# Patient Record
Sex: FEMALE | Race: WHITE | NOT HISPANIC OR LATINO | Employment: OTHER | URBAN - METROPOLITAN AREA
[De-identification: names, ages, dates, MRNs, and addresses within clinical notes are randomized per-mention and may not be internally consistent; named-entity substitution may affect disease eponyms.]

---

## 2017-01-04 DIAGNOSIS — I25.9 CHRONIC ISCHEMIC HEART DISEASE: Primary | ICD-10-CM

## 2017-01-04 RX ORDER — ISOSORBIDE MONONITRATE 60 MG/1
60 TABLET, EXTENDED RELEASE ORAL DAILY
Qty: 90 TABLET | Refills: 3 | Status: SHIPPED | OUTPATIENT
Start: 2017-01-04 | End: 2018-01-06

## 2017-01-04 NOTE — TELEPHONE ENCOUNTER
ISOSORBIDE [place] ER 60 MG TABLET  Last Written Prescription Date: 12/08/2016  Last Fill Quantity: 30,  # refills: 0   Last Office Visit with [facility], [facility] or [facility] prescribing provider: 12/20/2016                                         Next 5 appointments (look out 90 days)     Mar 20, 2017  9:15 AM   Return Visit with Kalyan Luis MD   [facility] [place] ([facility])    [address] [place] [id number] [phone]

## 2017-01-13 ENCOUNTER — TELEPHONE (OUTPATIENT)
Dept: FAMILY MEDICINE | Facility: CLINIC | Age: 82
End: 2017-01-13

## 2017-01-13 NOTE — TELEPHONE ENCOUNTER
Reason for Call:  Form, our goal is to have forms completed with 72 hours, however, some forms may require a visit or additional information.    Type of letter, form or note:  [address]    Who is the form from?: [address] (if other please explain)    Where did the form come from: form was faxed in    What clinic location was the form placed at?: [facility]    Where the form was placed: Dr's Box: Antwon Schmidt MD    What number is listed as a contact on the form?: [address], fax # [phone]       Additional comments: Need for Reasonable Accommodations Verification Form    Call taken on 1/13/2017 at 1:34 PM by YAQUELIN PHELPS

## 2017-02-08 DIAGNOSIS — I25.9 CHRONIC ISCHEMIC HEART DISEASE, UNSPECIFIED: Primary | ICD-10-CM

## 2017-02-08 RX ORDER — RANOLAZINE 500 MG/1
500 TABLET, EXTENDED RELEASE ORAL 2 TIMES DAILY
Qty: 180 TABLET | Refills: 2 | Status: SHIPPED | OUTPATIENT
Start: 2017-02-08 | End: 2018-02-19

## 2017-03-20 ENCOUNTER — OFFICE VISIT (OUTPATIENT)
Dept: CARDIOLOGY | Facility: CLINIC | Age: 82
End: 2017-03-20
Attending: INTERNAL MEDICINE
Payer: MEDICARE

## 2017-03-20 VITALS
DIASTOLIC BLOOD PRESSURE: 64 MMHG | WEIGHT: 205 LBS | BODY MASS INDEX: 37.73 KG/M2 | SYSTOLIC BLOOD PRESSURE: 112 MMHG | HEART RATE: 68 BPM | HEIGHT: 62 IN

## 2017-03-20 DIAGNOSIS — I25.9 CHRONIC ISCHEMIC HEART DISEASE: Primary | ICD-10-CM

## 2017-03-20 DIAGNOSIS — I20.9 ISCHEMIC CHEST PAIN (H): ICD-10-CM

## 2017-03-20 DIAGNOSIS — I10 HYPERTENSION GOAL BP (BLOOD PRESSURE) < 140/90: ICD-10-CM

## 2017-03-20 PROCEDURE — 99213 OFFICE O/P EST LOW 20 MIN: CPT | Performed by: INTERNAL MEDICINE

## 2017-03-20 NOTE — PROGRESS NOTES
HPI and Plan:   See dictation  [id number]    Orders Placed This Encounter   Procedures     Follow-Up with Cardiologist       No orders of the defined types were placed in this encounter.      There are no discontinued medications.      Encounter Diagnoses   Name Primary?     Chronic ischemic heart disease Yes     Hypertension goal BP (blood pressure) < 140/90      Ischemic chest pain (H)        CURRENT MEDICATIONS:  Current Outpatient Prescriptions   Medication Sig Dispense Refill     ranolazine (RANEXA) 500 MG 12 hr tablet Take 1 tablet (500 mg) by mouth 2 times daily 180 tablet 2     isosorbide mononitrate (IMDUR) 60 MG 24 hr tablet Take 1 tablet (60 mg) by mouth daily 90 tablet 3     metFORMIN (GLUCOPHAGE-XR) 750 MG 24 hr tablet TAKE ONE TABLET BY MOUTH ONE TIME DAILY WITH DINNER 90 tablet 1     LYRICA 150 MG capsule TAKE ONE CAPSULE TWICE A DAY 60 capsule 5     nitroglycerin (NITROSTAT) 0.4 MG SL tablet Place 1 tablet (0.4 mg) under the tongue every 5 minutes as needed for chest pain 25 tablet 3     atorvastatin (LIPITOR) 40 MG tablet Take 1 tablet (40 mg) by mouth daily 90 tablet 3     amLODIPine (NORVASC) 5 MG tablet Take 1 tablet (5 mg) by mouth daily 90 tablet 3     losartan (COZAAR) 50 MG tablet TAKE ONE TABLET BY MOUTH ONE TIME DAILY 90 tablet 2     levothyroxine (SYNTHROID, LEVOTHROID) 25 MCG tablet TAKE ONE TABLET BY MOUTH ONE TIME DAILY  90 tablet 3     order for DME Equipment being ordered: wheeled walker with brakes and a seat 1 Device 0     traMADol (ULTRAM) 50 MG tablet Take 1 tablet (50 mg) by mouth every 8 hours as needed for moderate pain (Patient taking differently: Take 50 mg by mouth as needed for moderate pain ) 60 tablet 3     ACETAMINOPHEN PO Take 1,000 mg by mouth 2 times daily       Travoprost (TRAVATAN) 0.004 % SOLN Place 1 drop into both eyes At Bedtime        psyllium (METAMUCIL) 30.9 % POWD        aspirin (ASPIRIN LOW DOSE) 81 MG tablet Take 1 tablet by mouth daily.       Cholecalciferol  (HM VITAMIN D3) 4000 UNITS CAPS Take 1 tablet by mouth daily.       metoprolol (LOPRESSOR) 100 MG tablet TAKE 1/2 TABLET BY MOUTH EVERY AM AND TAKE 1 TABLET IN THE EVENING. 135 tablet 2       ALLERGIES     Allergies   Allergen Reactions     Hydrochlorothiazide      Pancreatitis - patient denies this.  She says it gave her low sodium.       PAST MEDICAL HISTORY:  Past Medical History   Diagnosis Date     Angina at rest (H)      Thought to be microvascular     Arthritis      Basal cell carcinoma 1/2014, (3/5/6-2014)     nose and check and ear and face     Coronary artery disease      Mild CAD per 2/2008 angiogram     Hyperlipidaemia      Hypertension      Hypothyroidism 1/17/2013     Mitral regurgitation 6/2015     echo     Type 2 diabetes, HbA1C goal < 8% (H) 9/11/2013       PAST SURGICAL HISTORY:  Past Surgical History   Procedure Laterality Date     Cholecystectomy  1975     Back surgery  1996     Appendectomy  1974     Hysterectomy  1970     Oophorectomy  1974     bilateral     Bladder surgery  1990     bladder suspension     Face surgery  1-6/2014     basal cell plus plastic to nose, face , ear     Coronary angiography adult order  2/2008     Mild CAD. LAD w/20% stenosis, RCA with 30-40% stenosis. No flow limiting obstructions.     Phacoemulsification clear cornea with standard intraocular lens implant Right 7/21/2015     Procedure: PHACOEMULSIFICATION CLEAR CORNEA WITH STANDARD INTRAOCULAR LENS IMPLANT;  Surgeon: Schuyler Chapa MD;  Location: [facility]     Phacoemulsification clear cornea with standard intraocular lens implant Left 8/18/2015     Procedure: PHACOEMULSIFICATION CLEAR CORNEA WITH STANDARD INTRAOCULAR LENS IMPLANT;  Surgeon: Schuyler Chapa MD;  Location: [facility]       FAMILY HISTORY:  Family History   Problem Relation Age of Onset     CANCER Brother      lung     CANCER Son      CEREBROVASCULAR DISEASE Mother      and Parkinson's     CANCER Father      pancreas     DIABETES Son        SOCIAL  "HISTORY:  Social History     Social History     Marital status:      Spouse name: N/A     Number of children: N/A     Years of education: N/A     Social History Main Topics     Smoking status: Never Smoker     Smokeless tobacco: Never Used     Alcohol use Yes      Comment: Cocktail 2-3 times per month     Drug use: No     Sexual activity: No     Other Topics Concern     Parent/Sibling W/ Cabg, Mi Or Angioplasty Before 65f 55m? No     Caffeine Concern Yes     4 cups caffeine per day     Sleep Concern No     Stress Concern No     Weight Concern No     Special Diet No     Exercise Yes     exercises 30 minutes, 4 days week     Social History Narrative    Had 2 sons; one ; had 2 step dtrs; one .                                        times 2       Review of Systems:  Skin:  Negative       Eyes:  Positive for glaucoma;glasses    ENT:  Negative      Respiratory:  Positive for dyspnea on exertion     Cardiovascular:    Positive for;lightheadedness;dizziness;edema    Gastroenterology: Positive for constipation    Genitourinary:  Positive for nocturia    Musculoskeletal:  Positive for joint pain;arthritis    Neurologic:  Positive for numbness or tingling of feet neuropathy   Psychiatric:  Negative      Heme/Lymph/Imm:  Positive for allergies    Endocrine:  Positive for thyroid disorder;diabetes      Physical Exam:  Vitals: /64  Pulse 68  Ht 1.575 m (5' 2\")  Wt 93 kg (205 lb)  LMP  (LMP Unknown)  BMI 37.49 kg/m2    Constitutional:  cooperative;alert and oriented        Skin:  warm and dry to the touch        Head:  normocephalic        Eyes:  pupils equal and round        ENT:  not assessed this visit        Neck:  JVP normal        Chest:  clear to auscultation          Cardiac: regular rhythm;normal S1 and S2   S4              Abdomen:  abdomen soft;BS normoactive        Vascular: not assessed this visit                                        Extremities and Back:  no spinal abnormalities noted      "         Neurological:  no gross motor deficits              CC  Kalyan Luis MD   [facility]  [address]  [place] [id number]

## 2017-03-20 NOTE — PROGRESS NOTES
2017             Antwon Schmidt MD   [facility]    [address] [place] [address]      RE: Lora Vergara   MRN: [id number]   : 1925      Dear Antwon:      I had the pleasure of seeing Lora Vergara in Cardiology Clinic, who has chronic ischemic chest discomfort related to microvascular angina.  She has had a coronary angiography in , which showed mild to moderate coronary artery atherosclerosis, but no obstructive disease.  However, chest discomfort and jaw pain have been suspicious for angina and responded to Imdur and Ranexa.  With that, she has had no need for taking sublingual nitroglycerin.  She has been still quite active at her age.  She drives.   She denies any recent chest discomfort, orthopnea or PND.        PHYSICAL EXAMINATION:  Blood pressure 112/64, pulse 68 per minute and regular.  Cardiopulmonary examination is unremarkable and unchanged.        IMAGING:  Her last echocardiogram from 2016 had shown aortic valve sclerosis.  Ejection fraction was greater than 70%.  Last stress nuclear study from  showed no ischemia.      IMPRESSION:   1.  Microvascular angina/coronary artery atherosclerosis.  At this time, pain is well controlled on a combination of Imdur, Ranexa, amlodipine and p.r.n. sublingual nitro.  She has not taken sublingual nitro for a while now.  She is on baby aspirin.  She is also on metoprolol.  At this time, her blood pressure is quite well controlled.  Last LDL was 48 in 2016.   2.  Hyperlipidemia.  Continue atorvastatin.   3.  Hypertension, well controlled.   4.  Aortic valve sclerosis.  We will consider an echocardiogram next year to reassess this.        Overall, she is doing well.  I made no changes to her medications.  She will return to see me in followup in 6 months or earlier as needed.      Sincerely,      MD LIDYA Rosa MD             D: 2017 09:43   T: 2017  12:05   MT: tiffany      Name:     FRAN CALDWELL   MRN:      [id number]        Account:      [id number]   :      1925           Service Date: 2017      Document: [id number]

## 2017-03-20 NOTE — LETTER
2017             Antwon Schmidt MD   [facility]    [address] [place] [address]      RE: Lora Vergara   MRN: [id number]   : 1925      Dear Antwon:      I had the pleasure of seeing Lora Vergara in Cardiology Clinic, who has chronic ischemic chest discomfort related to microvascular angina.  She has had a coronary angiography in , which showed mild to moderate coronary artery atherosclerosis, but no obstructive disease.  However, chest discomfort and jaw pain have been suspicious for angina and responded to Imdur and Ranexa.  With that, she has had no need for taking sublingual nitroglycerin.  She has been still quite active at her age.  She drives.   She denies any recent chest discomfort, orthopnea or PND.        PHYSICAL EXAMINATION:  Blood pressure 112/64, pulse 68 per minute and regular.  Cardiopulmonary examination is unremarkable and unchanged.        IMAGING:  Her last echocardiogram from 2016 had shown aortic valve sclerosis.  Ejection fraction was greater than 70%.  Last stress nuclear study from  showed no ischemia.      IMPRESSION:   1.  Microvascular angina/coronary artery atherosclerosis.  At this time, pain is well controlled on a combination of Imdur, Ranexa, amlodipine and p.r.n. sublingual nitro.  She has not taken sublingual nitro for a while now.  She is on baby aspirin.  She is also on metoprolol.  At this time, her blood pressure is quite well controlled.  Last LDL was 48 in 2016.   2.  Hyperlipidemia.  Continue atorvastatin.   3.  Hypertension, well controlled.   4.  Aortic valve sclerosis.  We will consider an echocardiogram next year to reassess this.                  Overall, she is doing well.  I made no changes to her medications.  She will return to see me in followup in 6 months or earlier as needed.      Sincerely,      Kalyan Luis MD

## 2017-03-20 NOTE — MR AVS SNAPSHOT
"              After Visit Summary   3/20/2017    Lora Vergara    MRN: [id number]           Patient Information     Date Of Birth          9/11/1925        Visit Information        Provider Department      3/20/2017 9:15 AM Kalyan Luis MD [facility]        Today's Diagnoses     Chronic ischemic heart disease    -  1    Hypertension goal BP (blood pressure) < 140/90        Ischemic chest pain (H)           Follow-ups after your visit        Additional Services     Follow-Up with Cardiologist                 Future tests that were ordered for you today     Open Future Orders        Priority Expected Expires Ordered    Follow-Up with Cardiologist Routine 9/16/2017 3/20/2018 3/20/2017            Who to contact     If you have questions or need follow up information about today's clinic visit or your schedule please contact [facility] directly at [phone].  Normal or non-critical lab and imaging results will be communicated to you by MyChart, letter or phone within 4 business days after the clinic has received the results. If you do not hear from us within 7 days, please contact the clinic through [employer] or phone. If you have a critical or abnormal lab result, we will notify you by phone as soon as possible.  Submit refill requests through "[employer] or call your pharmacy and they will forward the refill request to us. Please allow 3 business days for your refill to be completed.          Additional Information About Your Visit        MyChart Information     "[employer] lets you send messages to your doctor, view your test results, renew your prescriptions, schedule appointments and more. To sign up, go to [email or web address] . Click on \"Log in\" on the left side of the screen, which will take you to the Welcome page. Then click on \"Sign up Now\" on the right side of the page.     You will be asked to enter the access code " "listed below, as well as some personal information. Please follow the directions to create your username and password.     Your access code is: [email or web address]  Expires: 2017  9:18 AM     Your access code will  in 90 days. If you need help or a new code, please call your [place] clinic or [phone].        Care EveryWhere ID     This is your Care EveryWhere ID. This could be used by other organizations to access your [place] medical records  [id number]        Your Vitals Were     Pulse Height Last Period BMI (Body Mass Index)          68 1.575 m (5' 2\") (LMP Unknown) 37.49 kg/m2         Blood Pressure from Last 3 Encounters:   17 112/64   16 120/60   16 114/68    Weight from Last 3 Encounters:   17 93 kg (205 lb)   16 95.3 kg (210 lb)   16 95.8 kg (211 lb 1.6 oz)              We Performed the Following     Follow-Up with Cardiologist          Today's Medication Changes          These changes are accurate as of: 3/20/17  9:18 AM.  If you have any questions, ask your nurse or doctor.               These medicines have changed or have updated prescriptions.        Dose/Directions    traMADol 50 MG tablet   Commonly known as:  ULTRAM   This may have changed:  when to take this   Used for:  Right hip pain, Trochanteric bursitis, right        Dose:  50 mg   Take 1 tablet (50 mg) by mouth every 8 hours as needed for moderate pain   Quantity:  60 tablet   Refills:  3                Primary Care Provider Office Phone # Fax #    Antwon Schmidt [phone]       [facility] [address] [facility] [address]        Thank you!     Thank you for choosing [facility] [place]  for your care. Our goal is always to provide you with excellent care. Hearing back from our patients is one way we can continue to improve our services. Please take a few minutes to complete the written survey that you may receive in " the mail after your visit with us. Thank you!             Your Updated Medication List - Protect others around you: Learn how to safely use, store and throw away your medicines at www.disposemymeds.org.          This list is accurate as of: 3/20/17  9:18 AM.  Always use your most recent med list.                   Brand Name Dispense Instructions for use    ACETAMINOPHEN PO      Take 1,000 mg by mouth 2 times daily       amLODIPine 5 MG tablet    NORVASC    90 tablet    Take 1 tablet (5 mg) by mouth daily       ASPIRIN LOW DOSE 81 MG tablet   Generic drug:  aspirin      Take 1 tablet by mouth daily.       atorvastatin 40 MG tablet    LIPITOR    90 tablet    Take 1 tablet (40 mg) by mouth daily       HM VITAMIN D3 4000 UNITS Caps   Generic drug:  Cholecalciferol      Take 1 tablet by mouth daily.       isosorbide mononitrate 60 MG 24 hr tablet    IMDUR    90 tablet    Take 1 tablet (60 mg) by mouth daily       levothyroxine 25 MCG tablet    SYNTHROID/LEVOTHROID    90 tablet    TAKE ONE TABLET BY MOUTH ONE TIME DAILY       losartan 50 MG tablet    COZAAR    90 tablet    TAKE ONE TABLET BY MOUTH ONE TIME DAILY       LYRICA 150 MG capsule   Generic drug:  pregabalin     60 capsule    TAKE ONE CAPSULE TWICE A DAY       METAMUCIL 30.9 % Powd   Generic drug:  psyllium          metFORMIN 750 MG 24 hr tablet    GLUCOPHAGE-XR    90 tablet    TAKE ONE TABLET BY MOUTH ONE TIME DAILY WITH DINNER       metoprolol 100 MG tablet    LOPRESSOR    135 tablet    TAKE 1/2 TABLET BY MOUTH EVERY AM AND TAKE 1 TABLET IN THE EVENING.       nitroglycerin 0.4 MG sublingual tablet    NITROSTAT    25 tablet    Place 1 tablet (0.4 mg) under the tongue every 5 minutes as needed for chest pain       order for DME     1 Device    Equipment being ordered: wheeled walker with brakes and a seat       ranolazine 500 MG 12 hr tablet    RANEXA    180 tablet    Take 1 tablet (500 mg) by mouth 2 times daily       traMADol 50 MG tablet    ULTRAM    60  tablet    Take 1 tablet (50 mg) by mouth every 8 hours as needed for moderate pain       TRAVATAN 0.004 % Soln   Generic drug:  Travoprost      Place 1 drop into both eyes At Bedtime

## 2017-03-22 ENCOUNTER — OFFICE VISIT (OUTPATIENT)
Dept: FAMILY MEDICINE | Facility: CLINIC | Age: 82
End: 2017-03-22
Payer: MEDICARE

## 2017-03-22 VITALS
HEART RATE: 79 BPM | BODY MASS INDEX: 35.77 KG/M2 | SYSTOLIC BLOOD PRESSURE: 132 MMHG | OXYGEN SATURATION: 95 % | DIASTOLIC BLOOD PRESSURE: 76 MMHG | TEMPERATURE: 98.1 F | HEIGHT: 64 IN | RESPIRATION RATE: 14 BRPM | WEIGHT: 209.5 LBS

## 2017-03-22 DIAGNOSIS — I25.9 CHRONIC ISCHEMIC HEART DISEASE: ICD-10-CM

## 2017-03-22 DIAGNOSIS — E11.40 TYPE 2 DIABETES MELLITUS WITH DIABETIC NEUROPATHY, WITHOUT LONG-TERM CURRENT USE OF INSULIN (H): ICD-10-CM

## 2017-03-22 DIAGNOSIS — Z00.00 ROUTINE GENERAL MEDICAL EXAMINATION AT A HEALTH CARE FACILITY: Primary | ICD-10-CM

## 2017-03-22 DIAGNOSIS — R39.89 ABNORMAL URINE COLOR: ICD-10-CM

## 2017-03-22 DIAGNOSIS — G62.9 NEUROPATHY: Chronic | ICD-10-CM

## 2017-03-22 DIAGNOSIS — I10 HYPERTENSION GOAL BP (BLOOD PRESSURE) < 140/90: ICD-10-CM

## 2017-03-22 LAB
ALBUMIN UR-MCNC: NEGATIVE MG/DL
APPEARANCE UR: CLEAR
BILIRUB UR QL STRIP: NEGATIVE
COLOR UR AUTO: YELLOW
GLUCOSE UR STRIP-MCNC: NEGATIVE MG/DL
HGB UR QL STRIP: NEGATIVE
KETONES UR STRIP-MCNC: NEGATIVE MG/DL
LEUKOCYTE ESTERASE UR QL STRIP: ABNORMAL
NITRATE UR QL: NEGATIVE
NON-SQ EPI CELLS #/AREA URNS LPF: NORMAL /LPF
PH UR STRIP: 5.5 PH (ref 5–7)
RBC #/AREA URNS AUTO: NORMAL /HPF (ref 0–2)
SP GR UR STRIP: 1.02 (ref 1–1.03)
URN SPEC COLLECT METH UR: ABNORMAL
UROBILINOGEN UR STRIP-ACNC: 0.2 EU/DL (ref 0.2–1)
WBC #/AREA URNS AUTO: NORMAL /HPF (ref 0–2)

## 2017-03-22 PROCEDURE — G0439 PPPS, SUBSEQ VISIT: HCPCS | Performed by: INTERNAL MEDICINE

## 2017-03-22 PROCEDURE — 81001 URINALYSIS AUTO W/SCOPE: CPT | Performed by: INTERNAL MEDICINE

## 2017-03-22 PROCEDURE — 36415 COLL VENOUS BLD VENIPUNCTURE: CPT | Performed by: INTERNAL MEDICINE

## 2017-03-22 PROCEDURE — 80053 COMPREHEN METABOLIC PANEL: CPT | Performed by: INTERNAL MEDICINE

## 2017-03-22 NOTE — NURSING NOTE
"Chief Complaint   Patient presents with     Medicare Visit     89 year old female presents for annual wellness exam.       Initial /76 (BP Location: Right arm, Patient Position: Chair, Cuff Size: Adult Regular)  Pulse 79  Temp 98.1  F (36.7  C) (Tympanic)  Resp 14  Ht 5' 3.5\" (1.613 m)  Wt 209 lb 8 oz (95 kg)  LMP  (LMP Unknown)  SpO2 95%  Breastfeeding? No  BMI 36.53 kg/m2 Estimated body mass index is 36.53 kg/(m^2) as calculated from the following:    Height as of this encounter: 5' 3.5\" (1.613 m).    Weight as of this encounter: 209 lb 8 oz (95 kg).  Medication Reconciliation: complete     April Joy LPN  "

## 2017-03-22 NOTE — PROGRESS NOTES
SUBJECTIVE:                                                            Lora Vergara is a 89 year old female who presents for Preventive Visit  Are you in the first 12 months of your Medicare Part B coverage?  No    Healthy Habits:    Do you get at least three servings of calcium containing foods daily (dairy, green leafy vegetables, etc.)? yes    Amount of exercise or daily activities, outside of work: 5 day(s) per week    Problems taking medications regularly No    Medication side effects: No    Have you had an eye exam in the past two years? yes    Do you see a dentist twice per year? yes    Do you have sleep apnea, excessive snoring or daytime drowsiness?no    COGNITIVE SCREEN  1) Repeat 3 items (Banana, Sunrise, Chair)    2) Clock draw: NORMAL  3) 3 item recall: Recalls 2 objects   Results: NORMAL clock, 1-2 items recalled: COGNITIVE IMPAIRMENT LESS LIKELY    Mini-CogTM Copyright S [employer]. Licensed by the author for use in [facility]; reprinted with permission (benito@[facility]). All rights reserved.                         She reports no major or urgent problems.          hhas not used any nitroglycerin for several months.                    she still c/o jasmyne/yellow urine,despite any amount of fluid intake.        Reviewed and updated as needed this visit by clinical staff  Tobacco  Allergies  Meds  Med Hx  Surg Hx  Fam Hx  Soc Hx        Reviewed and updated as needed this visit by Provider        Social History   Substance Use Topics     Smoking status: Never Smoker     Smokeless tobacco: Never Used     Alcohol use Yes      Comment: Cocktail 2-3 times per month       The patient does not drink >3 drinks per day nor >7 drinks per week.    Today's PHQ-2 Score:   PHQ-2 ( 1999 [employer]) 3/22/2017 12/20/2016   Q1: Little interest or pleasure in doing things 0 0   Q2: Feeling down, depressed or hopeless 1 0   PHQ-2 Score 1 0       Do you feel safe in your environment - Yes    Do you have a Health  Care Directive?: Yes: Advance Directive has been received and scanned.    Current providers sharing in care for this patient include:   Patient Care Team:  Antwon Schmidt MD as PCP - General (Internal Medicine)      Hearing impairment: Yes, Difficulty following a conversation in a noisy restaurant or crowded room.    Difficulty understanding soft or whispered speech.    Ability to successfully perform activities of daily living: Yes, no assistance needed     Fall risk:  Fallen 2 or more times in the past year?: No  Any fall with injury in the past year?: No    Home safety:  none identified      The following health maintenance items are reviewed in Epic and correct as of today:  Health Maintenance   Topic Date Due     EYE EXAM Q1 YEAR( NO INBASKET)  01/23/2016     MEDICARE ANNUAL WELLNESS VISIT  01/23/2016     MICROALBUMIN Q1 YEAR( NO INBASKET)  07/20/2016     FOOT EXAM Q1 YEAR( NO INBASKET)  03/09/2017     LIPID MONITORING Q1 YEAR( NO INBASKET)  03/09/2017     A1C Q6 MO( NO INBASKET)  06/20/2017     INFLUENZA VACCINE (SYSTEM ASSIGNED)  09/01/2017     CREATININE Q1 YEAR (NO INBASKET)  12/20/2017     FALL RISK ASSESSMENT  12/20/2017     TSH W/ FREE T4 REFLEX Q2 YEAR (NO INBASKET)  12/20/2018     ADVANCE DIRECTIVE PLANNING Q5 YRS (NO INBASKET)  05/17/2021     TETANUS IMMUNIZATION (SYSTEM ASSIGNED)  01/23/2025     PNEUMOCOCCAL  Completed              ROS:  C: NEGATIVE for fever, chills, change in weight  I: NEGATIVE for worrisome rashes, moles or lesions  E: NEGATIVE for vision changes or irritation  E/M: NEGATIVE for ear, mouth and throat problems  R: NEGATIVE for significant cough or SOB  B: NEGATIVE for masses, tenderness or discharge  CV: NEGATIVE for orthopnea and palpitations  GI: NEGATIVE for nausea, abdominal pain, heartburn, or change in bowel habits  : NEGATIVE for frequency, dysuria, or hematuria  NEURO: POSITIVE for stable symptoms of peripheral neuropathy, which affects her walking ability.  E:  "NEGATIVE for temperature intolerance, skin/hair changes  H: NEGATIVE for bleeding problems  P: NEGATIVE for changes in mood or affect    BP Readings from Last 3 Encounters:   03/22/17 132/76   03/20/17 112/64   12/20/16 120/60    Wt Readings from Last 3 Encounters:   03/22/17 209 lb 8 oz (95 kg)   03/20/17 205 lb (93 kg)   12/20/16 210 lb (95.3 kg)                  Patient Active Problem List    Diagnosis Date Noted     Other and unspecified angina pectoris 01/22/2015     Priority: High     Chronic ischemic heart disease 11/15/2013     Priority: High     Mild to moderate on coronary angiography per Cardiology; angina is thought to be \" microvascular\"; medical Rx advised by Cardiology.         Echo in 2010 showed normal LV systolic fxn with grade I diastolic dysfxn           Type 2 diabetes mellitus with diabetic neuropathy (H) 09/11/2013     Priority: High     Hypertension goal BP (blood pressure) < 140/90 01/17/2013     Priority: High     CXR neg in 6/13       Hyperlipidemia LDL goal <70 01/17/2013     Priority: High     Hypothyroidism, unspecified type 12/20/2016     Priority: Medium     SOB (shortness of breath) 03/16/2016     Priority: Medium     Ischemic chest pain (H) 03/16/2016     Priority: Medium     Hypothyroidism 03/09/2016     Priority: Medium     Low back pain 03/17/2015     Priority: Medium     See Dr Sutton note 2/15; pt reports MARANDA was helpful  Diagnosis updated by automated process. Provider to review and confirm.       Overweight 12/27/2013     Priority: Medium     Sent for a sleep study by Dr. Luis of Cardiology in the remote past; pt did not sleep enough for a valid test    Problem list name updated by automated process. Provider to review       BCC (basal cell carcinoma), face 11/27/2013     Priority: Medium     Left nose; will have Mohs surgery 1/6/14, and plastic surgery 1/7/14 with Dr. Ernestina Torres        LLQ abdominal pain 11/26/2013     Priority: Medium     2011; CT: possible mild " diverticulitis         Neuropathy (H) 04/29/2013     Priority: Medium     Uses Lyrica BID       ACP (advance care planning) 03/09/2016     Priority: Low     Advance Care Planning 5/17/2016: Receipt of ACP document:  Received: Health Care Directive which was witnessed or notarized on 11-12-12.  Document previously scanned on 12-28-12.  Validation form completed and sent to be scanned.  Code Status reflects choices in most recent ACP document.  Confirmed/documented designated decision maker(s).  Added by Kymberly Nolen RN Advance Care Planning Liaison with [employer]  Advance Care Planning 3/9/2016: Receipt of ACP document:  Received: POLST which was signed and dated by provider on 3/9/16.  Document not previously scanned.  Reviewed for validity as medical order and sent to be scanned.  Code Status needs to be updated to reflect choices in most recent ACP document. Notification sent to Dr. Antwon Schmidt for followup.  Confirmed/documented designated decision maker(s).  Added by Katalina Davidson             Preventive measure 11/26/2013     Priority: Low     Pre visit report from [facility] reviewed                                   Mammogram 8/14        CXR 6/13                                      DXA wnl  in 2014           Past Surgical History:   Procedure Laterality Date     APPENDECTOMY  1974     BACK SURGERY  1996     BLADDER SURGERY  1990    bladder suspension     CHOLECYSTECTOMY  1975     CORONARY ANGIOGRAPHY ADULT ORDER  2/2008    Mild CAD. LAD w/20% stenosis, RCA with 30-40% stenosis. No flow limiting obstructions.     face surgery  1-6/2014    basal cell plus plastic to nose, face , ear     HYSTERECTOMY  1970     OOPHORECTOMY  1974    bilateral     PHACOEMULSIFICATION CLEAR CORNEA WITH STANDARD INTRAOCULAR LENS IMPLANT Right 7/21/2015    Procedure: PHACOEMULSIFICATION CLEAR CORNEA WITH STANDARD INTRAOCULAR LENS IMPLANT;  Surgeon: Schuyler Chapa MD;  Location: [facility]     PHACOEMULSIFICATION CLEAR CORNEA  WITH STANDARD INTRAOCULAR LENS IMPLANT Left 8/18/2015    Procedure: PHACOEMULSIFICATION CLEAR CORNEA WITH STANDARD INTRAOCULAR LENS IMPLANT;  Surgeon: Schuyler Chapa MD;  Location: [facility]       Social History   Substance Use Topics     Smoking status: Never Smoker     Smokeless tobacco: Never Used     Alcohol use Yes      Comment: Cocktail 2-3 times per month     Family History   Problem Relation Age of Onset     CANCER Brother      lung     CANCER Son      CEREBROVASCULAR DISEASE Mother      and Parkinson's     CANCER Father      pancreas     DIABETES Son          Current Outpatient Prescriptions   Medication Sig Dispense Refill     ranolazine (RANEXA) 500 MG 12 hr tablet Take 1 tablet (500 mg) by mouth 2 times daily 180 tablet 2     isosorbide mononitrate (IMDUR) 60 MG 24 hr tablet Take 1 tablet (60 mg) by mouth daily 90 tablet 3     metFORMIN (GLUCOPHAGE-XR) 750 MG 24 hr tablet TAKE ONE TABLET BY MOUTH ONE TIME DAILY WITH DINNER 90 tablet 1     LYRICA 150 MG capsule TAKE ONE CAPSULE TWICE A DAY 60 capsule 5     nitroglycerin (NITROSTAT) 0.4 MG SL tablet Place 1 tablet (0.4 mg) under the tongue every 5 minutes as needed for chest pain 25 tablet 3     atorvastatin (LIPITOR) 40 MG tablet Take 1 tablet (40 mg) by mouth daily 90 tablet 3     amLODIPine (NORVASC) 5 MG tablet Take 1 tablet (5 mg) by mouth daily 90 tablet 3     losartan (COZAAR) 50 MG tablet TAKE ONE TABLET BY MOUTH ONE TIME DAILY 90 tablet 2     metoprolol (LOPRESSOR) 100 MG tablet TAKE 1/2 TABLET BY MOUTH EVERY AM AND TAKE 1 TABLET IN THE EVENING. 135 tablet 2     levothyroxine (SYNTHROID, LEVOTHROID) 25 MCG tablet TAKE ONE TABLET BY MOUTH ONE TIME DAILY  90 tablet 3     order for DME Equipment being ordered: wheeled walker with brakes and a seat 1 Device 0     traMADol (ULTRAM) 50 MG tablet Take 1 tablet (50 mg) by mouth every 8 hours as needed for moderate pain (Patient taking differently: Take 50 mg by mouth as needed for moderate pain ) 60  "tablet 3     ACETAMINOPHEN PO Take 1,000 mg by mouth 2 times daily       Travoprost (TRAVATAN) 0.004 % SOLN Place 1 drop into both eyes At Bedtime        psyllium (METAMUCIL) 30.9 % POWD        aspirin (ASPIRIN LOW DOSE) 81 MG tablet Take 1 tablet by mouth daily.       Cholecalciferol (HM VITAMIN D3) 4000 UNITS CAPS Take 1 tablet by mouth daily.       Allergies   Allergen Reactions     Hydrochlorothiazide      Pancreatitis - patient denies this.  She says it gave her low sodium.     OBJECTIVE:                                                            /76 (BP Location: Right arm, Patient Position: Chair, Cuff Size: Adult Regular)  Pulse 79  Temp 98.1  F (36.7  C) (Tympanic)  Resp 14  Ht 5' 3.5\" (1.613 m)  Wt 209 lb 8 oz (95 kg)  LMP  (LMP Unknown)  SpO2 95%  Breastfeeding? No  BMI 36.53 kg/m2 Estimated body mass index is 36.53 kg/(m^2) as calculated from the following:    Height as of this encounter: 5' 3.5\" (1.613 m).    Weight as of this encounter: 209 lb 8 oz (95 kg).  EXAM:   GENERAL: alert, no distress and over weight  HENT: ear canals and TM's normal, nose and mouth without ulcers or lesions  NECK: no adenopathy, no asymmetry, masses, or scars and thyroid normal to palpation  RESP: lungs clear to auscultation - no rales, rhonchi or wheezes  BREAST: normal without masses, tenderness or nipple discharge and no palpable axillary masses or adenopathy  CV: regular rate and rhythm, normal S1 S2, no S3 or S4, no murmur, click or rub, no peripheral edema and peripheral pulses strong  ABDOMEN: soft, nontender, without hepatosplenomegaly or masses  MS: spine exam shows kyphosis  SKIN: no suspicious lesions or rashes  NEURO: gait abnormal:   PSYCH: mentation appears normal, affect normal/bright    ASSESSMENT / PLAN:                                                            Lora was seen today for medicare visit.    Diagnoses and all orders for this visit:    Routine general medical examination at a " "health care facility  -     Urine Microscopic    Type 2 diabetes mellitus with diabetic neuropathy, without long-term current use of insulin (H)  -     Comprehensive metabolic panel (BMP + Alb, Alk Phos, ALT, AST, Total. Bili, TP)  -     UA reflex to Microscopic    Neuropathy (H)    Abnormal urine color  -     UA reflex to Microscopic    Hypertension goal BP (blood pressure) < 140/90    Chronic ischemic heart disease                        Summary and implications:  We reviewed multiple issues. We did some of the necessary lab work in December so that was not repeated today.       We will check for bilirubinuria, and for hyperbilirubinemia, given her observations regarding her urine color.                     For now she will continue the same medicines and follow-up in 6 months.                                     End of Life Planning:  Patient currently has an advanced directive: Yes.  Practitioner is supportive of decision.    COUNSELING:  Reviewed preventive health counseling, as reflected in patient instructions  Special attention given to:       Regular exercise       Healthy diet/nutrition        Estimated body mass index is 36.53 kg/(m^2) as calculated from the following:    Height as of this encounter: 5' 3.5\" (1.613 m).    Weight as of this encounter: 209 lb 8 oz (95 kg).  Weight management plan: Discussed healthy diet and exercise guidelines and patient will follow up in 12 months in clinic to re-evaluate.   reports that she has never smoked. She has never used smokeless tobacco.      Appropriate preventive services were discussed with this patient, including applicable screening as appropriate for cardiovascular disease, diabetes, osteopenia/osteoporosis, and glaucoma.  As appropriate for age/gender, discussed screening for colorectal cancer, prostate cancer, breast cancer, and cervical cancer. Checklist reviewing preventive services available has been given to the patient.    Reviewed patients plan of " care and provided an AVS. The Basic Care Plan (routine screening as documented in Health Maintenance) for Lora meets the Care Plan requirement. This Care Plan has been established and reviewed with the Patient.    Counseling Resources:  ATP IV Guidelines  Pooled Cohorts Equation Calculator  Breast Cancer Risk Calculator  FRAX Risk Assessment  ICSI Preventive Guidelines  Dietary Guidelines for Americans, 2010  USDA's MyPlate  ASA Prophylaxis  Lung CA Screening    Antwon Schmidt MD  [facility]    Results for orders placed or performed in visit on 03/22/17   Comprehensive metabolic panel (BMP + Alb, Alk Phos, ALT, AST, Total. Bili, TP)   Result Value Ref Range    Sodium 136 133 - 144 mmol/L    Potassium 4.2 3.4 - 5.3 mmol/L    Chloride 100 94 - 109 mmol/L    Carbon Dioxide 30 20 - 32 mmol/L    Anion Gap 6 3 - 14 mmol/L    Glucose 88 70 - 99 mg/dL    Urea Nitrogen 17 7 - 30 mg/dL    Creatinine 0.84 0.52 - 1.04 mg/dL    GFR Estimate 64 >60 mL/min/1.7m2    GFR Estimate If Black 77 >60 mL/min/1.7m2    Calcium 8.8 8.5 - 10.1 mg/dL    Bilirubin Total 0.4 0.2 - 1.3 mg/dL    Albumin 3.7 3.4 - 5.0 g/dL    Protein Total 7.0 6.8 - 8.8 g/dL    Alkaline Phosphatase 66 40 - 150 U/L    ALT 17 0 - 50 U/L    AST 12 0 - 45 U/L   UA reflex to Microscopic   Result Value Ref Range    Color Urine Yellow     Appearance Urine Clear     Glucose Urine Negative NEG mg/dL    Bilirubin Urine Negative NEG    Ketones Urine Negative NEG mg/dL    Specific Gravity Urine 1.025 1.003 - 1.035    Blood Urine Negative NEG    pH Urine 5.5 5.0 - 7.0 pH    Protein Albumin Urine Negative NEG mg/dL    Urobilinogen Urine 0.2 0.2 - 1.0 EU/dL    Nitrite Urine Negative NEG    Leukocyte Esterase Urine Small (A) NEG    Source Midstream Urine    Urine Microscopic   Result Value Ref Range    WBC Urine O - 2 0 - 2 /HPF    RBC Urine O - 2 0 - 2 /HPF    Squamous Epithelial /LPF Urine Few FEW /LPF     Letter sent.                 Your lab  results look very good,including the liver,kidney,glucose, and urine tests.      Keep taking the same medications.

## 2017-03-22 NOTE — MR AVS SNAPSHOT
After Visit Summary   3/22/2017    Lora Vergara    MRN: [id number]           Patient Information     Date Of Birth          9/11/1925        Visit Information        Provider Department      3/22/2017 3:15 PM Antwon Schmidt MD [facility]        Today's Diagnoses     Routine general medical examination at a health care facility    -  1    Type 2 diabetes mellitus with diabetic neuropathy, without long-term current use of insulin (H)        Neuropathy (H)        Abnormal urine color        Hypertension goal BP (blood pressure) < 140/90        Chronic ischemic heart disease          Care Instructions      Preventive Health Recommendations  Female Ages 65 +    Yearly exam:     See your health care provider every year in order to  o Review health changes.   o Discuss preventive care.    o Review your medicines if your doctor has prescribed any.      You no longer need a yearly Pap test unless you've had an abnormal Pap test in the past 10 years. If you have vaginal symptoms, such as bleeding or discharge, be sure to talk with your provider about a Pap test.      Every 1 to 2 years, have a mammogram.  If you are over 69, talk with your health care provider about whether or not you want to continue having screening mammograms.      Every 10 years, have a colonoscopy. Or, have a yearly FIT test (stool test). These exams will check for colon cancer.       Have a cholesterol test every 5 years, or more often if your doctor advises it.       Have a diabetes test (fasting glucose) every three years. If you are at risk for diabetes, you should have this test more often.       At age 65, have a bone density scan (DEXA) to check for osteoporosis (brittle bone disease).    Shots:    Get a flu shot each year.    Get a tetanus shot every 10 years.    Talk to your doctor about your pneumonia vaccines. There are now two you should receive - Pneumovax (PPSV 23) and Prevnar (PCV  "13).    Talk to your doctor about the shingles vaccine.    Talk to your doctor about the hepatitis B vaccine.    Nutrition:     Eat at least 5 servings of fruits and vegetables each day.      Eat whole-grain bread, whole-wheat pasta and brown rice instead of white grains and rice.      Talk to your provider about Calcium and Vitamin D.     Lifestyle    Exercise at least 150 minutes a week (30 minutes a day, 5 days a week). This will help you control your weight and prevent disease.      Limit alcohol to one drink per day.      No smoking.       Wear sunscreen to prevent skin cancer.       See your dentist twice a year for an exam and cleaning.      See your eye doctor every 1 to 2 years to screen for conditions such as glaucoma, macular degeneration, cataracts, etc         Follow-ups after your visit        Who to contact     If you have questions or need follow up information about today's clinic visit or your schedule please contact [facility] directly at [phone].  Normal or non-critical lab and imaging results will be communicated to you by [employer], letter or phone within 4 business days after the clinic has received the results. If you do not hear from us within 7 days, please contact the clinic through [employer] or phone. If you have a critical or abnormal lab result, we will notify you by phone as soon as possible.  Submit refill requests through [employer] or call your pharmacy and they will forward the refill request to us. Please allow 3 business days for your refill to be completed.          Additional Information About Your Visit        [employer] Information     [employer] lets you send messages to your doctor, view your test results, renew your prescriptions, schedule appointments and more. To sign up, go to [email or web address] . Click on \"Log in\" on the left side of the screen, which will take you to the Welcome page. Then click on \"Sign up Now\" on the right side of the " "page.     You will be asked to enter the access code listed below, as well as some personal information. Please follow the directions to create your username and password.     Your access code is: [email or web address]  Expires: 2017  9:18 AM     Your access code will  in 90 days. If you need help or a new code, please call your [place] clinic or [phone].        Care EveryWhere ID     This is your Care EveryWhere ID. This could be used by other organizations to access your [place] medical records  [id number]        Your Vitals Were     Pulse Temperature Respirations Height Last Period Pulse Oximetry    79 98.1  F (36.7  C) (Tympanic) 14 5' 3.5\" (1.613 m) (LMP Unknown) 95%    Breastfeeding? BMI (Body Mass Index)                No 36.53 kg/m2           Blood Pressure from Last 3 Encounters:   17 132/76   17 112/64   16 120/60    Weight from Last 3 Encounters:   17 209 lb 8 oz (95 kg)   17 205 lb (93 kg)   16 210 lb (95.3 kg)              We Performed the Following     Comprehensive metabolic panel (BMP + Alb, Alk Phos, ALT, AST, Total. Bili, TP)     UA reflex to Microscopic     Urine Microscopic          Today's Medication Changes          These changes are accurate as of: 3/22/17 11:59 PM.  If you have any questions, ask your nurse or doctor.               These medicines have changed or have updated prescriptions.        Dose/Directions    traMADol 50 MG tablet   Commonly known as:  ULTRAM   This may have changed:  when to take this   Used for:  Right hip pain, Trochanteric bursitis, right        Dose:  50 mg   Take 1 tablet (50 mg) by mouth every 8 hours as needed for moderate pain   Quantity:  60 tablet   Refills:  3                Primary Care Provider Office Phone # Fax #    Antwon Schmidt [phone]       [facility] [address]  [facility] [address]        Thank you!     Thank you for choosing [facility] [place]  for your care. Our goal is always to provide you with excellent care. Hearing back from our patients is one way we can continue to improve our services. Please take a few minutes to complete the written survey that you may receive in the mail after your visit with us. Thank you!             Your Updated Medication List - Protect others around you: Learn how to safely use, store and throw away your medicines at www.disposemymeds.org.          This list is accurate as of: 3/22/17 11:59 PM.  Always use your most recent med list.                   Brand Name Dispense Instructions for use    ACETAMINOPHEN PO      Take 1,000 mg by mouth 2 times daily       amLODIPine 5 MG tablet    NORVASC    90 tablet    Take 1 tablet (5 mg) by mouth daily       ASPIRIN LOW DOSE 81 MG tablet   Generic drug:  aspirin      Take 1 tablet by mouth daily.       atorvastatin 40 MG tablet    LIPITOR    90 tablet    Take 1 tablet (40 mg) by mouth daily       HM VITAMIN D3 4000 UNITS Caps   Generic drug:  Cholecalciferol      Take 1 tablet by mouth daily.       isosorbide mononitrate 60 MG 24 hr tablet    IMDUR    90 tablet    Take 1 tablet (60 mg) by mouth daily       levothyroxine 25 MCG tablet    SYNTHROID/LEVOTHROID    90 tablet    TAKE ONE TABLET BY MOUTH ONE TIME DAILY       losartan 50 MG tablet    COZAAR    90 tablet    TAKE ONE TABLET BY MOUTH ONE TIME DAILY       LYRICA 150 MG capsule   Generic drug:  pregabalin     60 capsule    TAKE ONE CAPSULE TWICE A DAY       METAMUCIL 30.9 % Powd   Generic drug:  psyllium          metFORMIN 750 MG 24 hr tablet    GLUCOPHAGE-XR    90 tablet    TAKE ONE TABLET BY MOUTH ONE TIME DAILY WITH DINNER       metoprolol 100 MG tablet    LOPRESSOR    135 tablet    TAKE 1/2 TABLET BY MOUTH EVERY AM AND TAKE 1 TABLET IN THE EVENING.       nitroglycerin 0.4 MG sublingual tablet    NITROSTAT    25 tablet    Place 1 tablet (0.4 mg) under the tongue every 5 minutes as needed for chest pain       order  for DME     1 Device    Equipment being ordered: wheeled walker with brakes and a seat       ranolazine 500 MG 12 hr tablet    RANEXA    180 tablet    Take 1 tablet (500 mg) by mouth 2 times daily       traMADol 50 MG tablet    ULTRAM    60 tablet    Take 1 tablet (50 mg) by mouth every 8 hours as needed for moderate pain       TRAVATAN 0.004 % Soln   Generic drug:  Travoprost      Place 1 drop into both eyes At Bedtime

## 2017-03-22 NOTE — LETTER
[facility]  [address]  [address]  [place] [facility] [place] [facility] [id number] [phone]                                                                                                           Lora Vergara  [address]    [place] [place] [facility] [address]    March 23, 2017      Dear Lora,    The results of your recent tests were reviewed and are enclosed.            Your lab results look very good,including the liver,kidney,glucose, and urine tests.      Keep taking the same medications.       Thank you for choosing [facility].  We appreciate the opportunity to serve you and look forward to supporting your healthcare needs in the future.    If you have any questions or concerns, please call me or my staff at [phone].      Sincerely,    Antwon Schmidt MD      Results for orders placed or performed in visit on 03/22/17   Comprehensive metabolic panel (BMP + Alb, Alk Phos, ALT, AST, Total. Bili, TP)   Result Value Ref Range    Sodium 136 133 - 144 mmol/L    Potassium 4.2 3.4 - 5.3 mmol/L    Chloride 100 94 - 109 mmol/L    Carbon Dioxide 30 20 - 32 mmol/L    Anion Gap 6 3 - 14 mmol/L    Glucose 88 70 - 99 mg/dL    Urea Nitrogen 17 7 - 30 mg/dL    Creatinine 0.84 0.52 - 1.04 mg/dL    GFR Estimate 64 >60 mL/min/1.7m2    GFR Estimate If Black 77 >60 mL/min/1.7m2    Calcium 8.8 8.5 - 10.1 mg/dL    Bilirubin Total 0.4 0.2 - 1.3 mg/dL    Albumin 3.7 3.4 - 5.0 g/dL    Protein Total 7.0 6.8 - 8.8 g/dL    Alkaline Phosphatase 66 40 - 150 U/L    ALT 17 0 - 50 U/L    AST 12 0 - 45 U/L   UA reflex to Microscopic   Result Value Ref Range    Color Urine Yellow     Appearance Urine Clear     Glucose Urine Negative NEG mg/dL    Bilirubin Urine Negative NEG    Ketones Urine Negative NEG mg/dL    Specific Gravity Urine 1.025 1.003 - 1.035    Blood Urine Negative NEG    pH Urine 5.5 5.0 - 7.0 pH    Protein Albumin Urine Negative NEG mg/dL    Urobilinogen Urine 0.2  0.2 - 1.0 EU/dL    Nitrite Urine Negative NEG    Leukocyte Esterase Urine Small (A) NEG    Source Midstream Urine    Urine Microscopic   Result Value Ref Range    WBC Urine O - 2 0 - 2 /HPF    RBC Urine O - 2 0 - 2 /HPF    Squamous Epithelial /LPF Urine Few FEW /LPF

## 2017-03-23 LAB
ALBUMIN SERPL-MCNC: 3.7 G/DL (ref 3.4–5)
ALP SERPL-CCNC: 66 U/L (ref 40–150)
ALT SERPL W P-5'-P-CCNC: 17 U/L (ref 0–50)
ANION GAP SERPL CALCULATED.3IONS-SCNC: 6 MMOL/L (ref 3–14)
AST SERPL W P-5'-P-CCNC: 12 U/L (ref 0–45)
BILIRUB SERPL-MCNC: 0.4 MG/DL (ref 0.2–1.3)
BUN SERPL-MCNC: 17 MG/DL (ref 7–30)
CALCIUM SERPL-MCNC: 8.8 MG/DL (ref 8.5–10.1)
CHLORIDE SERPL-SCNC: 100 MMOL/L (ref 94–109)
CO2 SERPL-SCNC: 30 MMOL/L (ref 20–32)
CREAT SERPL-MCNC: 0.84 MG/DL (ref 0.52–1.04)
GFR SERPL CREATININE-BSD FRML MDRD: 64 ML/MIN/1.7M2
GLUCOSE SERPL-MCNC: 88 MG/DL (ref 70–99)
POTASSIUM SERPL-SCNC: 4.2 MMOL/L (ref 3.4–5.3)
PROT SERPL-MCNC: 7 G/DL (ref 6.8–8.8)
SODIUM SERPL-SCNC: 136 MMOL/L (ref 133–144)

## 2017-03-26 DIAGNOSIS — I10 HYPERTENSION GOAL BP (BLOOD PRESSURE) < 140/90: ICD-10-CM

## 2017-03-27 NOTE — TELEPHONE ENCOUNTER
metoprolol (LOPRESSOR)      Last Written Prescription Date: 7/5/16  Last Fill Quantity: 135, # refills: 2  Last Office Visit with [facility] or [facility] prescribing provider: 3/22/17       Potassium   Date Value Ref Range Status   03/22/2017 4.2 3.4 - 5.3 mmol/L Final     Creatinine   Date Value Ref Range Status   03/22/2017 0.84 0.52 - 1.04 mg/dL Final     BP Readings from Last 3 Encounters:   03/22/17 132/76   03/20/17 112/64   12/20/16 120/60

## 2017-03-28 RX ORDER — METOPROLOL TARTRATE 100 MG
TABLET ORAL
Qty: 135 TABLET | Refills: 3 | Status: SHIPPED | OUTPATIENT
Start: 2017-03-28 | End: 2017-12-26

## 2017-03-28 NOTE — TELEPHONE ENCOUNTER
Prescription approved per [facility], [facility] or [facility] refill protocol.  Zoe Conde RN  [employer]

## 2017-04-19 DIAGNOSIS — E03.9 HYPOTHYROIDISM: ICD-10-CM

## 2017-04-19 DIAGNOSIS — I15.9 SECONDARY HYPERTENSION WITH GOAL BLOOD PRESSURE LESS THAN 130/80: ICD-10-CM

## 2017-04-19 RX ORDER — LEVOTHYROXINE SODIUM 25 UG/1
TABLET ORAL
Qty: 90 TABLET | Refills: 2 | Status: SHIPPED | OUTPATIENT
Start: 2017-04-19 | End: 2018-01-18

## 2017-04-19 RX ORDER — LOSARTAN POTASSIUM 50 MG/1
TABLET ORAL
Qty: 90 TABLET | Refills: 2 | Status: SHIPPED | OUTPATIENT
Start: 2017-04-19 | End: 2017-09-08

## 2017-04-19 NOTE — TELEPHONE ENCOUNTER
Losartan      Last Written Prescription Date: 7/27/16  Last Fill Quantity: 90, # refills: 2  Last Office Visit with [facility], [facility] or [facility] prescribing provider: 3/22/17       Potassium   Date Value Ref Range Status   03/22/2017 4.2 3.4 - 5.3 mmol/L Final     Creatinine   Date Value Ref Range Status   03/22/2017 0.84 0.52 - 1.04 mg/dL Final     BP Readings from Last 3 Encounters:   03/22/17 132/76   03/20/17 112/64   12/20/16 120/60     Levothyroxine     Last Written Prescription Date: 4/25/16  Last Quantity: 90, # refills: 3  Last Office Visit with [facility], [facility] or [facility] prescribing provider: 3/22/17        TSH   Date Value Ref Range Status   12/20/2016 3.14 0.40 - 5.00 mU/L Final

## 2017-04-24 ENCOUNTER — TELEPHONE (OUTPATIENT)
Dept: FAMILY MEDICINE | Facility: CLINIC | Age: 82
End: 2017-04-24

## 2017-04-24 NOTE — TELEPHONE ENCOUNTER
Reason for Call:  Form, our goal is to have forms completed with 72 hours, however, some forms may require a visit or additional information.    Type of letter, form or note:  [facility] [address]    Who is the form from?: [facility] [address] (if other please explain)    Where did the form come from: form was faxed in    What clinic location was the form placed at?: [facility]    Where the form was placed: Dr's Box: Antwon Schmidt MD    What number is listed as a contact on the form?: [employer] [address], fax # [phone]       Additional comments: Form for Verification of OTC Medications    Call taken on 4/24/2017 at 11:24 AM by YAQUELIN PHELPS

## 2017-05-10 DIAGNOSIS — G62.9 NEUROPATHY: Chronic | ICD-10-CM

## 2017-05-10 RX ORDER — PREGABALIN 150 MG/1
CAPSULE ORAL
Qty: 60 CAPSULE | Refills: 5 | Status: SHIPPED | OUTPATIENT
Start: 2017-05-10 | End: 2017-11-12

## 2017-05-10 NOTE — TELEPHONE ENCOUNTER
LYRICA 150 MG CAPSULE      Last Written Prescription Date:  11/14/2016  Last Fill Quantity: 60,   # refills: 5  Last Office Visit with [facility] or  [facility] prescribing provider: 03/22/2017  Future Office visit:       Routing refill request to provider for review/approval because:  Drug not on the [facility], [facility] or  [employer] refill protocol or controlled substance

## 2017-05-31 ENCOUNTER — HOSPITAL ENCOUNTER (OUTPATIENT)
Facility: CLINIC | Age: 82
Setting detail: OBSERVATION
Discharge: HOME | End: 2017-06-01
Attending: PHYSICIAN ASSISTANT | Admitting: INTERNAL MEDICINE
Payer: MEDICARE

## 2017-05-31 ENCOUNTER — APPOINTMENT (OUTPATIENT)
Dept: GENERAL RADIOLOGY | Facility: CLINIC | Age: 82
End: 2017-05-31
Attending: PHYSICIAN ASSISTANT
Payer: MEDICARE

## 2017-05-31 DIAGNOSIS — R19.7 NAUSEA VOMITING AND DIARRHEA: ICD-10-CM

## 2017-05-31 DIAGNOSIS — R11.2 NAUSEA VOMITING AND DIARRHEA: ICD-10-CM

## 2017-05-31 PROBLEM — K52.9 GASTROENTERITIS: Status: ACTIVE | Noted: 2017-05-31

## 2017-05-31 LAB
ALBUMIN SERPL-MCNC: 3.5 G/DL (ref 3.4–5)
ALP SERPL-CCNC: 50 U/L (ref 40–150)
ALT SERPL W P-5'-P-CCNC: 20 U/L (ref 0–50)
ANION GAP SERPL CALCULATED.3IONS-SCNC: 7 MMOL/L (ref 3–14)
AST SERPL W P-5'-P-CCNC: 16 U/L (ref 0–45)
BASOPHILS # BLD AUTO: 0 10E9/L (ref 0–0.2)
BASOPHILS NFR BLD AUTO: 0 %
BILIRUB SERPL-MCNC: 0.5 MG/DL (ref 0.2–1.3)
BUN SERPL-MCNC: 13 MG/DL (ref 7–30)
C DIFF TOX B STL QL: NORMAL
CALCIUM SERPL-MCNC: 8.4 MG/DL (ref 8.5–10.1)
CHLORIDE SERPL-SCNC: 103 MMOL/L (ref 94–109)
CO2 SERPL-SCNC: 28 MMOL/L (ref 20–32)
CREAT SERPL-MCNC: 0.68 MG/DL (ref 0.52–1.04)
DIFFERENTIAL METHOD BLD: NORMAL
EOSINOPHIL # BLD AUTO: 0 10E9/L (ref 0–0.7)
EOSINOPHIL NFR BLD AUTO: 0.2 %
ERYTHROCYTE [DISTWIDTH] IN BLOOD BY AUTOMATED COUNT: 13 % (ref 10–15)
GFR SERPL CREATININE-BSD FRML MDRD: 80 ML/MIN/1.7M2
GLUCOSE BLDC GLUCOMTR-MCNC: 123 MG/DL (ref 70–99)
GLUCOSE BLDC GLUCOMTR-MCNC: 87 MG/DL (ref 70–99)
GLUCOSE SERPL-MCNC: 126 MG/DL (ref 70–99)
HCT VFR BLD AUTO: 41.3 % (ref 35–47)
HGB BLD-MCNC: 14 G/DL (ref 11.7–15.7)
IMM GRANULOCYTES # BLD: 0 10E9/L (ref 0–0.4)
IMM GRANULOCYTES NFR BLD: 0.3 %
LIPASE SERPL-CCNC: 81 U/L (ref 73–393)
LYMPHOCYTES # BLD AUTO: 0.9 10E9/L (ref 0.8–5.3)
LYMPHOCYTES NFR BLD AUTO: 13.1 %
MCH RBC QN AUTO: 30.4 PG (ref 26.5–33)
MCHC RBC AUTO-ENTMCNC: 33.9 G/DL (ref 31.5–36.5)
MCV RBC AUTO: 90 FL (ref 78–100)
MONOCYTES # BLD AUTO: 0.3 10E9/L (ref 0–1.3)
MONOCYTES NFR BLD AUTO: 4.7 %
NEUTROPHILS # BLD AUTO: 5.3 10E9/L (ref 1.6–8.3)
NEUTROPHILS NFR BLD AUTO: 81.7 %
NRBC # BLD AUTO: 0 10*3/UL
NRBC BLD AUTO-RTO: 0 /100
PLATELET # BLD AUTO: 239 10E9/L (ref 150–450)
POTASSIUM SERPL-SCNC: 4.1 MMOL/L (ref 3.4–5.3)
PROT SERPL-MCNC: 6.9 G/DL (ref 6.8–8.8)
RBC # BLD AUTO: 4.61 10E12/L (ref 3.8–5.2)
SODIUM SERPL-SCNC: 138 MMOL/L (ref 133–144)
SPECIMEN SOURCE: NORMAL
WBC # BLD AUTO: 6.5 10E9/L (ref 4–11)

## 2017-05-31 PROCEDURE — 80053 COMPREHEN METABOLIC PANEL: CPT | Performed by: PHYSICIAN ASSISTANT

## 2017-05-31 PROCEDURE — 85025 COMPLETE CBC W/AUTO DIFF WBC: CPT | Performed by: PHYSICIAN ASSISTANT

## 2017-05-31 PROCEDURE — 25000132 ZZH RX MED GY IP 250 OP 250 PS 637: Mod: GY | Performed by: INTERNAL MEDICINE

## 2017-05-31 PROCEDURE — 00000146 ZZHCL STATISTIC GLUCOSE BY METER IP

## 2017-05-31 PROCEDURE — 87506 IADNA-DNA/RNA PROBE TQ 6-11: CPT | Performed by: INTERNAL MEDICINE

## 2017-05-31 PROCEDURE — A9270 NON-COVERED ITEM OR SERVICE: HCPCS | Mod: GY | Performed by: INTERNAL MEDICINE

## 2017-05-31 PROCEDURE — 25000128 H RX IP 250 OP 636: Performed by: PHYSICIAN ASSISTANT

## 2017-05-31 PROCEDURE — 87493 C DIFF AMPLIFIED PROBE: CPT | Mod: XU | Performed by: INTERNAL MEDICINE

## 2017-05-31 PROCEDURE — 99219 ZZC INITIAL OBSERVATION CARE,LEVL II: CPT | Performed by: INTERNAL MEDICINE

## 2017-05-31 PROCEDURE — 71020 XR CHEST 2 VW: CPT

## 2017-05-31 PROCEDURE — 83690 ASSAY OF LIPASE: CPT | Performed by: PHYSICIAN ASSISTANT

## 2017-05-31 PROCEDURE — 99285 EMERGENCY DEPT VISIT HI MDM: CPT | Mod: 25

## 2017-05-31 PROCEDURE — G0378 HOSPITAL OBSERVATION PER HR: HCPCS

## 2017-05-31 PROCEDURE — 25000128 H RX IP 250 OP 636: Performed by: INTERNAL MEDICINE

## 2017-05-31 PROCEDURE — 96360 HYDRATION IV INFUSION INIT: CPT

## 2017-05-31 RX ORDER — PROCHLORPERAZINE 25 MG
12.5 SUPPOSITORY, RECTAL RECTAL EVERY 12 HOURS PRN
Status: DISCONTINUED | OUTPATIENT
Start: 2017-05-31 | End: 2017-06-01 | Stop reason: HOSPADM

## 2017-05-31 RX ORDER — ISOSORBIDE MONONITRATE 30 MG/1
60 TABLET, EXTENDED RELEASE ORAL DAILY
Status: DISCONTINUED | OUTPATIENT
Start: 2017-05-31 | End: 2017-06-01 | Stop reason: HOSPADM

## 2017-05-31 RX ORDER — PREGABALIN 75 MG/1
150 CAPSULE ORAL 2 TIMES DAILY
Status: DISCONTINUED | OUTPATIENT
Start: 2017-05-31 | End: 2017-06-01 | Stop reason: HOSPADM

## 2017-05-31 RX ORDER — NALOXONE HYDROCHLORIDE 0.4 MG/ML
.1-.4 INJECTION, SOLUTION INTRAMUSCULAR; INTRAVENOUS; SUBCUTANEOUS
Status: DISCONTINUED | OUTPATIENT
Start: 2017-05-31 | End: 2017-06-01 | Stop reason: HOSPADM

## 2017-05-31 RX ORDER — ONDANSETRON 4 MG/1
4 TABLET, ORALLY DISINTEGRATING ORAL EVERY 6 HOURS PRN
Status: DISCONTINUED | OUTPATIENT
Start: 2017-05-31 | End: 2017-06-01 | Stop reason: HOSPADM

## 2017-05-31 RX ORDER — ASPIRIN 81 MG/1
81 TABLET ORAL DAILY
Status: DISCONTINUED | OUTPATIENT
Start: 2017-05-31 | End: 2017-06-01 | Stop reason: HOSPADM

## 2017-05-31 RX ORDER — PROCHLORPERAZINE MALEATE 5 MG
5 TABLET ORAL EVERY 6 HOURS PRN
Status: DISCONTINUED | OUTPATIENT
Start: 2017-05-31 | End: 2017-06-01 | Stop reason: HOSPADM

## 2017-05-31 RX ORDER — RANOLAZINE 500 MG/1
500 TABLET, EXTENDED RELEASE ORAL 2 TIMES DAILY
Status: DISCONTINUED | OUTPATIENT
Start: 2017-05-31 | End: 2017-06-01 | Stop reason: HOSPADM

## 2017-05-31 RX ORDER — ONDANSETRON 2 MG/ML
4 INJECTION INTRAMUSCULAR; INTRAVENOUS EVERY 6 HOURS PRN
Status: DISCONTINUED | OUTPATIENT
Start: 2017-05-31 | End: 2017-06-01 | Stop reason: HOSPADM

## 2017-05-31 RX ORDER — TRAMADOL HYDROCHLORIDE 50 MG/1
50 TABLET ORAL EVERY 6 HOURS PRN
Status: DISCONTINUED | OUTPATIENT
Start: 2017-05-31 | End: 2017-05-31

## 2017-05-31 RX ORDER — ATORVASTATIN CALCIUM 40 MG/1
40 TABLET, FILM COATED ORAL AT BEDTIME
Status: DISCONTINUED | OUTPATIENT
Start: 2017-05-31 | End: 2017-06-01 | Stop reason: HOSPADM

## 2017-05-31 RX ORDER — LIDOCAINE 40 MG/G
CREAM TOPICAL
Status: DISCONTINUED | OUTPATIENT
Start: 2017-05-31 | End: 2017-06-01 | Stop reason: HOSPADM

## 2017-05-31 RX ORDER — DEXTROSE MONOHYDRATE 25 G/50ML
25-50 INJECTION, SOLUTION INTRAVENOUS
Status: DISCONTINUED | OUTPATIENT
Start: 2017-05-31 | End: 2017-06-01 | Stop reason: HOSPADM

## 2017-05-31 RX ORDER — METOPROLOL TARTRATE 50 MG
50 TABLET ORAL 2 TIMES DAILY
Status: DISCONTINUED | OUTPATIENT
Start: 2017-05-31 | End: 2017-06-01 | Stop reason: HOSPADM

## 2017-05-31 RX ORDER — ACETAMINOPHEN 500 MG
1000 TABLET ORAL 2 TIMES DAILY
Status: DISCONTINUED | OUTPATIENT
Start: 2017-05-31 | End: 2017-06-01 | Stop reason: HOSPADM

## 2017-05-31 RX ORDER — SODIUM CHLORIDE 9 MG/ML
INJECTION, SOLUTION INTRAVENOUS CONTINUOUS
Status: DISCONTINUED | OUTPATIENT
Start: 2017-05-31 | End: 2017-06-01 | Stop reason: HOSPADM

## 2017-05-31 RX ORDER — LEVOTHYROXINE SODIUM 25 UG/1
25 TABLET ORAL
Status: DISCONTINUED | OUTPATIENT
Start: 2017-05-31 | End: 2017-06-01 | Stop reason: HOSPADM

## 2017-05-31 RX ORDER — NICOTINE POLACRILEX 4 MG
15-30 LOZENGE BUCCAL
Status: DISCONTINUED | OUTPATIENT
Start: 2017-05-31 | End: 2017-06-01 | Stop reason: HOSPADM

## 2017-05-31 RX ADMIN — ISOSORBIDE MONONITRATE 60 MG: 30 TABLET, EXTENDED RELEASE ORAL at 15:28

## 2017-05-31 RX ADMIN — ASPIRIN 81 MG: 81 TABLET, COATED ORAL at 15:29

## 2017-05-31 RX ADMIN — SODIUM CHLORIDE: 9 INJECTION, SOLUTION INTRAVENOUS at 15:04

## 2017-05-31 RX ADMIN — ATORVASTATIN CALCIUM 40 MG: 40 TABLET, FILM COATED ORAL at 20:27

## 2017-05-31 RX ADMIN — ACETAMINOPHEN 1000 MG: 500 TABLET, FILM COATED ORAL at 20:24

## 2017-05-31 RX ADMIN — PREGABALIN 150 MG: 75 CAPSULE ORAL at 20:25

## 2017-05-31 RX ADMIN — METOPROLOL TARTRATE 50 MG: 50 TABLET, FILM COATED ORAL at 20:25

## 2017-05-31 RX ADMIN — SODIUM CHLORIDE 1000 ML: 9 INJECTION, SOLUTION INTRAVENOUS at 11:40

## 2017-05-31 RX ADMIN — LEVOTHYROXINE SODIUM 25 MCG: 25 TABLET ORAL at 15:29

## 2017-05-31 RX ADMIN — RANOLAZINE 500 MG: 500 TABLET, FILM COATED, EXTENDED RELEASE ORAL at 20:25

## 2017-05-31 ASSESSMENT — ENCOUNTER SYMPTOMS
DIARRHEA: 1
ABDOMINAL PAIN: 1
NAUSEA: 1
DYSURIA: 0
HEMATURIA: 0
WEAKNESS: 1
SHORTNESS OF BREATH: 0
FEVER: 0
VOMITING: 1
FREQUENCY: 0

## 2017-05-31 NOTE — ED NOTES
"[facility]  ED Nurse Handoff Report    ED Chief complaint: Nausea, Vomiting, & Diarrhea (NVD all night. Loose stools all week , )      ED Diagnosis:   Final diagnoses:   Nausea vomiting and diarrhea       Code Status: not addressed in ED     Allergies:   Allergies   Allergen Reactions     Hydrochlorothiazide      Pancreatitis - patient denies this.  She says it gave her low sodium.       Activity level - Baseline/Home:  Independent    Activity Level - Current:   Stand with Assist     Needed?: No    Isolation: Yes  Infection: C-Diff Pending    Bariatric?: No    Vital Signs:   Vitals:    05/31/17 1109 05/31/17 1115 05/31/17 1158 05/31/17 1222   BP: 159/78 135/74 135/84 154/76   Pulse: 87      Resp: 16      Temp: 98.2  F (36.8  C)      TempSrc: Oral      SpO2: 96% 92% (!) 87% 93%   Weight: 95 kg (209 lb 7 oz)      Height: 1.575 m (5' 2\")          Cardiac Rhythm: ,        Pain level: 0-10 Pain Scale: 0    Is this patient confused?: No    Patient Report: Initial Complaint: lives alone, up all night with NVD, feels too weak, shakey to go home ,   Focused Assessment: had one episode diarrhea but was mixed with urine here so still needs stool specimens , no vomiting had zofran by EMS , sats 89-93 room air   Tests Performed: labs, CXR   Abnormal Results: see results   Treatments provided: IV fluids one liter     Family Comments: none here     OBS brochure/video discussed/provided to patient: Yes    ED Medications:   Medications   0.9% sodium chloride BOLUS (0 mLs Intravenous Stopped 5/31/17 1221)       Drips infusing?:  No      ED NURSE PHONE NUMBER: [phone]       "

## 2017-05-31 NOTE — ED NOTES
Bed: ED29  Expected date:   Expected time:   Means of arrival:   Comments:  511  89 F vomiting  1108

## 2017-05-31 NOTE — H&P
HISTORY OF PRESENT ILLNESS:  Lora Vergara is a 89-year-old female who presents with nausea, vomiting, diarrhea.  The patient states that for several days now, she has had loose, nonbloody diarrhea.  She denies any fevers or chills.  Yesterday evening diarrhea became more frequent and she started developing nausea, vomiting, threw up several times overnight.  The patient lives alone in a assisted community and said she had some difficulty making it to the bathroom quick enough.  In fact spent most of the night in the bathroom so she presents to the hospital here for further care.  The patient denies any sick contacts.  There is nobody else in the building who is known to have had gastroenteritis.  No recent travel, no antibiotics, no lightheadedness or dizziness.  No falls.  No nasal congestion, runny nose or sore throat.  No cough.  The patient has some mild cramping abdominal pain.      PAST MEDICAL HISTORY:  Significant for  coronary artery disease, hypertension, hyperlipidemia, aortic valve sclerosis, diabetes mellitus type 2, cholecystectomy, hypothyroidism, chronic low back pain, obesity, basal cell carcinoma, peripheral neuropathy.        PAST SURGICAL HISTORY:  Back surgery, appendectomy, hysterectomy, bilateral oophorectomy, bladder suspension surgery, phacoemulsification of the eyes bilaterally.      CURRENT MEDICATIONS:   1.  Lyrica 150 mg twice daily.   2.  Losartan 50 mg daily.   3.  Synthroid 25 mcg daily.   4.  Metoprolol 50 mg in the morning, 100 mg in the evening.    5.  Ranexa 500 mg twice daily.   6.  Imdur 60 mg daily.   7.  Metformin 750 mg daily.   8.  Nitroglycerin sublingual as needed.   9.  Lipitor 40 mg daily.   10.  Norvasc 5 mg daily.   11.  Tramadol 50 mg every 8 hours as needed.   12.  Acetaminophen 1000 mg twice daily.   13.  Travatan 1 drop both eyes at bedtime.   14.  Metamucil as directed.   15.  Aspirin 81 mg daily.   16.  Cholecalciferol 4000 units daily.      ALLERGIES:   Hydrochlorothiazide.      SOCIAL HISTORY:  The patient lives alone in an assisted living facility.  She is .  Nonsmoker.  Occasional alcohol use.      FAMILY HISTORY:  Reviewed and is noncontributory due to age.      REVIEW OF SYSTEMS:  Complete comprehensive review of systems was performed and was negative except as above.      PHYSICAL EXAMINATION:   VITAL SIGNS:  Temperature 98.2, pulse 87, respiratory rate 16, blood pressure 146/75, satting 93% on room air.   GENERAL:  Well-nourished elderly female resting comfortably in bed.   HEENT:  Head appears atraumatic, normocephalic.  Eyes:  Sclerae noninjected, anicteric.  Oral mucosa moist without lesions or exudates.   NECK:  Supple without any lymphadenopathy.   LUNGS:  Clear to auscultation bilaterally.   HEART:  Regular rate and rhythm, 2/6 systolic murmur.   ABDOMEN:  Bowel sounds positive.  Soft, nontender, nondistended, no hepatosplenomegaly.   MUSCULOSKELETAL:  Normal muscle mass and tone.   LYMPHATIC:  No lymphadenopathy, 1+ bilateral lower extremity edema.   SKIN:  Shows no rashes.   NEUROLOGIC:  Cranial nerves II-XII are intact.  Strength in all 4 extremities normal, coordination normal.  Reflexes normal and symmetrical.   MUSCULOSKELETAL:  Normal muscle mass and tone.      ASSESSMENT AND PLAN:  A 89-year-old female admitted with gastroenteritis.   1.  Gastroenteritis.  C. diff and stool ROSALINE is pending at this time.  We will place patient on clear liquid diet and gently hydrate with intravenous normal saline overnight, advance diet as tolerated.   2.  Diabetes.  Hold metformin and place patient on insulin sliding scale.   3.  Coronary artery disease.  Continue metoprolol, losartan, Imdur, aspirin and atorvastatin.  Hold losartan and amlodipine.  Continue Ranexa.   4.  Peripheral neuropathy.  Continue Lyrica.   5.  Code status:  Patient is DNR/DNI, as confirmed with patient.         JARED HURTADO MD             D: 05/31/2017 13:15   T: 05/31/2017  13:37   MT: NENA      Name:     FRAN CALDWELL   MRN:      [id number]        Account:      [id number]   :      1925           Admitted:     [id number]      Document: [id number]       cc: Antwon Schmidt MD

## 2017-05-31 NOTE — PLAN OF CARE
Problem: Goal Outcome Summary  Goal: Goal Outcome Summary  Outcome: Improving  Need stool sample.  PT is alert and oriented X4. Up independently.  No N/V/D since admission.  IV fluids infusing.  Denies pain.

## 2017-05-31 NOTE — ED PROVIDER NOTES
History     Chief Complaint:  Nausea, Vomiting, & Diarrhea     HPI   Ashish Vergara is a 89 year old female with a history of diabetes, hypertension and coronary artery disease who presents via EMS with nausea, vomiting, diarrhea. Ashish states that she had onset of loose diarrhea three days ago. Then last night she had onset of abdominal pain, nausea and dry heaving which persisted throughout the night. This morning ashish woke up with generalized weakness that ultimately prompted her to call the paramedics and come into the ER. Here in the ER, the patient endorses leg cramping. She denies chest pain, shortness of breath and fevers. She also denies any dysuria, hematuria and frequency above baseline for her. Ashish lives alone in a [facility] home. She denies sick contact or recent antibiotic use. Her MMR vaccination is up to date.     Allergies:  Hydrochlorothiazide     Medications:    Lyrica  Cozaar  Levothyroxine  Metoprolol  Ranexa  Metformin  Nitroglycerin  Atorvastatin  Norvasc  Ultram  Acetaminophen  Travatan  Metamucil  Aspirin    Past Medical History:    Angina at rest  CAD  Mitral regurgitation  Hypothyroidism  shortness of breath   Chronic ischemic heart disease  Lower back pain  Other and unspecified angina pectoris  Overweight  BCC  Preventative measure  LLQ abdominal pain  DM type II  Neuropathy  HTN  Hyperlipidemia    Past Surgical History:    Appendectomy  Back surgery  Cholecystectomy  Coronary angiography   BCC nose, face, ear surgery  Hysterectomy  Oophorectomy bilateral  Phacoemulsification clear cornea with standard intraocular lens implant    Family History:    Lung cancer  Parkinson's disease  Pancreatic cancer  Diabetes  Cerebrovascular disease    Social History:  Relationship status:   Tobacco use: neg  Alcohol use: pos  The patient presents via EMS alone.  The patient lives in a [facility] home.      Review of Systems   Constitutional: Negative for fever.   Respiratory: Negative for  "shortness of breath.    Cardiovascular: Negative for chest pain.   Gastrointestinal: Positive for abdominal pain, diarrhea, nausea and vomiting.   Genitourinary: Negative for dysuria, frequency and hematuria.   Musculoskeletal:        Positive for leg pain.    Neurological: Positive for weakness.   All other systems reviewed and are negative.    Physical Exam   First Vitals:  BP: 159/78  Pulse: 87  Temp: 98.2  F (36.8  C)  Resp: 16  Height: 157.5 cm (5' 2\")  Weight: 95 kg (209 lb 7 oz)  SpO2: 96 %      Physical Exam  General: Alert, interactive, elderly female appears comfortable    Eye:  Pupils are equal, round, and reactive.      ENT:     No rhinorrhea.     Moist mucus membranes.     Uvula midline    Neck: No rigidity.               Cardiac:  Regular rate and rhythm. S1, S2.  No murmurs, gallops, or rubs.    Pulmonary:  Clear to auscultation bilaterally.  No wheezes or crackles.             Non labored. No use of accessory muscles.     Abdomen:  Abdomen is soft and non-distended, without focal tenderness.     Musculoskeletal:  Freely moveable extremities    Skin:  Warm and dry without rashes.    Neurologic:  Non-focal exam without asymmetric weakness or numbness.  GCS 15    Psychiatric:  Awake. Normal affect with appropriate interaction with examiner.    Emergency Department Course   Imaging:  All results communicated to the patient via radiology.  Chest XR PA LAT, per radiology:  Mild new opacities in the lower left lung could represent prominent atelectasis or mild/early pneumonia. Aortic calcification. Degenerative changes of the spine. Otherwise negative chest.    Laboratory:  CBC: WNL (WBC 6.5, HGB 14.0, )   CMP: glucose 126 (H), calcium 8.4 (L), o/w WNL (Creatinine 0.68)     Lipase: 81    Interventions:  1140: Normal saline, 1000 mL, IV    Emergency Department Course:  Nursing notes and vitals reviewed.  I performed an exam of the patient as documented above.  The above workup was undertaken.  1220: I " rechecked the patient and discussed results.  1233: I spoke with Dr. Barrios from hospitalist services regarding patient admission. The patient has been accepted to an observation bed.  Admit to a telemetry bed under the care of Dr. Barrios.    Impression & Plan      Medical Decision Making:  Lora Vergara is a 89 year old female with a history of diabetes, coronary artery disease and hypertension who presents to the ER with nausea, vomiting and diarrhea. On exam she is hemodynamically stable and clinically well appearing. She has a rather benign abdominal exam with no specific focal tenderness, rebound or guarding making my clinical suspicious for underlying acute abdomen such as diverticulitis, appendicitis, severe colitis or biliary etiology or bowel obstruction unlikely therefore I did not pursue further abdominal imaging. The patient denies further urinary symptoms therefore a urine analysis was not obtained. Stool culture were ordered and are pending but they were difficult for her to give as she has contaminated the sample with urine here in the ER. She was administered one liter of normal saline for hydration. The patient has no significant electrolyte abnormality. On repeat exam, she appears comfortable and I noticed that her SPO2 ranged from 90-93 % and the patient report no known lung disease. Given her cardiac history and report of a cough over the last two weeks without shortness of breath or chest pain I opted to perform a plain chest Xray. Chest Xray shows some mild new opacities in the lower left lung which could represent early pneumonia, however given her normal WBC, no respiratory distress, and clear lung sounds I did not start emergent antibiotics, especially with her current diarrhea and GI symptoms I suspect viral etiology. At this point the patient lives alone and she has felt weak increasing her risk for falls. The patient will therefore be admitted to hospitalist services for further  observation and evaluation.     Diagnosis:    ICD-10-CM   1. Nausea vomiting and diarrhea R11.2     Disposition:  Admit to a telemetry bed under the care of Dr. Barrios.    I, Sebastian Rose, am serving as a scribe on 5/31/2017 at 11:27 AM to personally document services performed by Evon Rojas PA, based on my observations and the provider's statements to me.     EMERGENCY DEPARTMENT       Evon Rojas PA-C  05/31/17 1655

## 2017-05-31 NOTE — IP AVS SNAPSHOT
MRN:[id number]                      After Visit Summary   5/31/2017    Lora Vergara    MRN: [id number]           Thank you!     Thank you for choosing [facility] for your care. Our goal is always to provide you with excellent care. Hearing back from our patients is one way we can continue to improve our services. Please take a few minutes to complete the written survey that you may receive in the mail after you visit with us. Thank you!        Patient Information     Date Of Birth          9/11/1925        About your hospital stay     You were admitted on:  May 31, 2017 [facility]    You were discharged on:  June 1, 2017        Reason for your hospital stay       You were admitted due to symptoms of an acute GI illness. Your symptoms improved with supportive cares and your stool samples were negative for infection. The cause of your symptoms was most likely a viral illness. If you continue to experience symptoms after discharge home, you are encouraged to discuss them with your primary care provider.                  Who to Call     For medical emergencies, please call 911.  For non-urgent questions about your medical care, please call your primary care provider or clinic, [phone]          Attending Provider     Provider Specialty    Evon Rojas PA-C Physician Assistant    Antwon Barrios MD Internal Medicine       Primary Care Provider Office Phone # Fax #    Antwon Schmidt [phone]      After Care Instructions     Activity       Your activity upon discharge: activity as tolerated            Diet       Follow this diet upon discharge: Orders Placed This Encounter      Moderate Consistent CHO Diet                  Follow-up Appointments     Follow-up and recommended labs and tests        Follow up with primary care provider, Antwon Schmidt, within 7 days for hospital follow- up.  No follow up labs or test are  "needed.                  Your next 10 appointments already scheduled     2017  4:15 PM CDT   Office Visit with Antwon Schmidt MD   [facility]    [address] [place] [id number] [phone]           Bring a current list of meds and any records pertaining to this visit.  For Physicals, please bring immunization records and any forms needing to be filled out.  Please arrive 10 minutes early to complete paperwork.              Pending Results     No orders found for last 3 day(s).            Statement of Approval     Ordered          17 1118  I have reviewed and agree with all the recommendations and orders detailed in this document.  EFFECTIVE NOW     Approved and electronically signed by:  Dwight Pacheoc PA-C             Admission Information     Date & Time Provider Department Dept. Phone    2017 Antwon Barrios MD [facility] [phone]      Your Vitals Were     Blood Pressure Pulse Temperature Respirations Height Weight    126/60 (BP Location: Right arm) 75 98.4  F (36.9  C) (Oral) 16 1.575 m (5' 2\") 95 kg (209 lb 8 oz)    Last Period Pulse Oximetry BMI (Body Mass Index)             (LMP Unknown) 91% 38.32 kg/m2         [employer] Information     [employer] lets you send messages to your doctor, view your test results, renew your prescriptions, schedule appointments and more. To sign up, go to [email or web address] . Click on \"Log in\" on the left side of the screen, which will take you to the Welcome page. Then click on \"Sign up Now\" on the right side of the page.     You will be asked to enter the access code listed below, as well as some personal information. Please follow the directions to create your username and password.     Your access code is: [email or web address]  Expires: 2017  9:18 AM     Your access code will  in 90 days. If you need help or a new code, " please call your [facility] clinic or [phone].        Care EveryWhere ID     This is your Care EveryWhere ID. This could be used by other organizations to access your [facility] medical records  [id number]           Review of your medicines      CONTINUE these medicines which may have CHANGED, or have new prescriptions. If we are uncertain of the size of tablets/capsules you have at home, strength may be listed as something that might have changed.        Dose / Directions    metFORMIN 750 MG 24 hr tablet   Commonly known as:  GLUCOPHAGE-XR   This may have changed:  See the new instructions.   Used for:  Type 2 diabetes mellitus with diabetic neuropathy (H)        TAKE ONE TABLET BY MOUTH ONE TIME DAILY WITH DINNER   Quantity:  90 tablet   Refills:  1         CONTINUE these medicines which have NOT CHANGED        Dose / Directions    ACETAMINOPHEN PO        Dose:  1000 mg   Take 1,000 mg by mouth 2 times daily   Refills:  0       amLODIPine 5 MG tablet   Commonly known as:  NORVASC   Used for:  Essential hypertension        Dose:  5 mg   Take 1 tablet (5 mg) by mouth daily   Quantity:  90 tablet   Refills:  3       ASPIRIN LOW DOSE 81 MG tablet   Generic drug:  aspirin        Dose:  81 mg   Take 81 mg by mouth daily   Refills:  0       atorvastatin 40 MG tablet   Commonly known as:  LIPITOR   Used for:  Hyperlipidemia LDL goal <70        Dose:  40 mg   Take 1 tablet (40 mg) by mouth daily   Quantity:  90 tablet   Refills:  3       DOCUSATE SODIUM PO        Dose:  100 mg   Take 100 mg by mouth every morning   Refills:  0       HM VITAMIN D3 4000 UNITS Caps   Generic drug:  Cholecalciferol        Dose:  1 tablet   Take 1 tablet by mouth daily.   Refills:  0       isosorbide mononitrate 60 MG 24 hr tablet   Commonly known as:  IMDUR   Used for:  Chronic ischemic heart disease        Dose:  60 mg   Take 1 tablet (60 mg) by mouth daily   Quantity:  90 tablet   Refills:  3       levothyroxine 25 MCG tablet   Commonly  known as:  SYNTHROID/LEVOTHROID   Used for:  Hypothyroidism        TAKE ONE TABLET BY MOUTH ONE TIME DAILY   Quantity:  90 tablet   Refills:  2       losartan 50 MG tablet   Commonly known as:  COZAAR   Used for:  Secondary hypertension with goal blood pressure less than 130/80        TAKE ONE TABLET BY MOUTH ONE TIME DAILY   Quantity:  90 tablet   Refills:  2       LYRICA 150 MG capsule   Used for:  Neuropathy (H)   Generic drug:  pregabalin        TAKE ONE CAPSULE BY MOUTH TWICE A DAY   Quantity:  60 capsule   Refills:  5       METAMUCIL 30.9 % Powd   Generic drug:  psyllium        Dose:  1 tsp.   Take 1 tsp. by mouth daily   Refills:  0       metoprolol 100 MG tablet   Commonly known as:  LOPRESSOR   Used for:  Hypertension goal BP (blood pressure) < 140/90        TAKE 1/2 TABLET BY MOUTH EVERY AM AND TAKE 1 TABLET IN THE EVENING.   Quantity:  135 tablet   Refills:  3       nitroglycerin 0.4 MG sublingual tablet   Commonly known as:  NITROSTAT   Used for:  Chronic ischemic heart disease, unspecified        Dose:  0.4 mg   Place 1 tablet (0.4 mg) under the tongue every 5 minutes as needed for chest pain   Quantity:  25 tablet   Refills:  3       order for DME   Used for:  Neuropathy (H), Poor balance        Equipment being ordered: wheeled walker with brakes and a seat   Quantity:  1 Device   Refills:  0       ranolazine 500 MG 12 hr tablet   Commonly known as:  RANEXA   Used for:  Chronic ischemic heart disease, unspecified        Dose:  500 mg   Take 1 tablet (500 mg) by mouth 2 times daily   Quantity:  180 tablet   Refills:  2       TRAVATAN 0.004 % Soln   Generic drug:  Travoprost        Dose:  1 drop   Place 1 drop into both eyes At Bedtime   Refills:  0                Protect others around you: Learn how to safely use, store and throw away your medicines at www.disposemymeds.org.             Medication List: This is a list of all your medications and when to take them. Check marks below indicate your daily  home schedule. Keep this list as a reference.      Medications           Morning Afternoon Evening Bedtime As Needed    ACETAMINOPHEN PO   Take 1,000 mg by mouth 2 times daily   Last time this was given:  1,000 mg on 6/1/2017  8:31 AM                                amLODIPine 5 MG tablet   Commonly known as:  NORVASC   Take 1 tablet (5 mg) by mouth daily                                ASPIRIN LOW DOSE 81 MG tablet   Take 81 mg by mouth daily   Generic drug:  aspirin                                atorvastatin 40 MG tablet   Commonly known as:  LIPITOR   Take 1 tablet (40 mg) by mouth daily   Last time this was given:  40 mg on 5/31/2017  8:27 PM                                DOCUSATE SODIUM PO   Take 100 mg by mouth every morning                                HM VITAMIN D3 4000 UNITS Caps   Take 1 tablet by mouth daily.   Generic drug:  Cholecalciferol                                isosorbide mononitrate 60 MG 24 hr tablet   Commonly known as:  IMDUR   Take 1 tablet (60 mg) by mouth daily   Last time this was given:  60 mg on 6/1/2017  8:31 AM                                levothyroxine 25 MCG tablet   Commonly known as:  SYNTHROID/LEVOTHROID   TAKE ONE TABLET BY MOUTH ONE TIME DAILY   Last time this was given:  25 mcg on 6/1/2017  6:58 AM                                losartan 50 MG tablet   Commonly known as:  COZAAR   TAKE ONE TABLET BY MOUTH ONE TIME DAILY                                LYRICA 150 MG capsule   TAKE ONE CAPSULE BY MOUTH TWICE A DAY   Last time this was given:  150 mg on 6/1/2017  8:32 AM   Generic drug:  pregabalin                                METAMUCIL 30.9 % Powd   Take 1 tsp. by mouth daily   Generic drug:  psyllium                                metFORMIN 750 MG 24 hr tablet   Commonly known as:  GLUCOPHAGE-XR   TAKE ONE TABLET BY MOUTH ONE TIME DAILY WITH DINNER                                metoprolol 100 MG tablet   Commonly known as:  LOPRESSOR   TAKE 1/2 TABLET BY MOUTH EVERY AM  AND TAKE 1 TABLET IN THE EVENING.   Last time this was given:  50 mg on 6/1/2017  8:31 AM                                nitroglycerin 0.4 MG sublingual tablet   Commonly known as:  NITROSTAT   Place 1 tablet (0.4 mg) under the tongue every 5 minutes as needed for chest pain                                order for DME   Equipment being ordered: wheeled walker with brakes and a seat                                ranolazine 500 MG 12 hr tablet   Commonly known as:  RANEXA   Take 1 tablet (500 mg) by mouth 2 times daily   Last time this was given:  500 mg on 6/1/2017  8:31 AM                                TRAVATAN 0.004 % Soln   Place 1 drop into both eyes At Bedtime   Generic drug:  Travoprost

## 2017-05-31 NOTE — IP AVS SNAPSHOT
[facility]    [address] [place] [id number]    Phone:  [phone]                                       After Visit Summary   5/31/2017    Lora Vergara    MRN: [id number]           After Visit Summary Signature Page     I have received my discharge instructions, and my questions have been answered. I have discussed any challenges I see with this plan with the nurse or doctor.    ..........................................................................................................................................  Patient/Patient Representative Signature      ..........................................................................................................................................  Patient Representative Print Name and Relationship to Patient    ..................................................               ................................................  Date                                            Time    ..........................................................................................................................................  Reviewed by Signature/Title    ...................................................              ..............................................  Date                                                            Time

## 2017-06-01 VITALS
BODY MASS INDEX: 38.55 KG/M2 | HEIGHT: 62 IN | DIASTOLIC BLOOD PRESSURE: 60 MMHG | RESPIRATION RATE: 16 BRPM | SYSTOLIC BLOOD PRESSURE: 126 MMHG | WEIGHT: 209.5 LBS | OXYGEN SATURATION: 91 % | TEMPERATURE: 98.4 F | HEART RATE: 75 BPM

## 2017-06-01 LAB
ANION GAP SERPL CALCULATED.3IONS-SCNC: 7 MMOL/L (ref 3–14)
BUN SERPL-MCNC: 12 MG/DL (ref 7–30)
CALCIUM SERPL-MCNC: 7.5 MG/DL (ref 8.5–10.1)
CAMPYLOBACTER GROUP BY NAT: NOT DETECTED
CHLORIDE SERPL-SCNC: 106 MMOL/L (ref 94–109)
CO2 SERPL-SCNC: 27 MMOL/L (ref 20–32)
CREAT SERPL-MCNC: 0.72 MG/DL (ref 0.52–1.04)
ENTERIC PATHOGEN COMMENT: NORMAL
ERYTHROCYTE [DISTWIDTH] IN BLOOD BY AUTOMATED COUNT: 13.3 % (ref 10–15)
GFR SERPL CREATININE-BSD FRML MDRD: 75 ML/MIN/1.7M2
GLUCOSE BLDC GLUCOMTR-MCNC: 114 MG/DL (ref 70–99)
GLUCOSE BLDC GLUCOMTR-MCNC: 126 MG/DL (ref 70–99)
GLUCOSE SERPL-MCNC: 103 MG/DL (ref 70–99)
HBA1C MFR BLD: 5.9 % (ref 4.3–6)
HCT VFR BLD AUTO: 35.8 % (ref 35–47)
HGB BLD-MCNC: 11.9 G/DL (ref 11.7–15.7)
MCH RBC QN AUTO: 30.1 PG (ref 26.5–33)
MCHC RBC AUTO-ENTMCNC: 33.2 G/DL (ref 31.5–36.5)
MCV RBC AUTO: 91 FL (ref 78–100)
NOROVIRUS I AND II BY NAT: NOT DETECTED
PLATELET # BLD AUTO: 197 10E9/L (ref 150–450)
POTASSIUM SERPL-SCNC: 3.7 MMOL/L (ref 3.4–5.3)
RBC # BLD AUTO: 3.95 10E12/L (ref 3.8–5.2)
ROTAVIRUS A BY NAT: NOT DETECTED
SALMONELLA SPECIES BY NAT: NOT DETECTED
SHIGA TOXIN 1 GENE BY NAT: NOT DETECTED
SHIGA TOXIN 2 GENE BY NAT: NOT DETECTED
SHIGELLA SP+EIEC IPAH STL QL NAA+PROBE: NOT DETECTED
SODIUM SERPL-SCNC: 140 MMOL/L (ref 133–144)
VIBRIO GROUP BY NAT: NOT DETECTED
WBC # BLD AUTO: 4.1 10E9/L (ref 4–11)
YERSINIA ENTEROCOLITICA BY NAT: NOT DETECTED

## 2017-06-01 PROCEDURE — A9270 NON-COVERED ITEM OR SERVICE: HCPCS | Mod: GY | Performed by: INTERNAL MEDICINE

## 2017-06-01 PROCEDURE — 80048 BASIC METABOLIC PNL TOTAL CA: CPT | Performed by: INTERNAL MEDICINE

## 2017-06-01 PROCEDURE — 00000146 ZZHCL STATISTIC GLUCOSE BY METER IP

## 2017-06-01 PROCEDURE — 85027 COMPLETE CBC AUTOMATED: CPT | Performed by: INTERNAL MEDICINE

## 2017-06-01 PROCEDURE — 36415 COLL VENOUS BLD VENIPUNCTURE: CPT | Performed by: INTERNAL MEDICINE

## 2017-06-01 PROCEDURE — 25000128 H RX IP 250 OP 636: Performed by: INTERNAL MEDICINE

## 2017-06-01 PROCEDURE — 83036 HEMOGLOBIN GLYCOSYLATED A1C: CPT | Performed by: INTERNAL MEDICINE

## 2017-06-01 PROCEDURE — 25000132 ZZH RX MED GY IP 250 OP 250 PS 637: Mod: GY | Performed by: INTERNAL MEDICINE

## 2017-06-01 PROCEDURE — G0378 HOSPITAL OBSERVATION PER HR: HCPCS

## 2017-06-01 PROCEDURE — 99217 ZZC OBSERVATION CARE DISCHARGE: CPT | Performed by: PHYSICIAN ASSISTANT

## 2017-06-01 RX ADMIN — METOPROLOL TARTRATE 50 MG: 50 TABLET, FILM COATED ORAL at 08:31

## 2017-06-01 RX ADMIN — SODIUM CHLORIDE: 9 INJECTION, SOLUTION INTRAVENOUS at 03:18

## 2017-06-01 RX ADMIN — ACETAMINOPHEN 1000 MG: 500 TABLET, FILM COATED ORAL at 08:31

## 2017-06-01 RX ADMIN — ISOSORBIDE MONONITRATE 60 MG: 30 TABLET, EXTENDED RELEASE ORAL at 08:31

## 2017-06-01 RX ADMIN — RANOLAZINE 500 MG: 500 TABLET, FILM COATED, EXTENDED RELEASE ORAL at 08:31

## 2017-06-01 RX ADMIN — ASPIRIN 81 MG: 81 TABLET, COATED ORAL at 08:31

## 2017-06-01 RX ADMIN — LEVOTHYROXINE SODIUM 25 MCG: 25 TABLET ORAL at 06:58

## 2017-06-01 RX ADMIN — PREGABALIN 150 MG: 75 CAPSULE ORAL at 08:32

## 2017-06-01 NOTE — PLAN OF CARE
Problem: Goal Outcome Summary  Goal: Goal Outcome Summary  Outcome: No Change  A&O.VSS. Stool sample sent. Voiding. Denies N/V. Alley fluids.

## 2017-06-01 NOTE — PLAN OF CARE
Problem: Discharge Planning  Goal: Discharge Planning (Adult, OB, Behavioral, Peds)  Outcome: Improving  PRIOR TO DISCHARGE     Comments: List all goals to be met before discharge home   - Nausea/vomiting (diarrhea if present) improved. Goal met  - Tolerating oral intake to maintain hydration Goal met  - Vital signs normal or at patient baseline goal met    orthostatic vitals are normal    patient not lightheaded with standing goal met  - Diagnostic tests and consults completed and resulted if ordered Met  - Adequate pain control on oral analgesia goal met

## 2017-06-01 NOTE — PLAN OF CARE
Problem: Discharge Planning  Goal: Discharge Planning (Adult, OB, Behavioral, Peds)  Outcome: Improving  PRIOR TO DISCHARGE     Comments: List all goals to be met before discharge home   - Nausea/vomiting (diarrhea if present) improved. Goal met  - Tolerating oral intake to maintain hydration Goal met  - Vital signs normal or at patient baseline goal met    orthostatic vitals are normal    patient not lightheaded with standing goal met  - Diagnostic tests and consults completed and resulted if ordered Met  - Adequate pain control on oral analgesia goal met    Plan for d/c today

## 2017-06-01 NOTE — PLAN OF CARE
Problem: Discharge Planning  Goal: Discharge Planning (Adult, OB, Behavioral, Peds)  Outcome: Improving  PRIOR TO DISCHARGE     Comments: List all goals to be met before discharge home   - Nausea/vomiting (diarrhea if present) improved. Goal met  - Tolerating oral intake to maintain hydration Goal met  - Vital signs normal or at patient baseline goal met    orthostatic vitals are normal    patient not lightheaded with standing goal met  - Diagnostic tests and consults completed and resulted if ordered not met  - Adequate pain control on oral analgesia goal met

## 2017-06-01 NOTE — PLAN OF CARE
Problem: Discharge Planning  Goal: Discharge Planning (Adult, OB, Behavioral, Peds)  Outcome: No Change  PRIOR TO DISCHARGE     Comments: List all goals to be met before discharge home   - Nausea/vomiting (diarrhea if present) improved. Goal met  - Tolerating oral intake to maintain hydration Goal met  - Vital signs normal or at patient baseline goal met    orthostatic vitals are normal    patient not lightheaded with standing goal met  - Diagnostic tests and consults completed and resulted if ordered not met  - Adequate pain control on oral analgesia goal met

## 2017-06-01 NOTE — PROGRESS NOTES
The following criteria to be met before discharge:    1.  - Nausea/vomiting (diarrhea if present) improved - not met  2.  - Tolerating oral intake to maintain hydration - not met  3.  - Vital signs normal or at patient baseline and orthostatic vitals are normal and patient not lightheaded with standing - not met    May 31, 2017

## 2017-06-01 NOTE — PLAN OF CARE
Problem: Goal Outcome Summary  Goal: Goal Outcome Summary  Outcome: Adequate for Discharge Date Met:  06/01/17  A&O x4. VSS. Assist of 1 w/ walker. IV removed. Tolerating diet. Denies pain and nausea. Passing gas. . D/c paperwork and follow-up appointments explained to pt and son, no further questions at this time. Pt belongings gathered and taken w/ pt at time of d/c. Pt left unit via wheelchair and NA, son providing ride.

## 2017-06-01 NOTE — PLAN OF CARE
Problem: Goal Outcome Summary  Goal: Goal Outcome Summary  Outcome: Improving  A&O. VSS. Up with x1 with wk. Cdiff and viral panel negative. Voiding. Denies N/V and pain. IVF. Tolerating diet.

## 2017-06-01 NOTE — PROGRESS NOTES
The following criteria to be met before discharge:     1.  - Nausea/vomiting (diarrhea if present) improved -  met  2.  - Tolerating oral intake to maintain hydration - met  3.  - Vital signs normal or at patient baseline and orthostatic vitals are normal and patient not lightheaded with standing - not met

## 2017-06-01 NOTE — DISCHARGE SUMMARY
[facility]    Discharge Summary  Hospitalist    Date of Admission:  5/31/2017  Date of Discharge:  6/1/2017  Discharging Provider: Dwight Pacheco PA-C    Discharge Diagnoses   Nausea vomiting and diarrhea    History of Present Illness   Lora Vergara is an 89 year old female who presented with nausea, vomiting, and diarrhea.    For complete details of admission, please refer to H&P performed by Antwon Barrios MD.    Hospital Course   Lora Vergraa was admitted on 5/31/2017.  The following problems were addressed during her hospitalization:    Gastroenteritis, likely viral  Presented with several days of nonbloody diarrhea with associated nausea and vomiting.  Workup on admission negative for infectious etiology, including Cdiff and stool culture. She was treated with supportive cares including IV fluids, antiemetics, and bowel rest with subsequent improvement in symptoms. The following morning she was able to advance her diet and bowel function had returned to normal. She was discharged home in stable condition.    Chronic Medical conditions that remained stable:  Diabetes Mellitus, Type 2: Resume PTA metformin  Coronary artery disease: Continue metoprolol, amlodipine, ranexa, losartan, Imdur, aspirin, atorvastatin.    Peripheral neuropathy: Continue Lyrica.     Dwight Pacheco PA-C    Significant Results and Procedures   As noted below    Pending Results   These results will be followed up by n/a  Unresulted Labs Ordered in the Past 30 Days of this Admission     No orders found for last 61 day(s).          Code Status   DNR / DNI       Primary Care Physician   Antwon Schmidt    Physical Exam   Temp: 98.4  F (36.9  C) Temp src: Oral BP: 126/60 Pulse: 75   Resp: 16 SpO2: 91 % O2 Device: None (Room air)    Vitals:    05/31/17 1109 05/31/17 1329 06/01/17 0829   Weight: 95 kg (209 lb 7 oz) 94.1 kg (207 lb 8 oz) 95 kg (209 lb 8 oz)     Vital Signs with Ranges  Temp:  [98.3  F (36.8  C)-99  F (37.2  C)]  98.4  F (36.9  C)  Pulse:  [75-97] 75  Resp:  [16-18] 16  BP: (105-157)/(50-84) 126/60  SpO2:  [87 %-93 %] 91 %  I/O last 3 completed shifts:  In: 1125 [P.O.:600; I.V.:525]  Out: 650 [Urine:650]    GEN: well-developed, well-nourished, appears comfortable  PULM: lungs CTA bilaterally, no increased work of breathing, no wheeze, rales, rhonchi  CV: RRR, S1 & S2  GI: soft, nontender, nondistended, no guarding or rigidity, +BS in all 4 quadrants  SKIN: warm & dry without rash, wound, or pedal edema, no bruising or bleeding    Discharge Disposition   Discharged to home  Condition at discharge: Stable    Consultations This Hospital Stay   CARE COORDINATOR IP CONSULT    Time Spent on this Encounter   IDwight, personally saw the patient today and spent greater than 30 minutes discharging this patient.    Discharge Orders     Reason for your hospital stay   You were admitted due to symptoms of an acute GI illness. Your symptoms improved with supportive cares and your stool samples were negative for infection. The cause of your symptoms was most likely a viral illness. If you continue to experience symptoms after discharge home, you are encouraged to discuss them with your primary care provider.     Follow-up and recommended labs and tests    Follow up with primary care provider, Antwon Schmidt, within 7 days for hospital follow- up.  No follow up labs or test are needed.     Activity   Your activity upon discharge: activity as tolerated     Diet   Follow this diet upon discharge: Orders Placed This Encounter     Moderate Consistent CHO Diet       Discharge Medications   Current Discharge Medication List      CONTINUE these medications which have NOT CHANGED    Details   DOCUSATE SODIUM PO Take 100 mg by mouth every morning      LYRICA 150 MG capsule TAKE ONE CAPSULE BY MOUTH TWICE A DAY  Qty: 60 capsule, Refills: 5    Comments: This request is for a new prescription for a controlled substance as required by  Federal/State law.  Associated Diagnoses: Neuropathy (H)      losartan (COZAAR) 50 MG tablet TAKE ONE TABLET BY MOUTH ONE TIME DAILY  Qty: 90 tablet, Refills: 2    Associated Diagnoses: Secondary hypertension with goal blood pressure less than 130/80      levothyroxine (SYNTHROID/LEVOTHROID) 25 MCG tablet TAKE ONE TABLET BY MOUTH ONE TIME DAILY  Qty: 90 tablet, Refills: 2    Associated Diagnoses: Hypothyroidism      metoprolol (LOPRESSOR) 100 MG tablet TAKE 1/2 TABLET BY MOUTH EVERY AM AND TAKE 1 TABLET IN THE EVENING.  Qty: 135 tablet, Refills: 3    Associated Diagnoses: Hypertension goal BP (blood pressure) < 140/90      ranolazine (RANEXA) 500 MG 12 hr tablet Take 1 tablet (500 mg) by mouth 2 times daily  Qty: 180 tablet, Refills: 2    Associated Diagnoses: Chronic ischemic heart disease, unspecified      isosorbide mononitrate (IMDUR) 60 MG 24 hr tablet Take 1 tablet (60 mg) by mouth daily  Qty: 90 tablet, Refills: 3    Associated Diagnoses: Chronic ischemic heart disease      metFORMIN (GLUCOPHAGE-XR) 750 MG 24 hr tablet TAKE ONE TABLET BY MOUTH ONE TIME DAILY WITH DINNER  Qty: 90 tablet, Refills: 1    Associated Diagnoses: Type 2 diabetes mellitus with diabetic neuropathy (H)      nitroglycerin (NITROSTAT) 0.4 MG SL tablet Place 1 tablet (0.4 mg) under the tongue every 5 minutes as needed for chest pain  Qty: 25 tablet, Refills: 3    Associated Diagnoses: Chronic ischemic heart disease, unspecified      atorvastatin (LIPITOR) 40 MG tablet Take 1 tablet (40 mg) by mouth daily  Qty: 90 tablet, Refills: 3    Associated Diagnoses: Hyperlipidemia LDL goal <70      amLODIPine (NORVASC) 5 MG tablet Take 1 tablet (5 mg) by mouth daily  Qty: 90 tablet, Refills: 3    Associated Diagnoses: Essential hypertension      ACETAMINOPHEN PO Take 1,000 mg by mouth 2 times daily      Travoprost (TRAVATAN) 0.004 % SOLN Place 1 drop into both eyes At Bedtime       psyllium (METAMUCIL) 30.9 % POWD Take 1 tsp. by mouth daily        aspirin (ASPIRIN LOW DOSE) 81 MG tablet Take 81 mg by mouth daily       Cholecalciferol (HM VITAMIN D3) 4000 UNITS CAPS Take 1 tablet by mouth daily.      order for DME Equipment being ordered: wheeled walker with brakes and a seat  Qty: 1 Device, Refills: 0    Associated Diagnoses: Neuropathy (H); Poor balance           Allergies   Allergies   Allergen Reactions     Hydrochlorothiazide      Pancreatitis - patient denies this.  She says it gave her low sodium.     Data   Most Recent 3 CBC's:  Recent Labs   Lab Test  06/01/17   0543  05/31/17   1125  12/20/16   1115   WBC  4.1  6.5  4.8   HGB  11.9  14.0  13.0   MCV  91  90  90   PLT  197  239  283      Most Recent 3 BMP's:  Recent Labs   Lab Test  06/01/17   0543  05/31/17   1125  03/22/17   1601   NA  140  138  136   POTASSIUM  3.7  4.1  4.2   CHLORIDE  106  103  100   CO2  27  28  30   BUN  12  13  17   CR  0.72  0.68  0.84   ANIONGAP  7  7  6   VIOLETA  7.5*  8.4*  8.8   GLC  103*  126*  88     Most Recent 2 LFT's:  Recent Labs   Lab Test  05/31/17   1125  03/22/17   1601   AST  16  12   ALT  20  17   ALKPHOS  50  66   BILITOTAL  0.5  0.4     Most Recent INR's and Anticoagulation Dosing History:  Anticoagulation Dose History     Recent Dosing and Labs Latest Ref Rng & Units 3/23/2007 2/22/2008 12/17/2009    INR 0.86 - 1.14 1.00 0.97 1.02        Most Recent 3 Troponin's:  Recent Labs   Lab Test  12/29/14   0410  12/29/14   0015  12/28/14 2003   TROPI  <0.015  The 99th percentile for upper reference range is 0.045 ug/L.  Troponin values in   the range of 0.045 - 0.120 ug/L may be associated with risks of adverse   clinical events.   Effective 7/30/2014, the reference range for this assay has changed to reflect   new instrumentation/methodology.    <0.015  The 99th percentile for upper reference range is 0.045 ug/L.  Troponin values in   the range of 0.045 - 0.120 ug/L may be associated with risks of adverse   clinical events.   Effective 7/30/2014, the reference  range for this assay has changed to reflect   new instrumentation/methodology.    <0.015  The 99th percentile for upper reference range is 0.045 ug/L.  Troponin values in   the range of 0.045 - 0.120 ug/L may be associated with risks of adverse   clinical events.   Effective 7/30/2014, the reference range for this assay has changed to reflect   new instrumentation/methodology.       Most Recent Cholesterol Panel:  Recent Labs   Lab Test  03/09/16   0938   CHOL  127   LDL  48   HDL  58   TRIG  104     Most Recent 6 Bacteria Isolates From Any Culture (See EPIC Reports for Culture Details):  Recent Labs   Lab Test  12/20/16   1145   CULT  10,000 to 50,000 colonies/mL mixed urogenital fam     Most Recent TSH, T4 and A1c Labs:  Recent Labs   Lab Test  06/01/17   0543  12/20/16   1115   08/18/11   1843   TSH   --   3.14   < >   --    T4   --    --    --   1.06   A1C  5.9  6.0   < >   --     < > = values in this interval not displayed.     Results for orders placed or performed during the hospital encounter of 05/31/17   Chest XR,  PA & LAT    Narrative    XR CHEST 2 VW  5/31/2017 12:43 PM    HISTORY:  cough    COMPARISON:  3/17/2015      Impression    IMPRESSION:  Mild new opacities in the lower left lung could represent  prominent atelectasis or mild/early pneumonia. Aortic calcification.  Degenerative changes of the spine. Otherwise negative chest.    ISAI CHONG MD

## 2017-06-02 ENCOUNTER — TELEPHONE (OUTPATIENT)
Dept: FAMILY MEDICINE | Facility: CLINIC | Age: 82
End: 2017-06-02

## 2017-06-02 NOTE — TELEPHONE ENCOUNTER
"Hospital/TCU/ED for chronic condition Discharge Protocol    \"Hi, my name is Ariadna Hurd, a registered nurse, and I am calling from [facility].  I am calling to follow up and see how things are going for you after your recent emergency visit/hospital/TCU stay.\"    Tell me how you are doing now that you are home?\" \" I am feeling weak,it really took a lot out of me.      Discharge Instructions    \"Let's review your discharge instructions.  What is/are the follow-up recommendations?  Pt. Response: Well they really didn't tell me anything.    \"Has an appointment with your primary care provider been scheduled?\"   Yes. (confirm)    \"When you see the provider, I would recommend that you bring your medications with you.\"    Medications    \"Tell me what changed about your medicines when you discharged?\"    Changes to chronic meds?    0-1    \"What questions do you have about your medications?\"    None     New diagnoses of heart failure, COPD, diabetes, or MI?    No              Medication reconciliation completed? Yes  Was MTM referral placed (*Make sure to put transitions as reason for referral)?   No    Call Summary    \"What questions or concerns do you have about your recent visit and your follow-up care?\"     none    \"If you have questions or things don't continue to improve, we encourage you contact us through the main clinic number ([phone]  Even if the clinic is not open, triage nurses are available 24/7 to help you.     We would like you to know that our clinic has extended hours (provide information).  We also have urgent care (provide details on closest location and hours/contact info)\"      \"Thank you for your time and take care!\"             "

## 2017-06-06 ENCOUNTER — OFFICE VISIT (OUTPATIENT)
Dept: FAMILY MEDICINE | Facility: CLINIC | Age: 82
End: 2017-06-06
Payer: MEDICARE

## 2017-06-06 VITALS
SYSTOLIC BLOOD PRESSURE: 128 MMHG | DIASTOLIC BLOOD PRESSURE: 68 MMHG | HEART RATE: 88 BPM | HEIGHT: 62 IN | WEIGHT: 207 LBS | TEMPERATURE: 98 F | RESPIRATION RATE: 20 BRPM | OXYGEN SATURATION: 94 % | BODY MASS INDEX: 38.09 KG/M2

## 2017-06-06 DIAGNOSIS — R11.2 NON-INTRACTABLE VOMITING WITH NAUSEA, UNSPECIFIED VOMITING TYPE: ICD-10-CM

## 2017-06-06 DIAGNOSIS — E11.40 TYPE 2 DIABETES MELLITUS WITH DIABETIC NEUROPATHY, WITHOUT LONG-TERM CURRENT USE OF INSULIN (H): ICD-10-CM

## 2017-06-06 DIAGNOSIS — K52.9 GASTROENTERITIS: Primary | ICD-10-CM

## 2017-06-06 PROCEDURE — 99213 OFFICE O/P EST LOW 20 MIN: CPT | Performed by: INTERNAL MEDICINE

## 2017-06-06 NOTE — MR AVS SNAPSHOT
"              After Visit Summary   6/6/2017    Lora Vergara    MRN: [id number]           Patient Information     Date Of Birth          9/11/1925        Visit Information        Provider Department      6/6/2017 4:15 PM Antwon Schmidt MD [facility]        Today's Diagnoses     Gastroenteritis    -  1    Non-intractable vomiting with nausea, unspecified vomiting type          Care Instructions    Let's do this: stop taking the metformin.   You probably don't need it at this point anyway.                 If your symptoms persist, then please follow up.         I suggest that you cut back on coffee,until you feel back to normal.               Follow-ups after your visit        Who to contact     If you have questions or need follow up information about today's clinic visit or your schedule please contact [facility] directly at [phone].  Normal or non-critical lab and imaging results will be communicated to you by MyChart, letter or phone within 4 business days after the clinic has received the results. If you do not hear from us within 7 days, please contact the clinic through MyChart or phone. If you have a critical or abnormal lab result, we will notify you by phone as soon as possible.  Submit refill requests through [email or web address] or call your pharmacy and they will forward the refill request to us. Please allow 3 business days for your refill to be completed.          Additional Information About Your Visit        MyChart Information     [email or web address] lets you send messages to your doctor, view your test results, renew your prescriptions, schedule appointments and more. To sign up, go to [email or web address] . Click on \"Log in\" on the left side of the screen, which will take you to the Welcome page. Then click on \"Sign up Now\" on the right side of the page.     You will be asked to enter the access code listed below, as well as some personal information. " "Please follow the directions to create your username and password.     Your access code is: [email or web address]  Expires: 2017  9:18 AM     Your access code will  in 90 days. If you need help or a new code, please call your [place] clinic or [phone].        Care EveryWhere ID     This is your Care EveryWhere ID. This could be used by other organizations to access your [place] medical records  [id number]        Your Vitals Were     Pulse Temperature Respirations Height Last Period Pulse Oximetry    88 98  F (36.7  C) 20 5' 2\" (1.575 m) (LMP Unknown) 94%    Breastfeeding? BMI (Body Mass Index)                No 37.86 kg/m2           Blood Pressure from Last 3 Encounters:   17 128/68   17 126/60   17 132/76    Weight from Last 3 Encounters:   17 207 lb (93.9 kg)   17 209 lb 8 oz (95 kg)   17 209 lb 8 oz (95 kg)              Today, you had the following     No orders found for display         Today's Medication Changes          These changes are accurate as of: 17  4:59 PM.  If you have any questions, ask your nurse or doctor.               These medicines have changed or have updated prescriptions.        Dose/Directions    metFORMIN 750 MG 24 hr tablet   Commonly known as:  GLUCOPHAGE-XR   This may have changed:  See the new instructions.   Used for:  Type 2 diabetes mellitus with diabetic neuropathy (H)        TAKE ONE TABLET BY MOUTH ONE TIME DAILY WITH DINNER   Quantity:  90 tablet   Refills:  1                Primary Care Provider Office Phone # Fax #    Antwon Schmidt [phone]       [facility] [address] [facility] [address]        Thank you!     Thank you for choosing [facility]  for your care. Our goal is always to provide you with excellent care. Hearing back from our patients is one way we can continue to improve our services. Please take a few minutes to complete the written " survey that you may receive in the mail after your visit with us. Thank you!             Your Updated Medication List - Protect others around you: Learn how to safely use, store and throw away your medicines at www.disposemymeds.org.          This list is accurate as of: 6/6/17  4:59 PM.  Always use your most recent med list.                   Brand Name Dispense Instructions for use    ACETAMINOPHEN PO      Take 1,000 mg by mouth 2 times daily       amLODIPine 5 MG tablet    NORVASC    90 tablet    Take 1 tablet (5 mg) by mouth daily       ASPIRIN LOW DOSE 81 MG tablet   Generic drug:  aspirin      Take 81 mg by mouth daily       atorvastatin 40 MG tablet    LIPITOR    90 tablet    Take 1 tablet (40 mg) by mouth daily       DOCUSATE SODIUM PO      Take 100 mg by mouth every morning       HM VITAMIN D3 4000 UNITS Caps   Generic drug:  Cholecalciferol      Take 1 tablet by mouth daily.       isosorbide mononitrate 60 MG 24 hr tablet    IMDUR    90 tablet    Take 1 tablet (60 mg) by mouth daily       levothyroxine 25 MCG tablet    SYNTHROID/LEVOTHROID    90 tablet    TAKE ONE TABLET BY MOUTH ONE TIME DAILY       losartan 50 MG tablet    COZAAR    90 tablet    TAKE ONE TABLET BY MOUTH ONE TIME DAILY       LYRICA 150 MG capsule   Generic drug:  pregabalin     60 capsule    TAKE ONE CAPSULE BY MOUTH TWICE A DAY       METAMUCIL 30.9 % Powd   Generic drug:  psyllium      Take 1 tsp. by mouth daily       metFORMIN 750 MG 24 hr tablet    GLUCOPHAGE-XR    90 tablet    TAKE ONE TABLET BY MOUTH ONE TIME DAILY WITH DINNER       metoprolol 100 MG tablet    LOPRESSOR    135 tablet    TAKE 1/2 TABLET BY MOUTH EVERY AM AND TAKE 1 TABLET IN THE EVENING.       nitroglycerin 0.4 MG sublingual tablet    NITROSTAT    25 tablet    Place 1 tablet (0.4 mg) under the tongue every 5 minutes as needed for chest pain       order for DME     1 Device    Equipment being ordered: wheeled walker with brakes and a seat       ranolazine 500 MG 12 hr  tablet    RANEXA    180 tablet    Take 1 tablet (500 mg) by mouth 2 times daily       TRAVATAN 0.004 % Soln   Generic drug:  Travoprost      Place 1 drop into both eyes At Bedtime

## 2017-06-06 NOTE — NURSING NOTE
"Chief Complaint   Patient presents with     Hospital F/U       Initial /68 (BP Location: Right arm, Patient Position: Chair, Cuff Size: Adult Large)  Pulse 88  Temp 98  F (36.7  C)  Resp 20  Ht 5' 2\" (1.575 m)  Wt 207 lb (93.9 kg)  LMP  (LMP Unknown)  SpO2 94%  Breastfeeding? No  BMI 37.86 kg/m2 Estimated body mass index is 37.86 kg/(m^2) as calculated from the following:    Height as of this encounter: 5' 2\" (1.575 m).    Weight as of this encounter: 207 lb (93.9 kg).  Medication Reconciliation: complete   Catherine Royal LPN  "

## 2017-06-06 NOTE — PROGRESS NOTES
"  SUBJECTIVE:                                                    Lora Vergara is a 89 year old female who presents to clinic today for the following health issues:          Hospital Follow-up Visit:    Hospital/Nursing Home/IP Rehab Facility: [facility]  Date of Admission: 5/31/2017  Date of Discharge: 6/1/2017  Reason(s) for Admission: N & V -Gastroenteritis            Problems taking medications regularly:  None       Medication changes since discharge: see med list       Problems adhering to non-medication therapy:  None    Summary of hospitalization:  [facility] discharge summary reviewed  Diagnostic Tests/Treatments reviewed.  Follow up needed: none  Other Healthcare Providers Involved in Patient s Care:         None  Update since discharge: stable.  , but still \"weak and not hungry\"     Post Discharge Medication Reconciliation: discharge medications reconciled and changed, per note/orders (see AVS).  Plan of care communicated with patient     Coding guidelines for this visit:  Type of Medical   Decision Making Face-to-Face Visit       within 7 Days of discharge Face-to-Face Visit        within 14 days of discharge   Moderate Complexity [id number]   High Complexity [id number]                      See the hospital reports.   She presented with signs and symptoms of gastroenteritis. She was kept in the hospital overnight. As listed below, her lab results were unremarkable.            She still has some symptoms of anorexia and mild nausea.                 Problem list and histories reviewed & adjusted, as indicated.  Additional history: as documented    Current Outpatient Prescriptions   Medication Sig Dispense Refill     DOCUSATE SODIUM PO Take 100 mg by mouth every morning       LYRICA 150 MG capsule TAKE ONE CAPSULE BY MOUTH TWICE A DAY 60 capsule 5     losartan (COZAAR) 50 MG tablet TAKE ONE TABLET BY MOUTH ONE TIME DAILY 90 tablet 2     levothyroxine (SYNTHROID/LEVOTHROID) 25 MCG " tablet TAKE ONE TABLET BY MOUTH ONE TIME DAILY 90 tablet 2     metoprolol (LOPRESSOR) 100 MG tablet TAKE 1/2 TABLET BY MOUTH EVERY AM AND TAKE 1 TABLET IN THE EVENING. 135 tablet 3     ranolazine (RANEXA) 500 MG 12 hr tablet Take 1 tablet (500 mg) by mouth 2 times daily 180 tablet 2     isosorbide mononitrate (IMDUR) 60 MG 24 hr tablet Take 1 tablet (60 mg) by mouth daily 90 tablet 3     metFORMIN (GLUCOPHAGE-XR) 750 MG 24 hr tablet TAKE ONE TABLET BY MOUTH ONE TIME DAILY WITH DINNER (Patient taking differently: TAKE ONE TABLET BY MOUTH ONE TIME DAILY BEFORE BREAKFAST) 90 tablet 1     nitroglycerin (NITROSTAT) 0.4 MG SL tablet Place 1 tablet (0.4 mg) under the tongue every 5 minutes as needed for chest pain 25 tablet 3     atorvastatin (LIPITOR) 40 MG tablet Take 1 tablet (40 mg) by mouth daily 90 tablet 3     amLODIPine (NORVASC) 5 MG tablet Take 1 tablet (5 mg) by mouth daily 90 tablet 3     order for DME Equipment being ordered: wheeled walker with brakes and a seat 1 Device 0     ACETAMINOPHEN PO Take 1,000 mg by mouth 2 times daily       Travoprost (TRAVATAN) 0.004 % SOLN Place 1 drop into both eyes At Bedtime        psyllium (METAMUCIL) 30.9 % POWD Take 1 tsp. by mouth daily        aspirin (ASPIRIN LOW DOSE) 81 MG tablet Take 81 mg by mouth daily        Cholecalciferol (HM VITAMIN D3) 4000 UNITS CAPS Take 1 tablet by mouth daily.       Allergies   Allergen Reactions     Hydrochlorothiazide      Pancreatitis - patient denies this.  She says it gave her low sodium.     BP Readings from Last 3 Encounters:   06/06/17 128/68   06/01/17 126/60   03/22/17 132/76    Wt Readings from Last 3 Encounters:   06/06/17 207 lb (93.9 kg)   06/01/17 209 lb 8 oz (95 kg)   03/22/17 209 lb 8 oz (95 kg)                    Reviewed and updated as needed this visit by clinical staff  Tobacco  Allergies  Meds  Med Hx  Surg Hx  Fam Hx  Soc Hx      Reviewed and updated as needed this visit by Provider      "    ROS:  CONSTITUTIONAL:NEGATIVE for fever, chills, change in weight  RESP:NEGATIVE for significant cough or SOB  GI: NEGATIVE for hematochezia and melena    OBJECTIVE:                                                    /68 (BP Location: Right arm, Patient Position: Chair, Cuff Size: Adult Large)  Pulse 88  Temp 98  F (36.7  C)  Resp 20  Ht 5' 2\" (1.575 m)  Wt 207 lb (93.9 kg)  LMP  (LMP Unknown)  SpO2 94%  Breastfeeding? No  BMI 37.86 kg/m2  Body mass index is 37.86 kg/(m^2).  GENERAL APPEARANCE: alert, no distress and over weight  CV: regular rates and rhythm  ABDOMEN: soft, nontender, without hepatosplenomegaly or masses    Diagnostic test results:  Results for orders placed or performed during the hospital encounter of 05/31/17   Chest XR,  PA & LAT    Narrative    XR CHEST 2 VW  5/31/2017 12:43 PM    HISTORY:  cough    COMPARISON:  3/17/2015      Impression    IMPRESSION:  Mild new opacities in the lower left lung could represent  prominent atelectasis or mild/early pneumonia. Aortic calcification.  Degenerative changes of the spine. Otherwise negative chest.    ISAI CHONG MD   CBC with platelets differential   Result Value Ref Range    WBC 6.5 4.0 - 11.0 10e9/L    RBC Count 4.61 3.8 - 5.2 10e12/L    Hemoglobin 14.0 11.7 - 15.7 g/dL    Hematocrit 41.3 35.0 - 47.0 %    MCV 90 78 - 100 fl    MCH 30.4 26.5 - 33.0 pg    MCHC 33.9 31.5 - 36.5 g/dL    RDW 13.0 10.0 - 15.0 %    Platelet Count 239 150 - 450 10e9/L    Diff Method Automated Method     % Neutrophils 81.7 %    % Lymphocytes 13.1 %    % Monocytes 4.7 %    % Eosinophils 0.2 %    % Basophils 0.0 %    % Immature Granulocytes 0.3 %    Nucleated RBCs 0 0 /100    Absolute Neutrophil 5.3 1.6 - 8.3 10e9/L    Absolute Lymphocytes 0.9 0.8 - 5.3 10e9/L    Absolute Monocytes 0.3 0.0 - 1.3 10e9/L    Absolute Eosinophils 0.0 0.0 - 0.7 10e9/L    Absolute Basophils 0.0 0.0 - 0.2 10e9/L    Abs Immature Granulocytes 0.0 0 - 0.4 10e9/L    Absolute " Nucleated RBC 0.0    Comprehensive metabolic panel   Result Value Ref Range    Sodium 138 133 - 144 mmol/L    Potassium 4.1 3.4 - 5.3 mmol/L    Chloride 103 94 - 109 mmol/L    Carbon Dioxide 28 20 - 32 mmol/L    Anion Gap 7 3 - 14 mmol/L    Glucose 126 (H) 70 - 99 mg/dL    Urea Nitrogen 13 7 - 30 mg/dL    Creatinine 0.68 0.52 - 1.04 mg/dL    GFR Estimate 80 >60 mL/min/1.7m2    GFR Estimate If Black >90   GFR Calc   >60 mL/min/1.7m2    Calcium 8.4 (L) 8.5 - 10.1 mg/dL    Bilirubin Total 0.5 0.2 - 1.3 mg/dL    Albumin 3.5 3.4 - 5.0 g/dL    Protein Total 6.9 6.8 - 8.8 g/dL    Alkaline Phosphatase 50 40 - 150 U/L    ALT 20 0 - 50 U/L    AST 16 0 - 45 U/L   Lipase   Result Value Ref Range    Lipase 81 73 - 393 U/L   Glucose by meter   Result Value Ref Range    Glucose 87 70 - 99 mg/dL   Glucose by meter   Result Value Ref Range    Glucose 123 (H) 70 - 99 mg/dL   CBC with platelets   Result Value Ref Range    WBC 4.1 4.0 - 11.0 10e9/L    RBC Count 3.95 3.8 - 5.2 10e12/L    Hemoglobin 11.9 11.7 - 15.7 g/dL    Hematocrit 35.8 35.0 - 47.0 %    MCV 91 78 - 100 fl    MCH 30.1 26.5 - 33.0 pg    MCHC 33.2 31.5 - 36.5 g/dL    RDW 13.3 10.0 - 15.0 %    Platelet Count 197 150 - 450 10e9/L   Basic metabolic panel   Result Value Ref Range    Sodium 140 133 - 144 mmol/L    Potassium 3.7 3.4 - 5.3 mmol/L    Chloride 106 94 - 109 mmol/L    Carbon Dioxide 27 20 - 32 mmol/L    Anion Gap 7 3 - 14 mmol/L    Glucose 103 (H) 70 - 99 mg/dL    Urea Nitrogen 12 7 - 30 mg/dL    Creatinine 0.72 0.52 - 1.04 mg/dL    GFR Estimate 75 >60 mL/min/1.7m2    GFR Estimate If Black >90   GFR Calc   >60 mL/min/1.7m2    Calcium 7.5 (L) 8.5 - 10.1 mg/dL   Hemoglobin A1c   Result Value Ref Range    Hemoglobin A1C 5.9 4.3 - 6.0 %   Glucose by meter   Result Value Ref Range    Glucose 114 (H) 70 - 99 mg/dL   Glucose by meter   Result Value Ref Range    Glucose 126 (H) 70 - 99 mg/dL   Enteric Bacteria and Virus Panel by ROSALINE Stool    Result Value Ref Range    Campylobacter group by ROSALINE Not Detected NDET    Salmonella species by ROSALINE Not Detected NDET    Shigella species by ROSALINE Not Detected NDET    Vibrio group by ROSALINE Not Detected NDET    Rotavirus A by ROSALNIE Not Detected NDET    Shiga toxin 1 gene by ROSALINE Not Detected NDET    Shiga toxin 2 gene by ROSALINE Not Detected NDET    Norovirus I and II by ROSALINE Not Detected NDET    Yersinia enterocolitica by ROSALINE Not Detected NDET    Enteric pathogen comment       Testing performed by multiplexed, qualitative PCR using the [employer]   Enteric Pathogens Nucleic Acid Test. Results should not be used as the sole   basis for diagnosis, treatment, or other patient management decisions.   Positive results do not rule out co-infection with other organisms that are not   detected by this test, and may not be the sole or definitive cause of patient   illness.   Negative results in the setting of clinical illness compatible with   gastroenteritis may be due to infection by pathogens that are not detected by   this test or non-infectious causes such as ulcerative colitis, irritable bowel   syndrome, or Crohn's disease.   Note: Shiga toxin producing E. coli (STEC) typically harbor one or both genes   that encode for Shiga toxins 1 and 2.     Clostridium difficile toxin B PCR   Result Value Ref Range    Specimen Description Feces     C Diff Toxin B PCR  NEG     Negative  Negative: Clostridium difficile target DNA sequences NOT detected, presumed   negative for Clostridium difficile toxin B or the number of bacteria present   may be below the limit of detection for the test.   FDA approved assay performed using [employer] GeneXpert real-time PCR.   A negative result does not exclude actual disease due to Clostridium difficile   and may be due to improper collection, handling and storage of the specimen or   the number of organisms in the specimen is below the detection limit of the   assay.           ASSESSMENT/PLAN:                                                         ICD-10-CM    1. Gastroenteritis K52.9    2. Non-intractable vomiting with nausea, unspecified vomiting type R11.2    3. Type 2 diabetes mellitus with diabetic neuropathy, without long-term current use of insulin (H) E11.40        Her situation is most consistent with a gastroenteritis with mild residual symptoms.  Patient Instructions   Let's do this: stop taking the metformin.   You probably don't need it at this point anyway.                 If your symptoms persist, then please follow up.         I suggest that you cut back on coffee,until you feel back to normal.           Antwon Schmidt MD  [facility]

## 2017-06-06 NOTE — PATIENT INSTRUCTIONS
Let's do this: stop taking the metformin.   You probably don't need it at this point anyway.                 If your symptoms persist, then please follow up.         I suggest that you cut back on coffee,until you feel back to normal.

## 2017-07-18 DIAGNOSIS — I10 ESSENTIAL HYPERTENSION: ICD-10-CM

## 2017-07-18 DIAGNOSIS — E78.5 HYPERLIPIDEMIA LDL GOAL <70: ICD-10-CM

## 2017-07-18 RX ORDER — AMLODIPINE BESYLATE 5 MG/1
5 TABLET ORAL DAILY
Qty: 90 TABLET | Refills: 2 | Status: SHIPPED | OUTPATIENT
Start: 2017-07-18 | End: 2018-04-06

## 2017-07-18 RX ORDER — ATORVASTATIN CALCIUM 40 MG/1
40 TABLET, FILM COATED ORAL DAILY
Qty: 90 TABLET | Refills: 2 | Status: SHIPPED | OUTPATIENT
Start: 2017-07-18 | End: 2018-04-17

## 2017-08-07 ENCOUNTER — TELEPHONE (OUTPATIENT)
Dept: CARDIOLOGY | Facility: CLINIC | Age: 82
End: 2017-08-07

## 2017-08-07 NOTE — TELEPHONE ENCOUNTER
Call out to Pt. She left message regarding Remenex and Lyrica. Returned call left phone number for call back to discuss further what issue is. VIC Luo RN

## 2017-08-23 ENCOUNTER — OFFICE VISIT (OUTPATIENT)
Dept: URGENT CARE | Facility: URGENT CARE | Age: 82
End: 2017-08-23
Payer: MEDICARE

## 2017-08-23 VITALS
OXYGEN SATURATION: 93 % | HEART RATE: 75 BPM | TEMPERATURE: 97.8 F | DIASTOLIC BLOOD PRESSURE: 57 MMHG | SYSTOLIC BLOOD PRESSURE: 89 MMHG | WEIGHT: 206 LBS | BODY MASS INDEX: 37.68 KG/M2

## 2017-08-23 DIAGNOSIS — J20.9 ACUTE BRONCHITIS WITH SYMPTOMS > 10 DAYS: Primary | ICD-10-CM

## 2017-08-23 PROCEDURE — 99213 OFFICE O/P EST LOW 20 MIN: CPT | Performed by: FAMILY MEDICINE

## 2017-08-23 RX ORDER — DOXYCYCLINE HYCLATE 100 MG
100 TABLET ORAL 2 TIMES DAILY
Qty: 20 TABLET | Refills: 0 | Status: SHIPPED | OUTPATIENT
Start: 2017-08-23 | End: 2017-09-02

## 2017-08-23 NOTE — PROGRESS NOTES
"SUBJECTIVE: Lora Vergara is a 89 year old female presenting with a chief complaint of nasal congestion and cough .  Onset of symptoms was 10 day(s) ago.  Course of illness is same.    Severity moderate  Current and Associated symptoms: \"cold symptoms\"  Treatment measures tried include OTC meds.  Predisposing factors include None.    Past Medical History:   Diagnosis Date     Angina at rest (H)     Thought to be microvascular     Arthritis      Basal cell carcinoma 1/2014, (3/5/6-2014)    nose and check and ear and face     Coronary artery disease     Mild CAD per 2/2008 angiogram     Hyperlipidaemia      Hypertension      Hypothyroidism 1/17/2013     Mitral regurgitation 6/2015    echo     Type 2 diabetes, HbA1C goal < 8% (H) 9/11/2013     Allergies   Allergen Reactions     Hydrochlorothiazide      Pancreatitis - patient denies this.  She says it gave her low sodium.     Social History   Substance Use Topics     Smoking status: Never Smoker     Smokeless tobacco: Never Used     Alcohol use Yes      Comment: Cocktail 2-3 times per month       ROS:  SKIN: no rash  GI: no vomiting    OBJECTIVE:  BP (!) 89/57  Pulse 75  Temp 97.8  F (36.6  C) (Oral)  Wt 206 lb (93.4 kg)  LMP  (LMP Unknown)  SpO2 93%  Breastfeeding? No  BMI 37.68 kg/t8OADPHEQ APPEARANCE: healthy, alert and no distress  EYES: EOMI,  PERRL, conjunctiva clear  HENT: ear canals and TM's normal.  Nose and mouth without ulcers, erythema or lesions  NECK: supple, nontender, no lymphadenopathy  RESP: lungs clear to auscultation - no rales, rhonchi or wheezes  SKIN: no suspicious lesions or rashes      ICD-10-CM    1. Acute bronchitis with symptoms > 10 days J20.9 doxycycline (VIBRA-TABS) 100 MG tablet       Fluids/Rest, f/u if worse/not any better    "

## 2017-08-23 NOTE — MR AVS SNAPSHOT
"              After Visit Summary   8/23/2017    Lora Vergara    MRN: [id number]           Patient Information     Date Of Birth          9/11/1925        Visit Information        Provider Department      8/23/2017 10:10 AM Antwon Hurd DO [facility]        Today's Diagnoses     Acute bronchitis with symptoms > 10 days    -  1       Follow-ups after your visit        Your next 10 appointments already scheduled     Sep 08, 2017 11:15 AM CDT   Return Visit with Kalyan Luis MD   [facility] [employer] ([facility])    [address] [place] [id number] [phone]              Who to contact     If you have questions or need follow up information about today's clinic visit or your schedule please contact [facility] directly at [phone].  Normal or non-critical lab and imaging results will be communicated to you by MyChart, letter or phone within 4 business days after the clinic has received the results. If you do not hear from us within 7 days, please contact the clinic through MyChart or phone. If you have a critical or abnormal lab result, we will notify you by phone as soon as possible.  Submit refill requests through [employer] or call your pharmacy and they will forward the refill request to us. Please allow 3 business days for your refill to be completed.          Additional Information About Your Visit        MyChart Information     [employer] lets you send messages to your doctor, view your test results, renew your prescriptions, schedule appointments and more. To sign up, go to [email or web address] . Click on \"Log in\" on the left side of the screen, which will take you to the Welcome page. Then click on \"Sign up Now\" on the right side of the page.     You will be asked to enter the access code listed below, as well as some personal information. Please follow the directions to " create your username and password.     Your access code is: [email or web address]  Expires: 2017 10:27 AM     Your access code will  in 90 days. If you need help or a new code, please call your [facility] clinic or [phone].        Care EveryWhere ID     This is your Care EveryWhere ID. This could be used by other organizations to access your [facility] medical records  [id number]        Your Vitals Were     Pulse Temperature Last Period Pulse Oximetry Breastfeeding? BMI (Body Mass Index)    75 97.8  F (36.6  C) (Oral) (LMP Unknown) 93% No 37.68 kg/m2       Blood Pressure from Last 3 Encounters:   17 (!) 89/57   17 128/68   17 126/60    Weight from Last 3 Encounters:   17 206 lb (93.4 kg)   17 207 lb (93.9 kg)   17 209 lb 8 oz (95 kg)              Today, you had the following     No orders found for display         Today's Medication Changes          These changes are accurate as of: 17 10:27 AM.  If you have any questions, ask your nurse or doctor.               Start taking these medicines.        Dose/Directions    doxycycline 100 MG tablet   Commonly known as:  VIBRA-TABS   Used for:  Acute bronchitis with symptoms > 10 days   Started by:  Antwon Hurd DO        Dose:  100 mg   Take 1 tablet (100 mg) by mouth 2 times daily for 10 days   Quantity:  20 tablet   Refills:  0            Where to get your medicines      These medications were sent to [facility] [address] [place] [address] [facility] [address]     Phone:  [phone]     doxycycline 100 MG tablet                Primary Care Provider Office Phone # Fax #    Antwon Schmidt [phone] [address] [facility] [address]        Equal Access to Services     [facility] CASA AH: Liz fuentes Somarquita, waaxda luqadaha, qaybta kaaldana flower, debbie martins. [facility] [phone].    ATENCIÓN: Si carlos griffin,  tiene a david disposición servicios gratuitos de asistencia lingüística. Scar chandler [phone].    We comply with applicable federal civil rights laws and [place] laws. We do not discriminate on the basis of race, color, national origin, age, disability sex, sexual orientation or gender identity.            Thank you!     Thank you for choosing [facility]  for your care. Our goal is always to provide you with excellent care. Hearing back from our patients is one way we can continue to improve our services. Please take a few minutes to complete the written survey that you may receive in the mail after your visit with us. Thank you!             Your Updated Medication List - Protect others around you: Learn how to safely use, store and throw away your medicines at www.disposemymeds.org.          This list is accurate as of: 8/23/17 10:27 AM.  Always use your most recent med list.                   Brand Name Dispense Instructions for use Diagnosis    ACETAMINOPHEN PO      Take 1,000 mg by mouth 2 times daily        amLODIPine 5 MG tablet    NORVASC    90 tablet    Take 1 tablet (5 mg) by mouth daily    Essential hypertension       ASPIRIN LOW DOSE 81 MG tablet   Generic drug:  aspirin      Take 81 mg by mouth daily        atorvastatin 40 MG tablet    LIPITOR    90 tablet    Take 1 tablet (40 mg) by mouth daily    Hyperlipidemia LDL goal <70       DOCUSATE SODIUM PO      Take 100 mg by mouth every morning        doxycycline 100 MG tablet    VIBRA-TABS    20 tablet    Take 1 tablet (100 mg) by mouth 2 times daily for 10 days    Acute bronchitis with symptoms > 10 days       HM VITAMIN D3 4000 UNITS Caps   Generic drug:  Cholecalciferol      Take 1 tablet by mouth daily.        isosorbide mononitrate 60 MG 24 hr tablet    IMDUR    90 tablet    Take 1 tablet (60 mg) by mouth daily    Chronic ischemic heart disease       levothyroxine 25 MCG tablet    SYNTHROID/LEVOTHROID    90 tablet    TAKE  ONE TABLET BY MOUTH ONE TIME DAILY    Hypothyroidism       losartan 50 MG tablet    COZAAR    90 tablet    TAKE ONE TABLET BY MOUTH ONE TIME DAILY    Secondary hypertension with goal blood pressure less than 130/80       LYRICA 150 MG capsule   Generic drug:  pregabalin     60 capsule    TAKE ONE CAPSULE BY MOUTH TWICE A DAY    Neuropathy (H)       METAMUCIL 30.9 % Powd   Generic drug:  psyllium      Take 1 tsp. by mouth daily        metoprolol 100 MG tablet    LOPRESSOR    135 tablet    TAKE 1/2 TABLET BY MOUTH EVERY AM AND TAKE 1 TABLET IN THE EVENING.    Hypertension goal BP (blood pressure) < 140/90       nitroGLYcerin 0.4 MG sublingual tablet    NITROSTAT    25 tablet    Place 1 tablet (0.4 mg) under the tongue every 5 minutes as needed for chest pain    Chronic ischemic heart disease, unspecified       order for DME     1 Device    Equipment being ordered: wheeled walker with brakes and a seat    Neuropathy (H), Poor balance       ranolazine 500 MG 12 hr tablet    RANEXA    180 tablet    Take 1 tablet (500 mg) by mouth 2 times daily    Chronic ischemic heart disease, unspecified       TRAVATAN 0.004 % Soln   Generic drug:  Travoprost      Place 1 drop into both eyes At Bedtime

## 2017-08-23 NOTE — NURSING NOTE
"Chief Complaint   Patient presents with     Cough     persistent cough, chest congestion, wheezing and sob 10 days.       Initial BP (!) 89/57  Pulse 75  Temp 97.8  F (36.6  C) (Oral)  Wt 206 lb (93.4 kg)  LMP  (LMP Unknown)  SpO2 93%  Breastfeeding? No  BMI 37.68 kg/m2 Estimated body mass index is 37.68 kg/(m^2) as calculated from the following:    Height as of 6/6/17: 5' 2\" (1.575 m).    Weight as of this encounter: 206 lb (93.4 kg).  Medication Reconciliation: complete    "

## 2017-08-24 ENCOUNTER — ALLIED HEALTH/NURSE VISIT (OUTPATIENT)
Dept: NURSING | Facility: CLINIC | Age: 82
End: 2017-08-24
Payer: MEDICARE

## 2017-08-24 ENCOUNTER — TELEPHONE (OUTPATIENT)
Dept: FAMILY MEDICINE | Facility: CLINIC | Age: 82
End: 2017-08-24

## 2017-08-24 VITALS — OXYGEN SATURATION: 92 % | DIASTOLIC BLOOD PRESSURE: 68 MMHG | SYSTOLIC BLOOD PRESSURE: 102 MMHG | HEART RATE: 82 BPM

## 2017-08-24 DIAGNOSIS — Z01.30 BP CHECK: Primary | ICD-10-CM

## 2017-08-24 PROCEDURE — 99207 ZZC NO CHARGE NURSE ONLY: CPT

## 2017-08-24 NOTE — TELEPHONE ENCOUNTER
Reason for Call:  Other call back    Detailed comments: Patient was in urgent care. She states he BP was low and it was never addressed. Patient is wondering if she should come in. Please call to advise     Phone Number Patient can be reached at: Home number on file [phone] (home)    Best Time: Any    Can we leave a detailed message on this number? YES    Call taken on 8/24/2017 at 9:43 AM by ESTEPHANIA NOLAN

## 2017-08-24 NOTE — NURSING NOTE
Patient came in today for BP check. Initially her BP was 102/68, which is lower than her usual BP. I asked if she felt dizzy or sick at all, and she said no, she felt fine. I also checked patient's oxygen as it was 93 at . When checked here her oxygen went between 92 and 93. Pulse 82.   I told the patient that she should continue to rest and take her abx she was given at . I told her that if she feels worse or dizzy to call us back and/or schedule an appointment to be seen by you, Dr. Schmidt.    This is an FYI message. I told patient I would send this to you and if you have any comments to make, please advise.    Thanks!

## 2017-08-24 NOTE — MR AVS SNAPSHOT
"              After Visit Summary   8/24/2017    Lora Vergara    MRN: [id number]           Patient Information     Date Of Birth          9/11/1925        Visit Information        Provider Department      8/24/2017 2:00 PM BX NURSE [facility]        Today's Diagnoses     BP check    -  1       Follow-ups after your visit        Your next 10 appointments already scheduled     Sep 08, 2017 11:15 AM CDT   Return Visit with Kalyan Luis MD   [facility] [place] ([facility])    [address] [place] [address] [id number] [phone]              Who to contact     If you have questions or need follow up information about today's clinic visit or your schedule please contact [facility] directly at [phone].  Normal or non-critical lab and imaging results will be communicated to you by MyChart, letter or phone within 4 business days after the clinic has received the results. If you do not hear from us within 7 days, please contact the clinic through MyChart or phone. If you have a critical or abnormal lab result, we will notify you by phone as soon as possible.  Submit refill requests through [employer] or call your pharmacy and they will forward the refill request to us. Please allow 3 business days for your refill to be completed.          Additional Information About Your Visit        MyChart Information     [employer] lets you send messages to your doctor, view your test results, renew your prescriptions, schedule appointments and more. To sign up, go to [email or web address] . Click on \"Log in\" on the left side of the screen, which will take you to the Welcome page. Then click on \"Sign up Now\" on the right side of the page.     You will be asked to enter the access code listed below, as well as some personal information. Please follow the directions to create your username and password.     Your " access code is: [email or web address]  Expires: 2017 10:27 AM     Your access code will  in 90 days. If you need help or a new code, please call your [place] clinic or [phone].        Care EveryWhere ID     This is your Care EveryWhere ID. This could be used by other organizations to access your [place] medical records  [id number]        Your Vitals Were     Pulse Last Period Pulse Oximetry             82 (LMP Unknown) 92%          Blood Pressure from Last 3 Encounters:   17 102/68   17 (!) 89/57   17 128/68    Weight from Last 3 Encounters:   17 206 lb (93.4 kg)   17 207 lb (93.9 kg)   17 209 lb 8 oz (95 kg)              Today, you had the following     No orders found for display       Primary Care Provider Office Phone # Fax #    Antwon Schmidt [phone] [address] [facility] [place] [address]        Equal Access to Services     [facility]: Hadii aad ku hadasho Soomaali, waaxda luqadaha, qaybta kaalmada adeegyada, waxay ash haykylee juan . So [facility] [phone].    ATENCIÓN: Si habla español, tiene a david disposición servicios gratuitos de asistencia lingüística. Scar al [phone].    We comply with applicable federal civil rights laws and [place] laws. We do not discriminate on the basis of race, color, national origin, age, disability sex, sexual orientation or gender identity.            Thank you!     Thank you for choosing [facility]  for your care. Our goal is always to provide you with excellent care. Hearing back from our patients is one way we can continue to improve our services. Please take a few minutes to complete the written survey that you may receive in the mail after your visit with us. Thank you!             Your Updated Medication List - Protect others around you: Learn how to safely use, store and throw away your medicines at www.disposemymeds.org.          This  list is accurate as of: 8/24/17  2:09 PM.  Always use your most recent med list.                   Brand Name Dispense Instructions for use Diagnosis    ACETAMINOPHEN PO      Take 1,000 mg by mouth 2 times daily        amLODIPine 5 MG tablet    NORVASC    90 tablet    Take 1 tablet (5 mg) by mouth daily    Essential hypertension       ASPIRIN LOW DOSE 81 MG tablet   Generic drug:  aspirin      Take 81 mg by mouth daily        atorvastatin 40 MG tablet    LIPITOR    90 tablet    Take 1 tablet (40 mg) by mouth daily    Hyperlipidemia LDL goal <70       DOCUSATE SODIUM PO      Take 100 mg by mouth every morning        doxycycline 100 MG tablet    VIBRA-TABS    20 tablet    Take 1 tablet (100 mg) by mouth 2 times daily for 10 days    Acute bronchitis with symptoms > 10 days       HM VITAMIN D3 4000 UNITS Caps   Generic drug:  Cholecalciferol      Take 1 tablet by mouth daily.        isosorbide mononitrate 60 MG 24 hr tablet    IMDUR    90 tablet    Take 1 tablet (60 mg) by mouth daily    Chronic ischemic heart disease       levothyroxine 25 MCG tablet    SYNTHROID/LEVOTHROID    90 tablet    TAKE ONE TABLET BY MOUTH ONE TIME DAILY    Hypothyroidism       losartan 50 MG tablet    COZAAR    90 tablet    TAKE ONE TABLET BY MOUTH ONE TIME DAILY    Secondary hypertension with goal blood pressure less than 130/80       LYRICA 150 MG capsule   Generic drug:  pregabalin     60 capsule    TAKE ONE CAPSULE BY MOUTH TWICE A DAY    Neuropathy (H)       METAMUCIL 30.9 % Powd   Generic drug:  psyllium      Take 1 tsp. by mouth daily        metoprolol 100 MG tablet    LOPRESSOR    135 tablet    TAKE 1/2 TABLET BY MOUTH EVERY AM AND TAKE 1 TABLET IN THE EVENING.    Hypertension goal BP (blood pressure) < 140/90       nitroGLYcerin 0.4 MG sublingual tablet    NITROSTAT    25 tablet    Place 1 tablet (0.4 mg) under the tongue every 5 minutes as needed for chest pain    Chronic ischemic heart disease, unspecified       order for DME     1  Device    Equipment being ordered: wheeled walker with brakes and a seat    Neuropathy (H), Poor balance       ranolazine 500 MG 12 hr tablet    RANEXA    180 tablet    Take 1 tablet (500 mg) by mouth 2 times daily    Chronic ischemic heart disease, unspecified       TRAVATAN 0.004 % Soln   Generic drug:  Travoprost      Place 1 drop into both eyes At Bedtime

## 2017-08-24 NOTE — TELEPHONE ENCOUNTER
Patient was called back, yesterday she went to the urgent care for bronchitis, is now on Abx.   Her BP was 89/57, but was not addressed or rechecked. She denies dizziness, headache, or feeling different in any way.   Some clamminess skin and low grade fever this week, nothing over 99, but she thinks this is due to the bronchitis.     NURSING PLAN: Nursing advice to patient given    RECOMMENDED DISPOSITION:  See in 24 hours - Patient scheduled for a nurse only BP check appointment. If abnormal, patient to be seen by a provider.   Will comply with recommendation: Yes  If further questions/concerns or if symptoms do not improve, worsen or new symptoms develop, call your PCP or [facility] as soon as possible.      Guideline used:  Telephone Triage Protocols for Nurses, Fifth Edition, Sendy Manning RN

## 2017-08-28 ENCOUNTER — TRANSFERRED RECORDS (OUTPATIENT)
Dept: HEALTH INFORMATION MANAGEMENT | Facility: CLINIC | Age: 82
End: 2017-08-28

## 2017-09-08 ENCOUNTER — OFFICE VISIT (OUTPATIENT)
Dept: CARDIOLOGY | Facility: CLINIC | Age: 82
End: 2017-09-08
Attending: INTERNAL MEDICINE
Payer: MEDICARE

## 2017-09-08 VITALS
WEIGHT: 210.1 LBS | HEIGHT: 62 IN | BODY MASS INDEX: 38.66 KG/M2 | HEART RATE: 68 BPM | SYSTOLIC BLOOD PRESSURE: 116 MMHG | DIASTOLIC BLOOD PRESSURE: 72 MMHG

## 2017-09-08 DIAGNOSIS — E78.5 HYPERLIPIDEMIA LDL GOAL <70: Primary | Chronic | ICD-10-CM

## 2017-09-08 DIAGNOSIS — R06.02 SOB (SHORTNESS OF BREATH): ICD-10-CM

## 2017-09-08 DIAGNOSIS — I10 HYPERTENSION GOAL BP (BLOOD PRESSURE) < 140/90: ICD-10-CM

## 2017-09-08 DIAGNOSIS — I25.9 CHRONIC ISCHEMIC HEART DISEASE: ICD-10-CM

## 2017-09-08 PROCEDURE — 99214 OFFICE O/P EST MOD 30 MIN: CPT | Performed by: INTERNAL MEDICINE

## 2017-09-08 NOTE — MR AVS SNAPSHOT
"              After Visit Summary   9/8/2017    Lora Vergara    MRN: [id number]           Patient Information     Date Of Birth          9/11/1925        Visit Information        Provider Department      9/8/2017 11:15 AM Kalyan Luis MD [facility] [place]        Today's Diagnoses     Hyperlipidemia LDL goal <70    -  1    Chronic ischemic heart disease        Hypertension goal BP (blood pressure) < 140/90        SOB (shortness of breath)          Care Instructions    Stop Losartan          Follow-ups after your visit        Additional Services     Follow-Up with Cardiologist                 Future tests that were ordered for you today     Open Future Orders        Priority Expected Expires Ordered    Echocardiogram Routine 3/7/2018 9/8/2018 9/8/2017    Follow-Up with Cardiologist Routine 3/7/2018 9/8/2018 9/8/2017            Who to contact     If you have questions or need follow up information about today's clinic visit or your schedule please contact [facility] directly at [phone].  Normal or non-critical lab and imaging results will be communicated to you by [employer], letter or phone within 4 business days after the clinic has received the results. If you do not hear from us within 7 days, please contact the clinic through "[employer]t or phone. If you have a critical or abnormal lab result, we will notify you by phone as soon as possible.  Submit refill requests through [email or web address] ([email or web address]) or call your pharmacy and they will forward the refill request to us. Please allow 3 business days for your refill to be completed.          Additional Information About Your Visit        [employer] Information     [email or web address] lets you send messages to your doctor, view your test results, renew your prescriptions, schedule appointments and more. To sign up, go to [email or web address] ([email or web address]) . Click on \"Log in\" on the left side of the screen, which will take you " "to the Welcome page. Then click on \"Sign up Now\" on the right side of the page.     You will be asked to enter the access code listed below, as well as some personal information. Please follow the directions to create your username and password.     Your access code is: [email or web address]  Expires: 2017 10:27 AM     Your access code will  in 90 days. If you need help or a new code, please call your [place] clinic or [phone].        Care EveryWhere ID     This is your Care EveryWhere ID. This could be used by other organizations to access your [place] medical records  [id number]        Your Vitals Were     Pulse Height Last Period BMI (Body Mass Index)          68 1.575 m (5' 2\") (LMP Unknown) 38.43 kg/m2         Blood Pressure from Last 3 Encounters:   17 116/72   17 102/68   17 (!) 89/57    Weight from Last 3 Encounters:   17 95.3 kg (210 lb 1.6 oz)   17 93.4 kg (206 lb)   17 93.9 kg (207 lb)              We Performed the Following     Follow-Up with Cardiologist          Today's Medication Changes          These changes are accurate as of: 17 11:40 AM.  If you have any questions, ask your nurse or doctor.               Stop taking these medicines if you haven't already. Please contact your care team if you have questions.     losartan 50 MG tablet   Commonly known as:  COZAAR   Stopped by:  Kalyan Luis MD                    Primary Care Provider Office Phone # Fax #    Antwon Schmidt [phone] [address] [facility] [address]        Equal Access to Services     CINDI CORMIER : Liz Gracia, cynthia borrego, lupe reyesalmachio flower, debbie martins. So [facility] [phone].    ATENCIÓN: Si habla español, tiene a david disposición servicios gratuitos de asistencia lingüística. Llame al [phone].    We comply with applicable federal civil rights laws and [place] laws. We do " not discriminate on the basis of race, color, national origin, age, disability sex, sexual orientation or gender identity.            Thank you!     Thank you for choosing [facility] [place]  for your care. Our goal is always to provide you with excellent care. Hearing back from our patients is one way we can continue to improve our services. Please take a few minutes to complete the written survey that you may receive in the mail after your visit with us. Thank you!             Your Updated Medication List - Protect others around you: Learn how to safely use, store and throw away your medicines at www.disposemymeds.org.          This list is accurate as of: 9/8/17 11:40 AM.  Always use your most recent med list.                   Brand Name Dispense Instructions for use Diagnosis    ACETAMINOPHEN PO      Take 1,000 mg by mouth 2 times daily        amLODIPine 5 MG tablet    NORVASC    90 tablet    Take 1 tablet (5 mg) by mouth daily    Essential hypertension       ASPIRIN LOW DOSE 81 MG tablet   Generic drug:  aspirin      Take 81 mg by mouth daily        atorvastatin 40 MG tablet    LIPITOR    90 tablet    Take 1 tablet (40 mg) by mouth daily    Hyperlipidemia LDL goal <70       DOCUSATE SODIUM PO      Take 100 mg by mouth every morning        HM VITAMIN D3 4000 UNITS Caps   Generic drug:  Cholecalciferol      Take 1 tablet by mouth daily.        isosorbide mononitrate 60 MG 24 hr tablet    IMDUR    90 tablet    Take 1 tablet (60 mg) by mouth daily    Chronic ischemic heart disease       levothyroxine 25 MCG tablet    SYNTHROID/LEVOTHROID    90 tablet    TAKE ONE TABLET BY MOUTH ONE TIME DAILY    Hypothyroidism       LYRICA 150 MG capsule   Generic drug:  pregabalin     60 capsule    TAKE ONE CAPSULE BY MOUTH TWICE A DAY    Neuropathy (H)       METAMUCIL 30.9 % Powd   Generic drug:  psyllium      Take 1 tsp. by mouth daily        metoprolol 100 MG tablet    LOPRESSOR    135 tablet     TAKE 1/2 TABLET BY MOUTH EVERY AM AND TAKE 1 TABLET IN THE EVENING.    Hypertension goal BP (blood pressure) < 140/90       nitroGLYcerin 0.4 MG sublingual tablet    NITROSTAT    25 tablet    Place 1 tablet (0.4 mg) under the tongue every 5 minutes as needed for chest pain    Chronic ischemic heart disease, unspecified       order for DME     1 Device    Equipment being ordered: wheeled walker with brakes and a seat    Neuropathy (H), Poor balance       ranolazine 500 MG 12 hr tablet    RANEXA    180 tablet    Take 1 tablet (500 mg) by mouth 2 times daily    Chronic ischemic heart disease, unspecified       TRAVATAN 0.004 % Soln   Generic drug:  Travoprost      Place 1 drop into both eyes At Bedtime

## 2017-09-08 NOTE — PROGRESS NOTES
HPI and Plan:         RE:                Lora Vergara   MRN:             [id number]   :             1925       Dear Antwon:       I had the pleasure of seeing Lora Vergara in Cardiology Clinic, who has chronic ischemic chest discomfort related to microvascular angina.  She has had a coronary angiography in , which showed mild to moderate coronary artery atherosclerosis, but no obstructive disease.  However, chest discomfort and jaw pain have been suspicious for angina and responded to Imdur and Ranexa.  With that, she has had no need for taking sublingual nitroglycerin.      She has been still quite active at her age.  She drives.   She denies any recent chest discomfort, orthopnea or PND.       She does have some lightheadedness intermittently and is afraid that she might pass out occasionally.      PHYSICAL EXAMINATION:  Blood pressure 116/72, pulse 68 per minute and regular.  Cardiopulmonary examination is unremarkable and unchanged.         IMAGING:  Her last echocardiogram from 2016 had shown aortic valve sclerosis.  Ejection fraction was greater than 70%.  Last stress nuclear study from  showed no ischemia.       IMPRESSION:   1.  Microvascular angina/coronary artery atherosclerosis.  At this time, pain is well controlled on a combination of Imdur, Ranexa, amlodipine and p.r.n. sublingual nitro.   she is now in a donut hole and Ranexa is expensive for her. I gave her some samples of her neck supple. I was asked to call my nurse Mirna in couple months and if she runs out of her next set, we can give her more samples and perhaps 1000mg samples which she can cut it in half twice a day. She has not taken sublingual nitro for a while now.  She is on baby aspirin.  she is having some lightheadedness and blood pressure is low normal and therefore I have suggested we stop her losartan. She'll remain on amlodipine, Imdur and metoprolol.    2.  Hyperlipidemia.  Continue atorvastatin. Last LDL was 48  and 2016.     3.  Hypertension, well controlled.     She'll return to see me in follow up in 6 months. She'll call my nurse for additional samples of her neck service see her as she is in a donut hole.    Sincerely,    Kalyan Luis MD    Cc:  Antwon Schmidt MD   [facility]    [address] [place] [address]   Sincerely,       Kalyan Luis MD         Orders Placed This Encounter   Procedures     Follow-Up with Cardiologist     Echocardiogram       No orders of the defined types were placed in this encounter.      Medications Discontinued During This Encounter   Medication Reason     losartan (COZAAR) 50 MG tablet          Encounter Diagnoses   Name Primary?     Chronic ischemic heart disease      Hyperlipidemia LDL goal <70 Yes     Hypertension goal BP (blood pressure) < 140/90      SOB (shortness of breath)        CURRENT MEDICATIONS:  Current Outpatient Prescriptions   Medication Sig Dispense Refill     amLODIPine (NORVASC) 5 MG tablet Take 1 tablet (5 mg) by mouth daily 90 tablet 2     atorvastatin (LIPITOR) 40 MG tablet Take 1 tablet (40 mg) by mouth daily 90 tablet 2     DOCUSATE SODIUM PO Take 100 mg by mouth every morning       LYRICA 150 MG capsule TAKE ONE CAPSULE BY MOUTH TWICE A DAY 60 capsule 5     levothyroxine (SYNTHROID/LEVOTHROID) 25 MCG tablet TAKE ONE TABLET BY MOUTH ONE TIME DAILY 90 tablet 2     metoprolol (LOPRESSOR) 100 MG tablet TAKE 1/2 TABLET BY MOUTH EVERY AM AND TAKE 1 TABLET IN THE EVENING. 135 tablet 3     ranolazine (RANEXA) 500 MG 12 hr tablet Take 1 tablet (500 mg) by mouth 2 times daily 180 tablet 2     isosorbide mononitrate (IMDUR) 60 MG 24 hr tablet Take 1 tablet (60 mg) by mouth daily 90 tablet 3     nitroglycerin (NITROSTAT) 0.4 MG SL tablet Place 1 tablet (0.4 mg) under the tongue every 5 minutes as needed for chest pain 25 tablet 3     order for DME Equipment being ordered: wheeled walker with brakes and a seat 1 Device 0      ACETAMINOPHEN PO Take 1,000 mg by mouth 2 times daily       Travoprost (TRAVATAN) 0.004 % SOLN Place 1 drop into both eyes At Bedtime        psyllium (METAMUCIL) 30.9 % POWD Take 1 tsp. by mouth daily        aspirin (ASPIRIN LOW DOSE) 81 MG tablet Take 81 mg by mouth daily        Cholecalciferol (HM VITAMIN D3) 4000 UNITS CAPS Take 1 tablet by mouth daily.         ALLERGIES     Allergies   Allergen Reactions     Hydrochlorothiazide      Pancreatitis - patient denies this.  She says it gave her low sodium.       PAST MEDICAL HISTORY:  Past Medical History:   Diagnosis Date     Angina at rest (H)     Thought to be microvascular     Arthritis      Basal cell carcinoma 1/2014, (3/5/6-2014)    nose and check and ear and face     Coronary artery disease     Mild CAD per 2/2008 angiogram     Hyperlipidaemia      Hypertension      Hypothyroidism 1/17/2013     Mitral regurgitation 6/2015    echo     Type 2 diabetes, HbA1C goal < 8% (H) 9/11/2013       PAST SURGICAL HISTORY:  Past Surgical History:   Procedure Laterality Date     APPENDECTOMY  1974     BACK SURGERY  1996     BLADDER SURGERY  1990    bladder suspension     CHOLECYSTECTOMY  1975     CORONARY ANGIOGRAPHY ADULT ORDER  2/2008    Mild CAD. LAD w/20% stenosis, RCA with 30-40% stenosis. No flow limiting obstructions.     face surgery  1-6/2014    basal cell plus plastic to nose, face , ear     HYSTERECTOMY  1970     OOPHORECTOMY  1974    bilateral     PHACOEMULSIFICATION CLEAR CORNEA WITH STANDARD INTRAOCULAR LENS IMPLANT Right 7/21/2015    Procedure: PHACOEMULSIFICATION CLEAR CORNEA WITH STANDARD INTRAOCULAR LENS IMPLANT;  Surgeon: Schuyler Chapa MD;  Location: [facility]     PHACOEMULSIFICATION CLEAR CORNEA WITH STANDARD INTRAOCULAR LENS IMPLANT Left 8/18/2015    Procedure: PHACOEMULSIFICATION CLEAR CORNEA WITH STANDARD INTRAOCULAR LENS IMPLANT;  Surgeon: Schuyler Chapa MD;  Location: [facility]       FAMILY HISTORY:  Family History   Problem Relation Age of  "Onset     CANCER Brother      lung     CANCER Son      CEREBROVASCULAR DISEASE Mother      and Parkinson's     CANCER Father      pancreas     DIABETES Son        SOCIAL HISTORY:  Social History     Social History     Marital status:      Spouse name: N/A     Number of children: N/A     Years of education: N/A     Social History Main Topics     Smoking status: Never Smoker     Smokeless tobacco: Never Used     Alcohol use Yes      Comment: Cocktail 2-3 times per month     Drug use: No     Sexual activity: No     Other Topics Concern     Parent/Sibling W/ Cabg, Mi Or Angioplasty Before 65f 55m? No     Caffeine Concern Yes     4 cups caffeine per day     Sleep Concern No     Stress Concern No     Weight Concern No     Special Diet No     Exercise Yes     exercises 30 minutes, 4 days week     Social History Narrative    Had 2 sons; one ; had 2 step dtrs; one .                                        times 2       Review of Systems:  Skin:  Negative       Eyes:  Positive for glaucoma;glasses    ENT:  Negative      Respiratory:  Positive for dyspnea on exertion bronchitis one month ago, completed abx   Cardiovascular:  Negative;palpitations;chest pain;syncope or near-syncope;cyanosis exercise intolerance;Positive for;fatigue;lightheadedness edema in ankles at the end of the day  Gastroenterology: Positive for   hospitalized  for nausea and vomiting  Genitourinary:  Positive for nocturia    Musculoskeletal:  Positive for joint pain;arthritis    Neurologic:  Positive for   neuropathy  Psychiatric:  Negative      Heme/Lymph/Imm:  Positive for allergies    Endocrine:  Positive for thyroid disorder      Physical Exam:  Vitals: /72 (BP Location: Left arm, Cuff Size: Adult Large)  Pulse 68  Ht 1.575 m (5' 2\")  Wt 95.3 kg (210 lb 1.6 oz)  LMP  (LMP Unknown)  BMI 38.43 kg/m2    Constitutional:  cooperative;alert and oriented        Skin:  warm and dry to the touch        Head:  normocephalic    "     Eyes:  pupils equal and round        ENT:  not assessed this visit        Neck:  JVP normal        Chest:  clear to auscultation          Cardiac: regular rhythm;normal S1 and S2   S4              Abdomen:  abdomen soft;BS normoactive        Vascular: not assessed this visit                                        Extremities and Back:  no spinal abnormalities noted              Neurological:  no gross motor deficits              SHANKAR Luis MD  [address]  [id number]  [place] [id number]

## 2017-09-08 NOTE — LETTER
2017    Antwon Schmidt MD  [address]  [facility] [address]    RE: Lora Vergara       Dear Colleague,    I had the pleasure of seeing Lora Vergara in the [facility].    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  She has had a coronary angiography in , which showed mild to moderate coronary artery atherosclerosis, but no obstructive disease.  However, chest discomfort and jaw pain have been suspicious for angina and responded to Imdur and Ranexa.  With that, she has had no need for taking sublingual nitroglycerin.      She has been still quite active at her age.  She drives.   She denies any recent chest discomfort, orthopnea or PND.       She does have some lightheadedness intermittently and is afraid that she might pass out occasionally.      PHYSICAL EXAMINATION:  Blood pressure 116/72, pulse 68 per minute and regular.  Cardiopulmonary examination is unremarkable and unchanged.         IMAGING:  Her last echocardiogram from 2016 had shown aortic valve sclerosis.  Ejection fraction was greater than 70%.  Last stress nuclear study from  showed no ischemia.       IMPRESSION:   1.  Microvascular angina/coronary artery atherosclerosis.  At this time, pain is well controlled on a combination of Imdur, Ranexa, amlodipine and p.r.n. sublingual nitro.    she is now in a donut hole and Ranexa is expensive for her. I gave her some samples of her neck supple. I was asked to call my nurse Mirna in couple months and if she runs out of her next set, we can give her more samples and perhaps 1000mg samples which she can cut it in half twice a day. She has not taken sublingual nitro for a while now.  She is on baby aspirin.  she is having  some lightheadedness and blood pressure is low normal and therefore I have suggested we stop her losartan. She'll remain on amlodipine, Imdur and metoprolol.    2.  Hyperlipidemia.  Continue atorvastatin. Last LDL was 48 and 2016.     3.  Hypertension, well controlled.     She'll return to see me in follow up in 6 months. She'll call my nurse for additional samples of her neck service see her as she is in a donut hole.    Sincerely,    Kalyan Luis MD    Cc:  Antwon Schmidt MD   [address] [address]   Sincerely,       Kalyan Luis MD         Orders Placed This Encounter   Procedures     Follow-Up with Cardiologist     Echocardiogram       No orders of the defined types were placed in this encounter.      Medications Discontinued During This Encounter   Medication Reason     losartan (COZAAR) 50 MG tablet          Encounter Diagnoses   Name Primary?     Chronic ischemic heart disease      Hyperlipidemia LDL goal <70 Yes     Hypertension goal BP (blood pressure) < 140/90      SOB (shortness of breath)        CURRENT MEDICATIONS:  Current Outpatient Prescriptions   Medication Sig Dispense Refill     amLODIPine (NORVASC) 5 MG tablet Take 1 tablet (5 mg) by mouth daily 90 tablet 2     atorvastatin (LIPITOR) 40 MG tablet Take 1 tablet (40 mg) by mouth daily 90 tablet 2     DOCUSATE SODIUM PO Take 100 mg by mouth every morning       LYRICA 150 MG capsule TAKE ONE CAPSULE BY MOUTH TWICE A DAY 60 capsule 5     levothyroxine (SYNTHROID/LEVOTHROID) 25 MCG tablet TAKE ONE TABLET BY MOUTH ONE TIME DAILY 90 tablet 2     metoprolol (LOPRESSOR) 100 MG tablet TAKE 1/2 TABLET BY MOUTH EVERY AM AND TAKE 1 TABLET IN THE EVENING. 135 tablet 3     ranolazine (RANEXA) 500 MG 12 hr tablet Take 1 tablet (500 mg) by mouth 2 times daily 180 tablet 2     isosorbide mononitrate (IMDUR) 60 MG 24 hr tablet Take 1 tablet (60 mg) by mouth daily 90 tablet 3     nitroglycerin  (NITROSTAT) 0.4 MG SL tablet Place 1 tablet (0.4 mg) under the tongue every 5 minutes as needed for chest pain 25 tablet 3     order for DME Equipment being ordered: wheeled walker with brakes and a seat 1 Device 0     ACETAMINOPHEN PO Take 1,000 mg by mouth 2 times daily       Travoprost (TRAVATAN) 0.004 % SOLN Place 1 drop into both eyes At Bedtime        psyllium (METAMUCIL) 30.9 % POWD Take 1 tsp. by mouth daily        aspirin (ASPIRIN LOW DOSE) 81 MG tablet Take 81 mg by mouth daily        Cholecalciferol (HM VITAMIN D3) 4000 UNITS CAPS Take 1 tablet by mouth daily.         ALLERGIES     Allergies   Allergen Reactions     Hydrochlorothiazide      Pancreatitis - patient denies this.  She says it gave her low sodium.       PAST MEDICAL HISTORY:  Past Medical History:   Diagnosis Date     Angina at rest (H)     Thought to be microvascular     Arthritis      Basal cell carcinoma 1/2014, (3/5/6-2014)    nose and check and ear and face     Coronary artery disease     Mild CAD per 2/2008 angiogram     Hyperlipidaemia      Hypertension      Hypothyroidism 1/17/2013     Mitral regurgitation 6/2015    echo     Type 2 diabetes, HbA1C goal < 8% (H) 9/11/2013       PAST SURGICAL HISTORY:  Past Surgical History:   Procedure Laterality Date     APPENDECTOMY  1974     BACK SURGERY  1996     BLADDER SURGERY  1990    bladder suspension     CHOLECYSTECTOMY  1975     CORONARY ANGIOGRAPHY ADULT ORDER  2/2008    Mild CAD. LAD w/20% stenosis, RCA with 30-40% stenosis. No flow limiting obstructions.     face surgery  1-6/2014    basal cell plus plastic to nose, face , ear     HYSTERECTOMY  1970     OOPHORECTOMY  1974    bilateral     PHACOEMULSIFICATION CLEAR CORNEA WITH STANDARD INTRAOCULAR LENS IMPLANT Right 7/21/2015    Procedure: PHACOEMULSIFICATION CLEAR CORNEA WITH STANDARD INTRAOCULAR LENS IMPLANT;  Surgeon: Schuyler Chapa MD;  Location: [facility]     PHACOEMULSIFICATION CLEAR CORNEA WITH STANDARD INTRAOCULAR LENS IMPLANT  Left 2015    Procedure: PHACOEMULSIFICATION CLEAR CORNEA WITH STANDARD INTRAOCULAR LENS IMPLANT;  Surgeon: Schuyler Chapa MD;  Location: [facility]       FAMILY HISTORY:  Family History   Problem Relation Age of Onset     CANCER Brother      lung     CANCER Son      CEREBROVASCULAR DISEASE Mother      and Parkinson's     CANCER Father      pancreas     DIABETES Son        SOCIAL HISTORY:  Social History     Social History     Marital status:      Spouse name: N/A     Number of children: N/A     Years of education: N/A     Social History Main Topics     Smoking status: Never Smoker     Smokeless tobacco: Never Used     Alcohol use Yes      Comment: Cocktail 2-3 times per month     Drug use: No     Sexual activity: No     Other Topics Concern     Parent/Sibling W/ Cabg, Mi Or Angioplasty Before 65f 55m? No     Caffeine Concern Yes     4 cups caffeine per day     Sleep Concern No     Stress Concern No     Weight Concern No     Special Diet No     Exercise Yes     exercises 30 minutes, 4 days week     Social History Narrative    Had 2 sons; one ; had 2 step dtrs; one .                                        times 2       Review of Systems:  Skin:  Negative       Eyes:  Positive for glaucoma;glasses    ENT:  Negative      Respiratory:  Positive for dyspnea on exertion bronchitis one month ago, completed abx   Cardiovascular:  Negative;palpitations;chest pain;syncope or near-syncope;cyanosis exercise intolerance;Positive for;fatigue;lightheadedness edema in ankles at the end of the day  Gastroenterology: Positive for   hospitalized  for nausea and vomiting  Genitourinary:  Positive for nocturia    Musculoskeletal:  Positive for joint pain;arthritis    Neurologic:  Positive for   neuropathy  Psychiatric:  Negative      Heme/Lymph/Imm:  Positive for allergies    Endocrine:  Positive for thyroid disorder      Physical Exam:  Vitals: /72 (BP Location: Left arm, Cuff Size: Adult Large)   "Pulse 68  Ht 1.575 m (5' 2\")  Wt 95.3 kg (210 lb 1.6 oz)  LMP  (LMP Unknown)  BMI 38.43 kg/m2    Constitutional:  cooperative;alert and oriented        Skin:  warm and dry to the touch        Head:  normocephalic        Eyes:  pupils equal and round        ENT:  not assessed this visit        Neck:  JVP normal        Chest:  clear to auscultation          Cardiac: regular rhythm;normal S1 and S2   S4              Abdomen:  abdomen soft;BS normoactive        Vascular: not assessed this visit                                        Extremities and Back:  no spinal abnormalities noted              Neurological:  no gross motor deficits        Thank you for allowing me to participate in the care of your patient.    Sincerely,     Kalyan Luis MD     [facility] [place] [facility]    "

## 2017-10-31 ENCOUNTER — OFFICE VISIT (OUTPATIENT)
Dept: FAMILY MEDICINE | Facility: CLINIC | Age: 82
End: 2017-10-31
Payer: MEDICARE

## 2017-10-31 VITALS
HEART RATE: 78 BPM | OXYGEN SATURATION: 94 % | WEIGHT: 205 LBS | BODY MASS INDEX: 37.49 KG/M2 | SYSTOLIC BLOOD PRESSURE: 116 MMHG | RESPIRATION RATE: 18 BRPM | DIASTOLIC BLOOD PRESSURE: 62 MMHG | TEMPERATURE: 98.2 F

## 2017-10-31 DIAGNOSIS — G89.29 CHRONIC RIGHT-SIDED LOW BACK PAIN WITH RIGHT-SIDED SCIATICA: Primary | ICD-10-CM

## 2017-10-31 DIAGNOSIS — M54.41 CHRONIC RIGHT-SIDED LOW BACK PAIN WITH RIGHT-SIDED SCIATICA: Primary | ICD-10-CM

## 2017-10-31 DIAGNOSIS — M17.11 OSTEOARTHRITIS OF RIGHT KNEE, UNSPECIFIED OSTEOARTHRITIS TYPE: ICD-10-CM

## 2017-10-31 DIAGNOSIS — E11.40 TYPE 2 DIABETES MELLITUS WITH DIABETIC NEUROPATHY, WITHOUT LONG-TERM CURRENT USE OF INSULIN (H): ICD-10-CM

## 2017-10-31 DIAGNOSIS — E03.9 HYPOTHYROIDISM, UNSPECIFIED TYPE: Chronic | ICD-10-CM

## 2017-10-31 DIAGNOSIS — G62.9 NEUROPATHY: Chronic | ICD-10-CM

## 2017-10-31 DIAGNOSIS — I10 HYPERTENSION GOAL BP (BLOOD PRESSURE) < 140/90: ICD-10-CM

## 2017-10-31 LAB — HBA1C MFR BLD: 6.2 % (ref 4.3–6)

## 2017-10-31 PROCEDURE — 84443 ASSAY THYROID STIM HORMONE: CPT | Performed by: INTERNAL MEDICINE

## 2017-10-31 PROCEDURE — 99214 OFFICE O/P EST MOD 30 MIN: CPT | Performed by: INTERNAL MEDICINE

## 2017-10-31 PROCEDURE — 82043 UR ALBUMIN QUANTITATIVE: CPT | Performed by: INTERNAL MEDICINE

## 2017-10-31 PROCEDURE — 36415 COLL VENOUS BLD VENIPUNCTURE: CPT | Performed by: INTERNAL MEDICINE

## 2017-10-31 PROCEDURE — 80053 COMPREHEN METABOLIC PANEL: CPT | Performed by: INTERNAL MEDICINE

## 2017-10-31 PROCEDURE — 83036 HEMOGLOBIN GLYCOSYLATED A1C: CPT | Performed by: INTERNAL MEDICINE

## 2017-10-31 NOTE — NURSING NOTE
"Chief Complaint   Patient presents with     Knee Pain     right knee into shin       Initial /62  Pulse 78  Temp 98.2  F (36.8  C)  Resp 18  Wt 205 lb (93 kg)  LMP  (LMP Unknown)  SpO2 94%  BMI 37.49 kg/m2 Estimated body mass index is 37.49 kg/(m^2) as calculated from the following:    Height as of 9/8/17: 5' 2\" (1.575 m).    Weight as of this encounter: 205 lb (93 kg).  Medication Reconciliation: complete   EDVIN Ledesma CMA      "

## 2017-10-31 NOTE — PROGRESS NOTES
SUBJECTIVE:   Lora Vergara is a 89 year old female who presents to clinic today for the following health issues:      Musculoskeletal problem/pain      Duration: ongoing    Description  Location: right knee, pain is going into shin and up into right side low back    Intensity:  moderate    Accompanying signs and symptoms: radiation of pain to shin and back    History  Previous similar problem: YES  Previous evaluation:  YES    Precipitating or alleviating factors:  Trauma or overuse: no   Aggravating factors include: standing and walking    Therapies tried and outcome: rest/inactivity                      She is now off losartan; see Cardiology note; BP a bit low,some lightheadedness.                                             Ongoing right-sided low back pain with right leg radicular symptoms, as well as right knee pain.            She has seen Dr. Sutton in the past for these issues, most recently approximately 2-1/2 years ago.            She reports having a lumbar spine MRI at that time, followed by an epidural steroid injection which was helpful.                At some point prior to that she had also had a right knee injection which was also helpful.                             Her other health issues are fairly stable, and she is due for lab work to monitor her various conditions.                        Problem list and histories reviewed & adjusted, as indicated.  Additional history: as documented    Current Outpatient Prescriptions   Medication Sig Dispense Refill     amLODIPine (NORVASC) 5 MG tablet Take 1 tablet (5 mg) by mouth daily 90 tablet 2     atorvastatin (LIPITOR) 40 MG tablet Take 1 tablet (40 mg) by mouth daily 90 tablet 2     DOCUSATE SODIUM PO Take 100 mg by mouth every morning       LYRICA 150 MG capsule TAKE ONE CAPSULE BY MOUTH TWICE A DAY 60 capsule 5     levothyroxine (SYNTHROID/LEVOTHROID) 25 MCG tablet TAKE ONE TABLET BY MOUTH ONE TIME DAILY 90 tablet 2     metoprolol (LOPRESSOR)  100 MG tablet TAKE 1/2 TABLET BY MOUTH EVERY AM AND TAKE 1 TABLET IN THE EVENING. 135 tablet 3     ranolazine (RANEXA) 500 MG 12 hr tablet Take 1 tablet (500 mg) by mouth 2 times daily 180 tablet 2     isosorbide mononitrate (IMDUR) 60 MG 24 hr tablet Take 1 tablet (60 mg) by mouth daily 90 tablet 3     nitroglycerin (NITROSTAT) 0.4 MG SL tablet Place 1 tablet (0.4 mg) under the tongue every 5 minutes as needed for chest pain 25 tablet 3     order for DME Equipment being ordered: wheeled walker with brakes and a seat 1 Device 0     ACETAMINOPHEN PO Take 1,000 mg by mouth 2 times daily       Travoprost (TRAVATAN) 0.004 % SOLN Place 1 drop into both eyes At Bedtime        psyllium (METAMUCIL) 30.9 % POWD Take 1 tsp. by mouth daily        aspirin (ASPIRIN LOW DOSE) 81 MG tablet Take 81 mg by mouth daily        Cholecalciferol (HM VITAMIN D3) 4000 UNITS CAPS Take 1 tablet by mouth daily.       Allergies   Allergen Reactions     Hydrochlorothiazide      Pancreatitis - patient denies this.  She says it gave her low sodium.     Recent Labs   Lab Test  06/01/17   0543  05/31/17   1125  03/22/17   1601  12/20/16   1115  03/09/16   0938  07/20/15   1017   07/24/14   1048   A1C  5.9   --    --   6.0  6.2*  6.2*   < >  6.6*   LDL   --    --    --    --   48  33   --   58   HDL   --    --    --    --   58  61   --   61   TRIG   --    --    --    --   104  115   --   109   ALT   --   20  17  23  20  24   < >  19   CR  0.72  0.68  0.84  0.99  0.90  1.00   < >  0.80   GFRESTIMATED  75  80  64  53*  59*  52*   < >  68   GFRESTBLACK  >90   GFR Calc    >90   GFR Calc    77  64  71  63   < >  82   POTASSIUM  3.7  4.1  4.2  4.4  4.3  4.4   < >  4.5   TSH   --    --    --   3.14  3.61   --    --    --     < > = values in this interval not displayed.      BP Readings from Last 3 Encounters:   10/31/17 116/62   09/08/17 116/72   08/24/17 102/68    Wt Readings from Last 3 Encounters:   10/31/17 205 lb (93 kg)    09/08/17 210 lb 1.6 oz (95.3 kg)   08/23/17 206 lb (93.4 kg)                        Reviewed and updated as needed this visit by clinical staff     Reviewed and updated as needed this visit by Provider         ROS:  CONSTITUTIONAL:NEGATIVE for fever, chills, change in weight  RESP:NEGATIVE for significant cough or SOB  CV: NEGATIVE for chest pain, palpitations or peripheral edema  NEURO: POSITIVE for gait disturbance    OBJECTIVE:                                                    /62  Pulse 78  Temp 98.2  F (36.8  C)  Resp 18  Wt 205 lb (93 kg)  LMP  (LMP Unknown)  SpO2 94%  BMI 37.49 kg/m2  Body mass index is 37.49 kg/(m^2).  GENERAL APPEARANCE: alert, no distress and over weight  CV: regular rates and rhythm and normal S1 S2, no S3 or S4  MS: decreased range of motion right knee and lumbar spine  NEURO: gait abnormal ; she uses a walker    Diagnostic test results:  Pending       ASSESSMENT/PLAN:                                                        ICD-10-CM    1. Chronic right-sided low back pain with right-sided sciatica M54.41 ORTHOPEDICS ADULT REFERRAL    G89.29    2. Osteoarthritis of right knee, unspecified osteoarthritis type M17.11 ORTHOPEDICS ADULT REFERRAL   3. Type 2 diabetes mellitus with diabetic neuropathy, without long-term current use of insulin (H) E11.40 Albumin Random Urine Quantitative with Creat Ratio     Comprehensive metabolic panel (BMP + Alb, Alk Phos, ALT, AST, Total. Bili, TP)     Hemoglobin A1c   4. Hypothyroidism, unspecified type E03.9 TSH with free T4 reflex   5. Hypertension goal BP (blood pressure) < 140/90 I10    6. Neuropathy G62.9        Summary and implications:  We reviewed multiple issues. Check labs and adjust medications as indicated.    Patient Instructions   I will let you know your lab results.   You are planning another appointment with orthopedics.       Antwon Schmidt MD  [facility]            Results for orders placed  or performed in visit on 10/31/17   Albumin Random Urine Quantitative with Creat Ratio   Result Value Ref Range    Creatinine Urine 227 mg/dL    Albumin Urine mg/L 17 mg/L    Albumin Urine mg/g Cr 7.40 0 - 25 mg/g Cr   Comprehensive metabolic panel (BMP + Alb, Alk Phos, ALT, AST, Total. Bili, TP)   Result Value Ref Range    Sodium 137 133 - 144 mmol/L    Potassium 4.4 3.4 - 5.3 mmol/L    Chloride 100 94 - 109 mmol/L    Carbon Dioxide 30 20 - 32 mmol/L    Anion Gap 7 3 - 14 mmol/L    Glucose 108 (H) 70 - 99 mg/dL    Urea Nitrogen 16 7 - 30 mg/dL    Creatinine 0.79 0.52 - 1.04 mg/dL    GFR Estimate 68 >60 mL/min/1.7m2    GFR Estimate If Black 82 >60 mL/min/1.7m2    Calcium 8.6 8.5 - 10.1 mg/dL    Bilirubin Total 0.4 0.2 - 1.3 mg/dL    Albumin 3.5 3.4 - 5.0 g/dL    Protein Total 6.7 (L) 6.8 - 8.8 g/dL    Alkaline Phosphatase 62 40 - 150 U/L    ALT 18 0 - 50 U/L    AST 16 0 - 45 U/L   Hemoglobin A1c   Result Value Ref Range    Hemoglobin A1C 6.2 (H) 4.3 - 6.0 %   TSH with free T4 reflex   Result Value Ref Range    TSH 2.29 0.40 - 4.00 mU/L     Letter sent.            Most of your lab results are good,including the kidney,liver,and potassium levels.      The thyroid level is good. Keep taking the same dosage.         Your diabetes control is good.       The A1C of 6.2 correlates to an average blood sugar of approximately 126.

## 2017-10-31 NOTE — LETTER
November 1, 2017      Lora Vergara  [address]   [place] [place] [address]        Dear ,    We are writing to inform you of your test results.           Most of your lab results are good,including the kidney,liver,and potassium levels.      The thyroid level is good. Keep taking the same dosage.         Your diabetes control is good.       The A1C of 6.2 correlates to an average blood sugar of approximately 126.    Resulted Orders   Albumin Random Urine Quantitative with Creat Ratio   Result Value Ref Range    Creatinine Urine 227 mg/dL    Albumin Urine mg/L 17 mg/L    Albumin Urine mg/g Cr 7.40 0 - 25 mg/g Cr   Comprehensive metabolic panel (BMP + Alb, Alk Phos, ALT, AST, Total. Bili, TP)   Result Value Ref Range    Sodium 137 133 - 144 mmol/L    Potassium 4.4 3.4 - 5.3 mmol/L    Chloride 100 94 - 109 mmol/L    Carbon Dioxide 30 20 - 32 mmol/L    Anion Gap 7 3 - 14 mmol/L    Glucose 108 (H) 70 - 99 mg/dL    Urea Nitrogen 16 7 - 30 mg/dL    Creatinine 0.79 0.52 - 1.04 mg/dL    GFR Estimate 68 >60 mL/min/1.7m2      Comment:      Non  GFR Calc    GFR Estimate If Black 82 >60 mL/min/1.7m2      Comment:       GFR Calc    Calcium 8.6 8.5 - 10.1 mg/dL    Bilirubin Total 0.4 0.2 - 1.3 mg/dL    Albumin 3.5 3.4 - 5.0 g/dL    Protein Total 6.7 (L) 6.8 - 8.8 g/dL    Alkaline Phosphatase 62 40 - 150 U/L    ALT 18 0 - 50 U/L    AST 16 0 - 45 U/L   Hemoglobin A1c   Result Value Ref Range    Hemoglobin A1C 6.2 (H) 4.3 - 6.0 %   TSH with free T4 reflex   Result Value Ref Range    TSH 2.29 0.40 - 4.00 mU/L       If you have any questions or concerns, please call the clinic at the number listed above.       Sincerely,        Antwon Schmidt MD

## 2017-10-31 NOTE — MR AVS SNAPSHOT
After Visit Summary   10/31/2017    Lora Vergara    MRN: [id number]           Patient Information     Date Of Birth          9/11/1925        Visit Information        Provider Department      10/31/2017 2:30 PM Antwon Schmidt MD [facility]        Today's Diagnoses     Chronic bilateral low back pain with right-sided sciatica    -  1    Osteoarthritis of right knee, unspecified osteoarthritis type        Type 2 diabetes mellitus with diabetic neuropathy, without long-term current use of insulin (H)        Hypothyroidism, unspecified type        Neuropathy        Hypertension goal BP (blood pressure) < 140/90        Chronic right-sided low back pain with right-sided sciatica          Care Instructions    I will let you know your lab results.   You are planning another appointment with orthopedics.           Follow-ups after your visit        Additional Services     ORTHOPEDICS ADULT REFERRAL       Your provider has referred you to: [facility] [place] [phone]   [email or web address]                              PLEASE SEE THIS PATIENT AGAIN.                  SHE HAS WORSENING R SIDED LUMBOSACRAL RADICULOPATHY,AND RIGHT KNEE ARTHRITIS.                         Please be aware that coverage of these services is subject to the terms and limitations of your health insurance plan.  Call member services at your health plan with any benefit or coverage questions.      Please bring the following to your appointment:    >>   Any x-rays, CTs or MRIs which have been performed.  Contact the facility where they were done to arrange for  prior to your scheduled appointment.    >>   List of current medications   >>   This referral request   >>   Any documents/labs given to you for this referral                  Who to contact     If you have questions or need follow up information about today's clinic visit or your schedule please contact  "[facility] directly at [phone].  Normal or non-critical lab and imaging results will be communicated to you by MyChart, letter or phone within 4 business days after the clinic has received the results. If you do not hear from us within 7 days, please contact the clinic through MyChart or phone. If you have a critical or abnormal lab result, we will notify you by phone as soon as possible.  Submit refill requests through [facility] or call your pharmacy and they will forward the refill request to us. Please allow 3 business days for your refill to be completed.          Additional Information About Your Visit        [facility] Information     [facility] lets you send messages to your doctor, view your test results, renew your prescriptions, schedule appointments and more. To sign up, go to [email or web address] . Click on \"Log in\" on the left side of the screen, which will take you to the Welcome page. Then click on \"Sign up Now\" on the right side of the page.     You will be asked to enter the access code listed below, as well as some personal information. Please follow the directions to create your username and password.     Your access code is: [email or web address]  Expires: 2017 10:27 AM     Your access code will  in 90 days. If you need help or a new code, please call your [facility] clinic or [phone].        Care EveryWhere ID     This is your Care EveryWhere ID. This could be used by other organizations to access your [facility] medical records  [id number]        Your Vitals Were     Pulse Temperature Respirations Last Period Pulse Oximetry BMI (Body Mass Index)    78 98.2  F (36.8  C) 18 (LMP Unknown) 94% 37.49 kg/m2       Blood Pressure from Last 3 Encounters:   10/31/17 116/62   17 116/72   17 102/68    Weight from Last 3 Encounters:   10/31/17 205 lb (93 kg)   17 210 lb 1.6 oz (95.3 kg)   17 206 lb (93.4 kg)              We Performed the Following  "    Albumin Random Urine Quantitative with Creat Ratio     Comprehensive metabolic panel (BMP + Alb, Alk Phos, ALT, AST, Total. Bili, TP)     Hemoglobin A1c     ORTHOPEDICS ADULT REFERRAL     TSH with free T4 reflex        Primary Care Provider Office Phone # Fax #    Antwon Schmidt [phone] [address] [facility] [address]        Equal Access to Services     YOGESH CORMIER : Hadii aad ku hadasho Soomaali, waaxda luqadaha, qaybta kaalmada adeegyada, waxay idiin hayaan adeeg kharash la'aan ah. So [facility] [phone].    ATENCIÓN: Si habla espbeth, tiene a david disposición servicios gratuitos de asistencia lingüística. Llame al [phone].    We comply with applicable federal civil rights laws and [place] laws. We do not discriminate on the basis of race, color, national origin, age, disability, sex, sexual orientation, or gender identity.            Thank you!     Thank you for choosing [facility]  for your care. Our goal is always to provide you with excellent care. Hearing back from our patients is one way we can continue to improve our services. Please take a few minutes to complete the written survey that you may receive in the mail after your visit with us. Thank you!             Your Updated Medication List - Protect others around you: Learn how to safely use, store and throw away your medicines at www.disposemymeds.org.          This list is accurate as of: 10/31/17  2:59 PM.  Always use your most recent med list.                   Brand Name Dispense Instructions for use Diagnosis    ACETAMINOPHEN PO      Take 1,000 mg by mouth 2 times daily        amLODIPine 5 MG tablet    NORVASC    90 tablet    Take 1 tablet (5 mg) by mouth daily    Essential hypertension       ASPIRIN LOW DOSE 81 MG tablet   Generic drug:  aspirin      Take 81 mg by mouth daily        atorvastatin 40 MG tablet    LIPITOR    90 tablet    Take 1 tablet (40 mg) by mouth daily     Hyperlipidemia LDL goal <70       DOCUSATE SODIUM PO      Take 100 mg by mouth every morning        HM VITAMIN D3 4000 UNITS Caps   Generic drug:  Cholecalciferol      Take 1 tablet by mouth daily.        isosorbide mononitrate 60 MG 24 hr tablet    IMDUR    90 tablet    Take 1 tablet (60 mg) by mouth daily    Chronic ischemic heart disease       levothyroxine 25 MCG tablet    SYNTHROID/LEVOTHROID    90 tablet    TAKE ONE TABLET BY MOUTH ONE TIME DAILY    Hypothyroidism       LYRICA 150 MG capsule   Generic drug:  pregabalin     60 capsule    TAKE ONE CAPSULE BY MOUTH TWICE A DAY    Neuropathy       METAMUCIL 30.9 % Powd   Generic drug:  psyllium      Take 1 tsp. by mouth daily        metoprolol 100 MG tablet    LOPRESSOR    135 tablet    TAKE 1/2 TABLET BY MOUTH EVERY AM AND TAKE 1 TABLET IN THE EVENING.    Hypertension goal BP (blood pressure) < 140/90       nitroGLYcerin 0.4 MG sublingual tablet    NITROSTAT    25 tablet    Place 1 tablet (0.4 mg) under the tongue every 5 minutes as needed for chest pain    Chronic ischemic heart disease, unspecified       order for DME     1 Device    Equipment being ordered: wheeled walker with brakes and a seat    Neuropathy, Poor balance       ranolazine 500 MG 12 hr tablet    RANEXA    180 tablet    Take 1 tablet (500 mg) by mouth 2 times daily    Chronic ischemic heart disease, unspecified       TRAVATAN 0.004 % Soln   Generic drug:  Travoprost      Place 1 drop into both eyes At Bedtime

## 2017-11-01 LAB
ALBUMIN SERPL-MCNC: 3.5 G/DL (ref 3.4–5)
ALP SERPL-CCNC: 62 U/L (ref 40–150)
ALT SERPL W P-5'-P-CCNC: 18 U/L (ref 0–50)
ANION GAP SERPL CALCULATED.3IONS-SCNC: 7 MMOL/L (ref 3–14)
AST SERPL W P-5'-P-CCNC: 16 U/L (ref 0–45)
BILIRUB SERPL-MCNC: 0.4 MG/DL (ref 0.2–1.3)
BUN SERPL-MCNC: 16 MG/DL (ref 7–30)
CALCIUM SERPL-MCNC: 8.6 MG/DL (ref 8.5–10.1)
CHLORIDE SERPL-SCNC: 100 MMOL/L (ref 94–109)
CO2 SERPL-SCNC: 30 MMOL/L (ref 20–32)
CREAT SERPL-MCNC: 0.79 MG/DL (ref 0.52–1.04)
CREAT UR-MCNC: 227 MG/DL
GFR SERPL CREATININE-BSD FRML MDRD: 68 ML/MIN/1.7M2
GLUCOSE SERPL-MCNC: 108 MG/DL (ref 70–99)
MICROALBUMIN UR-MCNC: 17 MG/L
MICROALBUMIN/CREAT UR: 7.4 MG/G CR (ref 0–25)
POTASSIUM SERPL-SCNC: 4.4 MMOL/L (ref 3.4–5.3)
PROT SERPL-MCNC: 6.7 G/DL (ref 6.8–8.8)
SODIUM SERPL-SCNC: 137 MMOL/L (ref 133–144)
TSH SERPL DL<=0.005 MIU/L-ACNC: 2.29 MU/L (ref 0.4–4)

## 2017-11-12 DIAGNOSIS — G62.9 NEUROPATHY: Chronic | ICD-10-CM

## 2017-11-14 RX ORDER — PREGABALIN 150 MG/1
CAPSULE ORAL
Qty: 60 CAPSULE | Refills: 11 | Status: SHIPPED | OUTPATIENT
Start: 2017-11-14 | End: 2018-04-10

## 2017-11-14 NOTE — TELEPHONE ENCOUNTER
Controlled Substance Refill Request for Lyrica  Problem List Complete:  No     PROVIDER TO CONSIDER COMPLETION OF PROBLEM LIST AND OVERVIEW/CONTROLLED SUBSTANCE AGREEMENT    Last Written Prescription Date:  5-10-17  Last Fill Quantity: 60,   # refills: 5    Last Office Visit with G primary care provider: 10-31-17    Future Office visit:     Controlled substance agreement on file: No.     Processing:  Fax Rx to [facility] pharmacy     checked in past 6 months?  Yes 11-14-17 no concerns

## 2017-12-04 ENCOUNTER — TELEPHONE (OUTPATIENT)
Dept: CARDIOLOGY | Facility: CLINIC | Age: 82
End: 2017-12-04

## 2017-12-26 ENCOUNTER — HOSPITAL ENCOUNTER (INPATIENT)
Facility: CLINIC | Age: 82
LOS: 8 days | Discharge: HOME HEALTH | DRG: 189 | End: 2018-01-03
Attending: EMERGENCY MEDICINE | Admitting: INTERNAL MEDICINE
Payer: MEDICARE

## 2017-12-26 ENCOUNTER — APPOINTMENT (OUTPATIENT)
Dept: GENERAL RADIOLOGY | Facility: CLINIC | Age: 82
DRG: 189 | End: 2017-12-26
Attending: EMERGENCY MEDICINE
Payer: MEDICARE

## 2017-12-26 DIAGNOSIS — R09.02 HYPOXIA: ICD-10-CM

## 2017-12-26 DIAGNOSIS — J20.9 ACUTE BRONCHITIS, UNSPECIFIED ORGANISM: Primary | ICD-10-CM

## 2017-12-26 PROBLEM — J96.91 RESPIRATORY FAILURE WITH HYPOXIA (H): Status: ACTIVE | Noted: 2017-12-26

## 2017-12-26 LAB
ANION GAP SERPL CALCULATED.3IONS-SCNC: 4 MMOL/L (ref 3–14)
BASOPHILS # BLD AUTO: 0 10E9/L (ref 0–0.2)
BASOPHILS NFR BLD AUTO: 0.2 %
BUN SERPL-MCNC: 10 MG/DL (ref 7–30)
CALCIUM SERPL-MCNC: 8.4 MG/DL (ref 8.5–10.1)
CHLORIDE SERPL-SCNC: 96 MMOL/L (ref 94–109)
CO2 SERPL-SCNC: 32 MMOL/L (ref 20–32)
CREAT SERPL-MCNC: 0.57 MG/DL (ref 0.52–1.04)
DIFFERENTIAL METHOD BLD: NORMAL
EOSINOPHIL # BLD AUTO: 0.1 10E9/L (ref 0–0.7)
EOSINOPHIL NFR BLD AUTO: 1.9 %
ERYTHROCYTE [DISTWIDTH] IN BLOOD BY AUTOMATED COUNT: 12.9 % (ref 10–15)
FLUAV+FLUBV AG SPEC QL: NEGATIVE
FLUAV+FLUBV AG SPEC QL: NEGATIVE
GFR SERPL CREATININE-BSD FRML MDRD: >90 ML/MIN/1.7M2
GLUCOSE SERPL-MCNC: 133 MG/DL (ref 70–99)
HCT VFR BLD AUTO: 42.8 % (ref 35–47)
HGB BLD-MCNC: 14.4 G/DL (ref 11.7–15.7)
IMM GRANULOCYTES # BLD: 0 10E9/L (ref 0–0.4)
IMM GRANULOCYTES NFR BLD: 0.4 %
LYMPHOCYTES # BLD AUTO: 1 10E9/L (ref 0.8–5.3)
LYMPHOCYTES NFR BLD AUTO: 19 %
MCH RBC QN AUTO: 30.5 PG (ref 26.5–33)
MCHC RBC AUTO-ENTMCNC: 33.6 G/DL (ref 31.5–36.5)
MCV RBC AUTO: 91 FL (ref 78–100)
MONOCYTES # BLD AUTO: 0.4 10E9/L (ref 0–1.3)
MONOCYTES NFR BLD AUTO: 8.3 %
NEUTROPHILS # BLD AUTO: 3.7 10E9/L (ref 1.6–8.3)
NEUTROPHILS NFR BLD AUTO: 70.2 %
NRBC # BLD AUTO: 0 10*3/UL
NRBC BLD AUTO-RTO: 0 /100
PLATELET # BLD AUTO: 266 10E9/L (ref 150–450)
POTASSIUM SERPL-SCNC: 4 MMOL/L (ref 3.4–5.3)
RBC # BLD AUTO: 4.72 10E12/L (ref 3.8–5.2)
SODIUM SERPL-SCNC: 132 MMOL/L (ref 133–144)
SPECIMEN SOURCE: NORMAL
WBC # BLD AUTO: 5.3 10E9/L (ref 4–11)

## 2017-12-26 PROCEDURE — 25000125 ZZHC RX 250: Performed by: NURSE PRACTITIONER

## 2017-12-26 PROCEDURE — A9270 NON-COVERED ITEM OR SERVICE: HCPCS | Mod: GY | Performed by: NURSE PRACTITIONER

## 2017-12-26 PROCEDURE — 25000132 ZZH RX MED GY IP 250 OP 250 PS 637: Mod: GY | Performed by: NURSE PRACTITIONER

## 2017-12-26 PROCEDURE — 40000274 ZZH STATISTIC RCP CONSULT EA 30 MIN

## 2017-12-26 PROCEDURE — 94640 AIRWAY INHALATION TREATMENT: CPT

## 2017-12-26 PROCEDURE — 99285 EMERGENCY DEPT VISIT HI MDM: CPT | Mod: 25

## 2017-12-26 PROCEDURE — 25000125 ZZHC RX 250: Performed by: EMERGENCY MEDICINE

## 2017-12-26 PROCEDURE — 40000275 ZZH STATISTIC RCP TIME EA 10 MIN

## 2017-12-26 PROCEDURE — 94640 AIRWAY INHALATION TREATMENT: CPT | Mod: 76

## 2017-12-26 PROCEDURE — 99223 1ST HOSP IP/OBS HIGH 75: CPT | Mod: AI | Performed by: NURSE PRACTITIONER

## 2017-12-26 PROCEDURE — 25000132 ZZH RX MED GY IP 250 OP 250 PS 637: Mod: GY | Performed by: INTERNAL MEDICINE

## 2017-12-26 PROCEDURE — 71020 XR CHEST 2 VW: CPT

## 2017-12-26 PROCEDURE — 80048 BASIC METABOLIC PNL TOTAL CA: CPT | Performed by: EMERGENCY MEDICINE

## 2017-12-26 PROCEDURE — A9270 NON-COVERED ITEM OR SERVICE: HCPCS | Mod: GY | Performed by: INTERNAL MEDICINE

## 2017-12-26 PROCEDURE — 87804 INFLUENZA ASSAY W/OPTIC: CPT | Performed by: EMERGENCY MEDICINE

## 2017-12-26 PROCEDURE — 85025 COMPLETE CBC W/AUTO DIFF WBC: CPT | Performed by: EMERGENCY MEDICINE

## 2017-12-26 PROCEDURE — 12000007 ZZH R&B INTERMEDIATE

## 2017-12-26 RX ORDER — ONDANSETRON 4 MG/1
4 TABLET, ORALLY DISINTEGRATING ORAL EVERY 6 HOURS PRN
Status: DISCONTINUED | OUTPATIENT
Start: 2017-12-26 | End: 2018-01-03 | Stop reason: HOSPADM

## 2017-12-26 RX ORDER — AMLODIPINE BESYLATE 5 MG/1
5 TABLET ORAL DAILY
Status: DISCONTINUED | OUTPATIENT
Start: 2017-12-27 | End: 2018-01-03 | Stop reason: HOSPADM

## 2017-12-26 RX ORDER — POLYETHYLENE GLYCOL 3350 17 G/17G
17 POWDER, FOR SOLUTION ORAL DAILY PRN
Status: DISCONTINUED | OUTPATIENT
Start: 2017-12-26 | End: 2018-01-03 | Stop reason: HOSPADM

## 2017-12-26 RX ORDER — ACETAMINOPHEN 325 MG/1
975 TABLET ORAL EVERY 6 HOURS PRN
Status: DISCONTINUED | OUTPATIENT
Start: 2017-12-26 | End: 2018-01-03 | Stop reason: HOSPADM

## 2017-12-26 RX ORDER — LATANOPROST 50 UG/ML
1 SOLUTION/ DROPS OPHTHALMIC 3 TIMES DAILY
COMMUNITY
End: 2023-01-01

## 2017-12-26 RX ORDER — METOPROLOL TARTRATE 100 MG
100 TABLET ORAL EVERY EVENING
Status: DISCONTINUED | OUTPATIENT
Start: 2017-12-26 | End: 2018-01-03 | Stop reason: HOSPADM

## 2017-12-26 RX ORDER — PROCHLORPERAZINE 25 MG
12.5 SUPPOSITORY, RECTAL RECTAL EVERY 12 HOURS PRN
Status: DISCONTINUED | OUTPATIENT
Start: 2017-12-26 | End: 2018-01-03 | Stop reason: HOSPADM

## 2017-12-26 RX ORDER — PROCHLORPERAZINE MALEATE 5 MG
5 TABLET ORAL EVERY 6 HOURS PRN
Status: DISCONTINUED | OUTPATIENT
Start: 2017-12-26 | End: 2018-01-03 | Stop reason: HOSPADM

## 2017-12-26 RX ORDER — METOPROLOL TARTRATE 50 MG
50 TABLET ORAL EVERY MORNING
Status: DISCONTINUED | OUTPATIENT
Start: 2017-12-27 | End: 2018-01-03 | Stop reason: HOSPADM

## 2017-12-26 RX ORDER — IPRATROPIUM BROMIDE AND ALBUTEROL SULFATE 2.5; .5 MG/3ML; MG/3ML
3 SOLUTION RESPIRATORY (INHALATION) ONCE
Status: COMPLETED | OUTPATIENT
Start: 2017-12-26 | End: 2017-12-26

## 2017-12-26 RX ORDER — LATANOPROST 50 UG/ML
1 SOLUTION/ DROPS OPHTHALMIC AT BEDTIME
Status: DISCONTINUED | OUTPATIENT
Start: 2017-12-26 | End: 2018-01-03 | Stop reason: HOSPADM

## 2017-12-26 RX ORDER — NITROGLYCERIN 0.4 MG/1
0.4 TABLET SUBLINGUAL EVERY 5 MIN PRN
Status: DISCONTINUED | OUTPATIENT
Start: 2017-12-26 | End: 2018-01-03 | Stop reason: HOSPADM

## 2017-12-26 RX ORDER — LIDOCAINE 40 MG/G
CREAM TOPICAL
Status: DISCONTINUED | OUTPATIENT
Start: 2017-12-26 | End: 2018-01-03 | Stop reason: HOSPADM

## 2017-12-26 RX ORDER — PREGABALIN 75 MG/1
150 CAPSULE ORAL 2 TIMES DAILY
Status: DISCONTINUED | OUTPATIENT
Start: 2017-12-26 | End: 2018-01-03 | Stop reason: HOSPADM

## 2017-12-26 RX ORDER — IPRATROPIUM BROMIDE AND ALBUTEROL SULFATE 2.5; .5 MG/3ML; MG/3ML
3 SOLUTION RESPIRATORY (INHALATION)
Status: DISCONTINUED | OUTPATIENT
Start: 2017-12-26 | End: 2018-01-01

## 2017-12-26 RX ORDER — DOCUSATE SODIUM 100 MG/1
100 CAPSULE, LIQUID FILLED ORAL EVERY MORNING
Status: DISCONTINUED | OUTPATIENT
Start: 2017-12-26 | End: 2018-01-03 | Stop reason: HOSPADM

## 2017-12-26 RX ORDER — ONDANSETRON 2 MG/ML
4 INJECTION INTRAMUSCULAR; INTRAVENOUS EVERY 6 HOURS PRN
Status: DISCONTINUED | OUTPATIENT
Start: 2017-12-26 | End: 2018-01-03 | Stop reason: HOSPADM

## 2017-12-26 RX ORDER — ASPIRIN 81 MG/1
81 TABLET ORAL DAILY
Status: DISCONTINUED | OUTPATIENT
Start: 2017-12-27 | End: 2018-01-03 | Stop reason: HOSPADM

## 2017-12-26 RX ORDER — LEVOTHYROXINE SODIUM 25 UG/1
25 TABLET ORAL
Status: DISCONTINUED | OUTPATIENT
Start: 2017-12-27 | End: 2018-01-03 | Stop reason: HOSPADM

## 2017-12-26 RX ORDER — NALOXONE HYDROCHLORIDE 0.4 MG/ML
.1-.4 INJECTION, SOLUTION INTRAMUSCULAR; INTRAVENOUS; SUBCUTANEOUS
Status: DISCONTINUED | OUTPATIENT
Start: 2017-12-26 | End: 2018-01-03 | Stop reason: HOSPADM

## 2017-12-26 RX ORDER — PREDNISONE 20 MG/1
20 TABLET ORAL DAILY
Status: DISCONTINUED | OUTPATIENT
Start: 2017-12-26 | End: 2017-12-27

## 2017-12-26 RX ORDER — ISOSORBIDE MONONITRATE 60 MG/1
60 TABLET, EXTENDED RELEASE ORAL DAILY
Status: DISCONTINUED | OUTPATIENT
Start: 2017-12-26 | End: 2018-01-03 | Stop reason: HOSPADM

## 2017-12-26 RX ORDER — RANOLAZINE 500 MG/1
500 TABLET, EXTENDED RELEASE ORAL 2 TIMES DAILY
Status: DISCONTINUED | OUTPATIENT
Start: 2017-12-26 | End: 2018-01-03 | Stop reason: HOSPADM

## 2017-12-26 RX ADMIN — RANOLAZINE 500 MG: 500 TABLET, FILM COATED, EXTENDED RELEASE ORAL at 20:29

## 2017-12-26 RX ADMIN — LATANOPROST 1 DROP: 50 SOLUTION OPHTHALMIC at 21:46

## 2017-12-26 RX ADMIN — METOPROLOL TARTRATE 100 MG: 100 TABLET, FILM COATED ORAL at 20:26

## 2017-12-26 RX ADMIN — GUAIFENESIN 10 ML: 100 SOLUTION ORAL at 17:56

## 2017-12-26 RX ADMIN — ISOSORBIDE MONONITRATE 60 MG: 60 TABLET, EXTENDED RELEASE ORAL at 14:40

## 2017-12-26 RX ADMIN — IPRATROPIUM BROMIDE AND ALBUTEROL SULFATE 3 ML: .5; 3 SOLUTION RESPIRATORY (INHALATION) at 09:39

## 2017-12-26 RX ADMIN — IPRATROPIUM BROMIDE AND ALBUTEROL SULFATE 3 ML: .5; 3 SOLUTION RESPIRATORY (INHALATION) at 13:45

## 2017-12-26 RX ADMIN — PREDNISONE 20 MG: 20 TABLET ORAL at 14:40

## 2017-12-26 RX ADMIN — DOCUSATE SODIUM 100 MG: 100 CAPSULE, LIQUID FILLED ORAL at 17:38

## 2017-12-26 RX ADMIN — IPRATROPIUM BROMIDE AND ALBUTEROL SULFATE 3 ML: .5; 3 SOLUTION RESPIRATORY (INHALATION) at 15:47

## 2017-12-26 RX ADMIN — PREGABALIN 150 MG: 75 CAPSULE ORAL at 20:26

## 2017-12-26 RX ADMIN — IPRATROPIUM BROMIDE AND ALBUTEROL SULFATE 3 ML: .5; 3 SOLUTION RESPIRATORY (INHALATION) at 19:48

## 2017-12-26 ASSESSMENT — ACTIVITIES OF DAILY LIVING (ADL)
DRESS: 1-->ASSISTIVE EQUIPMENT
FALL_HISTORY_WITHIN_LAST_SIX_MONTHS: NO
COGNITION: 0 - NO COGNITION ISSUES REPORTED
AMBULATION: 1-->ASSISTIVE EQUIPMENT
RETIRED_EATING: 0-->INDEPENDENT
BATHING: 1-->ASSISTIVE EQUIPMENT
SWALLOWING: 0-->SWALLOWS FOODS/LIQUIDS WITHOUT DIFFICULTY
RETIRED_COMMUNICATION: 0-->UNDERSTANDS/COMMUNICATES WITHOUT DIFFICULTY
TRANSFERRING: 1-->ASSISTIVE EQUIPMENT
TOILETING: 1-->ASSISTIVE EQUIPMENT

## 2017-12-26 ASSESSMENT — ENCOUNTER SYMPTOMS
COUGH: 1
SHORTNESS OF BREATH: 1

## 2017-12-26 NOTE — PLAN OF CARE
Problem: Patient Care Overview  Goal: Plan of Care/Patient Progress Review  Outcome: No Change  Pt admitted with cough today.  Pt given neb treatments with improvement felt.  Pt did have dizziness when up to BR.  Encouraged to drink more fluids.  O2 on at 2L.

## 2017-12-26 NOTE — IP AVS SNAPSHOT
[address] [facility]    [address] [place] [address]    Phone:  [phone]                                       After Visit Summary   12/26/2017    Lora Vergara    MRN: [id number]           After Visit Summary Signature Page     I have received my discharge instructions, and my questions have been answered. I have discussed any challenges I see with this plan with the nurse or doctor.    ..........................................................................................................................................  Patient/Patient Representative Signature      ..........................................................................................................................................  Patient Representative Print Name and Relationship to Patient    ..................................................               ................................................  Date                                            Time    ..........................................................................................................................................  Reviewed by Signature/Title    ...................................................              ..............................................  Date                                                            Time

## 2017-12-26 NOTE — PHARMACY-ADMISSION MEDICATION HISTORY
Admission medication history interview status for the 12/26/2017  admission is complete. See EPIC admission navigator for prior to admission medications     Medication history source reliability:Moderate    Actions taken by pharmacist (provider contacted, etc):None     Additional medication history information not noted on PTA med list :None    Medication reconciliation/reorder completed by provider prior to medication history? No    Time spent in this activity: 25 minutes    Prior to Admission medications    Medication Sig Last Dose Taking? Auth Provider   VITAMIN D, CHOLECALCIFEROL, PO Take 4,000 Units by mouth daily 12/25/2017 at 1200 Yes Unknown, Entered By History   Metoprolol Tartrate (LOPRESSOR PO) Take 50 mg by mouth every morning 12/26/2017 at am Yes Unknown, Entered By History   Metoprolol Tartrate (LOPRESSOR PO) Take 100 mg by mouth every evening  Yes Unknown, Entered By History   latanoprost (XALATAN) 0.005 % ophthalmic solution Place 1 drop into both eyes At Bedtime 12/25/2017 at pm Yes Unknown, Entered By History   LYRICA 150 MG capsule TAKE 1 CAPSULE BY MOUTH TWICE A DAY 12/26/2017 at am Yes Antwon Schmidt MD   amLODIPine (NORVASC) 5 MG tablet Take 1 tablet (5 mg) by mouth daily 12/26/2017 at am Yes Kalyan Luis MD   atorvastatin (LIPITOR) 40 MG tablet Take 1 tablet (40 mg) by mouth daily 12/25/2017 at pm Yes Kalyan Luis MD   DOCUSATE SODIUM PO Take 100 mg by mouth every morning 12/25/2017 at 1200 Yes Unknown, Entered By History   levothyroxine (SYNTHROID/LEVOTHROID) 25 MCG tablet TAKE ONE TABLET BY MOUTH ONE TIME DAILY 12/26/2017 at am Yes Antwon Schmidt MD   ranolazine (RANEXA) 500 MG 12 hr tablet Take 1 tablet (500 mg) by mouth 2 times daily 12/26/2017 at am Yes Kalyan Luis MD   isosorbide mononitrate (IMDUR) 60 MG 24 hr tablet Take 1 tablet (60 mg) by mouth daily unsure if took Yes Antwon Schmidt MD   nitroglycerin (NITROSTAT) 0.4 MG SL tablet Place 1 tablet (0.4  mg) under the tongue every 5 minutes as needed for chest pain  Yes Kalyan Luis MD   ACETAMINOPHEN PO Take 1,000 mg by mouth 3 times daily  12/26/2017 at am Yes Unknown, Entered By History   aspirin (ASPIRIN LOW DOSE) 81 MG tablet Take 81 mg by mouth daily  12/26/2017 at am Yes Reported, Patient   order for DME Equipment being ordered: wheeled walker with brakes and a seat   Antwon Schmidt MD

## 2017-12-26 NOTE — IP AVS SNAPSHOT
MRN:[id number]                      After Visit Summary   12/26/2017    Lora Vergara    MRN: [id number]           Thank you!     Thank you for choosing [facility] for your care. Our goal is always to provide you with excellent care. Hearing back from our patients is one way we can continue to improve our services. Please take a few minutes to complete the written survey that you may receive in the mail after you visit with us. Thank you!        Patient Information     Date Of Birth          9/11/1925        Designated Caregiver       Most Recent Value    Caregiver    Will someone help with your care after discharge? no      About your hospital stay     You were admitted on:  December 26, 2017 You last received care in the:  [address] [facility]    You were discharged on:  January 3, 2018       Who to Call     For medical emergencies, please call 911.  For non-urgent questions about your medical care, please call your primary care provider or clinic, [phone]          Attending Provider     Provider Specialty    Martha Brady MD Emergency Medicine    Alaniz, Evan Pimentel MD Internal Medicine       Primary Care Provider Office Phone # Fax #    Antwon Schmidt [phone]      After Care Instructions     Activity       Your activity upon discharge: activity as tolerated            Diet       Follow this diet upon discharge: Regular                  Follow-up Appointments     Follow-up and recommended labs and tests        Follow up with primary care provider, Antwon Schmidt, within 5-7 days for hospital follow- up.  No follow up labs or test are needed.                  Your next 10 appointments already scheduled     Marty 10, 2018  1:30 PM CST   [facility]    [address] [place] [address] [id number] [phone]     "       Bring a current list of meds and any records pertaining to this visit. For Physicals, please bring immunization records and any forms needing to be filled out. Please arrive 10 minutes early to complete paperwork.              Additional Services     Home care nursing referral       RN skilled nursing visit. RN to assess vital signs, respiratory status and home safety.  RN to teach medication management.      Your provider has ordered home care nursing services.  Discharge to home with [facility] services. The staff will call to schedule the first visit. Their phone number is [phone].                  Pending Results     No orders found from 12/24/2017 to 12/27/2017.            Statement of Approval     Ordered          01/03/18 0839  I have reviewed and agree with all the recommendations and orders detailed in this document.  EFFECTIVE NOW     Approved and electronically signed by:  Tosha Crowell MD             Admission Information     Date & Time Provider Department Dept. Phone    12/26/2017 Evan Alaniz MD [place] [address] [facility] [phone]      Your Vitals Were     Blood Pressure Pulse Temperature Respirations Height Weight    138/104 (BP Location: Right arm) 78 98.3  F (36.8  C) (Oral) 16 1.575 m (5' 2\") 90.9 kg (200 lb 6.4 oz)    Last Period Pulse Oximetry BMI (Body Mass Index)             (LMP Unknown) 95% 36.65 kg/m2         MyChart Information     [employer] lets you send messages to your doctor, view your test results, renew your prescriptions, schedule appointments and more. To sign up, go to [email or web address] . Click on \"Log in\" on the left side of the screen, which will take you to the Welcome page. Then click on \"Sign up Now\" on the right side of the page.     You will be asked to enter the access code listed below, as well as some personal information. Please follow the directions to create your username and password.     Your access " code is: [email or web address]  Expires: 4/3/2018  9:05 AM     Your access code will  in 90 days. If you need help or a new code, please call your [facility] or [phone].        Care EveryWhere ID     This is your Care EveryWhere ID. This could be used by other organizations to access your [facility] medical records  [id number]        Equal Access to Services     [facility] : Hadii aad ku hadasho Soomaali, waaxda luqadaha, qaybta kaalmada adeegyada, debbie aleman margaretlaila callejas ld larandylaila . So [facility] [phone].    ATENCIÓN: Si carlos griffin, tiene a david disposición servicios gratuitos de asistencia lingüística. Llame al [phone].    We comply with applicable federal civil rights laws and [place] laws. We do not discriminate on the basis of race, color, national origin, age, disability, sex, sexual orientation, or gender identity.               Review of your medicines      START taking        Dose / Directions    albuterol 108 (90 BASE) MCG/ACT Inhaler   Commonly known as:  PROAIR HFA/PROVENTIL HFA/VENTOLIN HFA   Used for:  Acute bronchitis, unspecified organism        Dose:  2 puff   Inhale 2 puffs into the lungs every 6 hours as needed for shortness of breath / dyspnea or wheezing   Quantity:  1 Inhaler   Refills:  0       * guaiFENesin 20 mg/mL Soln solution   Commonly known as:  ROBITUSSIN   Used for:  Acute bronchitis, unspecified organism        Dose:  10 mL   Take 10 mLs by mouth every 4 hours as needed for cough   Quantity:  1200 mL   Refills:  0       * guaiFENesin 600 MG 12 hr tablet   Commonly known as:  MUCINEX   Used for:  Acute bronchitis, unspecified organism        Dose:  600 mg   Take 1 tablet (600 mg) by mouth 2 times daily for 7 days   Quantity:  14 tablet   Refills:  0       predniSONE 20 MG tablet   Commonly known as:  DELTASONE   Used for:  Acute bronchitis, unspecified organism        Dose:  40 mg   Start taking on:  2018   Take 2 tablets (40 mg) by mouth daily for 3 days    Quantity:  6 tablet   Refills:  0       * Notice:  This list has 2 medication(s) that are the same as other medications prescribed for you. Read the directions carefully, and ask your doctor or other care provider to review them with you.      CONTINUE these medicines which have NOT CHANGED        Dose / Directions    ACETAMINOPHEN PO        Dose:  1000 mg   Take 1,000 mg by mouth 3 times daily   Refills:  0       amLODIPine 5 MG tablet   Commonly known as:  NORVASC   Used for:  Essential hypertension        Dose:  5 mg   Take 1 tablet (5 mg) by mouth daily   Quantity:  90 tablet   Refills:  2       ASPIRIN LOW DOSE 81 MG tablet   Generic drug:  aspirin        Dose:  81 mg   Take 81 mg by mouth daily   Refills:  0       atorvastatin 40 MG tablet   Commonly known as:  LIPITOR   Used for:  Hyperlipidemia LDL goal <70        Dose:  40 mg   Take 1 tablet (40 mg) by mouth daily   Quantity:  90 tablet   Refills:  2       DOCUSATE SODIUM PO        Dose:  100 mg   Take 100 mg by mouth every morning   Refills:  0       isosorbide mononitrate 60 MG 24 hr tablet   Commonly known as:  IMDUR   Used for:  Chronic ischemic heart disease        Dose:  60 mg   Take 1 tablet (60 mg) by mouth daily   Quantity:  90 tablet   Refills:  3       latanoprost 0.005 % ophthalmic solution   Commonly known as:  XALATAN        Dose:  1 drop   Place 1 drop into both eyes At Bedtime   Refills:  0       levothyroxine 25 MCG tablet   Commonly known as:  SYNTHROID/LEVOTHROID   Used for:  Hypothyroidism        TAKE ONE TABLET BY MOUTH ONE TIME DAILY   Quantity:  90 tablet   Refills:  2       * LOPRESSOR PO        Dose:  50 mg   Take 50 mg by mouth every morning   Refills:  0       * LOPRESSOR PO        Dose:  100 mg   Take 100 mg by mouth every evening   Refills:  0       LYRICA 150 MG capsule   Used for:  Neuropathy   Generic drug:  pregabalin        TAKE 1 CAPSULE BY MOUTH TWICE A DAY   Quantity:  60 capsule   Refills:  11       nitroGLYcerin 0.4  MG sublingual tablet   Commonly known as:  NITROSTAT   Used for:  Chronic ischemic heart disease, unspecified        Dose:  0.4 mg   Place 1 tablet (0.4 mg) under the tongue every 5 minutes as needed for chest pain   Quantity:  25 tablet   Refills:  3       order for DME   Used for:  Neuropathy, Poor balance        Equipment being ordered: wheeled walker with brakes and a seat   Quantity:  1 Device   Refills:  0       ranolazine 500 MG 12 hr tablet   Commonly known as:  RANEXA   Used for:  Chronic ischemic heart disease, unspecified        Dose:  500 mg   Take 1 tablet (500 mg) by mouth 2 times daily   Quantity:  180 tablet   Refills:  2       VITAMIN D (CHOLECALCIFEROL) PO        Dose:  4000 Units   Take 4,000 Units by mouth daily   Refills:  0       * Notice:  This list has 2 medication(s) that are the same as other medications prescribed for you. Read the directions carefully, and ask your doctor or other care provider to review them with you.         Where to get your medicines      These medications were sent to [facility] [place] - [place] - [address]  [address] [facility] [address], [place] [address]     Phone:  [phone]     albuterol 108 (90 BASE) MCG/ACT Inhaler    guaiFENesin 20 mg/mL Soln solution    guaiFENesin 600 MG 12 hr tablet    predniSONE 20 MG tablet                Protect others around you: Learn how to safely use, store and throw away your medicines at www.disposemymeds.org.             Medication List: This is a list of all your medications and when to take them. Check marks below indicate your daily home schedule. Keep this list as a reference.      Medications           Morning Afternoon Evening Bedtime As Needed    ACETAMINOPHEN PO   Take 1,000 mg by mouth 3 times daily   Last time this was given:  975 mg on 12/28/2017  6:12 PM   Next Dose Due:  1/3/18 PM            1/4/18       1/4/18       1/3/18               albuterol 108 (90 BASE) MCG/ACT Inhaler   Commonly known as:   PROAIR HFA/PROVENTIL HFA/VENTOLIN HFA   Inhale 2 puffs into the lungs every 6 hours as needed for shortness of breath / dyspnea or wheezing   Next Dose Due:  Available as needed                                   amLODIPine 5 MG tablet   Commonly known as:  NORVASC   Take 1 tablet (5 mg) by mouth daily   Last time this was given:  5 mg on 1/3/2018  9:07 AM   Next Dose Due:  1/4/18 AM            1/4/18                       ASPIRIN LOW DOSE 81 MG tablet   Take 81 mg by mouth daily   Generic drug:  aspirin   Next Dose Due:  1/4/18 AM            1/4/18                       atorvastatin 40 MG tablet   Commonly known as:  LIPITOR   Take 1 tablet (40 mg) by mouth daily   Next Dose Due:  1/3/18 Bedtime                        1/3/18           DOCUSATE SODIUM PO   Take 100 mg by mouth every morning   Last time this was given:  100 mg on 1/3/2018  9:07 AM   Next Dose Due:  1/4/18 AM            1/4/18                       * guaiFENesin 20 mg/mL Soln solution   Commonly known as:  ROBITUSSIN   Take 10 mLs by mouth every 4 hours as needed for cough   Last time this was given:  10 mLs on 1/2/2018  9:07 PM   Next Dose Due:  Available as needed                                   * guaiFENesin 600 MG 12 hr tablet   Commonly known as:  MUCINEX   Take 1 tablet (600 mg) by mouth 2 times daily for 7 days   Next Dose Due:  1/3/18 PM            1/4/18           1/3/18               isosorbide mononitrate 60 MG 24 hr tablet   Commonly known as:  IMDUR   Take 1 tablet (60 mg) by mouth daily   Last time this was given:  60 mg on 1/3/2018  9:06 AM   Next Dose Due:  1/4/18 AM            1/4/18                       latanoprost 0.005 % ophthalmic solution   Commonly known as:  XALATAN   Place 1 drop into both eyes At Bedtime   Last time this was given:  1 drop on 1/2/2018  9:07 PM   Next Dose Due:  1/3/18 Bedtime                        1/3/18           levothyroxine 25 MCG tablet   Commonly known as:  SYNTHROID/LEVOTHROID   TAKE ONE TABLET BY  MOUTH ONE TIME DAILY   Last time this was given:  25 mcg on 1/3/2018  9:06 AM                                * LOPRESSOR PO   Take 50 mg by mouth every morning   Last time this was given:  50 mg on 1/3/2018  9:07 AM   Next Dose Due:  1/4/18 AM            1/4/18                       * LOPRESSOR PO   Take 100 mg by mouth every evening   Last time this was given:  50 mg on 1/3/2018  9:07 AM   Next Dose Due:  1/3/18 PM                    1/3/18               LYRICA 150 MG capsule   TAKE 1 CAPSULE BY MOUTH TWICE A DAY   Last time this was given:  150 mg on 1/3/2018  9:06 AM   Generic drug:  pregabalin   Next Dose Due:  1/3/18 PM            1/4/18           1/3/18                nitroGLYcerin 0.4 MG sublingual tablet   Commonly known as:  NITROSTAT   Place 1 tablet (0.4 mg) under the tongue every 5 minutes as needed for chest pain   Next Dose Due:  Available as needed                                   order for DME   Equipment being ordered: wheeled walker with brakes and a seat                                predniSONE 20 MG tablet   Commonly known as:  DELTASONE   Take 2 tablets (40 mg) by mouth daily for 3 days   Start taking on:  1/4/2018   Last time this was given:  40 mg on 1/3/2018  9:06 AM   Next Dose Due:  1/4/18 AM            1/4/18                       ranolazine 500 MG 12 hr tablet   Commonly known as:  RANEXA   Take 1 tablet (500 mg) by mouth 2 times daily   Last time this was given:  500 mg on 1/3/2018  9:06 AM   Next Dose Due:  1/3/18 PM            1/4/18           1/3/18               VITAMIN D (CHOLECALCIFEROL) PO   Take 4,000 Units by mouth daily   Next Dose Due:  1/4/18 AM            1/4/18                       * Notice:  This list has 4 medication(s) that are the same as other medications prescribed for you. Read the directions carefully, and ask your doctor or other care provider to review them with you.

## 2017-12-26 NOTE — ED NOTES
Bed: ED07  Expected date:   Expected time:   Means of arrival:   Comments:  514  89 F weak/SOB/  0882

## 2017-12-26 NOTE — PROGRESS NOTES
[facility]AT Note    Date: 12/26/2017  Admission Diagnosis: Cough, acute hypoxic resp. failure  Pulmonary History: none  Home Nebulizer/ MDI Use: none  Home Oxygen Use: none  Acuity Level (from RT Assessment flow sheet): 4    Aerosol Therapy Initiated: Duoneb QID      Pulmonary Hygiene Initiated:      Volume Expansion Therapy Initiated:      Current Oxygen Requirement: 2LPM  Current SpO2: 93%    Re-evaluation date: 12/29/2017    See 'RT Assessments' flow sheet for patient assessment scoring and Acuity Level Details.

## 2017-12-26 NOTE — ED NOTES
[facility]  ED Nurse Handoff Report    ED Chief complaint: Cough (SINCE SINCE 12/18.o2 sat 84 85 5 for ems. placed on 6liters n/c. o2 sat up to 95%)      ED Diagnosis:   Final diagnoses:   Hypoxia       Code Status: DNR / DNI    Allergies:   Allergies   Allergen Reactions     Hydrochlorothiazide      Pancreatitis - patient denies this.  She says it gave her low sodium.       Activity level - Baseline/Home:  Stand with Assist    Activity Level - Current:   Stand with Assist of 2     Needed?: No    Isolation: No  Infection: Not Applicable    Bariatric?: No    Vital Signs:   Vitals:    12/26/17 0900 12/26/17 0930 12/26/17 0945 12/26/17 1000   BP: 132/79   129/75   Pulse:       Resp:       Temp:       TempSrc:       SpO2: 95% 93% 96% 92%   Weight:       Height:           Cardiac Rhythm: ,        Pain level:      Is this patient confused?: No    Patient Report: Initial Complaint: cough  Focused Assessment:  Pt states  She has had a cough since 12/18. Productive at times. Alert and oriented. Pt states she uses her walker when she goes out and sometimes in her apartment.ems had a o2 sat of 84% on room air. Placed on o2 6liters by ems. Pt taken off o2 here to see room air. desat to 88%. Placed back on o2 at 2 liters n/c. sats 94%  Tests Performed: cxr ,blood,influenza  Abnormal Results: none  Treatments provided: neb    Family Comments: here alone    OBS brochure/video discussed/provided to patient: N/A    ED Medications:   Medications   ipratropium - albuterol 0.5 mg/2.5 mg/3 mL (DUONEB) neb solution 3 mL (3 mLs Nebulization Given 12/26/17 0939)       Drips infusing?:  No      ED NURSE PHONE NUMBER: [phone]

## 2017-12-26 NOTE — H&P
[facility]    History and Physical  Hospitalist       Date of Admission:  12/26/2017    Assessment & Plan   Lora Vergara is a 89 year old female who presented to [facility] Emergency Department this morning for evaluation and management of cough of 1 1/2 weeks duration with thick creamy sputum. She is being admitted for acute hypoxic respiratory failure of unknown etiology.     Acute hypoxic respiratory failure, unknown etiology, suspect acute bronchitis   Cough, 1 1/2 week duration   Ms. Vergara endorses a cough of at least 1 1/2 weeks duration with creamy thick sputum production. She denies chronic pulmonary disease, however endorses baseline mild shortness of breath secondary to heart disease. She states she has not been unusually short of breath and has been sleeping with 1- pillow. She specifically denies fevers, but has had intermittent chills. She has never had GERD and denies sinus symptoms. Last SaO2 10/31/2017 94% on room air per EMR review. Upon arrival to ED her SaO2 was 82% and improved with 2- liters per NC. Chest Xray did not reveal cardiopulmonary disease. On exam she does not appear significantly short of breath, speaks full sentences. Lungs are coarse with expiratory wheezing. Influenza swab is negative. No leukocytosis.   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 Mrs. Vergara states she has not had chest pain since initiation of Ranexa. EMR review is notable for last echocardiogram in March 2016 with aortic valve sclerosis with EF 70%.   - continue home anti-hypertensive medications with hold parameters  - continue home  Imdur and Ranexa     Hypothyroidism, treated  Chronic, stable.   - continue home levothyroxine     Personal history of diabetes mellitus, type II  She states she was told last summer she is no longer diabetic and was taken off medications.  - monitor     Chronic lower extremity pain, likely secondary to neuropathy and right knee arthritis, treated  Chronic, stable.  - resume home Lyrica    DVT Prophylaxis: Pneumatic Compression Devices and Ambulate every shift  Code Status: Full Code; she states she does have an advanced directive, but cannot remember exactly what is written. Agreeable to initiate treatment.     Disposition: Expected discharge in 2-4 days once no longer requiring oxygen.    HERMELINDA French, CNP  Hospitalist Service, House Officer  [facility]     Text Page  Pager: [phone]    HERMELINDA French, CNP  Hospitalist Service, House Officer  [facility]     Text Page  Pager: [phone]    Primary Care Physician   Dr. Antwon Schmidt    Chief Complaint   Cough, at least 1 1/2 weeks duration     History is obtained from the patient.    History of Present Illness   Lora Vergara is a 89 year old female who presented to [facility] Emergency Department this morning for evaluation of a persistent and worsening cough with onset 1 1/2 weeks ago. She states the cough has worsened over the past few days and she was up last night coughing. She has not noted positional dyspnea, has had thick creamy sputum production. She specifically denies fever, weight loss, and night sweats, but has had chills.     Emergency Department course includes chest Xray unremarkable for cardiopulmonary disease and negative influenza swab.     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 Taking?   ACETAMINOPHEN PO 12/26/2017 at am Self Yes Yes   Sig: Take 1,000 mg by mouth 3 times daily    DOCUSATE SODIUM PO 12/25/2017 at 1200 Self Yes Yes   Sig: Take 100 mg by mouth every morning   LYRICA 150 MG capsule 12/26/2017 at am Self No Yes   Sig: TAKE 1 CAPSULE BY MOUTH TWICE A DAY   Metoprolol Tartrate (LOPRESSOR PO) 12/26/2017 at am Self Yes Yes   Sig: Take 50 mg by mouth every morning   Metoprolol Tartrate (LOPRESSOR PO)  Self Yes Yes   Sig: Take 100 mg by mouth every evening   VITAMIN D, CHOLECALCIFEROL, PO 12/25/2017 at 1200 Self Yes Yes   Sig: Take 4,000 Units by mouth daily   amLODIPine (NORVASC) 5 MG tablet 12/26/2017 at am Self No Yes   Sig: Take 1 tablet (5 mg) by mouth daily   aspirin (ASPIRIN LOW DOSE) 81 MG tablet 12/26/2017 at am Self Yes Yes   Sig: Take 81 mg by mouth daily    atorvastatin (LIPITOR) 40 MG tablet 12/25/2017 at pm Self No Yes   Sig: Take 1 tablet (40 mg) by mouth daily   isosorbide mononitrate (IMDUR) 60 MG 24 hr tablet unsure if took Self No Yes   Sig: Take 1 tablet (60 mg) by mouth daily   latanoprost (XALATAN) 0.005 % ophthalmic solution 12/25/2017 at pm Self Yes Yes   Sig: Place 1 drop into both eyes At Bedtime   levothyroxine (SYNTHROID/LEVOTHROID) 25 MCG tablet 12/26/2017 at am Self No Yes   Sig: TAKE ONE TABLET BY MOUTH ONE TIME DAILY   nitroglycerin (NITROSTAT) 0.4 MG SL tablet  Self No Yes   Sig: Place 1 tablet (0.4 mg) under the tongue every 5 minutes as needed for chest pain   order for DME  Self No No   Sig: Equipment being ordered: wheeled walker with brakes and a seat   ranolazine (RANEXA) 500  MG 12 hr tablet 12/26/2017 at am Self No Yes   Sig: Take 1 tablet (500 mg) by mouth 2 times daily      Facility-Administered Medications: None     Allergies   Allergies   Allergen Reactions     Hydrochlorothiazide      Pancreatitis - patient denies this.  She says it gave her low sodium.       Social History   I personally reviewed history with patient:  - lives alone in a senior complex  - drives a car  - uses a walker  - lifelong non-smoker  - 1-2 drinks/month  - denies drug use    Family History   I personally reviewed history with patient:  - cancers and diabetes    Review of Systems   The 10 point Review of Systems is negative other than noted in the HPI or here.     Physical Exam   Temp: 99  F (37.2  C) Temp src: Oral BP: 120/72 Pulse: 81   Resp: 20 SpO2: 95 % O2 Device: None (Room air)    Vital Signs with Ranges  Temp:  [99  F (37.2  C)] 99  F (37.2  C)  Pulse:  [81] 81  Resp:  [20] 20  BP: (118-132)/(72-90) 120/72  SpO2:  [82 %-96 %] 95 %  200 lbs 0 oz    General: Appears younger than stated age, no acute distress.  Skin:  Warm, dry. No rashes or lesions on exposed skin.  HEENT:  Normocephalic, atraumatic.  Chest:  Bilateral anterior and posterior lung fields coarse with expiratory wheezing to auscultation. Very mild shortness of breath at rest, able to speak full sentences. Does require supplemental oxygen.  Cardiovascular:  Regular rate and rhythm, without murmur, rub, or gallop. Bilateral upper and lower distal pulses palpable.   Abdomen:  Soft, non-tender, non-distended. Bowel sounds present.   Musculoskeletal:  Moves all four extremities. Bilateral calves soft.  Neurological:  Alert and oriented x 4. Cranial nerves II-XII grossly intact.   Psychiatric:  Affect and mood congruent.    Data   Data reviewed today:  I personally reviewed the chest x-ray image(s) showing no acute cardipulmonary pathology..    Imaging:  Recent Results (from the past 24 hour(s))   Chest XR,  PA & LAT    Narrative    XR CHEST 2  VW 12/26/2017 9:25 AM    HISTORY: Cough and fever.    COMPARISON: 5/31/2017    FINDINGS: No airspace consolidation, pleural effusion or pneumothorax.  Normal heart size.      Impression    IMPRESSION: No acute cardiopulmonary abnormality.    FRANCISCO COTTRELL MD

## 2017-12-27 LAB
ANION GAP SERPL CALCULATED.3IONS-SCNC: 4 MMOL/L (ref 3–14)
BUN SERPL-MCNC: 13 MG/DL (ref 7–30)
CALCIUM SERPL-MCNC: 8.7 MG/DL (ref 8.5–10.1)
CHLORIDE SERPL-SCNC: 94 MMOL/L (ref 94–109)
CO2 SERPL-SCNC: 33 MMOL/L (ref 20–32)
CREAT SERPL-MCNC: 0.71 MG/DL (ref 0.52–1.04)
D DIMER PPP FEU-MCNC: 0.4 UG/ML FEU (ref 0–0.5)
ERYTHROCYTE [DISTWIDTH] IN BLOOD BY AUTOMATED COUNT: 12.8 % (ref 10–15)
GFR SERPL CREATININE-BSD FRML MDRD: 76 ML/MIN/1.7M2
GLUCOSE BLDC GLUCOMTR-MCNC: 153 MG/DL (ref 70–99)
GLUCOSE BLDC GLUCOMTR-MCNC: 234 MG/DL (ref 70–99)
GLUCOSE BLDC GLUCOMTR-MCNC: 277 MG/DL (ref 70–99)
GLUCOSE SERPL-MCNC: 116 MG/DL (ref 70–99)
HBA1C MFR BLD: 6.9 % (ref 4.3–6)
HCT VFR BLD AUTO: 38.3 % (ref 35–47)
HGB BLD-MCNC: 13.1 G/DL (ref 11.7–15.7)
MCH RBC QN AUTO: 30.5 PG (ref 26.5–33)
MCHC RBC AUTO-ENTMCNC: 34.2 G/DL (ref 31.5–36.5)
MCV RBC AUTO: 89 FL (ref 78–100)
PLATELET # BLD AUTO: 220 10E9/L (ref 150–450)
POTASSIUM SERPL-SCNC: 4 MMOL/L (ref 3.4–5.3)
RBC # BLD AUTO: 4.29 10E12/L (ref 3.8–5.2)
SODIUM SERPL-SCNC: 131 MMOL/L (ref 133–144)
WBC # BLD AUTO: 5.3 10E9/L (ref 4–11)

## 2017-12-27 PROCEDURE — 94640 AIRWAY INHALATION TREATMENT: CPT

## 2017-12-27 PROCEDURE — 36415 COLL VENOUS BLD VENIPUNCTURE: CPT | Performed by: INTERNAL MEDICINE

## 2017-12-27 PROCEDURE — 94640 AIRWAY INHALATION TREATMENT: CPT | Mod: 76

## 2017-12-27 PROCEDURE — 83036 HEMOGLOBIN GLYCOSYLATED A1C: CPT | Performed by: INTERNAL MEDICINE

## 2017-12-27 PROCEDURE — A9270 NON-COVERED ITEM OR SERVICE: HCPCS | Mod: GY | Performed by: INTERNAL MEDICINE

## 2017-12-27 PROCEDURE — 85379 FIBRIN DEGRADATION QUANT: CPT | Performed by: INTERNAL MEDICINE

## 2017-12-27 PROCEDURE — 85027 COMPLETE CBC AUTOMATED: CPT | Performed by: INTERNAL MEDICINE

## 2017-12-27 PROCEDURE — 12000007 ZZH R&B INTERMEDIATE

## 2017-12-27 PROCEDURE — 25000132 ZZH RX MED GY IP 250 OP 250 PS 637: Mod: GY | Performed by: INTERNAL MEDICINE

## 2017-12-27 PROCEDURE — 99233 SBSQ HOSP IP/OBS HIGH 50: CPT | Performed by: INTERNAL MEDICINE

## 2017-12-27 PROCEDURE — 40000275 ZZH STATISTIC RCP TIME EA 10 MIN

## 2017-12-27 PROCEDURE — 00000146 ZZHCL STATISTIC GLUCOSE BY METER IP

## 2017-12-27 PROCEDURE — 25000128 H RX IP 250 OP 636: Performed by: INTERNAL MEDICINE

## 2017-12-27 PROCEDURE — 25000131 ZZH RX MED GY IP 250 OP 636 PS 637: Mod: GY | Performed by: INTERNAL MEDICINE

## 2017-12-27 PROCEDURE — 25000132 ZZH RX MED GY IP 250 OP 250 PS 637: Mod: GY | Performed by: NURSE PRACTITIONER

## 2017-12-27 PROCEDURE — 80048 BASIC METABOLIC PNL TOTAL CA: CPT | Performed by: INTERNAL MEDICINE

## 2017-12-27 PROCEDURE — 25000125 ZZHC RX 250: Performed by: NURSE PRACTITIONER

## 2017-12-27 PROCEDURE — A9270 NON-COVERED ITEM OR SERVICE: HCPCS | Mod: GY | Performed by: NURSE PRACTITIONER

## 2017-12-27 RX ORDER — AZITHROMYCIN 250 MG/1
250 TABLET, FILM COATED ORAL DAILY
Status: COMPLETED | OUTPATIENT
Start: 2017-12-28 | End: 2017-12-31

## 2017-12-27 RX ORDER — SODIUM CHLORIDE 9 MG/ML
INJECTION, SOLUTION INTRAVENOUS CONTINUOUS
Status: ACTIVE | OUTPATIENT
Start: 2017-12-27 | End: 2017-12-28

## 2017-12-27 RX ORDER — METHYLPREDNISOLONE SODIUM SUCCINATE 40 MG/ML
40 INJECTION, POWDER, LYOPHILIZED, FOR SOLUTION INTRAMUSCULAR; INTRAVENOUS EVERY 12 HOURS
Status: DISCONTINUED | OUTPATIENT
Start: 2017-12-27 | End: 2018-01-02

## 2017-12-27 RX ORDER — AZITHROMYCIN 250 MG/1
500 TABLET, FILM COATED ORAL ONCE
Status: COMPLETED | OUTPATIENT
Start: 2017-12-27 | End: 2017-12-27

## 2017-12-27 RX ORDER — NICOTINE POLACRILEX 4 MG
15-30 LOZENGE BUCCAL
Status: DISCONTINUED | OUTPATIENT
Start: 2017-12-27 | End: 2018-01-03 | Stop reason: HOSPADM

## 2017-12-27 RX ORDER — DEXTROSE MONOHYDRATE 25 G/50ML
25-50 INJECTION, SOLUTION INTRAVENOUS
Status: DISCONTINUED | OUTPATIENT
Start: 2017-12-27 | End: 2018-01-03 | Stop reason: HOSPADM

## 2017-12-27 RX ADMIN — METHYLPREDNISOLONE SODIUM SUCCINATE 40 MG: 40 INJECTION, POWDER, FOR SOLUTION INTRAMUSCULAR; INTRAVENOUS at 21:50

## 2017-12-27 RX ADMIN — ISOSORBIDE MONONITRATE 60 MG: 60 TABLET, EXTENDED RELEASE ORAL at 10:12

## 2017-12-27 RX ADMIN — INSULIN ASPART 1 UNITS: 100 INJECTION, SOLUTION INTRAVENOUS; SUBCUTANEOUS at 12:40

## 2017-12-27 RX ADMIN — RANOLAZINE 500 MG: 500 TABLET, FILM COATED, EXTENDED RELEASE ORAL at 10:12

## 2017-12-27 RX ADMIN — METOPROLOL TARTRATE 100 MG: 100 TABLET, FILM COATED ORAL at 20:20

## 2017-12-27 RX ADMIN — LEVOTHYROXINE SODIUM 25 MCG: 25 TABLET ORAL at 06:47

## 2017-12-27 RX ADMIN — AMLODIPINE BESYLATE 5 MG: 5 TABLET ORAL at 10:12

## 2017-12-27 RX ADMIN — IPRATROPIUM BROMIDE AND ALBUTEROL SULFATE 3 ML: .5; 3 SOLUTION RESPIRATORY (INHALATION) at 11:25

## 2017-12-27 RX ADMIN — INSULIN ASPART 2 UNITS: 100 INJECTION, SOLUTION INTRAVENOUS; SUBCUTANEOUS at 18:06

## 2017-12-27 RX ADMIN — GUAIFENESIN 10 ML: 100 SOLUTION ORAL at 04:37

## 2017-12-27 RX ADMIN — AZITHROMYCIN 500 MG: 250 TABLET, FILM COATED ORAL at 10:12

## 2017-12-27 RX ADMIN — SODIUM CHLORIDE: 9 INJECTION, SOLUTION INTRAVENOUS at 18:11

## 2017-12-27 RX ADMIN — IPRATROPIUM BROMIDE AND ALBUTEROL SULFATE 3 ML: .5; 3 SOLUTION RESPIRATORY (INHALATION) at 20:08

## 2017-12-27 RX ADMIN — METHYLPREDNISOLONE SODIUM SUCCINATE 40 MG: 40 INJECTION, POWDER, FOR SOLUTION INTRAMUSCULAR; INTRAVENOUS at 10:15

## 2017-12-27 RX ADMIN — IPRATROPIUM BROMIDE AND ALBUTEROL SULFATE 3 ML: .5; 3 SOLUTION RESPIRATORY (INHALATION) at 15:33

## 2017-12-27 RX ADMIN — PREGABALIN 150 MG: 75 CAPSULE ORAL at 20:20

## 2017-12-27 RX ADMIN — LATANOPROST 1 DROP: 50 SOLUTION OPHTHALMIC at 21:49

## 2017-12-27 RX ADMIN — RANOLAZINE 500 MG: 500 TABLET, FILM COATED, EXTENDED RELEASE ORAL at 20:20

## 2017-12-27 RX ADMIN — DOCUSATE SODIUM 100 MG: 100 CAPSULE, LIQUID FILLED ORAL at 10:12

## 2017-12-27 RX ADMIN — ASPIRIN 81 MG: 81 TABLET, COATED ORAL at 10:12

## 2017-12-27 RX ADMIN — PREGABALIN 150 MG: 75 CAPSULE ORAL at 10:12

## 2017-12-27 RX ADMIN — IPRATROPIUM BROMIDE AND ALBUTEROL SULFATE 3 ML: .5; 3 SOLUTION RESPIRATORY (INHALATION) at 07:23

## 2017-12-27 NOTE — PLAN OF CARE
Problem: Patient Care Overview  Goal: Plan of Care/Patient Progress Review  Outcome: No Change  Pt admitted today with cough.  Pt's wheezes improving after neb treatments.  Pt c/o dizziness when up to use BR with assist.  Voided.  Pt has frequent cough with no production.  She was informed that we need sputum specimen.  Using O2-2L.

## 2017-12-27 NOTE — PROGRESS NOTES
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 She is being admitted for acute hypoxic respiratory failure of unknown etiology.      Acute hypoxic respiratory failure 2/2 suspected acute bronchitis   Ms. Vergara endorses a cough of at least 1 1/2 weeks duration with creamy thick sputum production. She denies chronic pulmonary disease, however endorses baseline mild shortness of breath secondary to heart disease. She states she has not been unusually short of breath and has been sleeping with 1- pillow. She specifically denies fevers, but has had intermittent chills. She has never had GERD and denies sinus symptoms. Last SaO2 10/31/2017 94% on room air per EMR review. Upon arrival to ED her SaO2 was 82% and improved with 2- liters per NC. Chest Xray did not reveal cardiopulmonary disease.  Influenza swab is negative. No leukocytosis. D-Dimer negative.     - Initiated on Prednisone 20 mg daily on admit but on HOD #1 she still has significant diffuse expiratory wheezing, coarse cough and requiring 2 L NC. Overall she does not appear well but not in any acute distress.   - Will increase her therapy to Solumedrol 40 mg Q12 and add Azithromycin for its antiinflammatory effect as well.  - C/w DuoNebs Q4 and Q 2 prn  - Robitussin prn  - Encourage IS and out of bed to chair.   - Taper O2 as tolerated.      Hyponatremia  Na noted to be 132 on admit and 131 12/27. She does appear a little dry on exam.    - Will gently hydrate overnight with NS.    - NA in am.      Chronic ischemia chest pain secondary to microvascular angina, treated  Coronary artery disease, non-obstructive per last  angiogram 2010  Hypertension, treated  Hyperlipidemia, treated  Chronic, stable. Mrs. Vergara states she has not had chest pain since initiation of Ranexa. EMR review is notable for last echocardiogram in March 2016 with aortic valve sclerosis with EF 70%.   - continue home anti-hypertensive medications with hold parameters  - continue home Imdur and Ranexa      Hypothyroidism, treated  Chronic, stable.   - continued home levothyroxine      Personal history of diabetes mellitus, type II  She states she was told last summer she is no longer diabetic and was taken off medications.  - A1c pending.    - Monitor FSBS now that her steroid regimen has been increased.  - RISS prn.       Chronic lower extremity pain, likely secondary to neuropathy and right knee arthritis, treated  Chronic, stable.  - resumed home Lyrica    LUE finger tip (thumb, index and middle) numbness   Noted 12/27 without other neurological deficit on exam.     - Possibly related to her hypoxia but unclear. Also with a h/o neuropathy but typically on in her feet.    - Will follow      DVT Prophylaxis: Pneumatic Compression Devices and Ambulate every shift  Code Status: Full Code; she states she does have an advanced directive, but cannot remember exactly what is written. Agreeable to initiate treatment.      Disposition:  Increased steroid therapy today given lack of improvement.  Will taper as able. Anticipate another 1-2 days with likely d/c on 12/29.        Evan Alaniz       Interval History   Does not feel any better today. Still with a coarse cough and wheezing. Aferile and without leukocytosis. On 2 L NC. C/o some numbness in her left thumb tip, index finger tip and middle finger tip. No other neurological deficits noted on exam. Started this am.     -Data reviewed today: I reviewed all new labs and imaging results over the last 24 hours. I personally reviewed no images or EKG's today.    Physical Exam   Temp: 98.6  F (37  C) Temp src:  Oral BP: 137/67 Pulse: 85   Resp: 18 SpO2: 93 % O2 Device: Nasal cannula Oxygen Delivery: 2 LPM  Vitals:    12/26/17 0833 12/27/17 0718   Weight: 90.7 kg (200 lb) 95.3 kg (210 lb)     Vital Signs with Ranges  Temp:  [97.2  F (36.2  C)-98.9  F (37.2  C)] 98.6  F (37  C)  Pulse:  [] 85  Resp:  [16-18] 18  BP: ()/(57-90) 137/67  SpO2:  [91 %-96 %] 93 %  I/O last 3 completed shifts:  In: 640 [P.O.:640]  Out: 700 [Urine:700]    Gen: Patient in no acute distress.  Appears tired and with a coarse cough.   Heart:  S1S2+, regular rate and rhythm, No murmurs.  Lungs:  Diffuse expiratory wheezing with reduced BS at the bases, Coarse R>L. No rales or clear rhonchi.   Abdomen:  Soft, non tender, non distended, bowel sounds positive.  Extremities:  No edema.    Medications        amLODIPine  5 mg Oral Daily     aspirin EC  81 mg Oral Daily     docusate sodium (COLACE) capsule 100 mg  100 mg Oral QAM     isosorbide mononitrate  60 mg Oral Daily     latanoprost  1 drop Both Eyes At Bedtime     levothyroxine  25 mcg Oral QAM AC     pregabalin  150 mg Oral BID     metoprolol (LOPRESSOR) tablet 50 mg  50 mg Oral QAM     metoprolol (LOPRESSOR) tablet 100 mg  100 mg Oral QPM     ranolazine  500 mg Oral BID     sodium chloride (PF)  3 mL Intracatheter Q8H     ipratropium - albuterol 0.5 mg/2.5 mg/3 mL  3 mL Nebulization 4x daily     predniSONE  20 mg Oral Daily       Data     Recent Labs  Lab 12/27/17  0633 12/26/17  0839   WBC 5.3 5.3   HGB 13.1 14.4   MCV 89 91    266   * 132*   POTASSIUM 4.0 4.0   CHLORIDE 94 96   CO2 33* 32   BUN 13 10   CR 0.71 0.57   ANIONGAP 4 4   VIOLETA 8.7 8.4*   * 133*       Recent Results (from the past 24 hour(s))   Chest XR,  PA & LAT    Narrative    XR CHEST 2 VW 12/26/2017 9:25 AM    HISTORY: Cough and fever.    COMPARISON: 5/31/2017    FINDINGS: No airspace consolidation, pleural effusion or pneumothorax.  Normal heart size.      Impression    IMPRESSION: No acute  cardiopulmonary abnormality.    FRANCISCO COTTRELL MD

## 2017-12-27 NOTE — PLAN OF CARE
Problem: Patient Care Overview  Goal: Plan of Care/Patient Progress Review  Outcome: Improving  Pt is A&Ox4, Assist x1/GB/walker. Voiding adequately in BR. Denies pain this shift. Frequent cough w/ no production relieved w/ robitussin. Will continue to monitor.

## 2017-12-27 NOTE — PLAN OF CARE
Problem: Patient Care Overview  Goal: Plan of Care/Patient Progress Review  Outcome: Improving  A&O x4. Bradycardic at times, otherwise VSS on 2L O2 NC, O2 sats maintained mid 90s. Up w/1, ADRIAN bailey. LS expiratory wheezes, bases diminished and coarse; frequent productive cough w/small amt of thick sputum; sputum sample needed; SOB reported. BS+, tolerating regular diet, denies nausea. Steroid dose increased, BG and SSI ordered;  w/approp coverage. New numbness in MD CRIS aware, continue to monitor.

## 2017-12-28 LAB
ANION GAP SERPL CALCULATED.3IONS-SCNC: 6 MMOL/L (ref 3–14)
BUN SERPL-MCNC: 12 MG/DL (ref 7–30)
CALCIUM SERPL-MCNC: 8.7 MG/DL (ref 8.5–10.1)
CHLORIDE SERPL-SCNC: 95 MMOL/L (ref 94–109)
CO2 SERPL-SCNC: 32 MMOL/L (ref 20–32)
CREAT SERPL-MCNC: 0.58 MG/DL (ref 0.52–1.04)
GFR SERPL CREATININE-BSD FRML MDRD: >90 ML/MIN/1.7M2
GLUCOSE BLDC GLUCOMTR-MCNC: 183 MG/DL (ref 70–99)
GLUCOSE BLDC GLUCOMTR-MCNC: 186 MG/DL (ref 70–99)
GLUCOSE BLDC GLUCOMTR-MCNC: 197 MG/DL (ref 70–99)
GLUCOSE BLDC GLUCOMTR-MCNC: 230 MG/DL (ref 70–99)
GLUCOSE BLDC GLUCOMTR-MCNC: 247 MG/DL (ref 70–99)
GLUCOSE SERPL-MCNC: 182 MG/DL (ref 70–99)
POTASSIUM SERPL-SCNC: 4.3 MMOL/L (ref 3.4–5.3)
SODIUM SERPL-SCNC: 133 MMOL/L (ref 133–144)

## 2017-12-28 PROCEDURE — 25000125 ZZHC RX 250: Performed by: NURSE PRACTITIONER

## 2017-12-28 PROCEDURE — 94640 AIRWAY INHALATION TREATMENT: CPT | Mod: 76

## 2017-12-28 PROCEDURE — 12000007 ZZH R&B INTERMEDIATE

## 2017-12-28 PROCEDURE — A9270 NON-COVERED ITEM OR SERVICE: HCPCS | Mod: GY | Performed by: NURSE PRACTITIONER

## 2017-12-28 PROCEDURE — 80048 BASIC METABOLIC PNL TOTAL CA: CPT | Performed by: INTERNAL MEDICINE

## 2017-12-28 PROCEDURE — 40000275 ZZH STATISTIC RCP TIME EA 10 MIN

## 2017-12-28 PROCEDURE — 25000132 ZZH RX MED GY IP 250 OP 250 PS 637: Mod: GY | Performed by: INTERNAL MEDICINE

## 2017-12-28 PROCEDURE — 94640 AIRWAY INHALATION TREATMENT: CPT

## 2017-12-28 PROCEDURE — 25000132 ZZH RX MED GY IP 250 OP 250 PS 637: Mod: GY | Performed by: NURSE PRACTITIONER

## 2017-12-28 PROCEDURE — 36415 COLL VENOUS BLD VENIPUNCTURE: CPT | Performed by: INTERNAL MEDICINE

## 2017-12-28 PROCEDURE — 25000128 H RX IP 250 OP 636: Performed by: INTERNAL MEDICINE

## 2017-12-28 PROCEDURE — 00000146 ZZHCL STATISTIC GLUCOSE BY METER IP

## 2017-12-28 PROCEDURE — 99232 SBSQ HOSP IP/OBS MODERATE 35: CPT | Performed by: INTERNAL MEDICINE

## 2017-12-28 PROCEDURE — A9270 NON-COVERED ITEM OR SERVICE: HCPCS | Mod: GY | Performed by: INTERNAL MEDICINE

## 2017-12-28 RX ADMIN — METOPROLOL TARTRATE 50 MG: 50 TABLET ORAL at 08:18

## 2017-12-28 RX ADMIN — METHYLPREDNISOLONE SODIUM SUCCINATE 40 MG: 40 INJECTION, POWDER, FOR SOLUTION INTRAMUSCULAR; INTRAVENOUS at 21:10

## 2017-12-28 RX ADMIN — METHYLPREDNISOLONE SODIUM SUCCINATE 40 MG: 40 INJECTION, POWDER, FOR SOLUTION INTRAMUSCULAR; INTRAVENOUS at 10:09

## 2017-12-28 RX ADMIN — INSULIN ASPART 2 UNITS: 100 INJECTION, SOLUTION INTRAVENOUS; SUBCUTANEOUS at 08:19

## 2017-12-28 RX ADMIN — AZITHROMYCIN 250 MG: 250 TABLET, FILM COATED ORAL at 08:19

## 2017-12-28 RX ADMIN — RANOLAZINE 500 MG: 500 TABLET, FILM COATED, EXTENDED RELEASE ORAL at 08:18

## 2017-12-28 RX ADMIN — PREGABALIN 150 MG: 75 CAPSULE ORAL at 08:19

## 2017-12-28 RX ADMIN — ASPIRIN 81 MG: 81 TABLET, COATED ORAL at 08:19

## 2017-12-28 RX ADMIN — LEVOTHYROXINE SODIUM 25 MCG: 25 TABLET ORAL at 06:22

## 2017-12-28 RX ADMIN — METOPROLOL TARTRATE 100 MG: 100 TABLET, FILM COATED ORAL at 21:11

## 2017-12-28 RX ADMIN — AMLODIPINE BESYLATE 5 MG: 5 TABLET ORAL at 08:19

## 2017-12-28 RX ADMIN — SODIUM CHLORIDE: 9 INJECTION, SOLUTION INTRAVENOUS at 06:35

## 2017-12-28 RX ADMIN — ACETAMINOPHEN 975 MG: 325 TABLET, FILM COATED ORAL at 18:12

## 2017-12-28 RX ADMIN — INSULIN ASPART 1 UNITS: 100 INJECTION, SOLUTION INTRAVENOUS; SUBCUTANEOUS at 13:51

## 2017-12-28 RX ADMIN — ISOSORBIDE MONONITRATE 60 MG: 60 TABLET, EXTENDED RELEASE ORAL at 08:19

## 2017-12-28 RX ADMIN — LATANOPROST 1 DROP: 50 SOLUTION OPHTHALMIC at 21:19

## 2017-12-28 RX ADMIN — INSULIN ASPART 3 UNITS: 100 INJECTION, SOLUTION INTRAVENOUS; SUBCUTANEOUS at 17:41

## 2017-12-28 RX ADMIN — PREGABALIN 150 MG: 75 CAPSULE ORAL at 21:10

## 2017-12-28 RX ADMIN — RANOLAZINE 500 MG: 500 TABLET, FILM COATED, EXTENDED RELEASE ORAL at 21:11

## 2017-12-28 RX ADMIN — IPRATROPIUM BROMIDE AND ALBUTEROL SULFATE 3 ML: .5; 3 SOLUTION RESPIRATORY (INHALATION) at 20:24

## 2017-12-28 RX ADMIN — IPRATROPIUM BROMIDE AND ALBUTEROL SULFATE 3 ML: .5; 3 SOLUTION RESPIRATORY (INHALATION) at 15:15

## 2017-12-28 RX ADMIN — IPRATROPIUM BROMIDE AND ALBUTEROL SULFATE 3 ML: .5; 3 SOLUTION RESPIRATORY (INHALATION) at 07:58

## 2017-12-28 NOTE — PLAN OF CARE
Problem: Patient Care Overview  Goal: Plan of Care/Patient Progress Review  Outcome: Improving  Patient is A&OX4, VSS on 2L of 02 while sleeping, 1L 02 while awake, continue to wean. CMS intact. Up with SBA and walker.   Voiding in bathroom. Regular  Diet. Denies Pain. Lung sounds coarse with expiratory wheezing. Reports dyspnea on exertion but appears comfortable and can talk when up.   Will continue to monitor.

## 2017-12-28 NOTE — PLAN OF CARE
Problem: Patient Care Overview  Goal: Plan of Care/Patient Progress Review  Outcome: Improving  Patient up with assist of 1 and walker. On 1L O2 n/c. Adequate I/O. Denies pain. Frequent cough, unable to collect sputum sample. Numbness in LUE resolved.

## 2017-12-28 NOTE — PROGRESS NOTES
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 She is being admitted for acute hypoxic respiratory failure of unknown etiology.      Acute hypoxic respiratory failure 2/2 suspected acute bronchitis   Ms. Vergara endorses a cough of at least 1 1/2 weeks duration with creamy thick sputum production. She denies chronic pulmonary disease, however endorses baseline mild shortness of breath secondary to heart disease. She states she has not been unusually short of breath and has been sleeping with 1- pillow. She specifically denies fevers, but has had intermittent chills. She has never had GERD and denies sinus symptoms. Last SaO2 10/31/2017 94% on room air per EMR review. Upon arrival to ED her SaO2 was 82% and improved with 2- liters per NC. Chest Xray did not reveal cardiopulmonary disease.  Influenza swab is negative. No leukocytosis. D-Dimer negative.   - Initiated on Prednisone 20 mg daily on admit but on HOD #1 she still has significant diffuse expiratory wheezing, coarse cough and requiring 2 L NC. Overall she does not appear well but not in any acute distress.   - increased her therapy to Solumedrol 40 mg Q12 and add Azithromycin for its antiinflammatory effect as well on 12/28  - C/w DuoNebs Q4 and Q 2 prn  - Robitussin prn  - Encourage IS and out of bed to chair.   - Taper O2 as tolerated.    - will add BNP to am labs    Hyponatremia  Stable in the low 130's  - monitor      Chronic ischemia chest pain secondary to microvascular angina, treated  Coronary artery disease, non-obstructive per last angiogram 2010  Hypertension, treated  Hyperlipidemia, treated  Chronic, stable.  Mrs. Vergara states she has not had chest pain since initiation of Ranexa. EMR review is notable for last echocardiogram in March 2016 with aortic valve sclerosis with EF 70%.   - continue home anti-hypertensive medications with hold parameters  - continue home Imdur and Ranexa      Hypothyroidism, treated  Chronic, stable.   - continued home levothyroxine      Personal history of diabetes mellitus, type II  She states she was told last summer she is no longer diabetic and was taken off medications.  - A1c 6.9%  - BS running high 2/2 steroids  - sliding scale insulin as needed     Chronic lower extremity pain, likely secondary to neuropathy and right knee arthritis, treated  Chronic, stable.  - resumed home Lyrica    LUE finger tip (thumb, index and middle) numbness   No actue issues and following      DVT Prophylaxis: Pneumatic Compression Devices and Ambulate every shift  Code Status: Full Code; she states she does have an advanced directive, but cannot remember exactly what is written. Agreeable to initiate treatment.      Disposition:  Increased steroid therapy 12/27 given lack of improvement.  Will taper as able. Anticipate another 1-2 days with likely d/c on 12/29.  Management otherwise as above    Lester Viera M.D.  Hospitalist  Pager [phone]  Text Page until 6 pm (after call answering service)      Interval History   Still with SOB. Denies cp.     -Data reviewed today: I reviewed all new labs and imaging results over the last 24 hours. I personally reviewed no images or EKG's today.    Physical Exam   Temp: 98  F (36.7  C) Temp src: Oral BP: 151/83 Pulse: 105 Heart Rate: 87 Resp: 18 SpO2: 93 % O2 Device: Nasal cannula Oxygen Delivery: 1 LPM  Vitals:    12/26/17 0833 12/27/17 0718 12/28/17 0412   Weight: 90.7 kg (200 lb) 95.3 kg (210 lb) 93 kg (205 lb)     Vital Signs with Ranges  Temp:  [98  F (36.7  C)-98.1  F (36.7  C)] 98  F (36.7  C)  Pulse:  [105] 105  Heart Rate:  [] 87  Resp:  [18]  18  BP: (138-151)/(77-83) 151/83  SpO2:  [90 %-93 %] 93 %  I/O last 3 completed shifts:  In: 1348 [P.O.:450; I.V.:898]  Out: 1100 [Urine:1100]    Gen: Patient in no acute distress.  Appears tired and with a coarse cough.   Heart:  S1S2+, regular rate and rhythm, No murmurs.  Lungs:  Diffuse wheezing, ? rales  Abdomen:  Soft, non tender, non distended, bowel sounds positive.  Extremities:  No edema.    Medications     NaCl 75 mL/hr at 12/28/17 0635       methylPREDNISolone  40 mg Intravenous Q12H     azithromycin  250 mg Oral Daily     insulin aspart  1-7 Units Subcutaneous TID AC     insulin aspart  1-5 Units Subcutaneous At Bedtime     amLODIPine  5 mg Oral Daily     aspirin EC  81 mg Oral Daily     docusate sodium (COLACE) capsule 100 mg  100 mg Oral QAM     isosorbide mononitrate  60 mg Oral Daily     latanoprost  1 drop Both Eyes At Bedtime     levothyroxine  25 mcg Oral QAM AC     pregabalin  150 mg Oral BID     metoprolol (LOPRESSOR) tablet 50 mg  50 mg Oral QAM     metoprolol (LOPRESSOR) tablet 100 mg  100 mg Oral QPM     ranolazine  500 mg Oral BID     sodium chloride (PF)  3 mL Intracatheter Q8H     ipratropium - albuterol 0.5 mg/2.5 mg/3 mL  3 mL Nebulization 4x daily       Data     Recent Labs  Lab 12/28/17  0702 12/27/17  0633 12/26/17  0839   WBC  --  5.3 5.3   HGB  --  13.1 14.4   MCV  --  89 91   PLT  --  220 266    131* 132*   POTASSIUM 4.3 4.0 4.0   CHLORIDE 95 94 96   CO2 32 33* 32   BUN 12 13 10   CR 0.58 0.71 0.57   ANIONGAP 6 4 4   VIOLETA 8.7 8.7 8.4*   * 116* 133*       No results found for this or any previous visit (from the past 24 hour(s)).

## 2017-12-29 ENCOUNTER — APPOINTMENT (OUTPATIENT)
Dept: GENERAL RADIOLOGY | Facility: CLINIC | Age: 82
DRG: 189 | End: 2017-12-29
Attending: INTERNAL MEDICINE
Payer: MEDICARE

## 2017-12-29 LAB
GLUCOSE BLDC GLUCOMTR-MCNC: 156 MG/DL (ref 70–99)
GLUCOSE BLDC GLUCOMTR-MCNC: 180 MG/DL (ref 70–99)
GLUCOSE BLDC GLUCOMTR-MCNC: 185 MG/DL (ref 70–99)
GLUCOSE BLDC GLUCOMTR-MCNC: 230 MG/DL (ref 70–99)
GLUCOSE BLDC GLUCOMTR-MCNC: 312 MG/DL (ref 70–99)
NT-PROBNP SERPL-MCNC: 2467 PG/ML (ref 0–1800)
PROCALCITONIN SERPL-MCNC: <0.05 NG/ML

## 2017-12-29 PROCEDURE — 00000146 ZZHCL STATISTIC GLUCOSE BY METER IP

## 2017-12-29 PROCEDURE — 94640 AIRWAY INHALATION TREATMENT: CPT

## 2017-12-29 PROCEDURE — 84145 PROCALCITONIN (PCT): CPT | Performed by: INTERNAL MEDICINE

## 2017-12-29 PROCEDURE — 12000007 ZZH R&B INTERMEDIATE

## 2017-12-29 PROCEDURE — 25000132 ZZH RX MED GY IP 250 OP 250 PS 637: Mod: GY | Performed by: INTERNAL MEDICINE

## 2017-12-29 PROCEDURE — A9270 NON-COVERED ITEM OR SERVICE: HCPCS | Mod: GY | Performed by: INTERNAL MEDICINE

## 2017-12-29 PROCEDURE — 40000274 ZZH STATISTIC RCP CONSULT EA 30 MIN

## 2017-12-29 PROCEDURE — 36415 COLL VENOUS BLD VENIPUNCTURE: CPT | Performed by: INTERNAL MEDICINE

## 2017-12-29 PROCEDURE — 40000275 ZZH STATISTIC RCP TIME EA 10 MIN

## 2017-12-29 PROCEDURE — 83880 ASSAY OF NATRIURETIC PEPTIDE: CPT | Performed by: INTERNAL MEDICINE

## 2017-12-29 PROCEDURE — 94640 AIRWAY INHALATION TREATMENT: CPT | Mod: 76

## 2017-12-29 PROCEDURE — A9270 NON-COVERED ITEM OR SERVICE: HCPCS | Mod: GY | Performed by: NURSE PRACTITIONER

## 2017-12-29 PROCEDURE — 25000125 ZZHC RX 250: Performed by: NURSE PRACTITIONER

## 2017-12-29 PROCEDURE — 25000132 ZZH RX MED GY IP 250 OP 250 PS 637: Mod: GY | Performed by: NURSE PRACTITIONER

## 2017-12-29 PROCEDURE — 25000128 H RX IP 250 OP 636: Performed by: INTERNAL MEDICINE

## 2017-12-29 PROCEDURE — 99233 SBSQ HOSP IP/OBS HIGH 50: CPT | Performed by: INTERNAL MEDICINE

## 2017-12-29 PROCEDURE — 71020 XR CHEST 2 VW: CPT

## 2017-12-29 PROCEDURE — 40000893 ZZH STATISTIC PT IP EVAL DEFER: Performed by: PHYSICAL THERAPIST

## 2017-12-29 RX ADMIN — RANOLAZINE 500 MG: 500 TABLET, FILM COATED, EXTENDED RELEASE ORAL at 20:05

## 2017-12-29 RX ADMIN — LATANOPROST 1 DROP: 50 SOLUTION OPHTHALMIC at 21:38

## 2017-12-29 RX ADMIN — RANOLAZINE 500 MG: 500 TABLET, FILM COATED, EXTENDED RELEASE ORAL at 08:09

## 2017-12-29 RX ADMIN — METOPROLOL TARTRATE 100 MG: 100 TABLET, FILM COATED ORAL at 20:05

## 2017-12-29 RX ADMIN — ISOSORBIDE MONONITRATE 60 MG: 60 TABLET, EXTENDED RELEASE ORAL at 08:09

## 2017-12-29 RX ADMIN — IPRATROPIUM BROMIDE AND ALBUTEROL SULFATE 3 ML: .5; 3 SOLUTION RESPIRATORY (INHALATION) at 08:15

## 2017-12-29 RX ADMIN — AZITHROMYCIN 250 MG: 250 TABLET, FILM COATED ORAL at 08:09

## 2017-12-29 RX ADMIN — INSULIN ASPART 1 UNITS: 100 INJECTION, SOLUTION INTRAVENOUS; SUBCUTANEOUS at 14:09

## 2017-12-29 RX ADMIN — AMLODIPINE BESYLATE 5 MG: 5 TABLET ORAL at 08:08

## 2017-12-29 RX ADMIN — LEVOTHYROXINE SODIUM 25 MCG: 25 TABLET ORAL at 06:58

## 2017-12-29 RX ADMIN — INSULIN ASPART 1 UNITS: 100 INJECTION, SOLUTION INTRAVENOUS; SUBCUTANEOUS at 08:09

## 2017-12-29 RX ADMIN — METHYLPREDNISOLONE SODIUM SUCCINATE 40 MG: 40 INJECTION, POWDER, FOR SOLUTION INTRAMUSCULAR; INTRAVENOUS at 09:07

## 2017-12-29 RX ADMIN — METOPROLOL TARTRATE 50 MG: 50 TABLET ORAL at 08:09

## 2017-12-29 RX ADMIN — IPRATROPIUM BROMIDE AND ALBUTEROL SULFATE 3 ML: .5; 3 SOLUTION RESPIRATORY (INHALATION) at 15:29

## 2017-12-29 RX ADMIN — METHYLPREDNISOLONE SODIUM SUCCINATE 40 MG: 40 INJECTION, POWDER, FOR SOLUTION INTRAMUSCULAR; INTRAVENOUS at 21:33

## 2017-12-29 RX ADMIN — DOCUSATE SODIUM 100 MG: 100 CAPSULE, LIQUID FILLED ORAL at 08:09

## 2017-12-29 RX ADMIN — PREGABALIN 150 MG: 75 CAPSULE ORAL at 08:09

## 2017-12-29 RX ADMIN — ASPIRIN 81 MG: 81 TABLET, COATED ORAL at 08:09

## 2017-12-29 RX ADMIN — PREGABALIN 150 MG: 75 CAPSULE ORAL at 20:05

## 2017-12-29 RX ADMIN — INSULIN ASPART 4 UNITS: 100 INJECTION, SOLUTION INTRAVENOUS; SUBCUTANEOUS at 17:18

## 2017-12-29 RX ADMIN — IPRATROPIUM BROMIDE AND ALBUTEROL SULFATE 3 ML: .5; 3 SOLUTION RESPIRATORY (INHALATION) at 12:11

## 2017-12-29 NOTE — PROGRESS NOTES
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 She is being admitted for acute hypoxic respiratory failure of unknown etiology.      Acute hypoxic respiratory failure 2/2 suspected acute bronchitis   Ms. Vergara endorses a cough of at least 1 1/2 weeks duration with creamy thick sputum production prior to admission. She denies chronic pulmonary disease, however endorses baseline mild shortness of breath secondary to heart disease. She states she has not been unusually short of breath and has been sleeping with 1- pillow. She specifically denies fevers, but has had intermittent chills. She has never had GERD and denies sinus symptoms. Last SaO2 10/31/2017 94% on room air per EMR review. Upon arrival to ED her SaO2 was 82% and improved with 2- liters per NC. Chest Xray did not reveal cardiopulmonary disease.  Influenza swab is negative. No leukocytosis. D-Dimer negative.   - Initiated on Prednisone 20 mg daily on admit but on HOD #1 she still has significant diffuse expiratory wheezing, coarse cough and requiring 2 L NC. Increased her therapy to Solumedrol 40 mg Q12 and add Azithromycin for its antiinflammatory effect as well on 12/28  - C/w DuoNebs Q4 and Q 2 prn  - Robitussin prn  - Encourage IS and out of bed to chair.   - Taper O2 as tolerated.    - BNP sl elevated at 2467, although exam and CXR not consistent with CHF, no peripheral edema noted  - has had some improvement in her hypoxia, appears to mostly be reactive airway component. If fails to improve will repeat CXR 12/30    Hyponatremia  Stable in the low 130's  - monitor      Chronic ischemia chest pain secondary to  microvascular angina, treated  Coronary artery disease, non-obstructive per last angiogram 2010  Hypertension, treated  Hyperlipidemia, treated  Chronic, stable. Mrs. Vergara states she has not had chest pain since initiation of Ranexa. EMR review is notable for last echocardiogram in March 2016 with aortic valve sclerosis with EF 70%.   - continue home anti-hypertensive medications with hold parameters  - continue home Imdur and Ranexa      Hypothyroidism, treated  Chronic, stable.   - continued home levothyroxine      Personal history of diabetes mellitus, type II  She states she was told last summer she is no longer diabetic and was taken off medications.  - A1c 6.9%  - BS running high 2/2 steroids  - sliding scale insulin as needed     Chronic lower extremity pain, likely secondary to neuropathy and right knee arthritis, treated  Chronic, stable.  - resumed home Lyrica     DVT Prophylaxis: Pneumatic Compression Devices   Code Status: Full Code; she states she does have an advanced directive, but cannot remember exactly what is written. Agreeable to initiate treatment.      Disposition:  Increased steroid therapy 12/27 given lack of improvement.  Will taper as able. Anticipate another 1-2 days with hopeful d/c over weekend (12/29-30)    Lester Viera M.D.  Hospitalist  Pager [phone]  Text Page until 6 pm (after call answering service)      Interval History   Breathing slightly better. Denies chest pain    -Data reviewed today: I reviewed all new labs and imaging results over the last 24 hours. I personally reviewed no images or EKG's today.    Physical Exam   Temp: 98  F (36.7  C) Temp src: Oral BP: 124/71   Heart Rate: 75 Resp: 16 SpO2: 94 % O2 Device: Nasal cannula Oxygen Delivery: 3 LPM  Vitals:    12/27/17 0718 12/28/17 0412 12/29/17 0344   Weight: 95.3 kg (210 lb) 93 kg (205 lb) 93.9 kg (207 lb)     Vital Signs with Ranges  Temp:  [98  F (36.7  C)-98.3  F (36.8  C)] 98  F (36.7  C)  Heart Rate:   [75-94] 75  Resp:  [16-18] 16  BP: (124-171)/(64-94) 124/71  SpO2:  [91 %-94 %] 94 %  I/O last 3 completed shifts:  In: 1260 [P.O.:1260]  Out: -     Gen: Patient in no acute distress.  Appears tired and with a coarse cough.   Heart:  S1S2+, regular rate and rhythm, No murmurs.  Lungs:  Diffuse wheezing, ? Some rales but seem to clear with cough  Abdomen:  Soft, non tender, non distended, bowel sounds positive.  Extremities:  No edema.    Medications        methylPREDNISolone  40 mg Intravenous Q12H     azithromycin  250 mg Oral Daily     insulin aspart  1-7 Units Subcutaneous TID AC     insulin aspart  1-5 Units Subcutaneous At Bedtime     amLODIPine  5 mg Oral Daily     aspirin EC  81 mg Oral Daily     docusate sodium (COLACE) capsule 100 mg  100 mg Oral QAM     isosorbide mononitrate  60 mg Oral Daily     latanoprost  1 drop Both Eyes At Bedtime     levothyroxine  25 mcg Oral QAM AC     pregabalin  150 mg Oral BID     metoprolol (LOPRESSOR) tablet 50 mg  50 mg Oral QAM     metoprolol (LOPRESSOR) tablet 100 mg  100 mg Oral QPM     ranolazine  500 mg Oral BID     sodium chloride (PF)  3 mL Intracatheter Q8H     ipratropium - albuterol 0.5 mg/2.5 mg/3 mL  3 mL Nebulization 4x daily       Data     Recent Labs  Lab 12/28/17  0702 12/27/17  0633 12/26/17  0839   WBC  --  5.3 5.3   HGB  --  13.1 14.4   MCV  --  89 91   PLT  --  220 266    131* 132*   POTASSIUM 4.3 4.0 4.0   CHLORIDE 95 94 96   CO2 32 33* 32   BUN 12 13 10   CR 0.58 0.71 0.57   ANIONGAP 6 4 4   VIOLETA 8.7 8.7 8.4*   * 116* 133*       No results found for this or any previous visit (from the past 24 hour(s)).

## 2017-12-29 NOTE — PLAN OF CARE
Problem: Patient Care Overview  Goal: Plan of Care/Patient Progress Review  PT: Orders received, PT screen completed.  Pt admitted with hypoxia.  Reports she lives in IND apartment, uses FWW or 4WW for mobility (has both); IND with all ADLs at baseline.  Denies falls history in last 6 months and denies use of supplemental O2 at baseline.    Discharge Planner PT   Patient plan for discharge: Home  Current status: IND supine <> sit from flat bed, IND sit <> stand, SBA progressed to mod I with FWW to amb x120', no LOB or path deviation noted.  2L O2, sats 92% prior to activity, 88% after activity needing cues for PLB to recover >90%  Barriers to return to prior living situation: None noted, may need O2???  Recommendations for discharge: Home with HHPT  Rationale for recommendations: Pt has demonstrated adequate mobility for safe discharge home.  Would recommend pt amb frequently with nursing staff and nursing staff to monitor O2 needs.  Pt would benefit from HHPT for safety eval.  No further acute PT needs identified.  Will complete order.       Entered by: Eryn Lassiter 12/29/2017 4:20 PM

## 2017-12-29 NOTE — PLAN OF CARE
Problem: Patient Care Overview  Goal: Plan of Care/Patient Progress Review  Outcome: Improving  VSS, no pain, doing better with nebs, solumedrol, A + O x 4, no wounds, BNP draw in am, 0.9% NS stopped at 1800.

## 2017-12-29 NOTE — PLAN OF CARE
Problem: Patient Care Overview  Goal: Plan of Care/Patient Progress Review  Outcome: No Change  A&Ox4.  VSS on 2L oxygen via NC.  Denies pain.  Regular diet.  Continue to monitor.

## 2017-12-29 NOTE — PLAN OF CARE
Problem: Patient Care Overview  Goal: Plan of Care/Patient Progress Review  Outcome: Improving  Pt A&Ox4, VSS, LS coarse with expiratory wheezes throughout, O2@2L/NC, dyspnea upon exertion, tolerating regular diet, bg 197,183,247 with sliding scale coverage, non productive cough, up with SBA and walker to BR, voiding well.

## 2017-12-30 LAB
ERYTHROCYTE [DISTWIDTH] IN BLOOD BY AUTOMATED COUNT: 12.8 % (ref 10–15)
GLUCOSE BLDC GLUCOMTR-MCNC: 167 MG/DL (ref 70–99)
GLUCOSE BLDC GLUCOMTR-MCNC: 185 MG/DL (ref 70–99)
GLUCOSE BLDC GLUCOMTR-MCNC: 190 MG/DL (ref 70–99)
GLUCOSE BLDC GLUCOMTR-MCNC: 208 MG/DL (ref 70–99)
GLUCOSE BLDC GLUCOMTR-MCNC: 314 MG/DL (ref 70–99)
HCT VFR BLD AUTO: 41.2 % (ref 35–47)
HGB BLD-MCNC: 13.9 G/DL (ref 11.7–15.7)
MCH RBC QN AUTO: 30.6 PG (ref 26.5–33)
MCHC RBC AUTO-ENTMCNC: 33.7 G/DL (ref 31.5–36.5)
MCV RBC AUTO: 91 FL (ref 78–100)
PLATELET # BLD AUTO: 266 10E9/L (ref 150–450)
RBC # BLD AUTO: 4.54 10E12/L (ref 3.8–5.2)
WBC # BLD AUTO: 7.6 10E9/L (ref 4–11)

## 2017-12-30 PROCEDURE — 25000132 ZZH RX MED GY IP 250 OP 250 PS 637: Mod: GY | Performed by: INTERNAL MEDICINE

## 2017-12-30 PROCEDURE — 25000132 ZZH RX MED GY IP 250 OP 250 PS 637: Mod: GY | Performed by: NURSE PRACTITIONER

## 2017-12-30 PROCEDURE — 25000128 H RX IP 250 OP 636: Performed by: INTERNAL MEDICINE

## 2017-12-30 PROCEDURE — A9270 NON-COVERED ITEM OR SERVICE: HCPCS | Mod: GY | Performed by: NURSE PRACTITIONER

## 2017-12-30 PROCEDURE — 25000125 ZZHC RX 250: Performed by: NURSE PRACTITIONER

## 2017-12-30 PROCEDURE — 12000007 ZZH R&B INTERMEDIATE

## 2017-12-30 PROCEDURE — 99232 SBSQ HOSP IP/OBS MODERATE 35: CPT | Performed by: INTERNAL MEDICINE

## 2017-12-30 PROCEDURE — 94640 AIRWAY INHALATION TREATMENT: CPT

## 2017-12-30 PROCEDURE — 40000275 ZZH STATISTIC RCP TIME EA 10 MIN

## 2017-12-30 PROCEDURE — 36415 COLL VENOUS BLD VENIPUNCTURE: CPT | Performed by: INTERNAL MEDICINE

## 2017-12-30 PROCEDURE — A9270 NON-COVERED ITEM OR SERVICE: HCPCS | Mod: GY | Performed by: INTERNAL MEDICINE

## 2017-12-30 PROCEDURE — 94640 AIRWAY INHALATION TREATMENT: CPT | Mod: 76

## 2017-12-30 PROCEDURE — 85027 COMPLETE CBC AUTOMATED: CPT | Performed by: INTERNAL MEDICINE

## 2017-12-30 PROCEDURE — 00000146 ZZHCL STATISTIC GLUCOSE BY METER IP

## 2017-12-30 RX ADMIN — POLYETHYLENE GLYCOL 3350 17 G: 17 POWDER, FOR SOLUTION ORAL at 22:59

## 2017-12-30 RX ADMIN — AZITHROMYCIN 250 MG: 250 TABLET, FILM COATED ORAL at 08:17

## 2017-12-30 RX ADMIN — ASPIRIN 81 MG: 81 TABLET, COATED ORAL at 08:17

## 2017-12-30 RX ADMIN — AMLODIPINE BESYLATE 5 MG: 5 TABLET ORAL at 08:17

## 2017-12-30 RX ADMIN — PREGABALIN 150 MG: 75 CAPSULE ORAL at 20:18

## 2017-12-30 RX ADMIN — GUAIFENESIN 10 ML: 100 SOLUTION ORAL at 22:59

## 2017-12-30 RX ADMIN — RANOLAZINE 500 MG: 500 TABLET, FILM COATED, EXTENDED RELEASE ORAL at 20:18

## 2017-12-30 RX ADMIN — PREGABALIN 150 MG: 75 CAPSULE ORAL at 08:17

## 2017-12-30 RX ADMIN — METHYLPREDNISOLONE SODIUM SUCCINATE 40 MG: 40 INJECTION, POWDER, FOR SOLUTION INTRAMUSCULAR; INTRAVENOUS at 10:11

## 2017-12-30 RX ADMIN — IPRATROPIUM BROMIDE AND ALBUTEROL SULFATE 3 ML: .5; 3 SOLUTION RESPIRATORY (INHALATION) at 16:17

## 2017-12-30 RX ADMIN — IPRATROPIUM BROMIDE AND ALBUTEROL SULFATE 3 ML: .5; 3 SOLUTION RESPIRATORY (INHALATION) at 12:08

## 2017-12-30 RX ADMIN — ISOSORBIDE MONONITRATE 60 MG: 60 TABLET, EXTENDED RELEASE ORAL at 08:17

## 2017-12-30 RX ADMIN — METOPROLOL TARTRATE 50 MG: 50 TABLET ORAL at 08:17

## 2017-12-30 RX ADMIN — RANOLAZINE 500 MG: 500 TABLET, FILM COATED, EXTENDED RELEASE ORAL at 08:17

## 2017-12-30 RX ADMIN — LATANOPROST 1 DROP: 50 SOLUTION OPHTHALMIC at 22:04

## 2017-12-30 RX ADMIN — LEVOTHYROXINE SODIUM 25 MCG: 25 TABLET ORAL at 06:19

## 2017-12-30 RX ADMIN — METHYLPREDNISOLONE SODIUM SUCCINATE 40 MG: 40 INJECTION, POWDER, FOR SOLUTION INTRAMUSCULAR; INTRAVENOUS at 22:04

## 2017-12-30 RX ADMIN — INSULIN ASPART 1 UNITS: 100 INJECTION, SOLUTION INTRAVENOUS; SUBCUTANEOUS at 15:07

## 2017-12-30 RX ADMIN — METOPROLOL TARTRATE 100 MG: 100 TABLET, FILM COATED ORAL at 20:18

## 2017-12-30 RX ADMIN — IPRATROPIUM BROMIDE AND ALBUTEROL SULFATE 3 ML: .5; 3 SOLUTION RESPIRATORY (INHALATION) at 08:22

## 2017-12-30 RX ADMIN — IPRATROPIUM BROMIDE AND ALBUTEROL SULFATE 3 ML: .5; 3 SOLUTION RESPIRATORY (INHALATION) at 20:59

## 2017-12-30 RX ADMIN — INSULIN ASPART 1 UNITS: 100 INJECTION, SOLUTION INTRAVENOUS; SUBCUTANEOUS at 08:18

## 2017-12-30 RX ADMIN — INSULIN ASPART 4 UNITS: 100 INJECTION, SOLUTION INTRAVENOUS; SUBCUTANEOUS at 17:15

## 2017-12-30 RX ADMIN — DOCUSATE SODIUM 100 MG: 100 CAPSULE, LIQUID FILLED ORAL at 08:17

## 2017-12-30 NOTE — PLAN OF CARE
Problem: Patient Care Overview  Goal: Plan of Care/Patient Progress Review  Outcome: No Change  VSS,A/OX4.Up with 1 assist/walker.IV SL.Denies pain.2L O2.dyspnea upon exertion,non productive frequent coughs..Tolerating reg diet.Walked in hallways.Will continue to monitor.

## 2017-12-30 NOTE — PLAN OF CARE
Problem: Breathing Pattern Ineffective (Adult)  Goal: Identify Related Risk Factors and Signs and Symptoms  Related risk factors and signs and symptoms are identified upon initiation of Human Response Clinical Practice Guideline (CPG).   Outcome: No Change  Pt A/Ox4. VSS, O2 2L. Up 1 assist w/ walker, ambulated in gould twice. Denies pain. Voiding adequately. Regular diet tolerated.

## 2017-12-30 NOTE — PLAN OF CARE
Problem: Patient Care Overview  Goal: Individualization & Mutuality  Outcome: No Change  Pt A/Ox4. VSS, O2 2L. Up 1 assist w/ walker. Denies pain. Voiding adequately. Regular diet tolerated. , 180 and 312.

## 2017-12-30 NOTE — PROGRESS NOTES
Patient on 2L NC. Lung sounds coarse with fine crackles. SpO2 in the mid 90's.  Neb tx given as ordered. Will continue to follow.    Dereje Royal RT

## 2017-12-30 NOTE — PROGRESS NOTES
[facility]AT Note    Date: 12/26/17  Admission Diagnosis: Cough, acute hypoxic resp. failure  Pulmonary History:  Home Nebulizer/ MDI Use:  Home Oxygen Use:  Acuity Level (from RT Assessment flow sheet): 4    Aerosol Therapy Initiated:  Continue Duoneb QID    Pulmonary Hygiene Initiated:      Volume Expansion Therapy Initiated:      Current Oxygen Requirement: 2L  Current SpO2: 94%    Re-evaluation date: 1/5/18    See 'RT Assessments' flow sheet for patient assessment scoring and Acuity Level Details.

## 2017-12-30 NOTE — PROGRESS NOTES
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 She was admitted on 12/26/2017 for acute hypoxic respiratory failure of unknown etiology.       Acute hypoxic respiratory failure 2/2 suspected acute bronchitis   Ms. Vergara endorses a cough of at least 1 1/2 weeks duration with creamy thick sputum production prior to admission. She denies chronic pulmonary disease, however endorses baseline mild shortness of breath secondary to heart disease. She states she has not been unusually short of breath and has been sleeping with 1- pillow. She specifically denies fevers, but has had intermittent chills. She has never had GERD and denies sinus symptoms. Last SaO2 10/31/2017 94% on room air per EMR review. Upon arrival to ED her SaO2 was 82% and improved with 2- liters per NC. Chest Xray did not reveal cardiopulmonary disease.  Influenza swab is negative. No leukocytosis. D-Dimer negative.   - Initiated on Prednisone 20 mg daily on admit but on HOD #1 she still has significant diffuse expiratory wheezing, coarse cough and requiring 2 L NC. Increased her therapy to Solumedrol 40 mg Q12 and add Azithromycin for its antiinflammatory effect as well on 12/28  -Patient gradually but improving and seems to be easily fatigued with any kind of activity, had negative repeat chest x-ray yesterday, continue to monitor today with current therapies  - C/w DuoNebs Q4 and Q 2 prn  - Robitussin prn  - Encourage IS and out of bed to chair.   - Taper O2 as tolerated.    - BNP sl elevated at 2467, although exam and CXR not consistent with CHF, no peripheral edema noted     Hyponatremia  Stable in the low 130's  - monitor       Chronic ischemia chest pain secondary  to microvascular angina, treated  Coronary artery disease, non-obstructive per last angiogram 2010  Hypertension, treated  Hyperlipidemia, treated  Chronic, stable. Mrs. Vergara states she has not had chest pain since initiation of Ranexa. EMR review is notable for last echocardiogram in March 2016 with aortic valve sclerosis with EF 70%.   - continue home anti-hypertensive which include Imdur, amlodipine, metoprolol with hold parameters  - continue home Imdur and Ranexa       Hypothyroidism, treated  Chronic, stable.   - continued home levothyroxine       Personal history of diabetes mellitus, type II  She states she was told last summer she is no longer diabetic and was taken off medications.  - A1c 6.9%  - BS running high 2/2 steroids  - sliding scale insulin as needed      Chronic lower extremity pain, likely secondary to neuropathy and right knee arthritis, treated  Chronic, stable.  - resumed home Lyrica    DVT Prophylaxis: Pneumatic Compression Devices  Code Status: Full Code    Disposition: Expected discharge in 1 days once clinical improvement and able to taper off oxygen.  Patient not comfortable going home on oxygen as she lives by herself and reports extreme fatigue and weakness currently    Sariah Oneil MD  Text page (7am - 6pm)    Interval History   Patient feels extremely exhausted and was short of breath when she went for a walk.  At rest however patient feels better as compared to when she came in.  No new nursing concerns    -Data reviewed today: I reviewed all new labs and imaging results over the last 24 hours. I personally reviewed no images or EKG's today.    Physical Exam   Temp: 97.8  F (36.6  C) Temp src: Oral BP: 128/64 Pulse: 91 Heart Rate: 65 Resp: 16 SpO2: 96 % O2 Device: Nasal cannula Oxygen Delivery: 2 LPM  Vitals:    12/28/17 0412 12/29/17 0344 12/30/17 0318   Weight: 93 kg (205 lb) 93.9 kg (207 lb) 91.2 kg (201 lb)     Vital Signs with Ranges  Temp:  [97.5  F (36.4  C)-98.1  F (36.7   C)] 97.8  F (36.6  C)  Pulse:  [84-91] 91  Heart Rate:  [65-98] 65  Resp:  [16-18] 16  BP: (124-155)/(63-90) 128/64  SpO2:  [93 %-98 %] 96 %  I/O last 3 completed shifts:  In: 780 [P.O.:780]  Out: -     Exam:  Constitutional: Awake, alert and no distress. Appears comfortable  Head: Normocephalic. No masses, lesions, tenderness or abnormalities  ENT: no neck nodes or sinus tenderness  Cardiovascular: RRR.  No murmurs, no rub or gallop no JVD  Respiratory: Normal WOB, bilateral wheezes or crackles present  Gastrointestinal: Abdomen soft, non-tender. BS normal. No masses, organomegaly  : Deferred  Extremities: No edema , no clubbing /cyanosis   Skin: Warm and dry, no rash        Medications        methylPREDNISolone  40 mg Intravenous Q12H     azithromycin  250 mg Oral Daily     insulin aspart  1-7 Units Subcutaneous TID AC     insulin aspart  1-5 Units Subcutaneous At Bedtime     amLODIPine  5 mg Oral Daily     aspirin EC  81 mg Oral Daily     docusate sodium (COLACE) capsule 100 mg  100 mg Oral QAM     isosorbide mononitrate  60 mg Oral Daily     latanoprost  1 drop Both Eyes At Bedtime     levothyroxine  25 mcg Oral QAM AC     pregabalin  150 mg Oral BID     metoprolol (LOPRESSOR) tablet 50 mg  50 mg Oral QAM     metoprolol (LOPRESSOR) tablet 100 mg  100 mg Oral QPM     ranolazine  500 mg Oral BID     sodium chloride (PF)  3 mL Intracatheter Q8H     ipratropium - albuterol 0.5 mg/2.5 mg/3 mL  3 mL Nebulization 4x daily       Data     Recent Labs  Lab 12/30/17  0720 12/28/17  0702 12/27/17  0633 12/26/17  0839   WBC 7.6  --  5.3 5.3   HGB 13.9  --  13.1 14.4   MCV 91  --  89 91     --  220 266   NA  --  133 131* 132*   POTASSIUM  --  4.3 4.0 4.0   CHLORIDE  --  95 94 96   CO2  --  32 33* 32   BUN  --  12 13 10   CR  --  0.58 0.71 0.57   ANIONGAP  --  6 4 4   VIOLETA  --  8.7 8.7 8.4*   GLC  --  182* 116* 133*       Imaging:  Recent Results (from the past 24 hour(s))   XR Chest 2 Views    Narrative    XR CHEST 2  VW   12/29/2017 2:40 PM     HISTORY: hypoxia, persistent;     COMPARISON: Film dated 12/26/2017    FINDINGS: The heart is negative.  The lungs are clear. The pulmonary  vasculature is normal.  The bones and soft tissues are unremarkable.      Impression    IMPRESSION: No active infiltrate identified. No significant change.         MEERA GOTTLIEB MD

## 2017-12-31 LAB
GLUCOSE BLDC GLUCOMTR-MCNC: 192 MG/DL (ref 70–99)
GLUCOSE BLDC GLUCOMTR-MCNC: 198 MG/DL (ref 70–99)
GLUCOSE BLDC GLUCOMTR-MCNC: 200 MG/DL (ref 70–99)
GLUCOSE BLDC GLUCOMTR-MCNC: 243 MG/DL (ref 70–99)
GLUCOSE BLDC GLUCOMTR-MCNC: 281 MG/DL (ref 70–99)

## 2017-12-31 PROCEDURE — 25000131 ZZH RX MED GY IP 250 OP 636 PS 637: Mod: GY | Performed by: INTERNAL MEDICINE

## 2017-12-31 PROCEDURE — 00000146 ZZHCL STATISTIC GLUCOSE BY METER IP

## 2017-12-31 PROCEDURE — 94640 AIRWAY INHALATION TREATMENT: CPT

## 2017-12-31 PROCEDURE — 25000125 ZZHC RX 250: Performed by: NURSE PRACTITIONER

## 2017-12-31 PROCEDURE — 94640 AIRWAY INHALATION TREATMENT: CPT | Mod: 76

## 2017-12-31 PROCEDURE — 25000132 ZZH RX MED GY IP 250 OP 250 PS 637: Mod: GY | Performed by: NURSE PRACTITIONER

## 2017-12-31 PROCEDURE — 99207 ZZC CDG-MDM COMPONENT: MEETS MODERATE - UP CODED: CPT | Performed by: INTERNAL MEDICINE

## 2017-12-31 PROCEDURE — A9270 NON-COVERED ITEM OR SERVICE: HCPCS | Mod: GY | Performed by: INTERNAL MEDICINE

## 2017-12-31 PROCEDURE — A9270 NON-COVERED ITEM OR SERVICE: HCPCS | Mod: GY | Performed by: NURSE PRACTITIONER

## 2017-12-31 PROCEDURE — 25000132 ZZH RX MED GY IP 250 OP 250 PS 637: Mod: GY | Performed by: INTERNAL MEDICINE

## 2017-12-31 PROCEDURE — 99232 SBSQ HOSP IP/OBS MODERATE 35: CPT | Performed by: INTERNAL MEDICINE

## 2017-12-31 PROCEDURE — 40000275 ZZH STATISTIC RCP TIME EA 10 MIN

## 2017-12-31 PROCEDURE — 25000128 H RX IP 250 OP 636: Performed by: INTERNAL MEDICINE

## 2017-12-31 PROCEDURE — 12000007 ZZH R&B INTERMEDIATE

## 2017-12-31 RX ADMIN — IPRATROPIUM BROMIDE AND ALBUTEROL SULFATE 3 ML: .5; 3 SOLUTION RESPIRATORY (INHALATION) at 07:55

## 2017-12-31 RX ADMIN — AZITHROMYCIN 250 MG: 250 TABLET, FILM COATED ORAL at 10:11

## 2017-12-31 RX ADMIN — IPRATROPIUM BROMIDE AND ALBUTEROL SULFATE 3 ML: .5; 3 SOLUTION RESPIRATORY (INHALATION) at 11:24

## 2017-12-31 RX ADMIN — ASPIRIN 81 MG: 81 TABLET, COATED ORAL at 10:12

## 2017-12-31 RX ADMIN — INSULIN ASPART 2 UNITS: 100 INJECTION, SOLUTION INTRAVENOUS; SUBCUTANEOUS at 14:07

## 2017-12-31 RX ADMIN — RANOLAZINE 500 MG: 500 TABLET, FILM COATED, EXTENDED RELEASE ORAL at 10:12

## 2017-12-31 RX ADMIN — PREGABALIN 150 MG: 75 CAPSULE ORAL at 20:48

## 2017-12-31 RX ADMIN — PREGABALIN 150 MG: 75 CAPSULE ORAL at 10:11

## 2017-12-31 RX ADMIN — IPRATROPIUM BROMIDE AND ALBUTEROL SULFATE 3 ML: .5; 3 SOLUTION RESPIRATORY (INHALATION) at 19:13

## 2017-12-31 RX ADMIN — ISOSORBIDE MONONITRATE 60 MG: 60 TABLET, EXTENDED RELEASE ORAL at 10:12

## 2017-12-31 RX ADMIN — INSULIN ASPART 3 UNITS: 100 INJECTION, SOLUTION INTRAVENOUS; SUBCUTANEOUS at 17:52

## 2017-12-31 RX ADMIN — LATANOPROST 1 DROP: 50 SOLUTION OPHTHALMIC at 22:11

## 2017-12-31 RX ADMIN — AMLODIPINE BESYLATE 5 MG: 5 TABLET ORAL at 10:12

## 2017-12-31 RX ADMIN — INSULIN ASPART 2 UNITS: 100 INJECTION, SOLUTION INTRAVENOUS; SUBCUTANEOUS at 10:09

## 2017-12-31 RX ADMIN — METHYLPREDNISOLONE SODIUM SUCCINATE 40 MG: 40 INJECTION, POWDER, FOR SOLUTION INTRAMUSCULAR; INTRAVENOUS at 10:14

## 2017-12-31 RX ADMIN — DOCUSATE SODIUM 100 MG: 100 CAPSULE, LIQUID FILLED ORAL at 10:12

## 2017-12-31 RX ADMIN — METOPROLOL TARTRATE 50 MG: 50 TABLET ORAL at 10:12

## 2017-12-31 RX ADMIN — LEVOTHYROXINE SODIUM 25 MCG: 25 TABLET ORAL at 08:53

## 2017-12-31 RX ADMIN — IPRATROPIUM BROMIDE AND ALBUTEROL SULFATE 3 ML: .5; 3 SOLUTION RESPIRATORY (INHALATION) at 15:25

## 2017-12-31 RX ADMIN — INSULIN GLARGINE 10 UNITS: 100 INJECTION, SOLUTION SUBCUTANEOUS at 10:08

## 2017-12-31 RX ADMIN — RANOLAZINE 500 MG: 500 TABLET, FILM COATED, EXTENDED RELEASE ORAL at 20:48

## 2017-12-31 RX ADMIN — METHYLPREDNISOLONE SODIUM SUCCINATE 40 MG: 40 INJECTION, POWDER, FOR SOLUTION INTRAMUSCULAR; INTRAVENOUS at 22:12

## 2017-12-31 RX ADMIN — METOPROLOL TARTRATE 100 MG: 100 TABLET, FILM COATED ORAL at 20:48

## 2017-12-31 NOTE — PLAN OF CARE
Problem: Breathing Pattern Ineffective (Adult)  Goal: Identify Related Risk Factors and Signs and Symptoms  Related risk factors and signs and symptoms are identified upon initiation of Human Response Clinical Practice Guideline (CPG).   Outcome: No Change  Patient up with assist of 1 and walker.  Denies pain.  VSS on 2.5L NC.  A/Ox4.  Lung sounds diminished, coarse crackles present.  Dyspnea upon exertion; improving per patient report.  Frequent, nonproductive cough present.  Blood sugars 314, covered with sliding scale coverage, and 190, no coverage needed.  Ambulated in halls twice this evening; patient complaint of fatigue after ambulating.

## 2017-12-31 NOTE — PROGRESS NOTES
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 She was admitted on 12/26/2017 for acute hypoxic respiratory failure of unknown etiology.       Acute hypoxic respiratory failure 2/2 suspected acute bronchitis   Ms. Vergara endorses a cough of at least 1 1/2 weeks duration with creamy thick sputum production prior to admission. She denies chronic pulmonary disease, however endorses baseline mild shortness of breath secondary to heart disease. She states she has not been unusually short of breath and has been sleeping with 1- pillow. She specifically denies fevers, but has had intermittent chills. She has never had GERD and denies sinus symptoms. Last SaO2 10/31/2017 94% on room air per EMR review. Upon arrival to ED her SaO2 was 82% and improved with 2- liters per NC. Chest Xray did not reveal cardiopulmonary disease.  Influenza swab is negative. No leukocytosis. D-Dimer negative.   - Initiated on Prednisone 20 mg daily on admit but on HOD #1 she still has significant diffuse expiratory wheezing, coarse cough and requiring 2 L NC. Increased her therapy to Solumedrol 40 mg Q12 and add Azithromycin for its antiinflammatory effect as well on 12/28  -Patient gradually but improving and seems to be easily fatigued with any kind of activity, had negative repeat chest x-ray , continue to monitor today with current therapies  - C/w DuoNebs Q4 and Q 2 prn  - Robitussin prn  - Encourage IS and out of bed to chair.   - Taper O2 as tolerated.    - BNP sl elevated at 2467, although exam and CXR not consistent with CHF, no peripheral edema noted     Hyponatremia  Stable in the low 130's  - monitor       Chronic ischemia chest pain secondary to  microvascular angina, treated  Coronary artery disease, non-obstructive per last angiogram 2010  Hypertension, treated  Hyperlipidemia, treated  Chronic, stable. Mrs. Vergara states she has not had chest pain since initiation of Ranexa. EMR review is notable for last echocardiogram in March 2016 with aortic valve sclerosis with EF 70%.   - continue home anti-hypertensive which include Imdur, amlodipine, metoprolol with hold parameters  - continue home Imdur and Ranexa       Hypothyroidism, treated  Chronic, stable.   - continued home levothyroxine       Personal history of diabetes mellitus, type II  She states she was told last summer she is no longer diabetic and was taken off medications.  - A1c 6.9%  - BS running high 2/2 steroids  - sliding scale insulin as needed, low-dose Lantus added      Chronic lower extremity pain, likely secondary to neuropathy and right knee arthritis, treated  Chronic, stable.  - resumed home Lyrica    DVT Prophylaxis: Pneumatic Compression Devices  Code Status: Full Code    Disposition: Expected discharge in 1 days once clinical improvement and able to taper off oxygen.  Patient not comfortable going home on oxygen as she lives by herself and reports extreme fatigue and weakness currently    Sariah Oneil MD  Text page (7am - 6pm)    Interval History   Patient without any new complaints.  No new nursing concerns.    -Data reviewed today: I reviewed all new labs and imaging results over the last 24 hours. I personally reviewed no images or EKG's today.    Physical Exam   Temp: 98  F (36.7  C) Temp src: Oral BP: 133/74 Pulse: 79 Heart Rate: 77 Resp: 16 SpO2: 96 % O2 Device: Nasal cannula Oxygen Delivery: 2 LPM  Vitals:    12/29/17 0344 12/30/17 0318 12/31/17 0656   Weight: 93.9 kg (207 lb) 91.2 kg (201 lb) 91.6 kg (202 lb)     Vital Signs with Ranges  Temp:  [97.4  F (36.3  C)-98  F (36.7  C)] 98  F (36.7  C)  Pulse:  [79-85] 79  Heart Rate:  [74-84] 77  Resp:  [16-18] 16  BP:  (128-154)/(70-87) 133/74  SpO2:  [85 %-96 %] 96 %  I/O last 3 completed shifts:  In: 1100 [P.O.:1100]  Out: -     Exam:  Constitutional: Awake, alert and no distress. Appears comfortable  Head: Normocephalic. No masses, lesions, tenderness or abnormalities  ENT: no neck nodes or sinus tenderness  Cardiovascular: RRR.  No murmurs, no rub or gallop no JVD  Respiratory: Normal WOB, bilateral wheezes or crackles present  Gastrointestinal: Abdomen soft, non-tender. BS normal. No masses, organomegaly  : Deferred  Extremities: No edema , no clubbing /cyanosis   Skin: Warm and dry, no rash        Medications        insulin glargine  10 Units Subcutaneous QAM AC     methylPREDNISolone  40 mg Intravenous Q12H     insulin aspart  1-7 Units Subcutaneous TID AC     insulin aspart  1-5 Units Subcutaneous At Bedtime     amLODIPine  5 mg Oral Daily     aspirin EC  81 mg Oral Daily     docusate sodium (COLACE) capsule 100 mg  100 mg Oral QAM     isosorbide mononitrate  60 mg Oral Daily     latanoprost  1 drop Both Eyes At Bedtime     levothyroxine  25 mcg Oral QAM AC     pregabalin  150 mg Oral BID     metoprolol (LOPRESSOR) tablet 50 mg  50 mg Oral QAM     metoprolol (LOPRESSOR) tablet 100 mg  100 mg Oral QPM     ranolazine  500 mg Oral BID     sodium chloride (PF)  3 mL Intracatheter Q8H     ipratropium - albuterol 0.5 mg/2.5 mg/3 mL  3 mL Nebulization 4x daily       Data     Recent Labs  Lab 12/30/17  0720 12/28/17  0702 12/27/17  0633 12/26/17  0839   WBC 7.6  --  5.3 5.3   HGB 13.9  --  13.1 14.4   MCV 91  --  89 91     --  220 266   NA  --  133 131* 132*   POTASSIUM  --  4.3 4.0 4.0   CHLORIDE  --  95 94 96   CO2  --  32 33* 32   BUN  --  12 13 10   CR  --  0.58 0.71 0.57   ANIONGAP  --  6 4 4   VIOLETA  --  8.7 8.7 8.4*   GLC  --  182* 116* 133*       Imaging:  No results found for this or any previous visit (from the past 24 hour(s)).

## 2017-12-31 NOTE — PLAN OF CARE
Problem: Patient Care Overview  Goal: Plan of Care/Patient Progress Review  Outcome: No Change  A&Ox4. VSS on 2.5: O2. LS diminished and coarse crackles. MCKEON. Up with 1 and walker. Regular diet. SL. Continue to monitor.

## 2018-01-01 ENCOUNTER — APPOINTMENT (OUTPATIENT)
Dept: GENERAL RADIOLOGY | Facility: CLINIC | Age: 83
DRG: 189 | End: 2018-01-01
Attending: INTERNAL MEDICINE
Payer: MEDICARE

## 2018-01-01 LAB
ANION GAP SERPL CALCULATED.3IONS-SCNC: 5 MMOL/L (ref 3–14)
BUN SERPL-MCNC: 23 MG/DL (ref 7–30)
CALCIUM SERPL-MCNC: 8.5 MG/DL (ref 8.5–10.1)
CHLORIDE SERPL-SCNC: 92 MMOL/L (ref 94–109)
CO2 SERPL-SCNC: 34 MMOL/L (ref 20–32)
CREAT SERPL-MCNC: 0.61 MG/DL (ref 0.52–1.04)
ERYTHROCYTE [DISTWIDTH] IN BLOOD BY AUTOMATED COUNT: 12.5 % (ref 10–15)
GFR SERPL CREATININE-BSD FRML MDRD: >90 ML/MIN/1.7M2
GLUCOSE BLDC GLUCOMTR-MCNC: 176 MG/DL (ref 70–99)
GLUCOSE BLDC GLUCOMTR-MCNC: 182 MG/DL (ref 70–99)
GLUCOSE BLDC GLUCOMTR-MCNC: 204 MG/DL (ref 70–99)
GLUCOSE BLDC GLUCOMTR-MCNC: 227 MG/DL (ref 70–99)
GLUCOSE BLDC GLUCOMTR-MCNC: 276 MG/DL (ref 70–99)
GLUCOSE SERPL-MCNC: 337 MG/DL (ref 70–99)
HCT VFR BLD AUTO: 40.2 % (ref 35–47)
HGB BLD-MCNC: 13.7 G/DL (ref 11.7–15.7)
MCH RBC QN AUTO: 30.4 PG (ref 26.5–33)
MCHC RBC AUTO-ENTMCNC: 34.1 G/DL (ref 31.5–36.5)
MCV RBC AUTO: 89 FL (ref 78–100)
PLATELET # BLD AUTO: 275 10E9/L (ref 150–450)
POTASSIUM SERPL-SCNC: 4.3 MMOL/L (ref 3.4–5.3)
RBC # BLD AUTO: 4.5 10E12/L (ref 3.8–5.2)
SODIUM SERPL-SCNC: 131 MMOL/L (ref 133–144)
WBC # BLD AUTO: 11.1 10E9/L (ref 4–11)

## 2018-01-01 PROCEDURE — 25000132 ZZH RX MED GY IP 250 OP 250 PS 637: Mod: GY | Performed by: NURSE PRACTITIONER

## 2018-01-01 PROCEDURE — 25000132 ZZH RX MED GY IP 250 OP 250 PS 637: Mod: GY | Performed by: INTERNAL MEDICINE

## 2018-01-01 PROCEDURE — 94640 AIRWAY INHALATION TREATMENT: CPT

## 2018-01-01 PROCEDURE — 00000146 ZZHCL STATISTIC GLUCOSE BY METER IP

## 2018-01-01 PROCEDURE — 94640 AIRWAY INHALATION TREATMENT: CPT | Mod: 76

## 2018-01-01 PROCEDURE — 25000125 ZZHC RX 250: Performed by: NURSE PRACTITIONER

## 2018-01-01 PROCEDURE — 80048 BASIC METABOLIC PNL TOTAL CA: CPT | Performed by: INTERNAL MEDICINE

## 2018-01-01 PROCEDURE — 25000128 H RX IP 250 OP 636: Performed by: INTERNAL MEDICINE

## 2018-01-01 PROCEDURE — 40000274 ZZH STATISTIC RCP CONSULT EA 30 MIN

## 2018-01-01 PROCEDURE — 40000275 ZZH STATISTIC RCP TIME EA 10 MIN

## 2018-01-01 PROCEDURE — 25000131 ZZH RX MED GY IP 250 OP 636 PS 637: Mod: GY | Performed by: INTERNAL MEDICINE

## 2018-01-01 PROCEDURE — A9270 NON-COVERED ITEM OR SERVICE: HCPCS | Mod: GY | Performed by: INTERNAL MEDICINE

## 2018-01-01 PROCEDURE — 99233 SBSQ HOSP IP/OBS HIGH 50: CPT | Performed by: INTERNAL MEDICINE

## 2018-01-01 PROCEDURE — 99207 ZZC CDG-MDM COMPONENT: MEETS MODERATE - UP CODED: CPT | Performed by: INTERNAL MEDICINE

## 2018-01-01 PROCEDURE — A9270 NON-COVERED ITEM OR SERVICE: HCPCS | Mod: GY | Performed by: NURSE PRACTITIONER

## 2018-01-01 PROCEDURE — 71046 X-RAY EXAM CHEST 2 VIEWS: CPT

## 2018-01-01 PROCEDURE — 12000007 ZZH R&B INTERMEDIATE

## 2018-01-01 PROCEDURE — 85027 COMPLETE CBC AUTOMATED: CPT | Performed by: INTERNAL MEDICINE

## 2018-01-01 PROCEDURE — 36415 COLL VENOUS BLD VENIPUNCTURE: CPT | Performed by: INTERNAL MEDICINE

## 2018-01-01 RX ORDER — IPRATROPIUM BROMIDE AND ALBUTEROL SULFATE 2.5; .5 MG/3ML; MG/3ML
3 SOLUTION RESPIRATORY (INHALATION) EVERY 4 HOURS PRN
Status: DISCONTINUED | OUTPATIENT
Start: 2018-01-01 | End: 2018-01-03 | Stop reason: HOSPADM

## 2018-01-01 RX ADMIN — IPRATROPIUM BROMIDE AND ALBUTEROL SULFATE 3 ML: .5; 3 SOLUTION RESPIRATORY (INHALATION) at 07:31

## 2018-01-01 RX ADMIN — RANOLAZINE 500 MG: 500 TABLET, FILM COATED, EXTENDED RELEASE ORAL at 09:36

## 2018-01-01 RX ADMIN — ASPIRIN 81 MG: 81 TABLET, COATED ORAL at 09:37

## 2018-01-01 RX ADMIN — GUAIFENESIN 10 ML: 100 SOLUTION ORAL at 06:56

## 2018-01-01 RX ADMIN — INSULIN ASPART 2 UNITS: 100 INJECTION, SOLUTION INTRAVENOUS; SUBCUTANEOUS at 18:20

## 2018-01-01 RX ADMIN — AMLODIPINE BESYLATE 5 MG: 5 TABLET ORAL at 09:37

## 2018-01-01 RX ADMIN — METHYLPREDNISOLONE SODIUM SUCCINATE 40 MG: 40 INJECTION, POWDER, FOR SOLUTION INTRAMUSCULAR; INTRAVENOUS at 10:55

## 2018-01-01 RX ADMIN — INSULIN ASPART 1 UNITS: 100 INJECTION, SOLUTION INTRAVENOUS; SUBCUTANEOUS at 09:36

## 2018-01-01 RX ADMIN — LEVOTHYROXINE SODIUM 25 MCG: 25 TABLET ORAL at 06:54

## 2018-01-01 RX ADMIN — METHYLPREDNISOLONE SODIUM SUCCINATE 40 MG: 40 INJECTION, POWDER, FOR SOLUTION INTRAMUSCULAR; INTRAVENOUS at 22:13

## 2018-01-01 RX ADMIN — PREGABALIN 150 MG: 75 CAPSULE ORAL at 19:52

## 2018-01-01 RX ADMIN — ISOSORBIDE MONONITRATE 60 MG: 60 TABLET, EXTENDED RELEASE ORAL at 09:37

## 2018-01-01 RX ADMIN — LATANOPROST 1 DROP: 50 SOLUTION OPHTHALMIC at 22:13

## 2018-01-01 RX ADMIN — METOPROLOL TARTRATE 100 MG: 100 TABLET, FILM COATED ORAL at 19:52

## 2018-01-01 RX ADMIN — METOPROLOL TARTRATE 50 MG: 50 TABLET ORAL at 09:37

## 2018-01-01 RX ADMIN — PREGABALIN 150 MG: 75 CAPSULE ORAL at 09:36

## 2018-01-01 RX ADMIN — INSULIN GLARGINE 15 UNITS: 100 INJECTION, SOLUTION SUBCUTANEOUS at 10:54

## 2018-01-01 RX ADMIN — RANOLAZINE 500 MG: 500 TABLET, FILM COATED, EXTENDED RELEASE ORAL at 19:52

## 2018-01-01 RX ADMIN — INSULIN ASPART 2 UNITS: 100 INJECTION, SOLUTION INTRAVENOUS; SUBCUTANEOUS at 13:34

## 2018-01-01 RX ADMIN — DOCUSATE SODIUM 100 MG: 100 CAPSULE, LIQUID FILLED ORAL at 09:37

## 2018-01-01 NOTE — PLAN OF CARE
Problem: Patient Care Overview  Goal: Plan of Care/Patient Progress Review  Outcome: No Change  VSS on 2L/NC, O2 85% on RA, baseline neuropathy, voiding in BR, ambulating in gould with SBA and walker, plans to discharge home when weaned off O2.

## 2018-01-01 NOTE — PROGRESS NOTES
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 She was admitted on 12/26/2017 for acute hypoxic respiratory failure of unknown etiology.       Acute hypoxic respiratory failure 2/2 suspected acute bronchitis   Ms. Vergara endorses a cough of at least 1 1/2 weeks duration with creamy thick sputum production prior to admission. She denies chronic pulmonary disease, however endorses baseline mild shortness of breath secondary to heart disease. She states she has not been unusually short of breath and has been sleeping with 1- pillow. She specifically denies fevers, but has had intermittent chills. She has never had GERD and denies sinus symptoms.  Upon arrival to ED her SaO2 was 82% and improved with 2- liters per NC.   -Chest Xray did not reveal cardiopulmonary disease.  Influenza swab is negative. No leukocytosis. D-Dimer negative.   -Currently on Solumedrol 40 mg Q12 and has completed course of azithromycin for its antiinflammatory effect    -However oxygen requirement worsened overnight from 2 L to 4 L, patient continues to feel bad  -I will check her repeat x-ray and will add [place]   - C/w Kelly Coffman, IS   - BNP sl elevated at 2467, although exam and CXR not consistent with CHF, no peripheral edema noted    Hyponatremia  Stable in the low 130's  - monitor intermittently      Chronic ischemia chest pain secondary to microvascular angina, treated  Coronary artery disease, non-obstructive per last angiogram 2010  Hypertension, treated  Hyperlipidemia, treated  Chronic, stable. Mrs. Vergara states she has not had chest pain since initiation of Ranexa. EMR review is notable for last echocardiogram in March 2016 with aortic  valve sclerosis with EF 70%.   - continue home anti-hypertensive which include Imdur, amlodipine, metoprolol with hold parameters  - continue home Imdur and Ranexa       Hypothyroidism, treated  Chronic, stable.   - continued home levothyroxine       Personal history of diabetes mellitus, type II  She states she was told last summer she is no longer diabetic and was taken off medications.  - A1c 6.9%  - BS running high 2/2 steroids  - sliding scale insulin as needed, low-dose Lantus added, blood sugar however still slightly high so Lantus increased from 10 units to 15 units.    -Once off steroids blood sugars should improve.  However did recommend lifestyle modification and follow-up with PCP    Chronic lower extremity pain, likely secondary to neuropathy and right knee arthritis, treated  Chronic, stable.  - resumed home Lyrica    DVT Prophylaxis: Pneumatic Compression Devices  Code Status: Full Code    Disposition: Expected discharge in 1 days once clinical improvement and oxygenation improved  Sariah Oneil MD  Text page (7am - 6pm)    Interval History   Patient feels about the same , however her O2 requirement increased to 4L today.  Nursing tried to wean her down earlier today however was unsuccessful.    -Data reviewed today: I reviewed all new labs and imaging results over the last 24 hours. I personally reviewed no images or EKG's today.    Physical Exam   Temp: 97.7  F (36.5  C) Temp src: Oral BP: 157/85 Pulse: 85 Heart Rate: 87 Resp: 16 SpO2: 94 % O2 Device: Nasal cannula Oxygen Delivery: 2 LPM  Vitals:    12/29/17 0344 12/30/17 0318 12/31/17 0656   Weight: 93.9 kg (207 lb) 91.2 kg (201 lb) 91.6 kg (202 lb)     Vital Signs with Ranges  Temp:  [97.3  F (36.3  C)-98.4  F (36.9  C)] 97.7  F (36.5  C)  Pulse:  [85] 85  Heart Rate:  [67-94] 87  Resp:  [16-18] 16  BP: (119-157)/(61-85) 157/85  SpO2:  [90 %-96 %] 94 %  I/O last 3 completed shifts:  In: 410 [P.O.:410]  Out: -     Exam:  Constitutional: Awake,  alert and no distress. Appears comfortable  Head: Normocephalic. No masses, lesions, tenderness or abnormalities  ENT: no neck nodes or sinus tenderness  Cardiovascular: RRR.  No murmurs, no rub or gallop no JVD  Respiratory: Normal WOB, bilateral scattered wheezes or crackles present  Gastrointestinal: Abdomen soft, non-tender. BS normal. No masses, organomegaly  : Deferred  Extremities: No edema , no clubbing /cyanosis   Skin: Warm and dry, no rash        Medications        insulin glargine  15 Units Subcutaneous QAM AC     methylPREDNISolone  40 mg Intravenous Q12H     insulin aspart  1-7 Units Subcutaneous TID AC     insulin aspart  1-5 Units Subcutaneous At Bedtime     amLODIPine  5 mg Oral Daily     aspirin EC  81 mg Oral Daily     docusate sodium (COLACE) capsule 100 mg  100 mg Oral QAM     isosorbide mononitrate  60 mg Oral Daily     latanoprost  1 drop Both Eyes At Bedtime     levothyroxine  25 mcg Oral QAM AC     pregabalin  150 mg Oral BID     metoprolol (LOPRESSOR) tablet 50 mg  50 mg Oral QAM     metoprolol (LOPRESSOR) tablet 100 mg  100 mg Oral QPM     ranolazine  500 mg Oral BID     sodium chloride (PF)  3 mL Intracatheter Q8H     ipratropium - albuterol 0.5 mg/2.5 mg/3 mL  3 mL Nebulization 4x daily       Data     Recent Labs  Lab 12/30/17  0720 12/28/17  0702 12/27/17  0633 12/26/17  0839   WBC 7.6  --  5.3 5.3   HGB 13.9  --  13.1 14.4   MCV 91  --  89 91     --  220 266   NA  --  133 131* 132*   POTASSIUM  --  4.3 4.0 4.0   CHLORIDE  --  95 94 96   CO2  --  32 33* 32   BUN  --  12 13 10   CR  --  0.58 0.71 0.57   ANIONGAP  --  6 4 4   VIOLETA  --  8.7 8.7 8.4*   GLC  --  182* 116* 133*       Imaging:  No results found for this or any previous visit (from the past 24 hour(s)).

## 2018-01-01 NOTE — PLAN OF CARE
Problem: Patient Care Overview  Goal: Plan of Care/Patient Progress Review  Outcome: No Change  A&O, vs stable, oxygen on at 4 liters.  Lungs coarse and diminished, infrequent nonproductive cough.  Up with assist x 1 and walker, dyspnea on exertion.  Denies pain, will continue to monitor.

## 2018-01-01 NOTE — PROGRESS NOTES
[facility] RCAT Note    Date:12/26/18  Admission Diagnosis:acute hypoxic respiratory failure of unknown etiology  Pulmonary History: None  Home Nebulizer/ MDI Use: None  Home Oxygen Use: None  Acuity Level (from RT Assessment flow sheet): 2    Aerosol Therapy Initiated: None      Pulmonary Hygiene Initiated: [place]      Volume Expansion Therapy Initiated:IS      Current Oxygen Requirement: 2LPM  Current SpO2: 95%    Re-evaluation date: 1/4/2018  See 'RT Assessments' flow sheet for patient assessment scoring and Acuity Level Details.

## 2018-01-02 LAB
ANION GAP SERPL CALCULATED.3IONS-SCNC: 4 MMOL/L (ref 3–14)
BUN SERPL-MCNC: 22 MG/DL (ref 7–30)
CALCIUM SERPL-MCNC: 8.4 MG/DL (ref 8.5–10.1)
CHLORIDE SERPL-SCNC: 94 MMOL/L (ref 94–109)
CO2 SERPL-SCNC: 33 MMOL/L (ref 20–32)
CREAT SERPL-MCNC: 0.55 MG/DL (ref 0.52–1.04)
ERYTHROCYTE [DISTWIDTH] IN BLOOD BY AUTOMATED COUNT: 12.6 % (ref 10–15)
GFR SERPL CREATININE-BSD FRML MDRD: >90 ML/MIN/1.7M2
GLUCOSE BLDC GLUCOMTR-MCNC: 176 MG/DL (ref 70–99)
GLUCOSE BLDC GLUCOMTR-MCNC: 218 MG/DL (ref 70–99)
GLUCOSE BLDC GLUCOMTR-MCNC: 246 MG/DL (ref 70–99)
GLUCOSE BLDC GLUCOMTR-MCNC: 314 MG/DL (ref 70–99)
GLUCOSE SERPL-MCNC: 225 MG/DL (ref 70–99)
HCT VFR BLD AUTO: 41.8 % (ref 35–47)
HGB BLD-MCNC: 14.1 G/DL (ref 11.7–15.7)
MCH RBC QN AUTO: 30.3 PG (ref 26.5–33)
MCHC RBC AUTO-ENTMCNC: 33.7 G/DL (ref 31.5–36.5)
MCV RBC AUTO: 90 FL (ref 78–100)
PLATELET # BLD AUTO: 260 10E9/L (ref 150–450)
POTASSIUM SERPL-SCNC: 4.8 MMOL/L (ref 3.4–5.3)
RBC # BLD AUTO: 4.65 10E12/L (ref 3.8–5.2)
SODIUM SERPL-SCNC: 131 MMOL/L (ref 133–144)
WBC # BLD AUTO: 8.6 10E9/L (ref 4–11)

## 2018-01-02 PROCEDURE — 25000125 ZZHC RX 250: Performed by: INTERNAL MEDICINE

## 2018-01-02 PROCEDURE — 12000007 ZZH R&B INTERMEDIATE

## 2018-01-02 PROCEDURE — A9270 NON-COVERED ITEM OR SERVICE: HCPCS | Mod: GY | Performed by: INTERNAL MEDICINE

## 2018-01-02 PROCEDURE — 25000132 ZZH RX MED GY IP 250 OP 250 PS 637: Mod: GY | Performed by: INTERNAL MEDICINE

## 2018-01-02 PROCEDURE — A9270 NON-COVERED ITEM OR SERVICE: HCPCS | Mod: GY | Performed by: NURSE PRACTITIONER

## 2018-01-02 PROCEDURE — 25000131 ZZH RX MED GY IP 250 OP 636 PS 637: Mod: GY | Performed by: INTERNAL MEDICINE

## 2018-01-02 PROCEDURE — 80048 BASIC METABOLIC PNL TOTAL CA: CPT | Performed by: INTERNAL MEDICINE

## 2018-01-02 PROCEDURE — 99232 SBSQ HOSP IP/OBS MODERATE 35: CPT | Performed by: INTERNAL MEDICINE

## 2018-01-02 PROCEDURE — 36415 COLL VENOUS BLD VENIPUNCTURE: CPT | Performed by: INTERNAL MEDICINE

## 2018-01-02 PROCEDURE — 85027 COMPLETE CBC AUTOMATED: CPT | Performed by: INTERNAL MEDICINE

## 2018-01-02 PROCEDURE — 25000132 ZZH RX MED GY IP 250 OP 250 PS 637: Mod: GY | Performed by: NURSE PRACTITIONER

## 2018-01-02 PROCEDURE — 00000146 ZZHCL STATISTIC GLUCOSE BY METER IP

## 2018-01-02 RX ORDER — PREDNISONE 20 MG/1
40 TABLET ORAL DAILY
Status: DISCONTINUED | OUTPATIENT
Start: 2018-01-02 | End: 2018-01-03 | Stop reason: HOSPADM

## 2018-01-02 RX ADMIN — PREDNISONE 40 MG: 20 TABLET ORAL at 09:11

## 2018-01-02 RX ADMIN — METOPROLOL TARTRATE 50 MG: 50 TABLET ORAL at 08:12

## 2018-01-02 RX ADMIN — INSULIN ASPART 1 UNITS: 100 INJECTION, SOLUTION INTRAVENOUS; SUBCUTANEOUS at 17:48

## 2018-01-02 RX ADMIN — GUAIFENESIN 10 ML: 100 SOLUTION ORAL at 21:07

## 2018-01-02 RX ADMIN — DOCUSATE SODIUM 100 MG: 100 CAPSULE, LIQUID FILLED ORAL at 08:12

## 2018-01-02 RX ADMIN — PREGABALIN 150 MG: 75 CAPSULE ORAL at 21:07

## 2018-01-02 RX ADMIN — LATANOPROST 1 DROP: 50 SOLUTION OPHTHALMIC at 21:07

## 2018-01-02 RX ADMIN — INSULIN ASPART 2 UNITS: 100 INJECTION, SOLUTION INTRAVENOUS; SUBCUTANEOUS at 08:13

## 2018-01-02 RX ADMIN — RANOLAZINE 500 MG: 500 TABLET, FILM COATED, EXTENDED RELEASE ORAL at 21:07

## 2018-01-02 RX ADMIN — INSULIN GLARGINE 15 UNITS: 100 INJECTION, SOLUTION SUBCUTANEOUS at 08:12

## 2018-01-02 RX ADMIN — ASPIRIN 81 MG: 81 TABLET, COATED ORAL at 08:12

## 2018-01-02 RX ADMIN — RANOLAZINE 500 MG: 500 TABLET, FILM COATED, EXTENDED RELEASE ORAL at 08:12

## 2018-01-02 RX ADMIN — INSULIN ASPART 4 UNITS: 100 INJECTION, SOLUTION INTRAVENOUS; SUBCUTANEOUS at 12:42

## 2018-01-02 RX ADMIN — AMLODIPINE BESYLATE 5 MG: 5 TABLET ORAL at 08:12

## 2018-01-02 RX ADMIN — PREGABALIN 150 MG: 75 CAPSULE ORAL at 08:12

## 2018-01-02 RX ADMIN — ISOSORBIDE MONONITRATE 60 MG: 60 TABLET, EXTENDED RELEASE ORAL at 08:12

## 2018-01-02 RX ADMIN — METOPROLOL TARTRATE 100 MG: 100 TABLET, FILM COATED ORAL at 19:32

## 2018-01-02 RX ADMIN — GUAIFENESIN 10 ML: 100 SOLUTION ORAL at 08:20

## 2018-01-02 RX ADMIN — LEVOTHYROXINE SODIUM 25 MCG: 25 TABLET ORAL at 06:29

## 2018-01-02 NOTE — PROGRESS NOTES
"BRIEF NUTRITION ASSESSMENT      REASON FOR ASSESSMENT:  LOS    NUTRITION HISTORY:  Pt lives alone, follows a regular diet.  She gets free groceries delivered twiec a month from, she thinks, [employer].  She normally does her own cooking but now is wondering about frozen meals during her recovery.    CURRENT DIET AND INTAKE:  Diet:  Mod consistent carb  She is eating 100%, orderiing small meals, \"I hate to waste food.\"           ANTHROPOMETRICS:  Height: 5' 2\"  Weight: 90.7 kg  BMI: 36.58 kg/m2  IBW:  50 kg +/- 10%  Weight Status: Obesity Grade II BMI 35-39.9  %IBW: 180%  Weight History:   Wt Readings from Last 10 Encounters:   01/02/18 90.7 kg (200 lb)   10/31/17 93 kg (205 lb)   09/08/17 95.3 kg (210 lb 1.6 oz)   08/23/17 93.4 kg (206 lb)   06/06/17 93.9 kg (207 lb)   06/01/17 95 kg (209 lb 8 oz)   03/22/17 95 kg (209 lb 8 oz)   03/20/17 93 kg (205 lb)   12/20/16 95.3 kg (210 lb)   09/29/16 95.8 kg (211 lb 1.6 oz)     LABS:  Labs noted    MALNUTRITION:  Patient does not meet two of the following criteria necessary for diagnosing malnutrition: significant weight loss, reduced intake, subcutaneous fat loss, muscle loss or fluid retention    NUTRITION INTERVENTION:  Nutrition Diagnosis:  No nutrition diagnosis at this time.    Implementation:  Nutrition Education:  Per Provider order if indicated    FOLLOW UP/MONITORING:   Will re-evaluate in 7 - 10 days, or sooner, if re-consulted.    Beena Olguin RD  Pager [phone] (M-F)            [phone] (W/E & Hol)            "

## 2018-01-02 NOTE — PROGRESS NOTES
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 She was admitted on 12/26/2017 for acute hypoxic respiratory failure of unknown etiology.       Acute hypoxic respiratory failure 2/2 suspected acute bronchitis   Cough of at least 1 1/2 weeks duration with creamy thick sputum production prior to admission. She denies chronic pulmonary disease, however endorses baseline mild shortness of breath secondary to heart disease. She states she has not been unusually short of breath and has been sleeping with 1- pillow. She specifically denies fevers, but has had intermittent chills. She has never had GERD and denies sinus symptoms.  Upon arrival to ED her SaO2 was 82% and improved with 2- liters per NC.   -Chest Xray did not reveal cardiopulmonary disease.  Influenza swab is negative. No leukocytosis. D-Dimer negative.   -Has completed course of azithromycin for its antiinflammatory effect    - change Solumedrol to Prednisone 40mg daily x5 days  -just weaned off O2 this morning and sat hanging ~90  - C/w Kelly Coffman IS   - BNP sl elevated at 2467, although exam and CXR not consistent with CHF, no peripheral edema noted  - will monitor her sats today and see if O2 is needed at discharge     Hyponatremia  Stable in the low 130's  - monitor intermittently      Chronic ischemia chest pain secondary to microvascular angina, treated  Coronary artery disease, non-obstructive per last angiogram 2010  Hypertension, treated  Hyperlipidemia, treated  Chronic, stable. Mrs. Vergara states she has not had chest pain since initiation of Ranexa. EMR review is notable for last echocardiogram in March 2016 with aortic  "valve sclerosis with EF 70%.   - continue home anti-hypertensive which include Imdur, amlodipine, metoprolol with hold parameters  - continue home Imdur and Ranexa       Hypothyroidism, treated  Chronic, stable.   - continued home levothyroxine       Personal history of diabetes mellitus, type II  She states she was told last summer she is no longer diabetic and was taken off medications.  - A1c 6.9%  - BS running high 2/2 steroids  - sliding scale insulin as needed, and continue Lantus at 15 units.   -since steroids are being decreased, continue same dose of lantus  - However did recommend lifestyle modification and follow-up with PCP     Chronic lower extremity pain, likely secondary to neuropathy and right knee arthritis, treated  Chronic, stable.  - resumed home Lyrica    Communications: son, Derrek, updated by phone per patient request     DVT Prophylaxis: Pneumatic Compression Devices  Code Status: changed to DNR/DNI--discussed with patient and also consistent with her POLST in chart.      Disposition: Expected discharge tomorrow if remains off O2    Interval History   Feels better. Cough seems to be most bothersome to her. Was weaned off O2 this am and sats ~90. She is afebrile. Denies chest pain, n/v or abdominal pain.     -Data reviewed today: I reviewed all new labs and imaging over the last 24 hours. I personally reviewed the chest x-ray image(s) showing no acute infiltrate.    Physical Exam   Heart Rate: 79, Blood pressure 147/88, pulse 78, temperature 98.3  F (36.8  C), temperature source Oral, resp. rate 16, height 1.575 m (5' 2\"), weight 90.7 kg (200 lb), SpO2 95 %, not currently breastfeeding.  Vitals:    12/30/17 0318 12/31/17 0656 01/02/18 0727   Weight: 91.2 kg (201 lb) 91.6 kg (202 lb) 90.7 kg (200 lb)     Vital Signs with Ranges  Temp:  [97.5  F (36.4  C)-98.3  F (36.8  C)] 98.3  F (36.8  C)  Pulse:  [76-78] 78  Heart Rate:  [65-88] 79  Resp:  [16] 16  BP: (126-158)/(59-88) 147/88  SpO2:  [92 %-95 " %] 95 %  I/O's Last 24 hours  I/O last 3 completed shifts:  In: 640 [P.O.:640]  Out: -     Constitutional: Alert, awake and no apparent distress   Respiratory: Coarse breath sounds bilaterally, no wheezing  Cardiovascular: Regular rate and rhythm  GI: soft and non-tender  Skin/Integumen: warm and dry  Ext: no LE edema bilaterally    Medications   All medications I am responsible for were reviewed.       predniSONE  40 mg Oral Daily     insulin glargine  15 Units Subcutaneous QAM AC     insulin aspart  1-7 Units Subcutaneous TID AC     insulin aspart  1-5 Units Subcutaneous At Bedtime     amLODIPine  5 mg Oral Daily     aspirin EC  81 mg Oral Daily     docusate sodium (COLACE) capsule 100 mg  100 mg Oral QAM     isosorbide mononitrate  60 mg Oral Daily     latanoprost  1 drop Both Eyes At Bedtime     levothyroxine  25 mcg Oral QAM AC     pregabalin  150 mg Oral BID     metoprolol (LOPRESSOR) tablet 50 mg  50 mg Oral QAM     metoprolol (LOPRESSOR) tablet 100 mg  100 mg Oral QPM     ranolazine  500 mg Oral BID     sodium chloride (PF)  3 mL Intracatheter Q8H        Data     Recent Labs  Lab 01/02/18  0617 01/01/18  1135 12/30/17  0720 12/28/17  0702   WBC 8.6 11.1* 7.6  --    HGB 14.1 13.7 13.9  --    MCV 90 89 91  --     275 266  --    * 131*  --  133   POTASSIUM 4.8 4.3  --  4.3   CHLORIDE 94 92*  --  95   CO2 33* 34*  --  32   BUN 22 23  --  12   CR 0.55 0.61  --  0.58   ANIONGAP 4 5  --  6   VIOLETA 8.4* 8.5  --  8.7   * 337*  --  182*       Recent Results (from the past 24 hour(s))   XR Chest 2 Views    Narrative    CHEST TWO VIEWS January 1, 2018 10:11 AM     HISTORY: Worsening hypoxia.    COMPARISON: Two view chest x-ray 12/29/2017.    FINDINGS: The cardiac silhouette, pulmonary vasculature, lungs and  pleural spaces are within normal limits.      Impression    IMPRESSION: Clear lungs.     HILARY BAUTISTA MD

## 2018-01-02 NOTE — PROGRESS NOTES
"SP[facility]ITUAL [facility] Progress Note  [facility] 55     visited pt on LOS. Pt was sitting up waiting for blood test before pt could eat lunch. Pt shared details of her family including the death of one son 5 years ago and diagnosis of her daughter in law and another family member with Alzheimer's. Pt has one living son who lives near [place] who pt says is \"very supportive\".    Pt is long time member of [employer] and has a \"strong bette\" Pt prays every morning and night and says she knows that \"God is good and will take care of everything in His own way\". Pt says she is well connected and supported through her [employer] family.     SH provided reflective listening, comfort and support. Pt welcomed prayer. Pt plans to d/c tomorrow.    No plans to follow but SH remains available by request.      P[place] M.Div.  Chaplain Resident  [phone] Pager  "

## 2018-01-02 NOTE — PLAN OF CARE
Problem: Patient Care Overview  Goal: Plan of Care/Patient Progress Review  A&O. VSS on 2L O2 overnight. Up with A1. Pt denies pain. CMS intact ex baseline neuropathy. LS  coarse and diminished.  Infrequent nonproductive cough. MCKEON. IV SL.

## 2018-01-02 NOTE — PLAN OF CARE
Problem: Patient Care Overview  Goal: Plan of Care/Patient Progress Review  Outcome: No Change  VSS on 2L/NC, continues to desat on RA, CXR negative, up with SBA and walker, voiding in BR, nonproductive cough continues.

## 2018-01-03 VITALS
HEIGHT: 62 IN | DIASTOLIC BLOOD PRESSURE: 104 MMHG | HEART RATE: 78 BPM | TEMPERATURE: 98.3 F | WEIGHT: 200.4 LBS | BODY MASS INDEX: 36.88 KG/M2 | OXYGEN SATURATION: 95 % | SYSTOLIC BLOOD PRESSURE: 138 MMHG | RESPIRATION RATE: 16 BRPM

## 2018-01-03 LAB
GLUCOSE BLDC GLUCOMTR-MCNC: 109 MG/DL (ref 70–99)
GLUCOSE BLDC GLUCOMTR-MCNC: 146 MG/DL (ref 70–99)

## 2018-01-03 PROCEDURE — 99239 HOSP IP/OBS DSCHRG MGMT >30: CPT | Performed by: INTERNAL MEDICINE

## 2018-01-03 PROCEDURE — 25000125 ZZHC RX 250: Performed by: INTERNAL MEDICINE

## 2018-01-03 PROCEDURE — A9270 NON-COVERED ITEM OR SERVICE: HCPCS | Mod: GY | Performed by: NURSE PRACTITIONER

## 2018-01-03 PROCEDURE — 25000132 ZZH RX MED GY IP 250 OP 250 PS 637: Mod: GY | Performed by: NURSE PRACTITIONER

## 2018-01-03 PROCEDURE — 00000146 ZZHCL STATISTIC GLUCOSE BY METER IP

## 2018-01-03 RX ORDER — ALBUTEROL SULFATE 90 UG/1
2 AEROSOL, METERED RESPIRATORY (INHALATION) EVERY 6 HOURS PRN
Qty: 1 INHALER | Refills: 0 | Status: SHIPPED | OUTPATIENT
Start: 2018-01-03 | End: 2019-02-10

## 2018-01-03 RX ORDER — GUAIFENESIN 600 MG/1
600 TABLET, EXTENDED RELEASE ORAL 2 TIMES DAILY
Qty: 14 TABLET | Refills: 0 | Status: SHIPPED | OUTPATIENT
Start: 2018-01-03 | End: 2018-01-10

## 2018-01-03 RX ORDER — PREDNISONE 20 MG/1
40 TABLET ORAL DAILY
Qty: 6 TABLET | Refills: 0 | Status: SHIPPED | OUTPATIENT
Start: 2018-01-04 | End: 2018-01-07

## 2018-01-03 RX ADMIN — DOCUSATE SODIUM 100 MG: 100 CAPSULE, LIQUID FILLED ORAL at 09:07

## 2018-01-03 RX ADMIN — ASPIRIN 81 MG: 81 TABLET, COATED ORAL at 09:07

## 2018-01-03 RX ADMIN — ISOSORBIDE MONONITRATE 60 MG: 60 TABLET, EXTENDED RELEASE ORAL at 09:06

## 2018-01-03 RX ADMIN — AMLODIPINE BESYLATE 5 MG: 5 TABLET ORAL at 09:07

## 2018-01-03 RX ADMIN — LEVOTHYROXINE SODIUM 25 MCG: 25 TABLET ORAL at 09:06

## 2018-01-03 RX ADMIN — PREDNISONE 40 MG: 20 TABLET ORAL at 09:06

## 2018-01-03 RX ADMIN — PREGABALIN 150 MG: 75 CAPSULE ORAL at 09:06

## 2018-01-03 RX ADMIN — RANOLAZINE 500 MG: 500 TABLET, FILM COATED, EXTENDED RELEASE ORAL at 09:06

## 2018-01-03 RX ADMIN — METOPROLOL TARTRATE 50 MG: 50 TABLET ORAL at 09:07

## 2018-01-03 NOTE — PROGRESS NOTES
Met with patient re: referral for C RN services.  Patient see's Dr. Schmidt at [facility]  Referral made to  and they will be in contact with patient to arrange first visit.  Will continue to follow for any further discharge needs.

## 2018-01-03 NOTE — PROGRESS NOTES
[facility]  Met with pt to discuss plans for HC.  Pt to be discharged home today and has agreed to have [facility] follow with services of . Patient care support center processing referral.  Pt verbalized understanding that initial visit is scheduled for 1/4/18 or 1/5/18.  Pt has 24 hour phone number for [facility] for any questions or concerns.

## 2018-01-03 NOTE — PLAN OF CARE
Problem: Breathing Pattern Ineffective (Adult)  Goal: Effective Oxygenation/Ventilation  Patient will demonstrate the desired outcomes by discharge/transition of care.   Outcome: Improving  Pt A/Ox4. VSS. Up 1 assist w/ walker, ambulated 3x. Denies pain. CMS intact except baseline bilateral numbness LE. Voiding adequately. Mod CHO diet tolerated, , 314 and 176. LS diminished.

## 2018-01-03 NOTE — PLAN OF CARE
Problem: Breathing Pattern Ineffective (Adult)  Goal: Identify Related Risk Factors and Signs and Symptoms  Related risk factors and signs and symptoms are identified upon initiation of Human [employer] Clinical Practice Guideline (CPG).   Outcome: Adequate for Discharge Date Met: 01/03/18  Pt A/Ox4. VSS. Up 1 assist w/ walker. Denies pain. Mod CHO diet tolerated. Voiding adequately. D/c to home with son at 1300 via wheelchair. AVS and cost of medication reviewed with pt. Denies pain at d/c.

## 2018-01-03 NOTE — PLAN OF CARE
Problem: Patient Care Overview  Goal: Plan of Care/Patient Progress Review  Outcome: No Change  1900-0730 Patient A/Ox4. VSS on RA. LS diminished, MCKEON.  Patient denies nausea, numbness/tingling. CMS intact.  , 146. Tolerating mod carb diet. Up with 1 + walker + gait belt. Voiding adequately in BR. Rested between cares. Will continue to monitor

## 2018-01-03 NOTE — DISCHARGE SUMMARY
[facility]    Discharge Summary  Hospitalist    Date of Admission:  12/26/2017  Date of Discharge:  1/3/2018  Discharging Provider: Tosha Crowell  Date of Service (when I saw the patient): 01/03/18    Discharge Diagnoses   Acute hypoxic respiratory failure likely secondary to acute bronchitis  Hyponatremia    Chronic ischemia chest pain secondary to microvascular angina  Coronary artery disease, non-obstructive per last angiogram 2010  Hypertension  Hyperlipidemia  chronic lower ext pain likely secondary to neuropathy      History of Present Illness   Lora Vergara is a 89 year old female with a pmhx of htn, cad, chronic 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 She was admitted on 12/26/2017 for acute hypoxic respiratory failure.  See H & P for detail.     Hospital Course   Lora Vergara was admitted on 12/26/2017.  The following problems were addressed during her hospitalization:    Acute hypoxic respiratory failure 2/2 suspected acute bronchitis   Cough of at least 1 1/2 weeks duration with creamy thick sputum production prior to admission. She denies chronic pulmonary disease, however endorses baseline mild shortness of breath secondary to heart disease. Upon arrival to ED her SaO2 was 82% and improved with 2- liters per NC. Chest Xray did not reveal cardiopulmonary disease.  Influenza swab is negative. No leukocytosis. D-Dimer negative.   - completed course of Z-karis  - treated with IV steroids and changed to PO Prednisone for total of 5 days and has 3 more days to take  - has been weaned off O2 and remained stable on RA >24 hrs  - d/c on antitussive and albuterol prn      Hyponatremia  Stable in the low 130's    Chronic ischemia chest pain secondary to microvascular angina, treated  Coronary artery disease, non-obstructive per last angiogram  2010  Hypertension  Hyperlipidemia  Chronic, stable. Mrs. Vergara states she has not had chest pain since initiation of Ranexa. EMR review is notable for last echocardiogram in March 2016 with aortic valve sclerosis with EF 70%.   - continue home anti-hypertensive which include Imdur, amlodipine, metoprolol and Ranexa      Hypothyroidism  - continued home levothyroxine       Personal history of diabetes mellitus, type II  She states she was told last summer she is no longer diabetic and was taken off medications.  - A1c 6.9%  - BS running high 2/2 steroids while in hospital which was managed with insulin  - BG improved with decreasing dose of steroids and transition to PO  - recommend follow up with PCP for further management      Chronic lower extremity pain, likely secondary to neuropathy and right knee arthritis, treated  Chronic, stable. Continue with Odalis Crowell    Significant Results and Procedures   See below for imaging and labs    Pending Results   Unresulted Labs Ordered in the Past 30 Days of this Admission     No orders found from 10/27/2017 to 12/27/2017.          Code Status   DNR / DNI       Primary Care Physician   Antwon Schmidt    Physical Exam                     Vitals:    12/31/17 0656 01/02/18 0727 01/03/18 0718   Weight: 91.6 kg (202 lb) 90.7 kg (200 lb) 90.9 kg (200 lb 6.4 oz)     Vital Signs with Ranges     I/O last 3 completed shifts:  In: 1920 [P.O.:1920]  Out: -     General: alert, awake and no  Apparent distress  CVS: regular rate and rhythm  Resp: CTAB, no wheezing  Abd: soft, non-tender and non-distended  Ext: trace LE edema    Discharge Disposition   Discharged to home  Condition at discharge: Stable    Consultations This Hospital Stay   PHYSICAL THERAPY ADULT IP CONSULT    Time Spent on this Encounter   Tosha GRACE, personally saw the patient today and spent 35 minutes discharging this patient.    Discharge Orders     Home care nursing referral      Follow-up and recommended labs and tests    Follow up with primary care provider, Antwon Schmidt, within 5-7 days for hospital follow- up.  No follow up labs or test are needed.     Activity   Your activity upon discharge: activity as tolerated     MD face to face encounter   Documentation of Face to Face and Certification for Home Health Services    I certify that patient: Lora Vergara is under my care and that I, or a nurse practitioner or physician's assistant working with me, had a face-to-face encounter that meets the physician face-to-face encounter requirements with this patient on: 1/3/2018.    This encounter with the patient was in whole, or in part, for the following medical condition, which is the primary reason for home health care: acute bronchitis    I certify that, based on my findings, the following services are medically necessary home health services: Nursing.    My clinical findings support the need for the above services because: Nurse is needed: To assess home safety/respiratory status after changes in medical regimen..    Further, I certify that my clinical findings support that this patient is homebound (i.e. absences from home require considerable and taxing effort and are for medical reasons or [employer] services or infrequently or of short duration when for other reasons) because: due to generalized weakness after respiratory illness and hospitalization    Based on the above findings. I certify that this patient is confined to the home and needs intermittent skilled nursing care, physical therapy and/or speech therapy.  The patient is under my care, and I have initiated the establishment of the plan of care.  This patient will be followed by a physician who will periodically review the plan of care.  Physician/Provider to provide follow up care: Antwon Schmidt    Attending hospital physician (the Medicare certified [facility] provider): Tosha Crowell  Physician Signature: See  electronic signature associated with these discharge orders.  Date: 1/3/2018     DNR/DNI     Diet   Follow this diet upon discharge: Regular       Discharge Medications   Discharge Medication List as of 1/3/2018 11:20 AM      START taking these medications    Details   predniSONE (DELTASONE) 20 MG tablet Take 2 tablets (40 mg) by mouth daily for 3 days, Disp-6 tablet, R-0, E-Prescribe      guaiFENesin (ROBITUSSIN) 20 mg/mL SOLN solution Take 10 mLs by mouth every 4 hours as needed for cough, Disp-1200 mL, R-0, E-Prescribe      guaiFENesin (MUCINEX) 600 MG 12 hr tablet Take 1 tablet (600 mg) by mouth 2 times daily for 7 days, Disp-14 tablet, R-0, E-Prescribe      albuterol (PROAIR HFA/PROVENTIL HFA/VENTOLIN HFA) 108 (90 BASE) MCG/ACT Inhaler Inhale 2 puffs into the lungs every 6 hours as needed for shortness of breath / dyspnea or wheezing, Disp-1 Inhaler, R-0, E-Prescribe         CONTINUE these medications which have NOT CHANGED    Details   VITAMIN D, CHOLECALCIFEROL, PO Take 4,000 Units by mouth daily, Historical      !! Metoprolol Tartrate (LOPRESSOR PO) Take 50 mg by mouth every morning, Historical      !! Metoprolol Tartrate (LOPRESSOR PO) Take 100 mg by mouth every evening, Historical      latanoprost (XALATAN) 0.005 % ophthalmic solution Place 1 drop into both eyes At Bedtime, Historical      LYRICA 150 MG capsule TAKE 1 CAPSULE BY MOUTH TWICE A DAY, Disp-60 capsule, R-11, Local PrintThis request is for a new prescription for a controlled substance as required by Federal/State law.      amLODIPine (NORVASC) 5 MG tablet Take 1 tablet (5 mg) by mouth daily, Disp-90 tablet, R-2, E-Prescribe      atorvastatin (LIPITOR) 40 MG tablet Take 1 tablet (40 mg) by mouth daily, Disp-90 tablet, R-2, E-Prescribe      DOCUSATE SODIUM PO Take 100 mg by mouth every morning, Historical      levothyroxine (SYNTHROID/LEVOTHROID) 25 MCG tablet TAKE ONE TABLET BY MOUTH ONE TIME DAILY, Disp-90 tablet, R-2, E-Prescribe       ranolazine (RANEXA) 500 MG 12 hr tablet Take 1 tablet (500 mg) by mouth 2 times daily, Disp-180 tablet, R-2, E-Prescribe      isosorbide mononitrate (IMDUR) 60 MG 24 hr tablet Take 1 tablet (60 mg) by mouth daily, Disp-90 tablet, R-3, E-Prescribe      nitroglycerin (NITROSTAT) 0.4 MG SL tablet Place 1 tablet (0.4 mg) under the tongue every 5 minutes as needed for chest pain, Disp-25 tablet, R-3, E-Prescribe      ACETAMINOPHEN PO Take 1,000 mg by mouth 3 times daily , Historical      aspirin (ASPIRIN LOW DOSE) 81 MG tablet Take 81 mg by mouth daily , Historical      order for DME Equipment being ordered: wheeled walker with brakes and a seatDisp-1 Device, R-0, Local Print       !! - Potential duplicate medications found. Please discuss with provider.        Allergies   Allergies   Allergen Reactions     Hydrochlorothiazide      Pancreatitis - patient denies this.  She says it gave her low sodium.     Data   Most Recent 3 CBC's:  Recent Labs   Lab Test  01/02/18   0617  01/01/18   1135  12/30/17   0720   WBC  8.6  11.1*  7.6   HGB  14.1  13.7  13.9   MCV  90  89  91   PLT  260  275  266      Most Recent 3 BMP's:  Recent Labs   Lab Test  01/02/18   0617  01/01/18   1135  12/28/17   0702   NA  131*  131*  133   POTASSIUM  4.8  4.3  4.3   CHLORIDE  94  92*  95   CO2  33*  34*  32   BUN  22  23  12   CR  0.55  0.61  0.58   ANIONGAP  4  5  6   VIOLETA  8.4*  8.5  8.7   GLC  225*  337*  182*     Most Recent 2 LFT's:  Recent Labs   Lab Test  10/31/17   1501  05/31/17   1125   AST  16  16   ALT  18  20   ALKPHOS  62  50   BILITOTAL  0.4  0.5     Most Recent INR's and Anticoagulation Dosing History:  Anticoagulation Dose History     Recent Dosing and Labs Latest Ref Rng & Units 3/23/2007 2/22/2008 12/17/2009    INR 0.86 - 1.14 1.00 0.97 1.02        Most Recent 3 Troponin's:  Recent Labs   Lab Test  12/29/14   0410  12/29/14   0015  12/28/14 2003   TROPI  <0.015  The 99th percentile for upper reference range is 0.045 ug/L.   Troponin values in   the range of 0.045 - 0.120 ug/L may be associated with risks of adverse   clinical events.   Effective 7/30/2014, the reference range for this assay has changed to reflect   new instrumentation/methodology.    <0.015  The 99th percentile for upper reference range is 0.045 ug/L.  Troponin values in   the range of 0.045 - 0.120 ug/L may be associated with risks of adverse   clinical events.   Effective 7/30/2014, the reference range for this assay has changed to reflect   new instrumentation/methodology.    <0.015  The 99th percentile for upper reference range is 0.045 ug/L.  Troponin values in   the range of 0.045 - 0.120 ug/L may be associated with risks of adverse   clinical events.   Effective 7/30/2014, the reference range for this assay has changed to reflect   new instrumentation/methodology.       Most Recent Cholesterol Panel:  Recent Labs   Lab Test  03/09/16   0938   CHOL  127   LDL  48   HDL  58   TRIG  104     Most Recent 6 Bacteria Isolates From Any Culture (See EPIC Reports for Culture Details):  Recent Labs   Lab Test  12/20/16   1145   CULT  10,000 to 50,000 colonies/mL mixed urogenital fam     Most Recent TSH, T4 and A1c Labs:  Recent Labs   Lab Test  12/27/17   0633  10/31/17   1501   08/18/11   1843   TSH   --   2.29   < >   --    T4   --    --    --   1.06   A1C  6.9*  6.2*   < >   --     < > = values in this interval not displayed.     Results for orders placed or performed during the hospital encounter of 12/26/17   Chest XR,  PA & LAT    Narrative    XR CHEST 2 VW 12/26/2017 9:25 AM    HISTORY: Cough and fever.    COMPARISON: 5/31/2017    FINDINGS: No airspace consolidation, pleural effusion or pneumothorax.  Normal heart size.      Impression    IMPRESSION: No acute cardiopulmonary abnormality.    FRANCISCO COTTRELL MD   XR Chest 2 Views    Narrative    XR CHEST 2 VW   12/29/2017 2:40 PM     HISTORY: hypoxia, persistent;     COMPARISON: Film dated 12/26/2017    FINDINGS: The  heart is negative.  The lungs are clear. The pulmonary  vasculature is normal.  The bones and soft tissues are unremarkable.      Impression    IMPRESSION: No active infiltrate identified. No significant change.         MEERA GOTTLIEB MD   XR Chest 2 Views    Narrative    CHEST TWO VIEWS January 1, 2018 10:11 AM     HISTORY: Worsening hypoxia.    COMPARISON: Two view chest x-ray 12/29/2017.    FINDINGS: The cardiac silhouette, pulmonary vasculature, lungs and  pleural spaces are within normal limits.      Impression    IMPRESSION: Clear lungs.     HILARY BAUTISTA MD

## 2018-01-04 ENCOUNTER — TELEPHONE (OUTPATIENT)
Dept: FAMILY MEDICINE | Facility: CLINIC | Age: 83
End: 2018-01-04

## 2018-01-04 NOTE — TELEPHONE ENCOUNTER
Verbal okay given for the following orders:     Skilled nursing services 1 x a week for1 weeks  2 times for one week and one time a week for one week & 3 PRN for: respiratory assessment and education, medication education and pain management.       Delmi Gomez RN

## 2018-01-04 NOTE — TELEPHONE ENCOUNTER
"Hospital/TCU/ED for chronic condition Discharge Protocol    \"Hi, my name is Delmi Gomez, a registered nurse, and I am calling from [facility].  I am calling to follow up and see how things are going for you after your recent emergency visit/hospital/TCU stay.\"    Tell me how you are doing now that you are home?\" Left yesterday, I am a lone, but it is a little scary. Hard to not have care after being used to having 24 hour care the last 7 days.       Discharge Instructions    \"Let's review your discharge instructions.  What is/are the follow-up recommendations?  Pt. Response: none    \"Has an appointment with your primary care provider been scheduled?\"   Yes. (confirm)    \"When you see the provider, I would recommend that you bring your medications with you.\"    Medications    \"Tell me what changed about your medicines when you discharged?\"    Changes to chronic meds?    0-1    \"What questions do you have about your medications?\"    None     New diagnoses of heart failure, COPD, diabetes, or MI?    No                  Medication reconciliation completed? Yes  Was MTM referral placed (*Make sure to put transitions as reason for referral)?   No    Call Summary    \"What questions or concerns do you have about your recent visit and your follow-up care?\"     none    \"If you have questions or things don't continue to improve, we encourage you contact us through the main clinic number (give number).  Even if the clinic is not open, triage nurses are available 24/7 to help you.     We would like you to know that our clinic has extended hours (provide information).  We also have urgent care (provide details on closest location and hours/contact info)\"      \"Thank you for your time and take care!\"         "

## 2018-01-06 DIAGNOSIS — I25.9 CHRONIC ISCHEMIC HEART DISEASE: ICD-10-CM

## 2018-01-09 RX ORDER — ISOSORBIDE MONONITRATE 60 MG/1
TABLET, EXTENDED RELEASE ORAL
Qty: 90 TABLET | Refills: 1 | Status: SHIPPED | OUTPATIENT
Start: 2018-01-09 | End: 2018-07-31

## 2018-01-09 NOTE — TELEPHONE ENCOUNTER
"Requested Prescriptions   Pending Prescriptions Disp Refills     isosorbide mononitrate (IMDUR) 60 MG 24 hr tablet [Pharmacy Med Name: ISOSORBIDE MN ER 60 MG TABLET]  Last Written Prescription Date:  1/4/2017  Last Fill Quantity: 90 tablet,  # refills: 3   Last Office Visit  10/31/2017        with  [facility], [facility] or [facility] prescribing provider:     Future Office Visit:    Next 5 appointments (look out 90 days)     Marty 10, 2018  1:30 PM CST   [facility]twon [facility] MD   [facility]    [address]  [place] [address]   [phone]            Apr 06, 2018  1:15 PM CDT   Return Visit with Kalyan Luis MD   [facility] ([facility])    [address]  [place] [address]   [phone]                90 tablet 3     Sig: TAKE 1 TABLET (60 MG) BY MOUTH DAILY    Nitrates Failed    1/6/2018  5:28 AM       Failed - Blood pressure under 140/90    BP Readings from Last 3 Encounters:   01/03/18 (!) 138/104   10/31/17 116/62   09/08/17 116/72          Passed - Pt is not on erectile dysfunction medications       Passed - Recent or future visit with authorizing provider's specialty    Patient had office visit in the last year or has a visit in the next 30 days with authorizing provider.  See \"Patient Info\" tab in inbasket, or \"Choose Columns\" in Meds & Orders section of the refill encounter.        Passed - Patient is age 18 or older          "

## 2018-01-10 ENCOUNTER — OFFICE VISIT (OUTPATIENT)
Dept: FAMILY MEDICINE | Facility: CLINIC | Age: 83
End: 2018-01-10
Payer: MEDICARE

## 2018-01-10 VITALS
TEMPERATURE: 97.9 F | HEIGHT: 62 IN | WEIGHT: 201 LBS | BODY MASS INDEX: 36.99 KG/M2 | OXYGEN SATURATION: 96 % | SYSTOLIC BLOOD PRESSURE: 120 MMHG | DIASTOLIC BLOOD PRESSURE: 62 MMHG | HEART RATE: 90 BPM | RESPIRATION RATE: 20 BRPM

## 2018-01-10 DIAGNOSIS — J96.91 RESPIRATORY FAILURE WITH HYPOXIA, UNSPECIFIED CHRONICITY (H): ICD-10-CM

## 2018-01-10 DIAGNOSIS — E11.40 TYPE 2 DIABETES MELLITUS WITH DIABETIC NEUROPATHY, WITHOUT LONG-TERM CURRENT USE OF INSULIN (H): Primary | ICD-10-CM

## 2018-01-10 LAB — GLUCOSE BLD-MCNC: 240 MG/DL (ref 70–99)

## 2018-01-10 PROCEDURE — 99213 OFFICE O/P EST LOW 20 MIN: CPT | Performed by: INTERNAL MEDICINE

## 2018-01-10 PROCEDURE — 82947 ASSAY GLUCOSE BLOOD QUANT: CPT | Performed by: INTERNAL MEDICINE

## 2018-01-10 PROCEDURE — 36416 COLLJ CAPILLARY BLOOD SPEC: CPT | Performed by: INTERNAL MEDICINE

## 2018-01-10 RX ORDER — METFORMIN HYDROCHLORIDE 750 MG/1
750 TABLET, EXTENDED RELEASE ORAL
Qty: 90 TABLET | Refills: 1 | Status: SHIPPED | OUTPATIENT
Start: 2018-01-10 | End: 2018-07-06

## 2018-01-10 NOTE — MR AVS SNAPSHOT
After Visit Summary   1/10/2018    Lora Vergara    MRN: [id number]           Patient Information     Date Of Birth          9/11/1925        Visit Information        Provider Department      1/10/2018 1:30 PM Antwon Schmidt MD [facility]        Today's Diagnoses     Type 2 diabetes mellitus with diabetic neuropathy, without long-term current use of insulin (H)    -  1    Respiratory failure with hypoxia, unspecified chronicity (H)          Care Instructions    Let's do this: resume the metformin, at 750 mg once per day.           We should recheck some of these labs again in 3 months.                See you then.                Follow-ups after your visit        Your next 10 appointments already scheduled     Apr 06, 2018  1:15 PM CDT   Return Visit with Kalyan Luis MD   [facility] ([facility])    [address] [place] [id number] [phone]              Who to contact     If you have questions or need follow up information about today's clinic visit or your schedule please contact [facility] directly at [phone].  Normal or non-critical lab and imaging results will be communicated to you by MyChart, letter or phone within 4 business days after the clinic has received the results. If you do not hear from us within 7 days, please contact the clinic through MyChart or phone. If you have a critical or abnormal lab result, we will notify you by phone as soon as possible.  Submit refill requests through [employer] or call your pharmacy and they will forward the refill request to us. Please allow 3 business days for your refill to be completed.          Additional Information About Your Visit        MyChart Information     [facility] lets you send messages to your doctor, view your test results, renew your prescriptions, schedule appointments and more. To sign up, go  "to [email or web address] . Click on \"Log in\" on the left side of the screen, which will take you to the Welcome page. Then click on \"Sign up Now\" on the right side of the page.     You will be asked to enter the access code listed below, as well as some personal information. Please follow the directions to create your username and password.     Your access code is: [email or web address]  Expires: 4/3/2018  9:05 AM     Your access code will  in 90 days. If you need help or a new code, please call your [facility] clinic or [phone].        Care EveryWhere ID     This is your Care EveryWhere ID. This could be used by other organizations to access your [facility] medical records  [id number]        Your Vitals Were     Pulse Temperature Respirations Height Last Period Pulse Oximetry    90 97.9  F (36.6  C) 20 5' 2\" (1.575 m) (LMP Unknown) 96%    Breastfeeding? BMI (Body Mass Index)                No 36.76 kg/m2           Blood Pressure from Last 3 Encounters:   01/10/18 120/62   18 (!) 138/104   10/31/17 116/62    Weight from Last 3 Encounters:   01/10/18 201 lb (91.2 kg)   18 200 lb 6.4 oz (90.9 kg)   10/31/17 205 lb (93 kg)              We Performed the Following     Glucose by meter     Glucose whole blood          Today's Medication Changes          These changes are accurate as of: 1/10/18  2:23 PM.  If you have any questions, ask your nurse or doctor.               Start taking these medicines.        Dose/Directions    metFORMIN 750 MG 24 hr tablet   Commonly known as:  GLUCOPHAGE-XR   Used for:  Type 2 diabetes mellitus with diabetic neuropathy, without long-term current use of insulin (H)   Started by:  Antwon Schmidt MD        Dose:  750 mg   Take 1 tablet (750 mg) by mouth daily (with dinner)   Quantity:  90 tablet   Refills:  1            Where to get your medicines      These medications were sent to [facility] [address] [place] [facility] [address] [address] "     Phone:  [phone]     metFORMIN 750 MG 24 hr tablet                Primary Care Provider Office Phone # Fax #    Antwon Schmidt [phone]       [address] [facility] [address]        Equal Access to Services     YOGESH CORMIER : Hadii aad ku hadasho Soomaali, waaxda luqadaha, qaybta kaalmada adeegyada, waxay jagdishin hayderrekn júnior terencemikey lakahlil . So [facility] [phone].    ATENCIÓN: Si habla español, tiene a david disposición servicios gratuitos de asistencia lingüística. Lindaclari al [phone].    We comply with applicable federal civil rights laws and [place] laws. We do not discriminate on the basis of race, color, national origin, age, disability, sex, sexual orientation, or gender identity.            Thank you!     Thank you for choosing [facility]  for your care. Our goal is always to provide you with excellent care. Hearing back from our patients is one way we can continue to improve our services. Please take a few minutes to complete the written survey that you may receive in the mail after your visit with us. Thank you!             Your Updated Medication List - Protect others around you: Learn how to safely use, store and throw away your medicines at www.disposemymeds.org.          This list is accurate as of: 1/10/18  2:23 PM.  Always use your most recent med list.                   Brand Name Dispense Instructions for use Diagnosis    ACETAMINOPHEN PO      Take 1,000 mg by mouth 3 times daily        albuterol 108 (90 BASE) MCG/ACT Inhaler    PROAIR HFA/PROVENTIL HFA/VENTOLIN HFA    1 Inhaler    Inhale 2 puffs into the lungs every 6 hours as needed for shortness of breath / dyspnea or wheezing    Acute bronchitis, unspecified organism       amLODIPine 5 MG tablet    NORVASC    90 tablet    Take 1 tablet (5 mg) by mouth daily    Essential hypertension       ASPIRIN LOW DOSE 81 MG tablet   Generic drug:  aspirin      Take 81 mg by mouth daily         atorvastatin 40 MG tablet    LIPITOR    90 tablet    Take 1 tablet (40 mg) by mouth daily    Hyperlipidemia LDL goal <70       DOCUSATE SODIUM PO      Take 100 mg by mouth every morning        * guaiFENesin 20 mg/mL Soln solution    ROBITUSSIN    1200 mL    Take 10 mLs by mouth every 4 hours as needed for cough    Acute bronchitis, unspecified organism       * guaiFENesin 600 MG 12 hr tablet    MUCINEX    14 tablet    Take 1 tablet (600 mg) by mouth 2 times daily for 7 days    Acute bronchitis, unspecified organism       isosorbide mononitrate 60 MG 24 hr tablet    IMDUR    90 tablet    TAKE 1 TABLET (60 MG) BY MOUTH DAILY    Chronic ischemic heart disease       latanoprost 0.005 % ophthalmic solution    XALATAN     Place 1 drop into both eyes At Bedtime        levothyroxine 25 MCG tablet    SYNTHROID/LEVOTHROID    90 tablet    TAKE ONE TABLET BY MOUTH ONE TIME DAILY    Hypothyroidism       * LOPRESSOR PO      Take 50 mg by mouth every morning        * LOPRESSOR PO      Take 100 mg by mouth every evening        LYRICA 150 MG capsule   Generic drug:  pregabalin     60 capsule    TAKE 1 CAPSULE BY MOUTH TWICE A DAY    Neuropathy       metFORMIN 750 MG 24 hr tablet    GLUCOPHAGE-XR    90 tablet    Take 1 tablet (750 mg) by mouth daily (with dinner)    Type 2 diabetes mellitus with diabetic neuropathy, without long-term current use of insulin (H)       nitroGLYcerin 0.4 MG sublingual tablet    NITROSTAT    25 tablet    Place 1 tablet (0.4 mg) under the tongue every 5 minutes as needed for chest pain    Chronic ischemic heart disease, unspecified       order for DME     1 Device    Equipment being ordered: wheeled walker with brakes and a seat    Neuropathy, Poor balance       ranolazine 500 MG 12 hr tablet    RANEXA    180 tablet    Take 1 tablet (500 mg) by mouth 2 times daily    Chronic ischemic heart disease, unspecified       VITAMIN D (CHOLECALCIFEROL) PO      Take 4,000 Units by mouth daily        * Notice:   This list has 4 medication(s) that are the same as other medications prescribed for you. Read the directions carefully, and ask your doctor or other care provider to review them with you.

## 2018-01-10 NOTE — PATIENT INSTRUCTIONS
Let's do this: resume the metformin, at 750 mg once per day.           We should recheck some of these labs again in 3 months.                See you then.

## 2018-01-10 NOTE — PROGRESS NOTES
"  SUBJECTIVE:   Lora Vergara is a 89 year old female who presents to clinic today for the following health issues:          Hospital Follow-up Visit:    Hospital/Nursing Home/IP Rehab Facility: [facility]  Date of Admission: 12/26/17  Date of Discharge: 1/3/2018  Reason(s) for Admission: Acute Hypoxic respitory failure likely secondary to Acute Bronchitis            Problems taking medications regularly:  None       Medication changes since discharge: see med list       Problems adhering to non-medication therapy:  None    Summary of hospitalization:  [facility] discharge summary reviewed  Diagnostic Tests/Treatments reviewed.  Follow up needed:   Other Healthcare Providers Involved in Patient s Care:         Homecare  Update since discharge: stable., but \"very weak\"    Post Discharge Medication Reconciliation: discharge medications reconciled and changed, per note/orders (see AVS).  Plan of care communicated with patient     Coding guidelines for this visit:  Type of Medical   Decision Making Face-to-Face Visit       within 7 Days of discharge Face-to-Face Visit        within 14 days of discharge   Moderate Complexity [id number] [id number]   High Complexity [id number]              Labs, and 3 CXR all negative for bacterial infection or pneumonia.                         Needed insulin; last prednisone was 4-5 days ago.                     She feels weak.  Otherwise her cough has largely resolved.                        Problem list and histories reviewed & adjusted, as indicated.  Additional history: as documented    Current Outpatient Prescriptions   Medication Sig Dispense Refill     isosorbide mononitrate (IMDUR) 60 MG 24 hr tablet TAKE 1 TABLET (60 MG) BY MOUTH DAILY 90 tablet 1     guaiFENesin (ROBITUSSIN) 20 mg/mL SOLN solution Take 10 mLs by mouth every 4 hours as needed for cough 1200 mL 0     guaiFENesin (MUCINEX) 600 MG 12 hr tablet Take 1 tablet (600 mg) by mouth 2 times daily for 7 days " 14 tablet 0     albuterol (PROAIR HFA/PROVENTIL HFA/VENTOLIN HFA) 108 (90 BASE) MCG/ACT Inhaler Inhale 2 puffs into the lungs every 6 hours as needed for shortness of breath / dyspnea or wheezing 1 Inhaler 0     VITAMIN D, CHOLECALCIFEROL, PO Take 4,000 Units by mouth daily       Metoprolol Tartrate (LOPRESSOR PO) Take 50 mg by mouth every morning       Metoprolol Tartrate (LOPRESSOR PO) Take 100 mg by mouth every evening       latanoprost (XALATAN) 0.005 % ophthalmic solution Place 1 drop into both eyes At Bedtime       LYRICA 150 MG capsule TAKE 1 CAPSULE BY MOUTH TWICE A DAY 60 capsule 11     amLODIPine (NORVASC) 5 MG tablet Take 1 tablet (5 mg) by mouth daily 90 tablet 2     atorvastatin (LIPITOR) 40 MG tablet Take 1 tablet (40 mg) by mouth daily 90 tablet 2     DOCUSATE SODIUM PO Take 100 mg by mouth every morning       levothyroxine (SYNTHROID/LEVOTHROID) 25 MCG tablet TAKE ONE TABLET BY MOUTH ONE TIME DAILY 90 tablet 2     ranolazine (RANEXA) 500 MG 12 hr tablet Take 1 tablet (500 mg) by mouth 2 times daily 180 tablet 2     nitroglycerin (NITROSTAT) 0.4 MG SL tablet Place 1 tablet (0.4 mg) under the tongue every 5 minutes as needed for chest pain 25 tablet 3     order for DME Equipment being ordered: wheeled walker with brakes and a seat 1 Device 0     ACETAMINOPHEN PO Take 1,000 mg by mouth 3 times daily        aspirin (ASPIRIN LOW DOSE) 81 MG tablet Take 81 mg by mouth daily        Allergies   Allergen Reactions     Hydrochlorothiazide      Pancreatitis - patient denies this.  She says it gave her low sodium.     BP Readings from Last 3 Encounters:   01/10/18 120/62   01/03/18 (!) 138/104   10/31/17 116/62    Wt Readings from Last 3 Encounters:   01/10/18 201 lb (91.2 kg)   01/03/18 200 lb 6.4 oz (90.9 kg)   10/31/17 205 lb (93 kg)                      Reviewed and updated as needed this visit by clinical staff       Reviewed and updated as needed this visit by Provider         ROS:  CONSTITUTIONAL:NEGATIVE  "for fever, chills, change in weight  RESP:NEGATIVE for hemoptysis and wheezing  CV: NEGATIVE for chest pain/chest pressure    OBJECTIVE:                                                    /62 (BP Location: Left arm, Patient Position: Chair, Cuff Size: Adult Large)  Pulse 90  Temp 97.9  F (36.6  C)  Resp 20  Ht 5' 2\" (1.575 m)  Wt 201 lb (91.2 kg)  LMP  (LMP Unknown)  SpO2 94%  Breastfeeding? No  BMI 36.76 kg/m2  Body mass index is 36.76 kg/(m^2).  GENERAL APPEARANCE: alert, no distress and over weight  RESP: no rales or rhonchi  CV: regular rates and rhythm  NEURO: gait abnormal ; she uses a walker    Diagnostic test results: 2 hours postprandial  Results for orders placed or performed in visit on 01/10/18   Glucose whole blood   Result Value Ref Range    Glucose Whole Blood 240 (H) 70 - 99 mg/dL            ASSESSMENT/PLAN:                                                        ICD-10-CM    1. Type 2 diabetes mellitus with diabetic neuropathy, without long-term current use of insulin (H) E11.40 Glucose by meter   2. Respiratory failure with hypoxia, unspecified chronicity (H) J96.91        She is recovering well from her respiratory infection.  She has residual hyperglycemia related to her underlying diabetes, plus recent prednisone use.  Her renal function has been normal.  She wants to resume metformin.  In the past she took 750 mg once per day.   Follow up with Provider -   Patient Instructions   Let's do this: resume the metformin, at 750 mg once per day.           We should recheck some of these labs again in 3 months.                See you then.               Antwon Schmidt MD  [facility]  "

## 2018-01-10 NOTE — NURSING NOTE
"Chief Complaint   Patient presents with     Hospital F/U       Initial /62 (BP Location: Left arm, Patient Position: Chair, Cuff Size: Adult Large)  Pulse 90  Temp 97.9  F (36.6  C)  Resp 20  Ht 5' 2\" (1.575 m)  Wt 201 lb (91.2 kg)  LMP  (LMP Unknown)  SpO2 94%  Breastfeeding? No  BMI 36.76 kg/m2 Estimated body mass index is 36.76 kg/(m^2) as calculated from the following:    Height as of this encounter: 5' 2\" (1.575 m).    Weight as of this encounter: 201 lb (91.2 kg).  Medication Reconciliation: complete   Catherine Royal LPN  "

## 2018-01-11 ENCOUNTER — TELEPHONE (OUTPATIENT)
Dept: FAMILY MEDICINE | Facility: CLINIC | Age: 83
End: 2018-01-11

## 2018-01-11 NOTE — TELEPHONE ENCOUNTER
Reason for Call:  Form, our goal is to have forms completed with 72 hours, however, some forms may require a visit or additional information.    Type of letter, form or note:  medical    Who is the form from?: Home care    Where did the form come from: form was faxed in    What clinic location was the form placed at?: [facility]    Where the form was placed: 's Box: Antwon Schmidt MD    What number is listed as a contact on the form?: [phone]       Additional comments: [facility] 1 d 1    Call taken on 1/11/2018 at 3:13 PM by Nhi Maharaj

## 2018-01-12 ENCOUNTER — TELEPHONE (OUTPATIENT)
Dept: FAMILY MEDICINE | Facility: CLINIC | Age: 83
End: 2018-01-12

## 2018-01-15 ENCOUNTER — TELEPHONE (OUTPATIENT)
Dept: FAMILY MEDICINE | Facility: CLINIC | Age: 83
End: 2018-01-15

## 2018-01-15 NOTE — TELEPHONE ENCOUNTER
Reason for Call:  Form, our goal is to have forms completed with 72 hours, however, some forms may require a visit or additional information.    Type of letter, form or note:  medical    Who is the form from?: Home care    Where did the form come from: form was faxed in    What clinic location was the form placed at?: [facility]    Where the form was placed: 's Box: Antwon Schmidt MD    What number is listed as a contact on the form?: [phone]       Additional comments: [facility] 1 d 1    Call taken on 1/15/2018 at 2:17 PM by Nhi Maharaj

## 2018-01-17 ENCOUNTER — TELEPHONE (OUTPATIENT)
Dept: FAMILY MEDICINE | Facility: CLINIC | Age: 83
End: 2018-01-17

## 2018-01-17 NOTE — TELEPHONE ENCOUNTER
Reason for Call:  Form, our goal is to have forms completed with 72 hours, however, some forms may require a visit or additional information.    Type of letter, form or note:  medical    Who is the form from?: Home care    Where did the form come from: form was faxed in    What clinic location was the form placed at?: [facility]    Where the form was placed: 's Box: Antwon Schmidt MD    What number is listed as a contact on the form?: [phone]       Additional comments: [facility] PT 2 week 2    Call taken on 1/17/2018 at 3:59 PM by Nhi Maharaj

## 2018-01-18 DIAGNOSIS — E03.9 HYPOTHYROIDISM: ICD-10-CM

## 2018-01-18 RX ORDER — LEVOTHYROXINE SODIUM 25 UG/1
TABLET ORAL
Qty: 90 TABLET | Refills: 2 | Status: SHIPPED | OUTPATIENT
Start: 2018-01-18 | End: 2018-10-20

## 2018-01-18 NOTE — TELEPHONE ENCOUNTER
"Last OV 01/10/2018.    Requested Prescriptions   Pending Prescriptions Disp Refills     levothyroxine (SYNTHROID/LEVOTHROID) 25 MCG tablet [Pharmacy Med Name: LEVOTHYROXINE 25 MCG TABLET] 90 tablet 2     Sig: TAKE ONE TABLET BY MOUTH ONE TIME DAILY    Thyroid Protocol Passed    1/18/2018  1:09 AM       Passed - Patient is 12 years or older       Passed - Recent or future visit with authorizing provider's specialty    Patient had office visit in the last year or has a visit in the next 30 days with authorizing provider.  See \"Patient Info\" tab in inbasket, or \"Choose Columns\" in Meds & Orders section of the refill encounter.            Passed - Normal TSH on file in past 12 months    Recent Labs   Lab Test  10/31/17   1501   TSH  2.29             Passed - No active pregnancy on record    If patient is pregnant or has had a positive pregnancy test, please check TSH.         Passed - No positive pregnancy test in past 12 months    If patient is pregnant or has had a positive pregnancy test, please check TSH.          Prescription approved per [facility] Refill Protocol.    "

## 2018-02-06 ENCOUNTER — TELEPHONE (OUTPATIENT)
Dept: FAMILY MEDICINE | Facility: CLINIC | Age: 83
End: 2018-02-06

## 2018-02-06 NOTE — TELEPHONE ENCOUNTER
Reason for Call:  Form, our goal is to have forms completed with 72 hours, however, some forms may require a visit or additional information.    Type of letter, form or note:  medical    Who is the form from?: Home care    Where did the form come from: form was faxed in    What clinic location was the form placed at?: [facility]    Where the form was placed: 's Box: Antwon Schmidt MD    What number is listed as a contact on the form?: [phone]       Additional comments: [facility] 1/4/18 to 3/4/18    Call taken on 2/6/2018 at 1:35 PM by Nhi Maharaj

## 2018-02-08 DIAGNOSIS — Z53.9 DIAGNOSIS NOT YET DEFINED: Primary | ICD-10-CM

## 2018-02-08 PROCEDURE — G0180 MD CERTIFICATION HHA PATIENT: HCPCS | Performed by: INTERNAL MEDICINE

## 2018-02-19 DIAGNOSIS — I20.9 ISCHEMIC CHEST PAIN (H): Primary | ICD-10-CM

## 2018-02-19 RX ORDER — RANOLAZINE 500 MG/1
500 TABLET, EXTENDED RELEASE ORAL 2 TIMES DAILY
Qty: 180 TABLET | Refills: 1 | Status: SHIPPED | OUTPATIENT
Start: 2018-02-19 | End: 2018-04-06

## 2018-04-05 DIAGNOSIS — I10 HYPERTENSION GOAL BP (BLOOD PRESSURE) < 140/90: ICD-10-CM

## 2018-04-05 RX ORDER — METOPROLOL TARTRATE 100 MG
TABLET ORAL
Qty: 135 TABLET | Refills: 2 | Status: SHIPPED | OUTPATIENT
Start: 2018-04-05 | End: 2018-04-05

## 2018-04-05 RX ORDER — METOPROLOL TARTRATE 100 MG
TABLET ORAL
Qty: 135 TABLET | Refills: 2 | Status: SHIPPED | OUTPATIENT
Start: 2018-04-05 | End: 2019-04-22

## 2018-04-05 NOTE — TELEPHONE ENCOUNTER
"Last OV 01/10/2018.  Requested Prescriptions   Pending Prescriptions Disp Refills     metoprolol tartrate (LOPRESSOR) 100 MG tablet [Pharmacy Med Name: METOPROLOL TARTR 100MG TAB] 135 tablet 3     Sig: TAKE 1/2 TABLET BY MOUTH EVERY AM AND TAKE 1 TABLET IN THE EVENING.    Beta-Blockers Protocol Passed    4/5/2018  8:18 AM       Passed - Blood pressure under 140/90 in past 12 months    BP Readings from Last 3 Encounters:   01/10/18 120/62   01/03/18 (!) 138/104   10/31/17 116/62                Passed - Patient is age 6 or older       Passed - Recent (12 mo) or future (30 days) visit within the authorizing provider's specialty    Patient had office visit in the last 12 months or has a visit in the next 30 days with authorizing provider or within the authorizing provider's specialty.  See \"Patient Info\" tab in inbasket, or \"Choose Columns\" in Meds & Orders section of the refill encounter.            Rx approved by error will route to PCP b/c  Routing refill request to provider for review/approval because:  Medication is reported/historical          "

## 2018-04-06 ENCOUNTER — OFFICE VISIT (OUTPATIENT)
Dept: CARDIOLOGY | Facility: CLINIC | Age: 83
End: 2018-04-06
Attending: INTERNAL MEDICINE
Payer: MEDICARE

## 2018-04-06 VITALS
HEART RATE: 87 BPM | HEIGHT: 62 IN | WEIGHT: 210.5 LBS | BODY MASS INDEX: 38.74 KG/M2 | DIASTOLIC BLOOD PRESSURE: 72 MMHG | SYSTOLIC BLOOD PRESSURE: 132 MMHG

## 2018-04-06 DIAGNOSIS — I87.2 VENOUS (PERIPHERAL) INSUFFICIENCY: ICD-10-CM

## 2018-04-06 DIAGNOSIS — R07.9 CHEST PAIN: Primary | ICD-10-CM

## 2018-04-06 DIAGNOSIS — E78.5 HYPERLIPIDEMIA LDL GOAL <70: Chronic | ICD-10-CM

## 2018-04-06 DIAGNOSIS — I25.9 CHRONIC ISCHEMIC HEART DISEASE: ICD-10-CM

## 2018-04-06 DIAGNOSIS — I10 HYPERTENSION GOAL BP (BLOOD PRESSURE) < 140/90: ICD-10-CM

## 2018-04-06 DIAGNOSIS — I50.33 ACUTE ON CHRONIC DIASTOLIC CONGESTIVE HEART FAILURE (H): Primary | ICD-10-CM

## 2018-04-06 DIAGNOSIS — R60.0 BILATERAL LEG EDEMA: ICD-10-CM

## 2018-04-06 DIAGNOSIS — R06.02 SOB (SHORTNESS OF BREATH): ICD-10-CM

## 2018-04-06 PROCEDURE — 99215 OFFICE O/P EST HI 40 MIN: CPT | Performed by: INTERNAL MEDICINE

## 2018-04-06 RX ORDER — RANOLAZINE 500 MG/1
500 TABLET, EXTENDED RELEASE ORAL 2 TIMES DAILY
Qty: 180 TABLET | Refills: 3 | Status: SHIPPED | OUTPATIENT
Start: 2018-04-06 | End: 2018-04-10

## 2018-04-06 RX ORDER — FUROSEMIDE 20 MG
20 TABLET ORAL DAILY
Qty: 30 TABLET | Refills: 11 | Status: SHIPPED | OUTPATIENT
Start: 2018-04-06 | End: 2018-10-26

## 2018-04-06 NOTE — PATIENT INSTRUCTIONS
Avoid high salt containing fodds  Dont use box dinners or canned soups  Take less than 2g per day of sodium  Start lasix 20mg daily (water pill)  Stop amlodipine  Stop Ranexa  Blood work in 7-10 days and see my NP  We will also do heart and leg ultrasound.

## 2018-04-06 NOTE — LETTER
4/6/2018    Antwon Schmidt MD  [address]  [facility][1] [id number]    RE: Lroa Vergara       Dear Colleague,    I had the pleasure of seeing Lora Vergara in the [facility].    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  She has had a coronary angiography in 2010, which showed mild to moderate coronary artery atherosclerosis, but no obstructive disease.  However, chest discomfort and jaw pain have been suspicious for angina and responded to Imdur and Ranexa.  However, she is not able to take Ranexa because of cost.  She cannot afford it.    In January, she was admitted with shortness of breath and cough with mucopurulent expectoration.  She was seen by the hospitalist and was diagnosed with the respiratory distress due to bronchitis.  She received some antibiotics.  She did have an N-terminal proBNP elevation but no echocardiogram was performed or diuretic started.    Since that time, she has progressively increasing leg edema.  She has gained about 9 pounds since January.  She has some shortness of breath but no orthopnea or PND.  Remarkably, she has not had much chest pain episodes requiring nitroglycerin.    With respect to her edema, she is also on amlodipine 5 mg daily.  She is on Lyrica which can cause edema.  She also eats boxed dinners which have high salt content.  In January, her sodium was low and she felt like she could eat more sodium to replenish it.  Her sodium was 131.     She has been still quite active at her age.  She drives.             PHYSICAL EXAMINATION:  She has 2+ leg edema.  Chest is clear to auscultation.  JVD was not well seen.      IMAGING:  Her last echocardiogram from 03/2016 had shown aortic valve sclerosis.  Ejection fraction was greater than 70%.  Last stress nuclear study from 2014 showed no ischemia.        IMPRESSION:   1.  Acute diastolic heart failure  Patient has increasing leg edema since January.  He has some shortness of breath.  She had elevated N-terminal proBNP in January.  She could have diastolic heart failure.  I will suggest starting Lasix 20 mg daily.  We will do a BMP and an N-terminal proBNP in a week.  I have recommended an echocardiogram to assess wall motion and ejection fraction.  We spent a long time discussing importance of low-salt diet.  She should have less than 2 g per day.  We talked about avoiding high salt containing box dinners.    2.  Leg edema  In addition to heart failure, this may be related to amlodipine I will discontinue it.  I told her that Lyrica can also contribute to it and will have to reassess that at a later date.  I will also check venous ultrasound to rule out venous insufficiency.    3.  Microvascular angina/coronary artery atherosclerosis.  At this time, pain is well controlled on a combination of Imdur and amlodipine and p.r.n. sublingual nitro.  Ranexa was useful for her but she cannot afford it.  I have asked by nursing team to check with the company to see if he can get discounted medication for her.  We are stopping amlodipine for leg edema.  If there is recurrent chest pain, we may have to increase him isosorbide mononitrate.    3.  Hyperlipidemia.  Continue atorvastatin.      4.  Hypertension, well controlled.  However, will have to monitor it off amlodipine    I have asked her to see my nurse practitioner-after the above tests are done.  If there is venous insufficiency, I would like to start her on compression therapy.  She might need couple visits to adjust her diuretics as needed.  I would like to see her back in follow-up in 6 months.          Sincerely,     Kalyan Luis MD    Orders Placed This Encounter   Procedures     US Venous Competency Bilateral     Basic metabolic panel     Follow-Up with Cardiac Advanced Practice Provider     Echocardiogram        No orders of the defined types were placed in this encounter.      Medications Discontinued During This Encounter   Medication Reason     Metoprolol Tartrate (LOPRESSOR PO) Dose adjustment     Metoprolol Tartrate (LOPRESSOR PO) Dose adjustment     amLODIPine (NORVASC) 5 MG tablet      ranolazine (RANEXA) 500 MG 12 hr tablet          Encounter Diagnoses   Name Primary?     Chronic ischemic heart disease      Hyperlipidemia LDL goal <70      Hypertension goal BP (blood pressure) < 140/90      SOB (shortness of breath)      Bilateral leg edema      Venous (peripheral) insufficiency      Acute on chronic diastolic congestive heart failure (H) Yes       CURRENT MEDICATIONS:  Current Outpatient Prescriptions   Medication Sig Dispense Refill     metoprolol tartrate (LOPRESSOR) 100 MG tablet TAKE 1/2 TABLET BY MOUTH EVERY AM AND TAKE 1 TABLET IN THE EVENING. 135 tablet 2     levothyroxine (SYNTHROID/LEVOTHROID) 25 MCG tablet TAKE ONE TABLET BY MOUTH ONE TIME DAILY 90 tablet 2     metFORMIN (GLUCOPHAGE-XR) 750 MG 24 hr tablet Take 1 tablet (750 mg) by mouth daily (with dinner) 90 tablet 1     isosorbide mononitrate (IMDUR) 60 MG 24 hr tablet TAKE 1 TABLET (60 MG) BY MOUTH DAILY 90 tablet 1     albuterol (PROAIR HFA/PROVENTIL HFA/VENTOLIN HFA) 108 (90 BASE) MCG/ACT Inhaler Inhale 2 puffs into the lungs every 6 hours as needed for shortness of breath / dyspnea or wheezing 1 Inhaler 0     VITAMIN D, CHOLECALCIFEROL, PO Take 4,000 Units by mouth daily       latanoprost (XALATAN) 0.005 % ophthalmic solution Place 1 drop into both eyes At Bedtime       LYRICA 150 MG capsule TAKE 1 CAPSULE BY MOUTH TWICE A DAY 60 capsule 11     atorvastatin (LIPITOR) 40 MG tablet Take 1 tablet (40 mg) by mouth daily 90 tablet 2     DOCUSATE SODIUM PO Take 100 mg by mouth every morning       nitroglycerin (NITROSTAT) 0.4 MG SL tablet Place 1 tablet (0.4 mg) under the tongue every 5 minutes as needed for chest pain 25 tablet 3     order for DME  Equipment being ordered: wheeled walker with brakes and a seat 1 Device 0     ACETAMINOPHEN PO Take 1,000 mg by mouth 3 times daily        aspirin (ASPIRIN LOW DOSE) 81 MG tablet Take 81 mg by mouth daily        guaiFENesin (ROBITUSSIN) 20 mg/mL SOLN solution Take 10 mLs by mouth every 4 hours as needed for cough (Patient not taking: Reported on 4/6/2018) 1200 mL 0     [DISCONTINUED] Metoprolol Tartrate (LOPRESSOR PO) Take 50 mg by mouth every morning       [DISCONTINUED] Metoprolol Tartrate (LOPRESSOR PO) Take 100 mg by mouth every evening         ALLERGIES     Allergies   Allergen Reactions     Hydrochlorothiazide      Pancreatitis - patient denies this.  She says it gave her low sodium.       PAST MEDICAL HISTORY:  Past Medical History:   Diagnosis Date     Angina at rest (H)     Thought to be microvascular     Arthritis      Basal cell carcinoma 1/2014, (3/5/6-2014)    nose and check and ear and face     Coronary artery disease     Mild CAD per 2/2008 angiogram     Hyperlipidaemia      Hypertension      Hypothyroidism 1/17/2013     Mitral regurgitation 6/2015    echo     Type 2 diabetes, HbA1C goal < 8% (H) 9/11/2013       PAST SURGICAL HISTORY:  Past Surgical History:   Procedure Laterality Date     APPENDECTOMY  1974     BACK SURGERY  1996     BLADDER SURGERY  1990    bladder suspension     CHOLECYSTECTOMY  1975     CORONARY ANGIOGRAPHY ADULT ORDER  2/2008    Mild CAD. LAD w/20% stenosis, RCA with 30-40% stenosis. No flow limiting obstructions.     face surgery  1-6/2014    basal cell plus plastic to nose, face , ear     HYSTERECTOMY  1970     OOPHORECTOMY  1974    bilateral     PHACOEMULSIFICATION CLEAR CORNEA WITH STANDARD INTRAOCULAR LENS IMPLANT Right 7/21/2015    Procedure: PHACOEMULSIFICATION CLEAR CORNEA WITH STANDARD INTRAOCULAR LENS IMPLANT;  Surgeon: Schuyler Chapa MD;  Location: [facility]     PHACOEMULSIFICATION CLEAR CORNEA WITH STANDARD INTRAOCULAR LENS IMPLANT Left 8/18/2015    Procedure:  "PHACOEMULSIFICATION CLEAR CORNEA WITH STANDARD INTRAOCULAR LENS IMPLANT;  Surgeon: Schuyler Chapa MD;  Location: [facility]       FAMILY HISTORY:  Family History   Problem Relation Age of Onset     CANCER Brother      lung     CANCER Son      CEREBROVASCULAR DISEASE Mother      and Parkinson's     CANCER Father      pancreas     DIABETES Son        SOCIAL HISTORY:  Social History     Social History     Marital status:      Spouse name: N/A     Number of children: N/A     Years of education: N/A     Social History Main Topics     Smoking status: Never Smoker     Smokeless tobacco: Never Used     Alcohol use Yes      Comment: Cocktail 2-3 times per month     Drug use: No     Sexual activity: No     Other Topics Concern     Parent/Sibling W/ Cabg, Mi Or Angioplasty Before 65f 55m? No     Caffeine Concern Yes     4 cups caffeine per day     Sleep Concern No     Stress Concern No     Weight Concern No     Special Diet No     Exercise Yes     exercises 30 minutes, 4 days week     Social History Narrative    Had 2 sons; one ; had 2 step dtrs; one .                                        times 2       Review of Systems:  Skin:  Negative       Eyes:  Positive for glaucoma;glasses    ENT:  Negative      Respiratory:  Negative       Cardiovascular:  Negative;palpitations;chest pain;lightheadedness;dizziness;cyanosis;fatigue Positive for;edema;lower extremity symptoms    Gastroenterology: Negative      Genitourinary:  Positive for urinary frequency    Musculoskeletal:  Positive for joint swelling;back pain    Neurologic:  Positive for numbness or tingling of feet neuropathy  Psychiatric:  Negative      Heme/Lymph/Imm:  Positive for allergies    Endocrine:  Positive for thyroid disorder;diabetes      Physical Exam:  Vitals: /72 (BP Location: Left arm, Cuff Size: Adult Large)  Pulse 87  Ht 1.575 m (5' 2\")  Wt 95.5 kg (210 lb 8 oz)  LMP  (LMP Unknown)  SpO2 (P) 96%  BMI 38.5 " kg/m2    Constitutional:  cooperative        Skin:  warm and dry to the touch          Head:  normocephalic        Eyes:  pupils equal and round        Lymph:      ENT:  not assessed this visit        Neck: JVP difficult to assess    Respiratory:  clear to auscultation         Cardiac: regular rhythm;normal S1 and S2   S4            not assessed this visit                                        GI:  abdomen soft;BS normoactive        Extremities and Muscular Skeletal:  no spinal abnormalities noted          2+ leg edema    Neurological:  no gross motor deficits        Psych:  Alert and Oriented x 3        CC  Kalyan Luis MD  [address]  [address]  [place] [id number]                Thank you for allowing me to participate in the care of your patient.      Sincerely,     Kalyan Luis MD     [facility]    cc:   Kalyan Luis MD  [address]  [address]  [place] [id number]

## 2018-04-06 NOTE — PROGRESS NOTES
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  She has had a coronary angiography in 2010, which showed mild to moderate coronary artery atherosclerosis, but no obstructive disease.  However, chest discomfort and jaw pain have been suspicious for angina and responded to Imdur and Ranexa.  However, she is not able to take Ranexa because of cost.  She cannot afford it.    In January, she was admitted with shortness of breath and cough with mucopurulent expectoration.  She was seen by the hospitalist and was diagnosed with the respiratory distress due to bronchitis.  She received some antibiotics.  She did have an N-terminal proBNP elevation but no echocardiogram was performed or diuretic started.    Since that time, she has progressively increasing leg edema.  She has gained about 9 pounds since January.  She has some shortness of breath but no orthopnea or PND.  Remarkably, she has not had much chest pain episodes requiring nitroglycerin.    With respect to her edema, she is also on amlodipine 5 mg daily.  She is on Lyrica which can cause edema.  She also eats boxed dinners which have high salt content.  In January, her sodium was low and she felt like she could eat more sodium to replenish it.  Her sodium was 131.     She has been still quite active at her age.  She drives.             PHYSICAL EXAMINATION:  She has 2+ leg edema.  Chest is clear to auscultation.  JVD was not well seen.      IMAGING:  Her last echocardiogram from 03/2016 had shown aortic valve sclerosis.  Ejection fraction was greater than 70%.  Last stress nuclear study from 2014 showed no ischemia.       IMPRESSION:   1.  Acute diastolic heart failure  Patient has increasing leg edema since January.  He has some shortness of breath.  She had elevated N-terminal proBNP in January.  She could have diastolic heart failure.  I  will suggest starting Lasix 20 mg daily.  We will do a BMP and an N-terminal proBNP in a week.  I have recommended an echocardiogram to assess wall motion and ejection fraction.  We spent a long time discussing importance of low-salt diet.  She should have less than 2 g per day.  We talked about avoiding high salt containing box dinners.    2.  Leg edema  In addition to heart failure, this may be related to amlodipine I will discontinue it.  I told her that Lyrica can also contribute to it and will have to reassess that at a later date.  I will also check venous ultrasound to rule out venous insufficiency.    3.  Microvascular angina/coronary artery atherosclerosis.  At this time, pain is well controlled on a combination of Imdur and amlodipine and p.r.n. sublingual nitro.  Ranexa was useful for her but she cannot afford it.  I have asked by nursing team to check with the company to see if he can get discounted medication for her.  We are stopping amlodipine for leg edema.  If there is recurrent chest pain, we may have to increase him isosorbide mononitrate.    3.  Hyperlipidemia.  Continue atorvastatin.      4.  Hypertension, well controlled.  However, will have to monitor it off amlodipine    I have asked her to see my nurse practitioner-after the above tests are done.  If there is venous insufficiency, I would like to start her on compression therapy.  She might need couple visits to adjust her diuretics as needed.  I would like to see her back in follow-up in 6 months.          Sincerely,     Kalyan Luis MD    Orders Placed This Encounter   Procedures     US Venous Competency Bilateral     Basic metabolic panel     Follow-Up with Cardiac Advanced Practice Provider     Echocardiogram       No orders of the defined types were placed in this encounter.      Medications Discontinued During This Encounter   Medication Reason     Metoprolol Tartrate (LOPRESSOR PO) Dose adjustment     Metoprolol Tartrate (LOPRESSOR  PO) Dose adjustment     amLODIPine (NORVASC) 5 MG tablet      ranolazine (RANEXA) 500 MG 12 hr tablet          Encounter Diagnoses   Name Primary?     Chronic ischemic heart disease      Hyperlipidemia LDL goal <70      Hypertension goal BP (blood pressure) < 140/90      SOB (shortness of breath)      Bilateral leg edema      Venous (peripheral) insufficiency      Acute on chronic diastolic congestive heart failure (H) Yes       CURRENT MEDICATIONS:  Current Outpatient Prescriptions   Medication Sig Dispense Refill     metoprolol tartrate (LOPRESSOR) 100 MG tablet TAKE 1/2 TABLET BY MOUTH EVERY AM AND TAKE 1 TABLET IN THE EVENING. 135 tablet 2     levothyroxine (SYNTHROID/LEVOTHROID) 25 MCG tablet TAKE ONE TABLET BY MOUTH ONE TIME DAILY 90 tablet 2     metFORMIN (GLUCOPHAGE-XR) 750 MG 24 hr tablet Take 1 tablet (750 mg) by mouth daily (with dinner) 90 tablet 1     isosorbide mononitrate (IMDUR) 60 MG 24 hr tablet TAKE 1 TABLET (60 MG) BY MOUTH DAILY 90 tablet 1     albuterol (PROAIR HFA/PROVENTIL HFA/VENTOLIN HFA) 108 (90 BASE) MCG/ACT Inhaler Inhale 2 puffs into the lungs every 6 hours as needed for shortness of breath / dyspnea or wheezing 1 Inhaler 0     VITAMIN D, CHOLECALCIFEROL, PO Take 4,000 Units by mouth daily       latanoprost (XALATAN) 0.005 % ophthalmic solution Place 1 drop into both eyes At Bedtime       LYRICA 150 MG capsule TAKE 1 CAPSULE BY MOUTH TWICE A DAY 60 capsule 11     atorvastatin (LIPITOR) 40 MG tablet Take 1 tablet (40 mg) by mouth daily 90 tablet 2     DOCUSATE SODIUM PO Take 100 mg by mouth every morning       nitroglycerin (NITROSTAT) 0.4 MG SL tablet Place 1 tablet (0.4 mg) under the tongue every 5 minutes as needed for chest pain 25 tablet 3     order for DME Equipment being ordered: wheeled walker with brakes and a seat 1 Device 0     ACETAMINOPHEN PO Take 1,000 mg by mouth 3 times daily        aspirin (ASPIRIN LOW DOSE) 81 MG tablet Take 81 mg by mouth daily        guaiFENesin  (ROBITUSSIN) 20 mg/mL SOLN solution Take 10 mLs by mouth every 4 hours as needed for cough (Patient not taking: Reported on 4/6/2018) 1200 mL 0     [DISCONTINUED] Metoprolol Tartrate (LOPRESSOR PO) Take 50 mg by mouth every morning       [DISCONTINUED] Metoprolol Tartrate (LOPRESSOR PO) Take 100 mg by mouth every evening         ALLERGIES     Allergies   Allergen Reactions     Hydrochlorothiazide      Pancreatitis - patient denies this.  She says it gave her low sodium.       PAST MEDICAL HISTORY:  Past Medical History:   Diagnosis Date     Angina at rest (H)     Thought to be microvascular     Arthritis      Basal cell carcinoma 1/2014, (3/5/6-2014)    nose and check and ear and face     Coronary artery disease     Mild CAD per 2/2008 angiogram     Hyperlipidaemia      Hypertension      Hypothyroidism 1/17/2013     Mitral regurgitation 6/2015    echo     Type 2 diabetes, HbA1C goal < 8% (H) 9/11/2013       PAST SURGICAL HISTORY:  Past Surgical History:   Procedure Laterality Date     APPENDECTOMY  1974     BACK SURGERY  1996     BLADDER SURGERY  1990    bladder suspension     CHOLECYSTECTOMY  1975     CORONARY ANGIOGRAPHY ADULT ORDER  2/2008    Mild CAD. LAD w/20% stenosis, RCA with 30-40% stenosis. No flow limiting obstructions.     face surgery  1-6/2014    basal cell plus plastic to nose, face , ear     HYSTERECTOMY  1970     OOPHORECTOMY  1974    bilateral     PHACOEMULSIFICATION CLEAR CORNEA WITH STANDARD INTRAOCULAR LENS IMPLANT Right 7/21/2015    Procedure: PHACOEMULSIFICATION CLEAR CORNEA WITH STANDARD INTRAOCULAR LENS IMPLANT;  Surgeon: Schuyler Chapa MD;  Location: [facility]     PHACOEMULSIFICATION CLEAR CORNEA WITH STANDARD INTRAOCULAR LENS IMPLANT Left 8/18/2015    Procedure: PHACOEMULSIFICATION CLEAR CORNEA WITH STANDARD INTRAOCULAR LENS IMPLANT;  Surgeon: Schuyler Chapa MD;  Location: [facility]       FAMILY HISTORY:  Family History   Problem Relation Age of Onset     CANCER Brother      lung      "CANCER Son      CEREBROVASCULAR DISEASE Mother      and Parkinson's     CANCER Father      pancreas     DIABETES Son        SOCIAL HISTORY:  Social History     Social History     Marital status:      Spouse name: N/A     Number of children: N/A     Years of education: N/A     Social History Main Topics     Smoking status: Never Smoker     Smokeless tobacco: Never Used     Alcohol use Yes      Comment: Cocktail 2-3 times per month     Drug use: No     Sexual activity: No     Other Topics Concern     Parent/Sibling W/ Cabg, Mi Or Angioplasty Before 65f 55m? No     Caffeine Concern Yes     4 cups caffeine per day     Sleep Concern No     Stress Concern No     Weight Concern No     Special Diet No     Exercise Yes     exercises 30 minutes, 4 days week     Social History Narrative    Had 2 sons; one ; had 2 step dtrs; one .                                        times 2       Review of Systems:  Skin:  Negative       Eyes:  Positive for glaucoma;glasses    ENT:  Negative      Respiratory:  Negative       Cardiovascular:  Negative;palpitations;chest pain;lightheadedness;dizziness;cyanosis;fatigue Positive for;edema;lower extremity symptoms    Gastroenterology: Negative      Genitourinary:  Positive for urinary frequency    Musculoskeletal:  Positive for joint swelling;back pain    Neurologic:  Positive for numbness or tingling of feet neuropathy  Psychiatric:  Negative      Heme/Lymph/Imm:  Positive for allergies    Endocrine:  Positive for thyroid disorder;diabetes      Physical Exam:  Vitals: /72 (BP Location: Left arm, Cuff Size: Adult Large)  Pulse 87  Ht 1.575 m (5' 2\")  Wt 95.5 kg (210 lb 8 oz)  LMP  (LMP Unknown)  SpO2 (P) 96%  BMI 38.5 kg/m2    Constitutional:  cooperative        Skin:  warm and dry to the touch          Head:  normocephalic        Eyes:  pupils equal and round        Lymph:      ENT:  not assessed this visit        Neck: JVP difficult to assess    Respiratory:  " clear to auscultation         Cardiac: regular rhythm;normal S1 and S2   S4            not assessed this visit                                        GI:  abdomen soft;BS normoactive        Extremities and Muscular Skeletal:  no spinal abnormalities noted          2+ leg edema    Neurological:  no gross motor deficits        Psych:  Alert and Oriented x 3        CC  Kalyan Luis MD  [address]  [address]  [place] [id number]

## 2018-04-06 NOTE — LETTER
4/6/2018    Antwon Schmidt MD  [address]  [facility] [address]    RE: Lora Vergara       Dear Colleague,    I had the pleasure of seeing Lora Vergara in the [facility].    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  She has had a coronary angiography in 2010, which showed mild to moderate coronary artery atherosclerosis, but no obstructive disease.  However, chest discomfort and jaw pain have been suspicious for angina and responded to Imdur and Ranexa.  However, she is not able to take Ranexa because of cost.  She cannot afford it.    In January, she was admitted with shortness of breath and cough with mucopurulent expectoration.  She was seen by the hospitalist and was diagnosed with the respiratory distress due to bronchitis.  She received some antibiotics.  She did have an N-terminal proBNP elevation but no echocardiogram was performed or diuretic started.    Since that time, she has progressively increasing leg edema.  She has gained about 9 pounds since January.  She has some shortness of breath but no orthopnea or PND.  Remarkably, she has not had much chest pain episodes requiring nitroglycerin.    With respect to her edema, she is also on amlodipine 5 mg daily.  She is on Lyrica which can cause edema.  She also eats boxed dinners which have high salt content.  In January, her sodium was low and she felt like she could eat more sodium to replenish it.  Her sodium was 131.     She has been still quite active at her age.  She drives.             PHYSICAL EXAMINATION:  She has 2+ leg edema.  Chest is clear to auscultation.  JVD was not well seen.      IMAGING:  Her last echocardiogram from 03/2016 had shown aortic valve sclerosis.  Ejection fraction was greater than 70%.  Last stress nuclear study from 2014 showed no ischemia.        IMPRESSION:   1.  Acute diastolic heart failure  Patient has increasing leg edema since January.  He has some shortness of breath.  She had elevated N-terminal proBNP in January.  She could have diastolic heart failure.  I will suggest starting Lasix 20 mg daily.  We will do a BMP and an N-terminal proBNP in a week.  I have recommended an echocardiogram to assess wall motion and ejection fraction.  We spent a long time discussing importance of low-salt diet.  She should have less than 2 g per day.  We talked about avoiding high salt containing box dinners.    2.  Leg edema  In addition to heart failure, this may be related to amlodipine I will discontinue it.  I told her that Lyrica can also contribute to it and will have to reassess that at a later date.  I will also check venous ultrasound to rule out venous insufficiency.    3.  Microvascular angina/coronary artery atherosclerosis.  At this time, pain is well controlled on a combination of Imdur and amlodipine and p.r.n. sublingual nitro.  Ranexa was useful for her but she cannot afford it.  I have asked by nursing team to check with the company to see if he can get discounted medication for her.  We are stopping amlodipine for leg edema.  If there is recurrent chest pain, we may have to increase him isosorbide mononitrate.    3.  Hyperlipidemia.  Continue atorvastatin.      4.  Hypertension, well controlled.  However, will have to monitor it off amlodipine    I have asked her to see my nurse practitioner-after the above tests are done.  If there is venous insufficiency, I would like to start her on compression therapy.  She might need couple visits to adjust her diuretics as needed.  I would like to see her back in follow-up in 6 months.          Sincerely,     Kalyan Luis MD    Orders Placed This Encounter   Procedures     US Venous Competency Bilateral     Basic metabolic panel     Follow-Up with Cardiac Advanced Practice Provider     Echocardiogram        No orders of the defined types were placed in this encounter.      Medications Discontinued During This Encounter   Medication Reason     Metoprolol Tartrate (LOPRESSOR PO) Dose adjustment     Metoprolol Tartrate (LOPRESSOR PO) Dose adjustment     amLODIPine (NORVASC) 5 MG tablet      ranolazine (RANEXA) 500 MG 12 hr tablet          Encounter Diagnoses   Name Primary?     Chronic ischemic heart disease      Hyperlipidemia LDL goal <70      Hypertension goal BP (blood pressure) < 140/90      SOB (shortness of breath)      Bilateral leg edema      Venous (peripheral) insufficiency      Acute on chronic diastolic congestive heart failure (H) Yes       CURRENT MEDICATIONS:  Current Outpatient Prescriptions   Medication Sig Dispense Refill     metoprolol tartrate (LOPRESSOR) 100 MG tablet TAKE 1/2 TABLET BY MOUTH EVERY AM AND TAKE 1 TABLET IN THE EVENING. 135 tablet 2     levothyroxine (SYNTHROID/LEVOTHROID) 25 MCG tablet TAKE ONE TABLET BY MOUTH ONE TIME DAILY 90 tablet 2     metFORMIN (GLUCOPHAGE-XR) 750 MG 24 hr tablet Take 1 tablet (750 mg) by mouth daily (with dinner) 90 tablet 1     isosorbide mononitrate (IMDUR) 60 MG 24 hr tablet TAKE 1 TABLET (60 MG) BY MOUTH DAILY 90 tablet 1     albuterol (PROAIR HFA/PROVENTIL HFA/VENTOLIN HFA) 108 (90 BASE) MCG/ACT Inhaler Inhale 2 puffs into the lungs every 6 hours as needed for shortness of breath / dyspnea or wheezing 1 Inhaler 0     VITAMIN D, CHOLECALCIFEROL, PO Take 4,000 Units by mouth daily       latanoprost (XALATAN) 0.005 % ophthalmic solution Place 1 drop into both eyes At Bedtime       LYRICA 150 MG capsule TAKE 1 CAPSULE BY MOUTH TWICE A DAY 60 capsule 11     atorvastatin (LIPITOR) 40 MG tablet Take 1 tablet (40 mg) by mouth daily 90 tablet 2     DOCUSATE SODIUM PO Take 100 mg by mouth every morning       nitroglycerin (NITROSTAT) 0.4 MG SL tablet Place 1 tablet (0.4 mg) under the tongue every 5 minutes as needed for chest pain 25 tablet 3     order for DME  Equipment being ordered: wheeled walker with brakes and a seat 1 Device 0     ACETAMINOPHEN PO Take 1,000 mg by mouth 3 times daily        aspirin (ASPIRIN LOW DOSE) 81 MG tablet Take 81 mg by mouth daily        guaiFENesin (ROBITUSSIN) 20 mg/mL SOLN solution Take 10 mLs by mouth every 4 hours as needed for cough (Patient not taking: Reported on 4/6/2018) 1200 mL 0     [DISCONTINUED] Metoprolol Tartrate (LOPRESSOR PO) Take 50 mg by mouth every morning       [DISCONTINUED] Metoprolol Tartrate (LOPRESSOR PO) Take 100 mg by mouth every evening         ALLERGIES     Allergies   Allergen Reactions     Hydrochlorothiazide      Pancreatitis - patient denies this.  She says it gave her low sodium.       PAST MEDICAL HISTORY:  Past Medical History:   Diagnosis Date     Angina at rest (H)     Thought to be microvascular     Arthritis      Basal cell carcinoma 1/2014, (3/5/6-2014)    nose and check and ear and face     Coronary artery disease     Mild CAD per 2/2008 angiogram     Hyperlipidaemia      Hypertension      Hypothyroidism 1/17/2013     Mitral regurgitation 6/2015    echo     Type 2 diabetes, HbA1C goal < 8% (H) 9/11/2013       PAST SURGICAL HISTORY:  Past Surgical History:   Procedure Laterality Date     APPENDECTOMY  1974     BACK SURGERY  1996     BLADDER SURGERY  1990    bladder suspension     CHOLECYSTECTOMY  1975     CORONARY ANGIOGRAPHY ADULT ORDER  2/2008    Mild CAD. LAD w/20% stenosis, RCA with 30-40% stenosis. No flow limiting obstructions.     face surgery  1-6/2014    basal cell plus plastic to nose, face , ear     HYSTERECTOMY  1970     OOPHORECTOMY  1974    bilateral     PHACOEMULSIFICATION CLEAR CORNEA WITH STANDARD INTRAOCULAR LENS IMPLANT Right 7/21/2015    Procedure: PHACOEMULSIFICATION CLEAR CORNEA WITH STANDARD INTRAOCULAR LENS IMPLANT;  Surgeon: Schuyler Chapa MD;  Location: [facility]     PHACOEMULSIFICATION CLEAR CORNEA WITH STANDARD INTRAOCULAR LENS IMPLANT Left 8/18/2015    Procedure:  "PHACOEMULSIFICATION CLEAR CORNEA WITH STANDARD INTRAOCULAR LENS IMPLANT;  Surgeon: Schuyler Chapa MD;  Location: [facility]       FAMILY HISTORY:  Family History   Problem Relation Age of Onset     CANCER Brother      lung     CANCER Son      CEREBROVASCULAR DISEASE Mother      and Parkinson's     CANCER Father      pancreas     DIABETES Son        SOCIAL HISTORY:  Social History     Social History     Marital status:      Spouse name: N/A     Number of children: N/A     Years of education: N/A     Social History Main Topics     Smoking status: Never Smoker     Smokeless tobacco: Never Used     Alcohol use Yes      Comment: Cocktail 2-3 times per month     Drug use: No     Sexual activity: No     Other Topics Concern     Parent/Sibling W/ Cabg, Mi Or Angioplasty Before 65f 55m? No     Caffeine Concern Yes     4 cups caffeine per day     Sleep Concern No     Stress Concern No     Weight Concern No     Special Diet No     Exercise Yes     exercises 30 minutes, 4 days week     Social History Narrative    Had 2 sons; one ; had 2 step dtrs; one .                                        times 2       Review of Systems:  Skin:  Negative       Eyes:  Positive for glaucoma;glasses    ENT:  Negative      Respiratory:  Negative       Cardiovascular:  Negative;palpitations;chest pain;lightheadedness;dizziness;cyanosis;fatigue Positive for;edema;lower extremity symptoms    Gastroenterology: Negative      Genitourinary:  Positive for urinary frequency    Musculoskeletal:  Positive for joint swelling;back pain    Neurologic:  Positive for numbness or tingling of feet neuropathy  Psychiatric:  Negative      Heme/Lymph/Imm:  Positive for allergies    Endocrine:  Positive for thyroid disorder;diabetes      Physical Exam:  Vitals: /72 (BP Location: Left arm, Cuff Size: Adult Large)  Pulse 87  Ht 1.575 m (5' 2\")  Wt 95.5 kg (210 lb 8 oz)  LMP  (LMP Unknown)  SpO2 (P) 96%  BMI 38.5 " kg/m2    Constitutional:  cooperative        Skin:  warm and dry to the touch          Head:  normocephalic        Eyes:  pupils equal and round        Lymph:      ENT:  not assessed this visit        Neck: JVP difficult to assess    Respiratory:  clear to auscultation         Cardiac: regular rhythm;normal S1 and S2   S4            not assessed this visit                                        GI:  abdomen soft;BS normoactive        Extremities and Muscular Skeletal:  no spinal abnormalities noted          2+ leg edema    Neurological:  no gross motor deficits        Psych:  Alert and Oriented x 3        Thank you for allowing me to participate in the care of your patient.    Sincerely,     Kalyan Luis MD     [facility]

## 2018-04-06 NOTE — TELEPHONE ENCOUNTER
RN confirmed with Dr. Luis that he would like patient to take 500mg BID in addition to her imdur 60mg daily (see 4/6/18 OV note for details). RN called [facility] pharmacy and they ran rx with and without co pay card and cost for 60tablets is $162.09. Pharmacist advised that they did not have any options for assistance programs as patient is on medicare part D. RN called [employer] program ([phone]) to inquire if there were any options they had for patients who are on medicare part D.     [employer]an[employer] connect program verified for this RN that they could fax over an application for potential additional coverage or total coverage for medicare part D patients. RN provided fax number for clinic and advised that they fax it to ATTN: Dr. Luis.    RN advised [facility] pharmacy to hold RX for fill until we can verify that we can find additional if not total coverage. Pharmacy acknowledged understanding and agreed with plan.     RN currently awaiting application for [employer] program.

## 2018-04-06 NOTE — MR AVS SNAPSHOT
After Visit Summary   4/6/2018    Lora Vergara    MRN: [id number]           Patient Information     Date Of Birth          9/11/1925        Visit Information        Provider Department      4/6/2018 1:15 PM Kalyan Luis MD [facility]        Today's Diagnoses     Acute on chronic diastolic congestive heart failure (H)    -  1    Chronic ischemic heart disease        Hyperlipidemia LDL goal <70        Hypertension goal BP (blood pressure) < 140/90        SOB (shortness of breath)        Bilateral leg edema        Venous (peripheral) insufficiency          Care Instructions    Avoid high salt containing fodds  Dont use box dinners or canned soups  Take less than 2g per day of sodium  Start lasix 20mg daily (water pill)  Stop amlodipine  Stop Ranexa  Blood work in 7-10 days and see my NP  We will also do heart and leg ultrasound.            Follow-ups after your visit        Additional Services     Follow-Up with Cardiac Advanced Practice Provider                 Your next 10 appointments already scheduled     Apr 10, 2018  1:00 PM CDT   Office Visit with Antwon Schmidt MD   [facility] ([facility])    [address] [place], [place] [id number] [phone]           Bring a current list of meds and any records pertaining to this visit. For Physicals, please bring immunization records and any forms needing to be filled out. Please arrive 10 minutes early to complete paperwork.              Future tests that were ordered for you today     Open Future Orders        Priority Expected Expires Ordered    US Venous Competency Bilateral Routine 5/6/2018 4/6/2019 4/6/2018    Basic metabolic panel Routine 4/13/2018 4/6/2019 4/6/2018    Echocardiogram Routine 4/13/2018 4/6/2019 4/6/2018    Follow-Up with Cardiac Advanced Practice Provider Routine 4/13/2018 4/6/2019 4/6/2018           "  Who to contact     If you have questions or need follow up information about today's clinic visit or your schedule please contact [facility] directly at [phone].  Normal or non-critical lab and imaging results will be communicated to you by MyChart, letter or phone within 4 business days after the clinic has received the results. If you do not hear from us within 7 days, please contact the clinic through MyChart or phone. If you have a critical or abnormal lab result, we will notify you by phone as soon as possible.  Submit refill requests through [employer] or call your pharmacy and they will forward the refill request to us. Please allow 3 business days for your refill to be completed.          Additional Information About Your Visit        [facility] Information     [employer] lets you send messages to your doctor, view your test results, renew your prescriptions, schedule appointments and more. To sign up, go to [email or web address] . Click on \"Log in\" on the left side of the screen, which will take you to the Welcome page. Then click on \"Sign up Now\" on the right side of the page.     You will be asked to enter the access code listed below, as well as some personal information. Please follow the directions to create your username and password.     Your access code is: [email or web address]  Expires: 2018  1:47 PM     Your access code will  in 90 days. If you need help or a new code, please call your [place] clinic or [phone].        Care EveryWhere ID     This is your Care EveryWhere ID. This could be used by other organizations to access your [place] medical records  [id number]        Your Vitals Were     Pulse Height Last Period BMI (Body Mass Index)          87 1.575 m (5' 2\") (LMP Unknown) 38.5 kg/m2         Blood Pressure from Last 3 Encounters:   18 132/72   01/10/18 120/62   18 (!) 138/104    Weight from Last 3 Encounters:   18 95.5 kg " (210 lb 8 oz)   01/10/18 91.2 kg (201 lb)   01/03/18 90.9 kg (200 lb 6.4 oz)              We Performed the Following     Follow-Up with Cardiologist          Today's Medication Changes          These changes are accurate as of 4/6/18  1:50 PM.  If you have any questions, ask your nurse or doctor.               These medicines have changed or have updated prescriptions.        Dose/Directions    metoprolol tartrate 100 MG tablet   Commonly known as:  LOPRESSOR   This may have changed:  Another medication with the same name was removed. Continue taking this medication, and follow the directions you see here.   Used for:  Hypertension goal BP (blood pressure) < 140/90   Changed by:  Kalyan Luis MD        TAKE 1/2 TABLET BY MOUTH EVERY AM AND TAKE 1 TABLET IN THE EVENING.   Quantity:  135 tablet   Refills:  2         Stop taking these medicines if you haven't already. Please contact your care team if you have questions.     amLODIPine 5 MG tablet   Commonly known as:  NORVASC   Stopped by:  Kalyan Luis MD           ranolazine 500 MG 12 hr tablet   Commonly known as:  RANEXA   Stopped by:  Kalyan Luis MD                    Primary Care Provider Office Phone # Fax #    Antwon PRAVIN Schmidt [phone] [address] [facility] [address]        Equal Access to Services     CINDI CORMIER AH: Hadii aad ku hadasho Soomaali, waaxda luqadaha, qaybta kaalmada adeegyada, debbie martins. So [facility] [phone].    ATENCIÓN: Si habla español, tiene a david disposición servicios gratuitos de asistencia lingüística. Llame al [phone].    We comply with applicable federal civil rights laws and [place] laws. We do not discriminate on the basis of race, color, national origin, age, disability, sex, sexual orientation, or gender identity.            Thank you!     Thank you for choosing [facility]  for your care. Our  goal is always to provide you with excellent care. Hearing back from our patients is one way we can continue to improve our services. Please take a few minutes to complete the written survey that you may receive in the mail after your visit with us. Thank you!             Your Updated Medication List - Protect others around you: Learn how to safely use, store and throw away your medicines at www.disposemymeds.org.          This list is accurate as of 4/6/18  1:50 PM.  Always use your most recent med list.                   Brand Name Dispense Instructions for use Diagnosis    ACETAMINOPHEN PO      Take 1,000 mg by mouth 3 times daily        albuterol 108 (90 BASE) MCG/ACT Inhaler    PROAIR HFA/PROVENTIL HFA/VENTOLIN HFA    1 Inhaler    Inhale 2 puffs into the lungs every 6 hours as needed for shortness of breath / dyspnea or wheezing    Acute bronchitis, unspecified organism       ASPIRIN LOW DOSE 81 MG tablet   Generic drug:  aspirin      Take 81 mg by mouth daily        atorvastatin 40 MG tablet    LIPITOR    90 tablet    Take 1 tablet (40 mg) by mouth daily    Hyperlipidemia LDL goal <70       DOCUSATE SODIUM PO      Take 100 mg by mouth every morning        guaiFENesin 20 mg/mL Soln solution    ROBITUSSIN    1200 mL    Take 10 mLs by mouth every 4 hours as needed for cough    Acute bronchitis, unspecified organism       isosorbide mononitrate 60 MG 24 hr tablet    IMDUR    90 tablet    TAKE 1 TABLET (60 MG) BY MOUTH DAILY    Chronic ischemic heart disease       latanoprost 0.005 % ophthalmic solution    XALATAN     Place 1 drop into both eyes At Bedtime        levothyroxine 25 MCG tablet    SYNTHROID/LEVOTHROID    90 tablet    TAKE ONE TABLET BY MOUTH ONE TIME DAILY    Hypothyroidism       LYRICA 150 MG capsule   Generic drug:  pregabalin     60 capsule    TAKE 1 CAPSULE BY MOUTH TWICE A DAY    Neuropathy       metFORMIN 750 MG 24 hr tablet    GLUCOPHAGE-XR    90 tablet    Take 1 tablet (750 mg) by mouth daily  (with dinner)    Type 2 diabetes mellitus with diabetic neuropathy, without long-term current use of insulin (H)       metoprolol tartrate 100 MG tablet    LOPRESSOR    135 tablet    TAKE 1/2 TABLET BY MOUTH EVERY AM AND TAKE 1 TABLET IN THE EVENING.    Hypertension goal BP (blood pressure) < 140/90       nitroGLYcerin 0.4 MG sublingual tablet    NITROSTAT    25 tablet    Place 1 tablet (0.4 mg) under the tongue every 5 minutes as needed for chest pain    Chronic ischemic heart disease, unspecified       order for DME     1 Device    Equipment being ordered: wheeled walker with brakes and a seat    Neuropathy, Poor balance       VITAMIN D (CHOLECALCIFEROL) PO      Take 4,000 Units by mouth daily

## 2018-04-09 DIAGNOSIS — R07.9 CHEST PAIN: Primary | ICD-10-CM

## 2018-04-09 RX ORDER — NITROGLYCERIN 0.4 MG/1
0.4 TABLET SUBLINGUAL EVERY 5 MIN PRN
Qty: 25 TABLET | Refills: 3 | Status: SHIPPED | OUTPATIENT
Start: 2018-04-09 | End: 2018-05-25

## 2018-04-10 ENCOUNTER — OFFICE VISIT (OUTPATIENT)
Dept: FAMILY MEDICINE | Facility: CLINIC | Age: 83
End: 2018-04-10
Payer: MEDICARE

## 2018-04-10 VITALS
DIASTOLIC BLOOD PRESSURE: 74 MMHG | WEIGHT: 208 LBS | RESPIRATION RATE: 20 BRPM | OXYGEN SATURATION: 93 % | HEART RATE: 85 BPM | TEMPERATURE: 97.9 F | SYSTOLIC BLOOD PRESSURE: 122 MMHG | BODY MASS INDEX: 38.28 KG/M2 | HEIGHT: 62 IN

## 2018-04-10 DIAGNOSIS — I25.9 CHRONIC ISCHEMIC HEART DISEASE: ICD-10-CM

## 2018-04-10 DIAGNOSIS — G62.9 NEUROPATHY: Primary | Chronic | ICD-10-CM

## 2018-04-10 DIAGNOSIS — R60.0 BILATERAL LEG EDEMA: ICD-10-CM

## 2018-04-10 PROCEDURE — 99213 OFFICE O/P EST LOW 20 MIN: CPT | Performed by: INTERNAL MEDICINE

## 2018-04-10 RX ORDER — PREGABALIN 150 MG/1
CAPSULE ORAL
Qty: 180 CAPSULE | Refills: 3 | Status: SHIPPED | OUTPATIENT
Start: 2018-04-10 | End: 2018-10-03

## 2018-04-10 NOTE — PROGRESS NOTES
SUBJECTIVE:   Lora Vergara is a 89 year old female who presents to clinic today for the following health issues:      Discuss recent Cardio Dr. Visit and also fill out form to get Lyrica at lower cost.             Has not started taking furosemide yet.               Will start today.                   She is concerned about recurrent hyponatremia.              Legs are heavy, and painful.           Amlodipine was stopped.          She cannot afford Ranexa.           Today she has some paperwork for us to complete so that she can get Lyrica at reduced cost.                                   She feels stressed, and actually took a sublingual nitroglycerin here in the office with good results.          Problem list and histories reviewed & adjusted, as indicated.      Current Outpatient Prescriptions   Medication Sig Dispense Refill     nitroGLYcerin (NITROSTAT) 0.4 MG sublingual tablet Place 1 tablet (0.4 mg) under the tongue every 5 minutes as needed for chest pain 25 tablet 3     furosemide (LASIX) 20 MG tablet Take 1 tablet (20 mg) by mouth daily 30 tablet 11     metoprolol tartrate (LOPRESSOR) 100 MG tablet TAKE 1/2 TABLET BY MOUTH EVERY AM AND TAKE 1 TABLET IN THE EVENING. 135 tablet 2     levothyroxine (SYNTHROID/LEVOTHROID) 25 MCG tablet TAKE ONE TABLET BY MOUTH ONE TIME DAILY 90 tablet 2     metFORMIN (GLUCOPHAGE-XR) 750 MG 24 hr tablet Take 1 tablet (750 mg) by mouth daily (with dinner) 90 tablet 1     isosorbide mononitrate (IMDUR) 60 MG 24 hr tablet TAKE 1 TABLET (60 MG) BY MOUTH DAILY 90 tablet 1     albuterol (PROAIR HFA/PROVENTIL HFA/VENTOLIN HFA) 108 (90 BASE) MCG/ACT Inhaler Inhale 2 puffs into the lungs every 6 hours as needed for shortness of breath / dyspnea or wheezing 1 Inhaler 0     VITAMIN D, CHOLECALCIFEROL, PO Take 4,000 Units by mouth daily       latanoprost (XALATAN) 0.005 % ophthalmic solution Place 1 drop into both eyes At Bedtime       LYRICA 150 MG capsule TAKE 1 CAPSULE BY MOUTH  "TWICE A DAY 60 capsule 11     atorvastatin (LIPITOR) 40 MG tablet Take 1 tablet (40 mg) by mouth daily 90 tablet 2     DOCUSATE SODIUM PO Take 100 mg by mouth every morning       order for DME Equipment being ordered: wheeled walker with brakes and a seat 1 Device 0     ACETAMINOPHEN PO Take 1,000 mg by mouth 3 times daily        aspirin (ASPIRIN LOW DOSE) 81 MG tablet Take 81 mg by mouth daily        Allergies   Allergen Reactions     Hydrochlorothiazide      Pancreatitis - patient denies this.  She says it gave her low sodium.     BP Readings from Last 3 Encounters:   04/10/18 122/74   04/06/18 132/72   01/10/18 120/62    Wt Readings from Last 3 Encounters:   04/10/18 208 lb (94.3 kg)   04/06/18 210 lb 8 oz (95.5 kg)   01/10/18 201 lb (91.2 kg)                    Reviewed and updated as needed this visit by clinical staff       Reviewed and updated as needed this visit by Provider         ROS:  CONSTITUTIONAL: Weight gain since her December hospitalization  RESP:POSITIVE for dyspnea on exertion  CV: POSITIVE for chest pain/chest pressure and lower extremity edema    OBJECTIVE:                                                    /74 (BP Location: Left arm, Patient Position: Chair, Cuff Size: Adult Large)  Pulse 85  Temp 97.9  F (36.6  C)  Resp 20  Ht 5' 2\" (1.575 m)  Wt 208 lb (94.3 kg)  LMP  (LMP Unknown)  SpO2 93%  Breastfeeding? No  BMI 38.04 kg/m2  Body mass index is 38.04 kg/(m^2).  GENERAL APPEARANCE: alert, no distress and over weight  RESP: lungs clear to auscultation - no rales, rhonchi or wheezes  CV: regular rates and rhythm, normal S1 S2, no S3 or S4, no murmur, click or rub and pitting B/L LE edema to 2+    Diagnostic test results:  none      ASSESSMENT/PLAN:                                                        ICD-10-CM    1. Neuropathy G62.9 pregabalin (LYRICA) 150 MG capsule   2. Chronic ischemic heart disease I25.9    3. Bilateral leg edema R60.0        She will start furosemide " today.  We discussed restricting her water intake.  She prefers coffee.                   Once her leg edema improves, and consider resuming amlodipine if necessary.                                     Paperwork completed regarding her Lyrica prescription.   Follow up with Provider -as needed    Antwon Schmidt MD  [facility]

## 2018-04-10 NOTE — NURSING NOTE
"Chief Complaint   Patient presents with     Forms       Initial /74 (BP Location: Left arm, Patient Position: Chair, Cuff Size: Adult Large)  Pulse 85  Temp 97.9  F (36.6  C)  Resp 20  Ht 5' 2\" (1.575 m)  Wt 208 lb (94.3 kg)  LMP  (LMP Unknown)  SpO2 93%  Breastfeeding? No  BMI 38.04 kg/m2 Estimated body mass index is 38.04 kg/(m^2) as calculated from the following:    Height as of this encounter: 5' 2\" (1.575 m).    Weight as of this encounter: 208 lb (94.3 kg).  Medication Reconciliation: complete   Catherine Royal LPN  "

## 2018-04-10 NOTE — MR AVS SNAPSHOT
After Visit Summary   4/10/2018    Lora Vergara    MRN: [id number]           Patient Information     Date Of Birth          9/11/1925        Visit Information        Provider Department      4/10/2018 1:00 PM Antwon Schmidt MD [facility]        Today's Diagnoses     Neuropathy    -  1    Chronic ischemic heart disease        Bilateral leg edema           Follow-ups after your visit        Your next 10 appointments already scheduled     Apr 18, 2018 12:10 PM CDT   LAB with [facility]   [facility] ([facility])    [address] [place], [place] [id number]  [facility] [address]   [phone]           Please do not eat 10-12 hours before your appointment if you are coming in fasting for labs on lipids, cholesterol, or glucose (sugar). This does not apply to pregnant women. Water, hot tea and black coffee (with nothing added) are okay. Do not drink other fluids, diet soda or chew gum.            Apr 18, 2018 12:45 PM CDT   Ech Complete with [id number]   [facility] CV Echocardiography (Cardiovascular [facility] at [facility])    [address] [place] [id number] [phone]           1. Please bring or wear a comfortable two-piece outfit. 2. You may eat, drink and take your normal medicines. 3. For any questions that cannot be answered, please contact the ordering physician            Apr 18, 2018  2:00 PM CDT   (Arrive by 1:45 PM)   US LOWER EXTREMITY VENOUS COMPETENCY BILATERAL with SUVUS1   [facility] ([facility])    [address] [place] [id number] [phone]           Please bring a list of your medicines (including vitamins, minerals and over-the-counter drugs). Also, tell your doctor about any allergies you may have. Wear comfortable clothes and leave your valuables at home.  You do not need to do anything special  "to prepare for your exam.  Please call the Imaging Department at your exam site with any questions.            2018  1:00 PM CDT   Return Visit with HERMELINDA Moraes CNP   [facility] ([facility])    [address] [place], [place] [address]  [facility] [address] [phone] OPT 2              Who to contact     If you have questions or need follow up information about today's clinic visit or your schedule please contact [facility] directly at [phone].  Normal or non-critical lab and imaging results will be communicated to you by GiPStechhart, letter or phone within 4 business days after the clinic has received the results. If you do not hear from us within 7 days, please contact the clinic through GiPStechhart or phone. If you have a critical or abnormal lab result, we will notify you by phone as soon as possible.  Submit refill requests through LogMeIn or call your pharmacy and they will forward the refill request to us. Please allow 3 business days for your refill to be completed.          Additional Information About Your Visit        MyChart Information     LogMeIn lets you send messages to your doctor, view your test results, renew your prescriptions, schedule appointments and more. To sign up, go to [email or web address] . Click on \"Log in\" on the left side of the screen, which will take you to the Welcome page. Then click on \"Sign up Now\" on the right side of the page.     You will be asked to enter the access code listed below, as well as some personal information. Please follow the directions to create your username and password.     Your access code is: [email or web address]  Expires: 2018  1:47 PM     Your access code will  in 90 days. If you need help or a new code, please call your [facility] or [phone].        Care EveryWhere ID     This is your Care EveryWhere ID. This could be used by other " "organizations to access your [facility] medical records  [id number]        Your Vitals Were     Pulse Temperature Respirations Height Last Period Pulse Oximetry    85 97.9  F (36.6  C) 20 5' 2\" (1.575 m) (LMP Unknown) 93%    Breastfeeding? BMI (Body Mass Index)                No 38.04 kg/m2           Blood Pressure from Last 3 Encounters:   04/10/18 122/74   04/06/18 132/72   01/10/18 120/62    Weight from Last 3 Encounters:   04/10/18 208 lb (94.3 kg)   04/06/18 210 lb 8 oz (95.5 kg)   01/10/18 201 lb (91.2 kg)              Today, you had the following     No orders found for display         Today's Medication Changes          These changes are accurate as of 4/10/18  1:32 PM.  If you have any questions, ask your nurse or doctor.               These medicines have changed or have updated prescriptions.        Dose/Directions    pregabalin 150 MG capsule   Commonly known as:  LYRICA   This may have changed:  See the new instructions.   Used for:  Neuropathy   Changed by:  Antwon Schmidt MD        TAKE 1 CAPSULE BY MOUTH TWICE A DAY   Quantity:  180 capsule   Refills:  3            Where to get your medicines      Some of these will need a paper prescription and others can be bought over the counter.  Ask your nurse if you have questions.     Bring a paper prescription for each of these medications     pregabalin 150 MG capsule                Primary Care Provider Office Phone # Fax #    Antwon Schmidt [phone] [address] [facility] [address]        Equal Access to Services     [facility] AH: Hadii aad ku hadasho Soomaali, waaxda luqadaha, qaybta kaalmada adeegyada, waxay idiin hayaan adeeg kharash la'aan . So [facility] [phone].    ATENCIÓN: Si habla español, tiene a david disposición servicios gratuitos de asistencia lingüística. Llame al [phone].    We comply with applicable federal civil rights laws and [place] laws. We do not discriminate on the basis of race, color, " national origin, age, disability, sex, sexual orientation, or gender identity.            Thank you!     Thank you for choosing [facility]  for your care. Our goal is always to provide you with excellent care. Hearing back from our patients is one way we can continue to improve our services. Please take a few minutes to complete the written survey that you may receive in the mail after your visit with us. Thank you!             Your Updated Medication List - Protect others around you: Learn how to safely use, store and throw away your medicines at www.disposemymeds.org.          This list is accurate as of 4/10/18  1:32 PM.  Always use your most recent med list.                   Brand Name Dispense Instructions for use Diagnosis    ACETAMINOPHEN PO      Take 1,000 mg by mouth 3 times daily        albuterol 108 (90 Base) MCG/ACT Inhaler    PROAIR HFA/PROVENTIL HFA/VENTOLIN HFA    1 Inhaler    Inhale 2 puffs into the lungs every 6 hours as needed for shortness of breath / dyspnea or wheezing    Acute bronchitis, unspecified organism       ASPIRIN LOW DOSE 81 MG tablet   Generic drug:  aspirin      Take 81 mg by mouth daily        atorvastatin 40 MG tablet    LIPITOR    90 tablet    Take 1 tablet (40 mg) by mouth daily    Hyperlipidemia LDL goal <70       DOCUSATE SODIUM PO      Take 100 mg by mouth every morning        furosemide 20 MG tablet    LASIX    30 tablet    Take 1 tablet (20 mg) by mouth daily    Bilateral leg edema       isosorbide mononitrate 60 MG 24 hr tablet    IMDUR    90 tablet    TAKE 1 TABLET (60 MG) BY MOUTH DAILY    Chronic ischemic heart disease       latanoprost 0.005 % ophthalmic solution    XALATAN     Place 1 drop into both eyes At Bedtime        levothyroxine 25 MCG tablet    SYNTHROID/LEVOTHROID    90 tablet    TAKE ONE TABLET BY MOUTH ONE TIME DAILY    Hypothyroidism       metFORMIN 750 MG 24 hr tablet    GLUCOPHAGE-XR    90 tablet    Take 1 tablet (750 mg)  by mouth daily (with dinner)    Type 2 diabetes mellitus with diabetic neuropathy, without long-term current use of insulin (H)       metoprolol tartrate 100 MG tablet    LOPRESSOR    135 tablet    TAKE 1/2 TABLET BY MOUTH EVERY AM AND TAKE 1 TABLET IN THE EVENING.    Hypertension goal BP (blood pressure) < 140/90       nitroGLYcerin 0.4 MG sublingual tablet    NITROSTAT    25 tablet    Place 1 tablet (0.4 mg) under the tongue every 5 minutes as needed for chest pain    Chest pain       order for DME     1 Device    Equipment being ordered: wheeled walker with brakes and a seat    Neuropathy, Poor balance       pregabalin 150 MG capsule    LYRICA    180 capsule    TAKE 1 CAPSULE BY MOUTH TWICE A DAY    Neuropathy       VITAMIN D (CHOLECALCIFEROL) PO      Take 4,000 Units by mouth daily

## 2018-04-17 DIAGNOSIS — E78.5 HYPERLIPIDEMIA LDL GOAL <70: ICD-10-CM

## 2018-04-17 RX ORDER — ATORVASTATIN CALCIUM 40 MG/1
40 TABLET, FILM COATED ORAL DAILY
Qty: 90 TABLET | Refills: 3 | Status: SHIPPED | OUTPATIENT
Start: 2018-04-17 | End: 2019-05-14

## 2018-04-18 ENCOUNTER — RADIANT APPOINTMENT (OUTPATIENT)
Dept: VASCULAR ULTRASOUND | Facility: CLINIC | Age: 83
End: 2018-04-18
Attending: INTERNAL MEDICINE
Payer: MEDICARE

## 2018-04-18 ENCOUNTER — HOSPITAL ENCOUNTER (OUTPATIENT)
Dept: CARDIOLOGY | Facility: CLINIC | Age: 83
Discharge: HOME | End: 2018-04-18
Attending: INTERNAL MEDICINE | Admitting: INTERNAL MEDICINE
Payer: MEDICARE

## 2018-04-18 ENCOUNTER — TELEPHONE (OUTPATIENT)
Dept: CARDIOLOGY | Facility: CLINIC | Age: 83
End: 2018-04-18

## 2018-04-18 DIAGNOSIS — I87.2 VENOUS (PERIPHERAL) INSUFFICIENCY: ICD-10-CM

## 2018-04-18 DIAGNOSIS — I50.33 ACUTE ON CHRONIC DIASTOLIC CONGESTIVE HEART FAILURE (H): ICD-10-CM

## 2018-04-18 DIAGNOSIS — R60.0 BILATERAL LEG EDEMA: ICD-10-CM

## 2018-04-18 DIAGNOSIS — R06.02 SOB (SHORTNESS OF BREATH): ICD-10-CM

## 2018-04-18 LAB
ANION GAP SERPL CALCULATED.3IONS-SCNC: 11.9 MMOL/L (ref 6–17)
BUN SERPL-MCNC: 12 MG/DL (ref 7–30)
CALCIUM SERPL-MCNC: 9.5 MG/DL (ref 8.5–10.5)
CHLORIDE SERPL-SCNC: 98 MMOL/L (ref 98–107)
CO2 SERPL-SCNC: 32 MMOL/L (ref 23–29)
CREAT SERPL-MCNC: 0.94 MG/DL (ref 0.7–1.3)
GFR SERPL CREATININE-BSD FRML MDRD: 56 ML/MIN/1.7M2
GLUCOSE SERPL-MCNC: 105 MG/DL (ref 70–105)
NT-PROBNP SERPL-MCNC: 495 PG/ML (ref 0–450)
POTASSIUM SERPL-SCNC: 3.9 MMOL/L (ref 3.5–5.1)
SODIUM SERPL-SCNC: 138 MMOL/L (ref 136–145)

## 2018-04-18 PROCEDURE — 80048 BASIC METABOLIC PNL TOTAL CA: CPT | Performed by: INTERNAL MEDICINE

## 2018-04-18 PROCEDURE — 36415 COLL VENOUS BLD VENIPUNCTURE: CPT | Performed by: INTERNAL MEDICINE

## 2018-04-18 PROCEDURE — 93970 EXTREMITY STUDY: CPT | Performed by: INTERNAL MEDICINE

## 2018-04-18 PROCEDURE — 25500064 ZZH RX 255 OP 636: Performed by: INTERNAL MEDICINE

## 2018-04-18 PROCEDURE — 93306 TTE W/DOPPLER COMPLETE: CPT | Mod: 26 | Performed by: INTERNAL MEDICINE

## 2018-04-18 PROCEDURE — 83880 ASSAY OF NATRIURETIC PEPTIDE: CPT | Performed by: INTERNAL MEDICINE

## 2018-04-18 PROCEDURE — 40000264 ECHO COMPLETE WITH OPTISON

## 2018-04-18 RX ORDER — RANOLAZINE 500 MG/1
500 TABLET, EXTENDED RELEASE ORAL 2 TIMES DAILY
Qty: 360 TABLET | Refills: 3 | COMMUNITY
Start: 2018-04-18 | End: 2018-05-25

## 2018-04-18 RX ADMIN — HUMAN ALBUMIN MICROSPHERES AND PERFLUTREN 9 ML: 10; .22 INJECTION, SOLUTION INTRAVENOUS at 13:15

## 2018-04-18 NOTE — TELEPHONE ENCOUNTER
Due to edema, Lora's amlodipine was discontinued on 4/6/18 by Dr. Luis.  Called pharmacy to inform them.  ROWAN Roth 4/18/2018

## 2018-04-19 NOTE — TELEPHONE ENCOUNTER
Prior Authorization Approval    Authorization Effective Date: 4/19/2018  Authorization Expiration Date:    Medication: Ranexa 500mg er tablets - Approved  Approved Dose/Quantity:   Reference #:     Insurance Company: Medicare Blue - Phone [phone] Fax [phone]  Expected CoPay:       CoPay Card Available:      Foundation Assistance Needed:    Which Pharmacy is filling the prescription (Not needed for infusion/clinic administered): [facility] [id number] [place] [address]  Pharmacy Notified:    Patient Notified:

## 2018-04-19 NOTE — TELEPHONE ENCOUNTER
Central Prior Authorization Team   Phone: [phone]      [place] Initiation    Medication: Ranexa 500mg er tablets  Insurance Company: Medicare Blue - Phone [phone] Fax [phone]  Pharmacy [facility]illing the Rx[id number] [place] [facility] [place], [place] - [address]  Filling Pharmacy Phone: [phone]  Filling Pharmacy Fax:    Start Date: 4/19/2018

## 2018-04-20 ENCOUNTER — TELEPHONE (OUTPATIENT)
Dept: FAMILY MEDICINE | Facility: CLINIC | Age: 83
End: 2018-04-20

## 2018-04-20 NOTE — TELEPHONE ENCOUNTER
Reason for Call:  Form, our goal is to have forms completed with 72 hours, however, some forms may require a visit or additional information.    Type of letter, form or note:  medical    Who is the form from?: Home care    Where did the form come from: form was faxed in    What clinic location was the form placed at?: [facility]    Where the form was placed: 's Box: Antwon Schmidt MD    What number is listed as a contact on the form?: [phone]  Phone [phone]         Additional comments: [employer]is [employer] OTC verification form    Call taken on 4/20/2018 at 9:04 AM by Nhi Maharaj

## 2018-04-26 ENCOUNTER — OFFICE VISIT (OUTPATIENT)
Dept: CARDIOLOGY | Facility: CLINIC | Age: 83
End: 2018-04-26
Attending: INTERNAL MEDICINE
Payer: MEDICARE

## 2018-04-26 VITALS
SYSTOLIC BLOOD PRESSURE: 122 MMHG | BODY MASS INDEX: 37.67 KG/M2 | HEART RATE: 84 BPM | HEIGHT: 62 IN | WEIGHT: 204.7 LBS | DIASTOLIC BLOOD PRESSURE: 64 MMHG

## 2018-04-26 DIAGNOSIS — E78.5 HYPERLIPIDEMIA LDL GOAL <70: Primary | Chronic | ICD-10-CM

## 2018-04-26 DIAGNOSIS — I50.33 ACUTE ON CHRONIC DIASTOLIC CONGESTIVE HEART FAILURE (H): ICD-10-CM

## 2018-04-26 DIAGNOSIS — I10 HYPERTENSION GOAL BP (BLOOD PRESSURE) < 140/90: ICD-10-CM

## 2018-04-26 DIAGNOSIS — R60.0 BILATERAL LEG EDEMA: ICD-10-CM

## 2018-04-26 DIAGNOSIS — I25.9 CHRONIC ISCHEMIC HEART DISEASE: ICD-10-CM

## 2018-04-26 DIAGNOSIS — I25.85 CARDIAC MICROVASCULAR DISEASE: ICD-10-CM

## 2018-04-26 PROCEDURE — 99214 OFFICE O/P EST MOD 30 MIN: CPT | Performed by: NURSE PRACTITIONER

## 2018-04-26 RX ORDER — RANOLAZINE 500 MG/1
500 TABLET, EXTENDED RELEASE ORAL 2 TIMES DAILY
Qty: 180 TABLET | Refills: 3 | Status: SHIPPED | OUTPATIENT
Start: 2018-04-26 | End: 2018-09-18

## 2018-04-26 NOTE — PATIENT INSTRUCTIONS
Once your restart Ranexa we need to get an EKG. Please call my nurse once you start it, and ask her to order and EKG a few days later, this can be done at Dr. Rush office.    Continue lasix 20 mg daily.    See me in a month.    [place] [phone]

## 2018-04-26 NOTE — LETTER
4/26/2018    Antwon Schmidt MD  [address]  [facility] [address]    RE: Lora Vergara       Dear Colleague,    I had the pleasure of seeing Lora Vergara in the [facility].    HPI and Plan:   I had the pleasure of meeting Lora Son and her son Derrek today in cardiology clinic to review the results of her venous competency studies, her echocardiogram and her response to some medication changes.    Lora has been following with Dr. Luis for many years.  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  Her coronary angiogram in 2010 showed mild to moderate coronary artery atherosclerosis, but no obstructive disease.  She is continued to have chest discomfort and jaw pain suspicious for angina that has responded to Imdur and Ranexa.  We have been trying to get her Ranexa covered for her at a reduced cost, and looking in to see where we are with that.    Lora saw Dr. Luis earlier this month.  She been hospitalized for bronchitis in December and had a elevated NT proBNP but no echo or diuretic.  Since then she has had progressive increasing leg edema and gained about 9 pounds.  She has not had any shortness of breath orthopnea or PND with this.  As a result Dr. Luis recommended that we do echocardiogram, that we see where her NT proBNP is, which has dropped from 2467 down to 495.  She also was started on a diuretic, and venous competency studies were done.    Venous competency studies were negative for venous insufficiency and also negative for DVT.  Her echocardiogram showed normal LV systolic function no wall motion abnormalities, and mild MR and moderate mitral stenosis.  Since starting furosemide 20 mg daily her weight has come down 6 pounds on our clinic scale.  Her renal function continues to be good, her creatinine is 0.94, though this is a little higher than it was in January at 0.55.  She still has a little bit of ankle  edema that is worse at the end of the day.    Today Lora is feeling well.  She denies any shortness of breath, PND or orthopnea.  She is using her Ranexa this left over as needed, I told her I do not think that is a good idea and would like her to stop.  She did have one episode where she is living when she went from sit to stand when she got a little bit dizzy and lightheaded.  She did not have any syncope or presyncope with this and this has never happened before or since.    Physical Exam  Please see Below     Assessment and Plan  1.  Diastolic heart failure.  Her echocardiogram looks good today, her RVSP is 27+ right atrial pressure.  Her EF is preserved.  She is responded well to the diuretics Dr. Luis started, though her weight is still up and she still complains of a little bit of ankle edema that is worse at the end of the day.  My recommendation is that she continue on the 20 mg of Lasix.  2.  Microvascular angina.  She admits today to still having some Ranexa tablets, she does not have enough to take this twice a day and she says the cost of it is prohibitive.  We have been looking into it getting covered at a lower cost through the pharmaceutical company.  I am not sure where we stand with that.  I did also give her son a paper copy of a prescription for Ranexa 500 mg twice daily.  He is going to look into a [place] pharmacy that he has used in the past for his medications.  I gave him written instructions that when she starts Ranexa 500 mg twice daily that a few days later we should do an EKG, we can place a order for it when they call us and this can be done at Dr. Stevens's office if they prefer.    Thank you for allowing me to see Lora today.  I will see her back in 1 month's time to see how she is doing with Ranexa and Lasix.    HERMELINDA Hernandez, CNP      Orders Placed This Encounter   Procedures     Follow-Up with Cardiac Advanced Practice Provider     Orders Placed This Encounter    Medications     ranolazine (RANEXA) 500 MG 12 hr tablet     Sig: Take 1 tablet (500 mg) by mouth 2 times daily     Dispense:  180 tablet     Refill:  3     There are no discontinued medications.      CURRENT MEDICATIONS:  Current Outpatient Prescriptions   Medication Sig Dispense Refill     ACETAMINOPHEN PO Take 1,000 mg by mouth 3 times daily        albuterol (PROAIR HFA/PROVENTIL HFA/VENTOLIN HFA) 108 (90 BASE) MCG/ACT Inhaler Inhale 2 puffs into the lungs every 6 hours as needed for shortness of breath / dyspnea or wheezing (Patient not taking: Reported on 4/26/2018) 1 Inhaler 0     aspirin (ASPIRIN LOW DOSE) 81 MG tablet Take 81 mg by mouth daily        atorvastatin (LIPITOR) 40 MG tablet Take 1 tablet (40 mg) by mouth daily 90 tablet 3     DOCUSATE SODIUM PO Take 100 mg by mouth every morning       furosemide (LASIX) 20 MG tablet Take 1 tablet (20 mg) by mouth daily 30 tablet 11     isosorbide mononitrate (IMDUR) 60 MG 24 hr tablet TAKE 1 TABLET (60 MG) BY MOUTH DAILY 90 tablet 1     latanoprost (XALATAN) 0.005 % ophthalmic solution Place 1 drop into both eyes At Bedtime       levothyroxine (SYNTHROID/LEVOTHROID) 25 MCG tablet TAKE ONE TABLET BY MOUTH ONE TIME DAILY 90 tablet 2     metFORMIN (GLUCOPHAGE-XR) 750 MG 24 hr tablet Take 1 tablet (750 mg) by mouth daily (with dinner) 90 tablet 1     metoprolol tartrate (LOPRESSOR) 100 MG tablet TAKE 1/2 TABLET BY MOUTH EVERY AM AND TAKE 1 TABLET IN THE EVENING. 135 tablet 2     nitroGLYcerin (NITROSTAT) 0.4 MG sublingual tablet Place 1 tablet (0.4 mg) under the tongue every 5 minutes as needed for chest pain 25 tablet 3     order for DME Equipment being ordered: wheeled walker with brakes and a seat 1 Device 0     pregabalin (LYRICA) 150 MG capsule TAKE 1 CAPSULE BY MOUTH TWICE A  capsule 3     ranolazine (RANEXA) 500 MG 12 hr tablet Take 1 tablet (500 mg) by mouth 2 times daily 360 tablet 3     ranolazine (RANEXA) 500 MG 12 hr tablet Take 1 tablet (500 mg)  by mouth 2 times daily 180 tablet 3     VITAMIN D, CHOLECALCIFEROL, PO Take 4,000 Units by mouth daily         ALLERGIES     Allergies   Allergen Reactions     Hydrochlorothiazide      Pancreatitis - patient denies this.  She says it gave her low sodium.       PAST MEDICAL HISTORY:  Past Medical History:   Diagnosis Date     Angina at rest (H)     Thought to be microvascular     Arthritis      Basal cell carcinoma 1/2014, (3/5/6-2014)    nose and check and ear and face     Coronary artery disease     Mild CAD per 2/2008 angiogram     Hyperlipidaemia      Hypertension      Hypothyroidism 1/17/2013     Mitral regurgitation 6/2015    echo     Type 2 diabetes, HbA1C goal < 8% (H) 9/11/2013       PAST SURGICAL HISTORY:  Past Surgical History:   Procedure Laterality Date     APPENDECTOMY  1974     BACK SURGERY  1996     BLADDER SURGERY  1990    bladder suspension     CHOLECYSTECTOMY  1975     CORONARY ANGIOGRAPHY ADULT ORDER  2/2008    Mild CAD. LAD w/20% stenosis, RCA with 30-40% stenosis. No flow limiting obstructions.     face surgery  1-6/2014    basal cell plus plastic to nose, face , ear     HYSTERECTOMY  1970     OOPHORECTOMY  1974    bilateral     PHACOEMULSIFICATION CLEAR CORNEA WITH STANDARD INTRAOCULAR LENS IMPLANT Right 7/21/2015    Procedure: PHACOEMULSIFICATION CLEAR CORNEA WITH STANDARD INTRAOCULAR LENS IMPLANT;  Surgeon: Schuyler Chapa MD;  Location: [facility]     PHACOEMULSIFICATION CLEAR CORNEA WITH STANDARD INTRAOCULAR LENS IMPLANT Left 8/18/2015    Procedure: PHACOEMULSIFICATION CLEAR CORNEA WITH STANDARD INTRAOCULAR LENS IMPLANT;  Surgeon: Schuyler Chapa MD;  Location: [facility]       FAMILY HISTORY:  Family History   Problem Relation Age of Onset     CANCER Brother      lung     CANCER Son      CEREBROVASCULAR DISEASE Mother      and Parkinson's     CANCER Father      pancreas     DIABETES Son        SOCIAL HISTORY:  Social History     Social History     Marital status:      Spouse name:  "N/A     Number of children: N/A     Years of education: N/A     Social History Main Topics     Smoking status: Never Smoker     Smokeless tobacco: Never Used     Alcohol use Yes      Comment: Cocktail 2-3 times per month     Drug use: No     Sexual activity: No     Other Topics Concern     Parent/Sibling W/ Cabg, Mi Or Angioplasty Before 65f 55m? No     Caffeine Concern Yes     4 cups caffeine per day     Sleep Concern No     Stress Concern No     Weight Concern No     Special Diet No     Exercise Yes     exercises 30 minutes, 4 days week     Social History Narrative    Had 2 sons; one ; had 2 step dtrs; one .                                        times 2       Review of Systems:  Skin:  Negative       Eyes:  Positive for glasses    ENT:  Negative      Respiratory:  Positive for dyspnea on exertion     Cardiovascular:    chest pain;Positive for;edema;lightheadedness used two Nitro in the last 16 days and lightheaded if stands too quickly  Gastroenterology: Negative      Genitourinary:  Negative      Musculoskeletal:  Positive for arthritis    Neurologic:  Positive for numbness or tingling of hands    Psychiatric:  Positive for depression maybe a little  Heme/Lymph/Imm:  Negative      Endocrine:  Positive for diabetes;thyroid disorder      Physical Exam:  Vitals: /64  Pulse 84  Ht 1.575 m (5' 2\")  Wt 92.9 kg (204 lb 11.2 oz)  LMP  (LMP Unknown)  BMI 37.44 kg/m2    Constitutional:  cooperative        Skin:  warm and dry to the touch          Head:  normocephalic        Eyes:  pupils equal and round        Lymph:      ENT:  not assessed this visit        Neck:  JVP normal        Respiratory:  clear to auscultation         Cardiac: regular rhythm;normal S1 and S2   S4            not assessed this visit                                        GI:  abdomen soft;BS normoactive        Extremities and Muscular Skeletal:  no spinal abnormalities noted   bilateral LE edema;trace          Neurological:  " no gross motor deficits        Psych:  Alert and Oriented x 3    Encounter Diagnoses   Name Primary?     Bilateral leg edema      Acute on chronic diastolic congestive heart failure (H)      Hyperlipidemia LDL goal <70 Yes     Hypertension goal BP (blood pressure) < 140/90      Chronic ischemic heart disease      Cardiac microvascular disease (H)        Recent Lab Results:  LIPID RESULTS:  Lab Results   Component Value Date    CHOL 127 03/09/2016    HDL 58 03/09/2016    LDL 48 03/09/2016    TRIG 104 03/09/2016    CHOLHDLRATIO 1.9 07/20/2015       LIVER ENZYME RESULTS:  Lab Results   Component Value Date    AST 16 10/31/2017    ALT 18 10/31/2017       CBC RESULTS:  Lab Results   Component Value Date    WBC 8.6 01/02/2018    RBC 4.65 01/02/2018    HGB 14.1 01/02/2018    HCT 41.8 01/02/2018    MCV 90 01/02/2018    MCH 30.3 01/02/2018    MCHC 33.7 01/02/2018    RDW 12.6 01/02/2018     01/02/2018       BMP RESULTS:  Lab Results   Component Value Date     04/18/2018    POTASSIUM 3.9 04/18/2018    CHLORIDE 98 04/18/2018    CO2 32 (H) 04/18/2018    ANIONGAP 11.9 04/18/2018     04/18/2018    BUN 12 04/18/2018    CR 0.94 04/18/2018    GFRESTIMATED 56 (L) 04/18/2018    GFRESTBLACK 67 04/18/2018    VIOLETA 9.5 04/18/2018        A1C RESULTS:  Lab Results   Component Value Date    A1C 6.9 (H) 12/27/2017       INR RESULTS:  Lab Results   Component Value Date    INR 1.02 12/17/2009    INR 0.97 02/22/2008           CC  Kalyan Luis MD  [address]  [address]  [place] [address]                  Thank you for allowing me to participate in the care of your patient.      Sincerely,     HERMELINDA [facility]ays [facility]    cc:   Kalyan Luis MD  [address]  [address]  [place] [address]

## 2018-04-26 NOTE — PROGRESS NOTES
HPI and Plan:   I had the pleasure of meeting Lora Son and her son Derrek today in cardiology clinic to review the results of her venous competency studies, her echocardiogram and her response to some medication changes.    Lora has been following with Dr. Luis for many years.  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  Her coronary angiogram in 2010 showed mild to moderate coronary artery atherosclerosis, but no obstructive disease.  She is continued to have chest discomfort and jaw pain suspicious for angina that has responded to Imdur and Ranexa.  We have been trying to get her Ranexa covered for her at a reduced cost, and looking in to see where we are with that.    Lora saw Dr. Luis earlier this month.  She been hospitalized for bronchitis in December and had a elevated NT proBNP but no echo or diuretic.  Since then she has had progressive increasing leg edema and gained about 9 pounds.  She has not had any shortness of breath orthopnea or PND with this.  As a result Dr. Luis recommended that we do echocardiogram, that we see where her NT proBNP is, which has dropped from 2467 down to 495.  She also was started on a diuretic, and venous competency studies were done.    Venous competency studies were negative for venous insufficiency and also negative for DVT.  Her echocardiogram showed normal LV systolic function no wall motion abnormalities, and mild MR and moderate mitral stenosis.  Since starting furosemide 20 mg daily her weight has come down 6 pounds on our clinic scale.  Her renal function continues to be good, her creatinine is 0.94, though this is a little higher than it was in January at 0.55.  She still has a little bit of ankle edema that is worse at the end of the day.    Today Lora is feeling well.  She denies any shortness of breath, PND or orthopnea.  She is using her Ranexa this left over as needed, I told her I do not think that is a good  idea and would like her to stop.  She did have one episode where she is living when she went from sit to stand when she got a little bit dizzy and lightheaded.  She did not have any syncope or presyncope with this and this has never happened before or since.    Physical Exam  Please see Below     Assessment and Plan  1.  Diastolic heart failure.  Her echocardiogram looks good today, her RVSP is 27+ right atrial pressure.  Her EF is preserved.  She is responded well to the diuretics Dr. Luis started, though her weight is still up and she still complains of a little bit of ankle edema that is worse at the end of the day.  My recommendation is that she continue on the 20 mg of Lasix.  2.  Microvascular angina.  She admits today to still having some Ranexa tablets, she does not have enough to take this twice a day and she says the cost of it is prohibitive.  We have been looking into it getting covered at a lower cost through the pharmaceutical company.  I am not sure where we stand with that.  I did also give her son a paper copy of a prescription for Ranexa 500 mg twice daily.  He is going to look into a [place] pharmacy that he has used in the past for his medications.  I gave him written instructions that when she starts Ranexa 500 mg twice daily that a few days later we should do an EKG, we can place a order for it when they call us and this can be done at Dr. Stevens's office if they prefer.    Thank you for allowing me to see Loar today.  I will see her back in 1 month's time to see how she is doing with Ranexa and Lasix.    HERMELINDA Hernandez, CNP      Orders Placed This Encounter   Procedures     Follow-Up with Cardiac Advanced Practice Provider     Orders Placed This Encounter   Medications     ranolazine (RANEXA) 500 MG 12 hr tablet     Sig: Take 1 tablet (500 mg) by mouth 2 times daily     Dispense:  180 tablet     Refill:  3     There are no discontinued medications.      CURRENT MEDICATIONS:  Current  Outpatient Prescriptions   Medication Sig Dispense Refill     ACETAMINOPHEN PO Take 1,000 mg by mouth 3 times daily        albuterol (PROAIR HFA/PROVENTIL HFA/VENTOLIN HFA) 108 (90 BASE) MCG/ACT Inhaler Inhale 2 puffs into the lungs every 6 hours as needed for shortness of breath / dyspnea or wheezing (Patient not taking: Reported on 4/26/2018) 1 Inhaler 0     aspirin (ASPIRIN LOW DOSE) 81 MG tablet Take 81 mg by mouth daily        atorvastatin (LIPITOR) 40 MG tablet Take 1 tablet (40 mg) by mouth daily 90 tablet 3     DOCUSATE SODIUM PO Take 100 mg by mouth every morning       furosemide (LASIX) 20 MG tablet Take 1 tablet (20 mg) by mouth daily 30 tablet 11     isosorbide mononitrate (IMDUR) 60 MG 24 hr tablet TAKE 1 TABLET (60 MG) BY MOUTH DAILY 90 tablet 1     latanoprost (XALATAN) 0.005 % ophthalmic solution Place 1 drop into both eyes At Bedtime       levothyroxine (SYNTHROID/LEVOTHROID) 25 MCG tablet TAKE ONE TABLET BY MOUTH ONE TIME DAILY 90 tablet 2     metFORMIN (GLUCOPHAGE-XR) 750 MG 24 hr tablet Take 1 tablet (750 mg) by mouth daily (with dinner) 90 tablet 1     metoprolol tartrate (LOPRESSOR) 100 MG tablet TAKE 1/2 TABLET BY MOUTH EVERY AM AND TAKE 1 TABLET IN THE EVENING. 135 tablet 2     nitroGLYcerin (NITROSTAT) 0.4 MG sublingual tablet Place 1 tablet (0.4 mg) under the tongue every 5 minutes as needed for chest pain 25 tablet 3     order for DME Equipment being ordered: wheeled walker with brakes and a seat 1 Device 0     pregabalin (LYRICA) 150 MG capsule TAKE 1 CAPSULE BY MOUTH TWICE A  capsule 3     ranolazine (RANEXA) 500 MG 12 hr tablet Take 1 tablet (500 mg) by mouth 2 times daily 360 tablet 3     ranolazine (RANEXA) 500 MG 12 hr tablet Take 1 tablet (500 mg) by mouth 2 times daily 180 tablet 3     VITAMIN D, CHOLECALCIFEROL, PO Take 4,000 Units by mouth daily         ALLERGIES     Allergies   Allergen Reactions     Hydrochlorothiazide      Pancreatitis - patient denies this.  She says  it gave her low sodium.       PAST MEDICAL HISTORY:  Past Medical History:   Diagnosis Date     Angina at rest (H)     Thought to be microvascular     Arthritis      Basal cell carcinoma 1/2014, (3/5/6-2014)    nose and check and ear and face     Coronary artery disease     Mild CAD per 2/2008 angiogram     Hyperlipidaemia      Hypertension      Hypothyroidism 1/17/2013     Mitral regurgitation 6/2015    echo     Type 2 diabetes, HbA1C goal < 8% (H) 9/11/2013       PAST SURGICAL HISTORY:  Past Surgical History:   Procedure Laterality Date     APPENDECTOMY  1974     BACK SURGERY  1996     BLADDER SURGERY  1990    bladder suspension     CHOLECYSTECTOMY  1975     CORONARY ANGIOGRAPHY ADULT ORDER  2/2008    Mild CAD. LAD w/20% stenosis, RCA with 30-40% stenosis. No flow limiting obstructions.     face surgery  1-6/2014    basal cell plus plastic to nose, face , ear     HYSTERECTOMY  1970     OOPHORECTOMY  1974    bilateral     PHACOEMULSIFICATION CLEAR CORNEA WITH STANDARD INTRAOCULAR LENS IMPLANT Right 7/21/2015    Procedure: PHACOEMULSIFICATION CLEAR CORNEA WITH STANDARD INTRAOCULAR LENS IMPLANT;  Surgeon: Schuyler Chapa MD;  Location: [facility]     PHACOEMULSIFICATION CLEAR CORNEA WITH STANDARD INTRAOCULAR LENS IMPLANT Left 8/18/2015    Procedure: PHACOEMULSIFICATION CLEAR CORNEA WITH STANDARD INTRAOCULAR LENS IMPLANT;  Surgeon: Schuyler Chapa MD;  Location: [facility]       FAMILY HISTORY:  Family History   Problem Relation Age of Onset     CANCER Brother      lung     CANCER Son      CEREBROVASCULAR DISEASE Mother      and Parkinson's     CANCER Father      pancreas     DIABETES Son        SOCIAL HISTORY:  Social History     Social History     Marital status:      Spouse name: N/A     Number of children: N/A     Years of education: N/A     Social History Main Topics     Smoking status: Never Smoker     Smokeless tobacco: Never Used     Alcohol use Yes      Comment: Cocktail 2-3 times per month     Drug  "use: No     Sexual activity: No     Other Topics Concern     Parent/Sibling W/ Cabg, Mi Or Angioplasty Before 65f 55m? No     Caffeine Concern Yes     4 cups caffeine per day     Sleep Concern No     Stress Concern No     Weight Concern No     Special Diet No     Exercise Yes     exercises 30 minutes, 4 days week     Social History Narrative    Had 2 sons; one ; had 2 step dtrs; one .                                        times 2       Review of Systems:  Skin:  Negative       Eyes:  Positive for glasses    ENT:  Negative      Respiratory:  Positive for dyspnea on exertion     Cardiovascular:    chest pain;Positive for;edema;lightheadedness used two Nitro in the last 16 days and lightheaded if stands too quickly  Gastroenterology: Negative      Genitourinary:  Negative      Musculoskeletal:  Positive for arthritis    Neurologic:  Positive for numbness or tingling of hands    Psychiatric:  Positive for depression maybe a little  Heme/Lymph/Imm:  Negative      Endocrine:  Positive for diabetes;thyroid disorder      Physical Exam:  Vitals: /64  Pulse 84  Ht 1.575 m (5' 2\")  Wt 92.9 kg (204 lb 11.2 oz)  LMP  (LMP Unknown)  BMI 37.44 kg/m2    Constitutional:  cooperative        Skin:  warm and dry to the touch          Head:  normocephalic        Eyes:  pupils equal and round        Lymph:      ENT:  not assessed this visit        Neck:  JVP normal        Respiratory:  clear to auscultation         Cardiac: regular rhythm;normal S1 and S2   S4            not assessed this visit                                        GI:  abdomen soft;BS normoactive        Extremities and Muscular Skeletal:  no spinal abnormalities noted   bilateral LE edema;trace          Neurological:  no gross motor deficits        Psych:  Alert and Oriented x 3    Encounter Diagnoses   Name Primary?     Bilateral leg edema      Acute on chronic diastolic congestive heart failure (H)      Hyperlipidemia LDL goal <70 Yes     " Hypertension goal BP (blood pressure) < 140/90      Chronic ischemic heart disease      Cardiac microvascular disease (H)        Recent Lab Results:  LIPID RESULTS:  Lab Results   Component Value Date    CHOL 127 03/09/2016    HDL 58 03/09/2016    LDL 48 03/09/2016    TRIG 104 03/09/2016    CHOLHDLRATIO 1.9 07/20/2015       LIVER ENZYME RESULTS:  Lab Results   Component Value Date    AST 16 10/31/2017    ALT 18 10/31/2017       CBC RESULTS:  Lab Results   Component Value Date    WBC 8.6 01/02/2018    RBC 4.65 01/02/2018    HGB 14.1 01/02/2018    HCT 41.8 01/02/2018    MCV 90 01/02/2018    MCH 30.3 01/02/2018    MCHC 33.7 01/02/2018    RDW 12.6 01/02/2018     01/02/2018       BMP RESULTS:  Lab Results   Component Value Date     04/18/2018    POTASSIUM 3.9 04/18/2018    CHLORIDE 98 04/18/2018    CO2 32 (H) 04/18/2018    ANIONGAP 11.9 04/18/2018     04/18/2018    BUN 12 04/18/2018    CR 0.94 04/18/2018    GFRESTIMATED 56 (L) 04/18/2018    GFRESTBLACK 67 04/18/2018    VIOLETA 9.5 04/18/2018        A1C RESULTS:  Lab Results   Component Value Date    A1C 6.9 (H) 12/27/2017       INR RESULTS:  Lab Results   Component Value Date    INR 1.02 12/17/2009    INR 0.97 02/22/2008           CC  Kalyan Luis MD  [address]  [address]  [place] [address]

## 2018-04-26 NOTE — LETTER
4/26/2018    Antwon Schmidt MD  [address]  [facility] [address]    RE: Lora Vergara       Dear Colleague,    I had the pleasure of seeing Lora Vergara in the [facility].    HPI and Plan:   I had the pleasure of meeting Lora Son and her son Derrek today in cardiology clinic to review the results of her venous competency studies, her echocardiogram and her response to some medication changes.    Lora has been following with Dr. Luis for many years.  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  Her coronary angiogram in 2010 showed mild to moderate coronary artery atherosclerosis, but no obstructive disease.  She is continued to have chest discomfort and jaw pain suspicious for angina that has responded to Imdur and Ranexa.  We have been trying to get her Ranexa covered for her at a reduced cost, and looking in to see where we are with that.    Lora saw Dr. Luis earlier this month.  She been hospitalized for bronchitis in December and had a elevated NT proBNP but no echo or diuretic.  Since then she has had progressive increasing leg edema and gained about 9 pounds.  She has not had any shortness of breath orthopnea or PND with this.  As a result Dr. Luis recommended that we do echocardiogram, that we see where her NT proBNP is, which has dropped from 2467 down to 495.  She also was started on a diuretic, and venous competency studies were done.    Venous competency studies were negative for venous insufficiency and also negative for DVT.  Her echocardiogram showed normal LV systolic function no wall motion abnormalities, and mild MR and moderate mitral stenosis.  Since starting furosemide 20 mg daily her weight has come down 6 pounds on our clinic scale.  Her renal function continues to be good, her creatinine is 0.94, though this is a little higher than it was in January at 0.55.  She still has a little bit of ankle  edema that is worse at the end of the day.    Today Lora is feeling well.  She denies any shortness of breath, PND or orthopnea.  She is using her Ranexa this left over as needed, I told her I do not think that is a good idea and would like her to stop.  She did have one episode where she is living when she went from sit to stand when she got a little bit dizzy and lightheaded.  She did not have any syncope or presyncope with this and this has never happened before or since.    Physical Exam  Please see Below     Assessment and Plan  1.  Diastolic heart failure.  Her echocardiogram looks good today, her RVSP is 27+ right atrial pressure.  Her EF is preserved.  She is responded well to the diuretics Dr. Luis started, though her weight is still up and she still complains of a little bit of ankle edema that is worse at the end of the day.  My recommendation is that she continue on the 20 mg of Lasix.  2.  Microvascular angina.  She admits today to still having some Ranexa tablets, she does not have enough to take this twice a day and she says the cost of it is prohibitive.  We have been looking into it getting covered at a lower cost through the pharmaceutical company.  I am not sure where we stand with that.  I did also give her son a paper copy of a prescription for Ranexa 500 mg twice daily.  He is going to look into a [place] pharmacy that he has used in the past for his medications.  I gave him written instructions that when she starts Ranexa 500 mg twice daily that a few days later we should do an EKG, we can place a order for it when they call us and this can be done at Dr. Stevens's office if they prefer.    Thank you for allowing me to see Lora today.  I will see her back in 1 month's time to see how she is doing with Ranexa and Lasix.    HERMELINDA Hernandez, CNP      Orders Placed This Encounter   Procedures     Follow-Up with Cardiac Advanced Practice Provider     Orders Placed This Encounter    Medications     ranolazine (RANEXA) 500 MG 12 hr tablet     Sig: Take 1 tablet (500 mg) by mouth 2 times daily     Dispense:  180 tablet     Refill:  3     There are no discontinued medications.      CURRENT MEDICATIONS:  Current Outpatient Prescriptions   Medication Sig Dispense Refill     ACETAMINOPHEN PO Take 1,000 mg by mouth 3 times daily        albuterol (PROAIR HFA/PROVENTIL HFA/VENTOLIN HFA) 108 (90 BASE) MCG/ACT Inhaler Inhale 2 puffs into the lungs every 6 hours as needed for shortness of breath / dyspnea or wheezing (Patient not taking: Reported on 4/26/2018) 1 Inhaler 0     aspirin (ASPIRIN LOW DOSE) 81 MG tablet Take 81 mg by mouth daily        atorvastatin (LIPITOR) 40 MG tablet Take 1 tablet (40 mg) by mouth daily 90 tablet 3     DOCUSATE SODIUM PO Take 100 mg by mouth every morning       furosemide (LASIX) 20 MG tablet Take 1 tablet (20 mg) by mouth daily 30 tablet 11     isosorbide mononitrate (IMDUR) 60 MG 24 hr tablet TAKE 1 TABLET (60 MG) BY MOUTH DAILY 90 tablet 1     latanoprost (XALATAN) 0.005 % ophthalmic solution Place 1 drop into both eyes At Bedtime       levothyroxine (SYNTHROID/LEVOTHROID) 25 MCG tablet TAKE ONE TABLET BY MOUTH ONE TIME DAILY 90 tablet 2     metFORMIN (GLUCOPHAGE-XR) 750 MG 24 hr tablet Take 1 tablet (750 mg) by mouth daily (with dinner) 90 tablet 1     metoprolol tartrate (LOPRESSOR) 100 MG tablet TAKE 1/2 TABLET BY MOUTH EVERY AM AND TAKE 1 TABLET IN THE EVENING. 135 tablet 2     nitroGLYcerin (NITROSTAT) 0.4 MG sublingual tablet Place 1 tablet (0.4 mg) under the tongue every 5 minutes as needed for chest pain 25 tablet 3     order for DME Equipment being ordered: wheeled walker with brakes and a seat 1 Device 0     pregabalin (LYRICA) 150 MG capsule TAKE 1 CAPSULE BY MOUTH TWICE A  capsule 3     ranolazine (RANEXA) 500 MG 12 hr tablet Take 1 tablet (500 mg) by mouth 2 times daily 360 tablet 3     ranolazine (RANEXA) 500 MG 12 hr tablet Take 1 tablet (500 mg)  by mouth 2 times daily 180 tablet 3     VITAMIN D, CHOLECALCIFEROL, PO Take 4,000 Units by mouth daily         ALLERGIES     Allergies   Allergen Reactions     Hydrochlorothiazide      Pancreatitis - patient denies this.  She says it gave her low sodium.       PAST MEDICAL HISTORY:  Past Medical History:   Diagnosis Date     Angina at rest (H)     Thought to be microvascular     Arthritis      Basal cell carcinoma 1/2014, (3/5/6-2014)    nose and check and ear and face     Coronary artery disease     Mild CAD per 2/2008 angiogram     Hyperlipidaemia      Hypertension      Hypothyroidism 1/17/2013     Mitral regurgitation 6/2015    echo     Type 2 diabetes, HbA1C goal < 8% (H) 9/11/2013       PAST SURGICAL HISTORY:  Past Surgical History:   Procedure Laterality Date     APPENDECTOMY  1974     BACK SURGERY  1996     BLADDER SURGERY  1990    bladder suspension     CHOLECYSTECTOMY  1975     CORONARY ANGIOGRAPHY ADULT ORDER  2/2008    Mild CAD. LAD w/20% stenosis, RCA with 30-40% stenosis. No flow limiting obstructions.     face surgery  1-6/2014    basal cell plus plastic to nose, face , ear     HYSTERECTOMY  1970     OOPHORECTOMY  1974    bilateral     PHACOEMULSIFICATION CLEAR CORNEA WITH STANDARD INTRAOCULAR LENS IMPLANT Right 7/21/2015    Procedure: PHACOEMULSIFICATION CLEAR CORNEA WITH STANDARD INTRAOCULAR LENS IMPLANT;  Surgeon: Schuyler Chapa MD;  Location: [facility]     PHACOEMULSIFICATION CLEAR CORNEA WITH STANDARD INTRAOCULAR LENS IMPLANT Left 8/18/2015    Procedure: PHACOEMULSIFICATION CLEAR CORNEA WITH STANDARD INTRAOCULAR LENS IMPLANT;  Surgeon: Schuyler Chapa MD;  Location: [facility]       FAMILY HISTORY:  Family History   Problem Relation Age of Onset     CANCER Brother      lung     CANCER Son      CEREBROVASCULAR DISEASE Mother      and Parkinson's     CANCER Father      pancreas     DIABETES Son        SOCIAL HISTORY:  Social History     Social History     Marital status:      Spouse name:  "N/A     Number of children: N/A     Years of education: N/A     Social History Main Topics     Smoking status: Never Smoker     Smokeless tobacco: Never Used     Alcohol use Yes      Comment: Cocktail 2-3 times per month     Drug use: No     Sexual activity: No     Other Topics Concern     Parent/Sibling W/ Cabg, Mi Or Angioplasty Before 65f 55m? No     Caffeine Concern Yes     4 cups caffeine per day     Sleep Concern No     Stress Concern No     Weight Concern No     Special Diet No     Exercise Yes     exercises 30 minutes, 4 days week     Social History Narrative    Had 2 sons; one ; had 2 step dtrs; one .                                        times 2       Review of Systems:  Skin:  Negative       Eyes:  Positive for glasses    ENT:  Negative      Respiratory:  Positive for dyspnea on exertion     Cardiovascular:    chest pain;Positive for;edema;lightheadedness used two Nitro in the last 16 days and lightheaded if stands too quickly  Gastroenterology: Negative      Genitourinary:  Negative      Musculoskeletal:  Positive for arthritis    Neurologic:  Positive for numbness or tingling of hands    Psychiatric:  Positive for depression maybe a little  Heme/Lymph/Imm:  Negative      Endocrine:  Positive for diabetes;thyroid disorder      Physical Exam:  Vitals: /64  Pulse 84  Ht 1.575 m (5' 2\")  Wt 92.9 kg (204 lb 11.2 oz)  LMP  (LMP Unknown)  BMI 37.44 kg/m2    Constitutional:  cooperative        Skin:  warm and dry to the touch          Head:  normocephalic        Eyes:  pupils equal and round        Lymph:      ENT:  not assessed this visit        Neck:  JVP normal        Respiratory:  clear to auscultation         Cardiac: regular rhythm;normal S1 and S2   S4            not assessed this visit                                        GI:  abdomen soft;BS normoactive        Extremities and Muscular Skeletal:  no spinal abnormalities noted   bilateral LE edema;trace          Neurological:  " no gross motor deficits        Psych:  Alert and Oriented x 3    Encounter Diagnoses   Name Primary?     Bilateral leg edema      Acute on chronic diastolic congestive heart failure (H)      Hyperlipidemia LDL goal <70 Yes     Hypertension goal BP (blood pressure) < 140/90      Chronic ischemic heart disease      Cardiac microvascular disease (H)        Recent Lab Results:  LIPID RESULTS:  Lab Results   Component Value Date    CHOL 127 03/09/2016    HDL 58 03/09/2016    LDL 48 03/09/2016    TRIG 104 03/09/2016    CHOLHDLRATIO 1.9 07/20/2015       LIVER ENZYME RESULTS:  Lab Results   Component Value Date    AST 16 10/31/2017    ALT 18 10/31/2017       CBC RESULTS:  Lab Results   Component Value Date    WBC 8.6 01/02/2018    RBC 4.65 01/02/2018    HGB 14.1 01/02/2018    HCT 41.8 01/02/2018    MCV 90 01/02/2018    MCH 30.3 01/02/2018    MCHC 33.7 01/02/2018    RDW 12.6 01/02/2018     01/02/2018       BMP RESULTS:  Lab Results   Component Value Date     04/18/2018    POTASSIUM 3.9 04/18/2018    CHLORIDE 98 04/18/2018    CO2 32 (H) 04/18/2018    ANIONGAP 11.9 04/18/2018     04/18/2018    BUN 12 04/18/2018    CR 0.94 04/18/2018    GFRESTIMATED 56 (L) 04/18/2018    GFRESTBLACK 67 04/18/2018    VIOLETA 9.5 04/18/2018        A1C RESULTS:  Lab Results   Component Value Date    A1C 6.9 (H) 12/27/2017       INR RESULTS:  Lab Results   Component Value Date    INR 1.02 12/17/2009    INR 0.97 02/22/2008       Thank you for allowing me to participate in the care of your patient.    Sincerely,     HERMELINDA [facility]

## 2018-04-30 ENCOUNTER — TELEPHONE (OUTPATIENT)
Dept: CARDIOLOGY | Facility: CLINIC | Age: 83
End: 2018-04-30

## 2018-04-30 ENCOUNTER — OFFICE VISIT (OUTPATIENT)
Dept: NURSING | Facility: CLINIC | Age: 83
End: 2018-04-30
Payer: MEDICARE

## 2018-04-30 DIAGNOSIS — I50.33 ACUTE ON CHRONIC DIASTOLIC HEART FAILURE (H): Primary | ICD-10-CM

## 2018-04-30 DIAGNOSIS — R07.9 CHEST PAIN: Primary | ICD-10-CM

## 2018-04-30 PROCEDURE — 93000 ELECTROCARDIOGRAM COMPLETE: CPT

## 2018-04-30 PROCEDURE — 99207 ZZC NO CHARGE LOS: CPT

## 2018-04-30 NOTE — TELEPHONE ENCOUNTER
Call from Nhi, with Dr. Schmidt's office, stating that the patient is in their clinic to get an EKG done. Nhi states that the patient brought in an AVS stating she needs an EKG a few days after starting the Ranexa. Nhi states that the patient started taking the Ranexa on Saturday and is now in Dr. Schmidt's office to get the EKG done. Reviewed Rosibel's note from  on 4/26/18:    2.  Microvascular angina.  She admits today to still having some Ranexa tablets, she does not have enough to take this twice a day and she says the cost of it is prohibitive.  We have been looking into it getting covered at a lower cost through the pharmaceutical company.  I am not sure where we stand with that.  I did also give her son a paper copy of a prescription for Ranexa 500 mg twice daily.  He is going to look into a [place] pharmacy that he has used in the past for his medications.  I gave him written instructions that when she starts Ranexa 500 mg twice daily that a few days later we should do an EKG, we can place a order for it when they call us and this can be done at Dr. Stevens's office if they prefer.    Order placed for EKG to be done and sent to Rosibel for review.

## 2018-04-30 NOTE — MR AVS SNAPSHOT
"              After Visit Summary   2018    Lora Vergara    MRN: [id number]           Patient Information     Date Of Birth          1925        Visit Information        Provider Department      2018 2:00 PM BX NURSE [facility]        Today's Diagnoses     Acute on chronic diastolic heart failure (H)    -  1       Follow-ups after your visit        Who to contact     If you have questions or need follow up information about today's clinic visit or your schedule please contact [facility] directly at [phone].  Normal or non-critical lab and imaging results will be communicated to you by Cardiostronghart, letter or phone within 4 business days after the clinic has received the results. If you do not hear from us within 7 days, please contact the clinic through Cardiostronghart or phone. If you have a critical or abnormal lab result, we will notify you by phone as soon as possible.  Submit refill requests through Inside Secure or call your pharmacy and they will forward the refill request to us. Please allow 3 business days for your refill to be completed.          Additional Information About Your Visit        MyChart Information     Inside Secure lets you send messages to your doctor, view your test results, renew your prescriptions, schedule appointments and more. To sign up, go to [email or web address] . Click on \"Log in\" on the left side of the screen, which will take you to the Welcome page. Then click on \"Sign up Now\" on the right side of the page.     You will be asked to enter the access code listed below, as well as some personal information. Please follow the directions to create your username and password.     Your access code is: [email or web address]  Expires: 2018  1:47 PM     Your access code will  in 90 days. If you need help or a new code, please call your [facility] or [phone].        Care EveryWhere ID     This is your Care EveryWhere " ID. This could be used by other organizations to access your [facility] medical records  [id number]        Your Vitals Were     Last Period                   (LMP Unknown)            Blood Pressure from Last 3 Encounters:   05/25/18 128/68   04/26/18 122/64   04/10/18 122/74    Weight from Last 3 Encounters:   05/25/18 203 lb (92.1 kg)   04/26/18 204 lb 11.2 oz (92.9 kg)   04/10/18 208 lb (94.3 kg)              We Performed the Following     EKG 12-lead complete w/read - Clinics        Primary Care Provider Office Phone # Fax #    Antwon Schmidt [phone]       [address] [facility] [address]        Equal Access to Services     YOGESH CORMIER : Hadii aad ku hadasho Soomaali, waaxda luqadaha, qaybta kaalmada adeegyada, waxay idiin hayderrekn júnior juan . So [facility] [phone].    ATENCIÓN: Si habla español, tiene a david disposición servicios gratuitos de asistencia lingüística. Llame al [phone].    We comply with applicable federal civil rights laws and [place] laws. We do not discriminate on the basis of race, color, national origin, age, disability, sex, sexual orientation, or gender identity.            Thank you!     Thank you for choosing [facility]  for your care. Our goal is always to provide you with excellent care. Hearing back from our patients is one way we can continue to improve our services. Please take a few minutes to complete the written survey that you may receive in the mail after your visit with us. Thank you!             Your Updated Medication List - Protect others around you: Learn how to safely use, store and throw away your medicines at www.disposemymeds.org.          This list is accurate as of 4/30/18 11:59 PM.  Always use your most recent med list.                   Brand Name Dispense Instructions for use Diagnosis    ACETAMINOPHEN PO      Take 1,000 mg by mouth 3 times daily        albuterol 108 (90 Base) MCG/ACT  Inhaler    PROAIR HFA/PROVENTIL HFA/VENTOLIN HFA    1 Inhaler    Inhale 2 puffs into the lungs every 6 hours as needed for shortness of breath / dyspnea or wheezing    Acute bronchitis, unspecified organism       ASPIRIN LOW DOSE 81 MG tablet   Generic drug:  aspirin      Take 81 mg by mouth daily        atorvastatin 40 MG tablet    LIPITOR    90 tablet    Take 1 tablet (40 mg) by mouth daily    Hyperlipidemia LDL goal <70       DOCUSATE SODIUM PO      Take 100 mg by mouth every morning        furosemide 20 MG tablet    LASIX    30 tablet    Take 1 tablet (20 mg) by mouth daily    Bilateral leg edema       isosorbide mononitrate 60 MG 24 hr tablet    IMDUR    90 tablet    TAKE 1 TABLET (60 MG) BY MOUTH DAILY    Chronic ischemic heart disease       latanoprost 0.005 % ophthalmic solution    XALATAN     Place 1 drop into both eyes At Bedtime        levothyroxine 25 MCG tablet    SYNTHROID/LEVOTHROID    90 tablet    TAKE ONE TABLET BY MOUTH ONE TIME DAILY    Hypothyroidism       metFORMIN 750 MG 24 hr tablet    GLUCOPHAGE-XR    90 tablet    Take 1 tablet (750 mg) by mouth daily (with dinner)    Type 2 diabetes mellitus with diabetic neuropathy, without long-term current use of insulin (H)       metoprolol tartrate 100 MG tablet    LOPRESSOR    135 tablet    TAKE 1/2 TABLET BY MOUTH EVERY AM AND TAKE 1 TABLET IN THE EVENING.    Hypertension goal BP (blood pressure) < 140/90       order for DME     1 Device    Equipment being ordered: wheeled walker with brakes and a seat    Neuropathy, Poor balance       pregabalin 150 MG capsule    LYRICA    180 capsule    TAKE 1 CAPSULE BY MOUTH TWICE A DAY    Neuropathy       ranolazine 500 MG 12 hr tablet    RANEXA    180 tablet    Take 1 tablet (500 mg) by mouth 2 times daily    Cardiac microvascular disease (H)       VITAMIN D (CHOLECALCIFEROL) PO      Take 4,000 Units by mouth daily

## 2018-05-02 NOTE — TELEPHONE ENCOUNTER
Notes Recorded by Jaqui Lazaro APRN CNP on 5/2/2018 at 8:52 AM  EKG looks good on Ranexa, no change in plan. Thanks, Rosibel     Spoke to patient and informed her of result above. Pt verbalized understanding. No further questions or concerns.

## 2018-05-03 NOTE — TELEPHONE ENCOUNTER
RN received application for [employer] program. RN filled out clinical portion and sent patient's portion to patient with self addressed envelope to mail back to clinic when completed..    RN attempted to call patient x3, but phone line busy. RN called patients listed contact Festus and informed him of paperwork being mailed out and that patient needs to fill out and return to clinic with self addressed envelope located in envelope. Festus acknowledged understanding and will work with patient to complete forms and return.

## 2018-05-25 ENCOUNTER — OFFICE VISIT (OUTPATIENT)
Dept: CARDIOLOGY | Facility: CLINIC | Age: 83
End: 2018-05-25
Attending: NURSE PRACTITIONER
Payer: MEDICARE

## 2018-05-25 VITALS
WEIGHT: 203 LBS | BODY MASS INDEX: 37.36 KG/M2 | HEIGHT: 62 IN | HEART RATE: 88 BPM | DIASTOLIC BLOOD PRESSURE: 68 MMHG | SYSTOLIC BLOOD PRESSURE: 128 MMHG

## 2018-05-25 DIAGNOSIS — I20.89 STABLE ANGINA PECTORIS (H): ICD-10-CM

## 2018-05-25 DIAGNOSIS — R60.0 BILATERAL LEG EDEMA: ICD-10-CM

## 2018-05-25 DIAGNOSIS — I50.33 ACUTE ON CHRONIC DIASTOLIC CONGESTIVE HEART FAILURE (H): ICD-10-CM

## 2018-05-25 PROCEDURE — 99214 OFFICE O/P EST MOD 30 MIN: CPT | Performed by: NURSE PRACTITIONER

## 2018-05-25 RX ORDER — NITROGLYCERIN 0.4 MG/1
0.4 TABLET SUBLINGUAL EVERY 5 MIN PRN
Qty: 50 TABLET | Refills: 3 | Status: SHIPPED | OUTPATIENT
Start: 2018-05-25 | End: 2020-09-18

## 2018-05-25 NOTE — LETTER
5/25/2018    Antwon Schmidt MD  [address]  [facility] [address]    RE: Lora Vergara       Dear Colleague,    I had the pleasure of seeing Lora Vergara in the [facility].    HPI and Plan:   I had the pleasure of seeing Lora Vergara today in cardiology clinic follow up. She is a pleasant 89 year old patient of Dr. Luis.    Lora has been following with Dr. Luis for many years.  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  She is done well with Ranexa, but struggled with the cost. Her coronary angiogram in 2010 showed mild to moderate coronary artery atherosclerosis, but no obstructive disease.  She is continued to have chest discomfort and jaw pain suspicious for angina that has responded to Imdur and Ranexa.       Lora saw Dr. Luis in April.  She been hospitalized for bronchitis in December and had a elevated NT proBNP but no echo or diuretic.  Since then she had progressive increasing leg edema and gained about 9 pounds, this resolved with Lasix 20 mg daily.  She continues to deny shortness of breath orthopnea or PND.  Venous competency studies were done to make sure she was not having any venous insufficiency contributing to her edema, these were negative.  Recent echo showed normal LV systolic function no wall motion abnormalities, and mild MR and moderate mitral stenosis.      Today Lora is feeling well.     She is very happy to report that she was able to get her Ranexa 500 mg twice daily through a [facility] pharmacy at a very affordable price.  Since she is been taking it regularly she has not had any need to take her nitroglycerin.  Her EKG shows sinus rhythm at 72 with a QTC of 435.  She continues to be euvolemic today and without symptoms of chest pain.    Physical Exam  Please see Below     Assessment and Plan  1.  Microvascular angina.  Coronary angiogram in 2010 showed moderate coronary atherosclerosis but no  obstructive disease.  She continues to have symptoms, these have been treated successfully with Imdur and Ranexa.  She is now getting Ranexa at a affordable price.  Her EKG looks good.  I continued her on these medications.      Thank you for allowing me to care for Lora Vergara today.  She should continue on Ranexa and see Dr. Luis in 6 months time.    HERMELINDA Hernandez, CNP  Cardiology    Voice recognition software was used for this note, I have reviewed this note, but errors may have been missed.    Orders Placed This Encounter   Procedures     Follow-Up with Cardiologist     Orders Placed This Encounter   Medications     nitroGLYcerin (NITROSTAT) 0.4 MG sublingual tablet     Sig: Place 1 tablet (0.4 mg) under the tongue every 5 minutes as needed for chest pain     Dispense:  50 tablet     Refill:  3     Medications Discontinued During This Encounter   Medication Reason     ranolazine (RANEXA) 500 MG 12 hr tablet Stopped by Patient     nitroGLYcerin (NITROSTAT) 0.4 MG sublingual tablet Reorder         CURRENT MEDICATIONS:  Current Outpatient Prescriptions   Medication Sig Dispense Refill     ACETAMINOPHEN PO Take 1,000 mg by mouth 3 times daily        albuterol (PROAIR HFA/PROVENTIL HFA/VENTOLIN HFA) 108 (90 BASE) MCG/ACT Inhaler Inhale 2 puffs into the lungs every 6 hours as needed for shortness of breath / dyspnea or wheezing (Patient not taking: Reported on 5/25/2018) 1 Inhaler 0     aspirin (ASPIRIN LOW DOSE) 81 MG tablet Take 81 mg by mouth daily        atorvastatin (LIPITOR) 40 MG tablet Take 1 tablet (40 mg) by mouth daily 90 tablet 3     DOCUSATE SODIUM PO Take 100 mg by mouth every morning       furosemide (LASIX) 20 MG tablet Take 1 tablet (20 mg) by mouth daily 30 tablet 11     isosorbide mononitrate (IMDUR) 60 MG 24 hr tablet TAKE 1 TABLET (60 MG) BY MOUTH DAILY 90 tablet 1     latanoprost (XALATAN) 0.005 % ophthalmic solution Place 1 drop into both eyes At Bedtime       levothyroxine  (SYNTHROID/LEVOTHROID) 25 MCG tablet TAKE ONE TABLET BY MOUTH ONE TIME DAILY 90 tablet 2     metFORMIN (GLUCOPHAGE-XR) 750 MG 24 hr tablet Take 1 tablet (750 mg) by mouth daily (with dinner) 90 tablet 1     metoprolol tartrate (LOPRESSOR) 100 MG tablet TAKE 1/2 TABLET BY MOUTH EVERY AM AND TAKE 1 TABLET IN THE EVENING. 135 tablet 2     nitroGLYcerin (NITROSTAT) 0.4 MG sublingual tablet Place 1 tablet (0.4 mg) under the tongue every 5 minutes as needed for chest pain 50 tablet 3     order for DME Equipment being ordered: wheeled walker with brakes and a seat 1 Device 0     pregabalin (LYRICA) 150 MG capsule TAKE 1 CAPSULE BY MOUTH TWICE A  capsule 3     ranolazine (RANEXA) 500 MG 12 hr tablet Take 1 tablet (500 mg) by mouth 2 times daily 180 tablet 3     VITAMIN D, CHOLECALCIFEROL, PO Take 4,000 Units by mouth daily       [DISCONTINUED] nitroGLYcerin (NITROSTAT) 0.4 MG sublingual tablet Place 1 tablet (0.4 mg) under the tongue every 5 minutes as needed for chest pain 25 tablet 3     [DISCONTINUED] ranolazine (RANEXA) 500 MG 12 hr tablet Take 1 tablet (500 mg) by mouth 2 times daily 360 tablet 3       ALLERGIES     Allergies   Allergen Reactions     Hydrochlorothiazide      Pancreatitis - patient denies this.  She says it gave her low sodium.       PAST MEDICAL HISTORY:  Past Medical History:   Diagnosis Date     Angina at rest (H)     Thought to be microvascular     Arthritis      Basal cell carcinoma 1/2014, (3/5/6-2014)    nose and check and ear and face     Coronary artery disease     Mild CAD per 2/2008 angiogram     Hyperlipidaemia      Hypertension      Hypothyroidism 1/17/2013     Mitral regurgitation 6/2015    echo     Type 2 diabetes, HbA1C goal < 8% (H) 9/11/2013       PAST SURGICAL HISTORY:  Past Surgical History:   Procedure Laterality Date     APPENDECTOMY  1974     BACK SURGERY  1996     BLADDER SURGERY  1990    bladder suspension     CHOLECYSTECTOMY  1975     CORONARY ANGIOGRAPHY ADULT ORDER   2008    Mild CAD. LAD w/20% stenosis, RCA with 30-40% stenosis. No flow limiting obstructions.     face surgery  -2014    basal cell plus plastic to nose, face , ear     HYSTERECTOMY  1970     OOPHORECTOMY  1974    bilateral     PHACOEMULSIFICATION CLEAR CORNEA WITH STANDARD INTRAOCULAR LENS IMPLANT Right 2015    Procedure: PHACOEMULSIFICATION CLEAR CORNEA WITH STANDARD INTRAOCULAR LENS IMPLANT;  Surgeon: Schuyler Chapa MD;  Location: [facility]     PHACOEMULSIFICATION CLEAR CORNEA WITH STANDARD INTRAOCULAR LENS IMPLANT Left 2015    Procedure: PHACOEMULSIFICATION CLEAR CORNEA WITH STANDARD INTRAOCULAR LENS IMPLANT;  Surgeon: Schuyler Chapa MD;  Location: [facility]       FAMILY HISTORY:  Family History   Problem Relation Age of Onset     CANCER Brother      lung     CANCER Son      CEREBROVASCULAR DISEASE Mother      and Parkinson's     CANCER Father      pancreas     DIABETES Son        SOCIAL HISTORY:  Social History     Social History     Marital status:      Spouse name: N/A     Number of children: N/A     Years of education: N/A     Social History Main Topics     Smoking status: Never Smoker     Smokeless tobacco: Never Used     Alcohol use Yes      Comment: Cocktail 2-3 times per week     Drug use: No     Sexual activity: No     Other Topics Concern     Parent/Sibling W/ Cabg, Mi Or Angioplasty Before 65f 55m? No     Caffeine Concern Yes     4 cups caffeine per day     Sleep Concern No     Stress Concern No     Weight Concern No     Special Diet No     Exercise Yes     exercises 30 minutes, 4 days week     Social History Narrative    Had 2 sons; one ; had 2 step dtrs; one .                                        times 2       Review of Systems:  Skin:  Negative       Eyes:  Positive for glasses cataract extraction both eyes  ENT:  Negative      Respiratory:  Negative       Cardiovascular:  Negative;palpitations;chest pain;lightheadedness;dizziness;cyanosis;fatigue Positive  "for;edema occ  Gastroenterology: Negative constipation    Genitourinary:  Negative urinary frequency    Musculoskeletal:  Positive for arthritis Left arm pain  Neurologic:  Positive for numbness or tingling of hands neuropathy  Psychiatric:  Negative      Heme/Lymph/Imm:  Negative allergies    Endocrine:  Positive for diabetes;thyroid disorder      Physical Exam:  Vitals: /68  Pulse 88  Ht 1.575 m (5' 2\")  Wt 92.1 kg (203 lb)  LMP  (LMP Unknown)  BMI 37.13 kg/m2    Constitutional:  cooperative        Skin:  warm and dry to the touch          Head:  normocephalic        Eyes:  pupils equal and round;pupils equal and round, conjunctivae and lids unremarkable, sclera white, no xanthalasma, EOMS intact, no nystagmus        Lymph:      ENT:  not assessed this visit        Neck:  JVP normal        Respiratory:  clear to auscultation         Cardiac: regular rhythm;normal S1 and S2   S4            not assessed this visit                                        GI:  abdomen soft;BS normoactive        Extremities and Muscular Skeletal:  no spinal abnormalities noted   bilateral LE edema;trace          Neurological:  no gross motor deficits        Psych:  Alert and Oriented x 3    Encounter Diagnoses   Name Primary?     Bilateral leg edema      Acute on chronic diastolic congestive heart failure (H)      Stable angina pectoris (H)        Recent Lab Results:  LIPID RESULTS:  Lab Results   Component Value Date    CHOL 127 03/09/2016    HDL 58 03/09/2016    LDL 48 03/09/2016    TRIG 104 03/09/2016    CHOLHDLRATIO 1.9 07/20/2015       LIVER ENZYME RESULTS:  Lab Results   Component Value Date    AST 16 10/31/2017    ALT 18 10/31/2017       CBC RESULTS:  Lab Results   Component Value Date    WBC 8.6 01/02/2018    RBC 4.65 01/02/2018    HGB 14.1 01/02/2018    HCT 41.8 01/02/2018    MCV 90 01/02/2018    MCH 30.3 01/02/2018    MCHC 33.7 01/02/2018    RDW 12.6 01/02/2018     01/02/2018       BMP RESULTS:  Lab Results "   Component Value Date     04/18/2018    POTASSIUM 3.9 04/18/2018    CHLORIDE 98 04/18/2018    CO2 32 (H) 04/18/2018    ANIONGAP 11.9 04/18/2018     04/18/2018    BUN 12 04/18/2018    CR 0.94 04/18/2018    GFRESTIMATED 56 (L) 04/18/2018    GFRESTBLACK 67 04/18/2018    VIOLETA 9.5 04/18/2018        A1C RESULTS:  Lab Results   Component Value Date    A1C 6.9 (H) 12/27/2017     INR RESULTS:  Lab Results   Component Value Date    INR 1.02 12/17/2009    INR 0.97 02/22/2008     Thank you for allowing me to participate in the care of your patient.    Sincerely,     HERMELINDA [facility]

## 2018-05-25 NOTE — MR AVS SNAPSHOT
After Visit Summary   5/25/2018    Lora Vergara    MRN: [id number]           Patient Information     Date Of Birth          9/11/1925        Visit Information        Provider Department      5/25/2018 3:00 PM Jaqui Lazaro APRN CNP [facility]        Today's Diagnoses     Bilateral leg edema        Acute on chronic diastolic congestive heart failure (H)        Stable angina pectoris (H)          Care Instructions    Try miconazole cream or powder to put under your bra where the skin is irritated. You can buy this over the counter. It's used for athletes feet too. If that doesn't work, then see dermatology. Stop the hydrocortisone cream.    Continue everything else. See Dr. Luis           Follow-ups after your visit        Additional Services     Follow-Up with Cardiologist                 Future tests that were ordered for you today     Open Future Orders        Priority Expected Expires Ordered    Follow-Up with Cardiologist Routine 10/26/2018 5/25/2019 5/25/2018            Who to contact     If you have questions or need follow up information about today's clinic visit or your schedule please contact [facility] directly at [phone].  Normal or non-critical lab and imaging results will be communicated to you by [employer], letter or phone within 4 business days after the clinic has received the results. If you do not hear from us within 7 days, please contact the clinic through [employer] or phone. If you have a critical or abnormal lab result, we will notify you by phone as soon as possible.  Submit refill requests through [employer] or call your pharmacy and they will forward the refill request to us. Please allow 3 business days for your refill to be completed.          Additional Information About Your Visit        [employer] Information     [employer] lets you send messages to your doctor, view your test results,  "renew your prescriptions, schedule appointments and more. To sign up, go to [email or web address] . Click on \"Log in\" on the left side of the screen, which will take you to the Welcome page. Then click on \"Sign up Now\" on the right side of the page.     You will be asked to enter the access code listed below, as well as some personal information. Please follow the directions to create your username and password.     Your access code is: [email or web address]  Expires: 2018  1:47 PM     Your access code will  in 90 days. If you need help or a new code, please call your [facility] clinic or [phone].        Care EveryWhere ID     This is your Care EveryWhere ID. This could be used by other organizations to access your [facility] medical records  [id number]        Your Vitals Were     Pulse Height Last Period BMI (Body Mass Index)          88 1.575 m (5' 2\") (LMP Unknown) 37.13 kg/m2         Blood Pressure from Last 3 Encounters:   18 128/68   18 122/64   04/10/18 122/74    Weight from Last 3 Encounters:   18 92.1 kg (203 lb)   18 92.9 kg (204 lb 11.2 oz)   04/10/18 94.3 kg (208 lb)              We Performed the Following     Follow-Up with Cardiac Advanced Practice Provider          Where to get your medicines      These medications were sent to [facility] [id number] [place] [id number] [address] [address]     Phone:  [phone]     nitroGLYcerin 0.4 MG sublingual tablet          Primary Care Provider Office Phone # Fax #    Antwon Schmidt [phone] [address]  [facility] [address]        Equal Access to Services     YOGESH CORMIER : Liz Gracia, cynthia borrego, debbie ricks. [facility] [phone].    ATENCIÓN: Si habla español, tiene a david disposición servicios gratuitos de asistencia lingüística. Llame al [phone].    We comply with applicable " federal civil rights laws and [place] laws. We do not discriminate on the basis of race, color, national origin, age, disability, sex, sexual orientation, or gender identity.            Thank you!     Thank you for choosing [facility]  for your care. Our goal is always to provide you with excellent care. Hearing back from our patients is one way we can continue to improve our services. Please take a few minutes to complete the written survey that you may receive in the mail after your visit with us. Thank you!             Your Updated Medication List - Protect others around you: Learn how to safely use, store and throw away your medicines at www.disposemymeds.org.          This list is accurate as of 5/25/18  3:24 PM.  Always use your most recent med list.                   Brand Name Dispense Instructions for use Diagnosis    ACETAMINOPHEN PO      Take 1,000 mg by mouth 3 times daily        albuterol 108 (90 Base) MCG/ACT Inhaler    PROAIR HFA/PROVENTIL HFA/VENTOLIN HFA    1 Inhaler    Inhale 2 puffs into the lungs every 6 hours as needed for shortness of breath / dyspnea or wheezing    Acute bronchitis, unspecified organism       ASPIRIN LOW DOSE 81 MG tablet   Generic drug:  aspirin      Take 81 mg by mouth daily        atorvastatin 40 MG tablet    LIPITOR    90 tablet    Take 1 tablet (40 mg) by mouth daily    Hyperlipidemia LDL goal <70       DOCUSATE SODIUM PO      Take 100 mg by mouth every morning        furosemide 20 MG tablet    LASIX    30 tablet    Take 1 tablet (20 mg) by mouth daily    Bilateral leg edema       isosorbide mononitrate 60 MG 24 hr tablet    IMDUR    90 tablet    TAKE 1 TABLET (60 MG) BY MOUTH DAILY    Chronic ischemic heart disease       latanoprost 0.005 % ophthalmic solution    XALATAN     Place 1 drop into both eyes At Bedtime        levothyroxine 25 MCG tablet    SYNTHROID/LEVOTHROID    90 tablet    TAKE ONE TABLET BY MOUTH ONE TIME DAILY     Hypothyroidism       metFORMIN 750 MG 24 hr tablet    GLUCOPHAGE-XR    90 tablet    Take 1 tablet (750 mg) by mouth daily (with dinner)    Type 2 diabetes mellitus with diabetic neuropathy, without long-term current use of insulin (H)       metoprolol tartrate 100 MG tablet    LOPRESSOR    135 tablet    TAKE 1/2 TABLET BY MOUTH EVERY AM AND TAKE 1 TABLET IN THE EVENING.    Hypertension goal BP (blood pressure) < 140/90       nitroGLYcerin 0.4 MG sublingual tablet    NITROSTAT    50 tablet    Place 1 tablet (0.4 mg) under the tongue every 5 minutes as needed for chest pain    Stable angina pectoris (H)       order for DME     1 Device    Equipment being ordered: wheeled walker with brakes and a seat    Neuropathy, Poor balance       pregabalin 150 MG capsule    LYRICA    180 capsule    TAKE 1 CAPSULE BY MOUTH TWICE A DAY    Neuropathy       ranolazine 500 MG 12 hr tablet    RANEXA    180 tablet    Take 1 tablet (500 mg) by mouth 2 times daily    Cardiac microvascular disease (H)       VITAMIN D (CHOLECALCIFEROL) PO      Take 4,000 Units by mouth daily

## 2018-05-25 NOTE — PATIENT INSTRUCTIONS
Try miconazole cream or powder to put under your bra where the skin is irritated. You can buy this over the counter. It's used for athletes feet too. If that doesn't work, then see dermatology. Stop the hydrocortisone cream.    Continue everything else. See Dr. Luis

## 2018-05-25 NOTE — PROGRESS NOTES
HPI and Plan:   I had the pleasure of seeing Lora Vergara today in cardiology clinic follow up. She is a pleasant 89 year old patient of Dr. Luis.    Lora has been following with Dr. Luis for many years.  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  She is done well with Ranexa, but struggled with the cost. Her coronary angiogram in 2010 showed mild to moderate coronary artery atherosclerosis, but no obstructive disease.  She is continued to have chest discomfort and jaw pain suspicious for angina that has responded to Imdur and Ranexa.       oLra saw Dr. Luis in April.  She been hospitalized for bronchitis in December and had a elevated NT proBNP but no echo or diuretic.  Since then she had progressive increasing leg edema and gained about 9 pounds, this resolved with Lasix 20 mg daily.  She continues to deny shortness of breath orthopnea or PND.  Venous competency studies were done to make sure she was not having any venous insufficiency contributing to her edema, these were negative.  Recent echo showed normal LV systolic function no wall motion abnormalities, and mild MR and moderate mitral stenosis.      Today Lora is feeling well.    She is very happy to report that she was able to get her Ranexa 500 mg twice daily through a [facility] pharmacy at a very affordable price.  Since she is been taking it regularly she has not had any need to take her nitroglycerin.  Her EKG shows sinus rhythm at 72 with a QTC of 435.  She continues to be euvolemic today and without symptoms of chest pain.    Physical Exam  Please see Below     Assessment and Plan  1.  Microvascular angina.  Coronary angiogram in 2010 showed moderate coronary atherosclerosis but no obstructive disease.  She continues to have symptoms, these have been treated successfully with Imdur and Ranexa.  She is now getting Ranexa at a affordable price.  Her EKG looks good.  I continued her on these  medications.      Thank you for allowing me to care for Lora Vergara today.  She should continue on Ranexa and see Dr. Luis in 6 months time.    HERMELINDA Hernandez, CNP  Cardiology    Voice recognition software was used for this note, I have reviewed this note, but errors may have been missed.    Orders Placed This Encounter   Procedures     Follow-Up with Cardiologist     Orders Placed This Encounter   Medications     nitroGLYcerin (NITROSTAT) 0.4 MG sublingual tablet     Sig: Place 1 tablet (0.4 mg) under the tongue every 5 minutes as needed for chest pain     Dispense:  50 tablet     Refill:  3     Medications Discontinued During This Encounter   Medication Reason     ranolazine (RANEXA) 500 MG 12 hr tablet Stopped by Patient     nitroGLYcerin (NITROSTAT) 0.4 MG sublingual tablet Reorder         CURRENT MEDICATIONS:  Current Outpatient Prescriptions   Medication Sig Dispense Refill     ACETAMINOPHEN PO Take 1,000 mg by mouth 3 times daily        albuterol (PROAIR HFA/PROVENTIL HFA/VENTOLIN HFA) 108 (90 BASE) MCG/ACT Inhaler Inhale 2 puffs into the lungs every 6 hours as needed for shortness of breath / dyspnea or wheezing (Patient not taking: Reported on 5/25/2018) 1 Inhaler 0     aspirin (ASPIRIN LOW DOSE) 81 MG tablet Take 81 mg by mouth daily        atorvastatin (LIPITOR) 40 MG tablet Take 1 tablet (40 mg) by mouth daily 90 tablet 3     DOCUSATE SODIUM PO Take 100 mg by mouth every morning       furosemide (LASIX) 20 MG tablet Take 1 tablet (20 mg) by mouth daily 30 tablet 11     isosorbide mononitrate (IMDUR) 60 MG 24 hr tablet TAKE 1 TABLET (60 MG) BY MOUTH DAILY 90 tablet 1     latanoprost (XALATAN) 0.005 % ophthalmic solution Place 1 drop into both eyes At Bedtime       levothyroxine (SYNTHROID/LEVOTHROID) 25 MCG tablet TAKE ONE TABLET BY MOUTH ONE TIME DAILY 90 tablet 2     metFORMIN (GLUCOPHAGE-XR) 750 MG 24 hr tablet Take 1 tablet (750 mg) by mouth daily (with dinner) 90 tablet 1     metoprolol  tartrate (LOPRESSOR) 100 MG tablet TAKE 1/2 TABLET BY MOUTH EVERY AM AND TAKE 1 TABLET IN THE EVENING. 135 tablet 2     nitroGLYcerin (NITROSTAT) 0.4 MG sublingual tablet Place 1 tablet (0.4 mg) under the tongue every 5 minutes as needed for chest pain 50 tablet 3     order for DME Equipment being ordered: wheeled walker with brakes and a seat 1 Device 0     pregabalin (LYRICA) 150 MG capsule TAKE 1 CAPSULE BY MOUTH TWICE A  capsule 3     ranolazine (RANEXA) 500 MG 12 hr tablet Take 1 tablet (500 mg) by mouth 2 times daily 180 tablet 3     VITAMIN D, CHOLECALCIFEROL, PO Take 4,000 Units by mouth daily       [DISCONTINUED] nitroGLYcerin (NITROSTAT) 0.4 MG sublingual tablet Place 1 tablet (0.4 mg) under the tongue every 5 minutes as needed for chest pain 25 tablet 3     [DISCONTINUED] ranolazine (RANEXA) 500 MG 12 hr tablet Take 1 tablet (500 mg) by mouth 2 times daily 360 tablet 3       ALLERGIES     Allergies   Allergen Reactions     Hydrochlorothiazide      Pancreatitis - patient denies this.  She says it gave her low sodium.       PAST MEDICAL HISTORY:  Past Medical History:   Diagnosis Date     Angina at rest (H)     Thought to be microvascular     Arthritis      Basal cell carcinoma 1/2014, (3/5/6-2014)    nose and check and ear and face     Coronary artery disease     Mild CAD per 2/2008 angiogram     Hyperlipidaemia      Hypertension      Hypothyroidism 1/17/2013     Mitral regurgitation 6/2015    echo     Type 2 diabetes, HbA1C goal < 8% (H) 9/11/2013       PAST SURGICAL HISTORY:  Past Surgical History:   Procedure Laterality Date     APPENDECTOMY  1974     BACK SURGERY  1996     BLADDER SURGERY  1990    bladder suspension     CHOLECYSTECTOMY  1975     CORONARY ANGIOGRAPHY ADULT ORDER  2/2008    Mild CAD. LAD w/20% stenosis, RCA with 30-40% stenosis. No flow limiting obstructions.     face surgery  1-6/2014    basal cell plus plastic to nose, face , ear     HYSTERECTOMY  1970     OOPHORECTOMY  1974     bilateral     PHACOEMULSIFICATION CLEAR CORNEA WITH STANDARD INTRAOCULAR LENS IMPLANT Right 2015    Procedure: PHACOEMULSIFICATION CLEAR CORNEA WITH STANDARD INTRAOCULAR LENS IMPLANT;  Surgeon: Schuyler Chapa MD;  Location: [facility]     PHACOEMULSIFICATION CLEAR CORNEA WITH STANDARD INTRAOCULAR LENS IMPLANT Left 2015    Procedure: PHACOEMULSIFICATION CLEAR CORNEA WITH STANDARD INTRAOCULAR LENS IMPLANT;  Surgeon: Schuyler Chapa MD;  Location: [facility]       FAMILY HISTORY:  Family History   Problem Relation Age of Onset     CANCER Brother      lung     CANCER Son      CEREBROVASCULAR DISEASE Mother      and Parkinson's     CANCER Father      pancreas     DIABETES Son        SOCIAL HISTORY:  Social History     Social History     Marital status:      Spouse name: N/A     Number of children: N/A     Years of education: N/A     Social History Main Topics     Smoking status: Never Smoker     Smokeless tobacco: Never Used     Alcohol use Yes      Comment: Cocktail 2-3 times per week     Drug use: No     Sexual activity: No     Other Topics Concern     Parent/Sibling W/ Cabg, Mi Or Angioplasty Before 65f 55m? No     Caffeine Concern Yes     4 cups caffeine per day     Sleep Concern No     Stress Concern No     Weight Concern No     Special Diet No     Exercise Yes     exercises 30 minutes, 4 days week     Social History Narrative    Had 2 sons; one ; had 2 step dtrs; one .                                        times 2       Review of Systems:  Skin:  Negative       Eyes:  Positive for glasses cataract extraction both eyes  ENT:  Negative      Respiratory:  Negative       Cardiovascular:  Negative;palpitations;chest pain;lightheadedness;dizziness;cyanosis;fatigue Positive for;edema occ  Gastroenterology: Negative constipation    Genitourinary:  Negative urinary frequency    Musculoskeletal:  Positive for arthritis Left arm pain  Neurologic:  Positive for numbness or tingling of hands  "neuropathy  Psychiatric:  Negative      Heme/Lymph/Imm:  Negative allergies    Endocrine:  Positive for diabetes;thyroid disorder      Physical Exam:  Vitals: /68  Pulse 88  Ht 1.575 m (5' 2\")  Wt 92.1 kg (203 lb)  LMP  (LMP Unknown)  BMI 37.13 kg/m2    Constitutional:  cooperative        Skin:  warm and dry to the touch          Head:  normocephalic        Eyes:  pupils equal and round;pupils equal and round, conjunctivae and lids unremarkable, sclera white, no xanthalasma, EOMS intact, no nystagmus        Lymph:      ENT:  not assessed this visit        Neck:  JVP normal        Respiratory:  clear to auscultation         Cardiac: regular rhythm;normal S1 and S2   S4            not assessed this visit                                        GI:  abdomen soft;BS normoactive        Extremities and Muscular Skeletal:  no spinal abnormalities noted   bilateral LE edema;trace          Neurological:  no gross motor deficits        Psych:  Alert and Oriented x 3    Encounter Diagnoses   Name Primary?     Bilateral leg edema      Acute on chronic diastolic congestive heart failure (H)      Stable angina pectoris (H)        Recent Lab Results:  LIPID RESULTS:  Lab Results   Component Value Date    CHOL 127 03/09/2016    HDL 58 03/09/2016    LDL 48 03/09/2016    TRIG 104 03/09/2016    CHOLHDLRATIO 1.9 07/20/2015       LIVER ENZYME RESULTS:  Lab Results   Component Value Date    AST 16 10/31/2017    ALT 18 10/31/2017       CBC RESULTS:  Lab Results   Component Value Date    WBC 8.6 01/02/2018    RBC 4.65 01/02/2018    HGB 14.1 01/02/2018    HCT 41.8 01/02/2018    MCV 90 01/02/2018    MCH 30.3 01/02/2018    MCHC 33.7 01/02/2018    RDW 12.6 01/02/2018     01/02/2018       BMP RESULTS:  Lab Results   Component Value Date     04/18/2018    POTASSIUM 3.9 04/18/2018    CHLORIDE 98 04/18/2018    CO2 32 (H) 04/18/2018    ANIONGAP 11.9 04/18/2018     04/18/2018    BUN 12 04/18/2018    CR 0.94 04/18/2018 "    GFRESTIMATED 56 (L) 04/18/2018    GFRESTBLACK 67 04/18/2018    VIOLETA 9.5 04/18/2018        A1C RESULTS:  Lab Results   Component Value Date    A1C 6.9 (H) 12/27/2017       INR RESULTS:  Lab Results   Component Value Date    INR 1.02 12/17/2009    INR 0.97 02/22/2008           CC  Jaqui Lazaro, APRN CNP  [id number]  [place] [id number]

## 2018-06-07 NOTE — TELEPHONE ENCOUNTER
RN received completed [employer] program form filled out by patient. RN faxed completed form to [phone]. Copy of form sent to scan.

## 2018-06-13 ENCOUNTER — DOCUMENTATION ONLY (OUTPATIENT)
Dept: CARDIOLOGY | Facility: CLINIC | Age: 83
End: 2018-06-13

## 2018-06-13 NOTE — PROGRESS NOTES
Received a fax from [employer] patient assistance program, she was approved, for medication from 6-12-20182018-12--letter sent to scanning-mmunns lpn

## 2018-07-06 DIAGNOSIS — E11.40 TYPE 2 DIABETES MELLITUS WITH DIABETIC NEUROPATHY, WITHOUT LONG-TERM CURRENT USE OF INSULIN (H): ICD-10-CM

## 2018-07-06 RX ORDER — METFORMIN HYDROCHLORIDE 750 MG/1
TABLET, EXTENDED RELEASE ORAL
Qty: 90 TABLET | Refills: 1 | Status: SHIPPED | OUTPATIENT
Start: 2018-07-06 | End: 2019-01-06

## 2018-07-06 NOTE — TELEPHONE ENCOUNTER
"Requested Prescriptions   Pending Prescriptions Disp Refills     metFORMIN (GLUCOPHAGE-XR) 750 MG 24 hr tablet [Pharmacy Med Name: METFORMIN  MG TABLET] 90 tablet 1    Last Written Prescription Date:  01/10/2018  Last Fill Quantity: 90,  # refills: 1   Last office visit: 4/10/2018 with prescribing provider:  Dr. Schmidt   Future Office Visit:   Sig: TAKE 1 TABLET (750 MG) BY MOUTH DAILY WITH DINNER    Biguanide Agents Failed    7/6/2018  1:43 AM       Failed - Patient has documented LDL within the past 12 mos.    Recent Labs   Lab Test  03/09/16   0938   LDL  48            Failed - Patient has documented A1c within the specified period of time.    If HgbA1C is 8 or greater, it needs to be on file within the past 3 months.  If less than 8, must be on file within the past 6 months.     Recent Labs   Lab Test  12/27/17   0633   A1C  6.9*            Passed - Blood pressure less than 140/90 in past 6 months    BP Readings from Last 3 Encounters:   05/25/18 128/68   04/26/18 122/64   04/10/18 122/74                Passed - Patient has had a Microalbumin in the past 12 mos.    Recent Labs   Lab Test  10/31/17   1502   MICROL  17   UMALCR  7.40            Passed - Patient is age 10 or older       Passed - Patient's CR is NOT>1.4 OR Patient's EGFR is NOT<45 within past 12 mos.    Recent Labs   Lab Test  04/18/18   1206   GFRESTIMATED  56*   GFRESTBLACK  67       Recent Labs   Lab Test  04/18/18   1206   CR  0.94            Passed - Patient does NOT have a diagnosis of CHF.       Passed - Patient is not pregnant       Passed - Patient has not had a positive pregnancy test within the past 12 mos.        Passed - Recent (6 mo) or future (30 days) visit within the authorizing provider's specialty    Patient had office visit in the last 6 months or has a visit in the next 30 days with authorizing provider or within the authorizing provider's specialty.  See \"Patient Info\" tab in inbasket, or \"Choose Columns\" in Meds & " Orders section of the refill encounter.

## 2018-07-16 ENCOUNTER — TELEPHONE (OUTPATIENT)
Dept: FAMILY MEDICINE | Facility: CLINIC | Age: 83
End: 2018-07-16

## 2018-07-17 NOTE — TELEPHONE ENCOUNTER
Called patient and cancelled her appointment with MILKA 8/6/2018, and rescheduled her for 7/25/2018 with Dr. Manzo.  Catherine Royal LPN

## 2018-07-17 NOTE — TELEPHONE ENCOUNTER
Reason for call:  Other   Patient called regarding (reason for call): appointment  Additional comments: Patient has an appointment scheduled with Dr. Schmidt 08/06/2018 for just a check up, want's to know if he can fit her in his schedule to be seen sooner. Patient has no major concerns just legs aching and fluid buildup.    Phone number to reach patient:  Home number on file [phone] (home)    Best Time:  anytime    Can we leave a detailed message on this number?  YES

## 2018-07-25 ENCOUNTER — OFFICE VISIT (OUTPATIENT)
Dept: FAMILY MEDICINE | Facility: CLINIC | Age: 83
End: 2018-07-25
Payer: MEDICARE

## 2018-07-25 VITALS
DIASTOLIC BLOOD PRESSURE: 72 MMHG | BODY MASS INDEX: 36.95 KG/M2 | OXYGEN SATURATION: 95 % | TEMPERATURE: 98.4 F | RESPIRATION RATE: 16 BRPM | WEIGHT: 202 LBS | SYSTOLIC BLOOD PRESSURE: 134 MMHG | HEART RATE: 82 BPM

## 2018-07-25 DIAGNOSIS — G62.9 NEUROPATHY: Chronic | ICD-10-CM

## 2018-07-25 DIAGNOSIS — L30.4 INTERTRIGO: ICD-10-CM

## 2018-07-25 DIAGNOSIS — E03.9 HYPOTHYROIDISM, UNSPECIFIED TYPE: Chronic | ICD-10-CM

## 2018-07-25 DIAGNOSIS — E66.01 MORBID OBESITY (H): ICD-10-CM

## 2018-07-25 DIAGNOSIS — E11.40 TYPE 2 DIABETES MELLITUS WITH DIABETIC NEUROPATHY, WITHOUT LONG-TERM CURRENT USE OF INSULIN (H): Primary | ICD-10-CM

## 2018-07-25 DIAGNOSIS — I10 HYPERTENSION GOAL BP (BLOOD PRESSURE) < 140/90: ICD-10-CM

## 2018-07-25 DIAGNOSIS — I25.9 CHRONIC ISCHEMIC HEART DISEASE: ICD-10-CM

## 2018-07-25 PROCEDURE — 99214 OFFICE O/P EST MOD 30 MIN: CPT | Performed by: FAMILY MEDICINE

## 2018-07-25 PROCEDURE — 99207 C FOOT EXAM  NO CHARGE: CPT | Performed by: FAMILY MEDICINE

## 2018-07-25 RX ORDER — CLOTRIMAZOLE AND BETAMETHASONE DIPROPIONATE 10; .64 MG/G; MG/G
CREAM TOPICAL 2 TIMES DAILY
Qty: 30 G | Refills: 1 | Status: SHIPPED | OUTPATIENT
Start: 2018-07-25 | End: 2019-02-10

## 2018-07-25 ASSESSMENT — ANXIETY QUESTIONNAIRES
3. WORRYING TOO MUCH ABOUT DIFFERENT THINGS: SEVERAL DAYS
2. NOT BEING ABLE TO STOP OR CONTROL WORRYING: SEVERAL DAYS
1. FEELING NERVOUS, ANXIOUS, OR ON EDGE: SEVERAL DAYS
5. BEING SO RESTLESS THAT IT IS HARD TO SIT STILL: NOT AT ALL
7. FEELING AFRAID AS IF SOMETHING AWFUL MIGHT HAPPEN: NOT AT ALL
GAD7 TOTAL SCORE: 5
6. BECOMING EASILY ANNOYED OR IRRITABLE: SEVERAL DAYS
IF YOU CHECKED OFF ANY PROBLEMS ON THIS QUESTIONNAIRE, HOW DIFFICULT HAVE THESE PROBLEMS MADE IT FOR YOU TO DO YOUR WORK, TAKE CARE OF THINGS AT HOME, OR GET ALONG WITH OTHER PEOPLE: NOT DIFFICULT AT ALL

## 2018-07-25 ASSESSMENT — PATIENT HEALTH QUESTIONNAIRE - PHQ9: 5. POOR APPETITE OR OVEREATING: SEVERAL DAYS

## 2018-07-25 NOTE — PROGRESS NOTES
SUBJECTIVE:   Lora Vergara is a 89 year old female who presents to clinic today for the following health issues:      Diabetes Follow-up      Patient is checking blood sugars: not at all    Diabetic concerns: None     Symptoms of hypoglycemia (low blood sugar): dizzy/lightheadedness     Paresthesias (numbness or burning in feet) or sores: Yes-getting worse, going up her legs     Date of last diabetic eye exam: within the last year    BP Readings from Last 2 Encounters:   07/25/18 134/72   05/25/18 128/68     Hemoglobin A1C (%)   Date Value   12/27/2017 6.9 (H)   10/31/2017 6.2 (H)     LDL Cholesterol Calculated (mg/dL)   Date Value   03/09/2016 48   07/20/2015 33       Diabetes Management Resources  Hypothyroidism Follow-up      Since last visit, patient describes the following symptoms: Weight stable, no hair loss, no skin changes, no constipation, no loose stools      Amount of exercise or physical activity: doing chores around the house    Problems taking medications regularly: No    Medication side effects: none    Diet: regular (no restrictions)      Heart issue      Duration: last 2-3 months    Description (location/character/radiation): patient states that she has very quick sharp pains in the center, not very often    Intensity:  mild    Accompanying signs and symptoms: see above    History (similar episodes/previous evaluation): None    Precipitating or alleviating factors: None    Therapies tried and outcome: None       -rash under breasts, 2-3 months, itchiness, redness, used cortisone cream and otc fungal cream    Rash      Duration: A few weeks    Description  Location: Beneath both breasts  Itching: no    Intensity:  moderate    Accompanying signs and symptoms: None    History (similar episodes/previous evaluation): None    Precipitating or alleviating factors:  New exposures:  None  Recent travel: no      Therapies tried and outcome: The patient got antifungal cream from the cardiology office  which cleared the area under the left breast but not the right.      Problem list and histories reviewed & adjusted, as indicated.  Additional history: as documented    Patient Active Problem List   Diagnosis     Hypertension goal BP (blood pressure) < 140/90     Hyperlipidemia LDL goal <70     Neuropathy     Type 2 diabetes mellitus with diabetic neuropathy (H)     Chronic ischemic heart disease     Preventive measure     LLQ abdominal pain     BCC (basal cell carcinoma), face     Overweight     Other and unspecified angina pectoris     Low back pain     ACP (advance care planning)     SOB (shortness of breath)     Ischemic chest pain (H)     Hypothyroidism, unspecified type     Abnormal urine color     Gastroenteritis     Osteoarthritis of right knee, unspecified osteoarthritis type     Chronic right-sided low back pain with right-sided sciatica     Respiratory failure with hypoxia (H)     Type 2 diabetes mellitus with diabetic neuropathy, without long-term current use of insulin (H)     Acute on chronic diastolic congestive heart failure (H)     Venous (peripheral) insufficiency     Bilateral leg edema     Morbid obesity (H)     Past Surgical History:   Procedure Laterality Date     APPENDECTOMY  1974     BACK SURGERY  1996     BLADDER SURGERY  1990    bladder suspension     CHOLECYSTECTOMY  1975     CORONARY ANGIOGRAPHY ADULT ORDER  2/2008    Mild CAD. LAD w/20% stenosis, RCA with 30-40% stenosis. No flow limiting obstructions.     face surgery  1-6/2014    basal cell plus plastic to nose, face , ear     HYSTERECTOMY  1970     OOPHORECTOMY  1974    bilateral     PHACOEMULSIFICATION CLEAR CORNEA WITH STANDARD INTRAOCULAR LENS IMPLANT Right 7/21/2015    Procedure: PHACOEMULSIFICATION CLEAR CORNEA WITH STANDARD INTRAOCULAR LENS IMPLANT;  Surgeon: Schuyler Chapa MD;  Location[facility]     PHACOEMULSIFICATION CLEAR CORNEA WITH STANDARD INTRAOCULAR LENS IMPLANT Left 8/18/2015    Procedure: PHACOEMULSIFICATION  CLEAR CORNEA WITH STANDARD INTRAOCULAR LENS IMPLANT;  Surgeon: Schuyler Chapa MD;  Location: [facility]       Social History   Substance Use Topics     Smoking status: Never Smoker     Smokeless tobacco: Never Used     Alcohol use Yes      Comment: Cocktail 2-3 times per week     Family History   Problem Relation Age of Onset     Cancer Brother      lung     Cancer Son      Cerebrovascular Disease Mother      and Parkinson's     Cancer Father      pancreas     Diabetes Son            Reviewed and updated as needed this visit by clinical staff  Tobacco  Allergies  Meds  Med Hx  Surg Hx  Fam Hx  Soc Hx      Reviewed and updated as needed this visit by Provider         ROS:  Constitutional, HEENT, cardiovascular, pulmonary, gi and gu systems are negative, except as otherwise noted.    OBJECTIVE:                                                    /72  Pulse 82  Temp 98.4  F (36.9  C) (Tympanic)  Resp 16  Wt 202 lb (91.6 kg)  LMP  (LMP Unknown)  SpO2 95%  BMI 36.95 kg/m2  Body mass index is 36.95 kg/(m^2).  GENERAL APPEARANCE: healthy, alert and no distress  SKIN: There is redness to the tissue underneath the right breast.         ASSESSMENT/PLAN:                                                        ICD-10-CM    1. Type 2 diabetes mellitus with diabetic neuropathy, without long-term current use of insulin (H) E11.40 Lipid Profile     Albumin Random Urine Quantitative with Creat Ratio     Hemoglobin A1c   2. Intertrigo L30.4 clotrimazole-betamethasone (LOTRISONE) cream   3. Morbid obesity (H) E66.01    4. Hypothyroidism, unspecified type E03.9 T4 FREE     TSH   5. Hypertension goal BP (blood pressure) < 140/90 I10 UA with Microscopic     CBC with platelets differential     Comprehensive metabolic panel   6. Neuropathy G62.9    7. Chronic ischemic heart disease I25.9        Patient Instructions   I will place the patient on Lotrisone cream twice daily underneath the right breast.  She was instructed  to wear her bra all the time with the exception of bathing to keep the breast tissue from lying down against the skin.  If this is not successful in clearing her yeast infection would switch to nystatin powder.    Labs studies were ordered.  The patient is not fasting so she will get a lab appointment and come back and get the labs done when she is fasting.  She needs her diabetes test once her kidney function and her thyroid numbers checked along with her lipids.  She has an appointment coming up in August with cardiology.      Nirmal Manzo MD  [facility]

## 2018-07-25 NOTE — MR AVS SNAPSHOT
After Visit Summary   7/25/2018    Lora Vergara    MRN: [id number]           Patient Information     Date Of Birth          9/11/1925        Visit Information        Provider Department      7/25/2018 2:15 PM Nirmal Manzo MD [facility]        Today's Diagnoses     Type 2 diabetes mellitus with diabetic neuropathy, without long-term current use of insulin (H)    -  1    Intertrigo        Morbid obesity (H)        Hypothyroidism, unspecified type        Hypertension goal BP (blood pressure) < 140/90        Neuropathy        Chronic ischemic heart disease          Care Instructions    I will place the patient on Lotrisone cream twice daily underneath the right breast.  She was instructed to wear her bra all the time with the exception of bathing to keep the breast tissue from lying down against the skin.  If this is not successful in clearing her yeast infection would switch to nystatin powder.    Labs studies were ordered.  The patient is not fasting so she will get a lab appointment and come back and get the labs done when she is fasting.  She needs her diabetes test once her kidney function and her thyroid numbers checked along with her lipids.  She has an appointment coming up in August with cardiology.          Follow-ups after your visit        Your next 10 appointments already scheduled     Jul 26, 2018  9:15 AM CDT   LAB with BX LAB   [facility] ([facility])    [address] [place] [id number] [phone]           Please do not eat 10-12 hours before your appointment if you are coming in fasting for labs on lipids, cholesterol, or glucose (sugar). This does not apply to pregnant women. Water, hot tea and black coffee (with nothing added) are okay. Do not drink other fluids, diet soda or chew gum.              Future tests that were ordered for you today     Open  Future Orders        Priority Expected Expires Ordered    UA with Microscopic Routine  8/31/2018 7/25/2018    CBC with platelets differential Routine  8/31/2018 7/25/2018    Comprehensive metabolic panel Routine  8/31/2018 7/25/2018    Lipid Profile Routine  8/31/2018 7/25/2018    Albumin Random Urine Quantitative with Creat Ratio Routine  8/31/2018 7/25/2018    Hemoglobin A1c Routine  8/31/2018 7/25/2018    T4 FREE Routine  8/31/2018 7/25/2018    TSH Routine  8/31/2018 7/25/2018            Who to contact     If you have questions or need follow up information about today's clinic visit or your schedule please contact [facility] directly at [phone].  Normal or non-critical lab and imaging results will be communicated to you by MyChart, letter or phone within 4 business days after the clinic has received the results. If you do not hear from us within 7 days, please contact the clinic through MyChart or phone. If you have a critical or abnormal lab result, we will notify you by phone as soon as possible.  Submit refill requests through [employer] or call your pharmacy and they will forward the refill request to us. Please allow 3 business days for your refill to be completed.          Additional Information About Your Visit        Care EveryWhere ID     This is your Care EveryWhere ID. This could be used by other organizations to access your [place] medical records  [id number]        Your Vitals Were     Pulse Temperature Respirations Last Period Pulse Oximetry BMI (Body Mass Index)    82 98.4  F (36.9  C) (Tympanic) 16 (LMP Unknown) 95% 36.95 kg/m2       Blood Pressure from Last 3 Encounters:   07/25/18 134/72   05/25/18 128/68   04/26/18 122/64    Weight from Last 3 Encounters:   07/25/18 202 lb (91.6 kg)   05/25/18 203 lb (92.1 kg)   04/26/18 204 lb 11.2 oz (92.9 kg)              We Performed the Following     FOOT EXAM  NO CHARGE [74279.114]          Today's Medication Changes           These changes are accurate as of 7/25/18  2:46 PM.  If you have any questions, ask your nurse or doctor.               Start taking these medicines.        Dose/Directions    clotrimazole-betamethasone cream   Commonly known as:  LOTRISONE   Used for:  Intertrigo   Started by:  Nirmal Manzo MD        Apply topically 2 times daily   Quantity:  30 g   Refills:  1            Where to get your medicines      These medications were sent to [place] [address] [facility] - [facility] [address]   [address] [facility] [address]     Phone:  [phone]     clotrimazole-betamethasone cream                Primary Care Provider Office Phone # Fax #    Antwon Schmidt [phone] [address] [facility] [address]        Equal Access to Services     CINDI [facility] HospitalVERITO : Hadii aad ku hadasho Soomaali, waaxda luqadaha, qaybta kaalmada adeegyada, debbie aleman haykylee juan . So [facility] [phone].    ATENCIÓN: Si habla español, tiene a david disposición servicios gratuitos de asistencia lingüística. Llame al [phone].    We comply with applicable federal civil rights laws and [place] laws. We do not discriminate on the basis of race, color, national origin, age, disability, sex, sexual orientation, or gender identity.            Thank you!     Thank you for choosing [facility]  for your care. Our goal is always to provide you with excellent care. Hearing back from our patients is one way we can continue to improve our services. Please take a few minutes to complete the written survey that you may receive in the mail after your visit with us. Thank you!             Your Updated Medication List - Protect others around you: Learn how to safely use, store and throw away your medicines at www.disposemymeds.org.          This list is accurate as of 7/25/18  2:46 PM.  Always use your most recent med list.                   Brand Name Dispense  Instructions for use Diagnosis    ACETAMINOPHEN PO      Take 1,000 mg by mouth 3 times daily        albuterol 108 (90 Base) MCG/ACT Inhaler    PROAIR HFA/PROVENTIL HFA/VENTOLIN HFA    1 Inhaler    Inhale 2 puffs into the lungs every 6 hours as needed for shortness of breath / dyspnea or wheezing    Acute bronchitis, unspecified organism       ASPIRIN LOW DOSE 81 MG tablet   Generic drug:  aspirin      Take 81 mg by mouth daily        atorvastatin 40 MG tablet    LIPITOR    90 tablet    Take 1 tablet (40 mg) by mouth daily    Hyperlipidemia LDL goal <70       clotrimazole-betamethasone cream    LOTRISONE    30 g    Apply topically 2 times daily    Intertrigo       DOCUSATE SODIUM PO      Take 100 mg by mouth every morning        furosemide 20 MG tablet    LASIX    30 tablet    Take 1 tablet (20 mg) by mouth daily    Bilateral leg edema       isosorbide mononitrate 60 MG 24 hr tablet    IMDUR    90 tablet    TAKE 1 TABLET (60 MG) BY MOUTH DAILY    Chronic ischemic heart disease       latanoprost 0.005 % ophthalmic solution    XALATAN     Place 1 drop into both eyes At Bedtime        levothyroxine 25 MCG tablet    SYNTHROID/LEVOTHROID    90 tablet    TAKE ONE TABLET BY MOUTH ONE TIME DAILY    Hypothyroidism       metFORMIN 750 MG 24 hr tablet    GLUCOPHAGE-XR    90 tablet    TAKE 1 TABLET (750 MG) BY MOUTH DAILY WITH DINNER    Type 2 diabetes mellitus with diabetic neuropathy, without long-term current use of insulin (H)       metoprolol tartrate 100 MG tablet    LOPRESSOR    135 tablet    TAKE 1/2 TABLET BY MOUTH EVERY AM AND TAKE 1 TABLET IN THE EVENING.    Hypertension goal BP (blood pressure) < 140/90       nitroGLYcerin 0.4 MG sublingual tablet    NITROSTAT    50 tablet    Place 1 tablet (0.4 mg) under the tongue every 5 minutes as needed for chest pain    Stable angina pectoris (H)       order for DME     1 Device    Equipment being ordered: wheeled walker with brakes and a seat    Neuropathy, Poor balance        pregabalin 150 MG capsule    LYRICA    180 capsule    TAKE 1 CAPSULE BY MOUTH TWICE A DAY    Neuropathy       ranolazine 500 MG 12 hr tablet    RANEXA    180 tablet    Take 1 tablet (500 mg) by mouth 2 times daily    Cardiac microvascular disease (H)       VITAMIN D (CHOLECALCIFEROL) PO      Take 4,000 Units by mouth daily

## 2018-07-25 NOTE — PATIENT INSTRUCTIONS
I will place the patient on Lotrisone cream twice daily underneath the right breast.  She was instructed to wear her bra all the time with the exception of bathing to keep the breast tissue from lying down against the skin.  If this is not successful in clearing her yeast infection would switch to nystatin powder.    Labs studies were ordered.  The patient is not fasting so she will get a lab appointment and come back and get the labs done when she is fasting.  She needs her diabetes test once her kidney function and her thyroid numbers checked along with her lipids.  She has an appointment coming up in August with cardiology.

## 2018-07-26 DIAGNOSIS — E03.9 HYPOTHYROIDISM, UNSPECIFIED TYPE: Chronic | ICD-10-CM

## 2018-07-26 DIAGNOSIS — I10 HYPERTENSION GOAL BP (BLOOD PRESSURE) < 140/90: ICD-10-CM

## 2018-07-26 DIAGNOSIS — E11.40 TYPE 2 DIABETES MELLITUS WITH DIABETIC NEUROPATHY, WITHOUT LONG-TERM CURRENT USE OF INSULIN (H): ICD-10-CM

## 2018-07-26 LAB
ALBUMIN SERPL-MCNC: 3.6 G/DL (ref 3.4–5)
ALBUMIN UR-MCNC: 30 MG/DL
ALP SERPL-CCNC: 49 U/L (ref 40–150)
ALT SERPL W P-5'-P-CCNC: 18 U/L (ref 0–50)
ANION GAP SERPL CALCULATED.3IONS-SCNC: 7 MMOL/L (ref 3–14)
APPEARANCE UR: CLEAR
AST SERPL W P-5'-P-CCNC: 13 U/L (ref 0–45)
BACTERIA #/AREA URNS HPF: ABNORMAL /HPF
BASOPHILS # BLD AUTO: 0 10E9/L (ref 0–0.2)
BASOPHILS NFR BLD AUTO: 0.2 %
BILIRUB SERPL-MCNC: 0.5 MG/DL (ref 0.2–1.3)
BILIRUB UR QL STRIP: ABNORMAL
BUN SERPL-MCNC: 13 MG/DL (ref 7–30)
CALCIUM SERPL-MCNC: 8.6 MG/DL (ref 8.5–10.1)
CHLORIDE SERPL-SCNC: 103 MMOL/L (ref 94–109)
CHOLEST SERPL-MCNC: 123 MG/DL
CO2 SERPL-SCNC: 28 MMOL/L (ref 20–32)
COLOR UR AUTO: YELLOW
CREAT SERPL-MCNC: 0.77 MG/DL (ref 0.52–1.04)
DIFFERENTIAL METHOD BLD: NORMAL
EOSINOPHIL # BLD AUTO: 0.1 10E9/L (ref 0–0.7)
EOSINOPHIL NFR BLD AUTO: 2.1 %
ERYTHROCYTE [DISTWIDTH] IN BLOOD BY AUTOMATED COUNT: 13.3 % (ref 10–15)
GFR SERPL CREATININE-BSD FRML MDRD: 70 ML/MIN/1.7M2
GLUCOSE SERPL-MCNC: 117 MG/DL (ref 70–99)
GLUCOSE UR STRIP-MCNC: NEGATIVE MG/DL
HBA1C MFR BLD: 6.1 % (ref 0–5.6)
HCT VFR BLD AUTO: 39 % (ref 35–47)
HDLC SERPL-MCNC: 55 MG/DL
HGB BLD-MCNC: 13.2 G/DL (ref 11.7–15.7)
HGB UR QL STRIP: NEGATIVE
KETONES UR STRIP-MCNC: NEGATIVE MG/DL
LDLC SERPL CALC-MCNC: 42 MG/DL
LEUKOCYTE ESTERASE UR QL STRIP: ABNORMAL
LYMPHOCYTES # BLD AUTO: 1.9 10E9/L (ref 0.8–5.3)
LYMPHOCYTES NFR BLD AUTO: 44.8 %
MCH RBC QN AUTO: 30.6 PG (ref 26.5–33)
MCHC RBC AUTO-ENTMCNC: 33.8 G/DL (ref 31.5–36.5)
MCV RBC AUTO: 91 FL (ref 78–100)
MONOCYTES # BLD AUTO: 0.3 10E9/L (ref 0–1.3)
MONOCYTES NFR BLD AUTO: 6.8 %
NEUTROPHILS # BLD AUTO: 2 10E9/L (ref 1.6–8.3)
NEUTROPHILS NFR BLD AUTO: 46.1 %
NITRATE UR QL: NEGATIVE
NONHDLC SERPL-MCNC: 68 MG/DL
PH UR STRIP: 7.5 PH (ref 5–7)
PLATELET # BLD AUTO: 241 10E9/L (ref 150–450)
POTASSIUM SERPL-SCNC: 4.1 MMOL/L (ref 3.4–5.3)
PROT SERPL-MCNC: 6.5 G/DL (ref 6.8–8.8)
RBC # BLD AUTO: 4.31 10E12/L (ref 3.8–5.2)
RBC #/AREA URNS AUTO: ABNORMAL /HPF
SODIUM SERPL-SCNC: 138 MMOL/L (ref 133–144)
SOURCE: ABNORMAL
SP GR UR STRIP: 1.01 (ref 1–1.03)
T4 FREE SERPL-MCNC: 1.12 NG/DL (ref 0.76–1.46)
TRIGL SERPL-MCNC: 130 MG/DL
TSH SERPL DL<=0.005 MIU/L-ACNC: 2.45 MU/L (ref 0.4–4)
UROBILINOGEN UR STRIP-ACNC: 1 EU/DL (ref 0.2–1)
WBC # BLD AUTO: 4.3 10E9/L (ref 4–11)
WBC #/AREA URNS AUTO: ABNORMAL /HPF

## 2018-07-26 PROCEDURE — 80053 COMPREHEN METABOLIC PANEL: CPT | Performed by: FAMILY MEDICINE

## 2018-07-26 PROCEDURE — 81001 URINALYSIS AUTO W/SCOPE: CPT | Performed by: FAMILY MEDICINE

## 2018-07-26 PROCEDURE — 85025 COMPLETE CBC W/AUTO DIFF WBC: CPT | Performed by: FAMILY MEDICINE

## 2018-07-26 PROCEDURE — 36415 COLL VENOUS BLD VENIPUNCTURE: CPT | Performed by: FAMILY MEDICINE

## 2018-07-26 PROCEDURE — 80061 LIPID PANEL: CPT | Performed by: FAMILY MEDICINE

## 2018-07-26 PROCEDURE — 84443 ASSAY THYROID STIM HORMONE: CPT | Performed by: FAMILY MEDICINE

## 2018-07-26 PROCEDURE — 84439 ASSAY OF FREE THYROXINE: CPT | Performed by: FAMILY MEDICINE

## 2018-07-26 PROCEDURE — 82043 UR ALBUMIN QUANTITATIVE: CPT | Performed by: FAMILY MEDICINE

## 2018-07-26 PROCEDURE — 83036 HEMOGLOBIN GLYCOSYLATED A1C: CPT | Performed by: FAMILY MEDICINE

## 2018-07-26 ASSESSMENT — PATIENT HEALTH QUESTIONNAIRE - PHQ9: SUM OF ALL RESPONSES TO PHQ QUESTIONS 1-9: 2

## 2018-07-26 ASSESSMENT — ANXIETY QUESTIONNAIRES: GAD7 TOTAL SCORE: 5

## 2018-07-27 LAB
CREAT UR-MCNC: 168 MG/DL
MICROALBUMIN UR-MCNC: 11 MG/L
MICROALBUMIN/CREAT UR: 6.43 MG/G CR (ref 0–25)

## 2018-09-18 DIAGNOSIS — I25.85 CARDIAC MICROVASCULAR DISEASE: ICD-10-CM

## 2018-09-18 RX ORDER — RANOLAZINE 500 MG/1
500 TABLET, EXTENDED RELEASE ORAL 2 TIMES DAILY
Qty: 180 TABLET | Refills: 2 | Status: SHIPPED | OUTPATIENT
Start: 2018-09-18 | End: 2019-12-12

## 2018-10-03 ENCOUNTER — TELEPHONE (OUTPATIENT)
Dept: FAMILY MEDICINE | Facility: CLINIC | Age: 83
End: 2018-10-03

## 2018-10-03 DIAGNOSIS — G62.9 NEUROPATHY: Chronic | ICD-10-CM

## 2018-10-03 NOTE — TELEPHONE ENCOUNTER
lYRICA       Last Written Prescription Date:  04/10/2018  Last Fill Quantity: 180,   # refills: 3  Last Office Visit: 07/25/2018  Future Office visit:    Next 5 appointments (look out 90 days)     Oct 26, 2018 11:15 AM CDT   Return Visit with Kalyan Luis MD   [facility] ([facility])    [address] [place] [id number] [phone] OPT 2                   Routing refill request to provider for review/approval because:  Drug not on the [facility], [facility] or [facility] refill protocol or controlled substance

## 2018-10-03 NOTE — TELEPHONE ENCOUNTER
Patient would like a call letting her know when refill sent. Patient states she only has a month left

## 2018-10-03 NOTE — TELEPHONE ENCOUNTER
Reason for Call:  Medication or medication refill:    Do you use a [facility]?  Name of the pharmacy and phone number for the current request:  Patient states she gets it from the makers of Lyrica    Name of the medication requested: pregabalin (LYRICA) 150 MG capsule    Other request:     Can we leave a detailed message on this number? YES    Phone number patient can be reached at: Home number on file [phone] (home)    Best Time: any    Call taken on 10/3/2018 at 8:33 AM by CUCO SANTOS

## 2018-10-04 RX ORDER — PREGABALIN 150 MG/1
CAPSULE ORAL
Qty: 180 CAPSULE | Refills: 0 | Status: SHIPPED | OUTPATIENT
Start: 2018-10-04 | End: 2019-01-15

## 2018-10-04 NOTE — TELEPHONE ENCOUNTER
"It appears the refill was already approved by Dr Sanders.                                                                               Here is the message from the pt; \"Patient would like a call letting her know when refill sent. Patient states she only has a month left\"                     Please let her know.     "

## 2018-10-15 NOTE — TELEPHONE ENCOUNTER
Pt says they never received this fax.  can we refax today as   Patient is almost out    Pt says fax to [phone].

## 2018-10-20 DIAGNOSIS — E03.9 HYPOTHYROIDISM: ICD-10-CM

## 2018-10-20 NOTE — TELEPHONE ENCOUNTER
"Requested Prescriptions   Pending Prescriptions Disp Refills     levothyroxine (SYNTHROID/LEVOTHROID) 25 MCG tablet [Pharmacy Med Name: LEVOTHYROXINE 25 MCG TABLET]  Last Written Prescription Date:  1-18-18  Last Fill Quantity: 90tab,  # refills: 2   Last office visit: 7/25/2018 with prescribing provider:     Future Office Visit:   Next 5 appointments (look out 90 days)     Oct 26, 2018 11:15 AM CDT   Return Visit with Kalyan Luis MD   [facility] ([facility])    [address] [place] [id number] [phone] OPT 2                  90 tablet 2     Sig: TAKE ONE TABLET BY MOUTH ONE TIME DAILY    Thyroid Protocol Passed    10/20/2018  1:40 AM       Passed - Patient is 12 years or older       Passed - Recent (12 mo) or future (30 days) visit within the authorizing provider's specialty    Patient had office visit in the last 12 months or has a visit in the next 30 days with authorizing provider or within the authorizing provider's specialty.  See \"Patient Info\" tab in inbasket, or \"Choose Columns\" in Meds & Orders section of the refill encounter.             Passed - Normal TSH on file in past 12 months    Recent Labs   Lab Test  07/26/18   0914   TSH  2.45             Passed - No active pregnancy on record    If patient is pregnant or has had a positive pregnancy test, please check TSH.         Passed - No positive pregnancy test in past 12 months    If patient is pregnant or has had a positive pregnancy test, please check TSH.            "

## 2018-10-22 ENCOUNTER — TELEPHONE (OUTPATIENT)
Dept: FAMILY MEDICINE | Facility: CLINIC | Age: 83
End: 2018-10-22

## 2018-10-22 DIAGNOSIS — G62.9 NEUROPATHY: Chronic | ICD-10-CM

## 2018-10-22 RX ORDER — LEVOTHYROXINE SODIUM 25 UG/1
TABLET ORAL
Qty: 90 TABLET | Refills: 2 | Status: SHIPPED | OUTPATIENT
Start: 2018-10-22 | End: 2019-08-09

## 2018-10-22 RX ORDER — PREGABALIN 150 MG/1
CAPSULE ORAL
Qty: 180 CAPSULE | Refills: 0 | Status: CANCELLED | OUTPATIENT
Start: 2018-10-22

## 2018-10-22 NOTE — TELEPHONE ENCOUNTER
Reason for Call:  Medication or medication refill:    [facility]?  Name of the pharmacy and phone number for the current request:  [facility]    Name of the medication requested: pregabalin (LYRICA) 150 MG capsule    Other request: script has been faxed numerous times but company states they do not have the script and will not fill med for pt-she is almost out of the med-confirmed the number scripts have been faxed to is correct-patients ID # is [id number]-please call the [facility] at     Can we leave a detailed message on this number? YES    Phone number patient can be reached at: Home number on file [phone] (home)    Best Time: asap    Call taken on 10/22/2018 at 3:37 PM by TJ CULVER

## 2018-10-25 NOTE — TELEPHONE ENCOUNTER
Lora called requesting a nurse return a call.  She is having trouble getting her refill on Lyrica.

## 2018-10-25 NOTE — TELEPHONE ENCOUNTER
I reached Lora and she just talked with [facility] and they do not have the Rx.  Looks like we have faxed it 3 times to 2 different fax numbers.    She said please fax to . And then call to make sure they got it.    The phone number and ID are below in original message. She also would like a call back.     The Rx is not at [facility]. Looks like [facility] faxed it Tuesday and was at [facility]. Candy can you look?  I couldn't find it.    Maria Antonia Argueta RN- Triage [employer]

## 2018-10-25 NOTE — TELEPHONE ENCOUNTER
I faxed to [facility] and then called and they said they actually had the Rx already.  Order number [id number].   I let Lora know her order number  Maria Antonia Argueta RN- Triage [employer]

## 2018-10-26 ENCOUNTER — OFFICE VISIT (OUTPATIENT)
Dept: CARDIOLOGY | Facility: CLINIC | Age: 83
End: 2018-10-26
Payer: MEDICARE

## 2018-10-26 VITALS
HEIGHT: 62 IN | SYSTOLIC BLOOD PRESSURE: 135 MMHG | WEIGHT: 206.3 LBS | BODY MASS INDEX: 37.97 KG/M2 | DIASTOLIC BLOOD PRESSURE: 78 MMHG | HEART RATE: 64 BPM | OXYGEN SATURATION: 95 %

## 2018-10-26 DIAGNOSIS — R60.0 BILATERAL LEG EDEMA: ICD-10-CM

## 2018-10-26 DIAGNOSIS — E78.5 HYPERLIPIDEMIA LDL GOAL <70: Chronic | ICD-10-CM

## 2018-10-26 DIAGNOSIS — I10 HYPERTENSION GOAL BP (BLOOD PRESSURE) < 140/90: ICD-10-CM

## 2018-10-26 DIAGNOSIS — I50.32 CHRONIC DIASTOLIC CONGESTIVE HEART FAILURE (H): ICD-10-CM

## 2018-10-26 DIAGNOSIS — E11.40 TYPE 2 DIABETES MELLITUS WITH DIABETIC NEUROPATHY, WITHOUT LONG-TERM CURRENT USE OF INSULIN (H): ICD-10-CM

## 2018-10-26 DIAGNOSIS — I25.9 CHRONIC ISCHEMIC HEART DISEASE: Primary | ICD-10-CM

## 2018-10-26 PROCEDURE — 99214 OFFICE O/P EST MOD 30 MIN: CPT | Performed by: INTERNAL MEDICINE

## 2018-10-26 RX ORDER — FUROSEMIDE 20 MG
10 TABLET ORAL DAILY
Qty: 45 TABLET | Refills: 3 | Status: SHIPPED | OUTPATIENT
Start: 2018-10-26 | End: 2019-12-11

## 2018-10-26 NOTE — PROGRESS NOTES
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  She has had a coronary angiography in 2010, which showed mild to moderate coronary artery atherosclerosis, but no obstructive disease.  However, chest discomfort and jaw pain have been suspicious for angina and responded to Imdur and Ranexa.  Now she is able to take Ranexa as she is got some discomfort from the company.    With that, she has done well.  There is not been a need to take a sublingual nitroglycerin recently.      Earlier this year, she had some fluid retention after being admitted with bronchitis and with Lasix 20 mg/day, the edema has improved.  I will decrease Lasix to 10 mg daily now.         She has been still quite active at her age.  She drives.        Her last BMP in July was normal      PHYSICAL EXAMINATION:   See below          IMPRESSION:   1. Microvascular angina/coronary artery atherosclerosis.  At this time, pain is well controlled on a combination of Imdur and amlodipine and p.r.n. sublingual nitro.    She is doing well on Ranexa which we will continue.    2.  Chronic diastolic heart failure  Improved with Lasix and decreasing salt intake.  We will decrease Lasix to 10 mg/day.  If there is worsening edema, she will call us.    3.  Hyperlipidemia.  Continue atorvastatin.      4.  Hypertension, well controlled.  However, will have to monitor it off amlodipine     She will return to see my nurse practitioner back in follow-up in 6 months and with me in a year.    Thank you for allowing us to participate in the care of this nice patient.    Sincerely,    Kalyan Luis MD    Orders Placed This Encounter   Procedures     Follow-Up with Cardiac Advanced Practice Provider       Orders Placed This Encounter   Medications     furosemide (LASIX) 20 MG tablet     Sig: Take 0.5 tablets (10 mg) by mouth daily     Dispense:  45 tablet      Refill:  3       Medications Discontinued During This Encounter   Medication Reason     furosemide (LASIX) 20 MG tablet Reorder         Encounter Diagnoses   Name Primary?     Hyperlipidemia LDL goal <70      Hypertension goal BP (blood pressure) < 140/90      Chronic ischemic heart disease Yes     Type 2 diabetes mellitus with diabetic neuropathy, without long-term current use of insulin (H)      Bilateral leg edema      Chronic diastolic congestive heart failure (H)        CURRENT MEDICATIONS:  Current Outpatient Prescriptions   Medication Sig Dispense Refill     ACETAMINOPHEN PO Take 1,000 mg by mouth 3 times daily        albuterol (PROAIR HFA/PROVENTIL HFA/VENTOLIN HFA) 108 (90 BASE) MCG/ACT Inhaler Inhale 2 puffs into the lungs every 6 hours as needed for shortness of breath / dyspnea or wheezing 1 Inhaler 0     aspirin (ASPIRIN LOW DOSE) 81 MG tablet Take 81 mg by mouth daily        atorvastatin (LIPITOR) 40 MG tablet Take 1 tablet (40 mg) by mouth daily 90 tablet 3     DOCUSATE SODIUM PO Take 100 mg by mouth every morning       furosemide (LASIX) 20 MG tablet Take 0.5 tablets (10 mg) by mouth daily 45 tablet 3     isosorbide mononitrate (IMDUR) 60 MG 24 hr tablet TAKE 1 TABLET (60 MG) BY MOUTH DAILY 90 tablet 2     latanoprost (XALATAN) 0.005 % ophthalmic solution Place 1 drop into both eyes At Bedtime       levothyroxine (SYNTHROID/LEVOTHROID) 25 MCG tablet TAKE ONE TABLET BY MOUTH ONE TIME DAILY 90 tablet 2     metFORMIN (GLUCOPHAGE-XR) 750 MG 24 hr tablet TAKE 1 TABLET (750 MG) BY MOUTH DAILY WITH DINNER 90 tablet 1     metoprolol tartrate (LOPRESSOR) 100 MG tablet TAKE 1/2 TABLET BY MOUTH EVERY AM AND TAKE 1 TABLET IN THE EVENING. 135 tablet 2     nitroGLYcerin (NITROSTAT) 0.4 MG sublingual tablet Place 1 tablet (0.4 mg) under the tongue every 5 minutes as needed for chest pain 50 tablet 3     pregabalin (LYRICA) 150 MG capsule TAKE 1 CAPSULE BY MOUTH TWICE A  capsule 0     ranolazine (RANEXA)  500 MG 12 hr tablet Take 1 tablet (500 mg) by mouth 2 times daily 180 tablet 2     VITAMIN D, CHOLECALCIFEROL, PO Take 4,000 Units by mouth daily       clotrimazole-betamethasone (LOTRISONE) cream Apply topically 2 times daily (Patient not taking: Reported on 10/26/2018) 30 g 1     order for DME Equipment being ordered: wheeled walker with brakes and a seat 1 Device 0     [DISCONTINUED] furosemide (LASIX) 20 MG tablet Take 1 tablet (20 mg) by mouth daily 30 tablet 11       ALLERGIES     Allergies   Allergen Reactions     Hydrochlorothiazide      Pancreatitis - patient denies this.  She says it gave her low sodium.       PAST MEDICAL HISTORY:  Past Medical History:   Diagnosis Date     Angina at rest (H)     Thought to be microvascular     Arthritis      Basal cell carcinoma 1/2014, (3/5/6-2014)    nose and check and ear and face     Coronary artery disease     Mild CAD per 2/2008 angiogram     Hyperlipidaemia      Hypertension      Hypothyroidism 1/17/2013     Mitral regurgitation 6/2015    echo     Type 2 diabetes, HbA1C goal < 8% (H) 9/11/2013       PAST SURGICAL HISTORY:  Past Surgical History:   Procedure Laterality Date     APPENDECTOMY  1974     BACK SURGERY  1996     BLADDER SURGERY  1990    bladder suspension     CHOLECYSTECTOMY  1975     CORONARY ANGIOGRAPHY ADULT ORDER  2/2008    Mild CAD. LAD w/20% stenosis, RCA with 30-40% stenosis. No flow limiting obstructions.     face surgery  1-6/2014    basal cell plus plastic to nose, face , ear     HYSTERECTOMY  1970     OOPHORECTOMY  1974    bilateral     PHACOEMULSIFICATION CLEAR CORNEA WITH STANDARD INTRAOCULAR LENS IMPLANT Right 7/21/2015    Procedure: PHACOEMULSIFICATION CLEAR CORNEA WITH STANDARD INTRAOCULAR LENS IMPLANT;  Surgeon: Schuyler Chapa MD;  Location: [facility]     PHACOEMULSIFICATION CLEAR CORNEA WITH STANDARD INTRAOCULAR LENS IMPLANT Left 8/18/2015    Procedure: PHACOEMULSIFICATION CLEAR CORNEA WITH STANDARD INTRAOCULAR LENS IMPLANT;  Surgeon:  "Schuyler Chapa MD;  Location: [facility]       FAMILY HISTORY:  Family History   Problem Relation Age of Onset     Cancer Brother      lung     Cancer Son      Cerebrovascular Disease Mother      and Parkinson's     Cancer Father      pancreas     Diabetes Son        SOCIAL HISTORY:  Social History     Social History     Marital status:      Spouse name: N/A     Number of children: N/A     Years of education: N/A     Social History Main Topics     Smoking status: Never Smoker     Smokeless tobacco: Never Used     Alcohol use Yes      Comment: 1 per week     Drug use: No     Sexual activity: No     Other Topics Concern     Parent/Sibling W/ Cabg, Mi Or Angioplasty Before 65f 55m? No     Caffeine Concern Yes     4 cups caffeine per day     Sleep Concern No     Stress Concern No     Weight Concern No     Special Diet No     Exercise Yes     exercises 30 minutes, 4 days week     Social History Narrative    Had 2 sons; one ; had 2 step dtrs; one .                                        times 2       Review of Systems:  Skin:  Negative       Eyes:  Positive for glasses cataract extraction both eyes  ENT:  Negative      Respiratory:  Positive for cough     Cardiovascular:    Positive for;chest pain;dizziness;lightheadedness;edema getting worse  Gastroenterology: Negative constipation    Genitourinary:  Negative urinary frequency    Musculoskeletal:  Positive for arthritis Left arm pain, pain in legs  Neurologic:  Positive for numbness or tingling of hands neuropathy  Psychiatric:  Negative      Heme/Lymph/Imm:  Positive for allergies    Endocrine:  Positive for diabetes;thyroid disorder      Physical Exam:  Vitals: /78  Pulse 64  Ht 1.575 m (5' 2\")  Wt 93.6 kg (206 lb 4.8 oz)  LMP  (LMP Unknown)  SpO2 95%  BMI 37.73 kg/m2    Constitutional:  cooperative        Skin:  warm and dry to the touch          Head:  normocephalic        Eyes:  pupils equal and round        Lymph:      ENT:  not " assessed this visit        Neck:  JVP normal        Respiratory:  clear to auscultation         Cardiac: regular rhythm;normal S1 and S2   S4            not assessed this visit                                        GI:  not assessed this visit        Extremities and Muscular Skeletal:  no spinal abnormalities noted   bilateral LE edema;trace          Neurological:  no gross motor deficits        Psych:  Alert and Oriented x 3        CC  No referring provider defined for this encounter.

## 2018-10-26 NOTE — MR AVS SNAPSHOT
After Visit Summary   10/26/2018    Lora Vergara    MRN: [id number]           Patient Information     Date Of Birth          9/11/1925        Visit Information        Provider Department      10/26/2018 11:15 AM Kalyan Luis MD [facility]        Today's Diagnoses     Chronic ischemic heart disease    -  1    Hyperlipidemia LDL goal <70        Hypertension goal BP (blood pressure) < 140/90        Type 2 diabetes mellitus with diabetic neuropathy, without long-term current use of insulin (H)        Bilateral leg edema        Chronic diastolic congestive heart failure (H)           Follow-ups after your visit        Additional Services     Follow-Up with Cardiac Advanced Practice Provider                 Future tests that were ordered for you today     Open Future Orders        Priority Expected Expires Ordered    Follow-Up with Cardiac Advanced Practice Provider Routine 4/24/2019 10/26/2019 10/26/2018            Who to contact     If you have questions or need follow up information about today's clinic visit or your schedule please contact [facility] directly at [phone].  Normal or non-critical lab and imaging results will be communicated to you by MyChart, letter or phone within 4 business days after the clinic has received the results. If you do not hear from us within 7 days, please contact the clinic through [employer] or phone. If you have a critical or abnormal lab result, we will notify you by phone as soon as possible.  Submit refill requests through [employer] or call your pharmacy and they will forward the refill request to us. Please allow 3 business days for your refill to be completed.          Additional Information About Your Visit        Care EveryWhere ID     This is your Care EveryWhere ID. This could be used by other organizations to access your [facility] medical records  [id number]        Your  "Vitals Were     Pulse Height Last Period Pulse Oximetry BMI (Body Mass Index)       64 1.575 m (5' 2\") (LMP Unknown) 95% 37.73 kg/m2        Blood Pressure from Last 3 Encounters:   10/26/18 135/78   07/25/18 134/72   05/25/18 128/68    Weight from Last 3 Encounters:   10/26/18 93.6 kg (206 lb 4.8 oz)   07/25/18 91.6 kg (202 lb)   05/25/18 92.1 kg (203 lb)                 Today's Medication Changes          These changes are accurate as of 10/26/18 11:35 AM.  If you have any questions, ask your nurse or doctor.               These medicines have changed or have updated prescriptions.        Dose/Directions    furosemide 20 MG tablet   Commonly known as:  LASIX   This may have changed:  how much to take   Used for:  Bilateral leg edema   Changed by:  Kalyan Luis MD        Dose:  10 mg   Take 0.5 tablets (10 mg) by mouth daily   Quantity:  45 tablet   Refills:  3            Where to get your medicines      These medications were sent to [facility] [address] [place] [address] [address]     Phone:  [phone]     furosemide 20 MG tablet                Primary Care Provider Office Phone # Fax #    Antwon Schmidt [phone] [address] [facility] [address]        Equal Access to Services     CINDI [facility] Hospital[facility] AH: Hadii acosta bain hadasho Soomaali, waaxda luqadaha, qaybta kaalmada adeegyada, debbie martins. So [facility] [phone].    ATENCIÓN: Si habla español, tiene a david disposición servicios gratuitos de asistencia lingüística. Llame al [phone].    We comply with applicable federal civil rights laws and [place] laws. We do not discriminate on the basis of race, color, national origin, age, disability, sex, sexual orientation, or gender identity.            Thank you!     Thank you for choosing [facility]  for your care. Our goal is always to provide you with excellent " care. Hearing back from our patients is one way we can continue to improve our services. Please take a few minutes to complete the written survey that you may receive in the mail after your visit with us. Thank you!             Your Updated Medication List - Protect others around you: Learn how to safely use, store and throw away your medicines at www.disposemymeds.org.          This list is accurate as of 10/26/18 11:35 AM.  Always use your most recent med list.                   Brand Name Dispense Instructions for use Diagnosis    ACETAMINOPHEN PO      Take 1,000 mg by mouth 3 times daily        albuterol 108 (90 Base) MCG/ACT inhaler    PROAIR HFA/PROVENTIL HFA/VENTOLIN HFA    1 Inhaler    Inhale 2 puffs into the lungs every 6 hours as needed for shortness of breath / dyspnea or wheezing    Acute bronchitis, unspecified organism       ASPIRIN LOW DOSE 81 MG tablet   Generic drug:  aspirin      Take 81 mg by mouth daily        atorvastatin 40 MG tablet    LIPITOR    90 tablet    Take 1 tablet (40 mg) by mouth daily    Hyperlipidemia LDL goal <70       clotrimazole-betamethasone cream    LOTRISONE    30 g    Apply topically 2 times daily    Intertrigo       DOCUSATE SODIUM PO      Take 100 mg by mouth every morning        furosemide 20 MG tablet    LASIX    45 tablet    Take 0.5 tablets (10 mg) by mouth daily    Bilateral leg edema       isosorbide mononitrate 60 MG 24 hr tablet    IMDUR    90 tablet    TAKE 1 TABLET (60 MG) BY MOUTH DAILY    Chronic ischemic heart disease       latanoprost 0.005 % ophthalmic solution    XALATAN     Place 1 drop into both eyes At Bedtime        levothyroxine 25 MCG tablet    SYNTHROID/LEVOTHROID    90 tablet    TAKE ONE TABLET BY MOUTH ONE TIME DAILY    Hypothyroidism       metFORMIN 750 MG 24 hr tablet    GLUCOPHAGE-XR    90 tablet    TAKE 1 TABLET (750 MG) BY MOUTH DAILY WITH DINNER    Type 2 diabetes mellitus with diabetic neuropathy, without long-term current use of insulin  (H)       metoprolol tartrate 100 MG tablet    LOPRESSOR    135 tablet    TAKE 1/2 TABLET BY MOUTH EVERY AM AND TAKE 1 TABLET IN THE EVENING.    Hypertension goal BP (blood pressure) < 140/90       nitroGLYcerin 0.4 MG sublingual tablet    NITROSTAT    50 tablet    Place 1 tablet (0.4 mg) under the tongue every 5 minutes as needed for chest pain    Stable angina pectoris (H)       order for DME     1 Device    Equipment being ordered: wheeled walker with brakes and a seat    Neuropathy, Poor balance       pregabalin 150 MG capsule    LYRICA    180 capsule    TAKE 1 CAPSULE BY MOUTH TWICE A DAY    Neuropathy       ranolazine 500 MG 12 hr tablet    RANEXA    180 tablet    Take 1 tablet (500 mg) by mouth 2 times daily    Cardiac microvascular disease       VITAMIN D (CHOLECALCIFEROL) PO      Take 4,000 Units by mouth daily

## 2018-10-26 NOTE — LETTER
10/26/2018    Antwon Schmidt MD  [address]  [facility] [address]    RE: Lora Vergara       Dear Colleague,    I had the pleasure of seeing Lora Vergara in the [facility].    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  She has had a coronary angiography in 2010, which showed mild to moderate coronary artery atherosclerosis, but no obstructive disease.  However, chest discomfort and jaw pain have been suspicious for angina and responded to Imdur and Ranexa.  Now she is able to take Ranexa as she is got some discomfort from the company.    With that, she has done well.  There is not been a need to take a sublingual nitroglycerin recently.      Earlier this year, she had some fluid retention after being admitted with bronchitis and with Lasix 20 mg/day, the edema has improved.  I will decrease Lasix to 10 mg daily now.         She has been still quite active at her age.  She drives.        Her last BMP in July was normal      PHYSICAL EXAMINATION:   See below          IMPRESSION:   1. Microvascular angina/coronary artery atherosclerosis.  At this time, pain is well controlled on a combination of Imdur and amlodipine and p.r.n. sublingual nitro.     She is doing well on Ranexa which we will continue.    2.  Chronic diastolic heart failure  Improved with Lasix and decreasing salt intake.  We will decrease Lasix to 10 mg/day.  If there is worsening edema, she will call us.    3.  Hyperlipidemia.  Continue atorvastatin.      4.  Hypertension, well controlled.  However, will have to monitor it off amlodipine     She will return to see my nurse practitioner back in follow-up in 6 months and with me in a year.    Thank you for allowing us to participate in the care of this nice patient.    Sincerely,    Kalyan Luis MD    Orders Placed This Encounter    Procedures     Follow-Up with Cardiac Advanced Practice Provider       Orders Placed This Encounter   Medications     furosemide (LASIX) 20 MG tablet     Sig: Take 0.5 tablets (10 mg) by mouth daily     Dispense:  45 tablet     Refill:  3       Medications Discontinued During This Encounter   Medication Reason     furosemide (LASIX) 20 MG tablet Reorder         Encounter Diagnoses   Name Primary?     Hyperlipidemia LDL goal <70      Hypertension goal BP (blood pressure) < 140/90      Chronic ischemic heart disease Yes     Type 2 diabetes mellitus with diabetic neuropathy, without long-term current use of insulin (H)      Bilateral leg edema      Chronic diastolic congestive heart failure (H)        CURRENT MEDICATIONS:  Current Outpatient Prescriptions   Medication Sig Dispense Refill     ACETAMINOPHEN PO Take 1,000 mg by mouth 3 times daily        albuterol (PROAIR HFA/PROVENTIL HFA/VENTOLIN HFA) 108 (90 BASE) MCG/ACT Inhaler Inhale 2 puffs into the lungs every 6 hours as needed for shortness of breath / dyspnea or wheezing 1 Inhaler 0     aspirin (ASPIRIN LOW DOSE) 81 MG tablet Take 81 mg by mouth daily        atorvastatin (LIPITOR) 40 MG tablet Take 1 tablet (40 mg) by mouth daily 90 tablet 3     DOCUSATE SODIUM PO Take 100 mg by mouth every morning       furosemide (LASIX) 20 MG tablet Take 0.5 tablets (10 mg) by mouth daily 45 tablet 3     isosorbide mononitrate (IMDUR) 60 MG 24 hr tablet TAKE 1 TABLET (60 MG) BY MOUTH DAILY 90 tablet 2     latanoprost (XALATAN) 0.005 % ophthalmic solution Place 1 drop into both eyes At Bedtime       levothyroxine (SYNTHROID/LEVOTHROID) 25 MCG tablet TAKE ONE TABLET BY MOUTH ONE TIME DAILY 90 tablet 2     metFORMIN (GLUCOPHAGE-XR) 750 MG 24 hr tablet TAKE 1 TABLET (750 MG) BY MOUTH DAILY WITH DINNER 90 tablet 1     metoprolol tartrate (LOPRESSOR) 100 MG tablet TAKE 1/2 TABLET BY MOUTH EVERY AM AND TAKE 1 TABLET IN THE EVENING. 135 tablet 2     nitroGLYcerin (NITROSTAT) 0.4 MG  sublingual tablet Place 1 tablet (0.4 mg) under the tongue every 5 minutes as needed for chest pain 50 tablet 3     pregabalin (LYRICA) 150 MG capsule TAKE 1 CAPSULE BY MOUTH TWICE A  capsule 0     ranolazine (RANEXA) 500 MG 12 hr tablet Take 1 tablet (500 mg) by mouth 2 times daily 180 tablet 2     VITAMIN D, CHOLECALCIFEROL, PO Take 4,000 Units by mouth daily       clotrimazole-betamethasone (LOTRISONE) cream Apply topically 2 times daily (Patient not taking: Reported on 10/26/2018) 30 g 1     order for DME Equipment being ordered: wheeled walker with brakes and a seat 1 Device 0     [DISCONTINUED] furosemide (LASIX) 20 MG tablet Take 1 tablet (20 mg) by mouth daily 30 tablet 11       ALLERGIES     Allergies   Allergen Reactions     Hydrochlorothiazide      Pancreatitis - patient denies this.  She says it gave her low sodium.       PAST MEDICAL HISTORY:  Past Medical History:   Diagnosis Date     Angina at rest (H)     Thought to be microvascular     Arthritis      Basal cell carcinoma 1/2014, (3/5/6-2014)    nose and check and ear and face     Coronary artery disease     Mild CAD per 2/2008 angiogram     Hyperlipidaemia      Hypertension      Hypothyroidism 1/17/2013     Mitral regurgitation 6/2015    echo     Type 2 diabetes, HbA1C goal < 8% (H) 9/11/2013       PAST SURGICAL HISTORY:  Past Surgical History:   Procedure Laterality Date     APPENDECTOMY  1974     BACK SURGERY  1996     BLADDER SURGERY  1990    bladder suspension     CHOLECYSTECTOMY  1975     CORONARY ANGIOGRAPHY ADULT ORDER  2/2008    Mild CAD. LAD w/20% stenosis, RCA with 30-40% stenosis. No flow limiting obstructions.     face surgery  1-6/2014    basal cell plus plastic to nose, face , ear     HYSTERECTOMY  1970     OOPHORECTOMY  1974    bilateral     PHACOEMULSIFICATION CLEAR CORNEA WITH STANDARD INTRAOCULAR LENS IMPLANT Right 7/21/2015    Procedure: PHACOEMULSIFICATION CLEAR CORNEA WITH STANDARD INTRAOCULAR LENS IMPLANT;  Surgeon:  "Schuyler Chapa MD;  Location: [facility]     PHACOEMULSIFICATION CLEAR CORNEA WITH STANDARD INTRAOCULAR LENS IMPLANT Left 2015    Procedure: PHACOEMULSIFICATION CLEAR CORNEA WITH STANDARD INTRAOCULAR LENS IMPLANT;  Surgeon: Schuyler Chapa MD;  Location: [facility]       FAMILY HISTORY:  Family History   Problem Relation Age of Onset     Cancer Brother      lung     Cancer Son      Cerebrovascular Disease Mother      and Parkinson's     Cancer Father      pancreas     Diabetes Son        SOCIAL HISTORY:  Social History     Social History     Marital status:      Spouse name: N/A     Number of children: N/A     Years of education: N/A     Social History Main Topics     Smoking status: Never Smoker     Smokeless tobacco: Never Used     Alcohol use Yes      Comment: 1 per week     Drug use: No     Sexual activity: No     Other Topics Concern     Parent/Sibling W/ Cabg, Mi Or Angioplasty Before 65f 55m? No     Caffeine Concern Yes     4 cups caffeine per day     Sleep Concern No     Stress Concern No     Weight Concern No     Special Diet No     Exercise Yes     exercises 30 minutes, 4 days week     Social History Narrative    Had 2 sons; one ; had 2 step dtrs; one .                                        times 2       Review of Systems:  Skin:  Negative       Eyes:  Positive for glasses cataract extraction both eyes  ENT:  Negative      Respiratory:  Positive for cough     Cardiovascular:    Positive for;chest pain;dizziness;lightheadedness;edema getting worse  Gastroenterology: Negative constipation    Genitourinary:  Negative urinary frequency    Musculoskeletal:  Positive for arthritis Left arm pain, pain in legs  Neurologic:  Positive for numbness or tingling of hands neuropathy  Psychiatric:  Negative      Heme/Lymph/Imm:  Positive for allergies    Endocrine:  Positive for diabetes;thyroid disorder      Physical Exam:  Vitals: /78  Pulse 64  Ht 1.575 m (5' 2\")  Wt 93.6 kg (206 lb " 4.8 oz)  LMP  (LMP Unknown)  SpO2 95%  BMI 37.73 kg/m2    Constitutional:  cooperative        Skin:  warm and dry to the touch          Head:  normocephalic        Eyes:  pupils equal and round        Lymph:      ENT:  not assessed this visit        Neck:  JVP normal        Respiratory:  clear to auscultation         Cardiac: regular rhythm;normal S1 and S2   S4            not assessed this visit                                        GI:  not assessed this visit        Extremities and Muscular Skeletal:  no spinal abnormalities noted   bilateral LE edema;trace          Neurological:  no gross motor deficits        Psych:  Alert and Oriented x 3        CC  No referring provider defined for this encounter.                Thank you for allowing me to participate in the care of your patient.      Sincerely,     Kalyan Luis MD     [facility]    cc:   No referring provider defined for this encounter.

## 2018-10-26 NOTE — LETTER
10/26/2018    Antwon Schmidt MD  [address]  [facility] [place] [address]    RE: Lora Vergara       Dear Colleague,    I had the pleasure of seeing Lora Vergara in the [facility].    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  She has had a coronary angiography in 2010, which showed mild to moderate coronary artery atherosclerosis, but no obstructive disease.  However, chest discomfort and jaw pain have been suspicious for angina and responded to Imdur and Ranexa.  Now she is able to take Ranexa as she is got some discomfort from the company.    With that, she has done well.  There is not been a need to take a sublingual nitroglycerin recently.      Earlier this year, she had some fluid retention after being admitted with bronchitis and with Lasix 20 mg/day, the edema has improved.  I will decrease Lasix to 10 mg daily now.         She has been still quite active at her age.  She drives.        Her last BMP in July was normal      PHYSICAL EXAMINATION:   See below          IMPRESSION:   1. Microvascular angina/coronary artery atherosclerosis.  At this time, pain is well controlled on a combination of Imdur and amlodipine and p.r.n. sublingual nitro.     She is doing well on Ranexa which we will continue.    2.  Chronic diastolic heart failure  Improved with Lasix and decreasing salt intake.  We will decrease Lasix to 10 mg/day.  If there is worsening edema, she will call us.    3.  Hyperlipidemia.  Continue atorvastatin.      4.  Hypertension, well controlled.  However, will have to monitor it off amlodipine     She will return to see my nurse practitioner back in follow-up in 6 months and with me in a year.    Thank you for allowing us to participate in the care of this nice patient.    Sincerely,    Kalyan Luis MD    Orders Placed This Encounter    Procedures     Follow-Up with Cardiac Advanced Practice Provider       Orders Placed This Encounter   Medications     furosemide (LASIX) 20 MG tablet     Sig: Take 0.5 tablets (10 mg) by mouth daily     Dispense:  45 tablet     Refill:  3       Medications Discontinued During This Encounter   Medication Reason     furosemide (LASIX) 20 MG tablet Reorder         Encounter Diagnoses   Name Primary?     Hyperlipidemia LDL goal <70      Hypertension goal BP (blood pressure) < 140/90      Chronic ischemic heart disease Yes     Type 2 diabetes mellitus with diabetic neuropathy, without long-term current use of insulin (H)      Bilateral leg edema      Chronic diastolic congestive heart failure (H)        CURRENT MEDICATIONS:  Current Outpatient Prescriptions   Medication Sig Dispense Refill     ACETAMINOPHEN PO Take 1,000 mg by mouth 3 times daily        albuterol (PROAIR HFA/PROVENTIL HFA/VENTOLIN HFA) 108 (90 BASE) MCG/ACT Inhaler Inhale 2 puffs into the lungs every 6 hours as needed for shortness of breath / dyspnea or wheezing 1 Inhaler 0     aspirin (ASPIRIN LOW DOSE) 81 MG tablet Take 81 mg by mouth daily        atorvastatin (LIPITOR) 40 MG tablet Take 1 tablet (40 mg) by mouth daily 90 tablet 3     DOCUSATE SODIUM PO Take 100 mg by mouth every morning       furosemide (LASIX) 20 MG tablet Take 0.5 tablets (10 mg) by mouth daily 45 tablet 3     isosorbide mononitrate (IMDUR) 60 MG 24 hr tablet TAKE 1 TABLET (60 MG) BY MOUTH DAILY 90 tablet 2     latanoprost (XALATAN) 0.005 % ophthalmic solution Place 1 drop into both eyes At Bedtime       levothyroxine (SYNTHROID/LEVOTHROID) 25 MCG tablet TAKE ONE TABLET BY MOUTH ONE TIME DAILY 90 tablet 2     metFORMIN (GLUCOPHAGE-XR) 750 MG 24 hr tablet TAKE 1 TABLET (750 MG) BY MOUTH DAILY WITH DINNER 90 tablet 1     metoprolol tartrate (LOPRESSOR) 100 MG tablet TAKE 1/2 TABLET BY MOUTH EVERY AM AND TAKE 1 TABLET IN THE EVENING. 135 tablet 2     nitroGLYcerin (NITROSTAT) 0.4 MG  sublingual tablet Place 1 tablet (0.4 mg) under the tongue every 5 minutes as needed for chest pain 50 tablet 3     pregabalin (LYRICA) 150 MG capsule TAKE 1 CAPSULE BY MOUTH TWICE A  capsule 0     ranolazine (RANEXA) 500 MG 12 hr tablet Take 1 tablet (500 mg) by mouth 2 times daily 180 tablet 2     VITAMIN D, CHOLECALCIFEROL, PO Take 4,000 Units by mouth daily       clotrimazole-betamethasone (LOTRISONE) cream Apply topically 2 times daily (Patient not taking: Reported on 10/26/2018) 30 g 1     order for DME Equipment being ordered: wheeled walker with brakes and a seat 1 Device 0     [DISCONTINUED] furosemide (LASIX) 20 MG tablet Take 1 tablet (20 mg) by mouth daily 30 tablet 11       ALLERGIES     Allergies   Allergen Reactions     Hydrochlorothiazide      Pancreatitis - patient denies this.  She says it gave her low sodium.       PAST MEDICAL HISTORY:  Past Medical History:   Diagnosis Date     Angina at rest (H)     Thought to be microvascular     Arthritis      Basal cell carcinoma 1/2014, (3/5/6-2014)    nose and check and ear and face     Coronary artery disease     Mild CAD per 2/2008 angiogram     Hyperlipidaemia      Hypertension      Hypothyroidism 1/17/2013     Mitral regurgitation 6/2015    echo     Type 2 diabetes, HbA1C goal < 8% (H) 9/11/2013       PAST SURGICAL HISTORY:  Past Surgical History:   Procedure Laterality Date     APPENDECTOMY  1974     BACK SURGERY  1996     BLADDER SURGERY  1990    bladder suspension     CHOLECYSTECTOMY  1975     CORONARY ANGIOGRAPHY ADULT ORDER  2/2008    Mild CAD. LAD w/20% stenosis, RCA with 30-40% stenosis. No flow limiting obstructions.     face surgery  1-6/2014    basal cell plus plastic to nose, face , ear     HYSTERECTOMY  1970     OOPHORECTOMY  1974    bilateral     PHACOEMULSIFICATION CLEAR CORNEA WITH STANDARD INTRAOCULAR LENS IMPLANT Right 7/21/2015    Procedure: PHACOEMULSIFICATION CLEAR CORNEA WITH STANDARD INTRAOCULAR LENS IMPLANT;  Surgeon:  "Schuyler Chapa MD;  Location: [facility]     PHACOEMULSIFICATION CLEAR CORNEA WITH STANDARD INTRAOCULAR LENS IMPLANT Left 2015    Procedure: PHACOEMULSIFICATION CLEAR CORNEA WITH STANDARD INTRAOCULAR LENS IMPLANT;  Surgeon: Schuyler Chapa MD;  Location: [facility]       FAMILY HISTORY:  Family History   Problem Relation Age of Onset     Cancer Brother      lung     Cancer Son      Cerebrovascular Disease Mother      and Parkinson's     Cancer Father      pancreas     Diabetes Son        SOCIAL HISTORY:  Social History     Social History     Marital status:      Spouse name: N/A     Number of children: N/A     Years of education: N/A     Social History Main Topics     Smoking status: Never Smoker     Smokeless tobacco: Never Used     Alcohol use Yes      Comment: 1 per week     Drug use: No     Sexual activity: No     Other Topics Concern     Parent/Sibling W/ Cabg, Mi Or Angioplasty Before 65f 55m? No     Caffeine Concern Yes     4 cups caffeine per day     Sleep Concern No     Stress Concern No     Weight Concern No     Special Diet No     Exercise Yes     exercises 30 minutes, 4 days week     Social History Narrative    Had 2 sons; one ; had 2 step dtrs; one .                                        times 2       Review of Systems:  Skin:  Negative       Eyes:  Positive for glasses cataract extraction both eyes  ENT:  Negative      Respiratory:  Positive for cough     Cardiovascular:    Positive for;chest pain;dizziness;lightheadedness;edema getting worse  Gastroenterology: Negative constipation    Genitourinary:  Negative urinary frequency    Musculoskeletal:  Positive for arthritis Left arm pain, pain in legs  Neurologic:  Positive for numbness or tingling of hands neuropathy  Psychiatric:  Negative      Heme/Lymph/Imm:  Positive for allergies    Endocrine:  Positive for diabetes;thyroid disorder      Physical Exam:  Vitals: /78  Pulse 64  Ht 1.575 m (5' 2\")  Wt 93.6 kg (206 lb " 4.8 oz)  LMP  (LMP Unknown)  SpO2 95%  BMI 37.73 kg/m2    Constitutional:  cooperative        Skin:  warm and dry to the touch          Head:  normocephalic        Eyes:  pupils equal and round        Lymph:      ENT:  not assessed this visit        Neck:  JVP normal        Respiratory:  clear to auscultation         Cardiac: regular rhythm;normal S1 and S2   S4            not assessed this visit                                        GI:  not assessed this visit        Extremities and Muscular Skeletal:  no spinal abnormalities noted   bilateral LE edema;trace          Neurological:  no gross motor deficits        Psych:  Alert and Oriented x 3          Thank you for allowing me to participate in the care of your patient.    Sincerely,     Kalyan Luis MD     [facility]

## 2018-12-20 ENCOUNTER — OFFICE VISIT (OUTPATIENT)
Dept: FAMILY MEDICINE | Facility: CLINIC | Age: 83
End: 2018-12-20
Payer: MEDICARE

## 2018-12-20 ENCOUNTER — TELEPHONE (OUTPATIENT)
Dept: FAMILY MEDICINE | Facility: CLINIC | Age: 83
End: 2018-12-20

## 2018-12-20 VITALS
RESPIRATION RATE: 16 BRPM | HEART RATE: 79 BPM | WEIGHT: 202 LBS | SYSTOLIC BLOOD PRESSURE: 136 MMHG | TEMPERATURE: 97.2 F | DIASTOLIC BLOOD PRESSURE: 84 MMHG | BODY MASS INDEX: 36.95 KG/M2 | OXYGEN SATURATION: 96 %

## 2018-12-20 DIAGNOSIS — J01.00 ACUTE NON-RECURRENT MAXILLARY SINUSITIS: Primary | ICD-10-CM

## 2018-12-20 PROCEDURE — 99213 OFFICE O/P EST LOW 20 MIN: CPT | Performed by: PHYSICIAN ASSISTANT

## 2018-12-20 RX ORDER — DOXYCYCLINE HYCLATE 100 MG
100 TABLET ORAL 2 TIMES DAILY
Qty: 14 TABLET | Refills: 0 | Status: SHIPPED | OUTPATIENT
Start: 2018-12-20 | End: 2019-01-02

## 2018-12-20 NOTE — TELEPHONE ENCOUNTER
Reason for Call: Request for an order or referral:    Order or referral being requested: med question    Date needed: as soon as possible    Has the patient been seen by the PCP for this problem? YES    Additional comments: Pt saw JENA Oneal today and was prescribed an antibiotic.  She has a question.    Phone number Patient can be reached at:  Home number on file [phone] (home)    Best Time:  any    Can we leave a detailed message on this number?  YES    Call taken on 12/20/2018 at 4:17 PM by CARMENCITA ESCALERA

## 2018-12-20 NOTE — TELEPHONE ENCOUNTER
Patient would like to know if she can take Robitussin with abx. She was informed she may take medication.

## 2018-12-20 NOTE — PROGRESS NOTES
SUBJECTIVE:   Lora Vergara is a 89 year old female who presents to clinic today for the following health issues:    ENT Symptoms             Symptoms: cc Present Absent Comment   Fever/Chills   x    Fatigue  x     Muscle Aches  x     Eye Irritation   x    Sneezing   x    Nasal Harpal/Drg  x     Sinus Pressure/Pain  x     Loss of smell   x    Dental pain   x    Sore Throat   x    Swollen Glands   x    Ear Pain/Fullness  x     Cough  x     Wheeze  x     Chest Pain  x     Shortness of breath  x     Rash   x    Other   x      Symptom duration:  1 week   Symptom severity:  moderate   Treatments tried:  robitussin and tylenol   Contacts:  n/a     Reviewed and updated as needed this visit by clinical staff  Tobacco  Allergies  Meds  Problems  Med Hx  Surg Hx  Fam Hx  Soc Hx        Reviewed and updated as needed this visit by Provider  Tobacco  Allergies  Meds  Problems  Med Hx  Surg Hx  Fam Hx  Soc Hx        Additional complaints: None    HPI additional notes: Lora presents today with   Chief Complaint   Patient presents with     URI   Has productive cough but does not feel SOB and is not wheezing.           ROS:  C: Negative for fever, chills, recent change in weight  Skin: Negative for worrisome rashes or lesions  ENT/MOUTH:POSITIVE for congestion, runny nose, ear pain and sore throat.  Negative for pain with swallowing, hoarseness and post-nasal drainage.  Resp: POSITIVE for cough occasionally productive without  SOB and wheezing  MS: POSITIVE for generalized fatigue and malaise.  NEURO: Negative  for headaches or dizziness.  P: Negative for changes in mood or affect  ROS otherwise negative.    Chart Review:  History   Smoking Status     Never Smoker   Smokeless Tobacco     Never Used     Patient Active Problem List   Diagnosis     Hypertension goal BP (blood pressure) < 140/90     Hyperlipidemia LDL goal <70     Neuropathy     Chronic ischemic heart disease     Preventive measure     LLQ abdominal pain      BCC (basal cell carcinoma), face     Overweight     Other and unspecified angina pectoris     Low back pain     ACP (advance care planning)     SOB (shortness of breath)     Ischemic chest pain     Hypothyroidism, unspecified type     Gastroenteritis     Osteoarthritis of right knee, unspecified osteoarthritis type     Chronic 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 LAD w/20% stenosis, RCA with 30-40% stenosis. No flow limiting obstructions.     face surgery  1-6/2014    basal cell plus plastic to nose, face , ear     HYSTERECTOMY  1970     OOPHORECTOMY  1974    bilateral     PHACOEMULSIFICATION CLEAR CORNEA WITH STANDARD INTRAOCULAR LENS IMPLANT Right 7/21/2015    Procedure: PHACOEMULSIFICATION CLEAR CORNEA WITH STANDARD INTRAOCULAR LENS IMPLANT;  Surgeon: Schuyler Chapa MD;  Location: [facility]     PHACOEMULSIFICATION CLEAR CORNEA WITH STANDARD INTRAOCULAR LENS IMPLANT Left 8/18/2015    Procedure: PHACOEMULSIFICATION CLEAR CORNEA WITH STANDARD INTRAOCULAR LENS IMPLANT;  Surgeon: Schuyler Chapa MD;  Location: [facility]     Problem list, Medication list, Allergies, Medical/Social/Surg hx reviewed in [facility], updated as appropriate.   OBJECTIVE:                                                    /84   Pulse 79   Temp 97.2  F (36.2  C) (Tympanic)   Resp 16   Wt 91.6 kg (202 lb)   LMP  (LMP Unknown)   SpO2 96%   BMI 36.95 kg/m    Body mass index is 36.95 kg/m .  GENERAL: healthy, alert, in  no acute distress  EYES: Grossly normal to inspection, EOMI, PERRL  HENT: Ear canals mild obstruction with cerumen bilaterally. TM pearly gray without effusion bilaterally.  Nasal mucosa mildly edematous with purulent rhinorrhea.  No septal deviation.  Mouth- mucous membranes moist, no lesions or ulcerations. Pharynx pink. and No tonsillary hypertrophy. Uvula midline, purulent post-nasal drainage.  Maxillary and frontal sinuses tender to palpation bilaterally  NECK:2+ posterior cervical LAD bilaterally  RESP: lungs clear to auscultation - no rales, no rhonchi, no wheezes  CV: regular rate and rhythm, normal S1 S2.  No peripheral edema.  SKIN: no suspicious lesions, no rashes  PSYCH: Alert and oriented times 3;  Able to articulate logical thoughts. Affect is normal.    Diagnostic test results: None     ASSESSMENT/PLAN:                                                          ICD-10-CM    1. Acute non-recurrent maxillary sinusitis J01.00 doxycycline hyclate (VIBRA-TABS) 100 MG tablet     Will call if symptoms are worsening.  Will monitor for fever.    Please see patient instructions for treatment details.    Return in about 1 week (around 12/27/2018) for if not improving.      Florencia Oneal PA-C  [facility]

## 2018-12-24 ENCOUNTER — HOSPITAL ENCOUNTER (EMERGENCY)
Facility: CLINIC | Age: 83
Discharge: HOME | End: 2018-12-24
Attending: EMERGENCY MEDICINE | Admitting: EMERGENCY MEDICINE
Payer: MEDICARE

## 2018-12-24 ENCOUNTER — APPOINTMENT (OUTPATIENT)
Dept: GENERAL RADIOLOGY | Facility: CLINIC | Age: 83
End: 2018-12-24
Attending: EMERGENCY MEDICINE
Payer: MEDICARE

## 2018-12-24 VITALS
DIASTOLIC BLOOD PRESSURE: 101 MMHG | BODY MASS INDEX: 37.76 KG/M2 | TEMPERATURE: 97.7 F | SYSTOLIC BLOOD PRESSURE: 185 MMHG | WEIGHT: 200 LBS | OXYGEN SATURATION: 95 % | RESPIRATION RATE: 20 BRPM | HEART RATE: 87 BPM | HEIGHT: 61 IN

## 2018-12-24 DIAGNOSIS — J06.9 VIRAL URI WITH COUGH: ICD-10-CM

## 2018-12-24 LAB
FLUAV+FLUBV AG SPEC QL: NEGATIVE
FLUAV+FLUBV AG SPEC QL: NEGATIVE
INTERPRETATION ECG - MUSE: NORMAL
NT-PROBNP SERPL-MCNC: 326 PG/ML (ref 0–1800)
SPECIMEN SOURCE: NORMAL
TROPONIN I SERPL-MCNC: <0.015 UG/L (ref 0–0.04)

## 2018-12-24 PROCEDURE — 93005 ELECTROCARDIOGRAM TRACING: CPT

## 2018-12-24 PROCEDURE — 83880 ASSAY OF NATRIURETIC PEPTIDE: CPT | Performed by: EMERGENCY MEDICINE

## 2018-12-24 PROCEDURE — 87804 INFLUENZA ASSAY W/OPTIC: CPT | Mod: 91 | Performed by: EMERGENCY MEDICINE

## 2018-12-24 PROCEDURE — A9270 NON-COVERED ITEM OR SERVICE: HCPCS | Mod: GY | Performed by: EMERGENCY MEDICINE

## 2018-12-24 PROCEDURE — 99285 EMERGENCY DEPT VISIT HI MDM: CPT | Mod: 25

## 2018-12-24 PROCEDURE — 71046 X-RAY EXAM CHEST 2 VIEWS: CPT

## 2018-12-24 PROCEDURE — 84484 ASSAY OF TROPONIN QUANT: CPT | Performed by: EMERGENCY MEDICINE

## 2018-12-24 PROCEDURE — 25000131 ZZH RX MED GY IP 250 OP 636 PS 637: Mod: GY | Performed by: EMERGENCY MEDICINE

## 2018-12-24 RX ORDER — OXYMETAZOLINE HYDROCHLORIDE 0.05 G/100ML
2 SPRAY NASAL 2 TIMES DAILY
Qty: 1 BOTTLE | Refills: 0 | Status: SHIPPED | OUTPATIENT
Start: 2018-12-24 | End: 2019-02-10

## 2018-12-24 RX ORDER — CODEINE PHOSPHATE AND GUAIFENESIN 10; 100 MG/5ML; MG/5ML
1-2 SOLUTION ORAL EVERY 4 HOURS PRN
Qty: 118 ML | Refills: 0 | Status: SHIPPED | OUTPATIENT
Start: 2018-12-24 | End: 2019-02-10

## 2018-12-24 RX ADMIN — DEXAMETHASONE 10 MG: 2 TABLET ORAL at 15:33

## 2018-12-24 ASSESSMENT — MIFFLIN-ST. JEOR: SCORE: 1249.57

## 2018-12-24 ASSESSMENT — ENCOUNTER SYMPTOMS
VOMITING: 0
RHINORRHEA: 1
FREQUENCY: 0
DIARRHEA: 1
HEADACHES: 1
DYSURIA: 0
COUGH: 1
FEVER: 0
CHEST TIGHTNESS: 0

## 2018-12-24 NOTE — ED PROVIDER NOTES
History     Chief Complaint:  Cough (states she has been on antibx for the past week yet not getting better)    DINA Vergara is a 89 year old female with a history of coronary artery disease, who presents to the ED for the evaluation of cough. The patient reports that for the past week she has been experiencing a worsening productive cough, rhinorrhea, and congestion, prompting her to the ED.  The patient notes that she was seen by her primary care provider one week ago for these symptoms who prescribed her doxycycline, but states that this has not helped her. She states that she also has been experiencing general fatigue, diarrhea, and intermittent headaches. The patient denies fever, vomiting, chest pressure, dysuria, an increase in frequency or urgency of urination, and chest pain.    Allergies:  Hydrochlorothiazide     Medications:    Lipitor   Lotrisone  Docusate  Lasix  Imdur  Xalatan  Synthroid  Lopressor  Nitrostat  Lyrica  Ranexa  Doxycycline     Past Medical History:    Angina at rest  Arthritis  Basal cell carcinoma  CAD  Hyperlipidemia  Hypertension  Hypothyroidism  Diabetes  Mitral regurgitation    Past Surgical History:    History reviewed. No pertinent surgical history.    Family History:    Cancer  Diabetes  Cerebrovascular disease    Social History:  Smoking status: Never Smoker  Alcohol use: No  Marital Status:   [5]     Review of Systems   Constitutional: Negative for fever.   HENT: Positive for congestion and rhinorrhea.    Respiratory: Positive for cough. Negative for chest tightness.    Cardiovascular: Negative for chest pain.   Gastrointestinal: Positive for diarrhea. Negative for vomiting.   Genitourinary: Negative for dysuria, frequency and urgency.   Neurological: Positive for headaches.   All other systems reviewed and are negative.        Physical Exam     Patient Vitals for the past 24 hrs:   BP Temp Temp src Pulse Resp SpO2 Height Weight   12/24/18 1332 150/75 97.7  F  "(36.5  C) Oral 85 20 93 % 1.549 m (5' 1\") 90.7 kg (200 lb)       Physical Exam  Constitutional: Alert, attentive, GCS 15  HENT:    Nose: Nose normal.    Mouth/Throat: Oropharynx is clear, mucous membranes are moist  Eyes: EOM are normal, anicteric, conjugate gaze  CV: regular rate and rhythm; no murmurs  Chest: Effort normal and breath sounds clear without wheezing or rales, symmetric bilaterally   GI:  non tender. No distension. No guarding or rebound.    MSK: No LE edema, no tenderness to palpation of BLE.  Neurological: Alert, attentive, moving all extremities equally.   Skin: Skin is warm and dry.    Emergency Department Course   Imaging:  Radiographic findings were communicated with the patient who voiced understanding of the findings.  XR Chest 2 Views  IMPRESSION: No acute cardiopulmonary abnormality.  As read by Radiology.     Laboratory:  BNP: 326  Troponin (1525): <0.015  Influenza A/B antigen: Negative    Interventions:  1553, Decadron, 10 mg, PO    Emergency Department Course:  Past medical records, nursing notes, and vitals reviewed.  1429: I performed an exam of the patient and obtained history, as documented above.    IV inserted and blood drawn.    The patient was sent for a chest x ray while in the emergency department, findings above.    1609: I rechecked the patient. Explained findings to patient.    1648: I rechecked the patient.  Findings and plan explained to the Patient. Patient discharged home with instructions regarding supportive care, medications, and reasons to return. The importance of close follow-up was reviewed.       Impression & Plan    Medical Decision Makin-year-old female with past medical history significant for mild coronary artery disease, hypertension, hyperlipidemia and mitral valve regurgitation presenting for evaluation of now 1 week URI symptoms including rhinorrhea, congestion, cough with some production and mild diarrhea who has been on doxycycline times 4 days " with no significant improvement.  Vital signs on arrival within normal limits, afebrile.  By history and physical exam, suspect likely viral etiology which would correlate with failure of her symptoms to improve with antibiotics.  Her chest x-ray shows no evidence of a focal infiltrate or evidence of atypical infection, her pulmonary exam is unremarkable.  Influenza testing negative.  EKG and troponin within normal limits.  Low suspicion for atypical presentation of ACS.  Remainder of her labs are unremarkable including BNP.  Given negative workup as above, she was given Decadron symptomatically for symptom improvement recommended to take Afrin for her cough given it is likely secondary to postnasal drip and to continue taking Tylenol as needed for myalgia.  Recommended close follow-up with PCP for recheck, return precautions were reviewed.    Diagnosis:    ICD-10-CM    1. Viral URI with coughAcute J06.9     B97.89        Disposition:  discharged to home    Discharge Medications:     Medication List      Started    guaiFENesin-codeine 100-10 MG/5ML solution  Commonly known as:  ROBITUSSIN AC  1-2 tsp., Oral, EVERY 4 HOURS PRN     oxymetazoline 0.05 % nasal spray  Commonly known as:  AFRIN NASAL SPRAY  2 sprays, Nasal, 2 TIMES DAILY          Nirmal Hathaway MD   [employer]  8:08 PM 12/24/18       Dawit Velasco  12/24/2018    EMERGENCY DEPARTMENT  Scribe Disclosure:  IDawit, am serving as a scribe at 2:41 PM on 12/24/2018 to document services personally performed by Nirmal Hathaway MD based on my observations and the provider's statements to me.      Nirmal Hathaway MD  12/24/18 2008

## 2018-12-24 NOTE — DISCHARGE INSTRUCTIONS

## 2018-12-24 NOTE — ED AVS SNAPSHOT
Emergency Department  [address] [place] [address]  Phone:  [phone]  Fax:  [phone]                                    Lora Vergara   MRN: [id number]    Department:   Emergency Department   Date of Visit:  12/24/2018           After Visit Summary Signature Page    I have received my discharge instructions, and my questions have been answered. I have discussed any challenges I see with this plan with the nurse or doctor.    ..........................................................................................................................................  Patient/Patient Representative Signature      ..........................................................................................................................................  Patient Representative Print Name and Relationship to Patient    ..................................................               ................................................  Date                                   Time    ..........................................................................................................................................  Reviewed by Signature/Title    ...................................................              ..............................................  Date                                               Time          22EPIC Rev 08/18

## 2019-01-02 ENCOUNTER — OFFICE VISIT (OUTPATIENT)
Dept: FAMILY MEDICINE | Facility: CLINIC | Age: 84
End: 2019-01-02
Payer: MEDICARE

## 2019-01-02 VITALS
TEMPERATURE: 98.9 F | DIASTOLIC BLOOD PRESSURE: 70 MMHG | BODY MASS INDEX: 37.17 KG/M2 | HEIGHT: 62 IN | HEART RATE: 88 BPM | RESPIRATION RATE: 20 BRPM | OXYGEN SATURATION: 94 % | SYSTOLIC BLOOD PRESSURE: 128 MMHG | WEIGHT: 202 LBS

## 2019-01-02 DIAGNOSIS — J20.9 ACUTE BRONCHITIS, UNSPECIFIED ORGANISM: Primary | ICD-10-CM

## 2019-01-02 PROCEDURE — 99213 OFFICE O/P EST LOW 20 MIN: CPT | Performed by: INTERNAL MEDICINE

## 2019-01-02 RX ORDER — CEFUROXIME AXETIL 250 MG/1
250 TABLET ORAL 2 TIMES DAILY
Qty: 14 TABLET | Refills: 0 | Status: SHIPPED | OUTPATIENT
Start: 2019-01-02 | End: 2019-01-02

## 2019-01-02 RX ORDER — CEFUROXIME AXETIL 250 MG/1
250 TABLET ORAL 2 TIMES DAILY
Qty: 14 TABLET | Refills: 0 | Status: SHIPPED | OUTPATIENT
Start: 2019-01-02 | End: 2019-02-10

## 2019-01-02 ASSESSMENT — MIFFLIN-ST. JEOR: SCORE: 1274.52

## 2019-01-02 NOTE — PROGRESS NOTES
"  SUBJECTIVE:   Lora Vergara is a 89 year old female who presents to clinic today for the following health issues:      ED/UC Followup:    Facility:  [facility]  Date of visit: 12/24/2018  Reason for visit: URI  Current Status: still coughing, and \"very bothersome with congestion and fatigue\"                   Coughing for a month.                CXR 12/24 was negative.        Prior to that, Rx was doxcycline.          In the ER, she got one dose of decadron,and Robitussin AC.                                Ongoing cough and malaise and fatigue.          Lots of creamy,gray colored phlegm.                            Problem list and histories reviewed & adjusted, as indicated.      Current Outpatient Medications   Medication Sig Dispense Refill     ACETAMINOPHEN PO Take 1,000 mg by mouth 3 times daily        albuterol (PROAIR HFA/PROVENTIL HFA/VENTOLIN HFA) 108 (90 BASE) MCG/ACT Inhaler Inhale 2 puffs into the lungs every 6 hours as needed for shortness of breath / dyspnea or wheezing 1 Inhaler 0     aspirin (ASPIRIN LOW DOSE) 81 MG tablet Take 81 mg by mouth daily        atorvastatin (LIPITOR) 40 MG tablet Take 1 tablet (40 mg) by mouth daily 90 tablet 3     clotrimazole-betamethasone (LOTRISONE) cream Apply topically 2 times daily 30 g 1     DOCUSATE SODIUM PO Take 100 mg by mouth every morning       furosemide (LASIX) 20 MG tablet Take 0.5 tablets (10 mg) by mouth daily 45 tablet 3     isosorbide mononitrate (IMDUR) 60 MG 24 hr tablet TAKE 1 TABLET (60 MG) BY MOUTH DAILY 90 tablet 2     latanoprost (XALATAN) 0.005 % ophthalmic solution Place 1 drop into both eyes At Bedtime       levothyroxine (SYNTHROID/LEVOTHROID) 25 MCG tablet TAKE ONE TABLET BY MOUTH ONE TIME DAILY 90 tablet 2     metFORMIN (GLUCOPHAGE-XR) 750 MG 24 hr tablet TAKE 1 TABLET (750 MG) BY MOUTH DAILY WITH DINNER 90 tablet 1     metoprolol tartrate (LOPRESSOR) 100 MG tablet TAKE 1/2 TABLET BY MOUTH EVERY AM AND TAKE 1 TABLET IN THE EVENING. 135 tablet " "2     nitroGLYcerin (NITROSTAT) 0.4 MG sublingual tablet Place 1 tablet (0.4 mg) under the tongue every 5 minutes as needed for chest pain 50 tablet 3     order for DME Equipment being ordered: wheeled walker with brakes and a seat 1 Device 0     pregabalin (LYRICA) 150 MG capsule TAKE 1 CAPSULE BY MOUTH TWICE A  capsule 0     ranolazine (RANEXA) 500 MG 12 hr tablet Take 1 tablet (500 mg) by mouth 2 times daily 180 tablet 2     VITAMIN D, CHOLECALCIFEROL, PO Take 4,000 Units by mouth daily       guaiFENesin-codeine (ROBITUSSIN AC) 100-10 MG/5ML solution Take 5-10 mLs by mouth every 4 hours as needed for cough (Patient not taking: Reported on 1/2/2019) 118 mL 0     Allergies   Allergen Reactions     Hydrochlorothiazide      Pancreatitis - patient denies this.  She says it gave her low sodium.     BP Readings from Last 3 Encounters:   01/02/19 128/70   12/24/18 (!) 185/101   12/20/18 136/84    Wt Readings from Last 3 Encounters:   01/02/19 91.6 kg (202 lb)   12/24/18 90.7 kg (200 lb)   12/20/18 91.6 kg (202 lb)                    Reviewed and updated as needed this visit by clinical staff       Reviewed and updated as needed this visit by Provider         ROS:  CONSTITUTIONAL:POSITIVE  for fatigue and malaise and NEGATIVE  for chills and fever   ENT/MOUTH: NEGATIVE for postnasal drainage  RESP:POSITIVE for cough-productive and NEGATIVE for hemoptysis, Hx asthma, wheezing and hx of smoking  NEURO: gait disturbance    OBJECTIVE:                                                    /70 (BP Location: Left arm, Patient Position: Chair, Cuff Size: Adult Large)   Pulse 88   Temp 98.9  F (37.2  C)   Resp 20   Ht 1.575 m (5' 2\")   Wt 91.6 kg (202 lb)   LMP  (LMP Unknown)   SpO2 94%   Breastfeeding? No   BMI 36.95 kg/m    Body mass index is 36.95 kg/m .  GENERAL APPEARANCE: alert, over weight and fatigued  RESP: no rales or rhonchi  CV: regular rates and rhythm  NEURO: gait abnormal ;uses a " walker    Diagnostic test results:  Recent Results (from the past 744 hour(s))   XR Chest 2 Views    Narrative    XR CHEST 2 VW 12/24/2018 3:07 PM    HISTORY: Cough.    COMPARISON: 1/1/2018    FINDINGS: No airspace consolidation, pleural effusion or pneumothorax.  Normal heart size.      Impression    IMPRESSION: No acute cardiopulmonary abnormality.    FRANCISCO COTTRELL MD          ASSESSMENT/PLAN:                                                        ICD-10-CM    1. Acute bronchitis, unspecified organism J20.9 cefuroxime (CEFTIN) 250 MG tablet     DISCONTINUED: cefuroxime (CEFTIN) 250 MG tablet       Rx as above.   Patient Instructions   Let's try cefuroxime for a week.                              Return in about 2 weeks (around 1/16/2019),  if symptoms worsen or fail to improve.      Antwon Schmidt MD  [facility]

## 2019-01-06 DIAGNOSIS — E11.40 TYPE 2 DIABETES MELLITUS WITH DIABETIC NEUROPATHY, WITHOUT LONG-TERM CURRENT USE OF INSULIN (H): ICD-10-CM

## 2019-01-07 NOTE — TELEPHONE ENCOUNTER
"Requested Prescriptions   Pending Prescriptions Disp Refills     metFORMIN (GLUCOPHAGE-XR) 750 MG 24 hr tablet [Pharmacy Med Name: METFORMIN  MG TABLET]  Last Written Prescription Date:  7/6/2018  Last Fill Quantity: 90 tablet,  # refills: 1   Last office visit: 1/2/2019 with prescribing provider:  Brayan   Future Office Visit:     90 tablet 1     Sig: TAKE 1 TABLET (750 MG) BY MOUTH DAILY WITH DINNER    Biguanide Agents Passed - 1/6/2019  8:45 AM       Passed - Blood pressure less than 140/90 in past 6 months    BP Readings from Last 3 Encounters:   01/02/19 128/70   12/24/18 (!) 185/101   12/20/18 136/84                Passed - Patient has documented LDL within the past 12 mos.    Recent Labs   Lab Test 07/26/18 0914   LDL 42            Passed - Patient has had a Microalbumin in the past 15 mos.    Recent Labs   Lab Test 07/26/18 0912   MICROL 11   UMALCR 6.43            Passed - Patient is age 10 or older       Passed - Patient has documented A1c within the specified period of time.    If HgbA1C is 8 or greater, it needs to be on file within the past 3 months.  If less than 8, must be on file within the past 6 months.     Recent Labs   Lab Test 07/26/18 0914   A1C 6.1*            Passed - Patient's CR is NOT>1.4 OR Patient's EGFR is NOT<45 within past 12 mos.    Recent Labs   Lab Test 07/26/18 0914   GFRESTIMATED 70   GFRESTBLACK 85       Recent Labs   Lab Test 07/26/18 0914   CR 0.77            Passed - Patient does NOT have a diagnosis of CHF.       Passed - Medication is active on med list       Passed - Patient is not pregnant       Passed - Patient has not had a positive pregnancy test within the past 12 mos.        Passed - Recent (6 mo) or future (30 days) visit within the authorizing provider's specialty    Patient had office visit in the last 6 months or has a visit in the next 30 days with authorizing provider or within the authorizing provider's specialty.  See \"Patient Info\" tab in " "inbasket, or \"Choose Columns\" in Meds & Orders section of the refill encounter.               "

## 2019-01-09 RX ORDER — METFORMIN HYDROCHLORIDE 750 MG/1
TABLET, EXTENDED RELEASE ORAL
Qty: 90 TABLET | Refills: 1 | Status: SHIPPED | OUTPATIENT
Start: 2019-01-09 | End: 2019-09-19

## 2019-01-15 ENCOUNTER — TELEPHONE (OUTPATIENT)
Dept: FAMILY MEDICINE | Facility: CLINIC | Age: 84
End: 2019-01-15

## 2019-01-15 DIAGNOSIS — G62.9 NEUROPATHY: Chronic | ICD-10-CM

## 2019-01-15 RX ORDER — PREGABALIN 150 MG/1
CAPSULE ORAL
Qty: 180 CAPSULE | Refills: 3 | Status: SHIPPED | OUTPATIENT
Start: 2019-01-15 | End: 2019-02-20

## 2019-01-15 NOTE — TELEPHONE ENCOUNTER
Reason for Call:  Medication or medication refill:    Do you use a [facility]?  Name of the pharmacy and phone number for the current request:  [facility] Patient # [id number]    Name of the medication requested:   pregabalin (LYRICA) 150 MG capsule 180 capsule          Other request: Patient states she has about a week l;eft.    Can we leave a detailed message on this number? YES    Phone number patient can be reached at: Home number on file [phone] (home)    Best Time:     Call taken on 1/15/2019 at 10:30 AM by CUCO SANTOS

## 2019-01-15 NOTE — TELEPHONE ENCOUNTER
pregabalin (LYRICA) 150 MG capsule      Last Written Prescription Date:  10/4/18  Last Fill Quantity: 180,   # refills: 0  Last Office Visit: 1/2/19  Future Office visit:       Routing refill request to provider for review/approval because:  Drug not on the [facility] or [facility] refill protocol or controlled substance        Requested Prescriptions   Pending Prescriptions Disp Refills     pregabalin (LYRICA) 150 MG capsule 180 capsule 0     Sig: TAKE 1 CAPSULE BY MOUTH TWICE A DAY    There is no refill protocol information for this order

## 2019-01-30 ENCOUNTER — TELEPHONE (OUTPATIENT)
Dept: FAMILY MEDICINE | Facility: CLINIC | Age: 84
End: 2019-01-30

## 2019-01-30 NOTE — TELEPHONE ENCOUNTER
Reason for Call:  Other call back  Detailed comments: patient has a question on medication Lyrica she would like it  Fax to # [phone]  Phone Number Patient can be reached at: Home number on file [phone] (home)  Best Time: any  Can we leave a detailed message on this number? YES  Call taken on 1/30/2019 at 12:12 PM by ALDO YAN

## 2019-01-31 NOTE — TELEPHONE ENCOUNTER
pregabalin (LYRICA) 150 MG capsule 180 capsule 3 1/15/2019  No   Sig: TAKE 1 CAPSULE BY MOUTH TWICE A DAY   Class: Local Print   Order: [id number]   Printout Tracking     External Result Report     Called [facility] pharmacy and discontinued the faxed order to them.     Called [facility] in [place] to tell them this and that it was okay to fill it as there are not two active prescriptions for this medication now.

## 2019-01-31 NOTE — TELEPHONE ENCOUNTER
Pharmacist from [facility] in [place] called stating this was faxed on 01/15/2019 and 01/18/2019    It cannot be sent to two different pharmacies    Please call  Pharmacist Mary [phone]  ASAP   To clarify  Prescription is on hold until call is received and information is clarified

## 2019-01-31 NOTE — TELEPHONE ENCOUNTER
Pt reports she is almost out of Lyrica and she gets medication from [facility]. Fax #[phone]. Per audelia with  [facility] and information below. Rx has been faxed several times. Pt states they have not received Rx. Writer called [employer]. Writer was kike pt needs to re enroll -complete form and submit information to them before Rx can be filled. Rep was unable to verify they received Rx because  It takes two business days for them to receive fax script in their system. Writer asked that enrollment form get fax to clinic and mailed to patient. BM fax number was given. Call attempted to patient to let her know she needs to re enroll for Rx. No answer unable to leave a message.

## 2019-01-31 NOTE — TELEPHONE ENCOUNTER
I called patient after I called [facility] @ 1 [phone] and was told that patient was no longer enrolled in the program and that she would need to apply again for RX, that she was no longer in the program. I called the patient and gave her the information. She became frustrated as she did not know who [facility] was and said that the package that the medication was sent in came from [facility]. She felt that she could not call again to apply. Pt. Also stated that she has had delivery from [facility]. She has requested that I go ahead and fax the script to [facility] on [address]. Faxed as requested. I am wondering if this patient would benefit from assistance from our care coordinators.    April Joy LPN

## 2019-02-01 ENCOUNTER — PATIENT OUTREACH (OUTPATIENT)
Dept: CARE COORDINATION | Facility: CLINIC | Age: 84
End: 2019-02-01

## 2019-02-01 DIAGNOSIS — Z65.8 PSYCHOSOCIAL STRESSORS: Primary | ICD-10-CM

## 2019-02-01 NOTE — LETTER
[facility]  Complex Care Plan  About Me  Patient Name:  Lora Vergara    YOB: 1925  Age:     89 year old   [place] MRN:   [id number] Telephone Information:  Home Phone [phone]   Mobile [phone]       Address:    [address]  [place] [address] Email address:  No e-mail address on record      Emergency Contact(s)  Name Relationship Lgl Grd Work Phone Home Phone Mobile Phone   1. SILVIA BARRERA   [phone]           Primary language:  English     needed? No   [facility]:  [phone] op. 1  Other communication barriers:    Preferred Method of Communication:  Mail  Current living arrangement:    Mobility Status/ Medical Equipment:      Health Maintenance  Health Maintenance Reviewed: Up to date    My Access Plan  Medical Emergency 911   Primary Clinic Line [facility] - [phone]   24 Hour Appointment Line [phone] or  [phone]) (toll-free)   24 Hour Nurse Line [phone] (toll-free)   Preferred Urgent Care     Preferred Hospital     Preferred Pharmacy [facility] - [place], [place] - [address]     Behavioral Health Crisis Line The National Suicide Prevention Lifeline at 1-551.465.8854 or 911     My Care Team Members    Patient Care Team       Relationship Specialty Notifications Start End    Antwon Schmidt MD PCP - General Internal Medicine  11/27/13     Phone: [phone] Fax: [phone]         [address] [facility] [address]    Antwon Schmidt MD PCP - Assigned PCP   6/14/15     Phone: [phone] Fax: [phone]         [address] [facility] [address]    Iris Reed LSW Clinic Care Coordinator   2/1/19     Phone: [phone]                 My Care Plans  Self Management and Treatment Plan  Goals and (Comments)  Goals        General    Other (pt-stated)     Notes - Note created  2/4/2019 10:55  AM by Iris Reed, LSW    Goal Statement: I want to access my medication, especially Lyrica, affordably through mail ordered [facility]  Measure of Success: I will re enroll in [facility] and receive my first prescription through the mail.   Supportive Steps to Achieve: MALINDA CC will provide copy of application for [facility]. SW CC will outreach weekly for support.  Barriers: no email, limited transportation  Strengths: knowledge of how to enroll (has completed application in the past)   Date to Achieve by: 3/2019             Action Plans on File:                       Advance Care Plans/Directives Type:        My Medical and Care Information  Problem List   Patient Active Problem List   Diagnosis     Hypertension goal BP (blood pressure) < 140/90     Hyperlipidemia LDL goal <70     Neuropathy     Chronic ischemic heart disease     Preventive measure     LLQ abdominal pain     BCC (basal cell carcinoma), face     Overweight     Other and unspecified angina pectoris     Low back pain     ACP (advance care planning)     SOB (shortness of breath)     Ischemic chest pain     Hypothyroidism, unspecified type     Gastroenteritis     Osteoarthritis of right knee, unspecified osteoarthritis type     Chronic 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    Care Coordination Start Date: No linked episodes   Frequency of Care Coordination: weekly   Form Last Updated: 02/04/2019

## 2019-02-01 NOTE — PROGRESS NOTES
Clinic Care Coordination Contact      Pt had multiple calls with clinic over the past few days regarding medication and pharmacies.     Per chart review pt had mentioned [facility] and [facility] in addition to [facility] and [facility]. Clinic staff called both [facility] and [facility] yesterday and were told that the pt was not currently enrolled.     MALINDA CHAPARRO outreached to [facility], they do not prescribe Lyrica as a medication. Outreach to [facility], pt has not been active there since 2014. Outreach to [facility], which is part of [facility]. Was able to assess that pt for 2018 had been enrolled with them and they confirmed that Lyrica was on her med list.     MALINDA CHAPARRO received re enrollment application via email in addition to ensuring it was mailed directly to pt. MALINDA CHAPARRO was told that pt would need to submit this application with proof of income, copy of ID, copy of insurance card and a new prescription. It will take about 2-3 weeks for pt to be active again.     Pt will need to complete this process annually. MALINDA CHAPARRO was told that [email or web address] does not automatically mail out re enrollment applications and that pts have to request these are sent.     MALINDA CHAPARRO left message for pt to call back to discuss further. MALINDA CHAPARRO will also address concerns about medication management and see if pt is open to further assistance at home to help with this.     BERNA Goldsmith   Social Work Care Coordinator  [phone]

## 2019-02-01 NOTE — LETTER
[facility] CARE COORDINATION    February 4, 2019    Lora Vergara  [address]   [place] [address]      Dear Lora,    I am a clinic care coordinator who works with Antwon Schmidt MD at [facility]. Thank you for spending the time to talk with me.  I wanted to introduce myself and provide you with my contact information so that you can call me with questions or concerns about your health care. Below is a description of clinic care coordination and how I can further assist you.     The clinic care coordinator is a registered nurse and/or  who understand the health care system. The goal of clinic care coordination is to help you manage your health and improve access to the [facility] system in the most efficient manner. The registered nurse can assist you in meeting your health care goals by providing education, coordinating services, and strengthening the communication among your providers. The  can assist you with financial, behavioral, psychosocial, chemical dependency, counseling, and/or psychiatric resources.    Please feel free to contact me at [phone], with any questions or concerns. We at [facility] are focused on providing you with the highest-quality healthcare experience possible and that all starts with you.     Sincerely,     Iris Reed

## 2019-02-04 NOTE — PROGRESS NOTES
Clinic Care Coordination Contact      Pt returned MALINDA CHAPARRO call. Discussed in detail what this writer had determined on Friday. Pt remembered enrolling with [facility] prior and knew what additional documentation was needed. MALINDA CHAPARRO will help facilitate prescription aspect of application.     Pt felt comfortable completing application independently and stated if she ran into issues she had support to help her. MALINDA CHAPARRO offered to also mail a copy of the application to pt in case she does not receive the one [facility] mailed.     Pt reported that when she attempted to fill Rx at [facility] they were out of Lyrica. MALINDA CHAPARRO encouraged pt to call back today to assess if she can get it filled this week. MALINDA CHAPARRO offered to look for coupons and mail those with application as pt reported she would have a $70 co pay which is not very affordable.     MALINDA CHAPARRO will send letter with application, CC information and any Rx coupons this writer is able to find. MALINDA CHAPARRO will outreach in one week for follow up.     BERNA Goldsmith   Social Work Care Coordinator  [phone]

## 2019-02-06 ENCOUNTER — PATIENT OUTREACH (OUTPATIENT)
Dept: CARE COORDINATION | Facility: CLINIC | Age: 84
End: 2019-02-06

## 2019-02-06 NOTE — PROGRESS NOTES
Clinic Care Coordination Contact      MALINDA CHAPARRO called [facility] to determine if previously faxed Rx would act as Rx needed for pt re enrollment. [facility] reported they couldn't find any faxed Rx since October. They did note that since pt isn't active they most likely discarded Rx. MALINDA CC inquired about sending application and Rx separately and [facility] was not reassuring about ability to put the two parts together.     MALINDA CHAPARRO outreached to pt requesting that when she completes application and obtains needed documents she turn them into MALINDA CC at clinic who will facilitate sending with Rx. Pt agreeable to this plan. She will plan to drop off paperwork at clinic.     BERNA Goldsmith   Social Work Care Coordinator  [phone]

## 2019-02-10 ENCOUNTER — APPOINTMENT (OUTPATIENT)
Dept: GENERAL RADIOLOGY | Facility: CLINIC | Age: 84
DRG: 871 | End: 2019-02-10
Attending: EMERGENCY MEDICINE
Payer: MEDICARE

## 2019-02-10 ENCOUNTER — HOSPITAL ENCOUNTER (INPATIENT)
Facility: CLINIC | Age: 84
LOS: 9 days | Discharge: NURSING, REHAB OR LONG-TERM CARE | DRG: 871 | End: 2019-02-19
Attending: EMERGENCY MEDICINE | Admitting: HOSPITALIST
Payer: MEDICARE

## 2019-02-10 DIAGNOSIS — A41.9 SEPSIS, DUE TO UNSPECIFIED ORGANISM: ICD-10-CM

## 2019-02-10 DIAGNOSIS — G62.9 NEUROPATHY: Chronic | ICD-10-CM

## 2019-02-10 DIAGNOSIS — J18.9 PNEUMONIA DUE TO INFECTIOUS ORGANISM, UNSPECIFIED LATERALITY, UNSPECIFIED PART OF LUNG: ICD-10-CM

## 2019-02-10 DIAGNOSIS — J18.9 COMMUNITY ACQUIRED PNEUMONIA OF LEFT LUNG, UNSPECIFIED PART OF LUNG: Primary | ICD-10-CM

## 2019-02-10 LAB
ALBUMIN SERPL-MCNC: 3.4 G/DL (ref 3.4–5)
ALP SERPL-CCNC: 56 U/L (ref 40–150)
ALT SERPL W P-5'-P-CCNC: 19 U/L (ref 0–50)
ANION GAP SERPL CALCULATED.3IONS-SCNC: 8 MMOL/L (ref 3–14)
AST SERPL W P-5'-P-CCNC: 22 U/L (ref 0–45)
BASOPHILS # BLD AUTO: 0 10E9/L (ref 0–0.2)
BASOPHILS NFR BLD AUTO: 0.2 %
BILIRUB SERPL-MCNC: 0.7 MG/DL (ref 0.2–1.3)
BUN SERPL-MCNC: 9 MG/DL (ref 7–30)
CALCIUM SERPL-MCNC: 8.5 MG/DL (ref 8.5–10.1)
CHLORIDE SERPL-SCNC: 95 MMOL/L (ref 94–109)
CO2 SERPL-SCNC: 28 MMOL/L (ref 20–32)
CREAT SERPL-MCNC: 0.65 MG/DL (ref 0.52–1.04)
DIFFERENTIAL METHOD BLD: NORMAL
EOSINOPHIL # BLD AUTO: 0 10E9/L (ref 0–0.7)
EOSINOPHIL NFR BLD AUTO: 0 %
ERYTHROCYTE [DISTWIDTH] IN BLOOD BY AUTOMATED COUNT: 12.5 % (ref 10–15)
FLUAV+FLUBV AG SPEC QL: NEGATIVE
FLUAV+FLUBV AG SPEC QL: NEGATIVE
GFR SERPL CREATININE-BSD FRML MDRD: 76 ML/MIN/{1.73_M2}
GLUCOSE BLDC GLUCOMTR-MCNC: 124 MG/DL (ref 70–99)
GLUCOSE SERPL-MCNC: 160 MG/DL (ref 70–99)
HBA1C MFR BLD: 7 % (ref 0–5.6)
HCT VFR BLD AUTO: 41.5 % (ref 35–47)
HGB BLD-MCNC: 14.5 G/DL (ref 11.7–15.7)
IMM GRANULOCYTES # BLD: 0 10E9/L (ref 0–0.4)
IMM GRANULOCYTES NFR BLD: 0.5 %
LACTATE BLD-SCNC: 1.1 MMOL/L (ref 0.7–2)
LYMPHOCYTES # BLD AUTO: 0.9 10E9/L (ref 0.8–5.3)
LYMPHOCYTES NFR BLD AUTO: 21.6 %
MCH RBC QN AUTO: 30.5 PG (ref 26.5–33)
MCHC RBC AUTO-ENTMCNC: 34.9 G/DL (ref 31.5–36.5)
MCV RBC AUTO: 87 FL (ref 78–100)
MONOCYTES # BLD AUTO: 0.3 10E9/L (ref 0–1.3)
MONOCYTES NFR BLD AUTO: 6.1 %
NEUTROPHILS # BLD AUTO: 3 10E9/L (ref 1.6–8.3)
NEUTROPHILS NFR BLD AUTO: 71.6 %
NRBC # BLD AUTO: 0 10*3/UL
NRBC BLD AUTO-RTO: 0 /100
NT-PROBNP SERPL-MCNC: 2198 PG/ML (ref 0–1800)
PLATELET # BLD AUTO: 206 10E9/L (ref 150–450)
POTASSIUM SERPL-SCNC: 3.8 MMOL/L (ref 3.4–5.3)
PROCALCITONIN SERPL-MCNC: <0.05 NG/ML
PROT SERPL-MCNC: 7.4 G/DL (ref 6.8–8.8)
RBC # BLD AUTO: 4.75 10E12/L (ref 3.8–5.2)
SODIUM SERPL-SCNC: 131 MMOL/L (ref 133–144)
SPECIMEN SOURCE: NORMAL
WBC # BLD AUTO: 4.1 10E9/L (ref 4–11)

## 2019-02-10 PROCEDURE — 94640 AIRWAY INHALATION TREATMENT: CPT

## 2019-02-10 PROCEDURE — 96365 THER/PROPH/DIAG IV INF INIT: CPT

## 2019-02-10 PROCEDURE — 99223 1ST HOSP IP/OBS HIGH 75: CPT | Mod: AI | Performed by: HOSPITALIST

## 2019-02-10 PROCEDURE — A9270 NON-COVERED ITEM OR SERVICE: HCPCS | Mod: GY | Performed by: EMERGENCY MEDICINE

## 2019-02-10 PROCEDURE — 94640 AIRWAY INHALATION TREATMENT: CPT | Mod: 76

## 2019-02-10 PROCEDURE — 87804 INFLUENZA ASSAY W/OPTIC: CPT | Performed by: EMERGENCY MEDICINE

## 2019-02-10 PROCEDURE — 84145 PROCALCITONIN (PCT): CPT | Performed by: EMERGENCY MEDICINE

## 2019-02-10 PROCEDURE — 25000132 ZZH RX MED GY IP 250 OP 250 PS 637: Mod: GY | Performed by: HOSPITALIST

## 2019-02-10 PROCEDURE — 83036 HEMOGLOBIN GLYCOSYLATED A1C: CPT | Performed by: EMERGENCY MEDICINE

## 2019-02-10 PROCEDURE — 83605 ASSAY OF LACTIC ACID: CPT | Performed by: EMERGENCY MEDICINE

## 2019-02-10 PROCEDURE — 83880 ASSAY OF NATRIURETIC PEPTIDE: CPT | Performed by: EMERGENCY MEDICINE

## 2019-02-10 PROCEDURE — 25000125 ZZHC RX 250: Performed by: EMERGENCY MEDICINE

## 2019-02-10 PROCEDURE — 40000275 ZZH STATISTIC RCP TIME EA 10 MIN

## 2019-02-10 PROCEDURE — 71046 X-RAY EXAM CHEST 2 VIEWS: CPT

## 2019-02-10 PROCEDURE — 00000146 ZZHCL STATISTIC GLUCOSE BY METER IP

## 2019-02-10 PROCEDURE — 80053 COMPREHEN METABOLIC PANEL: CPT | Performed by: EMERGENCY MEDICINE

## 2019-02-10 PROCEDURE — 99285 EMERGENCY DEPT VISIT HI MDM: CPT | Mod: 25

## 2019-02-10 PROCEDURE — 25000132 ZZH RX MED GY IP 250 OP 250 PS 637: Mod: GY | Performed by: EMERGENCY MEDICINE

## 2019-02-10 PROCEDURE — 25000128 H RX IP 250 OP 636: Performed by: HOSPITALIST

## 2019-02-10 PROCEDURE — 85025 COMPLETE CBC W/AUTO DIFF WBC: CPT | Performed by: EMERGENCY MEDICINE

## 2019-02-10 PROCEDURE — 25000128 H RX IP 250 OP 636: Performed by: EMERGENCY MEDICINE

## 2019-02-10 PROCEDURE — 25000125 ZZHC RX 250: Performed by: HOSPITALIST

## 2019-02-10 PROCEDURE — 99207 ZZC CDG-MDM COMPONENT: MEETS MODERATE - UP CODED: CPT | Performed by: HOSPITALIST

## 2019-02-10 PROCEDURE — 12000000 ZZH R&B MED SURG/OB

## 2019-02-10 PROCEDURE — A9270 NON-COVERED ITEM OR SERVICE: HCPCS | Mod: GY | Performed by: HOSPITALIST

## 2019-02-10 PROCEDURE — 96368 THER/DIAG CONCURRENT INF: CPT

## 2019-02-10 PROCEDURE — 87040 BLOOD CULTURE FOR BACTERIA: CPT | Performed by: EMERGENCY MEDICINE

## 2019-02-10 RX ORDER — LIDOCAINE 40 MG/G
CREAM TOPICAL
Status: DISCONTINUED | OUTPATIENT
Start: 2019-02-10 | End: 2019-02-10

## 2019-02-10 RX ORDER — PREGABALIN 150 MG/1
150 CAPSULE ORAL 2 TIMES DAILY
Status: DISCONTINUED | OUTPATIENT
Start: 2019-02-10 | End: 2019-02-19 | Stop reason: HOSPADM

## 2019-02-10 RX ORDER — BISACODYL 5 MG/1
5 TABLET, DELAYED RELEASE ORAL DAILY
COMMUNITY
End: 2021-09-13

## 2019-02-10 RX ORDER — NITROGLYCERIN 0.4 MG/1
0.4 TABLET SUBLINGUAL EVERY 5 MIN PRN
Status: DISCONTINUED | OUTPATIENT
Start: 2019-02-10 | End: 2019-02-19 | Stop reason: HOSPADM

## 2019-02-10 RX ORDER — ATORVASTATIN CALCIUM 40 MG/1
40 TABLET, FILM COATED ORAL DAILY
Status: DISCONTINUED | OUTPATIENT
Start: 2019-02-10 | End: 2019-02-19 | Stop reason: HOSPADM

## 2019-02-10 RX ORDER — ACETAMINOPHEN 500 MG
1000 TABLET ORAL ONCE
Status: COMPLETED | OUTPATIENT
Start: 2019-02-10 | End: 2019-02-10

## 2019-02-10 RX ORDER — IPRATROPIUM BROMIDE AND ALBUTEROL SULFATE 2.5; .5 MG/3ML; MG/3ML
3 SOLUTION RESPIRATORY (INHALATION) 4 TIMES DAILY
Status: DISCONTINUED | OUTPATIENT
Start: 2019-02-10 | End: 2019-02-19 | Stop reason: HOSPADM

## 2019-02-10 RX ORDER — LATANOPROST 50 UG/ML
1 SOLUTION/ DROPS OPHTHALMIC AT BEDTIME
Status: DISCONTINUED | OUTPATIENT
Start: 2019-02-10 | End: 2019-02-19 | Stop reason: HOSPADM

## 2019-02-10 RX ORDER — METOPROLOL TARTRATE 50 MG
50 TABLET ORAL EVERY MORNING
Status: DISCONTINUED | OUTPATIENT
Start: 2019-02-11 | End: 2019-02-19 | Stop reason: HOSPADM

## 2019-02-10 RX ORDER — IPRATROPIUM BROMIDE AND ALBUTEROL SULFATE 2.5; .5 MG/3ML; MG/3ML
3 SOLUTION RESPIRATORY (INHALATION) ONCE
Status: COMPLETED | OUTPATIENT
Start: 2019-02-10 | End: 2019-02-10

## 2019-02-10 RX ORDER — LEVOTHYROXINE SODIUM 25 UG/1
25 TABLET ORAL DAILY
Status: DISCONTINUED | OUTPATIENT
Start: 2019-02-10 | End: 2019-02-19 | Stop reason: HOSPADM

## 2019-02-10 RX ORDER — RANOLAZINE 500 MG/1
500 TABLET, EXTENDED RELEASE ORAL 2 TIMES DAILY
Status: DISCONTINUED | OUTPATIENT
Start: 2019-02-10 | End: 2019-02-19 | Stop reason: HOSPADM

## 2019-02-10 RX ORDER — NICOTINE POLACRILEX 4 MG
15-30 LOZENGE BUCCAL
Status: DISCONTINUED | OUTPATIENT
Start: 2019-02-10 | End: 2019-02-19 | Stop reason: HOSPADM

## 2019-02-10 RX ORDER — ASPIRIN 81 MG/1
81 TABLET ORAL DAILY
Status: DISCONTINUED | OUTPATIENT
Start: 2019-02-10 | End: 2019-02-19 | Stop reason: HOSPADM

## 2019-02-10 RX ORDER — CEFTRIAXONE 2 G/1
2 INJECTION, POWDER, FOR SOLUTION INTRAMUSCULAR; INTRAVENOUS ONCE
Status: COMPLETED | OUTPATIENT
Start: 2019-02-10 | End: 2019-02-10

## 2019-02-10 RX ORDER — NALOXONE HYDROCHLORIDE 0.4 MG/ML
.1-.4 INJECTION, SOLUTION INTRAMUSCULAR; INTRAVENOUS; SUBCUTANEOUS
Status: DISCONTINUED | OUTPATIENT
Start: 2019-02-10 | End: 2019-02-19 | Stop reason: HOSPADM

## 2019-02-10 RX ORDER — SODIUM CHLORIDE 9 MG/ML
INJECTION, SOLUTION INTRAVENOUS CONTINUOUS
Status: DISCONTINUED | OUTPATIENT
Start: 2019-02-10 | End: 2019-02-11

## 2019-02-10 RX ORDER — METOPROLOL TARTRATE 50 MG
100 TABLET ORAL AT BEDTIME
Status: DISCONTINUED | OUTPATIENT
Start: 2019-02-10 | End: 2019-02-19 | Stop reason: HOSPADM

## 2019-02-10 RX ORDER — CHOLECALCIFEROL (VITAMIN D3) 50 MCG
4000 TABLET ORAL DAILY
COMMUNITY

## 2019-02-10 RX ORDER — DEXTROSE MONOHYDRATE 25 G/50ML
25-50 INJECTION, SOLUTION INTRAVENOUS
Status: DISCONTINUED | OUTPATIENT
Start: 2019-02-10 | End: 2019-02-19 | Stop reason: HOSPADM

## 2019-02-10 RX ORDER — ISOSORBIDE MONONITRATE 30 MG/1
60 TABLET, EXTENDED RELEASE ORAL DAILY
Status: DISCONTINUED | OUTPATIENT
Start: 2019-02-10 | End: 2019-02-19 | Stop reason: HOSPADM

## 2019-02-10 RX ORDER — ACETAMINOPHEN 500 MG
500-1000 TABLET ORAL EVERY 6 HOURS PRN
COMMUNITY
End: 2022-09-15

## 2019-02-10 RX ORDER — BISACODYL 5 MG
5 TABLET, DELAYED RELEASE (ENTERIC COATED) ORAL DAILY
Status: DISCONTINUED | OUTPATIENT
Start: 2019-02-10 | End: 2019-02-19 | Stop reason: HOSPADM

## 2019-02-10 RX ORDER — SODIUM CHLORIDE 9 MG/ML
1000 INJECTION, SOLUTION INTRAVENOUS CONTINUOUS
Status: DISCONTINUED | OUTPATIENT
Start: 2019-02-10 | End: 2019-02-10

## 2019-02-10 RX ORDER — CEFTRIAXONE 1 G/1
1 INJECTION, POWDER, FOR SOLUTION INTRAMUSCULAR; INTRAVENOUS EVERY 24 HOURS
Status: DISCONTINUED | OUTPATIENT
Start: 2019-02-11 | End: 2019-02-16

## 2019-02-10 RX ORDER — ACETAMINOPHEN 500 MG
500-1000 TABLET ORAL EVERY 6 HOURS PRN
Status: DISCONTINUED | OUTPATIENT
Start: 2019-02-10 | End: 2019-02-19 | Stop reason: HOSPADM

## 2019-02-10 RX ORDER — ASPIRIN 81 MG/1
81 TABLET ORAL DAILY
COMMUNITY

## 2019-02-10 RX ADMIN — LEVOTHYROXINE SODIUM 25 MCG: 25 TABLET ORAL at 14:39

## 2019-02-10 RX ADMIN — ACETAMINOPHEN 1000 MG: 500 TABLET, FILM COATED ORAL at 11:19

## 2019-02-10 RX ADMIN — SODIUM CHLORIDE 1000 ML: 9 INJECTION, SOLUTION INTRAVENOUS at 13:03

## 2019-02-10 RX ADMIN — RANOLAZINE 500 MG: 500 TABLET, FILM COATED, EXTENDED RELEASE ORAL at 21:19

## 2019-02-10 RX ADMIN — PREGABALIN 150 MG: 150 CAPSULE ORAL at 21:19

## 2019-02-10 RX ADMIN — ISOSORBIDE MONONITRATE 60 MG: 30 TABLET, EXTENDED RELEASE ORAL at 14:39

## 2019-02-10 RX ADMIN — BISACODYL 5 MG: 5 TABLET, COATED ORAL at 14:39

## 2019-02-10 RX ADMIN — METOPROLOL TARTRATE 100 MG: 50 TABLET ORAL at 21:19

## 2019-02-10 RX ADMIN — SODIUM CHLORIDE: 9 INJECTION, SOLUTION INTRAVENOUS at 14:48

## 2019-02-10 RX ADMIN — SODIUM CHLORIDE 1000 ML: 9 INJECTION, SOLUTION INTRAVENOUS at 13:16

## 2019-02-10 RX ADMIN — ATORVASTATIN CALCIUM 40 MG: 40 TABLET, FILM COATED ORAL at 14:39

## 2019-02-10 RX ADMIN — VITAMIN D, TAB 1000IU (100/BT) 4000 UNITS: 25 TAB at 14:39

## 2019-02-10 RX ADMIN — Medication 1 MG: at 21:23

## 2019-02-10 RX ADMIN — CEFTRIAXONE SODIUM 2 G: 2 INJECTION, POWDER, FOR SOLUTION INTRAMUSCULAR; INTRAVENOUS at 13:03

## 2019-02-10 RX ADMIN — SODIUM CHLORIDE 500 MG: 9 INJECTION, SOLUTION INTRAVENOUS at 13:14

## 2019-02-10 RX ADMIN — ASPIRIN 81 MG: 81 TABLET, COATED ORAL at 14:43

## 2019-02-10 RX ADMIN — ENOXAPARIN SODIUM 40 MG: 40 INJECTION SUBCUTANEOUS at 14:40

## 2019-02-10 RX ADMIN — IPRATROPIUM BROMIDE AND ALBUTEROL SULFATE 3 ML: .5; 2.5 SOLUTION RESPIRATORY (INHALATION) at 11:19

## 2019-02-10 RX ADMIN — IPRATROPIUM BROMIDE AND ALBUTEROL SULFATE 3 ML: 2.5; .5 SOLUTION RESPIRATORY (INHALATION) at 21:07

## 2019-02-10 RX ADMIN — SODIUM CHLORIDE 1000 ML: 9 INJECTION, SOLUTION INTRAVENOUS at 13:14

## 2019-02-10 RX ADMIN — LATANOPROST 1 DROP: 50 SOLUTION OPHTHALMIC at 21:20

## 2019-02-10 RX ADMIN — GUAIFENESIN 10 ML: 200 SOLUTION ORAL at 17:19

## 2019-02-10 ASSESSMENT — ACTIVITIES OF DAILY LIVING (ADL)
RETIRED_EATING: 0-->INDEPENDENT
BATHING: 1-->ASSISTIVE EQUIPMENT
TOILETING: 1-->ASSISTIVE EQUIPMENT
RETIRED_COMMUNICATION: 0-->UNDERSTANDS/COMMUNICATES WITHOUT DIFFICULTY
ADLS_ACUITY_SCORE: 17
ADLS_ACUITY_SCORE: 19
TRANSFERRING: 1-->ASSISTIVE EQUIPMENT
SWALLOWING: 0-->SWALLOWS FOODS/LIQUIDS WITHOUT DIFFICULTY
AMBULATION: 1-->ASSISTIVE EQUIPMENT
DRESS: 0-->INDEPENDENT
COGNITION: 0 - NO COGNITION ISSUES REPORTED
FALL_HISTORY_WITHIN_LAST_SIX_MONTHS: NO

## 2019-02-10 ASSESSMENT — MIFFLIN-ST. JEOR: SCORE: 1265.44

## 2019-02-10 NOTE — PROGRESS NOTES
RECEIVING UNIT ED HANDOFF REVIEW    ED Nurse Handoff Report was reviewed by: Gilma Peter on February 10, 2019 at 1:29 PM

## 2019-02-10 NOTE — PHARMACY-ADMISSION MEDICATION HISTORY
Admission medication history interview status for the 2/10/2019  admission is complete. See EPIC admission navigator for prior to admission medications     Medication history source reliability:Good    Actions taken by pharmacist (provider contacted, etc): Verified Rx medication strengths with [facility].    Additional medication history information not noted on PTA med list :None    Medication reconciliation/reorder completed by provider prior to medication history? No    Time spent in this activity: 10 minutes    Prior to Admission medications    Medication Sig Last Dose Taking? Auth Provider   acetaminophen (TYLENOL) 500 MG tablet Take 500-1,000 mg by mouth every 6 hours as needed prn Yes Unknown, Entered By History   aspirin 81 MG EC tablet Take 81 mg by mouth daily 2/9/2019 Yes Unknown, Entered By History   atorvastatin (LIPITOR) 40 MG tablet Take 1 tablet (40 mg) by mouth daily 2/9/2019 Yes Kalyan Luis MD   bisacodyl (DULCOLAX) 5 MG EC tablet Take 5 mg by mouth daily 2/9/2019 Yes Unknown, Entered By History   furosemide (LASIX) 20 MG tablet Take 0.5 tablets (10 mg) by mouth daily 2/9/2019 Yes Kalyan Luis MD   isosorbide mononitrate (IMDUR) 60 MG 24 hr tablet TAKE 1 TABLET (60 MG) BY MOUTH DAILY 2/9/2019 Yes Antwon Schmidt MD   latanoprost (XALATAN) 0.005 % ophthalmic solution Place 1 drop into both eyes At Bedtime 2/9/2019 Yes Unknown, Entered By History   levothyroxine (SYNTHROID/LEVOTHROID) 25 MCG tablet TAKE ONE TABLET BY MOUTH ONE TIME DAILY 2/9/2019 Yes Antwon Schmidt MD   metFORMIN (GLUCOPHAGE-XR) 750 MG 24 hr tablet TAKE 1 TABLET (750 MG) BY MOUTH DAILY WITH DINNER 2/9/2019 Yes Antwon Schmidt MD   metoprolol tartrate (LOPRESSOR) 100 MG tablet TAKE 1/2 TABLET BY MOUTH EVERY AM AND TAKE 1 TABLET IN THE EVENING. 2/9/2019 Yes Antwon Schmidt MD   nitroGLYcerin (NITROSTAT) 0.4 MG sublingual tablet Place 1 tablet (0.4 mg) under the tongue every 5 minutes as needed for chest pain  prn Yes Jaqui Lazaro APRN CNP   pregabalin (LYRICA) 150 MG capsule TAKE 1 CAPSULE BY MOUTH TWICE A DAY 2/9/2019 Yes Antwon Schmidt MD   ranolazine (RANEXA) 500 MG 12 hr tablet Take 1 tablet (500 mg) by mouth 2 times daily 2/9/2019 Yes Kalyan Luis MD   vitamin D3 (CHOLECALCIFEROL) 2000 units tablet Take 4,000 Units by mouth daily 2/9/2019 Yes Unknown, Entered By History   order for DME Equipment being ordered: wheeled walker with brakes and a seat   Antwon Schmidt MD

## 2019-02-10 NOTE — ED NOTES
Bed: ED11  Expected date:   Expected time:   Means of arrival:   Comments:  husam - 515 - 89F cough eta 3410

## 2019-02-10 NOTE — ED NOTES
"[facility]  ED Nurse Handoff Report    ED Chief complaint: Cough (productive cough, EMS reports RA sats 88%.  Reports ongoing URI since [place], coughing getting worse and feeling more SOB.  Febrile.)      ED Diagnosis:   Final diagnoses:   Pneumonia due to infectious organism, unspecified laterality, unspecified part of lung   Sepsis, due to unspecified organism (H)       Code Status: DNR / DNI    Allergies:   Allergies   Allergen Reactions     Hydrochlorothiazide      Pancreatitis - patient denies this.  She says it gave her low sodium.       Activity level - Baseline/Home:  Independent    Activity Level - Current:   Independent     Needed?: No    Isolation: Yes  Infection: Not Applicable  Other   Pneumonia droplet    Bariatric?: No    Vital Signs:   Vitals:    02/10/19 1053   BP: (!) 171/97   Resp: 22   Temp: 101  F (38.3  C)   TempSrc: Oral   SpO2: 92%   Weight: 90.7 kg (200 lb)   Height: 1.575 m (5' 2\")       Cardiac Rhythm: ,        Pain level: 0-10 Pain Scale: 8    Is this patient confused?: No   Does this patient have a guardian?  No         If yes, is there guardianship documents in the Epic \"Code/ACP\" activity?  N/A         Guardian Notified?  N/A  [place] - Suicide Severity Rating Scale Completed?  Yes  If yes, what color did the patient score?  White    Patient Report: Initial Complaint: Cough  Focused Assessment: Pt arrived sating in the 88 range. Pt is currently on 2L sating around 92. Pt has a frequent cough with occasional small grey sputum. Pt arrived with fever 101 given tylenol 1000mg.  Pt is alert and oriented. Lives in a senior residence building. Pt is very pleasant with a good sense of humor.    Tests Performed: Labs, Xray, blood cultures  Abnormal Results: see labs and imaging  Treatments provided: Neb x1, fluids infusing, tylenol, antibiotic x2    Family Comments: none present    OBS brochure/video discussed/provided to patient/family: N/A              Name of " person given brochure if not patient: na              Relationship to patient: na    ED Medications:   Medications   lidocaine 1 % 1 mL (not administered)   lidocaine (LMX4) cream (not administered)   sodium chloride (PF) 0.9% PF flush 3 mL (not administered)   sodium chloride (PF) 0.9% PF flush 3 mL (not administered)   0.9% sodium chloride BOLUS (not administered)   0.9% sodium chloride BOLUS (not administered)     Followed by   sodium chloride 0.9% infusion (not administered)   acetaminophen (TYLENOL) tablet 1,000 mg (1,000 mg Oral Given 2/10/19 1119)   ipratropium - albuterol 0.5 mg/2.5 mg/3 mL (DUONEB) neb solution 3 mL (3 mLs Nebulization Given 2/10/19 1119)       Drips infusing?:  Yes    For the majority of the shift this patient was [place].   Interventions performed were none.    Severe Sepsis OR Septic Shock Diagnosis Present: No    To be done/followed up on inpatient unit:  continue to monitor fever treat accordingly, antibiotics as ordered. Fluids.     ED NURSE PHONE NUMBER: [phone]

## 2019-02-10 NOTE — H&P
[facility]    History and Physical - Hospitalist Service       Date of Admission:  2/10/2019    Assessment & Plan   Lora Vergara is a 89 year old female admitted on 2/10/2019.     Sepsis secondary to community-acquired pneumonia most likely viral  Patient with clear onset of fevers chills and productive cough starting Tuesday  She appears systemically ill, with elevation of fever to 101 and tachycardia 105,  Previous rounds of antibiotics will for chronic cough, not for fever  It appears this is an acute insult, but if her symptoms do not improve as expected, could consider more advanced imaging of her chest.  She has no smoking history  She was initiated on ceftriaxone and azithromycin in the emergency department  Pro calcitonin was negative  Plan:  - Admit to inpatient for supplemental oxygen, IV ceftriaxone, IV azithromycin  -Q ID duo nebs  - If her symptoms fail to improve we will obtain a CT scan of her chest    Diabetes mellitus type 2  -A1c, moderate sliding scale insulin, diabetic diet  -Hold home metformin    History of coronary artery disease  Microvascular angina  Her last angiogram was in 2010, sees Dr. CHAITANYA amador [facility]  She has symptoms of chest and jaw pain, that improved with full long-acting anginal medications  -Continue Imdur, amlodipine, and as needed nitroglycerin  -Continue Ranexa   -Obtain BNP, but she is not clinically in heart failure  - hold her home lasix for now  - continue the metoprolol and aspirin     Peripheral neuropathy  - continue the lyrica    HLD  - continue the atorvastatin             Diet: carb controlled  DVT Prophylaxis: Enoxaparin (Lovenox) SQ  Kumari Catheter: not present  Code Status: DNR/DNI    Disposition Plan   Expected discharge: 2 - 3 days, recommended to prior living arrangement once O2 use less than 2 liters/minute.  Entered: Lester Ron DO 02/10/2019, 1:49 PM     The patient's care was discussed with the Patient.    Lester MAYORGA  DO Asaf  [facility]    ______________________________________________________________________    Chief Complaint   Cough and fever    History is obtained from the patient    History of Present Illness   Lora Vergara is a 89 year old female who presents with fever and cough.  She states initially that the cough started in late December 2018, when she presented to the emergency department for a dry cough.  She was prescribed a course of doxycycline and discharged to home, she was never admitted.  Her cough never abated so she saw her primary doctor who put prescribed a course of cefuroxime, which again did not help her cough.  She notes that her cough acutely worsened last Tuesday, accompanied by fever, chills, shortness of breath, sore throat, and nasal congestion.  She tried some Tylenol but that did not work in alleviating her symptoms.  She also had a mild headache.    Because ever since the symptom constellation she called emergency medical services.  The paramedic on scene noted her oxygen saturation was 88% on room air.  She was placed on oxygen and brought to the emergency department for further treatment.  She underwent a chest x-ray indicating a left-sided patchy pneumonia.  She was started on antibiotics including ceftriaxone and azithromycin.    She notes no weight loss.  No history of smoking.  No hemoptysis. She has no chest pain although she notes in the past she has needed to take nitroglycerin before. She has not needed this lately.     Review of Systems    The 10 point Review of Systems is negative other than noted in the HPI or here.     Past Medical History    I have reviewed this patient's medical history and updated it with pertinent information if needed.   Past Medical History:   Diagnosis Date     Angina at rest (H)     Thought to be microvascular     Arthritis      Basal cell carcinoma 1/2014, (3/5/6-2014)    nose and check and ear and face     Coronary artery  disease     Mild CAD per 2/2008 angiogram     Hyperlipidaemia      Hypertension      Hypothyroidism 1/17/2013     Mitral regurgitation 6/2015    echo     Type 2 diabetes, HbA1C goal < 8% (H) 9/11/2013       Past Surgical History   I have reviewed this patient's surgical history and updated it with pertinent information if needed.  Past Surgical History:   Procedure Laterality Date     APPENDECTOMY  1974     BACK SURGERY  1996     BLADDER SURGERY  1990    bladder suspension     CHOLECYSTECTOMY  1975     CORONARY ANGIOGRAPHY ADULT ORDER  2/2008    Mild CAD. LAD w/20% stenosis, RCA with 30-40% stenosis. No flow limiting obstructions.     face surgery  1-6/2014    basal cell plus plastic to nose, face , ear     HYSTERECTOMY  1970     OOPHORECTOMY  1974    bilateral     PHACOEMULSIFICATION CLEAR CORNEA WITH STANDARD INTRAOCULAR LENS IMPLANT Right 7/21/2015    Procedure: PHACOEMULSIFICATION CLEAR CORNEA WITH STANDARD INTRAOCULAR LENS IMPLANT;  Surgeon: Schuyler Chapa MD;  Location: [facility]     PHACOEMULSIFICATION CLEAR CORNEA WITH STANDARD INTRAOCULAR LENS IMPLANT Left 8/18/2015    Procedure: PHACOEMULSIFICATION CLEAR CORNEA WITH STANDARD INTRAOCULAR LENS IMPLANT;  Surgeon: Schuyler Chapa MD;  Location: [facility]       Social History   I have reviewed this patient's social history and updated it with pertinent information if needed.  Social History     Tobacco Use     Smoking status: Never Smoker     Smokeless tobacco: Never Used   Substance Use Topics     Alcohol use: Yes     Comment: 1 per week     Drug use: No       Family History   I have reviewed this patient's family history and updated it with pertinent information if needed.   Family History   Problem Relation Age of Onset     Cancer Brother         lung     Cancer Son      Cerebrovascular Disease Mother         and Parkinson's     Cancer Father         pancreas     Diabetes Son        Prior to Admission Medications   Prior to Admission Medications    Prescriptions Last Dose Informant Patient Reported? Taking?   acetaminophen (TYLENOL) 500 MG tablet prn Self Yes Yes   Sig: Take 500-1,000 mg by mouth every 6 hours as needed   aspirin 81 MG EC tablet 2/9/2019 Self Yes Yes   Sig: Take 81 mg by mouth daily   atorvastatin (LIPITOR) 40 MG tablet 2/9/2019 Self No Yes   Sig: Take 1 tablet (40 mg) by mouth daily   bisacodyl (DULCOLAX) 5 MG EC tablet 2/9/2019 Self Yes Yes   Sig: Take 5 mg by mouth daily   furosemide (LASIX) 20 MG tablet 2/9/2019 Self No Yes   Sig: Take 0.5 tablets (10 mg) by mouth daily   isosorbide mononitrate (IMDUR) 60 MG 24 hr tablet 2/9/2019 Self No Yes   Sig: TAKE 1 TABLET (60 MG) BY MOUTH DAILY   latanoprost (XALATAN) 0.005 % ophthalmic solution 2/9/2019 Self Yes Yes   Sig: Place 1 drop into both eyes At Bedtime   levothyroxine (SYNTHROID/LEVOTHROID) 25 MCG tablet 2/9/2019 Self No Yes   Sig: TAKE ONE TABLET BY MOUTH ONE TIME DAILY   metFORMIN (GLUCOPHAGE-XR) 750 MG 24 hr tablet 2/9/2019 Self No Yes   Sig: TAKE 1 TABLET (750 MG) BY MOUTH DAILY WITH DINNER   metoprolol tartrate (LOPRESSOR) 100 MG tablet 2/9/2019 Self No Yes   Sig: TAKE 1/2 TABLET BY MOUTH EVERY AM AND TAKE 1 TABLET IN THE EVENING.   nitroGLYcerin (NITROSTAT) 0.4 MG sublingual tablet prn Self No Yes   Sig: Place 1 tablet (0.4 mg) under the tongue every 5 minutes as needed for chest pain   order for DME   No No   Sig: Equipment being ordered: wheeled walker with brakes and a seat   pregabalin (LYRICA) 150 MG capsule 2/9/2019 Self No Yes   Sig: TAKE 1 CAPSULE BY MOUTH TWICE A DAY   ranolazine (RANEXA) 500 MG 12 hr tablet 2/9/2019 Self No Yes   Sig: Take 1 tablet (500 mg) by mouth 2 times daily   vitamin D3 (CHOLECALCIFEROL) 2000 units tablet 2/9/2019 Self Yes Yes   Sig: Take 4,000 Units by mouth daily      Facility-Administered Medications: None     Allergies   Allergies   Allergen Reactions     Hydrochlorothiazide      Pancreatitis - patient denies this.  She says it gave her low  sodium.       Physical Exam   Vital Signs: Temp: 98.7  F (37.1  C) Temp src: Oral BP: (!) 171/97   Heart Rate: 115 Resp: 24 SpO2: 92 % O2 Device: Nasal cannula Oxygen Delivery: 2 LPM  Weight: 200 lbs 0 oz    Physical Exam   Constitutional: She is oriented to person, place, and time. She appears well-developed and well-nourished.   HENT:   Head: Normocephalic and atraumatic.   Eyes: Conjunctivae and EOM are normal. Pupils are equal, round, and reactive to light.   Neck: Neck supple. No thyromegaly present.   Cardiovascular: Normal rate, regular rhythm, normal heart sounds and intact distal pulses.   Pulmonary/Chest: She is in respiratory distress. She has rales. She exhibits no tenderness.   Abdominal: Soft. Bowel sounds are normal.   Musculoskeletal: Normal range of motion. She exhibits no edema or deformity.   Neurological: She is alert and oriented to person, place, and time. No cranial nerve deficit. Coordination normal.   Skin: Skin is warm and dry. Capillary refill takes less than 2 seconds.   Psychiatric: She has a normal mood and affect. Her behavior is normal. Judgment and thought content normal.         Data   Data reviewed today: I reviewed all medications, new labs and imaging results over the last 24 hours. I personally reviewed the chest x-ray image(s) showing L sided patchy opacities concerning for pneumonia.    Recent Labs   Lab 02/10/19  1102   WBC 4.1   HGB 14.5   MCV 87      *   POTASSIUM 3.8   CHLORIDE 95   CO2 28   BUN 9   CR 0.65   ANIONGAP 8   VIOLETA 8.5   *   ALBUMIN 3.4   PROTTOTAL 7.4   BILITOTAL 0.7   ALKPHOS 56   ALT 19   AST 22     Most Recent 3 CBC's:  Recent Labs   Lab Test 02/10/19  1102 07/26/18  0914 01/02/18  0617   WBC 4.1 4.3 8.6   HGB 14.5 13.2 14.1   MCV 87 91 90    241 260     Most Recent 3 BMP's:  Recent Labs   Lab Test 02/10/19  1102 07/26/18  0914 04/18/18  1206   * 138 138   POTASSIUM 3.8 4.1 3.9   CHLORIDE 95 103 98   CO2 28 28 32*   BUN 9 13  12   CR 0.65 0.77 0.94   ANIONGAP 8 7 11.9   VIOLETA 8.5 8.6 9.5   * 117* 105     Most Recent 2 LFT's:  Recent Labs   Lab Test 02/10/19  1102 07/26/18  0914   AST 22 13   ALT 19 18   ALKPHOS 56 49   BILITOTAL 0.7 0.5     Recent Results (from the past 24 hour(s))   XR Chest 2 Views    Narrative    CHEST TWO VIEWS 2/10/2019 12:24 PM     HISTORY: Cough. Fever.    COMPARISON: December 24, 2018     FINDINGS: Patchy left-sided infiltrates. Right lung clear. The cardiac  silhouette is not enlarged. Pulmonary vasculature is unremarkable.      Impression    IMPRESSION: Patchy left-sided infiltrates.    VITALIY BUENO MD

## 2019-02-10 NOTE — ED PROVIDER NOTES
History     Chief Complaint:  Cough    HPI   Lora Vergara is a 89 year old female with a history of basal cell carcinoma, CAD, hyperlipidemia, hypertension, chronic ischemic heart disease, mitral regurgitation, type 2 diabetes, chronic diastolic congestive heart failure, who presents to the emergency department via EMS for evaluation of a cough. The patient reports that she's had ongoing URI symptoms since the end of December. She was started on doxycycline shortly after onset, and then given a separate course of cefuroxime in early January. She took both antibiotics as prescribed with no improvement. Here, she states that within the last five days her symptoms have worsened. The patient lives in an independent long term home by herself and today called EMS due to her worsening cough and feeling more short of breath. Upon EMS arrival, her O2 was 88% on Room Air.  Here, she reports that her mildly productive cough has become more violent which has caused a sore throat. Last night, she coughed so hard she vomited and had associated abdominal pain, and developed a new fever. She has also continued to have a runny nose and congestion and states that her urinary incontinence is worsening but she has no dysuria.  She's been taking Tylenol and last use was last night.     Allergies:  Hydrochlorothiazide     Medications:    Acetaminophen  Albuterol  Aspirin 81 mg   Lipitor  Ceftin  Lotrisone  Docusate sodium  Lasix  Imdur  Xalatan  Levothyroxine  Metformin  Lopressor  Nitroglycerin  Lyrica  Ranexa     Past Medical History:    Angina at rest  Arthritis  Basal cell carcinoma  CAD  Hyperlipidemia  Hypertension  Hypothyroidism  Neuropathy   Chronic ischemic heart disease  Mitral regurgitation  Type 2 diabetes   Gastroenteritis  Morbid obesity  Chronic diastolic congestive heart failure  Venous insufficiency   Bilateral leg edema   Respiratory failure with hypoxia   Osteoarthritis   Chronic low back pain     Past Surgical  "History:    Appendectomy  Back surgery  Bladder suspension surgery  Cholecystectomy  Coronary angiography   Face surgery for basal cell carcinoma   Hysterectomy  Oophorectomy bilateral  Phacoemulsification clear cornea with standard intraocular lens implant bilateral    Family History:    Lung cancer  Cerebrovascular disease  Parkinson's  Pancreatic cancer  Diabetes     Social History:  Negative for tobacco use.  Positive for alcohol use.   Marital Status:        Review of Systems   All other systems reviewed and are negative.      Physical Exam     Patient Vitals for the past 24 hrs:   BP Temp Temp src Heart Rate Resp SpO2 Height Weight   02/10/19 1248 -- 98.7  F (37.1  C) Oral -- -- 92 % -- --   02/10/19 1245 -- -- -- -- -- 91 % -- --   02/10/19 1200 -- -- -- 115 24 92 % -- --   02/10/19 1053 (!) 171/97 101  F (38.3  C) Oral 114 22 92 % 1.575 m (5' 2\") 90.7 kg (200 lb)     Physical Exam  General: woman sitting upright in room 11, acts younger than age  HENT: mucous membranes moist, OP clear, TMs wnl  CV: mildly tachycardic rate, regular rhythm, no lower extremity edema  Resp: Slightly coarse throughout with mild crackles at the left base, frequent dry cough observed, speaks in full phrases without accessory muscle use  GI: abdomen soft and nontender, no guarding  MSK: no bony tenderness  Skin: appropriately warm and dry  Neuro: alert, clear speech, oriented  Psych: cooperative    Emergency Department Course   Imaging:  Chest X-Ray. 2 Views:   IMPRESSION: Patchy left-sided infiltrates.  Reading per radiology.     Radiographic findings were communicated with the patient who voiced understanding of the findings.    Laboratory:  CBC: WBC: 4.1, HGB: 14.5, PLT: 206  CMP: Glucose 160 (H), Sodium: 131 (L), o/w WNL (Creatinine: 0.65)    Procalcitonin: <0.05    1115 Lactic acid: 1.1    Influenza A/B antigen: Negative    1102 Blood Culture: In process  1305 Blood Culture: In " process    Interventions:  1119 Albuterol-Ipratropium inhalation solution, 2.5 mg-0.5 mg/3 ml; 3 mL, inhalation  1119 Tylenol 1000 mg tablet PO  1303 0.9% Sodium Chloride BOLUS 1000 mLs IV   1303 Rocephin 2 g IV  1314 Zithromax 500 mg IV    Emergency Department Course:  1104 Nursing notes and vitals reviewed. I performed an exam of the patient as documented above.     IV inserted. Medicine administered as documented above. Blood drawn. Nose swabbed. This was sent to the lab for further testing, results above.    The patient was sent for a chest XR while in the emergency department, findings above.     1241 I rechecked the patient and discussed the results of her workup thus far.     1243 I called the patient's son per her request regarding the patient's presentation here in the emergency department, though he wasn't available so I talked with a female family member.    1245  I consulted with Dr. Ron of the hospitalist services. They are in agreement to accept the patient for admission.    Findings and plan explained to the Patient who consents to admission. Discussed the patient with Dr. Ron, who will admit the patient to an adult med/surg bed for further monitoring, evaluation, and treatment.    Impression & Plan    Medical Decision Making:  I think her symptoms are likely from a pulmonary infection, as supported by her fever tachycardia hypoxia and infiltrate corresponding with focal abnormalities on her lung exam.  She has been dealing with some respiratory symptoms for some time, she is quite clear that it worsened in recent days.  Alternate diagnoses including viral processes, pulmonary embolism, pulmonary edema, reactive airway disease, pneumothorax, neoplasm are considered.  IV antibiotics were initiated.  She will be hospitalized for further care.      Diagnosis:    ICD-10-CM    1. Pneumonia due to infectious organism, unspecified laterality, unspecified part of lung J18.9 Comprehensive  metabolic panel   2. Sepsis, due to unspecified organism (H) A41.9        Disposition:  Admitted to Dr. Ron    Scribe Disclosure:  I, Fred Montenegro, am serving as a scribe on 2/10/2019 at 11:04 AM to personally document services performed by Dr. Cyn MD based on my observations and the provider's statements to me.     Fred Montenegro  2/10/2019    EMERGENCY DEPARTMENT    This record was created at least in part using electronic voice recognition software, so please excuse any typographical errors.       Schuyler Addison MD  02/10/19 [id number]

## 2019-02-11 ENCOUNTER — APPOINTMENT (OUTPATIENT)
Dept: PHYSICAL THERAPY | Facility: CLINIC | Age: 84
DRG: 871 | End: 2019-02-11
Attending: HOSPITALIST
Payer: MEDICARE

## 2019-02-11 LAB
ALBUMIN SERPL-MCNC: 3 G/DL (ref 3.4–5)
ALP SERPL-CCNC: 41 U/L (ref 40–150)
ALT SERPL W P-5'-P-CCNC: 16 U/L (ref 0–50)
ANION GAP SERPL CALCULATED.3IONS-SCNC: 7 MMOL/L (ref 3–14)
AST SERPL W P-5'-P-CCNC: 19 U/L (ref 0–45)
BASOPHILS # BLD AUTO: 0 10E9/L (ref 0–0.2)
BASOPHILS NFR BLD AUTO: 0.2 %
BILIRUB SERPL-MCNC: 0.5 MG/DL (ref 0.2–1.3)
BUN SERPL-MCNC: 7 MG/DL (ref 7–30)
CALCIUM SERPL-MCNC: 8.2 MG/DL (ref 8.5–10.1)
CHLORIDE SERPL-SCNC: 99 MMOL/L (ref 94–109)
CO2 SERPL-SCNC: 28 MMOL/L (ref 20–32)
CREAT SERPL-MCNC: 0.61 MG/DL (ref 0.52–1.04)
DIFFERENTIAL METHOD BLD: NORMAL
EOSINOPHIL # BLD AUTO: 0 10E9/L (ref 0–0.7)
EOSINOPHIL NFR BLD AUTO: 0.2 %
ERYTHROCYTE [DISTWIDTH] IN BLOOD BY AUTOMATED COUNT: 12.8 % (ref 10–15)
GFR SERPL CREATININE-BSD FRML MDRD: 78 ML/MIN/{1.73_M2}
GLUCOSE BLDC GLUCOMTR-MCNC: 124 MG/DL (ref 70–99)
GLUCOSE BLDC GLUCOMTR-MCNC: 133 MG/DL (ref 70–99)
GLUCOSE BLDC GLUCOMTR-MCNC: 149 MG/DL (ref 70–99)
GLUCOSE BLDC GLUCOMTR-MCNC: 156 MG/DL (ref 70–99)
GLUCOSE BLDC GLUCOMTR-MCNC: 184 MG/DL (ref 70–99)
GLUCOSE SERPL-MCNC: 124 MG/DL (ref 70–99)
HCT VFR BLD AUTO: 38.1 % (ref 35–47)
HGB BLD-MCNC: 12.8 G/DL (ref 11.7–15.7)
IMM GRANULOCYTES # BLD: 0 10E9/L (ref 0–0.4)
IMM GRANULOCYTES NFR BLD: 0.2 %
LYMPHOCYTES # BLD AUTO: 1.1 10E9/L (ref 0.8–5.3)
LYMPHOCYTES NFR BLD AUTO: 25.5 %
MCH RBC QN AUTO: 29.7 PG (ref 26.5–33)
MCHC RBC AUTO-ENTMCNC: 33.6 G/DL (ref 31.5–36.5)
MCV RBC AUTO: 88 FL (ref 78–100)
MONOCYTES # BLD AUTO: 0.3 10E9/L (ref 0–1.3)
MONOCYTES NFR BLD AUTO: 7.7 %
NEUTROPHILS # BLD AUTO: 2.8 10E9/L (ref 1.6–8.3)
NEUTROPHILS NFR BLD AUTO: 66.2 %
NRBC # BLD AUTO: 0 10*3/UL
NRBC BLD AUTO-RTO: 0 /100
PLATELET # BLD AUTO: 185 10E9/L (ref 150–450)
POTASSIUM SERPL-SCNC: 3.5 MMOL/L (ref 3.4–5.3)
PROT SERPL-MCNC: 6.5 G/DL (ref 6.8–8.8)
RBC # BLD AUTO: 4.31 10E12/L (ref 3.8–5.2)
SODIUM SERPL-SCNC: 134 MMOL/L (ref 133–144)
WBC # BLD AUTO: 4.2 10E9/L (ref 4–11)

## 2019-02-11 PROCEDURE — 40000193 ZZH STATISTIC PT WARD VISIT: Performed by: PHYSICAL THERAPIST

## 2019-02-11 PROCEDURE — 25800030 ZZH RX IP 258 OP 636: Performed by: HOSPITALIST

## 2019-02-11 PROCEDURE — 80053 COMPREHEN METABOLIC PANEL: CPT | Performed by: HOSPITALIST

## 2019-02-11 PROCEDURE — 40000275 ZZH STATISTIC RCP TIME EA 10 MIN

## 2019-02-11 PROCEDURE — 25000125 ZZHC RX 250: Performed by: HOSPITALIST

## 2019-02-11 PROCEDURE — 97116 GAIT TRAINING THERAPY: CPT | Mod: GP | Performed by: PHYSICAL THERAPIST

## 2019-02-11 PROCEDURE — 85025 COMPLETE CBC W/AUTO DIFF WBC: CPT | Performed by: HOSPITALIST

## 2019-02-11 PROCEDURE — 94640 AIRWAY INHALATION TREATMENT: CPT

## 2019-02-11 PROCEDURE — 99232 SBSQ HOSP IP/OBS MODERATE 35: CPT | Performed by: HOSPITALIST

## 2019-02-11 PROCEDURE — 25000128 H RX IP 250 OP 636: Performed by: HOSPITALIST

## 2019-02-11 PROCEDURE — 25000131 ZZH RX MED GY IP 250 OP 636 PS 637: Mod: GY | Performed by: HOSPITALIST

## 2019-02-11 PROCEDURE — 97110 THERAPEUTIC EXERCISES: CPT | Mod: GP | Performed by: PHYSICAL THERAPIST

## 2019-02-11 PROCEDURE — 25000132 ZZH RX MED GY IP 250 OP 250 PS 637: Mod: GY | Performed by: HOSPITALIST

## 2019-02-11 PROCEDURE — A9270 NON-COVERED ITEM OR SERVICE: HCPCS | Mod: GY | Performed by: HOSPITALIST

## 2019-02-11 PROCEDURE — 97161 PT EVAL LOW COMPLEX 20 MIN: CPT | Mod: GP | Performed by: PHYSICAL THERAPIST

## 2019-02-11 PROCEDURE — 36415 COLL VENOUS BLD VENIPUNCTURE: CPT | Performed by: HOSPITALIST

## 2019-02-11 PROCEDURE — 94640 AIRWAY INHALATION TREATMENT: CPT | Mod: 76

## 2019-02-11 PROCEDURE — 12000000 ZZH R&B MED SURG/OB

## 2019-02-11 PROCEDURE — 00000146 ZZHCL STATISTIC GLUCOSE BY METER IP

## 2019-02-11 RX ORDER — FUROSEMIDE 20 MG/1
10 TABLET ORAL DAILY
Status: DISCONTINUED | OUTPATIENT
Start: 2019-02-12 | End: 2019-02-15

## 2019-02-11 RX ORDER — HYDRALAZINE HYDROCHLORIDE 20 MG/ML
10 INJECTION INTRAMUSCULAR; INTRAVENOUS EVERY 4 HOURS PRN
Status: DISCONTINUED | OUTPATIENT
Start: 2019-02-11 | End: 2019-02-19 | Stop reason: HOSPADM

## 2019-02-11 RX ADMIN — GUAIFENESIN 10 ML: 200 SOLUTION ORAL at 10:21

## 2019-02-11 RX ADMIN — INSULIN ASPART 1 UNITS: 100 INJECTION, SOLUTION INTRAVENOUS; SUBCUTANEOUS at 08:52

## 2019-02-11 RX ADMIN — METOPROLOL TARTRATE 100 MG: 50 TABLET ORAL at 21:15

## 2019-02-11 RX ADMIN — GUAIFENESIN 10 ML: 200 SOLUTION ORAL at 16:07

## 2019-02-11 RX ADMIN — RANOLAZINE 500 MG: 500 TABLET, FILM COATED, EXTENDED RELEASE ORAL at 08:45

## 2019-02-11 RX ADMIN — VITAMIN D, TAB 1000IU (100/BT) 4000 UNITS: 25 TAB at 08:46

## 2019-02-11 RX ADMIN — SODIUM CHLORIDE 500 MG: 9 INJECTION, SOLUTION INTRAVENOUS at 14:08

## 2019-02-11 RX ADMIN — IPRATROPIUM BROMIDE AND ALBUTEROL SULFATE 3 ML: 2.5; .5 SOLUTION RESPIRATORY (INHALATION) at 11:41

## 2019-02-11 RX ADMIN — GUAIFENESIN 10 ML: 200 SOLUTION ORAL at 06:13

## 2019-02-11 RX ADMIN — INSULIN ASPART 1 UNITS: 100 INJECTION, SOLUTION INTRAVENOUS; SUBCUTANEOUS at 12:53

## 2019-02-11 RX ADMIN — METOPROLOL TARTRATE 50 MG: 50 TABLET ORAL at 08:46

## 2019-02-11 RX ADMIN — PREGABALIN 150 MG: 150 CAPSULE ORAL at 08:46

## 2019-02-11 RX ADMIN — LATANOPROST 1 DROP: 50 SOLUTION OPHTHALMIC at 21:15

## 2019-02-11 RX ADMIN — ENOXAPARIN SODIUM 40 MG: 40 INJECTION SUBCUTANEOUS at 14:08

## 2019-02-11 RX ADMIN — CEFTRIAXONE SODIUM 1 G: 1 INJECTION, POWDER, FOR SOLUTION INTRAMUSCULAR; INTRAVENOUS at 12:58

## 2019-02-11 RX ADMIN — PREGABALIN 150 MG: 150 CAPSULE ORAL at 21:15

## 2019-02-11 RX ADMIN — IPRATROPIUM BROMIDE AND ALBUTEROL SULFATE 3 ML: 2.5; .5 SOLUTION RESPIRATORY (INHALATION) at 19:28

## 2019-02-11 RX ADMIN — ASPIRIN 81 MG: 81 TABLET, COATED ORAL at 08:45

## 2019-02-11 RX ADMIN — HYDRALAZINE HYDROCHLORIDE 10 MG: 20 INJECTION INTRAMUSCULAR; INTRAVENOUS at 09:28

## 2019-02-11 RX ADMIN — LEVOTHYROXINE SODIUM 25 MCG: 25 TABLET ORAL at 08:45

## 2019-02-11 RX ADMIN — ISOSORBIDE MONONITRATE 60 MG: 30 TABLET, EXTENDED RELEASE ORAL at 08:45

## 2019-02-11 RX ADMIN — BISACODYL 5 MG: 5 TABLET, COATED ORAL at 08:46

## 2019-02-11 RX ADMIN — RANOLAZINE 500 MG: 500 TABLET, FILM COATED, EXTENDED RELEASE ORAL at 21:15

## 2019-02-11 RX ADMIN — ACETAMINOPHEN 1000 MG: 500 TABLET, FILM COATED ORAL at 16:12

## 2019-02-11 RX ADMIN — ATORVASTATIN CALCIUM 40 MG: 40 TABLET, FILM COATED ORAL at 08:45

## 2019-02-11 RX ADMIN — SODIUM CHLORIDE: 9 INJECTION, SOLUTION INTRAVENOUS at 06:18

## 2019-02-11 ASSESSMENT — ACTIVITIES OF DAILY LIVING (ADL)
ADLS_ACUITY_SCORE: 17
ADLS_ACUITY_SCORE: 18
ADLS_ACUITY_SCORE: 17
ADLS_ACUITY_SCORE: 18

## 2019-02-11 NOTE — PROGRESS NOTES
02/11/19 1318   Quick Adds   Type of Visit Initial PT Evaluation   Living Environment   Lives With alone   Living Arrangements apartment   Home Accessibility no concerns   Self-Care   Usual Activity Tolerance good   Current Activity Tolerance fair   Regular Exercise No   Equipment Currently Used at Home walker, rolling   Activity/Exercise/Self-Care Comment indep w/ ADLs, neighbor provides transportation and help with errands/grocery   Functional Level Prior   Ambulation 1-->assistive equipment   Transferring 1-->assistive equipment   Toileting 1-->assistive equipment   Bathing 1-->assistive equipment   Communication 0-->understands/communicates without difficulty   Swallowing 0-->swallows foods/liquids without difficulty   Cognition 0 - no cognition issues reported   Fall history within last six months no   Which of the above functional risks had a recent onset or change? ambulation;transferring   General Information   Onset of Illness/Injury or Date of Surgery - Date 02/10/19   Referring Physician Asaf   Patient/Family Goals Statement none stated   Pertinent History of Current Problem (include personal factors and/or comorbidities that impact the POC) pt admited with Sepsis secondary to community-acquired pneumonia most likely viral   Precautions/Limitations fall precautions;oxygen therapy device and L/min  (3L O2)   Cognitive Status Examination   Orientation orientation to person, place and time   Level of Consciousness alert   Follows Commands and Answers Questions able to follow single-step instructions   Personal Safety and Judgment intact   Range of Motion (ROM)   ROM Comment LE ROM appears WFL   Strength   Strength Comments 4/5 LE strength grossly, functional weakness in presence of illness   Bed Mobility   Bed Mobility Comments min A    Transfer Skills   Transfer Comments min A w/ WW   Gait   Gait Comments min A w/ WW, dec step length, shuffling with reduced gait speed and MCKEON   Balance   Balance  "Comments impaired from baseline using WW for mobility   General Therapy Interventions   Planned Therapy Interventions bed mobility training;gait training;strengthening;transfer training   Clinical Impression   Criteria for Skilled Therapeutic Intervention yes, treatment indicated   PT Diagnosis difficulty walking   Influenced by the following impairments decreased functional endurance, impaired gait, dec indep w/ transfers and deconditioning.   Functional limitations due to impairments impaired fucntional mobility   Clinical Presentation Evolving/Changing   Clinical Presentation Rationale increased O2 demand compared to baseline   Clinical Decision Making (Complexity) Low complexity   Therapy Frequency` daily   Predicted Duration of Therapy Intervention (days/wks) 3 days   Anticipated Discharge Disposition Transitional Care Facility   Risk & Benefits of therapy have been explained Yes   Patient, Family & other staff in agreement with plan of care Yes   [facility] CQuotient-PAC TM \"6 Clicks\"   2016, Trustees of [facility], under license to [employer].  All rights reserved.   6 Clicks Short Forms Basic Mobility Inpatient Short Form   [facility] AM-PAC  \"6 Clicks\" V.2 Basic Mobility Inpatient Short Form   1. Turning from your back to your side while in a flat bed without using bedrails? 3 - A Little   2. Moving from lying on your back to sitting on the side of a flat bed without using bedrails? 3 - A Little   3. Moving to and from a bed to a chair (including a wheelchair)? 3 - A Little   4. Standing up from a chair using your arms (e.g., wheelchair, or bedside chair)? 3 - A Little   5. To walk in hospital room? 3 - A Little   6. Climbing 3-5 steps with a railing? 3 - A Little   Basic Mobility Raw Score (Score out of 24.Lower scores equate to lower levels of function) 18   Total Evaluation Time   Total Evaluation Time (Minutes) 15     "

## 2019-02-11 NOTE — PLAN OF CARE
A/Ox4. VSS on 2L NC. Denied pain/SOB. Coarse expiratory wheezes. Frequent cough- prn robitussin given x1. Low saturated fat/low sodium- no caffeine diet. Up with assist of 1 with gait belt and walker. IVF-infusing NS at 50mL/hr. On IV abx. Continue to monitor.

## 2019-02-11 NOTE — PROGRESS NOTES
Patient is A&Ox4. VSS on 1L O2 via nasal cannula ex T max: 100.5. Tylenol given x 1. Encouraged use of incentive spirometer. Lung sounds coarse with expiratory wheezes. Frequent cough, PRN robitussin given x 2. Cardiac diet (no caffeine), fair appetite. No complaints of nausea. Bowel sounds active, passing flatus. Voiding adequately in bathroom. IV fluids discontinued. Continues on IV antibiotics. Up with SBA with walker. Plan: Discharge may be possible in 2-3 days once O2 needs are less than 2L.

## 2019-02-11 NOTE — PLAN OF CARE
PT:   Discharge Planner PT   Patient plan for discharge: none specifed  Current status: Prior to admission patient living alone in senior building apartment, having assist for cleaning, a neighbor provided transportation and assists with appointments and errands including groceries. Pt reporting mod indep w/ mobility using WW primarily outside her apartment, indep with many ADLs and IADLS. Pt presently requires min A for bed mobility and min A for transfers with WW, pt on 3L O2 with SaO2 93% at rest. Pt ambulated 125ft w/ WW and CGA, on 3L with decreased step length and reduced gait speed, easily fatigued with c/o on increased SOB, SAO2 90% with walking. Pt limited by decreased functional endurance, impaired gait, dec indep w/ transfers and deconditioning.  Barriers to return to prior living situation: Lives alone, need for assistance with mobility, dec activity tolerance  Recommendations for discharge: TCU versus home with home PT - at present level of function pt would needs TCU but should she improve over the next days to closer to her baseline for mobility home could be considered  Rationale for recommendations: Pt will benefit from skilled PT services to address the above impairments and assist with safe progression of mobility.          Entered by: Blanca Jang 02/11/2019 2:16 PM

## 2019-02-11 NOTE — PROGRESS NOTES
[facility]    Medicine Progress Note - Hospitalist Service       Date of Admission:  2/10/2019  Assessment & Plan      Lora Vergara is a 89 year old female admitted on 2/10/2019.     Sepsis secondary to community-acquired pneumonia most likely viral  Patient with clear onset of fevers chills and productive cough starting Tuesday  She appears systemically ill, with elevation of fever to 101 and tachycardia 105,  Previous rounds of antibiotics will for chronic cough, not for fever  It appears this is an acute insult, but if her symptoms do not improve as expected, could consider more advanced imaging of her chest.  She has no smoking history  She was initiated on ceftriaxone and azithromycin in the emergency department  Pro calcitonin was negative  Plan:  - continue supplemental oxygen, IV ceftriaxone, IV azithromycin  -change duonebs to prn, she does not feel these are helpful  - she feels improving at this time, her fever curve is improving as well.     Septic encephalopathy  Mild confusion this morning, but thankfully she has great insight into this  - delirium precautions    Diabetes mellitus type 2  A1c of 7.0 currently  -A1c, moderate sliding scale insulin, diabetic diet  -Hold home metformin     History of coronary artery disease  Microvascular angina  Her last angiogram was in 2010, sees Dr. CHAITANYA amdaor at [facility]  She has symptoms of chest and jaw pain, that improved with full long-acting anginal medications  -Continue Imdur, amlodipine, and as needed nitroglycerin  -Continue Ranexa   -BNP slightly elevated, but not in heart failure  - stop her IVF and restart her home lasix in the AM    Peripheral neuropathy  - continue the lyrica    HLD  - continue the atorvastatin         Diet: Combination Diet Low Saturated Fat Na <2400mg Diet, No Caffeine Diet  Room Service    DVT Prophylaxis: Enoxaparin (Lovenox) SQ  Kumari Catheter: not present  Code Status: DNR/DNI      Disposition Plan   Expected  discharge: 2 - 3 days, recommended to prior living arrangement once O2 use less than 2 liters/minute.  Entered: Lester Ron DO 02/11/2019, 3:18 PM       The patient's care was discussed with the Patient. I did offer to discuss with her son, but she declined. She wanted to confirm that we had her wishes indicate DNR/DNI. I asked her to discuss this with her son, she stated she would on her time. I am more than happy to discuss with him as well should be needed.     Lester Ron DO  Hospitalist Service  [facility]    ______________________________________________________________________    Interval History   She was slighlty worse this AM in terms of her breathing, but does feel improved this afternoon, did walk with therapy.     Data reviewed today: I reviewed all medications, new labs and imaging results over the last 24 hours. I personally reviewed no images or EKG's today.    Physical Exam   Vital Signs: Temp: 97.9  F (36.6  C) Temp src: Oral BP: 136/63   Heart Rate: 108 Resp: 17 SpO2: 93 % O2 Device: Nasal cannula Oxygen Delivery: 2 LPM  Weight: 200 lbs 0 oz  Constitutional: She is oriented to person, place, and time. She appears well-developed and well-nourished.   HENT:   Head: Normocephalic and atraumatic.   Eyes: Conjunctivae and EOM are normal. Pupils are equal, round, and reactive to light.   Neck: Neck supple. No thyromegaly present.   Cardiovascular: Normal rate, regular rhythm, normal heart sounds and intact distal pulses.   Pulmonary/Chest: She is in respiratory distress. She has rales. She exhibits no tenderness.   Abdominal: Soft. Bowel sounds are normal.   Musculoskeletal: Normal range of motion. She exhibits no edema or deformity.   Neurological: She is alert and oriented to person, place, and time. No cranial nerve deficit. Coordination normal.   Skin: Skin is warm and dry. Capillary refill takes less than 2 seconds.   Psychiatric: She has a normal mood and affect.  Her behavior is normal. Judgment and thought content normal.           Data   Recent Labs   Lab 02/11/19  0740 02/10/19  1102   WBC 4.2 4.1   HGB 12.8 14.5   MCV 88 87    206    131*   POTASSIUM 3.5 3.8   CHLORIDE 99 95   CO2 28 28   BUN 7 9   CR 0.61 0.65   ANIONGAP 7 8   VIOLETA 8.2* 8.5   * 160*   ALBUMIN 3.0* 3.4   PROTTOTAL 6.5* 7.4   BILITOTAL 0.5 0.7   ALKPHOS 41 56   ALT 16 19   AST 19 22     No results found for this or any previous visit (from the past 24 hour(s)).  Medications       aspirin  81 mg Oral Daily     atorvastatin  40 mg Oral Daily     azithromycin  500 mg Intravenous Q24H     bisacodyl  5 mg Oral Daily     cefTRIAXone  1 g Intravenous Q24H     enoxaparin  40 mg Subcutaneous Q24H     insulin aspart  1-7 Units Subcutaneous TID AC     insulin aspart  1-5 Units Subcutaneous At Bedtime     ipratropium - albuterol 0.5 mg/2.5 mg/3 mL  3 mL Nebulization 4x Daily     isosorbide mononitrate  60 mg Oral Daily     latanoprost  1 drop Both Eyes At Bedtime     levothyroxine  25 mcg Oral Daily     metoprolol tartrate  100 mg Oral At Bedtime     metoprolol tartrate  50 mg Oral QAM     pregabalin  150 mg Oral BID     ranolazine  500 mg Oral BID     vitamin D3  4,000 Units Oral Daily

## 2019-02-11 NOTE — PLAN OF CARE
Pt came up from ED around 1430. A/O x4. VSS. On 2 LPM  NC, satting in low 90's. IS teaching provided and use encouraged. LS: Coarse. Scheduled duonebs.PRN robitussin given for freq coughs with some relief. Cardiac diet, tolerating fair. Denies nausea. BS active, passing flatus. Up with SBA and walker, voiding adequately in BR.  R. PIV infusing NS at 50 ml/hr, intermittent abx. Will continue to monitor.

## 2019-02-11 NOTE — PROVIDER NOTIFICATION
MD Notification    Person notified: Dr. Ron    Person Name: Dayra George RN    Date/Time: 2/11/19@0900    Interaction: Paged    Purpose of Notification: B/P 213/97 and 198/91. Requesting new orders.    Orders Received: New order to discontinue IV fluids and give IV hydralazine Q4H PRN.

## 2019-02-11 NOTE — PLAN OF CARE
Pt A/O x4. VSS on 2 LPM  NC. LS: Coarse, ex wheezes. Denies pain. Cardiac diet, fair appetite. BS active, passing flatus. Up with SBA and walker, voiding adequately in BR. Will continue to monitor.

## 2019-02-12 ENCOUNTER — APPOINTMENT (OUTPATIENT)
Dept: CT IMAGING | Facility: CLINIC | Age: 84
DRG: 871 | End: 2019-02-12
Attending: HOSPITALIST
Payer: MEDICARE

## 2019-02-12 LAB
ALBUMIN SERPL-MCNC: 2.9 G/DL (ref 3.4–5)
ALP SERPL-CCNC: 40 U/L (ref 40–150)
ALT SERPL W P-5'-P-CCNC: 15 U/L (ref 0–50)
ANION GAP SERPL CALCULATED.3IONS-SCNC: 8 MMOL/L (ref 3–14)
AST SERPL W P-5'-P-CCNC: 24 U/L (ref 0–45)
BASOPHILS # BLD AUTO: 0 10E9/L (ref 0–0.2)
BASOPHILS NFR BLD AUTO: 0 %
BILIRUB SERPL-MCNC: 0.7 MG/DL (ref 0.2–1.3)
BUN SERPL-MCNC: 8 MG/DL (ref 7–30)
CALCIUM SERPL-MCNC: 8.5 MG/DL (ref 8.5–10.1)
CHLORIDE SERPL-SCNC: 95 MMOL/L (ref 94–109)
CO2 SERPL-SCNC: 28 MMOL/L (ref 20–32)
CREAT SERPL-MCNC: 0.52 MG/DL (ref 0.52–1.04)
DIFFERENTIAL METHOD BLD: NORMAL
EOSINOPHIL # BLD AUTO: 0 10E9/L (ref 0–0.7)
EOSINOPHIL NFR BLD AUTO: 0.2 %
ERYTHROCYTE [DISTWIDTH] IN BLOOD BY AUTOMATED COUNT: 12.8 % (ref 10–15)
GFR SERPL CREATININE-BSD FRML MDRD: 82 ML/MIN/{1.73_M2}
GLUCOSE BLDC GLUCOMTR-MCNC: 157 MG/DL (ref 70–99)
GLUCOSE BLDC GLUCOMTR-MCNC: 160 MG/DL (ref 70–99)
GLUCOSE BLDC GLUCOMTR-MCNC: 165 MG/DL (ref 70–99)
GLUCOSE BLDC GLUCOMTR-MCNC: 168 MG/DL (ref 70–99)
GLUCOSE SERPL-MCNC: 146 MG/DL (ref 70–99)
GRAM STN SPEC: NORMAL
HCT VFR BLD AUTO: 38.7 % (ref 35–47)
HGB BLD-MCNC: 13.1 G/DL (ref 11.7–15.7)
IMM GRANULOCYTES # BLD: 0 10E9/L (ref 0–0.4)
IMM GRANULOCYTES NFR BLD: 0.4 %
LYMPHOCYTES # BLD AUTO: 1.1 10E9/L (ref 0.8–5.3)
LYMPHOCYTES NFR BLD AUTO: 19.4 %
Lab: NORMAL
MCH RBC QN AUTO: 29.7 PG (ref 26.5–33)
MCHC RBC AUTO-ENTMCNC: 33.9 G/DL (ref 31.5–36.5)
MCV RBC AUTO: 88 FL (ref 78–100)
MONOCYTES # BLD AUTO: 0.3 10E9/L (ref 0–1.3)
MONOCYTES NFR BLD AUTO: 5.7 %
NEUTROPHILS # BLD AUTO: 4.2 10E9/L (ref 1.6–8.3)
NEUTROPHILS NFR BLD AUTO: 74.3 %
NRBC # BLD AUTO: 0 10*3/UL
NRBC BLD AUTO-RTO: 0 /100
NT-PROBNP SERPL-MCNC: 2458 PG/ML (ref 0–1800)
PLATELET # BLD AUTO: 184 10E9/L (ref 150–450)
POTASSIUM SERPL-SCNC: 3.6 MMOL/L (ref 3.4–5.3)
PROT SERPL-MCNC: 6.8 G/DL (ref 6.8–8.8)
RBC # BLD AUTO: 4.41 10E12/L (ref 3.8–5.2)
SODIUM SERPL-SCNC: 131 MMOL/L (ref 133–144)
SPECIMEN SOURCE: NORMAL
WBC # BLD AUTO: 5.7 10E9/L (ref 4–11)

## 2019-02-12 PROCEDURE — 25000128 H RX IP 250 OP 636: Performed by: HOSPITALIST

## 2019-02-12 PROCEDURE — 25800030 ZZH RX IP 258 OP 636: Performed by: HOSPITALIST

## 2019-02-12 PROCEDURE — 94640 AIRWAY INHALATION TREATMENT: CPT

## 2019-02-12 PROCEDURE — 25000125 ZZHC RX 250: Performed by: HOSPITALIST

## 2019-02-12 PROCEDURE — 85025 COMPLETE CBC W/AUTO DIFF WBC: CPT | Performed by: HOSPITALIST

## 2019-02-12 PROCEDURE — 83880 ASSAY OF NATRIURETIC PEPTIDE: CPT | Performed by: HOSPITALIST

## 2019-02-12 PROCEDURE — 99232 SBSQ HOSP IP/OBS MODERATE 35: CPT | Performed by: HOSPITALIST

## 2019-02-12 PROCEDURE — 87205 SMEAR GRAM STAIN: CPT | Performed by: HOSPITALIST

## 2019-02-12 PROCEDURE — 36415 COLL VENOUS BLD VENIPUNCTURE: CPT | Performed by: HOSPITALIST

## 2019-02-12 PROCEDURE — 25000132 ZZH RX MED GY IP 250 OP 250 PS 637: Mod: GY | Performed by: HOSPITALIST

## 2019-02-12 PROCEDURE — A9270 NON-COVERED ITEM OR SERVICE: HCPCS | Mod: GY | Performed by: HOSPITALIST

## 2019-02-12 PROCEDURE — 40000141 ZZH STATISTIC PERIPHERAL IV START W/O US GUIDANCE

## 2019-02-12 PROCEDURE — 87106 FUNGI IDENTIFICATION YEAST: CPT | Performed by: HOSPITALIST

## 2019-02-12 PROCEDURE — 94640 AIRWAY INHALATION TREATMENT: CPT | Mod: 76

## 2019-02-12 PROCEDURE — 12000000 ZZH R&B MED SURG/OB

## 2019-02-12 PROCEDURE — 00000146 ZZHCL STATISTIC GLUCOSE BY METER IP

## 2019-02-12 PROCEDURE — 40000275 ZZH STATISTIC RCP TIME EA 10 MIN

## 2019-02-12 PROCEDURE — 71260 CT THORAX DX C+: CPT

## 2019-02-12 PROCEDURE — 93005 ELECTROCARDIOGRAM TRACING: CPT

## 2019-02-12 PROCEDURE — 80053 COMPREHEN METABOLIC PANEL: CPT | Performed by: HOSPITALIST

## 2019-02-12 PROCEDURE — 87070 CULTURE OTHR SPECIMN AEROBIC: CPT | Performed by: HOSPITALIST

## 2019-02-12 PROCEDURE — 93010 ELECTROCARDIOGRAM REPORT: CPT | Performed by: INTERNAL MEDICINE

## 2019-02-12 RX ORDER — IOPAMIDOL 755 MG/ML
76 INJECTION, SOLUTION INTRAVASCULAR ONCE
Status: COMPLETED | OUTPATIENT
Start: 2019-02-12 | End: 2019-02-12

## 2019-02-12 RX ADMIN — METOPROLOL TARTRATE 50 MG: 50 TABLET ORAL at 09:16

## 2019-02-12 RX ADMIN — METOPROLOL TARTRATE 100 MG: 50 TABLET ORAL at 20:54

## 2019-02-12 RX ADMIN — RANOLAZINE 500 MG: 500 TABLET, FILM COATED, EXTENDED RELEASE ORAL at 09:17

## 2019-02-12 RX ADMIN — SODIUM CHLORIDE 500 MG: 9 INJECTION, SOLUTION INTRAVENOUS at 14:04

## 2019-02-12 RX ADMIN — CEFTRIAXONE SODIUM 1 G: 1 INJECTION, POWDER, FOR SOLUTION INTRAMUSCULAR; INTRAVENOUS at 13:14

## 2019-02-12 RX ADMIN — ASPIRIN 81 MG: 81 TABLET, COATED ORAL at 09:16

## 2019-02-12 RX ADMIN — ENOXAPARIN SODIUM 40 MG: 40 INJECTION SUBCUTANEOUS at 14:25

## 2019-02-12 RX ADMIN — BISACODYL 5 MG: 5 TABLET, COATED ORAL at 09:17

## 2019-02-12 RX ADMIN — PREGABALIN 150 MG: 150 CAPSULE ORAL at 20:53

## 2019-02-12 RX ADMIN — Medication 10 MG: at 09:17

## 2019-02-12 RX ADMIN — ACETAMINOPHEN 1000 MG: 500 TABLET, FILM COATED ORAL at 17:22

## 2019-02-12 RX ADMIN — IPRATROPIUM BROMIDE AND ALBUTEROL SULFATE 3 ML: 2.5; .5 SOLUTION RESPIRATORY (INHALATION) at 10:48

## 2019-02-12 RX ADMIN — ISOSORBIDE MONONITRATE 60 MG: 30 TABLET, EXTENDED RELEASE ORAL at 09:17

## 2019-02-12 RX ADMIN — SODIUM CHLORIDE 93 ML: 9 INJECTION, SOLUTION INTRAVENOUS at 16:52

## 2019-02-12 RX ADMIN — ATORVASTATIN CALCIUM 40 MG: 40 TABLET, FILM COATED ORAL at 09:17

## 2019-02-12 RX ADMIN — LATANOPROST 1 DROP: 50 SOLUTION OPHTHALMIC at 20:54

## 2019-02-12 RX ADMIN — INSULIN ASPART 1 UNITS: 100 INJECTION, SOLUTION INTRAVENOUS; SUBCUTANEOUS at 17:22

## 2019-02-12 RX ADMIN — PREGABALIN 150 MG: 150 CAPSULE ORAL at 09:17

## 2019-02-12 RX ADMIN — IPRATROPIUM BROMIDE AND ALBUTEROL SULFATE 3 ML: 2.5; .5 SOLUTION RESPIRATORY (INHALATION) at 19:03

## 2019-02-12 RX ADMIN — INSULIN ASPART 1 UNITS: 100 INJECTION, SOLUTION INTRAVENOUS; SUBCUTANEOUS at 07:02

## 2019-02-12 RX ADMIN — LEVOTHYROXINE SODIUM 25 MCG: 25 TABLET ORAL at 09:17

## 2019-02-12 RX ADMIN — IPRATROPIUM BROMIDE AND ALBUTEROL SULFATE 3 ML: 2.5; .5 SOLUTION RESPIRATORY (INHALATION) at 14:55

## 2019-02-12 RX ADMIN — RANOLAZINE 500 MG: 500 TABLET, FILM COATED, EXTENDED RELEASE ORAL at 20:53

## 2019-02-12 RX ADMIN — IPRATROPIUM BROMIDE AND ALBUTEROL SULFATE 3 ML: 2.5; .5 SOLUTION RESPIRATORY (INHALATION) at 07:08

## 2019-02-12 RX ADMIN — GUAIFENESIN 10 ML: 200 SOLUTION ORAL at 17:22

## 2019-02-12 RX ADMIN — VITAMIN D, TAB 1000IU (100/BT) 4000 UNITS: 25 TAB at 09:16

## 2019-02-12 RX ADMIN — IOPAMIDOL 76 ML: 755 INJECTION, SOLUTION INTRAVENOUS at 16:52

## 2019-02-12 ASSESSMENT — ACTIVITIES OF DAILY LIVING (ADL)
ADLS_ACUITY_SCORE: 17

## 2019-02-12 NOTE — PLAN OF CARE
A/Ox4. VSS on 1L O2. SOB at times. IS encouraged. Scheduled nebs. LS coarse with expiratory wheezes. Frequent cough, PRN robitussin available. Cardiac diet. Denies nausea. Voiding adequately. SBA. PIV SL, int abx. Discharge pending improvement and when O2 needs are less than 2L. Continue to monitor.

## 2019-02-12 NOTE — PROGRESS NOTES
[facility]    Medicine Progress Note - Hospitalist Service       Date of Admission:  2/10/2019  Assessment & Plan      Lora Vergara is a 89 year old female admitted on 2/10/2019.     Sepsis secondary to community-acquired pneumonia most likely viral  Patient with clear onset of fevers chills and productive cough starting Tuesday  She appears systemically ill, with elevation of fever to 101 and tachycardia 105,  Previous rounds of antibiotics will for chronic cough, not for fever  It appears this is an acute insult, but if her symptoms do not improve as expected, could consider more advanced imaging of her chest.  She has no smoking history  She was initiated on ceftriaxone and azithromycin in the emergency department  Pro calcitonin was negative  Plan:  - continue supplemental oxygen, IV ceftriaxone, IV azithromycin  -change duonebs to prn, she does not feel these are helpful  - she feels worse today. Given her chronic cough and slow improvement, will plan on CT to look for structural issues that could cause her respiratory complaints  - no contraindications for anticoagulation    Septic encephalopathy  Mild confusion this morning, but thankfully she has great insight into this  - delirium precautions    Diabetes mellitus type 2  A1c of 7.0 currently  -A1c, moderate sliding scale insulin, diabetic diet  -Hold home metformin     History of coronary artery disease  Microvascular angina  Her last angiogram was in 2010, sees Dr. CHAITANYA amador at [facility]  She has symptoms of chest and jaw pain, that improved with full long-acting anginal medications  -Continue Imdur, amlodipine, and as needed nitroglycerin  -Continue Ranexa   -restart home diuretics  - obtain  EKG    Peripheral neuropathy  - continue the lyrica    HLD  - continue the atorvastatin         Diet: Combination Diet Low Saturated Fat Na <2400mg Diet, No Caffeine Diet  Room Service    DVT Prophylaxis: Enoxaparin (Lovenox) SQ  Kumari Catheter:  not present  Code Status: DNR/DNI      Disposition Plan   Expected discharge: 2 - 3 days, recommended to prior living arrangement once O2 use less than 2 liters/minute.  Entered: Lester Ron DO 02/12/2019, 3:07 PM       Care discussed with patient and her son.    Lester Ron DO  Hospitalist Service  [facility]    ______________________________________________________________________    Interval History   Feels worse today, more dyspnea, more tachycardia, more shortness of breath    Data reviewed today: I reviewed all medications, new labs and imaging results over the last 24 hours. I personally reviewed no images or EKG's today.    Physical Exam   Vital Signs: Temp: 99.1  F (37.3  C) Temp src: Oral BP: 160/71   Heart Rate: 108 Resp: 18 SpO2: 92 % O2 Device: Nasal cannula Oxygen Delivery: 2 LPM  Weight: 200 lbs 0 oz  Constitutional: She is oriented to person, place, and time. She appears well-developed and well-nourished.   HENT:   Head: Normocephalic and atraumatic.   Eyes: Conjunctivae and EOM are normal. Pupils are equal, round, and reactive to light.   Neck: Neck supple. No thyromegaly present.   Cardiovascular: Normal rate, regular rhythm, normal heart sounds and intact distal pulses.   Pulmonary/Chest: She is in respiratory distress. She has rales. She exhibits no tenderness.   Abdominal: Soft. Bowel sounds are normal.   Musculoskeletal: Normal range of motion. She exhibits no edema or deformity.   Neurological: She is alert and oriented to person, place, and time. No cranial nerve deficit. Coordination normal.   Skin: Skin is warm and dry. Capillary refill takes less than 2 seconds.   Psychiatric: She has a normal mood and affect. Her behavior is normal. Judgment and thought content normal.           Data   Recent Labs   Lab 02/12/19  0727 02/11/19  0740 02/10/19  1102   WBC 5.7 4.2 4.1   HGB 13.1 12.8 14.5   MCV 88 88 87    185 206   * 134 131*   POTASSIUM 3.6  3.5 3.8   CHLORIDE 95 99 95   CO2 28 28 28   BUN 8 7 9   CR 0.52 0.61 0.65   ANIONGAP 8 7 8   VIOLETA 8.5 8.2* 8.5   * 124* 160*   ALBUMIN 2.9* 3.0* 3.4   PROTTOTAL 6.8 6.5* 7.4   BILITOTAL 0.7 0.5 0.7   ALKPHOS 40 41 56   ALT 15 16 19   AST 24 19 22     No results found for this or any previous visit (from the past 24 hour(s)).  Medications       aspirin  81 mg Oral Daily     atorvastatin  40 mg Oral Daily     azithromycin  500 mg Intravenous Q24H     bisacodyl  5 mg Oral Daily     cefTRIAXone  1 g Intravenous Q24H     enoxaparin  40 mg Subcutaneous Q24H     furosemide  10 mg Oral Daily     insulin aspart  1-7 Units Subcutaneous TID AC     insulin aspart  1-5 Units Subcutaneous At Bedtime     ipratropium - albuterol 0.5 mg/2.5 mg/3 mL  3 mL Nebulization 4x Daily     isosorbide mononitrate  60 mg Oral Daily     latanoprost  1 drop Both Eyes At Bedtime     levothyroxine  25 mcg Oral Daily     metoprolol tartrate  100 mg Oral At Bedtime     metoprolol tartrate  50 mg Oral QAM     pregabalin  150 mg Oral BID     ranolazine  500 mg Oral BID     vitamin D3  4,000 Units Oral Daily

## 2019-02-12 NOTE — PROGRESS NOTES
Patient is A&Ox4. CT chest pulm embolism with contrast, 2/12 - result pending. EKG 12 lead showed sinus rhythm. VSS ex T max: 100.1 on 2L O2 via nasal cannula, sating at 92% on 2L and unable to wean. Incentive spirometer use encouraged. Lung sounds coarse with expiratory wheezes. Frequent cough. PRN robitussin available. Cardiac diet (no caffeine), tolerating fair. No complaints of nausea. Bowel sounds active, passing flatus. Voiding adequately in bathroom. PIV SL. Continues on IV antibiotics. Up with SBA and a walker. Plan: Discharge possible in 2-3 days once patient is able to wean down from 2L O2.

## 2019-02-12 NOTE — PLAN OF CARE
A&Ox4. VSS ex required increase of O2 overnight, currently utilizing 2 L NC, tachy at times and hypertensive, but within parameters.  Reported of feeling anxious x1 this shift, increased O2 and requested to be up in chair, sx resolved.  Sputum collected.  LS course with expiratory wheezes.  Intermittent abx: rocephin and Zithromax.  Denies all pain.  Up with SBA and walker.  Voiding spontaneously.  Discharge goal <2L O2 utilization.

## 2019-02-13 LAB
ALBUMIN SERPL-MCNC: 2.9 G/DL (ref 3.4–5)
ALP SERPL-CCNC: 44 U/L (ref 40–150)
ALT SERPL W P-5'-P-CCNC: 20 U/L (ref 0–50)
ANION GAP SERPL CALCULATED.3IONS-SCNC: 9 MMOL/L (ref 3–14)
AST SERPL W P-5'-P-CCNC: 27 U/L (ref 0–45)
BASOPHILS # BLD AUTO: 0 10E9/L (ref 0–0.2)
BASOPHILS NFR BLD AUTO: 0.3 %
BILIRUB SERPL-MCNC: 1 MG/DL (ref 0.2–1.3)
BUN SERPL-MCNC: 9 MG/DL (ref 7–30)
CALCIUM SERPL-MCNC: 8.6 MG/DL (ref 8.5–10.1)
CHLORIDE SERPL-SCNC: 92 MMOL/L (ref 94–109)
CO2 SERPL-SCNC: 29 MMOL/L (ref 20–32)
CREAT SERPL-MCNC: 0.54 MG/DL (ref 0.52–1.04)
DIFFERENTIAL METHOD BLD: NORMAL
EOSINOPHIL # BLD AUTO: 0 10E9/L (ref 0–0.7)
EOSINOPHIL NFR BLD AUTO: 0.6 %
ERYTHROCYTE [DISTWIDTH] IN BLOOD BY AUTOMATED COUNT: 12.7 % (ref 10–15)
GFR SERPL CREATININE-BSD FRML MDRD: 81 ML/MIN/{1.73_M2}
GLUCOSE BLDC GLUCOMTR-MCNC: 119 MG/DL (ref 70–99)
GLUCOSE BLDC GLUCOMTR-MCNC: 121 MG/DL (ref 70–99)
GLUCOSE BLDC GLUCOMTR-MCNC: 147 MG/DL (ref 70–99)
GLUCOSE BLDC GLUCOMTR-MCNC: 206 MG/DL (ref 70–99)
GLUCOSE BLDC GLUCOMTR-MCNC: 226 MG/DL (ref 70–99)
GLUCOSE SERPL-MCNC: 150 MG/DL (ref 70–99)
HCT VFR BLD AUTO: 36.5 % (ref 35–47)
HGB BLD-MCNC: 12.5 G/DL (ref 11.7–15.7)
IMM GRANULOCYTES # BLD: 0 10E9/L (ref 0–0.4)
IMM GRANULOCYTES NFR BLD: 0.6 %
INTERPRETATION ECG - MUSE: NORMAL
LYMPHOCYTES # BLD AUTO: 1 10E9/L (ref 0.8–5.3)
LYMPHOCYTES NFR BLD AUTO: 15.1 %
MCH RBC QN AUTO: 30 PG (ref 26.5–33)
MCHC RBC AUTO-ENTMCNC: 34.2 G/DL (ref 31.5–36.5)
MCV RBC AUTO: 88 FL (ref 78–100)
MONOCYTES # BLD AUTO: 0.4 10E9/L (ref 0–1.3)
MONOCYTES NFR BLD AUTO: 5.5 %
NEUTROPHILS # BLD AUTO: 5.2 10E9/L (ref 1.6–8.3)
NEUTROPHILS NFR BLD AUTO: 77.9 %
NRBC # BLD AUTO: 0 10*3/UL
NRBC BLD AUTO-RTO: 0 /100
PLATELET # BLD AUTO: 189 10E9/L (ref 150–450)
POTASSIUM SERPL-SCNC: 3.6 MMOL/L (ref 3.4–5.3)
PROT SERPL-MCNC: 7 G/DL (ref 6.8–8.8)
RBC # BLD AUTO: 4.16 10E12/L (ref 3.8–5.2)
SODIUM SERPL-SCNC: 130 MMOL/L (ref 133–144)
WBC # BLD AUTO: 6.7 10E9/L (ref 4–11)

## 2019-02-13 PROCEDURE — 12000000 ZZH R&B MED SURG/OB

## 2019-02-13 PROCEDURE — 36415 COLL VENOUS BLD VENIPUNCTURE: CPT | Performed by: HOSPITALIST

## 2019-02-13 PROCEDURE — 25000128 H RX IP 250 OP 636: Performed by: HOSPITALIST

## 2019-02-13 PROCEDURE — 94640 AIRWAY INHALATION TREATMENT: CPT | Mod: 76

## 2019-02-13 PROCEDURE — 25000125 ZZHC RX 250: Performed by: HOSPITALIST

## 2019-02-13 PROCEDURE — 99232 SBSQ HOSP IP/OBS MODERATE 35: CPT | Performed by: HOSPITALIST

## 2019-02-13 PROCEDURE — 80053 COMPREHEN METABOLIC PANEL: CPT | Performed by: HOSPITALIST

## 2019-02-13 PROCEDURE — 00000146 ZZHCL STATISTIC GLUCOSE BY METER IP

## 2019-02-13 PROCEDURE — 40000275 ZZH STATISTIC RCP TIME EA 10 MIN

## 2019-02-13 PROCEDURE — A9270 NON-COVERED ITEM OR SERVICE: HCPCS | Mod: GY | Performed by: HOSPITALIST

## 2019-02-13 PROCEDURE — 25800030 ZZH RX IP 258 OP 636: Performed by: HOSPITALIST

## 2019-02-13 PROCEDURE — 25000132 ZZH RX MED GY IP 250 OP 250 PS 637: Mod: GY | Performed by: HOSPITALIST

## 2019-02-13 PROCEDURE — 85025 COMPLETE CBC W/AUTO DIFF WBC: CPT | Performed by: HOSPITALIST

## 2019-02-13 RX ORDER — LANOLIN ALCOHOL/MO/W.PET/CERES
6 CREAM (GRAM) TOPICAL
Status: DISCONTINUED | OUTPATIENT
Start: 2019-02-13 | End: 2019-02-19 | Stop reason: HOSPADM

## 2019-02-13 RX ADMIN — ISOSORBIDE MONONITRATE 60 MG: 30 TABLET, EXTENDED RELEASE ORAL at 09:34

## 2019-02-13 RX ADMIN — GUAIFENESIN 10 ML: 200 SOLUTION ORAL at 20:39

## 2019-02-13 RX ADMIN — LEVOTHYROXINE SODIUM 25 MCG: 25 TABLET ORAL at 09:33

## 2019-02-13 RX ADMIN — ENOXAPARIN SODIUM 40 MG: 40 INJECTION SUBCUTANEOUS at 15:23

## 2019-02-13 RX ADMIN — VITAMIN D, TAB 1000IU (100/BT) 4000 UNITS: 25 TAB at 09:34

## 2019-02-13 RX ADMIN — RANOLAZINE 500 MG: 500 TABLET, FILM COATED, EXTENDED RELEASE ORAL at 20:33

## 2019-02-13 RX ADMIN — RANOLAZINE 500 MG: 500 TABLET, FILM COATED, EXTENDED RELEASE ORAL at 09:34

## 2019-02-13 RX ADMIN — ATORVASTATIN CALCIUM 40 MG: 40 TABLET, FILM COATED ORAL at 09:33

## 2019-02-13 RX ADMIN — LATANOPROST 1 DROP: 50 SOLUTION OPHTHALMIC at 20:34

## 2019-02-13 RX ADMIN — SODIUM CHLORIDE 500 MG: 9 INJECTION, SOLUTION INTRAVENOUS at 14:11

## 2019-02-13 RX ADMIN — CEFTRIAXONE SODIUM 1 G: 1 INJECTION, POWDER, FOR SOLUTION INTRAMUSCULAR; INTRAVENOUS at 13:27

## 2019-02-13 RX ADMIN — PREGABALIN 150 MG: 150 CAPSULE ORAL at 20:33

## 2019-02-13 RX ADMIN — BISACODYL 5 MG: 5 TABLET, COATED ORAL at 09:34

## 2019-02-13 RX ADMIN — Medication 10 MG: at 09:33

## 2019-02-13 RX ADMIN — METOPROLOL TARTRATE 100 MG: 50 TABLET ORAL at 20:33

## 2019-02-13 RX ADMIN — METOPROLOL TARTRATE 50 MG: 50 TABLET ORAL at 09:35

## 2019-02-13 RX ADMIN — INSULIN ASPART 1 UNITS: 100 INJECTION, SOLUTION INTRAVENOUS; SUBCUTANEOUS at 09:24

## 2019-02-13 RX ADMIN — IPRATROPIUM BROMIDE AND ALBUTEROL SULFATE 3 ML: 2.5; .5 SOLUTION RESPIRATORY (INHALATION) at 19:11

## 2019-02-13 RX ADMIN — IPRATROPIUM BROMIDE AND ALBUTEROL SULFATE 3 ML: 2.5; .5 SOLUTION RESPIRATORY (INHALATION) at 15:20

## 2019-02-13 RX ADMIN — IPRATROPIUM BROMIDE AND ALBUTEROL SULFATE 3 ML: 2.5; .5 SOLUTION RESPIRATORY (INHALATION) at 07:05

## 2019-02-13 RX ADMIN — PREGABALIN 150 MG: 150 CAPSULE ORAL at 09:35

## 2019-02-13 RX ADMIN — GUAIFENESIN 10 ML: 200 SOLUTION ORAL at 11:19

## 2019-02-13 RX ADMIN — INSULIN ASPART 2 UNITS: 100 INJECTION, SOLUTION INTRAVENOUS; SUBCUTANEOUS at 12:21

## 2019-02-13 RX ADMIN — GUAIFENESIN 10 ML: 200 SOLUTION ORAL at 01:19

## 2019-02-13 RX ADMIN — ASPIRIN 81 MG: 81 TABLET, COATED ORAL at 09:35

## 2019-02-13 RX ADMIN — GUAIFENESIN 10 ML: 200 SOLUTION ORAL at 16:08

## 2019-02-13 RX ADMIN — IPRATROPIUM BROMIDE AND ALBUTEROL SULFATE 3 ML: 2.5; .5 SOLUTION RESPIRATORY (INHALATION) at 11:26

## 2019-02-13 ASSESSMENT — ACTIVITIES OF DAILY LIVING (ADL)
ADLS_ACUITY_SCORE: 17

## 2019-02-13 NOTE — PLAN OF CARE
PT: Pt states she has been waiting for her MD to return and nursing in on the way to get her in the shower. Requests PT returns after her shower as she will feel better.

## 2019-02-13 NOTE — PROGRESS NOTES
[facility]    Medicine Progress Note - Hospitalist Service       Date of Admission:  2/10/2019  Assessment & Plan      Lora Vergara is a 89 year old female admitted on 2/10/2019.     Sepsis secondary to community-acquired pneumonia most likely viral  Patient with clear onset of fevers chills and productive cough starting Tuesday  She appears systemically ill, with elevation of fever to 101 and tachycardia 105,  Previous rounds of antibiotics will for chronic cough, not for fever  It appears this is an acute insult, but if her symptoms do not improve as expected, could consider more advanced imaging of her chest.  She has no smoking history  She was initiated on ceftriaxone and azithromycin in the emergency department  Pro calcitonin was negative\  CT scan with extensive pneumonia, no PE or mass  Plan:  - continue supplemental oxygen, IV ceftriaxone, IV azithromycin  -continue duonebs  - stay the course  - increase PM melatonin    Septic encephalopathy  Mild confusion this morning, but thankfully she has great insight into this  - delirium precautions    Diabetes mellitus type 2  A1c of 7.0 currently  -A1c, moderate sliding scale insulin, diabetic diet  -Hold home metformin     History of coronary artery disease  Microvascular angina  Her last angiogram was in 2010, sees Dr. CHAITANYA amador at [facility]  She has symptoms of chest and jaw pain, that improved with full long-acting anginal medications  -Continue Imdur, amlodipine, and as needed nitroglycerin  -Continue Ranexa   -restart home diuretics  - obtain  EKG    Peripheral neuropathy  - continue the lyrica    HLD  - continue the atorvastatin         Diet: Combination Diet Low Saturated Fat Na <2400mg Diet, No Caffeine Diet  Room Service    DVT Prophylaxis: Enoxaparin (Lovenox) SQ  Kumari Catheter: not present  Code Status: DNR/DNI      Disposition Plan   Expected discharge: 2 - 3 days, recommended to prior living arrangement once O2 use less than 2  liters/minute.  Entered: Lester Ron DO 02/13/2019, 3:03 PM       Care discussed with patient and her son.    Lester Ron DO  Hospitalist Service  [facility]    ______________________________________________________________________    Interval History   About the same, poor night without much sleep. Up coughing, concerned about not getting better    Data reviewed today: I reviewed all medications, new labs and imaging results over the last 24 hours. I personally reviewed no images or EKG's today.    Physical Exam   Vital Signs: Temp: 96  F (35.6  C) Temp src: Oral BP: 148/59   Heart Rate: 109 Resp: 18 SpO2: 95 % O2 Device: Nasal cannula Oxygen Delivery: 3 LPM  Weight: 200 lbs 0 oz  Constitutional: She is oriented to person, place, and time. She appears well-developed and well-nourished.   HENT:   Head: Normocephalic and atraumatic.   Eyes: Conjunctivae and EOM are normal. Pupils are equal, round, and reactive to light.   Neck: Neck supple. No thyromegaly present.   Cardiovascular: Normal rate, regular rhythm, normal heart sounds and intact distal pulses.   Pulmonary/Chest: She is in respiratory distress. She has rales. She exhibits no tenderness.   Abdominal: Soft. Bowel sounds are normal.   Musculoskeletal: Normal range of motion. She exhibits no edema or deformity.   Neurological: She is alert and oriented to person, place, and time. No cranial nerve deficit. Coordination normal.   Skin: Skin is warm and dry. Capillary refill takes less than 2 seconds.   Psychiatric: She has a normal mood and affect. Her behavior is normal. Judgment and thought content normal.           Data   Recent Labs   Lab 02/13/19  0720 02/12/19  0727 02/11/19  0740   WBC 6.7 5.7 4.2   HGB 12.5 13.1 12.8   MCV 88 88 88    184 185   * 131* 134   POTASSIUM 3.6 3.6 3.5   CHLORIDE 92* 95 99   CO2 29 28 28   BUN 9 8 7   CR 0.54 0.52 0.61   ANIONGAP 9 8 7   VIOLETA 8.6 8.5 8.2*   * 146* 124*    ALBUMIN 2.9* 2.9* 3.0*   PROTTOTAL 7.0 6.8 6.5*   BILITOTAL 1.0 0.7 0.5   ALKPHOS 44 40 41   ALT 20 15 16   AST 27 24 19     Recent Results (from the past 24 hour(s))   CT Chest Pulmonary Embolism w Contrast    Narrative    CT CHEST PULMONARY EMBOLISM WITH CONTRAST  2/12/2019 4:55 PM     HISTORY: Shortness of breath, cough thought secondary to pneumonia not  improving with antibiotics. Rule out pulmonary embolism or structural  abnormality.    COMPARISON: December 28, 2014    TECHNIQUE: Volumetric helical acquisition of CT images of the chest  from the lung apices to the kidneys were acquired after the  administration of 76 mL Isovue-370  IV contrast. Radiation dose for  this scan was reduced using automated exposure control, adjustment of  the mA and/or kV according to patient size, or iterative  reconstruction technique.    FINDINGS: There is no pulmonary embolism. Extensive patchy infiltrates  in the left upper and lower lobes. A few scattered right-sided patchy  infiltrates. Trace pleural fluid bilaterally. The heart is not  enlarged. There is mild adenopathy which is nonspecific, but may be  reactive in this setting. There are moderate coronary vascular  calcifications consistent with coronary artery disease. Fairly  extensive bronchial wall thickening and mucous plugging seen in the  areas of infiltrate as well. Thoracic aorta is atherosclerotic without  evidence of dissection or aneurysm. There is no pleural or pericardial  effusion.  There is no pneumothorax. Adrenal glands are normal.  Remainder of the visualized upper abdomen is unremarkable.      Impression    IMPRESSION:   1. No pulmonary embolism demonstrated.  2. No thoracic aortic dissection or aneurysm.   3. Extensive left-sided infiltrates. Moderate areas of bronchial wall  thickening and mucous plugging also present.    VITALIY BUENO MD     Medications       aspirin  81 mg Oral Daily     atorvastatin  40 mg Oral Daily     azithromycin  500 mg  Intravenous Q24H     bisacodyl  5 mg Oral Daily     cefTRIAXone  1 g Intravenous Q24H     enoxaparin  40 mg Subcutaneous Q24H     furosemide  10 mg Oral Daily     insulin aspart  1-7 Units Subcutaneous TID AC     insulin aspart  1-5 Units Subcutaneous At Bedtime     ipratropium - albuterol 0.5 mg/2.5 mg/3 mL  3 mL Nebulization 4x Daily     isosorbide mononitrate  60 mg Oral Daily     latanoprost  1 drop Both Eyes At Bedtime     levothyroxine  25 mcg Oral Daily     metoprolol tartrate  100 mg Oral At Bedtime     metoprolol tartrate  50 mg Oral QAM     pregabalin  150 mg Oral BID     ranolazine  500 mg Oral BID     vitamin D3  4,000 Units Oral Daily

## 2019-02-13 NOTE — PLAN OF CARE
AO, feels worse overnight. VSS on 4 L overnight, unable to wean. Desats to 80's with activity, recovers within 5 minutes. Coarse LS and exp wheezes, c/o SOB with activity. Frequent cough, given robitussin x 1. Prefers to be upright in chair. Voiding in BR. IV SL. SBA with walker, calls appropriately.

## 2019-02-14 ENCOUNTER — APPOINTMENT (OUTPATIENT)
Dept: PHYSICAL THERAPY | Facility: CLINIC | Age: 84
DRG: 871 | End: 2019-02-14
Payer: MEDICARE

## 2019-02-14 LAB
BACTERIA SPEC CULT: ABNORMAL
BACTERIA SPEC CULT: ABNORMAL
GLUCOSE BLDC GLUCOMTR-MCNC: 131 MG/DL (ref 70–99)
GLUCOSE BLDC GLUCOMTR-MCNC: 132 MG/DL (ref 70–99)
GLUCOSE BLDC GLUCOMTR-MCNC: 142 MG/DL (ref 70–99)
GLUCOSE BLDC GLUCOMTR-MCNC: 181 MG/DL (ref 70–99)
GLUCOSE BLDC GLUCOMTR-MCNC: 186 MG/DL (ref 70–99)
SPECIMEN SOURCE: ABNORMAL

## 2019-02-14 PROCEDURE — 94640 AIRWAY INHALATION TREATMENT: CPT | Mod: 76

## 2019-02-14 PROCEDURE — 00000146 ZZHCL STATISTIC GLUCOSE BY METER IP

## 2019-02-14 PROCEDURE — 99232 SBSQ HOSP IP/OBS MODERATE 35: CPT | Performed by: HOSPITALIST

## 2019-02-14 PROCEDURE — 25800030 ZZH RX IP 258 OP 636: Performed by: HOSPITALIST

## 2019-02-14 PROCEDURE — 25000125 ZZHC RX 250: Performed by: HOSPITALIST

## 2019-02-14 PROCEDURE — 12000000 ZZH R&B MED SURG/OB

## 2019-02-14 PROCEDURE — 25000128 H RX IP 250 OP 636: Performed by: HOSPITALIST

## 2019-02-14 PROCEDURE — 25000132 ZZH RX MED GY IP 250 OP 250 PS 637: Mod: GY | Performed by: HOSPITALIST

## 2019-02-14 PROCEDURE — A9270 NON-COVERED ITEM OR SERVICE: HCPCS | Mod: GY | Performed by: HOSPITALIST

## 2019-02-14 PROCEDURE — 97110 THERAPEUTIC EXERCISES: CPT | Mod: GP

## 2019-02-14 PROCEDURE — 40000275 ZZH STATISTIC RCP TIME EA 10 MIN

## 2019-02-14 PROCEDURE — 40000193 ZZH STATISTIC PT WARD VISIT

## 2019-02-14 RX ADMIN — GUAIFENESIN 10 ML: 200 SOLUTION ORAL at 15:50

## 2019-02-14 RX ADMIN — LEVOTHYROXINE SODIUM 25 MCG: 25 TABLET ORAL at 08:09

## 2019-02-14 RX ADMIN — Medication 10 MG: at 08:09

## 2019-02-14 RX ADMIN — PREGABALIN 150 MG: 150 CAPSULE ORAL at 08:09

## 2019-02-14 RX ADMIN — PREGABALIN 150 MG: 150 CAPSULE ORAL at 20:57

## 2019-02-14 RX ADMIN — METOPROLOL TARTRATE 100 MG: 50 TABLET ORAL at 20:55

## 2019-02-14 RX ADMIN — SODIUM CHLORIDE 500 MG: 9 INJECTION, SOLUTION INTRAVENOUS at 14:17

## 2019-02-14 RX ADMIN — VITAMIN D, TAB 1000IU (100/BT) 4000 UNITS: 25 TAB at 08:08

## 2019-02-14 RX ADMIN — RANOLAZINE 500 MG: 500 TABLET, FILM COATED, EXTENDED RELEASE ORAL at 08:08

## 2019-02-14 RX ADMIN — INSULIN ASPART 1 UNITS: 100 INJECTION, SOLUTION INTRAVENOUS; SUBCUTANEOUS at 07:30

## 2019-02-14 RX ADMIN — LATANOPROST 1 DROP: 50 SOLUTION OPHTHALMIC at 20:57

## 2019-02-14 RX ADMIN — IPRATROPIUM BROMIDE AND ALBUTEROL SULFATE 3 ML: 2.5; .5 SOLUTION RESPIRATORY (INHALATION) at 07:21

## 2019-02-14 RX ADMIN — ASPIRIN 81 MG: 81 TABLET, COATED ORAL at 08:09

## 2019-02-14 RX ADMIN — MELATONIN TAB 3 MG 6 MG: 3 TAB at 22:50

## 2019-02-14 RX ADMIN — IPRATROPIUM BROMIDE AND ALBUTEROL SULFATE 3 ML: 2.5; .5 SOLUTION RESPIRATORY (INHALATION) at 15:05

## 2019-02-14 RX ADMIN — RANOLAZINE 500 MG: 500 TABLET, FILM COATED, EXTENDED RELEASE ORAL at 20:58

## 2019-02-14 RX ADMIN — INSULIN ASPART 1 UNITS: 100 INJECTION, SOLUTION INTRAVENOUS; SUBCUTANEOUS at 11:08

## 2019-02-14 RX ADMIN — ENOXAPARIN SODIUM 40 MG: 40 INJECTION SUBCUTANEOUS at 14:17

## 2019-02-14 RX ADMIN — BISACODYL 5 MG: 5 TABLET, COATED ORAL at 08:09

## 2019-02-14 RX ADMIN — IPRATROPIUM BROMIDE AND ALBUTEROL SULFATE 3 ML: 2.5; .5 SOLUTION RESPIRATORY (INHALATION) at 19:33

## 2019-02-14 RX ADMIN — ATORVASTATIN CALCIUM 40 MG: 40 TABLET, FILM COATED ORAL at 08:08

## 2019-02-14 RX ADMIN — CEFTRIAXONE SODIUM 1 G: 1 INJECTION, POWDER, FOR SOLUTION INTRAMUSCULAR; INTRAVENOUS at 12:22

## 2019-02-14 RX ADMIN — ISOSORBIDE MONONITRATE 60 MG: 30 TABLET, EXTENDED RELEASE ORAL at 08:09

## 2019-02-14 RX ADMIN — METOPROLOL TARTRATE 50 MG: 50 TABLET ORAL at 08:09

## 2019-02-14 ASSESSMENT — ACTIVITIES OF DAILY LIVING (ADL)
ADLS_ACUITY_SCORE: 17

## 2019-02-14 NOTE — PLAN OF CARE
Pt A&O. VSS on 2L. IV SL + abx. Sputum culture +, MD aware no changes. Lungs w/ coarse ex wheezing. IS encourage. Cardiac diet, fair intake. Up SBA, in chair most of shift. Plan to discharge in 1-2 days. Will continue to monitor.

## 2019-02-14 NOTE — PROVIDER NOTIFICATION
MD Notification    Notified Person: MD    Notified Person Name: Asaf    Notification Date/Time: 2/14 1145    Notification Interaction: page    Purpose of Notification: Sputum showing candida albicans    Orders Received:    Comments:

## 2019-02-14 NOTE — PLAN OF CARE
Pt is A&Ox4. VSS on 3L oxymask overnight d/t being a mouth breather while sleeping, switched to NC this AM. Low sat fat/NA diet, with no caffeine. Up SBA w/ walker. PIV SL. LS coarse with expiratory wheezes- MCKEON. Pt having frequent productive cough, PRN robitussin given x1. Plan to discharge once O2 needs decrease. Slept between cares.

## 2019-02-14 NOTE — PROGRESS NOTES
"SP[facility]UAL [facility] Progress Note  [facility]    Visit with pt, per her lengthy hospital stay.  Pt said she's had good support, especially today when she was visited separately by her , a Muslim deacon, and a friend.  Pt said, \"I'm ready to go when God says it's time.  I'm not afraid.  I'm 89!\"  Pt is anticipating a TCU stay, and eventually possibly a move to a higher level of care.  Provided conversation and support.   team available if additional needs arise.                                                                                                                                                 Olivia Sandoval M.A.  Staff   Pager [phone]  Phone [phone]      "

## 2019-02-14 NOTE — PLAN OF CARE
Discharge Planner PT   Patient plan for discharge: none specifed  Current status: IND with bed mobility and transfers. O2 sats 95% on 3L pre-activity. Ambulates 125' x 2 mod-I with FWW. Pace decreases with fatigue, standing rest between bouts. O2 sats 93% post gait, but significant fatigue reported. Progressing well, pending length of stay and activity tolerance, pt on track to discharge home with  PT from mobility perspective.  Barriers to return to prior living situation: Lives alone, need for assistance with mobility, dec activity tolerance  Recommendations for discharge: Home with HH PT  Rationale for recommendations: Pt will benefit from skilled PT services to address the above impairments and assist with safe progression of mobility. Safely ambulating, limited by fatigue and respiratory status.         Entered by: Odell Munguia 02/14/2019 5:22 PM

## 2019-02-14 NOTE — PROGRESS NOTES
Patient is A&Ox4. No complaints of pain. VSS on 1.5L O2 via nasal cannula. Encouraging ambulation and incentive spirometer use. Desats to mid 80's with activity. Lung sounds coarse with expiratory wheezes. Frequent cough, Robitussin given x 2. Low saturated fat, low sodium diet. Denies nausea. Bowel sounds active, passing flatus. Voiding adequately in bathroom. PIV SL. Continues on IV antibiotics. Up with SBA with walker, calls appropriately. Plan: Discharge pending.

## 2019-02-15 LAB
GLUCOSE BLDC GLUCOMTR-MCNC: 139 MG/DL (ref 70–99)
GLUCOSE BLDC GLUCOMTR-MCNC: 145 MG/DL (ref 70–99)
GLUCOSE BLDC GLUCOMTR-MCNC: 155 MG/DL (ref 70–99)
GLUCOSE BLDC GLUCOMTR-MCNC: 159 MG/DL (ref 70–99)

## 2019-02-15 PROCEDURE — 94640 AIRWAY INHALATION TREATMENT: CPT | Mod: 76

## 2019-02-15 PROCEDURE — 25000125 ZZHC RX 250: Performed by: HOSPITALIST

## 2019-02-15 PROCEDURE — 12000000 ZZH R&B MED SURG/OB

## 2019-02-15 PROCEDURE — 99232 SBSQ HOSP IP/OBS MODERATE 35: CPT | Performed by: HOSPITALIST

## 2019-02-15 PROCEDURE — 25000132 ZZH RX MED GY IP 250 OP 250 PS 637: Mod: GY | Performed by: HOSPITALIST

## 2019-02-15 PROCEDURE — 25000128 H RX IP 250 OP 636: Performed by: HOSPITALIST

## 2019-02-15 PROCEDURE — 00000146 ZZHCL STATISTIC GLUCOSE BY METER IP

## 2019-02-15 PROCEDURE — 94640 AIRWAY INHALATION TREATMENT: CPT

## 2019-02-15 PROCEDURE — A9270 NON-COVERED ITEM OR SERVICE: HCPCS | Mod: GY | Performed by: HOSPITALIST

## 2019-02-15 PROCEDURE — 25800030 ZZH RX IP 258 OP 636: Performed by: HOSPITALIST

## 2019-02-15 PROCEDURE — 40000275 ZZH STATISTIC RCP TIME EA 10 MIN

## 2019-02-15 RX ORDER — FUROSEMIDE 20 MG
20 TABLET ORAL ONCE
Status: COMPLETED | OUTPATIENT
Start: 2019-02-15 | End: 2019-02-15

## 2019-02-15 RX ORDER — FUROSEMIDE 20 MG
20 TABLET ORAL DAILY
Status: DISCONTINUED | OUTPATIENT
Start: 2019-02-16 | End: 2019-02-19 | Stop reason: HOSPADM

## 2019-02-15 RX ORDER — PREDNISONE 20 MG/1
20 TABLET ORAL DAILY
Status: COMPLETED | OUTPATIENT
Start: 2019-02-15 | End: 2019-02-17

## 2019-02-15 RX ADMIN — LEVOTHYROXINE SODIUM 25 MCG: 25 TABLET ORAL at 08:27

## 2019-02-15 RX ADMIN — LATANOPROST 1 DROP: 50 SOLUTION OPHTHALMIC at 20:53

## 2019-02-15 RX ADMIN — ISOSORBIDE MONONITRATE 60 MG: 30 TABLET, EXTENDED RELEASE ORAL at 08:27

## 2019-02-15 RX ADMIN — RANOLAZINE 500 MG: 500 TABLET, FILM COATED, EXTENDED RELEASE ORAL at 08:28

## 2019-02-15 RX ADMIN — GUAIFENESIN 10 ML: 200 SOLUTION ORAL at 16:19

## 2019-02-15 RX ADMIN — INSULIN ASPART 1 UNITS: 100 INJECTION, SOLUTION INTRAVENOUS; SUBCUTANEOUS at 18:17

## 2019-02-15 RX ADMIN — ASPIRIN 81 MG: 81 TABLET, COATED ORAL at 08:28

## 2019-02-15 RX ADMIN — PREDNISONE 20 MG: 20 TABLET ORAL at 10:30

## 2019-02-15 RX ADMIN — INSULIN ASPART 1 UNITS: 100 INJECTION, SOLUTION INTRAVENOUS; SUBCUTANEOUS at 11:46

## 2019-02-15 RX ADMIN — CEFTRIAXONE SODIUM 1 G: 1 INJECTION, POWDER, FOR SOLUTION INTRAMUSCULAR; INTRAVENOUS at 13:03

## 2019-02-15 RX ADMIN — METOPROLOL TARTRATE 100 MG: 50 TABLET ORAL at 20:53

## 2019-02-15 RX ADMIN — BISACODYL 5 MG: 5 TABLET, COATED ORAL at 08:27

## 2019-02-15 RX ADMIN — PREGABALIN 150 MG: 150 CAPSULE ORAL at 08:27

## 2019-02-15 RX ADMIN — ATORVASTATIN CALCIUM 40 MG: 40 TABLET, FILM COATED ORAL at 08:27

## 2019-02-15 RX ADMIN — IPRATROPIUM BROMIDE AND ALBUTEROL SULFATE 3 ML: 2.5; .5 SOLUTION RESPIRATORY (INHALATION) at 07:20

## 2019-02-15 RX ADMIN — FUROSEMIDE 20 MG: 20 TABLET ORAL at 10:30

## 2019-02-15 RX ADMIN — Medication 10 MG: at 08:28

## 2019-02-15 RX ADMIN — GUAIFENESIN 10 ML: 200 SOLUTION ORAL at 20:53

## 2019-02-15 RX ADMIN — IPRATROPIUM BROMIDE AND ALBUTEROL SULFATE 3 ML: 2.5; .5 SOLUTION RESPIRATORY (INHALATION) at 10:58

## 2019-02-15 RX ADMIN — IPRATROPIUM BROMIDE AND ALBUTEROL SULFATE 3 ML: 2.5; .5 SOLUTION RESPIRATORY (INHALATION) at 15:33

## 2019-02-15 RX ADMIN — GUAIFENESIN 10 ML: 200 SOLUTION ORAL at 02:17

## 2019-02-15 RX ADMIN — PREGABALIN 150 MG: 150 CAPSULE ORAL at 20:53

## 2019-02-15 RX ADMIN — RANOLAZINE 500 MG: 500 TABLET, FILM COATED, EXTENDED RELEASE ORAL at 20:53

## 2019-02-15 RX ADMIN — GUAIFENESIN 10 ML: 200 SOLUTION ORAL at 08:34

## 2019-02-15 RX ADMIN — SODIUM CHLORIDE 500 MG: 9 INJECTION, SOLUTION INTRAVENOUS at 13:36

## 2019-02-15 RX ADMIN — ENOXAPARIN SODIUM 40 MG: 40 INJECTION SUBCUTANEOUS at 14:40

## 2019-02-15 RX ADMIN — METOPROLOL TARTRATE 50 MG: 50 TABLET ORAL at 08:27

## 2019-02-15 RX ADMIN — VITAMIN D, TAB 1000IU (100/BT) 4000 UNITS: 25 TAB at 08:27

## 2019-02-15 ASSESSMENT — ACTIVITIES OF DAILY LIVING (ADL)
ADLS_ACUITY_SCORE: 17

## 2019-02-15 NOTE — PLAN OF CARE
Pt is A&Ox4. VSS on 3L O2, desats with ambulation. Denied pain and nausea. Lung sounds coarse crackles. Frequent congested cough. PRN Robitussin given. Low sat fat, low sodium diet. Voids in bathroom, SBA+walker. PIV SL. Slept well after moving into the recliner.

## 2019-02-15 NOTE — PROGRESS NOTES
Patient is on 2L nasal cannula with SpO2 96%. BBS clear diminished and nebs is given as ordered. Will continue to monitor.    Marion Flanagan RT  2/14/2019

## 2019-02-15 NOTE — PLAN OF CARE
Pt with frequent congested cough. Robitussin not effective.  VSS. Melatonin given as insomnia an issue this even. Up to BR with SBA. Cont iv abx.

## 2019-02-15 NOTE — PLAN OF CARE
Pt A&O x4. VSS on 2/3L decrease as able. IV SL  abx. Up SBA, ambulated halls today. Denies pain. LS coarse w/ ex wheezing, IS encouraged. Added prednisone today and upped lasix dose. Plan to discharge in 1-2 days.

## 2019-02-15 NOTE — PROGRESS NOTES
[facility]    Medicine Progress Note - Hospitalist Service       Date of Admission:  2/10/2019  Assessment & Plan      Lora Vergara is a 89 year old female admitted on 2/10/2019.     Sepsis secondary to community-acquired pneumonia most likely viral  Patient with clear onset of fevers chills and productive cough starting Tuesday  She appears systemically ill, with elevation of fever to 101 and tachycardia 105,  Previous rounds of antibiotics will for chronic cough, not for fever  It appears this is an acute insult, but if her symptoms do not improve as expected, could consider more advanced imaging of her chest.  She has no smoking history  She was initiated on ceftriaxone and azithromycin in the emergency department  Pro calcitonin was negative\  CT scan with extensive pneumonia, no PE or mass  Sputum with nonpathologic candida  Plan:  - continue supplemental oxygen, IV ceftriaxone, finished 5 days of azithromycin  -continue duonebs  -short course of steroids given poor improvement  - PM melatonin  - increase lasix to 20 mg daily    Septic encephalopathy  Mild confusion this morning, but thankfully she has great insight into this  - delirium precautions  - melatonin as above    Diabetes mellitus type 2  A1c of 7.0 currently  -A1c, moderate sliding scale insulin, diabetic diet  -Hold home metformin     History of coronary artery disease  Microvascular angina  Her last angiogram was in 2010, sees Dr. CHAITANYA amador at [facility]  She has symptoms of chest and jaw pain, that improved with full long-acting anginal medications  -Continue Imdur, amlodipine, and as needed nitroglycerin  -Continue Ranexa   -restart home diuretics    Peripheral neuropathy  - continue the lyrica    HLD  - continue the atorvastatin         Diet: Room Service  Low Saturated Fat Na <2400 mg    DVT Prophylaxis: Enoxaparin (Lovenox) SQ  Kumari Catheter: not present  Code Status: DNR/DNI      Disposition Plan   Expected discharge: 2  - 3 days, recommended to prior living arrangement once O2 use less than 2 liters/minute.  Entered: Lester TIERRA DO Asaf 02/15/2019, 3:03 PM       Care discussed with patient    Lester MAYORGA DO Asaf  Hospitalist Service  [facility]    ______________________________________________________________________    Interval History   Feels about the same, no change in cough. Fever is still absent. Otherwise doing about the same. She is concerned about her lack of progress    Data reviewed today: I reviewed all medications, new labs and imaging results over the last 24 hours. I personally reviewed no images or EKG's today.    Physical Exam   Vital Signs: Temp: 97  F (36.1  C) Temp src: Oral BP: 175/88   Heart Rate: 94 Resp: 20 SpO2: 95 % O2 Device: Nasal cannula Oxygen Delivery: 2 LPM  Weight: 200 lbs 0 oz  Constitutional: She is oriented to person, place, and time. She appears well-developed and well-nourished.   HENT:   Head: Normocephalic and atraumatic.   Eyes: Conjunctivae and EOM are normal. Pupils are equal, round, and reactive to light.   Neck: Neck supple. No thyromegaly present.   Cardiovascular: Normal rate, regular rhythm, normal heart sounds and intact distal pulses.   Pulmonary/Chest: She is in respiratory distress. She has rales bilaterally. She exhibits no tenderness.   Abdominal: Soft. Bowel sounds are normal.   Musculoskeletal: Normal range of motion. She exhibits no edema or deformity.   Neurological: She is alert and oriented to person, place, and time. No cranial nerve deficit. Coordination normal.   Skin: Skin is warm and dry. Capillary refill takes less than 2 seconds.   Psychiatric: She has a normal mood and affect. Her behavior is normal. Judgment and thought content normal.           Data   Recent Labs   Lab 02/13/19  0720 02/12/19  0727 02/11/19  0740   WBC 6.7 5.7 4.2   HGB 12.5 13.1 12.8   MCV 88 88 88    184 185   * 131* 134   POTASSIUM 3.6 3.6 3.5   CHLORIDE  92* 95 99   CO2 29 28 28   BUN 9 8 7   CR 0.54 0.52 0.61   ANIONGAP 9 8 7   VIOLETA 8.6 8.5 8.2*   * 146* 124*   ALBUMIN 2.9* 2.9* 3.0*   PROTTOTAL 7.0 6.8 6.5*   BILITOTAL 1.0 0.7 0.5   ALKPHOS 44 40 41   ALT 20 15 16   AST 27 24 19     No results found for this or any previous visit (from the past 24 hour(s)).  Medications       aspirin  81 mg Oral Daily     atorvastatin  40 mg Oral Daily     bisacodyl  5 mg Oral Daily     cefTRIAXone  1 g Intravenous Q24H     enoxaparin  40 mg Subcutaneous Q24H     [START ON 2/16/2019] furosemide  20 mg Oral Daily     insulin aspart  1-7 Units Subcutaneous TID AC     insulin aspart  1-5 Units Subcutaneous At Bedtime     ipratropium - albuterol 0.5 mg/2.5 mg/3 mL  3 mL Nebulization 4x Daily     isosorbide mononitrate  60 mg Oral Daily     latanoprost  1 drop Both Eyes At Bedtime     levothyroxine  25 mcg Oral Daily     metoprolol tartrate  100 mg Oral At Bedtime     metoprolol tartrate  50 mg Oral QAM     predniSONE  20 mg Oral Daily     pregabalin  150 mg Oral BID     ranolazine  500 mg Oral BID     vitamin D3  4,000 Units Oral Daily

## 2019-02-16 ENCOUNTER — APPOINTMENT (OUTPATIENT)
Dept: OCCUPATIONAL THERAPY | Facility: CLINIC | Age: 84
DRG: 871 | End: 2019-02-16
Attending: INTERNAL MEDICINE
Payer: MEDICARE

## 2019-02-16 ENCOUNTER — APPOINTMENT (OUTPATIENT)
Dept: PHYSICAL THERAPY | Facility: CLINIC | Age: 84
DRG: 871 | End: 2019-02-16
Payer: MEDICARE

## 2019-02-16 ENCOUNTER — APPOINTMENT (OUTPATIENT)
Dept: GENERAL RADIOLOGY | Facility: CLINIC | Age: 84
DRG: 871 | End: 2019-02-16
Attending: INTERNAL MEDICINE
Payer: MEDICARE

## 2019-02-16 LAB
BACTERIA SPEC CULT: NO GROWTH
BACTERIA SPEC CULT: NO GROWTH
CREAT SERPL-MCNC: 0.63 MG/DL (ref 0.52–1.04)
GFR SERPL CREATININE-BSD FRML MDRD: 77 ML/MIN/{1.73_M2}
GLUCOSE BLDC GLUCOMTR-MCNC: 121 MG/DL (ref 70–99)
GLUCOSE BLDC GLUCOMTR-MCNC: 125 MG/DL (ref 70–99)
GLUCOSE BLDC GLUCOMTR-MCNC: 127 MG/DL (ref 70–99)
GLUCOSE BLDC GLUCOMTR-MCNC: 153 MG/DL (ref 70–99)
GLUCOSE BLDC GLUCOMTR-MCNC: 163 MG/DL (ref 70–99)
Lab: NORMAL
Lab: NORMAL
PLATELET # BLD AUTO: 289 10E9/L (ref 150–450)
SPECIMEN SOURCE: NORMAL
SPECIMEN SOURCE: NORMAL

## 2019-02-16 PROCEDURE — A9270 NON-COVERED ITEM OR SERVICE: HCPCS | Mod: GY | Performed by: HOSPITALIST

## 2019-02-16 PROCEDURE — 97535 SELF CARE MNGMENT TRAINING: CPT | Mod: GO | Performed by: OCCUPATIONAL THERAPIST

## 2019-02-16 PROCEDURE — 00000146 ZZHCL STATISTIC GLUCOSE BY METER IP

## 2019-02-16 PROCEDURE — 82565 ASSAY OF CREATININE: CPT | Performed by: HOSPITALIST

## 2019-02-16 PROCEDURE — 94640 AIRWAY INHALATION TREATMENT: CPT | Mod: 76

## 2019-02-16 PROCEDURE — 40000275 ZZH STATISTIC RCP TIME EA 10 MIN

## 2019-02-16 PROCEDURE — 71046 X-RAY EXAM CHEST 2 VIEWS: CPT

## 2019-02-16 PROCEDURE — 94640 AIRWAY INHALATION TREATMENT: CPT

## 2019-02-16 PROCEDURE — 25000128 H RX IP 250 OP 636: Performed by: INTERNAL MEDICINE

## 2019-02-16 PROCEDURE — 36415 COLL VENOUS BLD VENIPUNCTURE: CPT | Performed by: HOSPITALIST

## 2019-02-16 PROCEDURE — 25000128 H RX IP 250 OP 636: Performed by: HOSPITALIST

## 2019-02-16 PROCEDURE — 25000132 ZZH RX MED GY IP 250 OP 250 PS 637: Mod: GY | Performed by: HOSPITALIST

## 2019-02-16 PROCEDURE — 97110 THERAPEUTIC EXERCISES: CPT | Mod: GP

## 2019-02-16 PROCEDURE — 85049 AUTOMATED PLATELET COUNT: CPT | Performed by: HOSPITALIST

## 2019-02-16 PROCEDURE — 12000000 ZZH R&B MED SURG/OB

## 2019-02-16 PROCEDURE — 25000125 ZZHC RX 250: Performed by: HOSPITALIST

## 2019-02-16 PROCEDURE — 99232 SBSQ HOSP IP/OBS MODERATE 35: CPT | Performed by: INTERNAL MEDICINE

## 2019-02-16 PROCEDURE — 97165 OT EVAL LOW COMPLEX 30 MIN: CPT | Mod: GO | Performed by: OCCUPATIONAL THERAPIST

## 2019-02-16 PROCEDURE — 97530 THERAPEUTIC ACTIVITIES: CPT | Mod: GP

## 2019-02-16 RX ORDER — LEVOFLOXACIN 5 MG/ML
500 INJECTION, SOLUTION INTRAVENOUS EVERY 24 HOURS
Status: DISCONTINUED | OUTPATIENT
Start: 2019-02-16 | End: 2019-02-17

## 2019-02-16 RX ADMIN — PREGABALIN 150 MG: 150 CAPSULE ORAL at 08:48

## 2019-02-16 RX ADMIN — VITAMIN D, TAB 1000IU (100/BT) 4000 UNITS: 25 TAB at 08:48

## 2019-02-16 RX ADMIN — INSULIN ASPART 1 UNITS: 100 INJECTION, SOLUTION INTRAVENOUS; SUBCUTANEOUS at 18:03

## 2019-02-16 RX ADMIN — FUROSEMIDE 20 MG: 20 TABLET ORAL at 08:48

## 2019-02-16 RX ADMIN — IPRATROPIUM BROMIDE AND ALBUTEROL SULFATE 3 ML: 2.5; .5 SOLUTION RESPIRATORY (INHALATION) at 07:22

## 2019-02-16 RX ADMIN — RANOLAZINE 500 MG: 500 TABLET, FILM COATED, EXTENDED RELEASE ORAL at 08:48

## 2019-02-16 RX ADMIN — METOPROLOL TARTRATE 50 MG: 50 TABLET ORAL at 08:48

## 2019-02-16 RX ADMIN — LEVOTHYROXINE SODIUM 25 MCG: 25 TABLET ORAL at 08:48

## 2019-02-16 RX ADMIN — IPRATROPIUM BROMIDE AND ALBUTEROL SULFATE 3 ML: 2.5; .5 SOLUTION RESPIRATORY (INHALATION) at 11:04

## 2019-02-16 RX ADMIN — BISACODYL 5 MG: 5 TABLET, COATED ORAL at 08:48

## 2019-02-16 RX ADMIN — LEVOFLOXACIN 500 MG: 5 INJECTION, SOLUTION INTRAVENOUS at 12:54

## 2019-02-16 RX ADMIN — PREDNISONE 20 MG: 20 TABLET ORAL at 08:48

## 2019-02-16 RX ADMIN — ENOXAPARIN SODIUM 40 MG: 40 INJECTION SUBCUTANEOUS at 13:50

## 2019-02-16 RX ADMIN — ATORVASTATIN CALCIUM 40 MG: 40 TABLET, FILM COATED ORAL at 08:48

## 2019-02-16 RX ADMIN — LATANOPROST 1 DROP: 50 SOLUTION OPHTHALMIC at 21:32

## 2019-02-16 RX ADMIN — METOPROLOL TARTRATE 100 MG: 50 TABLET ORAL at 21:32

## 2019-02-16 RX ADMIN — ASPIRIN 81 MG: 81 TABLET, COATED ORAL at 08:48

## 2019-02-16 RX ADMIN — IPRATROPIUM BROMIDE AND ALBUTEROL SULFATE 3 ML: 2.5; .5 SOLUTION RESPIRATORY (INHALATION) at 15:07

## 2019-02-16 RX ADMIN — ISOSORBIDE MONONITRATE 60 MG: 30 TABLET, EXTENDED RELEASE ORAL at 08:48

## 2019-02-16 RX ADMIN — PREGABALIN 150 MG: 150 CAPSULE ORAL at 21:32

## 2019-02-16 RX ADMIN — RANOLAZINE 500 MG: 500 TABLET, FILM COATED, EXTENDED RELEASE ORAL at 21:32

## 2019-02-16 RX ADMIN — IPRATROPIUM BROMIDE AND ALBUTEROL SULFATE 3 ML: 2.5; .5 SOLUTION RESPIRATORY (INHALATION) at 19:13

## 2019-02-16 ASSESSMENT — ACTIVITIES OF DAILY LIVING (ADL)
ADLS_ACUITY_SCORE: 17
PREVIOUS_RESPONSIBILITIES: MEAL PREP;SHOPPING;MEDICATION MANAGEMENT
ADLS_ACUITY_SCORE: 17

## 2019-02-16 NOTE — PROGRESS NOTES
" 02/16/19 1100   Quick Adds   Type of Visit Initial Occupational Therapy Evaluation   Living Environment   Lives With alone   Living Arrangements apartment   Home Accessibility no concerns   Self-Care   Usual Activity Tolerance good   Current Activity Tolerance moderate   Regular Exercise No   Equipment Currently Used at Home walker, rolling   Activity/Exercise/Self-Care Comment Pt lives in senior building they have group outings for errands  also has friends who provide transportation and assist with groceries.    Functional Level   Ambulation 1-->assistive equipment   Transferring 1-->assistive equipment   Toileting 1-->assistive equipment   Bathing 1-->assistive equipment   Communication 0-->understands/communicates without difficulty   Swallowing 0-->swallows foods/liquids without difficulty   Cognition 0 - no cognition issues reported   Fall history within last six months no   Which of the above functional risks had a recent onset or change? ambulation;transferring   Prior Functional Level Comment MOD IND in ADLs, has tub/shower with grab bars has been feeling nervous about showers, has life alert, does dimple meal/frozen dinners, med management, had home care last year   General Information   Onset of Illness/Injury or Date of Surgery - Date 02/10/19   Referring Physician Marcus Ray MD   Patient/Family Goals Statement Pt feels she would like to go to rehab pt was \"looking forward to someone taking care of me for a bit\"   Additional Occupational Profile Info/Pertinent History of Current Problem 89-year-old female with history of diabetes mellitus type 2 hypothyroidism hypertension hyperlipidemia coronary artery disease mitral regurgitation venous insufficiency was admitted with complaint of cough fever shortness of breath and hypoxic and found to have left-sided pneumonia.   Precautions/Limitations fall precautions;oxygen therapy device and L/min   General Observations Pt is on 3L via NC   Cognitive " Status Examination   Orientation orientation to person, place and time   Visual Perception   Visual Perception Wears glasses   Sensory Examination   Sensory Comments Has numbness in B feet due to PN, pt reports is baseline   Pain Assessment   Patient Currently in Pain No   Range of Motion (ROM)   ROM Comment BUE WFL for ADLs   Strength   Strength Comments BUE WFL for ADLs, pt notes she feels weaker than normal   Coordination   Upper Extremity Coordination No deficits were identified   Mobility   Bed Mobility Comments SBA   Transfer Skill: Bed to Chair/Chair to Bed   Level of [place]: Bed to Chair stand-by assist   Physical Assist/Nonphysical Assist: Bed to Chair supervision;verbal cues   Weight-Bearing Restrictions full weight-bearing   Assistive Device - Transfer Skill Bed to Chair Chair to Bed Rehab Eval rolling walker   Transfer Skill: Sit to Stand   Level of [place]: Sit/Stand stand-by assist   Physical Assist/Nonphysical Assist: Sit/Stand supervision;verbal cues   Transfer Skill: Sit to Stand full weight-bearing   Assistive Device for Transfer: Sit/Stand rolling walker   Upper Body Dressing   Level of [place]: Dress Upper Body independent   Lower Body Dressing   Level of [place]: Dress Lower Body stand-by assist   Toileting   Level of [place]: Toilet stand-by assist   Grooming   Level of [place]: Grooming independent   Eating/Self Feeding   Level of [place]: Eating independent   Instrumental Activities of Daily Living (IADL)   Previous Responsibilities meal prep;shopping;medication management   Activities of Daily Living Analysis   Impairments Contributing to Impaired Activities of Daily Living strength decreased  (Reduced Activity tolerance)   General Therapy Interventions   Planned Therapy Interventions ADL retraining;strengthening;home program guidelines  (manage O2 lines; energy conservation)   Clinical Impression   Criteria for Skilled Therapeutic Interventions Met yes,  "treatment indicated   OT Diagnosis reduced IND in ADLs   Influenced by the following impairments reduced activity tolerance, weakness   Assessment of Occupational Performance 1-3 Performance Deficits   Identified Performance Deficits dressing, bathing, toileting   Clinical Decision Making (Complexity) Low complexity   Therapy Frequency daily   Predicted Duration of Therapy Intervention (days/wks) 3 days   Anticipated Equipment Needs at Discharge tub bench   Anticipated Discharge Disposition Home with Home Therapy   Risks and Benefits of Treatment have been explained. Yes   Patient, Family & other staff in agreement with plan of care Yes   [facility] TM \"6 Clicks\"   2016, Trustees of [facility], under license to [employer].  All rights reserved.   6 Clicks Short Forms Basic Mobility Inpatient Short Form;Daily Activity Inpatient Short Form   [facility]  \"6 Clicks\" Daily Activity Inpatient Short Form   1. Putting on and taking off regular lower body clothing? 3 - A Little   2. Bathing (including washing, rinsing, drying)? 3 - A Little   3. Toileting, which includes using toilet, bedpan or urinal? 3 - A Little   4. Putting on and taking off regular upper body clothing? 4 - None   5. Taking care of personal grooming such as brushing teeth? 4 - None   6. Eating meals? 4 - None   Daily Activity Raw Score (Score out of 24.Lower scores equate to lower levels of function) 21   Total Evaluation Time   Total Evaluation Time (Minutes) 10     "

## 2019-02-16 NOTE — PROGRESS NOTES
"  BRIEF NUTRITION ASSESSMENT      REASON FOR ASSESSMENT:  LOS    NUTRITION HISTORY:  Deferred    CURRENT DIET AND INTAKE:  Diet:  LSF, <2400 mg Na, 1800 mL fluid restriction.  Per flow sheets, pt has been eating mostly 75%.                ANTHROPOMETRICS:  Height: 5' 2\"  Weight: 90.7 kg  BMI: 36.58 kg/m2  IBW:  50 kg +/- 10%  Weight Status: Obesity Grade II BMI 35-39.9  Weight History:   Wt Readings from Last 10 Encounters:   02/10/19 90.7 kg (200 lb)   01/02/19 91.6 kg (202 lb)   12/24/18 90.7 kg (200 lb)   12/20/18 91.6 kg (202 lb)   10/26/18 93.6 kg (206 lb 4.8 oz)   07/25/18 91.6 kg (202 lb)   05/25/18 92.1 kg (203 lb)   04/26/18 92.9 kg (204 lb 11.2 oz)   04/10/18 94.3 kg (208 lb)   04/06/18 95.5 kg (210 lb 8 oz)     LABS:  Alb 3.4 on admit      NUTRITION INTERVENTION:  Nutrition Diagnosis:  No nutrition diagnosis at this time.    Implementation:  Nutrition Education:  Per Provider order if indicated    FOLLOW UP/MONITORING:   Will re-evaluate in 7 - 10 days, or sooner, if re-consulted.    eBena Olguin RD  Pager [phone] (M-F)            [phone] (W/E & Hol)            "

## 2019-02-16 NOTE — PLAN OF CARE
A&Ox4. VSS on 3L O2 via NC. Coarse lung sounds. Frequent harsh cough, PRN robitussin given x2, effective. Low saturated fat, low sodium diet. Denies nausea. BS active, passing flatus. Voiding adequately in bathroom. PIV SL, intermittent abx. Up with SBA and walker. Will continue to monitor.

## 2019-02-16 NOTE — PROGRESS NOTES
[facility]    Hospitalist Progress Note    Brief Summary:  This is a 89-year-old female with history of diabetes mellitus type 2 hypothyroidism hypertension hyperlipidemia coronary artery disease mitral regurgitation venous insufficiency was admitted with complaint of cough fever shortness of breath and hypoxic and found to have left-sided pneumonia.    Assessment & Plan        Sepsis secondary to community-acquired pneumonia   Patient with clear onset of fevers chills and productive cough starting Tuesday prior to admission  She appears systemically ill, with elevation of fever to 101 and tachycardia 105,  Previous rounds of antibiotics will for chronic cough, not for fever  It appears this is an acute insult  She has no smoking history  She was initiated on ceftriaxone and azithromycin in the emergency department  Pro calcitonin was negative  CT scan with extensive pneumonia, no PE or mass  Sputum with nonpathologic candida  Plan:  -She is currently on supplemental oxygen, IV ceftriaxone, finished 5 days of azithromycin.  Given history of prior antibiotic use within a month for cough, extensive pneumonia on the CT chest  I will switch her antibiotic to levofloxacin at this time.  -continue duonebs  -short course of steroids given poor improvement agree with that  - PM melatonin  -Continue Lasix to 20 mg daily    At this time we will start her on aggressive incentive spirometry flutter, ambulation  Try to wean her oxygen off.     Septic metabolic encephalopathy, now completely resolved  Not encephalopathic this morning not confused awake alert and oriented x3  - delirium precautions  - melatonin as above     Diabetes mellitus type 2  A1c of 7.0 currently  -A1c, moderate sliding scale insulin, diabetic diet  -Hold home metformin   Sugar is in good control, restart metformin on discharge.     History of coronary artery disease  Microvascular angina  Her last angiogram was in 2010, sees Dr. Luis at  [facility]  She has symptoms of chest and jaw pain, that improved with full long-acting anginal medications  -Continue Imdur, amlodipine, and as needed nitroglycerin  -Continue Ranexa   -restart home diuretics     Peripheral neuropathy  - continue the lyrica     HLD  - continue the atorvastatin     At this time overall the patient is improving but does continue to have cough, crackles on examination,  Some exertional shortness of breath.  I will go ahead and repeat the chest x-ray PA and lateral  Given significant pneumonia and recent use of antibiotics I will switch antibiotic to levofloxacin at this time.  Obtain sputum for Gram stain culture again.  Incentive spirometry and flutter  PT and OT   for disposition of the patient that she may need to stay at TCU       Diet: Room Service  Low Saturated Fat Na <2400 mg    DVT Prophylaxis: Enoxaparin (Lovenox) SQ  Kumari Catheter: not present  Code Status: DNR/DNI          Disposition Plan: 1-2 days, consult  for disposition, PT and OT order     Expected discharge: 2 - 3 days, recommended to prior living arrangement once O2 use less than 2 liters/minute.  Entered: Lester Ron,  02/15/2019, 3:03 PM          Code Status: DNR/DNI        Marcus Ray MD  Text Page  (7am - 6pm)    Interval History   Patient continue to have cough, some SOB on exertion but some what better as compare to before.  No fever, chills, nausea, vomiting, headache, dizziness or  Lightheadedness this morning.     -Data reviewed today: I reviewed all new labs and imaging results over the last 24 hours. I personally reviewed no images or EKG's today.    Physical Exam   Temp: 97.4  F (36.3  C) Temp src: Oral BP: 170/72 Pulse: 97 Heart Rate: 91 Resp: 18 SpO2: 91 % O2 Device: Nasal cannula Oxygen Delivery: 2 LPM  Vitals:    02/10/19 1053   Weight: 90.7 kg (200 lb)     Vital Signs with Ranges  Temp:  [97.3  F (36.3  C)-98.2  F (36.8  C)] 97.4  F (36.3  C)  Pulse:   [97] 97  Heart Rate:  [82-94] 91  Resp:  [18-20] 18  BP: (141-170)/(72-80) 170/72  SpO2:  [91 %-96 %] 91 %  I/O last 3 completed shifts:  In: 720 [P.O.:720]  Out: -     Constitutional: awake, alert, cooperative, no apparent distress, and appears stated age  Eyes: Lids and lashes normal, pupils equal, round and reactive to light, extra ocular muscles intact, sclera clear, conjunctiva normal  Respiratory: crackles right base and left base  Cardiovascular: Normal apical impulse, regular rate and rhythm, normal S1 and S2, no S3 or S4, and no murmur noted  GI: No scars, normal bowel sounds, soft, non-distended, non-tender, no masses palpated, no hepatosplenomegally  Skin: no bruising or bleeding  Neurologic: no focal deficit.     Medications       aspirin  81 mg Oral Daily     atorvastatin  40 mg Oral Daily     bisacodyl  5 mg Oral Daily     cefTRIAXone  1 g Intravenous Q24H     enoxaparin  40 mg Subcutaneous Q24H     furosemide  20 mg Oral Daily     insulin aspart  1-7 Units Subcutaneous TID AC     insulin aspart  1-5 Units Subcutaneous At Bedtime     ipratropium - albuterol 0.5 mg/2.5 mg/3 mL  3 mL Nebulization 4x Daily     isosorbide mononitrate  60 mg Oral Daily     latanoprost  1 drop Both Eyes At Bedtime     levothyroxine  25 mcg Oral Daily     metoprolol tartrate  100 mg Oral At Bedtime     metoprolol tartrate  50 mg Oral QAM     predniSONE  20 mg Oral Daily     pregabalin  150 mg Oral BID     ranolazine  500 mg Oral BID     vitamin D3  4,000 Units Oral Daily       Data   Recent Labs   Lab 02/16/19  0657 02/13/19  0720 02/12/19  0727 02/11/19  0740   WBC  --  6.7 5.7 4.2   HGB  --  12.5 13.1 12.8   MCV  --  88 88 88    189 184 185   NA  --  130* 131* 134   POTASSIUM  --  3.6 3.6 3.5   CHLORIDE  --  92* 95 99   CO2  --  29 28 28   BUN  --  9 8 7   CR 0.63 0.54 0.52 0.61   ANIONGAP  --  9 8 7   VIOLETA  --  8.6 8.5 8.2*   GLC  --  150* 146* 124*   ALBUMIN  --  2.9* 2.9* 3.0*   PROTTOTAL  --  7.0 6.8 6.5*    BILITOTAL  --  1.0 0.7 0.5   ALKPHOS  --  44 40 41   ALT  --  20 15 16   AST  --  27 24 19       No results found for this or any previous visit (from the past 24 hour(s)).

## 2019-02-16 NOTE — PLAN OF CARE
Discharge Planner PT   Patient plan for discharge: none specifed  Current status: Pt anxious about home discharge recommendation, states she cannot get out of bed without help. Performs supine>sit SBA, cues for log roll and to use rail as be has a rail on her bed at home. IND with transfers. Discussed home setup, pt will be able to use counters in bathroom for support and living areas open enough to navigate with walker. Ensured pt that the distance she is currently walking exceeds her needs to navigate home environment. Discussed hopeful weaning from O2 so home O2 is not needed.  O2 sats 94% on 2L pre-activity. Ambulates 125' x 2 mod-I with FWW on 2L O2 to improve activity tolerace. Decreased pace, standing rest between bouts. O2 sats 95% post gait, and less fatigue reported this date.  Barriers to return to prior living situation: Lives alone, need for assistance with mobility, dec activity tolerance  Recommendations for discharge: Home with HH PT  Rationale for recommendations: Pt will benefit from skilled PT services to address the above impairments and assist with safe progression of mobility. Safely ambulating, limited by fatigue and respiratory status.         Entered by: Odell Munguia 02/16/2019 4:00 PM

## 2019-02-16 NOTE — PLAN OF CARE
A&Ox4. VSS on 2L O2 NC exc slightly Hypertensive. Up SBA w/ Walker. IV SL. Voiding adequately. Denies SOB, pain, nausea. LS coarse with exp wheezes. Frequent, congested, nonproductive cough. Has baseline neuropathy in BLE. BS stable. Continue to monitor.

## 2019-02-16 NOTE — PLAN OF CARE
PT: Pt reports needs to rest right now and was up throughout the morning, just returned from walking. States she is eager to go home and requests PT returns later.

## 2019-02-16 NOTE — PLAN OF CARE
Pt A&O x4. VSS, some HTN. 2L NC, wean as able. Up SBA w/ walker. PO azithromycin and switched rocephin to levothyroxine. Chest xray showing no change. IV SL. Cardiac diet now fluid restriction 1800. Plan to discharge home per OT/PT. Will continue to monitor.

## 2019-02-16 NOTE — PLAN OF CARE
Discharge Planner OT   Patient plan for discharge: Pt states she prefers a discharge to a TCU  Current status: Pt is a 89 yo female admitted for hypoxia, found to have pneumonia. Pt lives alone in senior building, has life alert, group outings and grocery shopping, cleaning assistance 2x/mo, does simple meal prep and med management.     Pt completed bed mobility with SBA, transfers and ambulation with SBA with 2WW, assist needed to manage O2 lines. Pt donned/doffed socks with SBA with noted effort. Pt reports feeling nervous about returning home alone, particularly at night. Pt introduced to AE which may help reduce effort and make pt feel more secure, particularly tub transfer bench as pt states she is nervous when she steps over tub to shower.   Barriers to return to prior living situation: Alone, pt has not been on O2 previously at home  Recommendations for discharge: Home with home OT/home care  Rationale for recommendations: Appears pt is progressing per POC, pt most likely will be able to return home with safety checks from friends and family, use of AE and home care. If pt is unable to manage O2 lines safely and goes home with O2 may require rehab stay for additional training in ADLs.        Entered by: Nicole Martin 02/16/2019 11:40 AM

## 2019-02-17 ENCOUNTER — APPOINTMENT (OUTPATIENT)
Dept: PHYSICAL THERAPY | Facility: CLINIC | Age: 84
DRG: 871 | End: 2019-02-17
Payer: MEDICARE

## 2019-02-17 ENCOUNTER — APPOINTMENT (OUTPATIENT)
Dept: OCCUPATIONAL THERAPY | Facility: CLINIC | Age: 84
DRG: 871 | End: 2019-02-17
Attending: INTERNAL MEDICINE
Payer: MEDICARE

## 2019-02-17 LAB
ALBUMIN SERPL-MCNC: 2.9 G/DL (ref 3.4–5)
ANION GAP SERPL CALCULATED.3IONS-SCNC: 6 MMOL/L (ref 3–14)
BASOPHILS # BLD AUTO: 0 10E9/L (ref 0–0.2)
BASOPHILS NFR BLD AUTO: 0.2 %
BUN SERPL-MCNC: 10 MG/DL (ref 7–30)
CALCIUM SERPL-MCNC: 8.9 MG/DL (ref 8.5–10.1)
CHLORIDE SERPL-SCNC: 91 MMOL/L (ref 94–109)
CO2 SERPL-SCNC: 38 MMOL/L (ref 20–32)
CREAT SERPL-MCNC: 0.69 MG/DL (ref 0.52–1.04)
DIFFERENTIAL METHOD BLD: NORMAL
EOSINOPHIL # BLD AUTO: 0 10E9/L (ref 0–0.7)
EOSINOPHIL NFR BLD AUTO: 0.8 %
ERYTHROCYTE [DISTWIDTH] IN BLOOD BY AUTOMATED COUNT: 12.6 % (ref 10–15)
GFR SERPL CREATININE-BSD FRML MDRD: 75 ML/MIN/{1.73_M2}
GLUCOSE BLDC GLUCOMTR-MCNC: 112 MG/DL (ref 70–99)
GLUCOSE BLDC GLUCOMTR-MCNC: 129 MG/DL (ref 70–99)
GLUCOSE BLDC GLUCOMTR-MCNC: 140 MG/DL (ref 70–99)
GLUCOSE BLDC GLUCOMTR-MCNC: 209 MG/DL (ref 70–99)
GLUCOSE BLDC GLUCOMTR-MCNC: 211 MG/DL (ref 70–99)
GLUCOSE SERPL-MCNC: 117 MG/DL (ref 70–99)
HCT VFR BLD AUTO: 36.2 % (ref 35–47)
HGB BLD-MCNC: 12.3 G/DL (ref 11.7–15.7)
IMM GRANULOCYTES # BLD: 0.1 10E9/L (ref 0–0.4)
IMM GRANULOCYTES NFR BLD: 1.4 %
LYMPHOCYTES # BLD AUTO: 1.5 10E9/L (ref 0.8–5.3)
LYMPHOCYTES NFR BLD AUTO: 30.2 %
MCH RBC QN AUTO: 30 PG (ref 26.5–33)
MCHC RBC AUTO-ENTMCNC: 34 G/DL (ref 31.5–36.5)
MCV RBC AUTO: 88 FL (ref 78–100)
MONOCYTES # BLD AUTO: 0.4 10E9/L (ref 0–1.3)
MONOCYTES NFR BLD AUTO: 7.1 %
NEUTROPHILS # BLD AUTO: 3 10E9/L (ref 1.6–8.3)
NEUTROPHILS NFR BLD AUTO: 60.3 %
NRBC # BLD AUTO: 0 10*3/UL
NRBC BLD AUTO-RTO: 0 /100
PHOSPHATE SERPL-MCNC: 4 MG/DL (ref 2.5–4.5)
PLATELET # BLD AUTO: 292 10E9/L (ref 150–450)
POTASSIUM SERPL-SCNC: 3.9 MMOL/L (ref 3.4–5.3)
RBC # BLD AUTO: 4.1 10E12/L (ref 3.8–5.2)
SODIUM SERPL-SCNC: 135 MMOL/L (ref 133–144)
WBC # BLD AUTO: 5 10E9/L (ref 4–11)

## 2019-02-17 PROCEDURE — 94640 AIRWAY INHALATION TREATMENT: CPT | Mod: 76

## 2019-02-17 PROCEDURE — 36415 COLL VENOUS BLD VENIPUNCTURE: CPT | Performed by: INTERNAL MEDICINE

## 2019-02-17 PROCEDURE — 25000125 ZZHC RX 250: Performed by: HOSPITALIST

## 2019-02-17 PROCEDURE — 25000132 ZZH RX MED GY IP 250 OP 250 PS 637: Mod: GY | Performed by: HOSPITALIST

## 2019-02-17 PROCEDURE — 25000128 H RX IP 250 OP 636: Performed by: INTERNAL MEDICINE

## 2019-02-17 PROCEDURE — 94640 AIRWAY INHALATION TREATMENT: CPT

## 2019-02-17 PROCEDURE — 40000275 ZZH STATISTIC RCP TIME EA 10 MIN

## 2019-02-17 PROCEDURE — 00000146 ZZHCL STATISTIC GLUCOSE BY METER IP

## 2019-02-17 PROCEDURE — 99232 SBSQ HOSP IP/OBS MODERATE 35: CPT | Performed by: INTERNAL MEDICINE

## 2019-02-17 PROCEDURE — 12000000 ZZH R&B MED SURG/OB

## 2019-02-17 PROCEDURE — 25000128 H RX IP 250 OP 636: Performed by: HOSPITALIST

## 2019-02-17 PROCEDURE — A9270 NON-COVERED ITEM OR SERVICE: HCPCS | Mod: GY | Performed by: HOSPITALIST

## 2019-02-17 PROCEDURE — 97535 SELF CARE MNGMENT TRAINING: CPT | Mod: GO | Performed by: OCCUPATIONAL THERAPY ASSISTANT

## 2019-02-17 PROCEDURE — 85025 COMPLETE CBC W/AUTO DIFF WBC: CPT | Performed by: INTERNAL MEDICINE

## 2019-02-17 PROCEDURE — 80069 RENAL FUNCTION PANEL: CPT | Performed by: INTERNAL MEDICINE

## 2019-02-17 PROCEDURE — 97116 GAIT TRAINING THERAPY: CPT | Mod: GP

## 2019-02-17 PROCEDURE — 97530 THERAPEUTIC ACTIVITIES: CPT | Mod: GO | Performed by: OCCUPATIONAL THERAPY ASSISTANT

## 2019-02-17 PROCEDURE — 97530 THERAPEUTIC ACTIVITIES: CPT | Mod: GP

## 2019-02-17 RX ORDER — LEVOFLOXACIN 5 MG/ML
750 INJECTION, SOLUTION INTRAVENOUS EVERY 24 HOURS
Status: DISCONTINUED | OUTPATIENT
Start: 2019-02-17 | End: 2019-02-19 | Stop reason: HOSPADM

## 2019-02-17 RX ADMIN — PREDNISONE 20 MG: 20 TABLET ORAL at 08:27

## 2019-02-17 RX ADMIN — ATORVASTATIN CALCIUM 40 MG: 40 TABLET, FILM COATED ORAL at 08:34

## 2019-02-17 RX ADMIN — INSULIN ASPART 2 UNITS: 100 INJECTION, SOLUTION INTRAVENOUS; SUBCUTANEOUS at 12:32

## 2019-02-17 RX ADMIN — RANOLAZINE 500 MG: 500 TABLET, FILM COATED, EXTENDED RELEASE ORAL at 08:35

## 2019-02-17 RX ADMIN — LATANOPROST 1 DROP: 50 SOLUTION OPHTHALMIC at 20:20

## 2019-02-17 RX ADMIN — IPRATROPIUM BROMIDE AND ALBUTEROL SULFATE 3 ML: 2.5; .5 SOLUTION RESPIRATORY (INHALATION) at 11:38

## 2019-02-17 RX ADMIN — ASPIRIN 81 MG: 81 TABLET, COATED ORAL at 08:27

## 2019-02-17 RX ADMIN — PREGABALIN 150 MG: 150 CAPSULE ORAL at 08:27

## 2019-02-17 RX ADMIN — FUROSEMIDE 20 MG: 20 TABLET ORAL at 08:34

## 2019-02-17 RX ADMIN — METOPROLOL TARTRATE 50 MG: 50 TABLET ORAL at 08:27

## 2019-02-17 RX ADMIN — ENOXAPARIN SODIUM 40 MG: 40 INJECTION SUBCUTANEOUS at 16:23

## 2019-02-17 RX ADMIN — METOPROLOL TARTRATE 100 MG: 50 TABLET ORAL at 20:19

## 2019-02-17 RX ADMIN — RANOLAZINE 500 MG: 500 TABLET, FILM COATED, EXTENDED RELEASE ORAL at 20:19

## 2019-02-17 RX ADMIN — PREGABALIN 150 MG: 150 CAPSULE ORAL at 20:20

## 2019-02-17 RX ADMIN — ISOSORBIDE MONONITRATE 60 MG: 30 TABLET, EXTENDED RELEASE ORAL at 08:27

## 2019-02-17 RX ADMIN — INSULIN ASPART 1 UNITS: 100 INJECTION, SOLUTION INTRAVENOUS; SUBCUTANEOUS at 17:25

## 2019-02-17 RX ADMIN — LEVOTHYROXINE SODIUM 25 MCG: 25 TABLET ORAL at 08:27

## 2019-02-17 RX ADMIN — IPRATROPIUM BROMIDE AND ALBUTEROL SULFATE 3 ML: 2.5; .5 SOLUTION RESPIRATORY (INHALATION) at 07:44

## 2019-02-17 RX ADMIN — IPRATROPIUM BROMIDE AND ALBUTEROL SULFATE 3 ML: 2.5; .5 SOLUTION RESPIRATORY (INHALATION) at 19:03

## 2019-02-17 RX ADMIN — IPRATROPIUM BROMIDE AND ALBUTEROL SULFATE 3 ML: 2.5; .5 SOLUTION RESPIRATORY (INHALATION) at 15:07

## 2019-02-17 RX ADMIN — VITAMIN D, TAB 1000IU (100/BT) 4000 UNITS: 25 TAB at 08:27

## 2019-02-17 RX ADMIN — LEVOFLOXACIN 750 MG: 5 INJECTION, SOLUTION INTRAVENOUS at 11:53

## 2019-02-17 ASSESSMENT — ACTIVITIES OF DAILY LIVING (ADL)
ADLS_ACUITY_SCORE: 17

## 2019-02-17 NOTE — PLAN OF CARE
Discharge Planner PT   Patient plan for discharge: Home with HH PT vs TCU  Current status: Patient sitting in chair upon arrival of therapist, agreeable to working with PT, notes she feels somewhat clammy. Patient on 1L O2 at rest. Patient ambulated 150 feet with FWW and Mod I and intermittent SBA 2/2 to fatigue. SpO2 noted as ~87% on 1L with ambulation, unable to recover to greater than 90% with standing rest break and PLB, increased to 2L O2 for remainder of gait with SpO2 noted as 89-93%. Overall steady gait with use of FWW, needing a few short standing rest breaks. Patient in supine at end of session with all needs in reach and bed alarm on, back on 1L O2 upon therapist exit. Discussed with RN as patient stating she feels clammy.   Barriers to return to prior living situation: Decreased tolerance to activity   Recommendations for discharge: Home with HH PT  Rationale for recommendations: Patient overall mobilizing well with use of FWW. Patient most limited at this time by increased O2 needs. Anticipate patient will progress to level of independence for safe discharge home with HH PT to further improve strength and tolerance to activity.        Entered by: Tuyet Cameron 02/17/2019 4:10 PM

## 2019-02-17 NOTE — PLAN OF CARE
A/ox4. VSS except elevated BP (142/67) on 2L O2 NC. Denied pain. LS with crackles and expiratory wheezing. MCKEON. Frequent, congested cough, patient declined PRN Robitussin. Cardiac diet. Blood sugars 163 and 153. Up indpedently in room, walks hallway with SBA due to oxygen tank. Started on 1,800 fluid restriction today, consumed 180cc this shift. Plan to continue with IV abx and discharge to home pending respiratory improvement. Nursing to continue to monitor.

## 2019-02-17 NOTE — CONSULTS
"Care Transition Initial Assessment - RN        Met with: Patient.    DATA   Active Problems:    Community acquired pneumonia       Cognitive Status: awake, alert and oriented.     Contact information and PCP information verified: Yes, Dr. Schmidt  Lives With: alone   Living Arrangements: apartment    Insurance concerns: No Insurance issues identified    ASSESSMENT  Patient currently receives the following services:    + has a cleaning person  + has groceries delivered, can also ride the [employer] van but hasn't due to weather lately  + privately hired a  ([employer]) to get her to/from hair and MD appointments  + son lives in [place] so he cannot do frequent checks or assist at home  + pt utilizes a walker regularly at home         Identified issues/concerns regarding health management:   + \"I have been battling this illness for a month, and I don't know if I will be strong enough to go home.\"  + If she IS strong enough, she would utilize [facility]  + If not, she would like TCU referrals sent to [facility] ([facility] #1 preference) and [facility]      PLAN  Financial costs for the patient include: copays, private  for rides.  + Discussed [employer] as she mentions the private  is getting a little expensive.  Pt states she does not yet want to sign up for this service, \"I hear they are late.\" I encouraged pt to consider it if cost of private rides becomes prohibitive, or if she needs wheelchair transport in the future.  Patient given options and choices for discharge Yes.  Patient/family is agreeable to the plan?  Yes: pending final recommendations   Patient anticipates discharging to: currently she thinks she is more TCU appropriate, but we also made a plan in case she improves enough to go home.     Patient anticipates needs for home equipment: Yes, Oxygen  Plan/Disposition: TCU (vs home with [facility] if she improves)  + Referrals sent to [facility] "     Appointments:     + to be scheduled when discharge timing better known.    Care  (CTS) will continue to follow as needed.    Marion Dixon RN, BSN  [facility] Care Coordinator   Mobile Phone: [phone]

## 2019-02-17 NOTE — PLAN OF CARE
A&Ox4, pleasant. No c/o pain. VSS, continues on 2 liters O2 via NC. LS coarse crackles with expiratory wheezing. MCKEON. Frequent, congested, nonproductive cough. Tolerating 1800 FR. Voiding adequately in bathroom. PIV SL, intermittent abx. BG check @ 0200= 129. SBA and walker to bathroom. Continue to monitor.

## 2019-02-17 NOTE — PROGRESS NOTES
[facility]    Hospitalist Progress Note    Brief Summary:  This is a 89-year-old female with history of diabetes mellitus type 2 hypothyroidism hypertension hyperlipidemia coronary artery disease mitral regurgitation venous insufficiency was admitted with complaint of cough fever shortness of breath and hypoxic and found to have left-sided pneumonia.    Assessment & Plan        Sepsis secondary to community-acquired pneumonia   Patient with clear onset of fevers chills and productive cough starting Tuesday prior to admission  She appears systemically ill, with elevation of fever to 101 and tachycardia 105,  Previous rounds of antibiotics was for chronic cough, not for fever  It appears this is an acute insult  She has no smoking history  She was initiated on ceftriaxone and azithromycin in the emergency department  Pro calcitonin was negative  CT scan with extensive pneumonia, no PE or mass  Sputum with nonpathologic candida  Plan:  -She was currently on supplemental oxygen, IV ceftriaxone, finished 5 days of azithromycin.  Given history of prior antibiotic use within a month for cough, extensive pneumonia on the CT chest  I did switch her antibiotic to levofloxacin at this time.  -continue duonebs  -short course of steroids given poor improvement agree with that  - PM melatonin  -Continue Lasix to 20 mg daily  - try to wean her off the oxygen today     continue on aggressive incentive spirometry flutter, ambulation  Try to wean her oxygen off.     Septic metabolic encephalopathy, now completely resolved  Not encephalopathic this morning not confused, awake alert and oriented x3  - delirium precautions  - melatonin as above     Diabetes mellitus type 2  A1c of 7.0 currently  -A1c, moderate sliding scale insulin, diabetic diet  -Hold home metformin   Sugar is in good control, restart metformin on discharge.     History of coronary artery disease  Microvascular angina  Her last angiogram was in 2010,  sees Dr. Luis at [facility]  She has symptoms of chest and jaw pain, that improved with full long-acting anginal medications  -Continue Imdur, amlodipine, and as needed nitroglycerin  -Continue Ranexa   -restart home diuretics     Peripheral neuropathy  - continue the lyrica     HLD  - continue the atorvastatin     Overall improving.  Given significant pneumonia and recent use of antibiotics I will switch antibiotic to levofloxacin at this time.  Obtain sputum for Gram stain culture again, unable to provide sample   Incentive spirometry and flutter  PT and OT   for disposition of the patient that she may need to stay at TCU       Diet: Room Service  Low Saturated Fat Na <2400 mg    DVT Prophylaxis: Enoxaparin (Lovenox) SQ  Kumari Catheter: not present  Code Status: DNR/DNI          Disposition Plan: 1-2 days, consult  for disposition, PT and OT order      Marcus Rya MD  Text Page  (7am - 6pm)    Interval History   Thinks that she is getting better, her SOB is improve, denies any dyspnea this morning. Have some cough but improving as well, denies any chest pain, nausea, vomiting, abdominal pain, headache or dizziness.     No other significant event overnight.     -Data reviewed today: I reviewed all new labs and imaging results over the last 24 hours. I personally reviewed no images or EKG's today.    Physical Exam   Temp: 96.3  F (35.7  C) Temp src: Oral BP: 116/76 Pulse: 98 Heart Rate: 91 Resp: 17 SpO2: 95 % O2 Device: Nasal cannula Oxygen Delivery: 2 LPM  Vitals:    02/10/19 1053   Weight: 90.7 kg (200 lb)     Vital Signs with Ranges  Temp:  [96.3  F (35.7  C)-98.2  F (36.8  C)] 96.3  F (35.7  C)  Pulse:  [98] 98  Heart Rate:  [74-95] 91  Resp:  [16-18] 17  BP: (116-142)/(60-76) 116/76  SpO2:  [92 %-95 %] 95 %  I/O last 3 completed shifts:  In: 520 [P.O.:520]  Out: -     Constitutional: awake, alert, cooperative, no apparent distress, and appears stated age  Eyes: Lids and lashes  normal, pupils equal, round and reactive to light, extra ocular muscles intact, sclera clear, conjunctiva normal  Respiratory: crackles right base and left base improving.   Cardiovascular: Normal apical impulse, regular rate and rhythm, normal S1 and S2, no S3 or S4, and no murmur noted  GI: No scars, normal bowel sounds, soft, non-distended, non-tender, no masses palpated, no hepatosplenomegally  Skin: no bruising or bleeding  Neurologic: no focal deficit.     Medications       aspirin  81 mg Oral Daily     atorvastatin  40 mg Oral Daily     bisacodyl  5 mg Oral Daily     enoxaparin  40 mg Subcutaneous Q24H     furosemide  20 mg Oral Daily     insulin aspart  1-7 Units Subcutaneous TID AC     insulin aspart  1-5 Units Subcutaneous At Bedtime     ipratropium - albuterol 0.5 mg/2.5 mg/3 mL  3 mL Nebulization 4x Daily     isosorbide mononitrate  60 mg Oral Daily     latanoprost  1 drop Both Eyes At Bedtime     levofloxacin  750 mg Intravenous Q24H     levothyroxine  25 mcg Oral Daily     metoprolol tartrate  100 mg Oral At Bedtime     metoprolol tartrate  50 mg Oral QAM     pregabalin  150 mg Oral BID     ranolazine  500 mg Oral BID     vitamin D3  4,000 Units Oral Daily       Data   Recent Labs   Lab 02/17/19  0702 02/16/19  0657 02/13/19  0720 02/12/19  0727   WBC 5.0  --  6.7 5.7   HGB 12.3  --  12.5 13.1   MCV 88  --  88 88    289 189 184     --  130* 131*   POTASSIUM 3.9  --  3.6 3.6   CHLORIDE 91*  --  92* 95   CO2 38*  --  29 28   BUN 10  --  9 8   CR 0.69 0.63 0.54 0.52   ANIONGAP 6  --  9 8   VIOLETA 8.9  --  8.6 8.5   *  --  150* 146*   ALBUMIN 2.9*  --  2.9* 2.9*   PROTTOTAL  --   --  7.0 6.8   BILITOTAL  --   --  1.0 0.7   ALKPHOS  --   --  44 40   ALT  --   --  20 15   AST  --   --  27 24       Recent Results (from the past 24 hour(s))   XR Chest 2 Views    Narrative    XR CHEST 2 VW   2/16/2019 11:58 AM     HISTORY: follow up PNA    COMPARISON: CT dated 2/12/2018. Plain film dated  2/10/2019.    FINDINGS: The heart is negative.  Patchy infiltrate is still present  at the left lung base and there is a small associated left pleural  effusion. This has not changed significantly when compared to  2/10/2018.. The pulmonary vasculature is normal.  The bones and soft  tissues are unremarkable.      Impression    IMPRESSION: Persistent left basilar infiltrate and small associated  left pleural effusion. Findings would be compatible with pneumonia.         MEERA GOTTLIEB MD

## 2019-02-17 NOTE — PROGRESS NOTES
Patient is on 1L nasal cannula with Spo2 in the lower 90's.  Breath sounds are coarse with some expiratory wheezes.  Scheduled neb given as ordered.  RT will continue to follow.    Cindy Parker.

## 2019-02-17 NOTE — PLAN OF CARE
AOx4, up with SBA + walker. Ambulated x2. Weaned to 1L NC, VSS. Denies pain. IS encouraged. LS coarse with crackles/expiratory wheezes. MCKEON. BS active (+) flatus, voiding well in BR. PIV SL'd, intermittent abx. PIV SL'd, intermittent abx. Levaquin increased today. Frequent, congested cough- small amount of clear sputum. Low  sodium diet, fluid restriction 1800. Discharge pending TCU placement.

## 2019-02-17 NOTE — PLAN OF CARE
Discharge Planner OT   Patient plan for discharge: Pt states she prefers a discharge to a TCU  Current status:  pt SBA sit to stands, SBA with FWW to/from bathroom, toilet transfer with RTS SBA, SBA standing at sink ADLS, SBA to amb into hallway down to shower room for shower with nursing assist.   Barriers to return to prior living situation: Alone, pt has not been on O2 previously at home  Recommendations for discharge: Home with home OT/home care per plan established by the Occupational Therapist  Rationale for recommendations: pt most likely will be able to return home with safety checks from friends and family, use of AE and home care. If pt is unable to manage O2 lines safely and goes home with O2 may require rehab stay for additional training in ADLs.          Entered by: Fabiola Blancas 02/17/2019 9:01 AM

## 2019-02-18 ENCOUNTER — APPOINTMENT (OUTPATIENT)
Dept: PHYSICAL THERAPY | Facility: CLINIC | Age: 84
DRG: 871 | End: 2019-02-18
Payer: MEDICARE

## 2019-02-18 ENCOUNTER — APPOINTMENT (OUTPATIENT)
Dept: OCCUPATIONAL THERAPY | Facility: CLINIC | Age: 84
DRG: 871 | End: 2019-02-18
Payer: MEDICARE

## 2019-02-18 LAB
GLUCOSE BLDC GLUCOMTR-MCNC: 105 MG/DL (ref 70–99)
GLUCOSE BLDC GLUCOMTR-MCNC: 117 MG/DL (ref 70–99)
GLUCOSE BLDC GLUCOMTR-MCNC: 127 MG/DL (ref 70–99)
GLUCOSE BLDC GLUCOMTR-MCNC: 142 MG/DL (ref 70–99)
GLUCOSE BLDC GLUCOMTR-MCNC: 230 MG/DL (ref 70–99)

## 2019-02-18 PROCEDURE — 25000125 ZZHC RX 250: Performed by: HOSPITALIST

## 2019-02-18 PROCEDURE — 25000128 H RX IP 250 OP 636: Performed by: HOSPITALIST

## 2019-02-18 PROCEDURE — 25000128 H RX IP 250 OP 636: Performed by: INTERNAL MEDICINE

## 2019-02-18 PROCEDURE — 99232 SBSQ HOSP IP/OBS MODERATE 35: CPT | Performed by: INTERNAL MEDICINE

## 2019-02-18 PROCEDURE — 97116 GAIT TRAINING THERAPY: CPT | Mod: GP

## 2019-02-18 PROCEDURE — 40000275 ZZH STATISTIC RCP TIME EA 10 MIN

## 2019-02-18 PROCEDURE — 97530 THERAPEUTIC ACTIVITIES: CPT | Mod: GO | Performed by: OCCUPATIONAL THERAPY ASSISTANT

## 2019-02-18 PROCEDURE — 94640 AIRWAY INHALATION TREATMENT: CPT

## 2019-02-18 PROCEDURE — 00000146 ZZHCL STATISTIC GLUCOSE BY METER IP

## 2019-02-18 PROCEDURE — 94640 AIRWAY INHALATION TREATMENT: CPT | Mod: 76

## 2019-02-18 PROCEDURE — A9270 NON-COVERED ITEM OR SERVICE: HCPCS | Mod: GY | Performed by: HOSPITALIST

## 2019-02-18 PROCEDURE — 97530 THERAPEUTIC ACTIVITIES: CPT | Mod: GP

## 2019-02-18 PROCEDURE — 12000000 ZZH R&B MED SURG/OB

## 2019-02-18 PROCEDURE — 25000132 ZZH RX MED GY IP 250 OP 250 PS 637: Mod: GY | Performed by: HOSPITALIST

## 2019-02-18 RX ADMIN — IPRATROPIUM BROMIDE AND ALBUTEROL SULFATE 3 ML: 2.5; .5 SOLUTION RESPIRATORY (INHALATION) at 07:43

## 2019-02-18 RX ADMIN — METOPROLOL TARTRATE 50 MG: 50 TABLET ORAL at 08:03

## 2019-02-18 RX ADMIN — VITAMIN D, TAB 1000IU (100/BT) 4000 UNITS: 25 TAB at 08:03

## 2019-02-18 RX ADMIN — ENOXAPARIN SODIUM 40 MG: 40 INJECTION SUBCUTANEOUS at 16:47

## 2019-02-18 RX ADMIN — PREGABALIN 150 MG: 150 CAPSULE ORAL at 20:05

## 2019-02-18 RX ADMIN — ISOSORBIDE MONONITRATE 60 MG: 30 TABLET, EXTENDED RELEASE ORAL at 08:02

## 2019-02-18 RX ADMIN — IPRATROPIUM BROMIDE AND ALBUTEROL SULFATE 3 ML: 2.5; .5 SOLUTION RESPIRATORY (INHALATION) at 15:40

## 2019-02-18 RX ADMIN — LEVOFLOXACIN 750 MG: 5 INJECTION, SOLUTION INTRAVENOUS at 11:59

## 2019-02-18 RX ADMIN — BISACODYL 5 MG: 5 TABLET, COATED ORAL at 08:02

## 2019-02-18 RX ADMIN — INSULIN ASPART 1 UNITS: 100 INJECTION, SOLUTION INTRAVENOUS; SUBCUTANEOUS at 11:56

## 2019-02-18 RX ADMIN — LATANOPROST 1 DROP: 50 SOLUTION OPHTHALMIC at 20:10

## 2019-02-18 RX ADMIN — FUROSEMIDE 20 MG: 20 TABLET ORAL at 08:03

## 2019-02-18 RX ADMIN — RANOLAZINE 500 MG: 500 TABLET, FILM COATED, EXTENDED RELEASE ORAL at 20:05

## 2019-02-18 RX ADMIN — IPRATROPIUM BROMIDE AND ALBUTEROL SULFATE 3 ML: 2.5; .5 SOLUTION RESPIRATORY (INHALATION) at 19:48

## 2019-02-18 RX ADMIN — RANOLAZINE 500 MG: 500 TABLET, FILM COATED, EXTENDED RELEASE ORAL at 08:03

## 2019-02-18 RX ADMIN — LEVOTHYROXINE SODIUM 25 MCG: 25 TABLET ORAL at 08:02

## 2019-02-18 RX ADMIN — IPRATROPIUM BROMIDE AND ALBUTEROL SULFATE 3 ML: 2.5; .5 SOLUTION RESPIRATORY (INHALATION) at 11:30

## 2019-02-18 RX ADMIN — PREGABALIN 150 MG: 150 CAPSULE ORAL at 08:03

## 2019-02-18 RX ADMIN — ATORVASTATIN CALCIUM 40 MG: 40 TABLET, FILM COATED ORAL at 08:03

## 2019-02-18 RX ADMIN — METOPROLOL TARTRATE 100 MG: 50 TABLET ORAL at 20:04

## 2019-02-18 RX ADMIN — ASPIRIN 81 MG: 81 TABLET, COATED ORAL at 08:02

## 2019-02-18 ASSESSMENT — ACTIVITIES OF DAILY LIVING (ADL)
ADLS_ACUITY_SCORE: 17

## 2019-02-18 NOTE — PROGRESS NOTES
[facility]    Hospitalist Progress Note    Brief Summary:  This is a 89-year-old female with history of diabetes mellitus type 2 hypothyroidism hypertension hyperlipidemia coronary artery disease mitral regurgitation venous insufficiency was admitted with complaint of cough fever shortness of breath and hypoxic and found to have left-sided pneumonia.    Assessment & Plan        Sepsis secondary to community-acquired pneumonia   Patient with clear onset of fevers chills and productive cough starting Tuesday prior to admission  She appears systemically ill, with elevation of fever to 101 and tachycardia 105,  Previous rounds of antibiotics was for chronic cough, not for fever  It appears this is an acute insult  She has no smoking history  She was initiated on ceftriaxone and azithromycin in the emergency department  Pro calcitonin was negative  CT scan with extensive pneumonia, no PE or mass  Sputum with nonpathologic candida  Plan:  -She was currently on supplemental oxygen, IV ceftriaxone, finished 5 days of azithromycin.  Given history of prior antibiotic use within a month for cough, extensive pneumonia on the CT chest  I did switch her antibiotic to levofloxacin at this time.  -continue duonebs  -short course of steroids given poor improvement agree with that  - PM melatonin  -Continue Lasix to 20 mg daily  - try to wean her off the oxygen today     continue on aggressive incentive spirometry flutter, ambulation  Try to wean her oxygen off.     Septic metabolic encephalopathy, now completely resolved  Not encephalopathic this morning not confused, awake alert and oriented x3  - delirium precautions  - melatonin as above     Diabetes mellitus type 2  A1c of 7.0 currently  -A1c, moderate sliding scale insulin, diabetic diet  -Hold home metformin   Sugar is in good control, restart metformin on discharge.     History of coronary artery disease  Microvascular angina  Her last angiogram was in 2010,  sees Dr. Luis at [facility]  She has symptoms of chest and jaw pain, that improved with full long-acting anginal medications  -Continue Imdur, amlodipine, and as needed nitroglycerin  -Continue Ranexa   -restart home diuretics     Peripheral neuropathy  - continue the lyrica     HLD  - continue the atorvastatin     Overall improving.  Given significant pneumonia and recent use of antibiotics I  switch antibiotic to levofloxacin at this time.  Obtain sputum for Gram stain culture again, unable to provide sample   Incentive spirometry and flutter  PT and OT  Discharge to nursing home tomorrow morning.       Diet: Room Service  Low Saturated Fat Na <2400 mg    DVT Prophylaxis: Enoxaparin (Lovenox) SQ  Kumari Catheter: not present  Code Status: DNR/DNI          Disposition Plan: 1-2 days, consult  for disposition, PT and OT order      Marcus Ray MD  Text Page  (7am - 6pm)    Interval History   Slept better last night, cough is improving shortness of breath is better, denies any fever chills headache dizziness lightheadedness    No other significant event overnight.     -Data reviewed today: I reviewed all new labs and imaging results over the last 24 hours. I personally reviewed no images or EKG's today.    Physical Exam   Temp: 98.6  F (37  C) Temp src: Oral BP: 125/59   Heart Rate: 84 Resp: 16 SpO2: 91 % O2 Device: Nasal cannula Oxygen Delivery: 1 LPM  Vitals:    02/10/19 1053   Weight: 90.7 kg (200 lb)     Vital Signs with Ranges  Temp:  [97  F (36.1  C)-98.6  F (37  C)] 98.6  F (37  C)  Heart Rate:  [78-97] 84  Resp:  [16-18] 16  BP: (100-151)/(59-70) 125/59  SpO2:  [90 %-93 %] 91 %  I/O last 3 completed shifts:  In: 920 [P.O.:920]  Out: -     Constitutional: awake, alert, cooperative, no apparent distress, and appears stated age  Eyes: Lids and lashes normal, pupils equal, round and reactive to light, extra ocular muscles intact, sclera clear, conjunctiva normal  Respiratory: crackles right  base and left base improving.   Cardiovascular: Normal apical impulse, regular rate and rhythm, normal S1 and S2, no S3 or S4, and no murmur noted  GI: No scars, normal bowel sounds, soft, non-distended, non-tender, no masses palpated, no hepatosplenomegally  Skin: no bruising or bleeding  Neurologic: no focal deficit.     Medications       aspirin  81 mg Oral Daily     atorvastatin  40 mg Oral Daily     bisacodyl  5 mg Oral Daily     enoxaparin  40 mg Subcutaneous Q24H     furosemide  20 mg Oral Daily     insulin aspart  1-7 Units Subcutaneous TID AC     insulin aspart  1-5 Units Subcutaneous At Bedtime     ipratropium - albuterol 0.5 mg/2.5 mg/3 mL  3 mL Nebulization 4x Daily     isosorbide mononitrate  60 mg Oral Daily     latanoprost  1 drop Both Eyes At Bedtime     levofloxacin  750 mg Intravenous Q24H     levothyroxine  25 mcg Oral Daily     metoprolol tartrate  100 mg Oral At Bedtime     metoprolol tartrate  50 mg Oral QAM     pregabalin  150 mg Oral BID     ranolazine  500 mg Oral BID     vitamin D3  4,000 Units Oral Daily       Data   Recent Labs   Lab 02/17/19  0702 02/16/19  0657 02/13/19  0720 02/12/19  0727   WBC 5.0  --  6.7 5.7   HGB 12.3  --  12.5 13.1   MCV 88  --  88 88    289 189 184     --  130* 131*   POTASSIUM 3.9  --  3.6 3.6   CHLORIDE 91*  --  92* 95   CO2 38*  --  29 28   BUN 10  --  9 8   CR 0.69 0.63 0.54 0.52   ANIONGAP 6  --  9 8   VIOLETA 8.9  --  8.6 8.5   *  --  150* 146*   ALBUMIN 2.9*  --  2.9* 2.9*   PROTTOTAL  --   --  7.0 6.8   BILITOTAL  --   --  1.0 0.7   ALKPHOS  --   --  44 40   ALT  --   --  20 15   AST  --   --  27 24       No results found for this or any previous visit (from the past 24 hour(s)).

## 2019-02-18 NOTE — PLAN OF CARE
Discharge Planner OT   Patient plan for discharge: Pt states she prefers a discharge to a TCU  Current status:  CGA sit to stand, amb with FWW CGA to/from tub room however not able to complete tub transfer due to room was busy, pt SBA for LE dressing however increase fatigue and SOB noted with LE dressing task. Pt on 2L with O2 94% post activity  Barriers to return to prior living situation: Alone, pt has not been on O2 previously at home  Recommendations for discharge: Home with home OT/home care per plan established by the Occupational Therapist however pt stated she will discharge to TCU as she does not have any assist at home and does not feel strong enough to be able to complete ADLS/IADLS independently at this time.   Rationale for recommendations:  Pt would benefit from rehab stay to maximize safe and independence with ADLS/IADLS as pt stated has no one to assist her if needed.          Entered by: Fabiola Blancas 02/18/2019 12:35 PM

## 2019-02-18 NOTE — PLAN OF CARE
A/ox4, forgetful. VSS on 1L O2 NC. Denied pain. IS and flutter valve encouraged, patient using frequently in room. LS coarse. MCKEON is improving per patient report. Frequent, congested cough - productive at times. Cardiac diet, good appetite. Blood sugar 140 and 211. 1,800 fluid restriction, patient had a total of 920cc today. Plan to continue with IV abx. Discharge pending to TCU vs home. Nursing to continue to monitor.

## 2019-02-18 NOTE — CONSULTS
Care Transition Initial Assessment - SW     Met with: Patient    Active Problems:    Community acquired pneumonia       DATA  Lives With: alone   Living Arrangements: apartment        Identified issues/concerns regarding health management: Per visit with RN Care Coordinator, Marion on 2/17, a TCU stay is preferred by patient and she would like [facility]. Spoke with Marion in Admissions at [facility] and they can accept patient today or Tuesday in a shared room. Updated patient who states her son will be able to provide transport around noon on Tuesday. Per MD, discharge Tuesday is anticipated.     ASSESSMENT  Cognitive Status: Appears oriented  Concerns to be addressed: SW is following to coordinate the discharge.   PLAN  Financial costs for the patient includes: Discussed possible portable oxygen charge and transport fees. Patient hopes to avoid both however.  Patient given options and choices for discharge: Yes  Patient/family is agreeable to the plan?  Yes  Patient Goals and Preferences: [facility]  Patient anticipates discharging to: [facility]  Addendum  PAS-RR  Per DHS regulation, SW completed and submitted PAS-RR to [place] [employer] Direct Connect via the Senior LinkAge Line.  PAS-RR confirmation # is : [id number]  Further questions may be directed to [facility] LinkAge Line at #[phone], option #4 for PAS-RR staff.

## 2019-02-18 NOTE — PLAN OF CARE
A&Ox4, forgetful @ times. VSS on 1 L NC via NC sating in low-mid 90's. Attempt to wean as able as O2 is new for patient. No c/o pain. LS: Coarse with exp wheezes. MCKEON at times. Frequent, congested, nonproductive cough. BG 1117 @ 0200. Continues on 1800 FR. Plan to continue with IV abx. Discharge pending to TCU vs home. Nursing to continue to monitor.

## 2019-02-19 ENCOUNTER — APPOINTMENT (OUTPATIENT)
Dept: OCCUPATIONAL THERAPY | Facility: CLINIC | Age: 84
DRG: 871 | End: 2019-02-19
Payer: MEDICARE

## 2019-02-19 ENCOUNTER — PATIENT OUTREACH (OUTPATIENT)
Dept: CARE COORDINATION | Facility: CLINIC | Age: 84
End: 2019-02-19

## 2019-02-19 VITALS
TEMPERATURE: 98 F | OXYGEN SATURATION: 94 % | DIASTOLIC BLOOD PRESSURE: 55 MMHG | HEART RATE: 99 BPM | HEIGHT: 62 IN | WEIGHT: 200 LBS | SYSTOLIC BLOOD PRESSURE: 115 MMHG | BODY MASS INDEX: 36.8 KG/M2 | RESPIRATION RATE: 16 BRPM

## 2019-02-19 VITALS
HEART RATE: 84 BPM | RESPIRATION RATE: 20 BRPM | DIASTOLIC BLOOD PRESSURE: 58 MMHG | TEMPERATURE: 97 F | OXYGEN SATURATION: 94 % | WEIGHT: 200.9 LBS | SYSTOLIC BLOOD PRESSURE: 105 MMHG | BODY MASS INDEX: 36.75 KG/M2

## 2019-02-19 LAB
CREAT SERPL-MCNC: 0.87 MG/DL (ref 0.52–1.04)
GFR SERPL CREATININE-BSD FRML MDRD: 57 ML/MIN/{1.73_M2}
GLUCOSE BLDC GLUCOMTR-MCNC: 106 MG/DL (ref 70–99)
GLUCOSE BLDC GLUCOMTR-MCNC: 109 MG/DL (ref 70–99)
GLUCOSE BLDC GLUCOMTR-MCNC: 123 MG/DL (ref 70–99)
PLATELET # BLD AUTO: 280 10E9/L (ref 150–450)

## 2019-02-19 PROCEDURE — A9270 NON-COVERED ITEM OR SERVICE: HCPCS | Mod: GY | Performed by: HOSPITALIST

## 2019-02-19 PROCEDURE — 97530 THERAPEUTIC ACTIVITIES: CPT | Mod: GO | Performed by: OCCUPATIONAL THERAPIST

## 2019-02-19 PROCEDURE — 85049 AUTOMATED PLATELET COUNT: CPT | Performed by: HOSPITALIST

## 2019-02-19 PROCEDURE — 94640 AIRWAY INHALATION TREATMENT: CPT | Mod: 76

## 2019-02-19 PROCEDURE — 99239 HOSP IP/OBS DSCHRG MGMT >30: CPT | Performed by: INTERNAL MEDICINE

## 2019-02-19 PROCEDURE — 82565 ASSAY OF CREATININE: CPT | Performed by: HOSPITALIST

## 2019-02-19 PROCEDURE — 36415 COLL VENOUS BLD VENIPUNCTURE: CPT | Performed by: HOSPITALIST

## 2019-02-19 PROCEDURE — 97535 SELF CARE MNGMENT TRAINING: CPT | Mod: GO | Performed by: OCCUPATIONAL THERAPIST

## 2019-02-19 PROCEDURE — 25000125 ZZHC RX 250: Performed by: HOSPITALIST

## 2019-02-19 PROCEDURE — 40000275 ZZH STATISTIC RCP TIME EA 10 MIN

## 2019-02-19 PROCEDURE — 00000146 ZZHCL STATISTIC GLUCOSE BY METER IP

## 2019-02-19 PROCEDURE — 25000132 ZZH RX MED GY IP 250 OP 250 PS 637: Mod: GY | Performed by: HOSPITALIST

## 2019-02-19 PROCEDURE — 94640 AIRWAY INHALATION TREATMENT: CPT

## 2019-02-19 RX ORDER — LEVOFLOXACIN 500 MG/1
500 TABLET, FILM COATED ORAL DAILY
Qty: 7 TABLET | Refills: 0 | Status: SHIPPED | OUTPATIENT
Start: 2019-02-19 | End: 2019-02-27

## 2019-02-19 RX ADMIN — IPRATROPIUM BROMIDE AND ALBUTEROL SULFATE 3 ML: 2.5; .5 SOLUTION RESPIRATORY (INHALATION) at 10:19

## 2019-02-19 RX ADMIN — ATORVASTATIN CALCIUM 40 MG: 40 TABLET, FILM COATED ORAL at 09:15

## 2019-02-19 RX ADMIN — METOPROLOL TARTRATE 50 MG: 50 TABLET ORAL at 09:15

## 2019-02-19 RX ADMIN — VITAMIN D, TAB 1000IU (100/BT) 4000 UNITS: 25 TAB at 09:14

## 2019-02-19 RX ADMIN — BISACODYL 5 MG: 5 TABLET, COATED ORAL at 09:15

## 2019-02-19 RX ADMIN — ASPIRIN 81 MG: 81 TABLET, COATED ORAL at 09:15

## 2019-02-19 RX ADMIN — IPRATROPIUM BROMIDE AND ALBUTEROL SULFATE 3 ML: 2.5; .5 SOLUTION RESPIRATORY (INHALATION) at 15:06

## 2019-02-19 RX ADMIN — ISOSORBIDE MONONITRATE 60 MG: 30 TABLET, EXTENDED RELEASE ORAL at 09:15

## 2019-02-19 RX ADMIN — LEVOTHYROXINE SODIUM 25 MCG: 25 TABLET ORAL at 09:15

## 2019-02-19 RX ADMIN — RANOLAZINE 500 MG: 500 TABLET, FILM COATED, EXTENDED RELEASE ORAL at 09:14

## 2019-02-19 RX ADMIN — PREGABALIN 150 MG: 150 CAPSULE ORAL at 09:15

## 2019-02-19 RX ADMIN — IPRATROPIUM BROMIDE AND ALBUTEROL SULFATE 3 ML: 2.5; .5 SOLUTION RESPIRATORY (INHALATION) at 06:50

## 2019-02-19 RX ADMIN — FUROSEMIDE 20 MG: 20 TABLET ORAL at 09:15

## 2019-02-19 ASSESSMENT — ACTIVITIES OF DAILY LIVING (ADL)
ADLS_ACUITY_SCORE: 19
ADLS_ACUITY_SCORE: 17

## 2019-02-19 NOTE — LETTER
[facility]   To:             Please give to facility    From:   MALINDA Goldsmith Care Coordinator [facility]     Patient Name:  Lora Vergara YOB: 1925   Admit date: 2/1919      *Information Needed:  Please contact me when the patient will discharge (or if they will move to long term care)- include the discharge date, disposition, and main diagnosis   - If the patient is discharged with home care services, please provide the name of the agency    Also- Please inform me if a care conference is being held.   Phone, Fax or Email with information       Thank You,   MALINDA Goldsmith  Clinic Care Coordinator  [facility]  Ph: [phone]  [email or web address]

## 2019-02-19 NOTE — PLAN OF CARE
Pt A&O x 4, forgetful. VSS on 1L, ex HBP,  unable to wean O2 off. LS coarse, MCKEON, scheduled nebs per RT. Up with SBA/GB/W. Denies pain. Voiding in bathroom without difficulty. Tolerating cardiac diet, carb counting. Fluid restriction 1800mL. BS active, no BM today. Plan to discharge to TCU tomorrow with son at noon. Will continue to monitor.

## 2019-02-19 NOTE — PROGRESS NOTES
Clinic Care Coordination Contact  Care Coordination Transition Communication       Clinical Data: Patient known to this writer was hospitalized at [facility] from 2/10/19 to 2/19/19 with diagnosis of community-acquired pneumonia and sepsis secondary to pnemonia.     Transition to Facility:              Facility Name: [facility]              Contact name and phone number/fax:      [phone] / [phone]    Plan: RN/SW Care Coordinator will await notification from facility staff informing RN/SW Care Coordinator of patient's discharge plans/needs. RN/SW Care Coordinator will review chart and outreach to facility staff every 4 weeks and as needed.     BERNA Goldsmith   Social Work Care Coordinator  [phone]

## 2019-02-19 NOTE — PROGRESS NOTES
Pt. On 2 L NC sat.low 90's. BBS diminished, neb Tx. Given as scheduled. Will continue to follow.                    Bg Singh

## 2019-02-19 NOTE — PLAN OF CARE
A&Ox4, forgetful. Denies pain. VSS on 2L. LS coarse, MCKEON at times. Frequent, congested/nonproductive cough. PIV SL. SBA w/ walker to [facility]EC[facility].

## 2019-02-19 NOTE — PROGRESS NOTES
MALINDA  I: SW spoke with patient to discuss d.c plan. Patient confirmed her son will transport her around 1500/1530. Patient is aware that O2 will be required for transport and is ok with the $75 portable tank fee. SW faxed orders/scripts to TCU. [place] is aware of transport time.     P: SW will continue to follow and assist as needed.    Fiordaliza Solis, BERNA   *[phone]

## 2019-02-19 NOTE — PROGRESS NOTES
Pt discharged to [facility] home via private transport, Son.  AVS reviewed and packet sent.  Oxygen delivered and sent with patient.  Pt with improved cough. Up in halls with walker and SBA.  VSS.

## 2019-02-19 NOTE — PLAN OF CARE
Discharge Planner PT   Patient plan for discharge: TCU  Current status: Pt is IND with bed mobility. SBA STS transfers. Pt requires assist to manage O2 tank. FOcus on progressing functional endurance and gait, pt ambulated 200' requiring one standing rest break. No overt LOB, slow mathew   Barriers to return to prior living situation: Lives alone, impaired functional endurance  Recommendations for discharge: Home with HHPT however pt states she does not feel that she has the strength or endurance to go home as pt lives alone, pt plans to discharge to TCU  Rationale for recommendations: Pt mobilizing well, limited by SOB and O2 needs. Continued PT recommended to improve strength, balance, gait to optimize functional mobility          Entered by: Margot Abdi 02/18/2019 6:16 PM

## 2019-02-19 NOTE — PLAN OF CARE
Discharge Planner OT   Patient plan for discharge: TCU  Current status: Pt completed toilet transfer with SBA with RTS and grab bar, completed clothing mgmt with SBA, pt able to stand at sink approx 10-13 mins with SBA for oral facial hygiene with some SOB cues for PLB and 2 L O2, O2 sats 94% upon return from . CGA/SBA for LE dress   Barriers to return to prior living situation: decreased activity tolerance, strength.   Recommendations for discharge: TCU  Rationale for recommendations: daily therapy to increase ADL and functional mobility I and safety to PLOF.        Entered by: Rasheeda Persaud 02/19/2019 8:16 AM      Occupational Therapy Discharge Summary    Reason for therapy discharge:    Discharged to transitional care facility.    Progress towards therapy goal(s). See goals on Care Plan in [facility] electronic health record for goal details.  Goals not met.  Barriers to achieving goals:   discharge from facility.    Therapy recommendation(s):    Continued therapy is recommended.  Rationale/Recommendations:  OT at TCU to increase ADL and functional mobility I and safety to PLOF. .

## 2019-02-19 NOTE — PLAN OF CARE
A&Ox4, forgetful. VSS on 1L. Denies pain.  LS coarse, MCKEON. Cardiac diet, with FR of 1800ml. Plan: discharge to TCU tomorrow with son at noon.

## 2019-02-19 NOTE — PLAN OF CARE
PT: Patient sitting up in chair awaiting discharge upon arrival of therapist.     Physical Therapy Discharge Summary    Reason for therapy discharge:    Discharged to transitional care facility.    Progress towards therapy goal(s). See goals on Care Plan in [facility] electronic health record for goal details.  Goals partially met.  Barriers to achieving goals:   discharge from facility.    Therapy recommendation(s):    Continued therapy is recommended.  Rationale/Recommendations:  Pt will benefit from continued skilled PT intervention in order to improve activity tolerance and independence with mobility.

## 2019-02-19 NOTE — DISCHARGE SUMMARY
[facility]    Discharge Summary  Hospitalist    Date of Admission:  2/10/2019  Date of Discharge:  2/19/2019  Discharging Provider: Marcus Ray MD  Date of Service (when I saw the patient): 02/19/19    Discharge Diagnoses   Community-acquired pneumonia.   Sepsis secondary to pneumonia    History of Present Illness   Lora Vergara is an 89 year old female who presented with shortness of breath    Hospital Course       This is a 89-year-old female with history of diabetes mellitus type 2 hypothyroidism hypertension hyperlipidemia coronary artery disease mitral regurgitation venous insufficiency was admitted with complaint of cough fever shortness of breath and hypoxic and found to have left-sided pneumonia.        Final discharge diagnoses and hospital course     Sepsis secondary to community-acquired pneumonia   Patient with clear onset of fevers chills and productive cough starting Tuesday prior to admission  She appears systemically ill, with elevation of fever to 101 and tachycardia 105,  Previous rounds of antibiotics was for chronic cough, not for fever  It appears this is an acute insult  She has no smoking history  She was initiated on ceftriaxone and azithromycin in the emergency department  Pro calcitonin was negative  CT scan with extensive pneumonia, no PE or mass  Sputum with nonpathologic candida  Plan:  -She was currently on supplemental oxygen, IV ceftriaxone, finished 5 days of azithromycin.  Given history of prior antibiotic use within a month for cough, extensive pneumonia on the CT chest  I did switch her antibiotic to levofloxacin at this time.  -continue duonebs, she responded very well with levofloxacin and symptoms are improved now.  -short course of steroids given poor improvement completed while in the hospital.  - PM melatonin  -Continue Lasix to 20 mg daily  -  At this time the patient is doing much better, denies any chest pain shortness of breath fever chills nausea  vomiting dizziness or lightheadedness.  He is still requiring 1-2/min of oxygen through nasal cannula.  We will continue with that.  He will be discharged on oral levofloxacin to complete the course.     Septic metabolic encephalopathy, now completely resolved  Not encephalopathic this morning not confused, awake alert and oriented x3  - delirium precautions  - melatonin as above     Diabetes mellitus type 2  A1c of 7.0 currently  -A1c, moderate sliding scale insulin, diabetic diet  -Hold home metformin   Sugar is in good control, restart metformin on discharge.     History of coronary artery disease  Microvascular angina  Her last angiogram was in 2010, sees Dr. Luis at [facility]  She has symptoms of chest and jaw pain, that improved with full long-acting anginal medications  -Continue Imdur, amlodipine, and as needed nitroglycerin  -Continue Ranexa   -restart home diuretics     Peripheral neuropathy  - continue the lyrica     HLD  - continue the atorvastatin     Follow with the PCP in 1 week  To taking levofloxacin to complete the course  Discharge to nursing home in a stable condition            Marcus Ray MD    Significant Results and Procedures       Pending Results   These results will be followed up by PCP  Unresulted Labs Ordered in the Past 30 Days of this Admission     No orders found from 12/12/2018 to 2/11/2019.          Code Status   DNR / DNI       Primary Care Physician   Antwon Schmidt    Physical Exam   Temp: 98  F (36.7  C) Temp src: Oral BP: 115/55 Pulse: 99 Heart Rate: 95 Resp: 16 SpO2: 92 % O2 Device: Nasal cannula Oxygen Delivery: 2 LPM  Vitals:    02/10/19 1053   Weight: 90.7 kg (200 lb)     Vital Signs with Ranges  Temp:  [96.8  F (36  C)-98  F (36.7  C)] 98  F (36.7  C)  Pulse:  [99] 99  Heart Rate:  [80-95] 95  Resp:  [16-18] 16  BP: (111-152)/(53-79) 115/55  SpO2:  [92 %-95 %] 92 %  I/O last 3 completed shifts:  In: 240 [P.O.:240]  Out: -     Constitutional: awake, alert,  cooperative, no apparent distress, and appears stated age  Eyes: Lids and lashes normal, pupils equal, round and reactive to light, extra ocular muscles intact, sclera clear, conjunctiva normal  Respiratory: Occasional crackles at the base otherwise clear lungs  Cardiovascular: Normal apical impulse, regular rate and rhythm, normal S1 and S2, no S3 or S4, and no murmur noted  GI: No scars, normal bowel sounds, soft, non-distended, non-tender, no masses palpated, no hepatosplenomegally  Skin: no bruising or bleeding  Neurologic: No focal deficit    Discharge Disposition   Discharged to nursing home  Condition at discharge: Stable    Consultations This Hospital Stay   PHYSICAL THERAPY ADULT IP CONSULT  PHYSICAL THERAPY ADULT IP CONSULT  OCCUPATIONAL THERAPY ADULT IP CONSULT  SOCIAL WORK IP CONSULT  CARE TRANSITION RN/SW IP CONSULT  PHYSICAL THERAPY ADULT IP CONSULT  OCCUPATIONAL THERAPY ADULT IP CONSULT    Time Spent on this Encounter   Marcus GRACE, personally saw the patient today and spent greater than 30 minutes discharging this patient.    Discharge Orders      General info for SNF    Length of Stay Estimate: Short Term Care: Estimated # of Days <30  Condition at Discharge: Improving  Level of care:skilled   Rehabilitation Potential: Good  Admission H&P remains valid and up-to-date: Yes  Recent Chemotherapy: N/A  Use Nursing Home Standing Orders: Yes     Mantoux instructions    Give two-step Mantoux (PPD) Per Facility Policy Yes     Reason for your hospital stay    Pneumonia     Glucose monitor nursing POCT    Before meals and at bedtime     Intake and output    Every shift     Daily weights    Call Provider for weight gain of more than 2 pounds per day or 5 pounds per week.     Activity - Up with nursing assistance     Follow Up and recommended labs and tests    Follow up with Nursing home physician.  No follow up labs or test are needed.     DNR/DNI     Physical Therapy Adult Consult    Evaluate and treat as  clinically indicated.    Reason:  Decondition     Occupational Therapy Adult Consult    Evaluate and treat as clinically indicated.    Reason:  Decondition     Oxygen - Nasal cannula    2 Lpm by nasal cannula to keep O2 sats 90% or greater.     Advance Diet as Tolerated    Follow this diet upon discharge: Orders Placed This Encounter      Room Service      Fluid restriction 1800 ML FLUID      Low Saturated Fat Na <2400 mg     Discharge Medications   Current Discharge Medication List      START taking these medications    Details   levofloxacin (LEVAQUIN) 500 MG tablet Take 1 tablet (500 mg) by mouth daily for 7 days  Qty: 7 tablet, Refills: 0    Associated Diagnoses: Community acquired pneumonia of left lung, unspecified part of lung         CONTINUE these medications which have NOT CHANGED    Details   acetaminophen (TYLENOL) 500 MG tablet Take 500-1,000 mg by mouth every 6 hours as needed      aspirin 81 MG EC tablet Take 81 mg by mouth daily      atorvastatin (LIPITOR) 40 MG tablet Take 1 tablet (40 mg) by mouth daily  Qty: 90 tablet, Refills: 3    Associated Diagnoses: Hyperlipidemia LDL goal <70      bisacodyl (DULCOLAX) 5 MG EC tablet Take 5 mg by mouth daily      furosemide (LASIX) 20 MG tablet Take 0.5 tablets (10 mg) by mouth daily  Qty: 45 tablet, Refills: 3    Associated Diagnoses: Bilateral leg edema      isosorbide mononitrate (IMDUR) 60 MG 24 hr tablet TAKE 1 TABLET (60 MG) BY MOUTH DAILY  Qty: 90 tablet, Refills: 2    Associated Diagnoses: Chronic ischemic heart disease      latanoprost (XALATAN) 0.005 % ophthalmic solution Place 1 drop into both eyes At Bedtime      levothyroxine (SYNTHROID/LEVOTHROID) 25 MCG tablet TAKE ONE TABLET BY MOUTH ONE TIME DAILY  Qty: 90 tablet, Refills: 2    Associated Diagnoses: Hypothyroidism      metFORMIN (GLUCOPHAGE-XR) 750 MG 24 hr tablet TAKE 1 TABLET (750 MG) BY MOUTH DAILY WITH DINNER  Qty: 90 tablet, Refills: 1    Associated Diagnoses: Type 2 diabetes mellitus  with diabetic neuropathy, without long-term current use of insulin (H)      metoprolol tartrate (LOPRESSOR) 100 MG tablet TAKE 1/2 TABLET BY MOUTH EVERY AM AND TAKE 1 TABLET IN THE EVENING.  Qty: 135 tablet, Refills: 2    Associated Diagnoses: Hypertension goal BP (blood pressure) < 140/90      nitroGLYcerin (NITROSTAT) 0.4 MG sublingual tablet Place 1 tablet (0.4 mg) under the tongue every 5 minutes as needed for chest pain  Qty: 50 tablet, Refills: 3    Associated Diagnoses: Stable angina pectoris (H)      pregabalin (LYRICA) 150 MG capsule TAKE 1 CAPSULE BY MOUTH TWICE A DAY  Qty: 180 capsule, Refills: 3    Associated Diagnoses: Neuropathy      ranolazine (RANEXA) 500 MG 12 hr tablet Take 1 tablet (500 mg) by mouth 2 times daily  Qty: 180 tablet, Refills: 2    Associated Diagnoses: Cardiac microvascular disease      vitamin D3 (CHOLECALCIFEROL) 2000 units tablet Take 4,000 Units by mouth daily      order for DME Equipment being ordered: wheeled walker with brakes and a seat  Qty: 1 Device, Refills: 0    Associated Diagnoses: Neuropathy; Poor balance           Allergies   Allergies   Allergen Reactions     Hydrochlorothiazide      Pancreatitis - patient denies this.  She says it gave her low sodium.     Data   Most Recent 3 CBC's:  Recent Labs   Lab Test 02/19/19  0645 02/17/19  0702 02/16/19  0657 02/13/19  0720 02/12/19  0727   WBC  --  5.0  --  6.7 5.7   HGB  --  12.3  --  12.5 13.1   MCV  --  88  --  88 88    292 289 189 184      Most Recent 3 BMP's:  Recent Labs   Lab Test 02/19/19  0645 02/17/19  0702 02/16/19  0657 02/13/19  0720 02/12/19  0727   NA  --  135  --  130* 131*   POTASSIUM  --  3.9  --  3.6 3.6   CHLORIDE  --  91*  --  92* 95   CO2  --  38*  --  29 28   BUN  --  10  --  9 8   CR 0.87 0.69 0.63 0.54 0.52   ANIONGAP  --  6  --  9 8   VIOLETA  --  8.9  --  8.6 8.5   GLC  --  117*  --  150* 146*     Most Recent 2 LFT's:  Recent Labs   Lab Test 02/13/19  0720 02/12/19  0727   AST 27 24   ALT 20 15    ALKPHOS 44 40   BILITOTAL 1.0 0.7     Most Recent INR's and Anticoagulation Dosing History:  Anticoagulation Dose History     Recent Dosing and Labs Latest Ref Rng & Units 3/23/2007 2/22/2008 12/17/2009    INR 0.86 - 1.14 1.00 0.97 1.02        Most Recent 3 Troponin's:  Recent Labs   Lab Test 12/24/18  1525 12/29/14  0410 12/29/14  0015   TROPI <0.015 <0.015  The 99th percentile for upper reference range is 0.045 ug/L.  Troponin values in   the range of 0.045 - 0.120 ug/L may be associated with risks of adverse   clinical events.   Effective 7/30/2014, the reference range for this assay has changed to reflect   new instrumentation/methodology.   <0.015  The 99th percentile for upper reference range is 0.045 ug/L.  Troponin values in   the range of 0.045 - 0.120 ug/L may be associated with risks of adverse   clinical events.   Effective 7/30/2014, the reference range for this assay has changed to reflect   new instrumentation/methodology.       Most Recent Cholesterol Panel:  Recent Labs   Lab Test 07/26/18  0914   CHOL 123   LDL 42   HDL 55   TRIG 130     Most Recent 6 Bacteria Isolates From Any Culture (See EPIC Reports for Culture Details):  Recent Labs   Lab Test 02/12/19  0327 02/10/19  1305 02/10/19  1102 12/20/16  1145   CULT Moderate growth  Normal fam    Moderate growth  Candida albicans / dubliniensis  Candida albicans and Candida dubliniensis are not routinely speciated  Susceptibility testing not routinely done  * No growth No growth 10,000 to 50,000 colonies/mL mixed urogenital fam     Most Recent TSH, T4 and A1c Labs:  Recent Labs   Lab Test 02/10/19  1102 07/26/18  0914   TSH  --  2.45   T4  --  1.12   A1C 7.0* 6.1*     Results for orders placed or performed during the hospital encounter of 02/10/19   XR Chest 2 Views    Narrative    CHEST TWO VIEWS 2/10/2019 12:24 PM     HISTORY: Cough. Fever.    COMPARISON: December 24, 2018     FINDINGS: Patchy left-sided infiltrates. Right lung clear. The  cardiac  silhouette is not enlarged. Pulmonary vasculature is unremarkable.      Impression    IMPRESSION: Patchy left-sided infiltrates.    VITALIY BUENO MD   CT Chest Pulmonary Embolism w Contrast    Narrative    CT CHEST PULMONARY EMBOLISM WITH CONTRAST  2/12/2019 4:55 PM     HISTORY: Shortness of breath, cough thought secondary to pneumonia not  improving with antibiotics. Rule out pulmonary embolism or structural  abnormality.    COMPARISON: December 28, 2014    TECHNIQUE: Volumetric helical acquisition of CT images of the chest  from the lung apices to the kidneys were acquired after the  administration of 76 mL Isovue-370  IV contrast. Radiation dose for  this scan was reduced using automated exposure control, adjustment of  the mA and/or kV according to patient size, or iterative  reconstruction technique.    FINDINGS: There is no pulmonary embolism. Extensive patchy infiltrates  in the left upper and lower lobes. A few scattered right-sided patchy  infiltrates. Trace pleural fluid bilaterally. The heart is not  enlarged. There is mild adenopathy which is nonspecific, but may be  reactive in this setting. There are moderate coronary vascular  calcifications consistent with coronary artery disease. Fairly  extensive bronchial wall thickening and mucous plugging seen in the  areas of infiltrate as well. Thoracic aorta is atherosclerotic without  evidence of dissection or aneurysm. There is no pleural or pericardial  effusion.  There is no pneumothorax. Adrenal glands are normal.  Remainder of the visualized upper abdomen is unremarkable.      Impression    IMPRESSION:   1. No pulmonary embolism demonstrated.  2. No thoracic aortic dissection or aneurysm.   3. Extensive left-sided infiltrates. Moderate areas of bronchial wall  thickening and mucous plugging also present.    VITALIY BUENO MD   XR Chest 2 Views    Narrative    XR CHEST 2 VW   2/16/2019 11:58 AM     HISTORY: follow up PNA    COMPARISON: CT dated  2/12/2018. Plain film dated 2/10/2019.    FINDINGS: The heart is negative.  Patchy infiltrate is still present  at the left lung base and there is a small associated left pleural  effusion. This has not changed significantly when compared to  2/10/2018.. The pulmonary vasculature is normal.  The bones and soft  tissues are unremarkable.      Impression    IMPRESSION: Persistent left basilar infiltrate and small associated  left pleural effusion. Findings would be compatible with pneumonia.         MEERA GOTTLIEB MD     Most Recent 3 CBC's:  Recent Labs   Lab Test 02/19/19 0645 02/17/19 0702 02/16/19 0657 02/13/19 0720 02/12/19 0727   WBC  --  5.0  --  6.7 5.7   HGB  --  12.3  --  12.5 13.1   MCV  --  88  --  88 88    292 289 189 184     Most Recent 3 BMP's:  Recent Labs   Lab Test 02/19/19 0645 02/17/19 0702 02/16/19 0657 02/13/19 0720 02/12/19 0727   NA  --  135  --  130* 131*   POTASSIUM  --  3.9  --  3.6 3.6   CHLORIDE  --  91*  --  92* 95   CO2  --  38*  --  29 28   BUN  --  10  --  9 8   CR 0.87 0.69 0.63 0.54 0.52   ANIONGAP  --  6  --  9 8   VIOLETA  --  8.9  --  8.6 8.5   GLC  --  117*  --  150* 146*

## 2019-02-20 ENCOUNTER — NURSING HOME VISIT (OUTPATIENT)
Dept: GERIATRICS | Facility: CLINIC | Age: 84
End: 2019-02-20
Payer: MEDICARE

## 2019-02-20 DIAGNOSIS — I10 HYPERTENSION GOAL BP (BLOOD PRESSURE) < 140/90: ICD-10-CM

## 2019-02-20 DIAGNOSIS — R53.81 PHYSICAL DECONDITIONING: ICD-10-CM

## 2019-02-20 DIAGNOSIS — E11.40 TYPE 2 DIABETES MELLITUS WITH DIABETIC NEUROPATHY, WITHOUT LONG-TERM CURRENT USE OF INSULIN (H): ICD-10-CM

## 2019-02-20 DIAGNOSIS — J96.01 ACUTE RESPIRATORY FAILURE WITH HYPOXIA (H): ICD-10-CM

## 2019-02-20 DIAGNOSIS — J18.9 COMMUNITY ACQUIRED PNEUMONIA, UNSPECIFIED LATERALITY: Primary | ICD-10-CM

## 2019-02-20 DIAGNOSIS — E66.01 MORBID OBESITY (H): ICD-10-CM

## 2019-02-20 DIAGNOSIS — I50.32 CHRONIC DIASTOLIC CONGESTIVE HEART FAILURE (H): ICD-10-CM

## 2019-02-20 DIAGNOSIS — I25.10 CORONARY ARTERY DISEASE INVOLVING NATIVE CORONARY ARTERY OF NATIVE HEART WITHOUT ANGINA PECTORIS: ICD-10-CM

## 2019-02-20 PROCEDURE — 99310 SBSQ NF CARE HIGH MDM 45: CPT | Performed by: NURSE PRACTITIONER

## 2019-02-20 RX ORDER — PREGABALIN 150 MG/1
CAPSULE ORAL
Qty: 20 CAPSULE | Refills: 3 | Status: SHIPPED | OUTPATIENT
Start: 2019-02-20 | End: 2019-03-12

## 2019-02-20 NOTE — LETTER
2/20/2019        RE: Lora Vergara  [address]  [facility] [id number]        [place] [facility]  PRIMARY CARE PROVIDER AND CLINIC:  Antwon Schmidt [address] / [facility] [address]  Chief Complaint   Patient presents with     Hospital F/U     [facility] Record Number:  [id number]  Place of Service where encounter took place:  [facility] ([facility]) [[id number]]    HPI:    Lora Vergara is a 89 year old  (9/11/1925),history of diabetes mellitus type 2 hypothyroidism hypertension hyperlipidemia coronary artery disease mitral regurgitation venous insufficiency was admitted with complaint of cough fever shortness of breath and hypoxic and found to have left-sided pneumonia  admitted to the above facility from  [facility].  Hospital stay 2/10/19 through 2/19/19.   CAP : sepsis: pro calcitonin was negative, IV rocephin and azithromycin started in ER, switched to oral levaquin due to extensive pneumonia found on CT and prior hx of Abx use before hospitalization  Encephalopathy: resolved throughout stay with Tx   DMII: HgbA1C 7.0, held metformin during hospitalization, resumed at discharge  CAD: no concerns during hospitalization   Admitted to this facility for  rehab, medical management and nursing care.  HPI information obtained from: facility chart records, facility staff, patient report and [facility] chart review.  Current issues are:    On exam today patient is alert, pleasant, states she is feeling better today, denies SOB, CP, states she has a loose non productive cough, I did observe her walking, no MCKEON, she does have O2 on at 2 liters and SaO2 95% with activity, denies fever, chills, N/V/D or constipation, states she slept fair last night, no other concerns.     Last 3 Bps: 126/64, 122/72, 125/69 mmHg  HR Ranges: 80-84 bpm  Admission Weight: 200.9 lbs  BG  AM: 137 mg/dL  PM: 209-274 mg/dL      CODE STATUS/ADVANCE DIRECTIVES DISCUSSION:    DNR / DNI  Patient's living condition: lives alone    ALLERGIES:Hydrochlorothiazide  PAST MEDICAL HISTORY:  has a past medical history of Angina at rest (H), Arthritis, Basal cell carcinoma (1/2014, (3/5/6-2014)), Coronary artery disease, Hyperlipidaemia, Hypertension, Hypothyroidism (1/17/2013), Mitral regurgitation (6/2015), and Type 2 diabetes, HbA1C goal < 8% (H) (9/11/2013). She also has no past medical history of Asthma, History of blood transfusion, Malignant neoplasm (H), or Unspecified cerebral artery occlusion with cerebral infarction.  PAST SURGICAL HISTORY:  has a past surgical history that includes Cholecystectomy (1975); back surgery (1996); appendectomy (1974); Hysterectomy (1970); Oophorectomy (1974); Bladder surgery (1990); face surgery (1-6/2014); Coronary Angiography Adult Order (2/2008); Phacoemulsification clear cornea with standard intraocular lens implant (Right, 7/21/2015); and Phacoemulsification clear cornea with standard intraocular lens implant (Left, 8/18/2015).  FAMILY HISTORY: family history includes Cancer in her brother, father, and son; Cerebrovascular Disease in her mother; Diabetes in her son.  SOCIAL HISTORY:  reports that  has never smoked. she has never used smokeless tobacco. She reports that she drinks alcohol. She reports that she does not use drugs.    Post Discharge Medication Reconciliation Status: discharge medications reconciled, continue medications without change.  Current Outpatient Medications   Medication Sig Dispense Refill     acetaminophen (TYLENOL) 500 MG tablet Take 500-1,000 mg by mouth every 6 hours as needed       aspirin 81 MG EC tablet Take 81 mg by mouth daily       atorvastatin (LIPITOR) 40 MG tablet Take 1 tablet (40 mg) by mouth daily 90 tablet 3     bisacodyl (DULCOLAX) 5 MG EC tablet Take 5 mg by mouth daily       furosemide (LASIX) 20 MG tablet Take 0.5 tablets (10 mg) by mouth daily 45 tablet 3     isosorbide mononitrate (IMDUR) 60 MG 24 hr tablet  TAKE 1 TABLET (60 MG) BY MOUTH DAILY 90 tablet 2     latanoprost (XALATAN) 0.005 % ophthalmic solution Place 1 drop into both eyes At Bedtime       levofloxacin (LEVAQUIN) 500 MG tablet Take 1 tablet (500 mg) by mouth daily for 7 days 7 tablet 0     levothyroxine (SYNTHROID/LEVOTHROID) 25 MCG tablet TAKE ONE TABLET BY MOUTH ONE TIME DAILY 90 tablet 2     metFORMIN (GLUCOPHAGE-XR) 750 MG 24 hr tablet TAKE 1 TABLET (750 MG) BY MOUTH DAILY WITH DINNER 90 tablet 1     metoprolol tartrate (LOPRESSOR) 100 MG tablet TAKE 1/2 TABLET BY MOUTH EVERY AM AND TAKE 1 TABLET IN THE EVENING. 135 tablet 2     nitroGLYcerin (NITROSTAT) 0.4 MG sublingual tablet Place 1 tablet (0.4 mg) under the tongue every 5 minutes as needed for chest pain 50 tablet 3     order for DME Equipment being ordered: wheeled walker with brakes and a seat 1 Device 0     ranolazine (RANEXA) 500 MG 12 hr tablet Take 1 tablet (500 mg) by mouth 2 times daily 180 tablet 2     vitamin D3 (CHOLECALCIFEROL) 2000 units tablet Take 4,000 Units by mouth daily       pregabalin (LYRICA) 150 MG capsule TAKE 1 CAPSULE BY MOUTH TWICE A DAY 20 capsule 3       ROS:  10 point ROS of systems including Constitutional, Eyes, Respiratory, Cardiovascular, Gastroenterology, Genitourinary, Integumentary, Musculoskeletal, Psychiatric were all negative except for pertinent positives noted in my HPI.    Exam:  /58   Pulse 84   Temp 97  F (36.1  C)   Resp 20   Wt 91.1 kg (200 lb 14.4 oz)   LMP  (LMP Unknown)   SpO2 94%   BMI 36.75 kg/m     GENERAL APPEARANCE:  Alert, in no distress, morbidly obese  ENT:  Mouth and posterior oropharynx normal, moist mucous membranes, normal hearing acuity  EYES:  EOM, conjunctivae, lids, pupils and irises normal, PERRL  RESP:  respiratory effort and palpation of chest normal, lungs clear to auscultation , no respiratory distress  CV:  Palpation and auscultation of heart done , regular rate and rhythm, no murmur, rub, or gallop, peripheral  edema trace+ in LE bilaterally  ABDOMEN:  normal bowel sounds, soft, nontender, no hepatosplenomegaly or other masses  M/S:   Examination of:   right upper extremity, left upper extremity, right lower extremity and left lower extremity  Inspection, ROM, stability and muscle strength normal  SKIN:  Inspection of skin and subcutaneous tissue baseline  NEURO:   Cranial nerves 2-12 are normal tested and grossly at patient's baseline, speech WNL  PSYCH:  affect and mood normal    Lab/Diagnostic data:     CBC RESULTS:   Recent Labs   Lab Test 02/19/19  0645 02/17/19  0702 02/13/19  0720   WBC  --  5.0  --  6.7   RBC  --  4.10  --  4.16   HGB  --  12.3  --  12.5   HCT  --  36.2  --  36.5   MCV  --  88  --  88   MCH  --  30.0  --  30.0   MCHC  --  34.0  --  34.2   RDW  --  12.6  --  12.7    292   < > 189    < > = values in this interval not displayed.       Last Basic Metabolic Panel:  Recent Labs   Lab Test 02/19/19  0645 02/17/19  0702 02/13/19  0720   NA  --  135  --  130*   POTASSIUM  --  3.9  --  3.6   CHLORIDE  --  91*  --  92*   VIOLETA  --  8.9  --  8.6   CO2  --  38*  --  29   BUN  --  10  --  9   CR 0.87 0.69   < > 0.54   GLC  --  117*  --  150*    < > = values in this interval not displayed.       Liver Function Studies -   Recent Labs   Lab Test 02/17/19  0702 02/13/19  0720 02/12/19  0727   PROTTOTAL  --  7.0 6.8   ALBUMIN 2.9* 2.9* 2.9*   BILITOTAL  --  1.0 0.7   ALKPHOS  --  44 40   AST  --  27 24   ALT  --  20 15       Lab Results   Component Value Date    A1C 7.0 02/10/2019    A1C 6.1 07/26/2018       ASSESSMENT/PLAN:  Community acquired pneumonia, unspecified laterality  Acute respiratory failure with hypoxia (H)  Morbid obesity (H)  Physical deconditioning  Acute/ongoing: monitor SaO2 at rest and with activity, wean O2 to keep SaO2 > 90%, continue levaquin 500mg QD for 7 days   Consult dietician for weight management  PT and OT for strengthening    Chronic diastolic congestive heart failure  (H)  Hypertension goal BP (blood pressure) < 140/90  Coronary artery disease involving native coronary artery of native heart without angina pectoris  Acute/ongoing: daily weights, vitals daily and prn, BMP follow, lasix 10mg QD, Imdur 60mg QD, Lopressor 50mg qhs,     Type 2 diabetes mellitus with diabetic neuropathy, without long-term current use of insulin (H)  Ongoing: blood sugar monitoring BID call if BS < 60 or > 350, glucophage xr 750mg QAM continue lyrica 150mg BID       Orders:  BMP and CBC weekly  Wean off O2  Blood sugar monitoring BID call if BS < 60 or > 350  Nystatin powder to affected areas BID until clear    Electronically signed by:  Tonya Lynn Haase, APRN CNP                    Sincerely,        Tonya Lynn Haase, APRN CNP

## 2019-02-20 NOTE — PROGRESS NOTES
[place] [facility]  PRIMARY CARE PROVIDER AND CLINIC:  Antwon Schmidt [address] / [facility] [address]  Chief Complaint   Patient presents with     Hospital F/U     [facility] Record Number:  [id number]  Place of Service where encounter took place:  [facility] ([facility]) [[id number]]    HPI:    Lora Vergara is a 89 year old  (9/11/1925),history of diabetes mellitus type 2 hypothyroidism hypertension hyperlipidemia coronary artery disease mitral regurgitation venous insufficiency was admitted with complaint of cough fever shortness of breath and hypoxic and found to have left-sided pneumonia  admitted to the above facility from  [facility].  Hospital stay 2/10/19 through 2/19/19.   CAP : sepsis: pro calcitonin was negative, IV rocephin and azithromycin started in ER, switched to oral levaquin due to extensive pneumonia found on CT and prior hx of Abx use before hospitalization  Encephalopathy: resolved throughout stay with Tx   DMII: HgbA1C 7.0, held metformin during hospitalization, resumed at discharge  CAD: no concerns during hospitalization   Admitted to this facility for  rehab, medical management and nursing care.  HPI information obtained from: facility chart records, facility staff, patient report and [facility] chart review.  Current issues are:    On exam today patient is alert, pleasant, states she is feeling better today, denies SOB, CP, states she has a loose non productive cough, I did observe her walking, no MCKEON, she does have O2 on at 2 liters and SaO2 95% with activity, denies fever, chills, N/V/D or constipation, states she slept fair last night, no other concerns.     Last 3 Bps: 126/64, 122/72, 125/69 mmHg  HR Ranges: 80-84 bpm  Admission Weight: 200.9 lbs  BG  AM: 137 mg/dL  PM: 209-274 mg/dL      CODE STATUS/ADVANCE DIRECTIVES DISCUSSION:   DNR / DNI  Patient's living condition: lives alone    ALLERGIES:Hydrochlorothiazide  PAST MEDICAL  HISTORY:  has a past medical history of Angina at rest (H), Arthritis, Basal cell carcinoma (1/2014, (3/5/6-2014)), Coronary artery disease, Hyperlipidaemia, Hypertension, Hypothyroidism (1/17/2013), Mitral regurgitation (6/2015), and Type 2 diabetes, HbA1C goal < 8% (H) (9/11/2013). She also has no past medical history of Asthma, History of blood transfusion, Malignant neoplasm (H), or Unspecified cerebral artery occlusion with cerebral infarction.  PAST SURGICAL HISTORY:  has a past surgical history that includes Cholecystectomy (1975); back surgery (1996); appendectomy (1974); Hysterectomy (1970); Oophorectomy (1974); Bladder surgery (1990); face surgery (1-6/2014); Coronary Angiography Adult Order (2/2008); Phacoemulsification clear cornea with standard intraocular lens implant (Right, 7/21/2015); and Phacoemulsification clear cornea with standard intraocular lens implant (Left, 8/18/2015).  FAMILY HISTORY: family history includes Cancer in her brother, father, and son; Cerebrovascular Disease in her mother; Diabetes in her son.  SOCIAL HISTORY:  reports that  has never smoked. she has never used smokeless tobacco. She reports that she drinks alcohol. She reports that she does not use drugs.    Post Discharge Medication Reconciliation Status: discharge medications reconciled, continue medications without change.  Current Outpatient Medications   Medication Sig Dispense Refill     acetaminophen (TYLENOL) 500 MG tablet Take 500-1,000 mg by mouth every 6 hours as needed       aspirin 81 MG EC tablet Take 81 mg by mouth daily       atorvastatin (LIPITOR) 40 MG tablet Take 1 tablet (40 mg) by mouth daily 90 tablet 3     bisacodyl (DULCOLAX) 5 MG EC tablet Take 5 mg by mouth daily       furosemide (LASIX) 20 MG tablet Take 0.5 tablets (10 mg) by mouth daily 45 tablet 3     isosorbide mononitrate (IMDUR) 60 MG 24 hr tablet TAKE 1 TABLET (60 MG) BY MOUTH DAILY 90 tablet 2     latanoprost (XALATAN) 0.005 % ophthalmic  solution Place 1 drop into both eyes At Bedtime       levofloxacin (LEVAQUIN) 500 MG tablet Take 1 tablet (500 mg) by mouth daily for 7 days 7 tablet 0     levothyroxine (SYNTHROID/LEVOTHROID) 25 MCG tablet TAKE ONE TABLET BY MOUTH ONE TIME DAILY 90 tablet 2     metFORMIN (GLUCOPHAGE-XR) 750 MG 24 hr tablet TAKE 1 TABLET (750 MG) BY MOUTH DAILY WITH DINNER 90 tablet 1     metoprolol tartrate (LOPRESSOR) 100 MG tablet TAKE 1/2 TABLET BY MOUTH EVERY AM AND TAKE 1 TABLET IN THE EVENING. 135 tablet 2     nitroGLYcerin (NITROSTAT) 0.4 MG sublingual tablet Place 1 tablet (0.4 mg) under the tongue every 5 minutes as needed for chest pain 50 tablet 3     order for DME Equipment being ordered: wheeled walker with brakes and a seat 1 Device 0     ranolazine (RANEXA) 500 MG 12 hr tablet Take 1 tablet (500 mg) by mouth 2 times daily 180 tablet 2     vitamin D3 (CHOLECALCIFEROL) 2000 units tablet Take 4,000 Units by mouth daily       pregabalin (LYRICA) 150 MG capsule TAKE 1 CAPSULE BY MOUTH TWICE A DAY 20 capsule 3       ROS:  10 point ROS of systems including Constitutional, Eyes, Respiratory, Cardiovascular, Gastroenterology, Genitourinary, Integumentary, Musculoskeletal, Psychiatric were all negative except for pertinent positives noted in my HPI.    Exam:  /58   Pulse 84   Temp 97  F (36.1  C)   Resp 20   Wt 91.1 kg (200 lb 14.4 oz)   LMP  (LMP Unknown)   SpO2 94%   BMI 36.75 kg/m    GENERAL APPEARANCE:  Alert, in no distress, morbidly obese  ENT:  Mouth and posterior oropharynx normal, moist mucous membranes, normal hearing acuity  EYES:  EOM, conjunctivae, lids, pupils and irises normal, PERRL  RESP:  respiratory effort and palpation of chest normal, lungs clear to auscultation , no respiratory distress  CV:  Palpation and auscultation of heart done , regular rate and rhythm, no murmur, rub, or gallop, peripheral edema trace+ in LE bilaterally  ABDOMEN:  normal bowel sounds, soft, nontender, no  hepatosplenomegaly or other masses  M/S:   Examination of:   right upper extremity, left upper extremity, right lower extremity and left lower extremity  Inspection, ROM, stability and muscle strength normal  SKIN:  Inspection of skin and subcutaneous tissue baseline  NEURO:   Cranial nerves 2-12 are normal tested and grossly at patient's baseline, speech WNL  PSYCH:  affect and mood normal    Lab/Diagnostic data:     CBC RESULTS:   Recent Labs   Lab Test 02/19/19  0645 02/17/19  0702 02/13/19  0720   WBC  --  5.0  --  6.7   RBC  --  4.10  --  4.16   HGB  --  12.3  --  12.5   HCT  --  36.2  --  36.5   MCV  --  88  --  88   MCH  --  30.0  --  30.0   MCHC  --  34.0  --  34.2   RDW  --  12.6  --  12.7    292   < > 189    < > = values in this interval not displayed.       Last Basic Metabolic Panel:  Recent Labs   Lab Test 02/19/19  0645 02/17/19  0702 02/13/19  0720   NA  --  135  --  130*   POTASSIUM  --  3.9  --  3.6   CHLORIDE  --  91*  --  92*   VIOLETA  --  8.9  --  8.6   CO2  --  38*  --  29   BUN  --  10  --  9   CR 0.87 0.69   < > 0.54   GLC  --  117*  --  150*    < > = values in this interval not displayed.       Liver Function Studies -   Recent Labs   Lab Test 02/17/19  0702 02/13/19  0720 02/12/19  0727   PROTTOTAL  --  7.0 6.8   ALBUMIN 2.9* 2.9* 2.9*   BILITOTAL  --  1.0 0.7   ALKPHOS  --  44 40   AST  --  27 24   ALT  --  20 15       Lab Results   Component Value Date    A1C 7.0 02/10/2019    A1C 6.1 07/26/2018       ASSESSMENT/PLAN:  Community acquired pneumonia, unspecified laterality  Acute respiratory failure with hypoxia (H)  Morbid obesity (H)  Physical deconditioning  Acute/ongoing: monitor SaO2 at rest and with activity, wean O2 to keep SaO2 > 90%, continue levaquin 500mg QD for 7 days   Consult dietician for weight management  PT and OT for strengthening    Chronic diastolic congestive heart failure (H)  Hypertension goal BP (blood pressure) < 140/90  Coronary artery disease involving  native coronary artery of native heart without angina pectoris  Acute/ongoing: daily weights, vitals daily and prn, BMP follow, lasix 10mg QD, Imdur 60mg QD, Lopressor 50mg qhs,     Type 2 diabetes mellitus with diabetic neuropathy, without long-term current use of insulin (H)  Ongoing: blood sugar monitoring BID call if BS < 60 or > 350, glucophage xr 750mg QAM continue lyrica 150mg BID       Orders:  BMP and CBC weekly  Wean off O2  Blood sugar monitoring BID call if BS < 60 or > 350  Nystatin powder to affected areas BID until clear    Electronically signed by:  Tonya Lynn Haase, APRN CNP

## 2019-02-21 ENCOUNTER — TRANSFERRED RECORDS (OUTPATIENT)
Dept: HEALTH INFORMATION MANAGEMENT | Facility: CLINIC | Age: 84
End: 2019-02-21

## 2019-02-21 LAB
BUN SERPL-MCNC: 14 MG/DL (ref 9–26)
CALCIUM SERPL-MCNC: 8.7 MG/DL (ref 8.4–10.4)
CHLORIDE SERPLBLD-SCNC: 96 MMOL/L (ref 98–109)
CO2 SERPL-SCNC: 33 MMOL/L (ref 22–31)
CREAT SERPL-MCNC: 0.82 MG/DL (ref 0.55–1.02)
DIFFERENTIAL: ABNORMAL
ERYTHROCYTE [DISTWIDTH] IN BLOOD BY AUTOMATED COUNT: 12.6 % (ref 11–15)
GFR SERPL CREATININE-BSD FRML MDRD: >60 ML/MIN/1.73M2
GLUCOSE SERPL-MCNC: 132 MG/DL (ref 70–100)
HCT VFR BLD AUTO: 35.1 % (ref 35–46)
HEMOGLOBIN: 11.4 G/DL (ref 11.8–15.5)
MCV RBC AUTO: 91.9 FL (ref 80–100)
PLATELET # BLD AUTO: 320 K/CMM (ref 140–450)
POTASSIUM SERPL-SCNC: 4.1 MMOL/L (ref 3.5–5.2)
RBC # BLD AUTO: 3.82 M/CMM (ref 3.7–5.2)
SODIUM SERPL-SCNC: 135 MMOL/L (ref 136–145)
WBC # BLD AUTO: 4.4 K/CMM (ref 3.8–11)

## 2019-02-25 ENCOUNTER — TRANSFERRED RECORDS (OUTPATIENT)
Dept: HEALTH INFORMATION MANAGEMENT | Facility: CLINIC | Age: 84
End: 2019-02-25

## 2019-02-27 ENCOUNTER — NURSING HOME VISIT (OUTPATIENT)
Dept: GERIATRICS | Facility: CLINIC | Age: 84
End: 2019-02-27
Payer: MEDICARE

## 2019-02-27 VITALS
OXYGEN SATURATION: 92 % | WEIGHT: 199.4 LBS | HEART RATE: 76 BPM | SYSTOLIC BLOOD PRESSURE: 116 MMHG | RESPIRATION RATE: 20 BRPM | TEMPERATURE: 97.6 F | BODY MASS INDEX: 36.47 KG/M2 | DIASTOLIC BLOOD PRESSURE: 62 MMHG

## 2019-02-27 DIAGNOSIS — J18.9 COMMUNITY ACQUIRED PNEUMONIA, UNSPECIFIED LATERALITY: Primary | ICD-10-CM

## 2019-02-27 DIAGNOSIS — E11.40 TYPE 2 DIABETES MELLITUS WITH DIABETIC NEUROPATHY, WITHOUT LONG-TERM CURRENT USE OF INSULIN (H): ICD-10-CM

## 2019-02-27 DIAGNOSIS — I25.10 CORONARY ARTERY DISEASE INVOLVING NATIVE CORONARY ARTERY OF NATIVE HEART WITHOUT ANGINA PECTORIS: ICD-10-CM

## 2019-02-27 DIAGNOSIS — I10 HYPERTENSION GOAL BP (BLOOD PRESSURE) < 140/90: ICD-10-CM

## 2019-02-27 DIAGNOSIS — R53.81 PHYSICAL DECONDITIONING: ICD-10-CM

## 2019-02-27 DIAGNOSIS — J96.01 ACUTE RESPIRATORY FAILURE WITH HYPOXIA (H): ICD-10-CM

## 2019-02-27 DIAGNOSIS — E66.01 MORBID OBESITY (H): ICD-10-CM

## 2019-02-27 DIAGNOSIS — I50.32 CHRONIC DIASTOLIC CONGESTIVE HEART FAILURE (H): ICD-10-CM

## 2019-02-27 PROCEDURE — 99309 SBSQ NF CARE MODERATE MDM 30: CPT | Performed by: NURSE PRACTITIONER

## 2019-02-27 RX ORDER — NYSTATIN 100000 [USP'U]/G
POWDER TOPICAL 2 TIMES DAILY
COMMUNITY
End: 2021-09-13

## 2019-02-27 RX ORDER — POLYETHYLENE GLYCOL 3350 17 G/17G
1 POWDER, FOR SOLUTION ORAL AT BEDTIME
COMMUNITY

## 2019-02-27 NOTE — LETTER
"    2/27/2019        RE: Lora Vergara  [address]  [facility] [address]        [facility]    Chief Complaint   Patient presents with     RECHECK       [place] Medical Record Number:  [id number]  Place of Service where encounter took place:  [facility] ([facility]) [[id number]]    HPI:    Lora Vergara is a 89 year old  (9/11/1925), who is being seen today for an episodic care visit.  HPI information obtained from: facility chart records, facility staff, patient report and [facility] chart review.Today's concern is:  Pneumonia: states she is feeling better, stronger with therapy, has occasional cough with grey sputum, denies SOB at rest, states she gets \"winded\"  When she walks.   CHF: weight stable, breathing stable  CAD/HTN: see BP below, denies CP, palpitations   Last 3 BPs: 116/62, 109/62, 123/59 mmHg  HR Ranges: 76-86 bpm  Admission Weight: 2/19/19: 200.9 lbs  Current Weights: 2/23/19: 197.5 lbs - 2/26/19: 199.4 lbs  DMII: BG  AM: 129-153 mg/dL  NOON: 108-140 mg/dL  PM: 114-274 mg/dL    ALLERGIES: Hydrochlorothiazide  Past Medical, Surgical, Family and Social History reviewed and updated in [facility].    Current Outpatient Medications   Medication Sig Dispense Refill     acetaminophen (TYLENOL) 500 MG tablet Take 500-1,000 mg by mouth every 6 hours as needed       aspirin 81 MG EC tablet Take 81 mg by mouth daily       atorvastatin (LIPITOR) 40 MG tablet Take 1 tablet (40 mg) by mouth daily 90 tablet 3     bisacodyl (DULCOLAX) 5 MG EC tablet Take 5 mg by mouth daily       furosemide (LASIX) 20 MG tablet Take 0.5 tablets (10 mg) by mouth daily 45 tablet 3     isosorbide mononitrate (IMDUR) 60 MG 24 hr tablet TAKE 1 TABLET (60 MG) BY MOUTH DAILY 90 tablet 2     latanoprost (XALATAN) 0.005 % ophthalmic solution Place 1 drop into both eyes At Bedtime       levothyroxine (SYNTHROID/LEVOTHROID) 25 MCG tablet TAKE ONE TABLET BY MOUTH ONE TIME DAILY 90 tablet 2     metFORMIN " (GLUCOPHAGE-XR) 750 MG 24 hr tablet TAKE 1 TABLET (750 MG) BY MOUTH DAILY WITH DINNER 90 tablet 1     metoprolol tartrate (LOPRESSOR) 100 MG tablet TAKE 1/2 TABLET BY MOUTH EVERY AM AND TAKE 1 TABLET IN THE EVENING. 135 tablet 2     nitroGLYcerin (NITROSTAT) 0.4 MG sublingual tablet Place 1 tablet (0.4 mg) under the tongue every 5 minutes as needed for chest pain 50 tablet 3     nystatin (MYCOSTATIN) 710345 UNIT/GM external powder Apply topically 2 times daily       order for DME Equipment being ordered: wheeled walker with brakes and a seat 1 Device 0     polyethylene glycol (MIRALAX/GLYCOLAX) packet Take 1 packet by mouth daily as needed for constipation       pregabalin (LYRICA) 150 MG capsule TAKE 1 CAPSULE BY MOUTH TWICE A DAY 20 capsule 3     ranolazine (RANEXA) 500 MG 12 hr tablet Take 1 tablet (500 mg) by mouth 2 times daily 180 tablet 2     vitamin D3 (CHOLECALCIFEROL) 2000 units tablet Take 4,000 Units by mouth daily       Medications reviewed:  Medications reconciled to facility chart and changes were made to reflect current medications as identified as above med list. Below are the changes that were made:   Medications stopped since last EPIC medication reconciliation:   Medications Discontinued During This Encounter   Medication Reason     levofloxacin (LEVAQUIN) 500 MG tablet Therapy completed       Medications started since last [facility] medication reconciliation:  Orders Placed This Encounter   Medications     polyethylene glycol (MIRALAX/GLYCOLAX) packet     Sig: Take 1 packet by mouth daily as needed for constipation     nystatin (MYCOSTATIN) 178343 UNIT/GM external powder     Sig: Apply topically 2 times daily         REVIEW OF SYSTEMS:  10 point ROS of systems including Constitutional, Eyes, Respiratory, Cardiovascular, Gastroenterology, Genitourinary, Integumentary, Musculoskeletal, Psychiatric were all negative except for pertinent positives noted in my HPI.    Physical Exam:  /62   Pulse 76    Temp 97.6  F (36.4  C)   Resp 20   Wt 90.4 kg (199 lb 6.4 oz)   LMP  (LMP Unknown)   SpO2 92%   BMI 36.47 kg/m     GENERAL APPEARANCE:  Alert, in no distress, morbidly obese  ENT:  Mouth and posterior oropharynx normal, moist mucous membranes, normal hearing acuity  EYES:  EOM, conjunctivae, lids, pupils and irises normal, PERRL  RESP:  respiratory effort and palpation of chest normal, lungs clear to auscultation , no respiratory distress  CV:  Palpation and auscultation of heart done , regular rate and rhythm, no murmur, rub, or gallop, peripheral edema trace+ in LE bilaterally  ABDOMEN:  normal bowel sounds, soft, nontender, no hepatosplenomegaly or other masses  M/S:   Examination of:   right upper extremity, left upper extremity, right lower extremity and left lower extremity  Inspection, ROM, stability and muscle strength normal  SKIN:  Inspection of skin and subcutaneous tissue baseline  NEURO:   Cranial nerves 2-12 are normal tested and grossly at patient's baseline, speech WNL  PSYCH:  affect and mood normal    Recent Labs:   CBC RESULTS:   Recent Labs   Lab Test 02/21/19 02/19/19 0645 02/17/19 0702 02/13/19 0720   WBC 4.4  --  5.0  --  6.7   RBC 3.82  --  4.10  --  4.16   HGB 11.4*  --  12.3  --  12.5   HCT 35.1  --  36.2  --  36.5   MCV 91.9  --  88  --  88   MCH  --   --  30.0  --  30.0   MCHC  --   --  34.0  --  34.2   RDW 12.6  --  12.6  --  12.7    280 292   < > 189    < > = values in this interval not displayed.       Last Basic Metabolic Panel:  Recent Labs   Lab Test 02/21/19 02/19/19  0645 02/17/19 0702   *  --  135   POTASSIUM 4.1  --  3.9   CHLORIDE 96*  --  91*   VIOLETA 8.7  --  8.9   CO2 33*  --  38*   BUN 14  --  10   CR 0.82 0.87 0.69   *  --  117*       Liver Function Studies -   Recent Labs   Lab Test 02/17/19 0702 02/13/19  0720 02/12/19  0727   PROTTOTAL  --  7.0 6.8   ALBUMIN 2.9* 2.9* 2.9*   BILITOTAL  --  1.0 0.7   ALKPHOS  --  44 40   AST  --  27 24    ALT  --  20 15         Lab Results   Component Value Date    A1C 7.0 02/10/2019    A1C 6.1 07/26/2018         Assessment/Plan:  Community acquired pneumonia, unspecified laterality  Acute respiratory failure with hypoxia (H)  Morbid obesity (H)  Physical deconditioning  Acute/ongoing: monitor SaO2 at rest and with activity, still using O2 at night, DC O2 at night and check SaO2 during night, Finished course of levaquin  Consult dietician for weight management  PT and OT for strengthening     Chronic diastolic congestive heart failure (H)  Hypertension goal BP (blood pressure) < 140/90  Coronary artery disease involving native coronary artery of native heart without angina pectoris  Acute/ongoing: daily weights, vitals daily and prn, BMP follow, lasix 10mg QD, Imdur 60mg QD, Lopressor 50mg qhs,      Type 2 diabetes mellitus with diabetic neuropathy, without long-term current use of insulin (H)  Ongoing: blood sugar monitoring BID call if BS < 60 or > 350, glucophage xr 750mg QAM continue lyrica 150mg BID       Orders:  No new orders today    Electronically signed by  Tonya Lynn Haase, APRN CNP                      Sincerely,        Tonya Lynn Haase, APRN CNP

## 2019-02-27 NOTE — PROGRESS NOTES
"[facility]    Chief Complaint   Patient presents with     AZEEM       [place] Medical Record Number:  [id number]  Place of Service where encounter took place:  [facility] ([facility]) [[id number]]    HPI:    Lora Vergara is a 89 year old  (9/11/1925), who is being seen today for an episodic care visit.  HPI information obtained from: facility chart records, facility staff, patient report and [facility] chart review.Today's concern is:  Pneumonia: states she is feeling better, stronger with therapy, has occasional cough with grey sputum, denies SOB at rest, states she gets \"winded\"  When she walks.   CHF: weight stable, breathing stable  CAD/HTN: see BP below, denies CP, palpitations   Last 3 BPs: 116/62, 109/62, 123/59 mmHg  HR Ranges: 76-86 bpm  Admission Weight: 2/19/19: 200.9 lbs  Current Weights: 2/23/19: 197.5 lbs - 2/26/19: 199.4 lbs  DMII: BG  AM: 129-153 mg/dL  NOON: 108-140 mg/dL  PM: 114-274 mg/dL    ALLERGIES: Hydrochlorothiazide  Past Medical, Surgical, Family and Social History reviewed and updated in [facility].    Current Outpatient Medications   Medication Sig Dispense Refill     acetaminophen (TYLENOL) 500 MG tablet Take 500-1,000 mg by mouth every 6 hours as needed       aspirin 81 MG EC tablet Take 81 mg by mouth daily       atorvastatin (LIPITOR) 40 MG tablet Take 1 tablet (40 mg) by mouth daily 90 tablet 3     bisacodyl (DULCOLAX) 5 MG EC tablet Take 5 mg by mouth daily       furosemide (LASIX) 20 MG tablet Take 0.5 tablets (10 mg) by mouth daily 45 tablet 3     isosorbide mononitrate (IMDUR) 60 MG 24 hr tablet TAKE 1 TABLET (60 MG) BY MOUTH DAILY 90 tablet 2     latanoprost (XALATAN) 0.005 % ophthalmic solution Place 1 drop into both eyes At Bedtime       levothyroxine (SYNTHROID/LEVOTHROID) 25 MCG tablet TAKE ONE TABLET BY MOUTH ONE TIME DAILY 90 tablet 2     metFORMIN (GLUCOPHAGE-XR) 750 MG 24 hr tablet TAKE 1 TABLET (750 MG) BY MOUTH DAILY WITH DINNER 90 tablet 1     " metoprolol tartrate (LOPRESSOR) 100 MG tablet TAKE 1/2 TABLET BY MOUTH EVERY AM AND TAKE 1 TABLET IN THE EVENING. 135 tablet 2     nitroGLYcerin (NITROSTAT) 0.4 MG sublingual tablet Place 1 tablet (0.4 mg) under the tongue every 5 minutes as needed for chest pain 50 tablet 3     nystatin (MYCOSTATIN) [id number] UNIT/GM external powder Apply topically 2 times daily       order for DME Equipment being ordered: wheeled walker with brakes and a seat 1 Device 0     polyethylene glycol (MIRALAX/GLYCOLAX) packet Take 1 packet by mouth daily as needed for constipation       pregabalin (LYRICA) 150 MG capsule TAKE 1 CAPSULE BY MOUTH TWICE A DAY 20 capsule 3     ranolazine (RANEXA) 500 MG 12 hr tablet Take 1 tablet (500 mg) by mouth 2 times daily 180 tablet 2     vitamin D3 (CHOLECALCIFEROL) 2000 units tablet Take 4,000 Units by mouth daily       Medications reviewed:  Medications reconciled to facility chart and changes were made to reflect current medications as identified as above med list. Below are the changes that were made:   Medications stopped since last EPIC medication reconciliation:   Medications Discontinued During This Encounter   Medication Reason     levofloxacin (LEVAQUIN) 500 MG tablet Therapy completed       Medications started since last [facility] medication reconciliation:  Orders Placed This Encounter   Medications     polyethylene glycol (MIRALAX/GLYCOLAX) packet     Sig: Take 1 packet by mouth daily as needed for constipation     nystatin (MYCOSTATIN) [id number] UNIT/GM external powder     Sig: Apply topically 2 times daily         REVIEW OF SYSTEMS:  10 point ROS of systems including Constitutional, Eyes, Respiratory, Cardiovascular, Gastroenterology, Genitourinary, Integumentary, Musculoskeletal, Psychiatric were all negative except for pertinent positives noted in my HPI.    Physical Exam:  /62   Pulse 76   Temp 97.6  F (36.4  C)   Resp 20   Wt 90.4 kg (199 lb 6.4 oz)   LMP  (LMP Unknown)   SpO2 92%    BMI 36.47 kg/m    GENERAL APPEARANCE:  Alert, in no distress, morbidly obese  ENT:  Mouth and posterior oropharynx normal, moist mucous membranes, normal hearing acuity  EYES:  EOM, conjunctivae, lids, pupils and irises normal, PERRL  RESP:  respiratory effort and palpation of chest normal, lungs clear to auscultation , no respiratory distress  CV:  Palpation and auscultation of heart done , regular rate and rhythm, no murmur, rub, or gallop, peripheral edema trace+ in LE bilaterally  ABDOMEN:  normal bowel sounds, soft, nontender, no hepatosplenomegaly or other masses  M/S:   Examination of:   right upper extremity, left upper extremity, right lower extremity and left lower extremity  Inspection, ROM, stability and muscle strength normal  SKIN:  Inspection of skin and subcutaneous tissue baseline  NEURO:   Cranial nerves 2-12 are normal tested and grossly at patient's baseline, speech WNL  PSYCH:  affect and mood normal    Recent Labs:   CBC RESULTS:   Recent Labs   Lab Test 02/21/19 02/19/19  0645 02/17/19 0702 02/13/19  0720   WBC 4.4  --  5.0  --  6.7   RBC 3.82  --  4.10  --  4.16   HGB 11.4*  --  12.3  --  12.5   HCT 35.1  --  36.2  --  36.5   MCV 91.9  --  88  --  88   MCH  --   --  30.0  --  30.0   MCHC  --   --  34.0  --  34.2   RDW 12.6  --  12.6  --  12.7    280 292   < > 189    < > = values in this interval not displayed.       Last Basic Metabolic Panel:  Recent Labs   Lab Test 02/21/19 02/19/19  0645 02/17/19  0702   *  --  135   POTASSIUM 4.1  --  3.9   CHLORIDE 96*  --  91*   VIOLETA 8.7  --  8.9   CO2 33*  --  38*   BUN 14  --  10   CR 0.82 0.87 0.69   *  --  117*       Liver Function Studies -   Recent Labs   Lab Test 02/17/19  0702 02/13/19  0720 02/12/19  0727   PROTTOTAL  --  7.0 6.8   ALBUMIN 2.9* 2.9* 2.9*   BILITOTAL  --  1.0 0.7   ALKPHOS  --  44 40   AST  --  27 24   ALT  --  20 15         Lab Results   Component Value Date    A1C 7.0 02/10/2019    A1C 6.1 07/26/2018          Assessment/Plan:  Community acquired pneumonia, unspecified laterality  Acute respiratory failure with hypoxia (H)  Morbid obesity (H)  Physical deconditioning  Acute/ongoing: monitor SaO2 at rest and with activity, still using O2 at night, DC O2 at night and check SaO2 during night, Finished course of levaquin  Consult dietician for weight management  PT and OT for strengthening     Chronic diastolic congestive heart failure (H)  Hypertension goal BP (blood pressure) < 140/90  Coronary artery disease involving native coronary artery of native heart without angina pectoris  Acute/ongoing: daily weights, vitals daily and prn, BMP follow, lasix 10mg QD, Imdur 60mg QD, Lopressor 50mg qhs,      Type 2 diabetes mellitus with diabetic neuropathy, without long-term current use of insulin (H)  Ongoing: blood sugar monitoring BID call if BS < 60 or > 350, glucophage xr 750mg QAM continue lyrica 150mg BID       Orders:  No new orders today    Electronically signed by  Tonya Lynn Haase, APRN CNP

## 2019-02-28 ENCOUNTER — TRANSFERRED RECORDS (OUTPATIENT)
Dept: HEALTH INFORMATION MANAGEMENT | Facility: CLINIC | Age: 84
End: 2019-02-28

## 2019-02-28 VITALS
WEIGHT: 199.9 LBS | DIASTOLIC BLOOD PRESSURE: 66 MMHG | BODY MASS INDEX: 36.56 KG/M2 | SYSTOLIC BLOOD PRESSURE: 122 MMHG | TEMPERATURE: 97.6 F | RESPIRATION RATE: 20 BRPM | OXYGEN SATURATION: 95 % | HEART RATE: 88 BPM

## 2019-02-28 LAB
BUN SERPL-MCNC: 12 MG/DL (ref 9–26)
CALCIUM SERPL-MCNC: 8.3 MG/DL (ref 8.4–10.4)
CHLORIDE SERPLBLD-SCNC: 100 MMOL/L (ref 98–109)
CO2 SERPL-SCNC: 31 MMOL/L (ref 22–31)
CREAT SERPL-MCNC: 0.8 MG/DL (ref 0.55–1.02)
DIFFERENTIAL: ABNORMAL
ERYTHROCYTE [DISTWIDTH] IN BLOOD BY AUTOMATED COUNT: 12.9 % (ref 11–15)
GFR SERPL CREATININE-BSD FRML MDRD: >60 ML/MIN/1.73M2
GLUCOSE SERPL-MCNC: 105 MG/DL (ref 70–100)
HCT VFR BLD AUTO: 33.6 % (ref 35–46)
HEMOGLOBIN: 10.8 G/DL (ref 11.8–15.5)
MCV RBC AUTO: 91.8 FL (ref 80–100)
PLATELET # BLD AUTO: 227 K/CMM (ref 140–450)
POTASSIUM SERPL-SCNC: 4.4 MMOL/L (ref 3.5–5.2)
RBC # BLD AUTO: 3.66 M/CMM (ref 3.7–5.2)
SODIUM SERPL-SCNC: 138 MMOL/L (ref 136–145)
WBC # BLD AUTO: 4.4 K/CMM (ref 3.8–11)

## 2019-03-01 ENCOUNTER — NURSING HOME VISIT (OUTPATIENT)
Dept: GERIATRICS | Facility: CLINIC | Age: 84
End: 2019-03-01
Payer: MEDICARE

## 2019-03-01 DIAGNOSIS — J96.01 ACUTE RESPIRATORY FAILURE WITH HYPOXIA (H): ICD-10-CM

## 2019-03-01 DIAGNOSIS — I10 HYPERTENSION GOAL BP (BLOOD PRESSURE) < 140/90: ICD-10-CM

## 2019-03-01 DIAGNOSIS — E66.01 MORBID OBESITY (H): ICD-10-CM

## 2019-03-01 DIAGNOSIS — I50.32 CHRONIC DIASTOLIC CONGESTIVE HEART FAILURE (H): ICD-10-CM

## 2019-03-01 DIAGNOSIS — E11.40 TYPE 2 DIABETES MELLITUS WITH DIABETIC NEUROPATHY, WITHOUT LONG-TERM CURRENT USE OF INSULIN (H): ICD-10-CM

## 2019-03-01 DIAGNOSIS — J18.9 COMMUNITY ACQUIRED PNEUMONIA, UNSPECIFIED LATERALITY: Primary | ICD-10-CM

## 2019-03-01 DIAGNOSIS — I25.10 CORONARY ARTERY DISEASE INVOLVING NATIVE CORONARY ARTERY OF NATIVE HEART WITHOUT ANGINA PECTORIS: ICD-10-CM

## 2019-03-01 DIAGNOSIS — R53.81 PHYSICAL DECONDITIONING: ICD-10-CM

## 2019-03-01 PROCEDURE — 99316 NF DSCHRG MGMT 30 MIN+: CPT | Performed by: NURSE PRACTITIONER

## 2019-03-01 NOTE — LETTER
3/1/2019        RE: Lora Vergara  [address]  [facility] [address]          [facility] DISCHARGE SUMMARY    PATIENT'S NAME: Lora Vergara  YOB: 1925  MEDICAL RECORD NUMBER:  [id number]  Place of Service where encounter took place:  [facility] ([facility] [[id number]]    PRIMARY CARE PROVIDER AND CLINIC RESPONSIBLE AFTER TRANSFER: Antwon Schmidt [address] / [facility] [address]     TRANSFERRING PROVIDERS: Tonya Lynn Haase, APRN CNP, Dr. Jeanine MD  DATE OF SNF ADMISSION:  February / 19 / 2019  DATE OF SNF (anticipated) DISCHARGE: March / 02 / 2019  DISCHARGE DISPOSITION: [facility] Provider   RECENT HOSPITALIZATION/ED:  [facility] stay 2/10/19 to 2/19/19.     CODE STATUS/ADVANCE DIRECTIVES DISCUSSION:   DNR / DNI     Allergies   Allergen Reactions     Hydrochlorothiazide      Pancreatitis - patient denies this.  She says it gave her low sodium.     Condition on Discharge:  Stable.  Function:  Walking > 500 feet using a 4ww indep  Cognitive Scores: BIMS: 15/15, SBT: 0/28    Equipment: no aids    DISCHARGE DIAGNOSIS:   1. Community acquired pneumonia, unspecified laterality    2. Acute respiratory failure with hypoxia (H)    3. Morbid obesity (H)    4. Physical deconditioning    5. Chronic diastolic congestive heart failure (H)    6. Hypertension goal BP (blood pressure) < 140/90    7. Coronary artery disease involving native coronary artery of native heart without angina pectoris    8. Type 2 diabetes mellitus with diabetic neuropathy, without long-term current use of insulin (H)          PAST MEDICAL HISTORY:  has a past medical history of Angina at rest (H), Arthritis, Basal cell carcinoma (1/2014, (3/5/6-2014)), Coronary artery disease, Hyperlipidaemia, Hypertension, Hypothyroidism (1/17/2013), Mitral regurgitation (6/2015), and Type 2 diabetes, HbA1C goal < 8% (H) (9/11/2013). She also has no past medical history of  Asthma, History of blood transfusion, Malignant neoplasm (H), or Unspecified cerebral artery occlusion with cerebral infarction.    DISCHARGE MEDICATIONS:  Current Outpatient Medications   Medication Sig Dispense Refill     acetaminophen (TYLENOL) 500 MG tablet Take 500-1,000 mg by mouth every 6 hours as needed       aspirin 81 MG EC tablet Take 81 mg by mouth daily       atorvastatin (LIPITOR) 40 MG tablet Take 1 tablet (40 mg) by mouth daily 90 tablet 3     bisacodyl (DULCOLAX) 5 MG EC tablet Take 5 mg by mouth daily       furosemide (LASIX) 20 MG tablet Take 0.5 tablets (10 mg) by mouth daily 45 tablet 3     isosorbide mononitrate (IMDUR) 60 MG 24 hr tablet TAKE 1 TABLET (60 MG) BY MOUTH DAILY 90 tablet 2     latanoprost (XALATAN) 0.005 % ophthalmic solution Place 1 drop into both eyes At Bedtime       levothyroxine (SYNTHROID/LEVOTHROID) 25 MCG tablet TAKE ONE TABLET BY MOUTH ONE TIME DAILY 90 tablet 2     metFORMIN (GLUCOPHAGE-XR) 750 MG 24 hr tablet TAKE 1 TABLET (750 MG) BY MOUTH DAILY WITH DINNER 90 tablet 1     metoprolol tartrate (LOPRESSOR) 100 MG tablet TAKE 1/2 TABLET BY MOUTH EVERY AM AND TAKE 1 TABLET IN THE EVENING. 135 tablet 2     nitroGLYcerin (NITROSTAT) 0.4 MG sublingual tablet Place 1 tablet (0.4 mg) under the tongue every 5 minutes as needed for chest pain 50 tablet 3     nystatin (MYCOSTATIN) 252164 UNIT/GM external powder Apply topically 2 times daily       order for DME Equipment being ordered: wheeled walker with brakes and a seat 1 Device 0     polyethylene glycol (MIRALAX/GLYCOLAX) packet Take 1 packet by mouth daily as needed for constipation       pregabalin (LYRICA) 150 MG capsule TAKE 1 CAPSULE BY MOUTH TWICE A DAY 20 capsule 3     ranolazine (RANEXA) 500 MG 12 hr tablet Take 1 tablet (500 mg) by mouth 2 times daily 180 tablet 2     vitamin D3 (CHOLECALCIFEROL) 2000 units tablet Take 4,000 Units by mouth daily         MEDICATION CHANGES/RATIONALE:   There were no medication changes  made during TCU stay  Weaned off O2 during TCU stay SaO2 at night 92% on room air  Last 3 BPs: 122/66, 106/62, 116/62 mmHg  HR Ranges: 72-88 bpm  Admission Weight: 2/19/19: 200.9 lbs  Current Weights: 2/23/19: 197.5 lbs - 2/26/19: 199.4 lbs - 2/28/19: 199.9 lbs  DMII: BG  AM: 129-153 mg/dL  NOON: 108-140 mg/dL  PM: 114-274 mg/dL  Controlled medications sent with patient:   not applicable/none     ROS:    10 point ROS of systems including Constitutional, Eyes, Respiratory, Cardiovascular, Gastroenterology, Genitourinary, Integumentary, Musculoskeletal, Psychiatric were all negative except for pertinent positives noted in my HPI.    Physical Exam:   Vitals: /66   Pulse 88   Temp 97.6  F (36.4  C)   Resp 20   Wt 90.7 kg (199 lb 14.4 oz)   LMP  (LMP Unknown)   SpO2 95%   BMI 36.56 kg/m     BMI= Body mass index is 36.56 kg/m .  GENERAL APPEARANCE:  Alert, in no distress, morbidly obese  ENT:  Mouth and posterior oropharynx normal, moist mucous membranes, normal hearing acuity  EYES:  EOM, conjunctivae, lids, pupils and irises normal, PERRL  RESP:  respiratory effort and palpation of chest normal, lungs clear to auscultation , no respiratory distress  CV:  Palpation and auscultation of heart done , regular rate and rhythm, no murmur, rub, or gallop, peripheral edema trace+ in LE bilaterally  ABDOMEN:  normal bowel sounds, soft, nontender, no hepatosplenomegaly or other masses  M/S:   Examination of:   right upper extremity, left upper extremity, right lower extremity and left lower extremity  Inspection, ROM, stability and muscle strength normal  SKIN:  Inspection of skin and subcutaneous tissue baseline  NEURO:   Cranial nerves 2-12 are normal tested and grossly at patient's baseline, speech WNL  PSYCH:  affect and mood normal      HPI Nursing Facility Course: patient progressed in therapy to walking > 500 feet using a 4WW indep, indep with ADL's, she was weaned off O2, will DC home with home PT, RN, HHA  through [facility].   HPI information obtained from: facility chart records, facility staff, patient report and [facility] chart review.      Community acquired pneumonia, unspecified laterality  Acute respiratory failure with hypoxia (H)  Morbid obesity (H)  Physical deconditioning  Acute/ongoing: DC home with home PT, RN and HHA, weaned off O2,  Finished course of levaquin     Chronic diastolic congestive heart failure (H)  Hypertension goal BP (blood pressure) < 140/90  Coronary artery disease involving native coronary artery of native heart without angina pectoris  Acute/ongoing: continue daily weights,  lasix 10mg QD, Imdur 60mg QD, Lopressor 50mg qhs,      Type 2 diabetes mellitus with diabetic neuropathy, without long-term current use of insulin (H)  Ongoing: blood sugar monitoring per home routine, glucophage xr 750mg QAM continue lyrica 150mg BID       DISCHARGE PLAN:  Physical Therapy, Registered Nurse and Home Health Aide  Patient instructed to follow-up with:  PCP in 7 days      Current [facility] scheduled appointments:  Future Appointments   Date Time Provider Department Center   3/6/2019 10:00 AM Antwon Schmidt MD [facility]       MTM referral needed and placed by this provider: No    Pending labs: none  SNF labs   CBC RESULTS:   Recent Labs   Lab Test 02/28/19 02/21/19 02/17/19  0702  02/13/19  0720   WBC 4.4 4.4  --  5.0  --  6.7   RBC 3.66* 3.82  --  4.10  --  4.16   HGB 10.8* 11.4*  --  12.3  --  12.5   HCT 33.6* 35.1  --  36.2  --  36.5   MCV 91.8 91.9  --  88  --  88   MCH  --   --   --  30.0  --  30.0   MCHC  --   --   --  34.0  --  34.2   RDW 12.9 12.6  --  12.6  --  12.7    320   < > 292   < > 189    < > = values in this interval not displayed.       Last Basic Metabolic Panel:  Recent Labs   Lab Test 02/28/19 02/21/19    135*   POTASSIUM 4.4 4.1   CHLORIDE 100 96*   VIOLETA 8.3* 8.7   CO2 31 33*   BUN 12 14   CR 0.80 0.82   * 132*       Liver Function Studies -   Recent Labs    Lab Test 02/17/19  0702 02/13/19  0720 02/12/19  0727   PROTTOTAL  --  7.0 6.8   ALBUMIN 2.9* 2.9* 2.9*   BILITOTAL  --  1.0 0.7   ALKPHOS  --  44 40   AST  --  27 24   ALT  --  20 15       Lab Results   Component Value Date    A1C 7.0 02/10/2019    A1C 6.1 07/26/2018           Discharge Treatments:none    TOTAL DISCHARGE TIME:   Greater than 30 minutes  Electronically signed by:  Tonya Lynn Haase, APRN CNP          Sincerely,        Tonya Lynn Haase, APRN CNP

## 2019-03-01 NOTE — PROGRESS NOTES
[place] [facility] DISCHARGE SUMMARY    PATIENT'S NAME: Lora Vergara  YOB: 1925  MEDICAL RECORD NUMBER:  [id number]  Place of Service where encounter took place:  [facility] ([facility]) [[id number]]    PRIMARY CARE PROVIDER AND CLINIC RESPONSIBLE AFTER TRANSFER: Antwon Schmidt [address] [facility] [id number]     TRANSFERRING PROVIDERS: Tonya Lynn Haase, APRN CNP, Dr. Jeanine MD  DATE OF SNF ADMISSION:  February / 19 / 2019  DATE OF SNF (anticipated) DISCHARGE: March / 02 / 2019  DISCHARGE DISPOSITION: [facility] Provider   RECENT HOSPITALIZATION/ED:  [facility] stay 2/10/19 to 2/19/19.     CODE STATUS/ADVANCE DIRECTIVES DISCUSSION:   DNR / DNI     Allergies   Allergen Reactions     Hydrochlorothiazide      Pancreatitis - patient denies this.  She says it gave her low sodium.     Condition on Discharge:  Stable.  Function:  Walking > 500 feet using a 4ww indep  Cognitive Scores: BIMS: 15/15, SBT: 0/28    Equipment: no aids    DISCHARGE DIAGNOSIS:   1. Community acquired pneumonia, unspecified laterality    2. Acute respiratory failure with hypoxia (H)    3. Morbid obesity (H)    4. Physical deconditioning    5. Chronic diastolic congestive heart failure (H)    6. Hypertension goal BP (blood pressure) < 140/90    7. Coronary artery disease involving native coronary artery of native heart without angina pectoris    8. Type 2 diabetes mellitus with diabetic neuropathy, without long-term current use of insulin (H)          PAST MEDICAL HISTORY:  has a past medical history of Angina at rest (H), Arthritis, Basal cell carcinoma (1/2014, (3/5/6-2014)), Coronary artery disease, Hyperlipidaemia, Hypertension, Hypothyroidism (1/17/2013), Mitral regurgitation (6/2015), and Type 2 diabetes, HbA1C goal < 8% (H) (9/11/2013). She also has no past medical history of Asthma, History of blood transfusion, Malignant neoplasm (H), or Unspecified cerebral artery occlusion  with cerebral infarction.    DISCHARGE MEDICATIONS:  Current Outpatient Medications   Medication Sig Dispense Refill     acetaminophen (TYLENOL) 500 MG tablet Take 500-1,000 mg by mouth every 6 hours as needed       aspirin 81 MG EC tablet Take 81 mg by mouth daily       atorvastatin (LIPITOR) 40 MG tablet Take 1 tablet (40 mg) by mouth daily 90 tablet 3     bisacodyl (DULCOLAX) 5 MG EC tablet Take 5 mg by mouth daily       furosemide (LASIX) 20 MG tablet Take 0.5 tablets (10 mg) by mouth daily 45 tablet 3     isosorbide mononitrate (IMDUR) 60 MG 24 hr tablet TAKE 1 TABLET (60 MG) BY MOUTH DAILY 90 tablet 2     latanoprost (XALATAN) 0.005 % ophthalmic solution Place 1 drop into both eyes At Bedtime       levothyroxine (SYNTHROID/LEVOTHROID) 25 MCG tablet TAKE ONE TABLET BY MOUTH ONE TIME DAILY 90 tablet 2     metFORMIN (GLUCOPHAGE-XR) 750 MG 24 hr tablet TAKE 1 TABLET (750 MG) BY MOUTH DAILY WITH DINNER 90 tablet 1     metoprolol tartrate (LOPRESSOR) 100 MG tablet TAKE 1/2 TABLET BY MOUTH EVERY AM AND TAKE 1 TABLET IN THE EVENING. 135 tablet 2     nitroGLYcerin (NITROSTAT) 0.4 MG sublingual tablet Place 1 tablet (0.4 mg) under the tongue every 5 minutes as needed for chest pain 50 tablet 3     nystatin (MYCOSTATIN) 859644 UNIT/GM external powder Apply topically 2 times daily       order for DME Equipment being ordered: wheeled walker with brakes and a seat 1 Device 0     polyethylene glycol (MIRALAX/GLYCOLAX) packet Take 1 packet by mouth daily as needed for constipation       pregabalin (LYRICA) 150 MG capsule TAKE 1 CAPSULE BY MOUTH TWICE A DAY 20 capsule 3     ranolazine (RANEXA) 500 MG 12 hr tablet Take 1 tablet (500 mg) by mouth 2 times daily 180 tablet 2     vitamin D3 (CHOLECALCIFEROL) 2000 units tablet Take 4,000 Units by mouth daily         MEDICATION CHANGES/RATIONALE:   There were no medication changes made during TCU stay  Weaned off O2 during TCU stay SaO2 at night 92% on room air  Last 3 BPs: 122/66,  106/62, 116/62 mmHg  HR Ranges: 72-88 bpm  Admission Weight: 2/19/19: 200.9 lbs  Current Weights: 2/23/19: 197.5 lbs - 2/26/19: 199.4 lbs - 2/28/19: 199.9 lbs  DMII: BG  AM: 129-153 mg/dL  NOON: 108-140 mg/dL  PM: 114-274 mg/dL  Controlled medications sent with patient:   not applicable/none     ROS:    10 point ROS of systems including Constitutional, Eyes, Respiratory, Cardiovascular, Gastroenterology, Genitourinary, Integumentary, Musculoskeletal, Psychiatric were all negative except for pertinent positives noted in my HPI.    Physical Exam:   Vitals: /66   Pulse 88   Temp 97.6  F (36.4  C)   Resp 20   Wt 90.7 kg (199 lb 14.4 oz)   LMP  (LMP Unknown)   SpO2 95%   BMI 36.56 kg/m    BMI= Body mass index is 36.56 kg/m .  GENERAL APPEARANCE:  Alert, in no distress, morbidly obese  ENT:  Mouth and posterior oropharynx normal, moist mucous membranes, normal hearing acuity  EYES:  EOM, conjunctivae, lids, pupils and irises normal, PERRL  RESP:  respiratory effort and palpation of chest normal, lungs clear to auscultation , no respiratory distress  CV:  Palpation and auscultation of heart done , regular rate and rhythm, no murmur, rub, or gallop, peripheral edema trace+ in LE bilaterally  ABDOMEN:  normal bowel sounds, soft, nontender, no hepatosplenomegaly or other masses  M/S:   Examination of:   right upper extremity, left upper extremity, right lower extremity and left lower extremity  Inspection, ROM, stability and muscle strength normal  SKIN:  Inspection of skin and subcutaneous tissue baseline  NEURO:   Cranial nerves 2-12 are normal tested and grossly at patient's baseline, speech WNL  PSYCH:  affect and mood normal      HPI Nursing Facility Course: patient progressed in therapy to walking > 500 feet using a 4WW indep, indep with ADL's, she was weaned off O2, will DC home with home PT, RN, HHA through [facility].   HPI information obtained from: facility chart records, facility staff, patient report and  [facility] chart review.      Community acquired pneumonia, unspecified laterality  Acute respiratory failure with hypoxia (H)  Morbid obesity (H)  Physical deconditioning  Acute/ongoing: DC home with home PT, RN and HHA, weaned off O2,  Finished course of levaquin     Chronic diastolic congestive heart failure (H)  Hypertension goal BP (blood pressure) < 140/90  Coronary artery disease involving native coronary artery of native heart without angina pectoris  Acute/ongoing: continue daily weights,  lasix 10mg QD, Imdur 60mg QD, Lopressor 50mg qhs,      Type 2 diabetes mellitus with diabetic neuropathy, without long-term current use of insulin (H)  Ongoing: blood sugar monitoring per home routine, glucophage xr 750mg QAM continue lyrica 150mg BID       DISCHARGE PLAN:  Physical Therapy, Registered Nurse and Home Health Aide  Patient instructed to follow-up with:  PCP in 7 days      Current [place] scheduled appointments:  Future Appointments   Date Time Provider Department Center   3/6/2019 10:00 AM Antwon Schmidt MD [facility]       MTM referral needed and placed by this provider: No    Pending labs: none  SNF labs   CBC RESULTS:   Recent Labs   Lab Test 02/28/19 02/21/19 02/17/19 0702 02/13/19 0720   WBC 4.4 4.4  --  5.0  --  6.7   RBC 3.66* 3.82  --  4.10  --  4.16   HGB 10.8* 11.4*  --  12.3  --  12.5   HCT 33.6* 35.1  --  36.2  --  36.5   MCV 91.8 91.9  --  88  --  88   MCH  --   --   --  30.0  --  30.0   MCHC  --   --   --  34.0  --  34.2   RDW 12.9 12.6  --  12.6  --  12.7    320   < > 292   < > 189    < > = values in this interval not displayed.       Last Basic Metabolic Panel:  Recent Labs   Lab Test 02/28/19 02/21/19    135*   POTASSIUM 4.4 4.1   CHLORIDE 100 96*   VIOLETA 8.3* 8.7   CO2 31 33*   BUN 12 14   CR 0.80 0.82   * 132*       Liver Function Studies -   Recent Labs   Lab Test 02/17/19  0702 02/13/19  0720 02/12/19  0727   PROTTOTAL  --  7.0 6.8   ALBUMIN 2.9* 2.9* 2.9*    BILITOTAL  --  1.0 0.7   ALKPHOS  --  44 40   AST  --  27 24   ALT  --  20 15       Lab Results   Component Value Date    A1C 7.0 02/10/2019    A1C 6.1 07/26/2018           Discharge Treatments:none    TOTAL DISCHARGE TIME:   Greater than 30 minutes  Electronically signed by:  Tonya Lynn Haase, APRN CNP

## 2019-03-04 ENCOUNTER — TELEPHONE (OUTPATIENT)
Dept: FAMILY MEDICINE | Facility: CLINIC | Age: 84
End: 2019-03-04

## 2019-03-04 NOTE — TELEPHONE ENCOUNTER
"  Hospital/ED for chronic condition Discharge Protocol    \"Hi, my name is Lennox Villeda, a registered nurse, and I am calling from [facility].  I am calling to follow up and see how things are going after Lora Vergara's recent emergency visit/hospital stay.\"    Tell me how he/she is doing now that they are home?\" weak- \"but I have home care \".      Discharge Instructions    \"Let's review the discharge instructions.  What is/are the follow-up recommendations?  Response: follow up with PCP     \"Has an appointment with the primary care provider been scheduled?\"   Yes. (confirm)    \"When your child sees the provider, I would recommend that you bring the medications with you.\"    Medications    \"Tell me what changed about his/her medicines when he/she discharged?\"    Changes to chronic meds?    0-1    \"What questions do you have about the medications?\"    None          Medication reconciliation completed? Yes  Was MTM referral placed (*Make sure to put transitions as reason for referral)?   No    Call Summary    \"What questions or concerns do you have about your child's recent visit and the follow-up care?\"     none    \"If you have questions or things don't continue to improve, we encourage you contact us through the main clinic number (give number).  Even if the clinic is not open, triage nurses are available 24/7 to help you.     We would like you to know that our clinic has extended hours (provide information).  We also have urgent care (provide details on closest location and hours/contact info)\"      \"Thank you for your time and take care!\"            "

## 2019-03-05 ENCOUNTER — TELEPHONE (OUTPATIENT)
Dept: FAMILY MEDICINE | Facility: CLINIC | Age: 84
End: 2019-03-05

## 2019-03-05 NOTE — TELEPHONE ENCOUNTER
Reason for Call:  Other Home care     Detailed comments: Deya with [facility] is calling wanting to know if Dr. Schmidt will follow [facility] home care.     Phone Number Patient can be reached at: Other phone number:  [phone]    Best Time: anytime     Can we leave a detailed message on this number? YES    Call taken on 3/5/2019 at 11:10 AM by Dorothy Bernard

## 2019-03-11 ENCOUNTER — TELEPHONE (OUTPATIENT)
Dept: FAMILY MEDICINE | Facility: CLINIC | Age: 84
End: 2019-03-11

## 2019-03-11 NOTE — TELEPHONE ENCOUNTER
Our goal is to have forms completed within 72 hours, however some forms may require a visit or additional information.    What clinic location was the form placed at [facility] or [place].?     Who is the form from?   Where did the form come from? Faxed to clinic   The form was placed in the inbox of Antwon Schmidt MD      Please fax to [phone]  Phone number: [phone]    Additional comments:[facility] health  [facility], RN, & PT orders      Call take on 3/11/2019 at 3:17 PM by Nhi Maharaj

## 2019-03-12 ENCOUNTER — OFFICE VISIT (OUTPATIENT)
Dept: FAMILY MEDICINE | Facility: CLINIC | Age: 84
End: 2019-03-12
Payer: MEDICARE

## 2019-03-12 VITALS
WEIGHT: 198 LBS | DIASTOLIC BLOOD PRESSURE: 70 MMHG | BODY MASS INDEX: 36.44 KG/M2 | OXYGEN SATURATION: 95 % | HEART RATE: 80 BPM | SYSTOLIC BLOOD PRESSURE: 128 MMHG | RESPIRATION RATE: 20 BRPM | HEIGHT: 62 IN | TEMPERATURE: 97.5 F

## 2019-03-12 DIAGNOSIS — R53.1 GENERALIZED WEAKNESS: ICD-10-CM

## 2019-03-12 DIAGNOSIS — G62.9 NEUROPATHY: Chronic | ICD-10-CM

## 2019-03-12 DIAGNOSIS — J18.9 PNEUMONIA OF LEFT LUNG DUE TO INFECTIOUS ORGANISM, UNSPECIFIED PART OF LUNG: Primary | ICD-10-CM

## 2019-03-12 PROCEDURE — 99495 TRANSJ CARE MGMT MOD F2F 14D: CPT | Performed by: INTERNAL MEDICINE

## 2019-03-12 RX ORDER — PREGABALIN 150 MG/1
CAPSULE ORAL
Qty: 180 CAPSULE | Refills: 3 | Status: SHIPPED | OUTPATIENT
Start: 2019-03-12 | End: 2019-09-04

## 2019-03-12 ASSESSMENT — MIFFLIN-ST. JEOR: SCORE: 1256.37

## 2019-03-12 NOTE — PROGRESS NOTES
SUBJECTIVE:   Lora Vergara is a 89 year old female who presents to clinic today for the following health issues:          Hospital Follow-up Visit:    Hospital/Nursing Home/IP Rehab Facility: [facility]  Date of Admission: 2/10/2019 [facility] and 2/19/2019 [facility]  Date of Discharge: 2/19/2019 [facility] and 3/1//2019 [facility]   Reason(s) for Admission: Community acquired pneumonia  and Sepsis secondary to Pneumonia              Problems taking medications regularly:  None       Medication changes since discharge: see med list       Problems adhering to non-medication therapy:  None    Summary of hospitalization:  [facility] discharge summary reviewed  See outside records, reviewed and scanned  Diagnostic Tests/Treatments reviewed.  Follow up needed: yes  Other Healthcare Providers Involved in Patient s Care:         Homecare and Care Coordination  Update since discharge: stable.     Post Discharge Medication Reconciliation: discharge medications reconciled and changed, per note/orders (see AVS).  Plan of care communicated with patient and family     Coding guidelines for this visit:  Type of Medical   Decision Making Face-to-Face Visit       within 7 Days of discharge Face-to-Face Visit        within 14 days of discharge   Moderate Complexity [id number]   High Complexity [id number]                   Rx ceftin 1/2/19 for a respiratory infection.                 Then she became acutely ill and went to the ER 2/10 and found to have a L sided pneumonia.                    We reviewed the hospital reports.  She was treated initially with Rocephin and Zithromax, and then was switched to Levaquin for another week.  She required supplemental oxygen for multiple days.  She was given a short course of corticosteroids.  Then she went to rehab.            Here with her son.                Home for 10 days or so from rehab.                     She is feeling much better, but she is      unhappy with being ill.             Has an oximeter; 93-96% readings today.        Her son bought the oximeter for her.                         She also has some paperwork for us to complete so that she can get reduced cost Lyrica.  She continues to report this is very helpful for her.                         She has home care, with OT and PT.                      She was discharged with no medication changes.    Problem list and histories reviewed & adjusted, as indicated.  Additional history: as documented    Current Outpatient Medications   Medication Sig Dispense Refill     acetaminophen (TYLENOL) 500 MG tablet Take 500-1,000 mg by mouth every 6 hours as needed       aspirin 81 MG EC tablet Take 81 mg by mouth daily       atorvastatin (LIPITOR) 40 MG tablet Take 1 tablet (40 mg) by mouth daily 90 tablet 3     bisacodyl (DULCOLAX) 5 MG EC tablet Take 5 mg by mouth daily       furosemide (LASIX) 20 MG tablet Take 0.5 tablets (10 mg) by mouth daily 45 tablet 3     isosorbide mononitrate (IMDUR) 60 MG 24 hr tablet TAKE 1 TABLET (60 MG) BY MOUTH DAILY 90 tablet 2     latanoprost (XALATAN) 0.005 % ophthalmic solution Place 1 drop into both eyes At Bedtime       levothyroxine (SYNTHROID/LEVOTHROID) 25 MCG tablet TAKE ONE TABLET BY MOUTH ONE TIME DAILY 90 tablet 2     metFORMIN (GLUCOPHAGE-XR) 750 MG 24 hr tablet TAKE 1 TABLET (750 MG) BY MOUTH DAILY WITH DINNER 90 tablet 1     metoprolol tartrate (LOPRESSOR) 100 MG tablet TAKE 1/2 TABLET BY MOUTH EVERY AM AND TAKE 1 TABLET IN THE EVENING. 135 tablet 2     nitroGLYcerin (NITROSTAT) 0.4 MG sublingual tablet Place 1 tablet (0.4 mg) under the tongue every 5 minutes as needed for chest pain 50 tablet 3     nystatin (MYCOSTATIN) 028485 UNIT/GM external powder Apply topically 2 times daily       order for DME Equipment being ordered: wheeled walker with brakes and a seat 1 Device 0     polyethylene glycol (MIRALAX/GLYCOLAX) packet Take 1 packet by mouth daily as needed for constipation        "pregabalin (LYRICA) 150 MG capsule TAKE 1 CAPSULE BY MOUTH TWICE A DAY 20 capsule 3     ranolazine (RANEXA) 500 MG 12 hr tablet Take 1 tablet (500 mg) by mouth 2 times daily 180 tablet 2     vitamin D3 (CHOLECALCIFEROL) 2000 units tablet Take 4,000 Units by mouth daily       Allergies   Allergen Reactions     Hydrochlorothiazide      Pancreatitis - patient denies this.  She says it gave her low sodium.     BP Readings from Last 3 Encounters:   03/12/19 128/70   02/28/19 122/66   02/27/19 116/62    Wt Readings from Last 3 Encounters:   03/12/19 89.8 kg (198 lb)   02/28/19 90.7 kg (199 lb 14.4 oz)   02/27/19 90.4 kg (199 lb 6.4 oz)                    Reviewed and updated as needed this visit by clinical staff  Allergies       Reviewed and updated as needed this visit by Provider         ROS:  CONSTITUTIONAL:NEGATIVE for fever, chills, change in weight and POSITIVE  for fatigue  RESP:NEGATIVE for cough-non productive and cough-productive  CV: NEGATIVE for chest pain, palpitations or peripheral edema  NEURO: NEGATIVE for behavior changes    OBJECTIVE:                                                    /70 (BP Location: Left arm, Patient Position: Chair, Cuff Size: Adult Large)   Pulse 80   Temp 97.5  F (36.4  C)   Resp 20   Ht 1.575 m (5' 2\")   Wt 89.8 kg (198 lb)   LMP  (LMP Unknown)   SpO2 95%   Breastfeeding? No   BMI 36.21 kg/m    Body mass index is 36.21 kg/m .  GENERAL APPEARANCE: alert, no distress, over weight and fatigued  RESP: rales L lower posterior  CV: regular rates and rhythm and pitting B/L LE edema to trace only  NEURO: gait abnormal ; uses a cane    Diagnostic test results:  Results for orders placed or performed in visit on 02/28/19   CBC with Platelets Differential - HIM   Result Value Ref Range    WBC 4.4 3.8 - 11.0 k/cmm    RBC Count 3.66 (A) 3.70 - 5.20 m/cmm    Hemoglobin 10.8 (A) 11.8 - 15.5 g/dL    Hematocrit 33.6 (A) 35.0 - 46.0 %    MCV 91.8 80.0 - 100.0 fl    RDW 12.9 11.0 - " 15.0 %    Platelet Count 227 140 - 450 k/cmm    Differential See Scanned Document     Narrative    LAB RESULT [facility]   Basic metabolic panel   Result Value Ref Range    Sodium 138 136 - 145 mmol/L    Potassium 4.4 3.5 - 5.2 mmol/L    Chloride 100 98 - 109 mmol/L    Carbon Dioxide 31 22 - 31 mmol/L    Glucose 105 (H) 70 - 100 mg/dL    Urea Nitrogen 12 9 - 26 mg/dL    Creatinine 0.80 0.55 - 1.02 mg/dL    Calcium 8.3 (L) 8.4 - 10.4 mg/dL    GFR Estimate >60 >60 ml/min/1.73m2    GFR Estimate If Black >60 >60 ml/min/1.73m2    Narrative    LAB RESULT [facility]        ASSESSMENT/PLAN:                                                        ICD-10-CM    1. Pneumonia of left lung due to infectious organism, unspecified part of lung J18.9    2. Neuropathy G62.9 pregabalin (LYRICA) 150 MG capsule   3. Generalized weakness R53.1             She has had a slow, but apparently complete recovery from her respiratory illness.  We discussed signs and symptoms for her to      be aware of going forward.                        We filled out the paperwork for her reduced cost or no cost Lyrica.    Patient Instructions   You have recovered nicely.               Keep up a good activity level.                    Keep taking the same medications.                Return in about 4 months (around 7/12/2019) for BP Recheck, labs will be needed.      Antwon Schmidt MD  [facility]

## 2019-03-12 NOTE — PATIENT INSTRUCTIONS
You have recovered nicely.               Keep up a good activity level.                    Keep taking the same medications.

## 2019-03-13 ENCOUNTER — MEDICAL CORRESPONDENCE (OUTPATIENT)
Dept: HEALTH INFORMATION MANAGEMENT | Facility: CLINIC | Age: 84
End: 2019-03-13

## 2019-03-13 ENCOUNTER — TELEPHONE (OUTPATIENT)
Dept: FAMILY MEDICINE | Facility: CLINIC | Age: 84
End: 2019-03-13

## 2019-03-13 NOTE — TELEPHONE ENCOUNTER
Our goal is to have forms completed within 72 hours, however some forms may require a visit or additional information.    What clinic location was the form placed at [facility] or [place].?     Who is the form from?   Where did the form come from? Faxed to clinic   The form was placed in the inbox of Antwon Schmidt MD      Please fax to [phone]  Phone number: [phone]    Additional comments: [facility] 3/3/19 to 5/1/19    Call take on 3/13/2019 at 11:37 AM by Nhi Maahraj

## 2019-03-18 ENCOUNTER — OFFICE VISIT (OUTPATIENT)
Dept: URGENT CARE | Facility: URGENT CARE | Age: 84
End: 2019-03-18
Payer: MEDICARE

## 2019-03-18 VITALS — TEMPERATURE: 98.2 F | DIASTOLIC BLOOD PRESSURE: 64 MMHG | HEART RATE: 64 BPM | SYSTOLIC BLOOD PRESSURE: 108 MMHG

## 2019-03-18 DIAGNOSIS — B37.0 THRUSH: Primary | ICD-10-CM

## 2019-03-18 PROCEDURE — 99213 OFFICE O/P EST LOW 20 MIN: CPT | Performed by: FAMILY MEDICINE

## 2019-03-18 RX ORDER — CLOTRIMAZOLE 10 MG/1
10 LOZENGE ORAL
Qty: 50 TROCHE | Refills: 0 | Status: SHIPPED | OUTPATIENT
Start: 2019-03-18 | End: 2019-09-19

## 2019-03-18 NOTE — PATIENT INSTRUCTIONS
Patient Education     Candida Infection: Thrush  Thrush is a fungal infection in the mouth and throat. Thrush does not usually affect healthy adults. It is more common in people with a weak immune system. It is also more likely if you take antibiotics. Thrush is normally not contagious.  Understanding fungus in the mouth and throat  Your mouth and throat normally contain millions of tiny organisms. These include bacteria and yeasts. Many of these do not cause any problems. In fact, they may help fight disease.  Yeasts are a type of fungus. A type of yeast called Candida normally lives on the membranes of your mouth and throat. Usually, this yeast grows only in small amounts and is harmless. But in some cases, Candida can grow out of control and cause thrush. Thrush is related to other kinds of Candida infections that can grow all over the body. Thrush refers to an infection of only the mouth and throat.  What causes thrush?  Thrush happens when something lets too much Candida grow inside your mouth and throat. Certain things that change the normal balance of organisms in the mouth can lead to thrush. One example is antibiotic medicine. This medicine may kill some of the normal bacteria in your mouth. Candida can then grow freely. People on antibiotics have an increased risk for thrush.  You have a higher risk for thrush if you:    Wear dentures    Are getting chemotherapy    Are getting radiation therapy    Have diabetes    Have a transplanted organ    Use corticosteroids, including inhaled corticosteroids for lung disease    Have a weak immune system, such as from AIDS    Are an older adult  Symptoms of thrush  Symptoms of thrush can include:    A dry, cottony feeling in your mouth    Cracking at the corners of the mouth    Loss of taste    Pain while eating or swallowing    White patches on the tongue and around the sides of the mouth  Diagnosing thrush  Your healthcare provider will ask about your medical  history and your symptoms. He or she will look closely at your mouth and throat. White or red patches will be scraped with a tongue depressor. The sample will be sent to a lab to test. This test can usually confirm thrush.  If you have thrush, you may also have esophageal candidiasis. This is common in people who have HIV or a weak immune system. Your healthcare provider may check for this condition with an upper endoscopy. This is a procedure to look at the esophagus. A tissue sample may be taken to test.  Treatment for thrush  Thrush is usually treated with antifungal medicine. The medicine is put directly in your mouth and throat. You may be given a  swish and swallow  medicine or an antifungal lozenge.  In some cases, you may need an antifungal pill. This can remove Candida throughout your body. Or you may need medicine through an intravenous line ( IV). These treatments depend on how severe your infection is, and what other health conditions you have.  If you are at high risk for thrush, you may need to keep taking oral antifungal medicine. This is to help prevent thrush in the future.  What happens if you don t get treated for thrush?  If untreated, the Candida may spread throughout your body. They may even enter your bloodstream. This can cause serious problems, such as organ failure and even death. Bloodstream infection may need to be treated with high doses of antifungal medicine through an IV.  Systemic infection is much more likely in people who are very ill. It is also more common in those who have serious problems with their immune system. Additional risk factors for systemic infection in very ill people include:    Central venous lines    IV nutrition    Use of broad-spectrum antibiotics    Kidney failure    Recent surgery  Preventing thrush  You may be able to help prevent some cases of thrush. Make sure to:    Practice good oral hygiene. Try using a chlorhexidine mouthwash.    Clean your dentures  regularly as instructed. Make sure they fit you correctly.    After using a corticosteroid inhaler, rinse out your mouth with water or mouthwash.    Do not use broad-spectrum antibiotics, if possible.    Get treated for health problems that increase your risk for thrush, such as diabetes.     When to call the healthcare provider  Call your healthcare provider right away if you have any of these:    Cot[place]y feeling in your mouth    Loss of taste    Pain while eating or swallowing    White patches or plaques on your tongue or inside your mouth   Date Last Reviewed: 5/1/2017 2000-2018 [employer]. [address] [place] [address]. All rights reserved. This information is not intended as a substitute for professional medical care. Always follow your healthcare professional's instructions.

## 2019-03-18 NOTE — PROGRESS NOTES
SUBJECTIVE: Lora Vergara is a 89 year old female presenting with a chief complaint of oral white patches aand redness/soreness.  Onset of symptoms was 1 week(s) ago.  Course of illness is worsening.    Severity moderate  Current and Associated symptoms: none  Treatment measures tried include None tried.  Predisposing factors include multiple antibiotics over weeks.    Past Medical History:   Diagnosis Date     Angina at rest (H)     Thought to be microvascular     Arthritis      Basal cell carcinoma 1/2014, (3/5/6-2014)    nose and check and ear and face     Coronary artery disease     Mild CAD per 2/2008 angiogram     Hyperlipidaemia      Hypertension      Hypothyroidism 1/17/2013     Mitral regurgitation 6/2015    echo     Type 2 diabetes, HbA1C goal < 8% (H) 9/11/2013     Allergies   Allergen Reactions     Hydrochlorothiazide      Pancreatitis - patient denies this.  She says it gave her low sodium.     Social History     Tobacco Use     Smoking status: Never Smoker     Smokeless tobacco: Never Used   Substance Use Topics     Alcohol use: Yes     Comment: 1 per week       ROS:  SKIN: no rash  GI: no vomiting    OBJECTIVE:  /64   Pulse 64   Temp 98.2  F (36.8  C) (Oral)   LMP  (LMP Unknown)    GENERAL APPEARANCE: healthy, alert and no distress  EYES: EOMI,  PERRL, conjunctiva clear  HENT: TM's normal bilaterally and oral white patches and redness  NECK: supple, nontender, no lymphadenopathy  SKIN: no suspicious lesions or rashes      ICD-10-CM    1. Thrush B37.0 clotrimazole 10 MG sonia       Fluids/Rest, f/u if worse/not any better

## 2019-03-19 ENCOUNTER — TELEPHONE (OUTPATIENT)
Dept: FAMILY MEDICINE | Facility: CLINIC | Age: 84
End: 2019-03-19

## 2019-03-19 NOTE — TELEPHONE ENCOUNTER
Our goal is to have forms completed within 72 hours, however some forms may require a visit or additional information.    What clinic location was the form placed at [facility] or [place].?     Who is the form from?   Where did the form come from? Faxed to clinic   The form was placed in the inbox of Antwon Schmidt MD      Please fax to [phone]  Phone number: [phone]    Additional comments: [facility] as of 3/13/19    Call take on 3/19/2019 at 1:54 PM by Nhi Maharaj

## 2019-03-19 NOTE — TELEPHONE ENCOUNTER
Our goal is to have forms completed within 72 hours, however some forms may require a visit or additional information.    What clinic location was the form placed at [facility] or [place].?     Who is the form from?   Where did the form come from? Faxed to clinic   The form was placed in the inbox of Antwon Schmidt MD      Please fax to [phone]  Phone number: [phone]    Additional comments: [facility]  SN, PT, HHA orders     Call take on 3/19/2019 at 11:23 AM by Nhi Maharaj

## 2019-03-25 ENCOUNTER — TELEPHONE (OUTPATIENT)
Dept: FAMILY MEDICINE | Facility: CLINIC | Age: 84
End: 2019-03-25

## 2019-03-25 NOTE — TELEPHONE ENCOUNTER
Our goal is to have forms completed within 72 hours, however some forms may require a visit or additional information.    What clinic location was the form placed at [facility] or [place].?     Who is the form from?   Where did the form come from? Faxed to clinic   The form was placed in the inbox of Antwon Schmidt MD      Please fax to [phone]  Phone number: [phone]    Additional comments: [facility] SN DC summary    Call take on 3/25/2019 at 10:57 AM by Nhi Maharaj

## 2019-03-25 NOTE — TELEPHONE ENCOUNTER
Our goal is to have forms completed within 72 hours, however some forms may require a visit or additional information.    What clinic location was the form placed at [facility] or [place].?     Who is the form from?   Where did the form come from? Faxed to clinic   The form was placed in the inbox of Antwon Schmidt MD      Please fax to [phone]  Phone number: [phone]    Additional comments: [facility] SN orders     Call take on 3/25/2019 at 11:34 AM by Nhi Maharaj

## 2019-03-26 ENCOUNTER — PATIENT OUTREACH (OUTPATIENT)
Dept: CARE COORDINATION | Facility: CLINIC | Age: 84
End: 2019-03-26

## 2019-03-26 NOTE — PROGRESS NOTES
Clinic Care Coordination Contact      [facility] outreach following pt discharge from Home Care. Pt reports she was discharged 3/18 from Home Care and continues to do strengthening exercises on her own. Pt shared more recent urgent care visit where she was diagnosed with Thrush. Pt states this is improving but slowly. Pt encouraged to schedule clinic visit if symptoms worsen or stop improving.     Pt also able to submit needed paperwork to re enroll in [facility] and pt shared she has already received her first prescription of Lyrica via mail. Pt has not other concerns regarding medication affordability at this time.     No further outreaches will be made at this time unless a new referral is made or a change in the pt's status occurs. Patient was provided with this writer's contact information and encouraged to call with any questions or concerns.    BERNA Goldsmith   Social Work Care Coordinator  [phone]

## 2019-03-28 ENCOUNTER — TELEPHONE (OUTPATIENT)
Dept: FAMILY MEDICINE | Facility: CLINIC | Age: 84
End: 2019-03-28

## 2019-03-28 NOTE — TELEPHONE ENCOUNTER
Our goal is to have forms completed within 72 hours, however some forms may require a visit or additional information.    What clinic location was the form placed at [facility] or [place].?     Who is the form from?   Where did the form come from? Faxed to clinic   The form was placed in the inbox of Antwon Schmidt MD      Please fax to [phone]  Phone number: [phone]    Additional comments: [facility] DC summary    Call take on 3/28/2019 at 1:48 PM by Nhi Maharaj

## 2019-04-12 ENCOUNTER — OFFICE VISIT (OUTPATIENT)
Dept: CARDIOLOGY | Facility: CLINIC | Age: 84
End: 2019-04-12
Attending: INTERNAL MEDICINE
Payer: MEDICARE

## 2019-04-12 VITALS
SYSTOLIC BLOOD PRESSURE: 112 MMHG | HEART RATE: 84 BPM | DIASTOLIC BLOOD PRESSURE: 76 MMHG | HEIGHT: 62 IN | BODY MASS INDEX: 36.77 KG/M2 | WEIGHT: 199.8 LBS

## 2019-04-12 DIAGNOSIS — I25.9 CHRONIC ISCHEMIC HEART DISEASE: ICD-10-CM

## 2019-04-12 PROCEDURE — 99214 OFFICE O/P EST MOD 30 MIN: CPT | Performed by: NURSE PRACTITIONER

## 2019-04-12 ASSESSMENT — MIFFLIN-ST. JEOR: SCORE: 1264.54

## 2019-04-12 NOTE — LETTER
4/12/2019    Antwon Schmidt MD  [address]  [facility] [address]    RE: Lora Vergara       Dear Colleague,    I had the pleasure of seeing Lora Vergara in the [facility].    HPI and Plan:   I had the pleasure of seeing Lora Vergara today in cardiology clinic follow up. She is a pleasant 89 year old patient of Dr. Luis.    Ms. 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  Coronary angiography in 2010 showed mild to moderate coronary artery atherosclerosis but no obstructive disease.  Her chest discomfort and jaw pain have been suspicious for angina and responded to Imdur and Ranexa.  She has been getting Ranexa at a discount from the company, she brought with her renewal paperwork today.    She has had a rough winter, she went in with to the ED with a cough at [facility] time and was admitted in February with pneumonia, took her 3 weeks to recover before she could go home.  She no longer drives.    Today she says she is doing well.  She denies any shortness of breath, PND or orthopnea.  Her angina is controlled with beta-blocker, Imdur and Ranexa.  Her blood pressure is borderline.  Previously she has been on calcium channel blocker but it was discontinued for hypotension.    Labs Reviewed: Sodium 138, potassium 4.4, creatinine 0.8, LDL 42, hemoglobin 10.8    Physical Exam  Please see Below     Assessment and Plan  1.  Microvascular angina.  She has nonobstructive coronary artery disease.  She has symptomatic control with isosorbide, beta blockade and Ranexa.  She has paperwork for Ranexa, she is completed most of the paperwork and I completed with I could I will have Mirna fill the remainder and fax a copy to the company and send a copy to the patient for her records.  She continues on aspirin and statin therapy.  2.  Hypertension.  Controlled.  Her blood pressure is on the low side despite discontinuing amlodipine.   When she sees Dr. Luis in 6 months time they will reevaluate and decide on further med changes.  3.  Hyperlipidemia.  Controlled, LDL 42 on statin therapy.    Thank you for allowing me to care for Lora Vergara today.    HERMELINDA Hernandez, CNP  Cardiology    Voice recognition software was used for this note, I have reviewed this note, but errors may have been missed.    No orders of the defined types were placed in this encounter.    No orders of the defined types were placed in this encounter.    There are no discontinued medications.      CURRENT MEDICATIONS:  Current Outpatient Medications   Medication Sig Dispense Refill     acetaminophen (TYLENOL) 500 MG tablet Take 500-1,000 mg by mouth every 6 hours as needed       aspirin 81 MG EC tablet Take 81 mg by mouth daily       atorvastatin (LIPITOR) 40 MG tablet Take 1 tablet (40 mg) by mouth daily 90 tablet 3     bisacodyl (DULCOLAX) 5 MG EC tablet Take 5 mg by mouth daily       furosemide (LASIX) 20 MG tablet Take 0.5 tablets (10 mg) by mouth daily 45 tablet 3     isosorbide mononitrate (IMDUR) 60 MG 24 hr tablet TAKE 1 TABLET (60 MG) BY MOUTH DAILY 90 tablet 2     latanoprost (XALATAN) 0.005 % ophthalmic solution Place 1 drop into both eyes At Bedtime       metFORMIN (GLUCOPHAGE-XR) 750 MG 24 hr tablet TAKE 1 TABLET (750 MG) BY MOUTH DAILY WITH DINNER 90 tablet 1     metoprolol tartrate (LOPRESSOR) 100 MG tablet TAKE 1/2 TABLET BY MOUTH EVERY AM AND TAKE 1 TABLET IN THE EVENING. 135 tablet 2     nitroGLYcerin (NITROSTAT) 0.4 MG sublingual tablet Place 1 tablet (0.4 mg) under the tongue every 5 minutes as needed for chest pain 50 tablet 3     order for DME Equipment being ordered: wheeled walker with brakes and a seat 1 Device 0     pregabalin (LYRICA) 150 MG capsule TAKE 1 CAPSULE BY MOUTH TWICE A  capsule 3     ranolazine (RANEXA) 500 MG 12 hr tablet Take 1 tablet (500 mg) by mouth 2 times daily 180 tablet 2     vitamin D3 (CHOLECALCIFEROL) 2000  units tablet Take 4,000 Units by mouth daily       levothyroxine (SYNTHROID/LEVOTHROID) 25 MCG tablet TAKE ONE TABLET BY MOUTH ONE TIME DAILY (Patient not taking: Reported on 4/12/2019) 90 tablet 2     nystatin (MYCOSTATIN) 752765 UNIT/GM external powder Apply topically 2 times daily       polyethylene glycol (MIRALAX/GLYCOLAX) packet Take 1 packet by mouth daily as needed for constipation         ALLERGIES     Allergies   Allergen Reactions     Hydrochlorothiazide      Pancreatitis - patient denies this.  She says it gave her low sodium.       PAST MEDICAL HISTORY:  Past Medical History:   Diagnosis Date     Angina at rest (H)     Thought to be microvascular     Arthritis      Basal cell carcinoma 1/2014, (3/5/6-2014)    nose and check and ear and face     Coronary artery disease     Mild CAD per 2/2008 angiogram     Hyperlipidaemia      Hypertension      Hypothyroidism 1/17/2013     Mitral regurgitation 6/2015    echo     Type 2 diabetes, HbA1C goal < 8% (H) 9/11/2013       PAST SURGICAL HISTORY:  Past Surgical History:   Procedure Laterality Date     APPENDECTOMY  1974     BACK SURGERY  1996     BLADDER SURGERY  1990    bladder suspension     CHOLECYSTECTOMY  1975     CORONARY ANGIOGRAPHY ADULT ORDER  2/2008    Mild CAD. LAD w/20% stenosis, RCA with 30-40% stenosis. No flow limiting obstructions.     face surgery  1-6/2014    basal cell plus plastic to nose, face , ear     HYSTERECTOMY  1970     OOPHORECTOMY  1974    bilateral     PHACOEMULSIFICATION CLEAR CORNEA WITH STANDARD INTRAOCULAR LENS IMPLANT Right 7/21/2015    Procedure: PHACOEMULSIFICATION CLEAR CORNEA WITH STANDARD INTRAOCULAR LENS IMPLANT;  Surgeon: Schuyler Chapa MD;  Location: [facility]     PHACOEMULSIFICATION CLEAR CORNEA WITH STANDARD INTRAOCULAR LENS IMPLANT Left 8/18/2015    Procedure: PHACOEMULSIFICATION CLEAR CORNEA WITH STANDARD INTRAOCULAR LENS IMPLANT;  Surgeon: Schuyler Chapa MD;  Location: [facility]       FAMILY HISTORY:  Family  History   Problem Relation Age of Onset     Cancer Brother         lung     Cancer Son      Cerebrovascular Disease Mother         and Parkinson's     Cancer Father         pancreas     Diabetes Son        SOCIAL HISTORY:  Social History     Socioeconomic History     Marital status:      Spouse name: None     Number of children: None     Years of education: None     Highest education level: None   Occupational History     None   Social Needs     Financial resource strain: None     Food insecurity:     Worry: None     Inability: None     Transportation needs:     Medical: None     Non-medical: None   Tobacco Use     Smoking status: Never Smoker     Smokeless tobacco: Never Used   Substance and Sexual Activity     Alcohol use: Yes     Comment: rarely     Drug use: No     Sexual activity: Never   Lifestyle     Physical activity:     Days per week: None     Minutes per session: None     Stress: None   Relationships     Social connections:     Talks on phone: None     Gets together: None     Attends [employer] service: None     Active member of club or organization: None     Attends meetings of clubs or organizations: None     Relationship status: None     Intimate partner violence:     Fear of current or ex partner: None     Emotionally abused: None     Physically abused: None     Forced sexual activity: None   Other Topics Concern     Parent/sibling w/ CABG, MI or angioplasty before 65F 55M? No      Service Not Asked     Blood Transfusions Not Asked     Caffeine Concern Yes     Comment: 4 cups caffeine per day     Occupational Exposure Not Asked     Hobby Hazards Not Asked     Sleep Concern No     Stress Concern No     Weight Concern No     Special Diet No     Back Care Not Asked     Exercise Yes     Comment: exercises 30 minutes, 4 days week     Bike Helmet Not Asked     Seat Belt Not Asked     Self-Exams Not Asked   Social History Narrative    Had 2 sons; one ; had 2 step dtrs; one .              "                           times 2       Review of Systems:  Skin:  Negative       Eyes:  Positive for glasses cataract extraction both eyes  ENT:  Negative      Respiratory:  Positive for dyspnea on exertion     Cardiovascular:    Positive for;edema;fatigue edema due to neuropathy. wobbly sometimes  Gastroenterology: Negative      Genitourinary:  Negative      Musculoskeletal:  Positive for arthritis;back pain;foot pain right arm pain, pain in legs(knees).neuropathy in feet going up legs  Neurologic:  Positive for numbness or tingling of hands;numbness or tingling of feet neuropathy  Psychiatric:  Negative      Heme/Lymph/Imm:  Positive for allergies    Endocrine:  Positive for diabetes;thyroid disorder      Physical Exam:  Vitals: /76   Pulse 84   Ht 1.575 m (5' 2\")   Wt 90.6 kg (199 lb 12.8 oz)   LMP  (LMP Unknown)   BMI 36.54 kg/m       Constitutional:  cooperative        Skin:  warm and dry to the touch          Head:  normocephalic        Eyes:  pupils equal and round        Lymph:      ENT:  no pallor or cyanosis        Neck:  JVP normal        Respiratory:  clear to auscultation         Cardiac: regular rhythm;normal S1 and S2                pulses full and equal                                        GI:           Extremities and Muscular Skeletal:  no edema              Neurological:  no gross motor deficits   uses walker    Psych:  Alert and Oriented x 3    Encounter Diagnosis   Name Primary?     Chronic ischemic heart disease        Recent Lab Results:  LIPID RESULTS:  Lab Results   Component Value Date    CHOL 123 07/26/2018    HDL 55 07/26/2018    LDL 42 07/26/2018    TRIG 130 07/26/2018    CHOLHDLRATIO 1.9 07/20/2015       LIVER ENZYME RESULTS:  Lab Results   Component Value Date    AST 27 02/13/2019    ALT 20 02/13/2019       CBC RESULTS:  Lab Results   Component Value Date    WBC 4.4 02/28/2019    RBC 3.66 (A) 02/28/2019    HGB 10.8 (A) 02/28/2019    HCT 33.6 (A) 02/28/2019    MCV " 91.8 02/28/2019    MCH 30.0 02/17/2019    MCHC 34.0 02/17/2019    RDW 12.9 02/28/2019     02/28/2019       BMP RESULTS:  Lab Results   Component Value Date     02/28/2019    POTASSIUM 4.4 02/28/2019    CHLORIDE 100 02/28/2019    CO2 31 02/28/2019    ANIONGAP 6 02/17/2019     (H) 02/28/2019    BUN 12 02/28/2019    CR 0.80 02/28/2019    GFRESTIMATED >60 02/28/2019    GFRESTBLACK >60 02/28/2019    VIOLETA 8.3 (L) 02/28/2019        A1C RESULTS:  Lab Results   Component Value Date    A1C 7.0 (H) 02/10/2019       INR RESULTS:  Lab Results   Component Value Date    INR 1.02 12/17/2009    INR 0.97 02/22/2008         Thank you for allowing me to participate in the care of your patient.    Sincerely,     HERMELINDA [facility]

## 2019-04-12 NOTE — PROGRESS NOTES
HPI and Plan:   I had the pleasure of seeing Lora Vergara today in cardiology clinic follow up. She is a pleasant 89 year old patient of Dr. Luis.    Ms. 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  Coronary angiography in 2010 showed mild to moderate coronary artery atherosclerosis but no obstructive disease.  Her chest discomfort and jaw pain have been suspicious for angina and responded to Imdur and Ranexa.  She has been getting Ranexa at a discount from the company, she brought with her renewal paperwork today.    She has had a rough winter, she went in with to the ED with a cough at [facility] time and was admitted in February with pneumonia, took her 3 weeks to recover before she could go home.  She no longer drives.    Today she says she is doing well.  She denies any shortness of breath, PND or orthopnea.  Her angina is controlled with beta-blocker, Imdur and Ranexa.  Her blood pressure is borderline.  Previously she has been on calcium channel blocker but it was discontinued for hypotension.    Labs Reviewed: Sodium 138, potassium 4.4, creatinine 0.8, LDL 42, hemoglobin 10.8    Physical Exam  Please see Below     Assessment and Plan  1.  Microvascular angina.  She has nonobstructive coronary artery disease.  She has symptomatic control with isosorbide, beta blockade and Ranexa.  She has paperwork for Ranexa, she is completed most of the paperwork and I completed with I could I will have [facility] fill the remainder and fax a copy to the company and send a copy to the patient for her records.  She continues on aspirin and statin therapy.  2.  Hypertension.  Controlled.  Her blood pressure is on the low side despite discontinuing amlodipine.  When she sees Dr. Luis in 6 months time they will reevaluate and decide on further med changes.  3.  Hyperlipidemia.  Controlled, LDL 42 on statin therapy.    Thank you for allowing me to care for Lora Vergara  today.    HERMELINDA Hernandez, CNP  Cardiology    Voice recognition software was used for this note, I have reviewed this note, but errors may have been missed.    No orders of the defined types were placed in this encounter.    No orders of the defined types were placed in this encounter.    There are no discontinued medications.      CURRENT MEDICATIONS:  Current Outpatient Medications   Medication Sig Dispense Refill     acetaminophen (TYLENOL) 500 MG tablet Take 500-1,000 mg by mouth every 6 hours as needed       aspirin 81 MG EC tablet Take 81 mg by mouth daily       atorvastatin (LIPITOR) 40 MG tablet Take 1 tablet (40 mg) by mouth daily 90 tablet 3     bisacodyl (DULCOLAX) 5 MG EC tablet Take 5 mg by mouth daily       furosemide (LASIX) 20 MG tablet Take 0.5 tablets (10 mg) by mouth daily 45 tablet 3     isosorbide mononitrate (IMDUR) 60 MG 24 hr tablet TAKE 1 TABLET (60 MG) BY MOUTH DAILY 90 tablet 2     latanoprost (XALATAN) 0.005 % ophthalmic solution Place 1 drop into both eyes At Bedtime       metFORMIN (GLUCOPHAGE-XR) 750 MG 24 hr tablet TAKE 1 TABLET (750 MG) BY MOUTH DAILY WITH DINNER 90 tablet 1     metoprolol tartrate (LOPRESSOR) 100 MG tablet TAKE 1/2 TABLET BY MOUTH EVERY AM AND TAKE 1 TABLET IN THE EVENING. 135 tablet 2     nitroGLYcerin (NITROSTAT) 0.4 MG sublingual tablet Place 1 tablet (0.4 mg) under the tongue every 5 minutes as needed for chest pain 50 tablet 3     order for DME Equipment being ordered: wheeled walker with brakes and a seat 1 Device 0     pregabalin (LYRICA) 150 MG capsule TAKE 1 CAPSULE BY MOUTH TWICE A  capsule 3     ranolazine (RANEXA) 500 MG 12 hr tablet Take 1 tablet (500 mg) by mouth 2 times daily 180 tablet 2     vitamin D3 (CHOLECALCIFEROL) 2000 units tablet Take 4,000 Units by mouth daily       levothyroxine (SYNTHROID/LEVOTHROID) 25 MCG tablet TAKE ONE TABLET BY MOUTH ONE TIME DAILY (Patient not taking: Reported on 4/12/2019) 90 tablet 2     nystatin  (MYCOSTATIN) 798151 UNIT/GM external powder Apply topically 2 times daily       polyethylene glycol (MIRALAX/GLYCOLAX) packet Take 1 packet by mouth daily as needed for constipation         ALLERGIES     Allergies   Allergen Reactions     Hydrochlorothiazide      Pancreatitis - patient denies this.  She says it gave her low sodium.       PAST MEDICAL HISTORY:  Past Medical History:   Diagnosis Date     Angina at rest (H)     Thought to be microvascular     Arthritis      Basal cell carcinoma 1/2014, (3/5/6-2014)    nose and check and ear and face     Coronary artery disease     Mild CAD per 2/2008 angiogram     Hyperlipidaemia      Hypertension      Hypothyroidism 1/17/2013     Mitral regurgitation 6/2015    echo     Type 2 diabetes, HbA1C goal < 8% (H) 9/11/2013       PAST SURGICAL HISTORY:  Past Surgical History:   Procedure Laterality Date     APPENDECTOMY  1974     BACK SURGERY  1996     BLADDER SURGERY  1990    bladder suspension     CHOLECYSTECTOMY  1975     CORONARY ANGIOGRAPHY ADULT ORDER  2/2008    Mild CAD. LAD w/20% stenosis, RCA with 30-40% stenosis. No flow limiting obstructions.     face surgery  1-6/2014    basal cell plus plastic to nose, face , ear     HYSTERECTOMY  1970     OOPHORECTOMY  1974    bilateral     PHACOEMULSIFICATION CLEAR CORNEA WITH STANDARD INTRAOCULAR LENS IMPLANT Right 7/21/2015    Procedure: PHACOEMULSIFICATION CLEAR CORNEA WITH STANDARD INTRAOCULAR LENS IMPLANT;  Surgeon: Schuyler Chapa MD;  Location: [facility]     PHACOEMULSIFICATION CLEAR CORNEA WITH STANDARD INTRAOCULAR LENS IMPLANT Left 8/18/2015    Procedure: PHACOEMULSIFICATION CLEAR CORNEA WITH STANDARD INTRAOCULAR LENS IMPLANT;  Surgeon: Schuyler Chapa MD;  Location: [facility]       FAMILY HISTORY:  Family History   Problem Relation Age of Onset     Cancer Brother         lung     Cancer Son      Cerebrovascular Disease Mother         and Parkinson's     Cancer Father         pancreas     Diabetes Son        SOCIAL  HISTORY:  Social History     Socioeconomic History     Marital status:      Spouse name: None     Number of children: None     Years of education: None     Highest education level: None   Occupational History     None   Social Needs     Financial resource strain: None     Food insecurity:     Worry: None     Inability: None     Transportation needs:     Medical: None     Non-medical: None   Tobacco Use     Smoking status: Never Smoker     Smokeless tobacco: Never Used   Substance and Sexual Activity     Alcohol use: Yes     Comment: rarely     Drug use: No     Sexual activity: Never   Lifestyle     Physical activity:     Days per week: None     Minutes per session: None     Stress: None   Relationships     Social connections:     Talks on phone: None     Gets together: None     Attends Shinto service: None     Active member of club or organization: None     Attends meetings of clubs or organizations: None     Relationship status: None     Intimate partner violence:     Fear of current or ex partner: None     Emotionally abused: None     Physically abused: None     Forced sexual activity: None   Other Topics Concern     Parent/sibling w/ CABG, MI or angioplasty before 65F 55M? No      Service Not Asked     Blood Transfusions Not Asked     Caffeine Concern Yes     Comment: 4 cups caffeine per day     Occupational Exposure Not Asked     Hobby Hazards Not Asked     Sleep Concern No     Stress Concern No     Weight Concern No     Special Diet No     Back Care Not Asked     Exercise Yes     Comment: exercises 30 minutes, 4 days week     Bike Helmet Not Asked     Seat Belt Not Asked     Self-Exams Not Asked   Social History Narrative    Had 2 sons; one ; had 2 step dtrs; one .                                        times 2       Review of Systems:  Skin:  Negative       Eyes:  Positive for glasses cataract extraction both eyes  ENT:  Negative      Respiratory:  Positive for dyspnea on  "exertion     Cardiovascular:    Positive for;edema;fatigue edema due to neuropathy. wobbly sometimes  Gastroenterology: Negative      Genitourinary:  Negative      Musculoskeletal:  Positive for arthritis;back pain;foot pain right arm pain, pain in legs(knees).neuropathy in feet going up legs  Neurologic:  Positive for numbness or tingling of hands;numbness or tingling of feet neuropathy  Psychiatric:  Negative      Heme/Lymph/Imm:  Positive for allergies    Endocrine:  Positive for diabetes;thyroid disorder      Physical Exam:  Vitals: /76   Pulse 84   Ht 1.575 m (5' 2\")   Wt 90.6 kg (199 lb 12.8 oz)   LMP  (LMP Unknown)   BMI 36.54 kg/m      Constitutional:  cooperative        Skin:  warm and dry to the touch          Head:  normocephalic        Eyes:  pupils equal and round        Lymph:      ENT:  no pallor or cyanosis        Neck:  JVP normal        Respiratory:  clear to auscultation         Cardiac: regular rhythm;normal S1 and S2                pulses full and equal                                        GI:           Extremities and Muscular Skeletal:  no edema              Neurological:  no gross motor deficits   uses walker    Psych:  Alert and Oriented x 3    Encounter Diagnosis   Name Primary?     Chronic ischemic heart disease        Recent Lab Results:  LIPID RESULTS:  Lab Results   Component Value Date    CHOL 123 07/26/2018    HDL 55 07/26/2018    LDL 42 07/26/2018    TRIG 130 07/26/2018    CHOLHDLRATIO 1.9 07/20/2015       LIVER ENZYME RESULTS:  Lab Results   Component Value Date    AST 27 02/13/2019    ALT 20 02/13/2019       CBC RESULTS:  Lab Results   Component Value Date    WBC 4.4 02/28/2019    RBC 3.66 (A) 02/28/2019    HGB 10.8 (A) 02/28/2019    HCT 33.6 (A) 02/28/2019    MCV 91.8 02/28/2019    MCH 30.0 02/17/2019    MCHC 34.0 02/17/2019    RDW 12.9 02/28/2019     02/28/2019       BMP RESULTS:  Lab Results   Component Value Date     02/28/2019    POTASSIUM 4.4 " 02/28/2019    CHLORIDE 100 02/28/2019    CO2 31 02/28/2019    ANIONGAP 6 02/17/2019     (H) 02/28/2019    BUN 12 02/28/2019    CR 0.80 02/28/2019    GFRESTIMATED >60 02/28/2019    GFRESTBLACK >60 02/28/2019    VIOLETA 8.3 (L) 02/28/2019        A1C RESULTS:  Lab Results   Component Value Date    A1C 7.0 (H) 02/10/2019       INR RESULTS:  Lab Results   Component Value Date    INR 1.02 12/17/2009    INR 0.97 02/22/2008           CC  Kalyan Luis MD  [address]  [id number]  [place] [id number]

## 2019-04-16 ENCOUNTER — TELEPHONE (OUTPATIENT)
Dept: LAB | Facility: CLINIC | Age: 84
End: 2019-04-16

## 2019-04-16 NOTE — TELEPHONE ENCOUNTER
Called Pt informed her Ranexa paperwork faxed to [place] for her Ranexa script and Pt informed. Paperwork sent to tiffanie Luo RN

## 2019-04-18 ENCOUNTER — TELEPHONE (OUTPATIENT)
Dept: CARDIOLOGY | Facility: CLINIC | Age: 84
End: 2019-04-18

## 2019-04-18 NOTE — TELEPHONE ENCOUNTER
Central Prior Authorization Team   Phone: [phone]    Prior Authorization Retail Medication Request    Received a phone call from [employer], before the patient can be enrolled in their patient assistance program a prior authorization needs to be done. They gave a phone number of [phone] to call to initiate the PA. They said that if the PA was denied to fax a copy of the denial letter to them at [phone] with case number [id number]    Medication/Dose: ranolazine (RANEXA) 500 MG 12 hr tablet  ICD code (if different than what is on RX):  Cardiac microvascular disease [I20.9]   Previously Tried and Failed:    Rationale:      Insurance Name: [employer] Senior Gold  BIN: [id number]  Group: [id number]  Insurance ID:  [id number]  PCN: [facility]      Pharmacy Information (if different than what is on RX)  Name:  [facility]  Phone:  [phone]

## 2019-04-18 NOTE — TELEPHONE ENCOUNTER
Tier Exception PA Initiation    Medication: ranolazine (RANEXA) 500 MG 12 hr tablet -   Insurance Company: [employer] - Phone [phone] Fax [phone]  Pharmacy Filling the Rx: [facility] [address]  Filling Pharmacy Phone: [phone]  Filling Pharmacy Fax: [phone]  Start Date: 4/18/2019

## 2019-04-19 NOTE — TELEPHONE ENCOUNTER
Prior Authorization Approval    Authorization Effective Date: 1/18/2019  Authorization Expiration Date: 12/31/2019  Medication: ranolazine (RANEXA) 500 MG 12 hr tablet - APPROVED  Approved Dose/Quantity:   Reference #:     Insurance Company: [employer] - Phone [phone] Fax [phone]  Expected CoPay:       CoPay Card Available:      Foundation Assistance Needed:    Which Pharmacy is filling the prescription (Not needed for infusion/clinic administered): [facility] - [address]  Pharmacy Notified: Yes  Patient Notified: Comment:  **Instructed pharmacy to notify patient when script is ready to /ship.**

## 2019-04-22 ENCOUNTER — TELEPHONE (OUTPATIENT)
Dept: FAMILY MEDICINE | Facility: CLINIC | Age: 84
End: 2019-04-22

## 2019-04-22 DIAGNOSIS — I10 HYPERTENSION GOAL BP (BLOOD PRESSURE) < 140/90: ICD-10-CM

## 2019-04-22 NOTE — TELEPHONE ENCOUNTER
"Requested Prescriptions   Pending Prescriptions Disp Refills     metoprolol tartrate (LOPRESSOR) 100 MG tablet [Pharmacy Med Name: METOPROLOL TARTRATE 100 MG TAB] 135 tablet 0     Sig: TAKE 1/2 TABLET BY MOUTH EVERY MORNING AND TAKE 1 TABLET IN THE EVENING.   Last Written Prescription Date:  4/5/18  Last Fill Quantity: 135,  # refills: 2   Last office visit: 3/12/2019 with prescribing provider:     Future Office Visit:        Beta-Blockers Protocol Passed - 4/22/2019 11:11 AM        Passed - Blood pressure under 140/90 in past 12 months     BP Readings from Last 3 Encounters:   04/12/19 112/76   03/18/19 108/64   03/12/19 128/70                 Passed - Patient is age 6 or older        Passed - Recent (12 mo) or future (30 days) visit within the authorizing provider's specialty     Patient had office visit in the last 12 months or has a visit in the next 30 days with authorizing provider or within the authorizing provider's specialty.  See \"Patient Info\" tab in inbasket, or \"Choose Columns\" in Meds & Orders section of the refill encounter.              Passed - Medication is active on med list          "

## 2019-04-22 NOTE — TELEPHONE ENCOUNTER
Our goal is to have forms completed within 72 hours, however some forms may require a visit or additional information.    What clinic location was the form placed at [facility] or [place].?     Who is the form from?   Where did the form come from? Faxed to clinic   The form was placed in the inbox of Antwon Schmidt MD      Please fax to [phone]  Phone number: [phone]    Additional comments:  [address] OTC verification form    Call take on 4/22/2019 at 1:20 PM by Nhi Maharaj

## 2019-04-23 RX ORDER — METOPROLOL TARTRATE 100 MG
TABLET ORAL
Qty: 135 TABLET | Refills: 3 | Status: SHIPPED | OUTPATIENT
Start: 2019-04-23 | End: 2020-04-16

## 2019-05-08 NOTE — TELEPHONE ENCOUNTER
Per Katie at [facility], patient was enrolled in the foundational assistance program from 2018 to 2018   - Patient must call [phone] option 1 to re-enroll into the program.      - Called [facility] [id number] in [place] (found on previous Ranexa Rx) to see if they would be able to provide me the copay amount without using the foundation assistance program, however the script they have is .    Ran test claim for #180 however copay still remains high (Possibly due to patient needing to satisfy her deductible)   Test claim for #60= $147.91   darline Bensonamil

## 2019-05-10 NOTE — TELEPHONE ENCOUNTER
RN spoke with pt regarding Jacob needing more information for approval for Ranexa. RN called and was told the patient needed to call in to provide consent for insurance verification. RN called pt back with this information. Pt to call with any other concerns.

## 2019-05-13 DIAGNOSIS — I25.9 CHRONIC ISCHEMIC HEART DISEASE: ICD-10-CM

## 2019-05-13 NOTE — TELEPHONE ENCOUNTER
"Requested Prescriptions   Pending Prescriptions Disp Refills     isosorbide mononitrate (IMDUR) 60 MG 24 hr tablet [Pharmacy Med Name:  Last Written Prescription Date:  8/2/2018  Last Fill Quantity: 90,  # refills: 2   Last office visit: 3/12/2019 with prescribing provider:  Dr Schmidt   Future Office Visit:     ISOSORBIDE MONONIT ER 60 MG TB] 90 tablet 2     Sig: TAKE 1 TABLET (60 MG) BY MOUTH DAILY       Nitrates Passed - 5/13/2019  9:54 AM        Passed - Blood pressure under 140/90 in past 12 months     BP Readings from Last 3 Encounters:   04/12/19 112/76   03/18/19 108/64   03/12/19 128/70                 Passed - Pt is not on erectile dysfunction medications        Passed - Recent (12 mo) or future (30 days) visit within the authorizing provider's specialty     Patient had office visit in the last 12 months or has a visit in the next 30 days with authorizing provider or within the authorizing provider's specialty.  See \"Patient Info\" tab in inbasket, or \"Choose Columns\" in Meds & Orders section of the refill encounter.              Passed - Medication is active on med list        Passed - Patient is age 18 or older          "

## 2019-05-14 DIAGNOSIS — E78.5 HYPERLIPIDEMIA LDL GOAL <70: ICD-10-CM

## 2019-05-14 RX ORDER — ATORVASTATIN CALCIUM 40 MG/1
40 TABLET, FILM COATED ORAL DAILY
Qty: 90 TABLET | Refills: 0 | Status: SHIPPED | OUTPATIENT
Start: 2019-05-14 | End: 2019-08-08

## 2019-05-15 RX ORDER — ISOSORBIDE MONONITRATE 60 MG/1
TABLET, EXTENDED RELEASE ORAL
Qty: 90 TABLET | Refills: 2 | Status: SHIPPED | OUTPATIENT
Start: 2019-05-15 | End: 2020-02-17

## 2019-05-20 ENCOUNTER — TELEPHONE (OUTPATIENT)
Dept: FAMILY MEDICINE | Facility: CLINIC | Age: 84
End: 2019-05-20

## 2019-05-20 NOTE — TELEPHONE ENCOUNTER
Our goal is to have forms completed within 72 hours, however some forms may require a visit or additional information.    What clinic location was the form placed at [facility] or [place].?     Who is the form from?   Where did the form come from? Faxed to clinic   The form was placed in the inbox of Antwon Schmidt MD      Please fax to [phone]  Phone number: [phone]    Additional comments: Mary Mahoney  verification of OTC meds     Call take on 5/20/2019 at 3:56 PM by Nhi Maharaj

## 2019-05-30 ENCOUNTER — OFFICE VISIT (OUTPATIENT)
Dept: FAMILY MEDICINE | Facility: CLINIC | Age: 84
End: 2019-05-30
Payer: MEDICARE

## 2019-05-30 VITALS
WEIGHT: 196 LBS | SYSTOLIC BLOOD PRESSURE: 110 MMHG | RESPIRATION RATE: 16 BRPM | DIASTOLIC BLOOD PRESSURE: 74 MMHG | BODY MASS INDEX: 36.07 KG/M2 | TEMPERATURE: 98 F | HEART RATE: 70 BPM | OXYGEN SATURATION: 95 % | HEIGHT: 62 IN

## 2019-05-30 DIAGNOSIS — R25.2 LEG CRAMPS: ICD-10-CM

## 2019-05-30 DIAGNOSIS — K14.8 TONGUE LESION: Primary | ICD-10-CM

## 2019-05-30 LAB
KOH PREP SPEC: NORMAL
SPECIMEN SOURCE: NORMAL

## 2019-05-30 PROCEDURE — 83735 ASSAY OF MAGNESIUM: CPT | Performed by: PHYSICIAN ASSISTANT

## 2019-05-30 PROCEDURE — 36415 COLL VENOUS BLD VENIPUNCTURE: CPT | Performed by: PHYSICIAN ASSISTANT

## 2019-05-30 PROCEDURE — 99214 OFFICE O/P EST MOD 30 MIN: CPT | Performed by: PHYSICIAN ASSISTANT

## 2019-05-30 PROCEDURE — 80048 BASIC METABOLIC PNL TOTAL CA: CPT | Performed by: PHYSICIAN ASSISTANT

## 2019-05-30 PROCEDURE — 87210 SMEAR WET MOUNT SALINE/INK: CPT | Performed by: PHYSICIAN ASSISTANT

## 2019-05-30 ASSESSMENT — MIFFLIN-ST. JEOR: SCORE: 1247.3

## 2019-05-30 NOTE — PATIENT INSTRUCTIONS
At [facility] for the knee, I recommend Dr. Maggie Rico, Dr. Asim Nolen, Dr. Aroldo Roberston, Dr. Ramses Sales    Follow up with ENT for tongue lesions since there was no yeast on lesion scrapings today.

## 2019-05-30 NOTE — LETTER
May 31, 2019      Lora Vergara  [address]   [place] [address]        Dear ,    We are writing to inform you of your test results.    Your test results fall within the expected range(s) or remain unchanged from previous results.  Please continue with current treatment plan.    Resulted Orders   KOH prep (Other than skin, nails, hair)   Result Value Ref Range    Specimen Description Mouth     KOH Prep No fungal elements seen    Basic metabolic panel   Result Value Ref Range    Sodium 137 133 - 144 mmol/L    Potassium 3.9 3.4 - 5.3 mmol/L    Chloride 101 94 - 109 mmol/L    Carbon Dioxide 29 20 - 32 mmol/L    Anion Gap 7 3 - 14 mmol/L    Glucose 81 70 - 99 mg/dL    Urea Nitrogen 13 7 - 30 mg/dL    Creatinine 0.78 0.52 - 1.04 mg/dL    GFR Estimate 65 >60 mL/min/[1.73_m2]      Comment:      Non  GFR Calc  Starting 12/18/2018, serum creatinine based estimated GFR (eGFR) will be   calculated using the Chronic Kidney Disease Epidemiology Collaboration   (CKD-EPI) equation.      GFR Estimate If Black 76 >60 mL/min/[1.73_m2]      Comment:       GFR Calc  Starting 12/18/2018, serum creatinine based estimated GFR (eGFR) will be   calculated using the Chronic Kidney Disease Epidemiology Collaboration   (CKD-EPI) equation.      Calcium 8.9 8.5 - 10.1 mg/dL   Magnesium   Result Value Ref Range    Magnesium 2.0 1.6 - 2.3 mg/dL       If you have any questions or concerns, please call the clinic at the number listed above.       Sincerely,        Ibis Ortiz PA-C

## 2019-05-30 NOTE — PROGRESS NOTES
Subjective     Lora Vergara is a 89 year old female who presents to clinic today for the following health issues:    HPI   Mouth Problem      Duration: started end of March    Description (location/character/radiation): swollen tongue, pain in mouth    Intensity:  mild    Accompanying signs and symptoms: still having swelling and pain    History (similar episodes/previous evaluation): seen at     Precipitating or alleviating factors: None    Therapies tried and outcome: Clotrimazole and nystatin - helpful but did not completely clear up the thrush     Musculoskeletal problem/pain      Duration: weeks    Description  Location: right leg > left leg    Intensity:  moderate    Accompanying signs and symptoms: swelling    History  Previous similar problem: no   Previous evaluation:  none    Precipitating or alleviating factors:  Trauma or overuse: no   Aggravating factors include: none    Therapies tried and outcome: nothing    Advanced OA right knee  Bilateral neuropathy with burning  Severe calf cramp comes and goes in the right leg  Swelling? - maybe  She has baseline peripheral neuropathy    Patient Active Problem List   Diagnosis     Hypertension goal BP (blood pressure) < 140/90     Hyperlipidemia LDL goal <70     Neuropathy     Chronic ischemic heart disease     Preventive measure     LLQ abdominal pain     BCC (basal cell carcinoma), face     Overweight     Other and unspecified angina pectoris     Low back pain     SOB (shortness of breath)     Ischemic chest pain     Hypothyroidism, unspecified type     Gastroenteritis     Osteoarthritis of right knee, unspecified osteoarthritis type     Chronic right-sided low back pain with right-sided sciatica     Respiratory failure with hypoxia (H)     Type 2 diabetes mellitus with diabetic neuropathy, without long-term current use of insulin (H)     Chronic diastolic congestive heart failure (H)     Venous (peripheral) insufficiency     Bilateral leg edema     Morbid  obesity (H)     Community acquired pneumonia     Generalized weakness     Pneumonia of left lung due to infectious organism, unspecified part of lung     Past Surgical History:   Procedure Laterality Date     APPENDECTOMY  1974     BACK SURGERY  1996     BLADDER SURGERY  1990    bladder suspension     CHOLECYSTECTOMY  1975     CORONARY ANGIOGRAPHY ADULT ORDER  2/2008    Mild CAD. LAD w/20% stenosis, RCA with 30-40% stenosis. No flow limiting obstructions.     face surgery  1-6/2014    basal cell plus plastic to nose, face , ear     HYSTERECTOMY  1970     OOPHORECTOMY  1974    bilateral     PHACOEMULSIFICATION CLEAR CORNEA WITH STANDARD INTRAOCULAR LENS IMPLANT Right 7/21/2015    Procedure: PHACOEMULSIFICATION CLEAR CORNEA WITH STANDARD INTRAOCULAR LENS IMPLANT;  Surgeon: Schuyler Chapa MD;  Location: [facility]     PHACOEMULSIFICATION CLEAR CORNEA WITH STANDARD INTRAOCULAR LENS IMPLANT Left 8/18/2015    Procedure: PHACOEMULSIFICATION CLEAR CORNEA WITH STANDARD INTRAOCULAR LENS IMPLANT;  Surgeon: Schuyler Chapa MD;  Location: [facility]       Social History     Tobacco Use     Smoking status: Never Smoker     Smokeless tobacco: Never Used   Substance Use Topics     Alcohol use: Yes     Comment: rarely     Family History   Problem Relation Age of Onset     Cancer Brother         lung     Cancer Son      Cerebrovascular Disease Mother         and Parkinson's     Cancer Father         pancreas     Diabetes Son          Current Outpatient Medications   Medication Sig Dispense Refill     acetaminophen (TYLENOL) 500 MG tablet Take 500-1,000 mg by mouth every 6 hours as needed       aspirin 81 MG EC tablet Take 81 mg by mouth daily       atorvastatin (LIPITOR) 40 MG tablet Take 1 tablet (40 mg) by mouth daily 90 tablet 0     bisacodyl (DULCOLAX) 5 MG EC tablet Take 5 mg by mouth daily       furosemide (LASIX) 20 MG tablet Take 0.5 tablets (10 mg) by mouth daily 45 tablet 3     isosorbide mononitrate (IMDUR) 60 MG 24 hr  "tablet TAKE 1 TABLET (60 MG) BY MOUTH DAILY 90 tablet 2     latanoprost (XALATAN) 0.005 % ophthalmic solution Place 1 drop into both eyes At Bedtime       levothyroxine (SYNTHROID/LEVOTHROID) 25 MCG tablet TAKE ONE TABLET BY MOUTH ONE TIME DAILY 90 tablet 2     metFORMIN (GLUCOPHAGE-XR) 750 MG 24 hr tablet TAKE 1 TABLET (750 MG) BY MOUTH DAILY WITH DINNER 90 tablet 1     metoprolol tartrate (LOPRESSOR) 100 MG tablet TAKE 1/2 TABLET BY MOUTH EVERY MORNING AND TAKE 1 TABLET IN THE EVENING. 135 tablet 3     nitroGLYcerin (NITROSTAT) 0.4 MG sublingual tablet Place 1 tablet (0.4 mg) under the tongue every 5 minutes as needed for chest pain 50 tablet 3     order for DME Equipment being ordered: wheeled walker with brakes and a seat 1 Device 0     pregabalin (LYRICA) 150 MG capsule TAKE 1 CAPSULE BY MOUTH TWICE A  capsule 3     ranolazine (RANEXA) 500 MG 12 hr tablet Take 1 tablet (500 mg) by mouth 2 times daily 180 tablet 2     vitamin D3 (CHOLECALCIFEROL) 2000 units tablet Take 4,000 Units by mouth daily       nystatin (MYCOSTATIN) 403439 UNIT/GM external powder Apply topically 2 times daily       polyethylene glycol (MIRALAX/GLYCOLAX) packet Take 1 packet by mouth daily as needed for constipation       Allergies   Allergen Reactions     Hydrochlorothiazide      Pancreatitis - patient denies this.  She says it gave her low sodium.         Reviewed and updated as needed this visit by Provider         Review of Systems   Constitutional: Negative.    HENT:        As in HPI   Eyes: Negative.    Respiratory: Negative.    Cardiovascular: Negative.    Gastrointestinal: Negative.    Genitourinary: Negative.    Musculoskeletal:        As in HPI   Skin: Negative.    Neurological: Negative.    Psychiatric/Behavioral: Negative.             Objective    /74 (Cuff Size: Adult Large)   Pulse 70   Temp 98  F (36.7  C) (Tympanic)   Resp 16   Ht 1.575 m (5' 2\")   Wt 88.9 kg (196 lb)   LMP  (LMP Unknown)   SpO2 95%   " "BMI 35.85 kg/m    Body mass index is 35.85 kg/m .  Physical Exam   Constitutional: She is oriented to person, place, and time. She appears well-developed and well-nourished. No distress.   HENT:   Head: Normocephalic.   Right Ear: External ear normal.   Left Ear: External ear normal.   Nose: Nose normal.   Mouth/Throat: Uvula is midline and oropharynx is clear and moist. Oral lesions (right buccal mucosa - white lesion partially removed when scraping, yellow/white tongue discoloration) present.   Eyes: Conjunctivae and EOM are normal.   Neck: Neck supple.   Pulmonary/Chest: Effort normal.   Musculoskeletal:        Right lower leg: She exhibits no tenderness, no bony tenderness, no swelling and no edema.        Left lower leg: She exhibits no tenderness, no bony tenderness, no swelling and no edema.   Neurological: She is alert and oriented to person, place, and time.   Skin: She is not diaphoretic.   Psychiatric: She has a normal mood and affect. Her behavior is normal.          Diagnostic Test Results:  KOH prep on oral lesion scrapings - no yeast seen.         Assessment & Plan   Problem List Items Addressed This Visit     None      Visit Diagnoses     Tongue lesion    -  Primary    Relevant Orders    KOH prep (Other than skin, nails, hair) (Completed)    OTOLARYNGOLOGY REFERRAL    Leg cramps        Relevant Orders    Basic metabolic panel (Completed)    Magnesium (Completed)           BMI:   Estimated body mass index is 35.85 kg/m  as calculated from the following:    Height as of this encounter: 1.575 m (5' 2\").    Weight as of this encounter: 88.9 kg (196 lb).   Weight management plan: no concerns          Return in about 1 week (around 6/6/2019) for Specialist Appointment.    Ibis Ortiz PA-C  [facility]      "

## 2019-05-30 NOTE — LETTER
Marleen 3, 2019      Lora Bluederrick  [address]   [place] [address]        Dear ,    We are writing to inform you of your test results.    All your lab work was normal.   Follow up with the ENT specialist as we discussed.   It is ok to take a magnesium supplement at night, and see if your leg cramps improve.     Resulted Orders   KOH prep (Other than skin, nails, hair)   Result Value Ref Range    Specimen Description Mouth     KOH Prep No fungal elements seen    Basic metabolic panel   Result Value Ref Range    Sodium 137 133 - 144 mmol/L    Potassium 3.9 3.4 - 5.3 mmol/L    Chloride 101 94 - 109 mmol/L    Carbon Dioxide 29 20 - 32 mmol/L    Anion Gap 7 3 - 14 mmol/L    Glucose 81 70 - 99 mg/dL    Urea Nitrogen 13 7 - 30 mg/dL    Creatinine 0.78 0.52 - 1.04 mg/dL    GFR Estimate 65 >60 mL/min/[1.73_m2]      Comment:      Non  GFR Calc  Starting 12/18/2018, serum creatinine based estimated GFR (eGFR) will be   calculated using the Chronic Kidney Disease Epidemiology Collaboration   (CKD-EPI) equation.      GFR Estimate If Black 76 >60 mL/min/[1.73_m2]      Comment:       GFR Calc  Starting 12/18/2018, serum creatinine based estimated GFR (eGFR) will be   calculated using the Chronic Kidney Disease Epidemiology Collaboration   (CKD-EPI) equation.      Calcium 8.9 8.5 - 10.1 mg/dL   Magnesium   Result Value Ref Range    Magnesium 2.0 1.6 - 2.3 mg/dL       If you have any questions or concerns, please call the clinic at the number listed above.       Sincerely,        Ibis Ortiz PA-C

## 2019-05-31 LAB
ANION GAP SERPL CALCULATED.3IONS-SCNC: 7 MMOL/L (ref 3–14)
BUN SERPL-MCNC: 13 MG/DL (ref 7–30)
CALCIUM SERPL-MCNC: 8.9 MG/DL (ref 8.5–10.1)
CHLORIDE SERPL-SCNC: 101 MMOL/L (ref 94–109)
CO2 SERPL-SCNC: 29 MMOL/L (ref 20–32)
CREAT SERPL-MCNC: 0.78 MG/DL (ref 0.52–1.04)
GFR SERPL CREATININE-BSD FRML MDRD: 65 ML/MIN/{1.73_M2}
GLUCOSE SERPL-MCNC: 81 MG/DL (ref 70–99)
MAGNESIUM SERPL-MCNC: 2 MG/DL (ref 1.6–2.3)
POTASSIUM SERPL-SCNC: 3.9 MMOL/L (ref 3.4–5.3)
SODIUM SERPL-SCNC: 137 MMOL/L (ref 133–144)

## 2019-05-31 ASSESSMENT — ENCOUNTER SYMPTOMS
GASTROINTESTINAL NEGATIVE: 1
PSYCHIATRIC NEGATIVE: 1
CONSTITUTIONAL NEGATIVE: 1
NEUROLOGICAL NEGATIVE: 1
CARDIOVASCULAR NEGATIVE: 1
EYES NEGATIVE: 1
RESPIRATORY NEGATIVE: 1

## 2019-06-05 ENCOUNTER — TRANSFERRED RECORDS (OUTPATIENT)
Dept: HEALTH INFORMATION MANAGEMENT | Facility: CLINIC | Age: 84
End: 2019-06-05

## 2019-06-27 ENCOUNTER — TRANSFERRED RECORDS (OUTPATIENT)
Dept: HEALTH INFORMATION MANAGEMENT | Facility: CLINIC | Age: 84
End: 2019-06-27

## 2019-08-08 DIAGNOSIS — E78.5 HYPERLIPIDEMIA LDL GOAL <70: ICD-10-CM

## 2019-08-08 RX ORDER — ATORVASTATIN CALCIUM 40 MG/1
40 TABLET, FILM COATED ORAL DAILY
Qty: 90 TABLET | Refills: 0 | Status: SHIPPED | OUTPATIENT
Start: 2019-08-08 | End: 2019-10-30

## 2019-08-09 DIAGNOSIS — E03.9 HYPOTHYROIDISM: ICD-10-CM

## 2019-08-09 RX ORDER — LEVOTHYROXINE SODIUM 25 UG/1
TABLET ORAL
Qty: 30 TABLET | Refills: 0 | Status: SHIPPED | OUTPATIENT
Start: 2019-08-09 | End: 2019-09-07

## 2019-08-09 NOTE — TELEPHONE ENCOUNTER
"Requested Prescriptions   Pending Prescriptions Disp Refills     levothyroxine (SYNTHROID/LEVOTHROID) 25 MCG tablet [Pharmacy Med Name: LEVOTHYROXINE 25 MCG TABLET]  Last Written Prescription Date:  10/22/2018  Last Fill Quantity: 90 tablet,  # refills: 2   Last office visit: 5/30/2019 with prescribing provider:  Diana   Future Office Visit:     90 tablet 2     Sig: TAKE ONE TABLET BY MOUTH ONE TIME DAILY       Thyroid Protocol Failed - 8/9/2019  1:07 AM        Failed - Normal TSH on file in past 12 months     Recent Labs   Lab Test 07/26/18  0914   TSH 2.45              Passed - Patient is 12 years or older        Passed - Recent (12 mo) or future (30 days) visit within the authorizing provider's specialty     Patient had office visit in the last 12 months or has a visit in the next 30 days with authorizing provider or within the authorizing provider's specialty.  See \"Patient Info\" tab in inbasket, or \"Choose Columns\" in Meds & Orders section of the refill encounter.              Passed - Medication is active on med list        Passed - No active pregnancy on record     If patient is pregnant or has had a positive pregnancy test, please check TSH.          Passed - No positive pregnancy test in past 12 months     If patient is pregnant or has had a positive pregnancy test, please check TSH.             "

## 2019-08-27 ENCOUNTER — OFFICE VISIT (OUTPATIENT)
Dept: FAMILY MEDICINE | Facility: CLINIC | Age: 84
End: 2019-08-27
Payer: MEDICARE

## 2019-08-27 VITALS
BODY MASS INDEX: 36.8 KG/M2 | HEIGHT: 62 IN | OXYGEN SATURATION: 94 % | HEART RATE: 84 BPM | SYSTOLIC BLOOD PRESSURE: 128 MMHG | DIASTOLIC BLOOD PRESSURE: 70 MMHG | RESPIRATION RATE: 20 BRPM | WEIGHT: 200 LBS

## 2019-08-27 DIAGNOSIS — G62.9 NEUROPATHY: Chronic | ICD-10-CM

## 2019-08-27 DIAGNOSIS — E03.9 HYPOTHYROIDISM, UNSPECIFIED TYPE: Chronic | ICD-10-CM

## 2019-08-27 DIAGNOSIS — M25.511 RIGHT SHOULDER PAIN, UNSPECIFIED CHRONICITY: Primary | ICD-10-CM

## 2019-08-27 DIAGNOSIS — E11.40 TYPE 2 DIABETES MELLITUS WITH DIABETIC NEUROPATHY, WITHOUT LONG-TERM CURRENT USE OF INSULIN (H): ICD-10-CM

## 2019-08-27 LAB — HBA1C MFR BLD: 5.9 % (ref 0–5.6)

## 2019-08-27 PROCEDURE — 99214 OFFICE O/P EST MOD 30 MIN: CPT | Performed by: INTERNAL MEDICINE

## 2019-08-27 PROCEDURE — 80053 COMPREHEN METABOLIC PANEL: CPT | Performed by: INTERNAL MEDICINE

## 2019-08-27 PROCEDURE — 36415 COLL VENOUS BLD VENIPUNCTURE: CPT | Performed by: INTERNAL MEDICINE

## 2019-08-27 PROCEDURE — 83036 HEMOGLOBIN GLYCOSYLATED A1C: CPT | Performed by: INTERNAL MEDICINE

## 2019-08-27 PROCEDURE — 84443 ASSAY THYROID STIM HORMONE: CPT | Performed by: INTERNAL MEDICINE

## 2019-08-27 ASSESSMENT — MIFFLIN-ST. JEOR: SCORE: 1265.44

## 2019-08-27 NOTE — PATIENT INSTRUCTIONS
I will let you know your lab results.                   Call [phone] for an orthopedics appointment.         Hopefully you will get a steroid injection and then some physical therapy.

## 2019-08-27 NOTE — LETTER
August 28, 2019      Lora Vergara  [address]   [place] [address]        Dear ,    We are writing to inform you of your test results.             Your diabetes control is excellent.       The A1C of 5.9 correlates to an average blood sugar of approximately 117.                  Your other lab results are normal,including the liver,kidney,and the thyroid level.    Results for orders placed or performed in visit on 08/27/19   Comprehensive metabolic panel (BMP + Alb, Alk Phos, ALT, AST, Total. Bili, TP)   Result Value Ref Range    Sodium 136 133 - 144 mmol/L    Potassium 4.1 3.4 - 5.3 mmol/L    Chloride 101 94 - 109 mmol/L    Carbon Dioxide 25 20 - 32 mmol/L    Anion Gap 10 3 - 14 mmol/L    Glucose 112 (H) 70 - 99 mg/dL    Urea Nitrogen 13 7 - 30 mg/dL    Creatinine 0.81 0.52 - 1.04 mg/dL    GFR Estimate 62 >60 mL/min/[1.73_m2]    GFR Estimate If Black 72 >60 mL/min/[1.73_m2]    Calcium 8.7 8.5 - 10.1 mg/dL    Bilirubin Total 0.4 0.2 - 1.3 mg/dL    Albumin 3.6 3.4 - 5.0 g/dL    Protein Total 6.6 (L) 6.8 - 8.8 g/dL    Alkaline Phosphatase 65 40 - 150 U/L    ALT 20 0 - 50 U/L    AST 15 0 - 45 U/L   Hemoglobin A1c   Result Value Ref Range    Hemoglobin A1C 5.9 (H) 0 - 5.6 %   TSH with free T4 reflex   Result Value Ref Range    TSH 2.70 0.40 - 4.00 mU/L         If you have any questions or concerns, please call the clinic at the number listed above.       Sincerely,        Antwon Schmidt MD

## 2019-08-27 NOTE — PROGRESS NOTES
Subjective     Lora Vergara is a 89 year old female who presents to clinic today for the following health issues:    HPI   Joint Pain    Onset: 5-6 weeks    Description:   Location: Right arm  Character: Sharp, Dull ache and Gnawing, and some swelling    Intensity: moderate    Progression of Symptoms: same, intermittent    Accompanying Signs & Symptoms:  Other symptoms: numbness and tingling    History:   Previous similar pain: no       Precipitating factors:   Trauma or overuse: no     Alleviating factors:  Improved by: nothing    Therapies Tried and outcome: Tyl - but not too effective?                          This patient is here with several issues.        She is having ongoing right shoulder pain, with limitation in range of motion, especially abduction, and both internal and external rotation.               She even needs help now getting dressed and undressed. She still lives independently otherwise.                   She reports no injury.                                                She is also due for lab work to monitor her thyroid and glucose issues.                                                                                 She also mentions some residual mouth and lip soreness after being treated for thrush with a prolonged course of therapy  Supervised by ENT.                                  Current Outpatient Medications   Medication Sig Dispense Refill     acetaminophen (TYLENOL) 500 MG tablet Take 500-1,000 mg by mouth every 6 hours as needed       aspirin 81 MG EC tablet Take 81 mg by mouth daily       atorvastatin (LIPITOR) 40 MG tablet Take 1 tablet (40 mg) by mouth daily 90 tablet 0     bisacodyl (DULCOLAX) 5 MG EC tablet Take 5 mg by mouth daily       furosemide (LASIX) 20 MG tablet Take 0.5 tablets (10 mg) by mouth daily 45 tablet 3     isosorbide mononitrate (IMDUR) 60 MG 24 hr tablet TAKE 1 TABLET (60 MG) BY MOUTH DAILY 90 tablet 2     latanoprost (XALATAN) 0.005 % ophthalmic  "solution Place 1 drop into both eyes At Bedtime       levothyroxine (SYNTHROID/LEVOTHROID) 25 MCG tablet TAKE ONE TABLET BY MOUTH ONE TIME DAILY 30 tablet 0     metFORMIN (GLUCOPHAGE-XR) 750 MG 24 hr tablet TAKE 1 TABLET (750 MG) BY MOUTH DAILY WITH DINNER 90 tablet 1     metoprolol tartrate (LOPRESSOR) 100 MG tablet TAKE 1/2 TABLET BY MOUTH EVERY MORNING AND TAKE 1 TABLET IN THE EVENING. 135 tablet 3     nitroGLYcerin (NITROSTAT) 0.4 MG sublingual tablet Place 1 tablet (0.4 mg) under the tongue every 5 minutes as needed for chest pain 50 tablet 3     nystatin (MYCOSTATIN) 218981 UNIT/GM external powder Apply topically 2 times daily       order for DME Equipment being ordered: wheeled walker with brakes and a seat 1 Device 0     polyethylene glycol (MIRALAX/GLYCOLAX) packet Take 1 packet by mouth daily as needed for constipation       pregabalin (LYRICA) 150 MG capsule TAKE 1 CAPSULE BY MOUTH TWICE A  capsule 3     ranolazine (RANEXA) 500 MG 12 hr tablet Take 1 tablet (500 mg) by mouth 2 times daily 180 tablet 2     vitamin D3 (CHOLECALCIFEROL) 2000 units tablet Take 4,000 Units by mouth daily       Allergies   Allergen Reactions     Hydrochlorothiazide      Pancreatitis - patient denies this.  She says it gave her low sodium.     BP Readings from Last 3 Encounters:   08/27/19 128/70   05/30/19 110/74   04/12/19 112/76    Wt Readings from Last 3 Encounters:   08/27/19 90.7 kg (200 lb)   05/30/19 88.9 kg (196 lb)   04/12/19 90.6 kg (199 lb 12.8 oz)                      Reviewed and updated as needed this visit by Provider         Review of Systems   ROS COMP: CONSTITUTIONAL:NEGATIVE for fever, chills, change in weight  RESP:NEGATIVE for significant cough or SOB  CV: NEGATIVE for chest pain/chest pressure and palpitations      Objective    /70 (BP Location: Left arm, Patient Position: Chair, Cuff Size: Adult Large)   Pulse 84   Resp 20   Ht 1.575 m (5' 2\")   Wt 90.7 kg (200 lb)   LMP  (LMP Unknown)  " " SpO2 94%   Breastfeeding? No   BMI 36.58 kg/m    Body mass index is 36.58 kg/m .  Physical Exam   GENERAL APPEARANCE: alert, no distress, over weight and fatigued  HENT: nose and mouth without ulcers or lesions  RESP: no rales or rhonchi  CV: regular rates and rhythm and normal S1 S2, no S3 or S4  MS: decreased range of motion Right shoulder,with abduction, and both internal and external rotation.  NEURO: gait abnormal ;She uses a walker    Diagnostic Test Results:  Pending        Assessment & Plan     Lora was seen today for musculoskeletal problem.    Diagnoses and all orders for this visit:    Right shoulder pain, unspecified chronicity  -     ORTHOPEDICS ADULT REFERRAL    Type 2 diabetes mellitus with diabetic neuropathy, without long-term current use of insulin (H)  -     Comprehensive metabolic panel (BMP + Alb, Alk Phos, ALT, AST, Total. Bili, TP)  -     Hemoglobin A1c    Hypothyroidism, unspecified type  -     TSH with free T4 reflex      Summary and implications:  We reviewed multiple issues.           We reviewed all of the issues on the diagnoses list.                         Check labs and adjust medications as indicated.     Patient Instructions   I will let you know your lab results.                   Call [phone] for an orthopedics appointment.         Hopefully you will get a steroid injection and then some physical therapy.                       Return in about 6 months (around 2/27/2020) for medication review and refills.       BMI:   Estimated body mass index is 36.58 kg/m  as calculated from the following:    Height as of this encounter: 1.575 m (5' 2\").    Weight as of this encounter: 90.7 kg (200 lb).   Weight management plan: Discussed healthy diet and exercise guidelines          Antwon Schmidt MD  [facility]    Results for orders placed or performed in visit on 08/27/19   Comprehensive metabolic panel (BMP + Alb, Alk Phos, ALT, AST, Total. Bili, TP) "   Result Value Ref Range    Sodium 136 133 - 144 mmol/L    Potassium 4.1 3.4 - 5.3 mmol/L    Chloride 101 94 - 109 mmol/L    Carbon Dioxide 25 20 - 32 mmol/L    Anion Gap 10 3 - 14 mmol/L    Glucose 112 (H) 70 - 99 mg/dL    Urea Nitrogen 13 7 - 30 mg/dL    Creatinine 0.81 0.52 - 1.04 mg/dL    GFR Estimate 62 >60 mL/min/[1.73_m2]    GFR Estimate If Black 72 >60 mL/min/[1.73_m2]    Calcium 8.7 8.5 - 10.1 mg/dL    Bilirubin Total 0.4 0.2 - 1.3 mg/dL    Albumin 3.6 3.4 - 5.0 g/dL    Protein Total 6.6 (L) 6.8 - 8.8 g/dL    Alkaline Phosphatase 65 40 - 150 U/L    ALT 20 0 - 50 U/L    AST 15 0 - 45 U/L   Hemoglobin A1c   Result Value Ref Range    Hemoglobin A1C 5.9 (H) 0 - 5.6 %   TSH with free T4 reflex   Result Value Ref Range    TSH 2.70 0.40 - 4.00 mU/L                 Letter sent.           Your diabetes control is excellent.       The A1C of 5.9 correlates to an average blood sugar of approximately 117.                  Your other lab results are normal,including the liver,kidney,and the thyroid level.

## 2019-08-28 LAB
ALBUMIN SERPL-MCNC: 3.6 G/DL (ref 3.4–5)
ALP SERPL-CCNC: 65 U/L (ref 40–150)
ALT SERPL W P-5'-P-CCNC: 20 U/L (ref 0–50)
ANION GAP SERPL CALCULATED.3IONS-SCNC: 10 MMOL/L (ref 3–14)
AST SERPL W P-5'-P-CCNC: 15 U/L (ref 0–45)
BILIRUB SERPL-MCNC: 0.4 MG/DL (ref 0.2–1.3)
BUN SERPL-MCNC: 13 MG/DL (ref 7–30)
CALCIUM SERPL-MCNC: 8.7 MG/DL (ref 8.5–10.1)
CHLORIDE SERPL-SCNC: 101 MMOL/L (ref 94–109)
CO2 SERPL-SCNC: 25 MMOL/L (ref 20–32)
CREAT SERPL-MCNC: 0.81 MG/DL (ref 0.52–1.04)
GFR SERPL CREATININE-BSD FRML MDRD: 62 ML/MIN/{1.73_M2}
GLUCOSE SERPL-MCNC: 112 MG/DL (ref 70–99)
POTASSIUM SERPL-SCNC: 4.1 MMOL/L (ref 3.4–5.3)
PROT SERPL-MCNC: 6.6 G/DL (ref 6.8–8.8)
SODIUM SERPL-SCNC: 136 MMOL/L (ref 133–144)
TSH SERPL DL<=0.005 MIU/L-ACNC: 2.7 MU/L (ref 0.4–4)

## 2019-08-29 ENCOUNTER — TRANSFERRED RECORDS (OUTPATIENT)
Dept: HEALTH INFORMATION MANAGEMENT | Facility: CLINIC | Age: 84
End: 2019-08-29

## 2019-09-04 RX ORDER — PREGABALIN 150 MG/1
CAPSULE ORAL
Qty: 180 CAPSULE | Refills: 3 | Status: SHIPPED | OUTPATIENT
Start: 2019-09-04 | End: 2019-09-24

## 2019-09-07 DIAGNOSIS — E03.9 HYPOTHYROIDISM: ICD-10-CM

## 2019-09-07 NOTE — TELEPHONE ENCOUNTER
"Requested Prescriptions   Pending Prescriptions Disp Refills     levothyroxine (SYNTHROID/LEVOTHROID) 25 MCG tablet [Pharmacy Med Name: LEVOTHYROXINE 25 MCG TABLET] 30 tablet 0     Sig: TAKE 1 TABLET BY MOUTH EVERY DAY  Last Written Prescription Date:  8/9/19  Last Fill Quantity: 30 tab,  # refills: 0   Last office visit: 8/27/2019 with prescribing provider:  Brayan   Future Office Visit:         Thyroid Protocol Passed - 9/7/2019  8:36 AM        Passed - Patient is 12 years or older        Passed - Recent (12 mo) or future (30 days) visit within the authorizing provider's specialty     Patient had office visit in the last 12 months or has a visit in the next 30 days with authorizing provider or within the authorizing provider's specialty.  See \"Patient Info\" tab in inbasket, or \"Choose Columns\" in Meds & Orders section of the refill encounter.              Passed - Medication is active on med list        Passed - Normal TSH on file in past 12 months     Recent Labs   Lab Test 08/27/19  1631   TSH 2.70              Passed - No active pregnancy on record     If patient is pregnant or has had a positive pregnancy test, please check TSH.          Passed - No positive pregnancy test in past 12 months     If patient is pregnant or has had a positive pregnancy test, please check TSH.             "

## 2019-09-09 RX ORDER — LEVOTHYROXINE SODIUM 25 UG/1
TABLET ORAL
Qty: 90 TABLET | Refills: 3 | Status: SHIPPED | OUTPATIENT
Start: 2019-09-09 | End: 2020-09-04

## 2019-09-19 DIAGNOSIS — E11.40 TYPE 2 DIABETES MELLITUS WITH DIABETIC NEUROPATHY, WITHOUT LONG-TERM CURRENT USE OF INSULIN (H): ICD-10-CM

## 2019-09-19 RX ORDER — METFORMIN HYDROCHLORIDE 750 MG/1
TABLET, EXTENDED RELEASE ORAL
Qty: 90 TABLET | Refills: 3 | Status: SHIPPED | OUTPATIENT
Start: 2019-09-19 | End: 2020-07-17

## 2019-09-19 NOTE — TELEPHONE ENCOUNTER
"Requested Prescriptions   Pending Prescriptions Disp Refills     metFORMIN (GLUCOPHAGE-XR) 750 MG 24 hr tablet [Pharmacy Med Name: METFORMIN HCL  MG TABLET]  Last Written Prescription Date:  1/9/2019  Last Fill Quantity: 90 tablet,  # refills: 1   Last office visit: 8/27/2019 with prescribing provider:  Brayan   Future Office Visit:     90 tablet 1     Sig: TAKE 1 TABLET (750 MG) BY MOUTH DAILY WITH DINNER       Biguanide Agents Failed - 9/19/2019  1:55 AM        Failed - Patient has documented LDL within the past 12 mos.     Recent Labs   Lab Test 07/26/18  0914   LDL 42             Failed - Patient does NOT have a diagnosis of CHF.        Passed - Blood pressure less than 140/90 in past 6 months     BP Readings from Last 3 Encounters:   08/27/19 128/70   05/30/19 110/74   04/12/19 112/76                 Passed - Patient has had a Microalbumin in the past 15 mos.     Recent Labs   Lab Test 07/26/18  0912   MICROL 11   UMALCR 6.43             Passed - Patient is age 10 or older        Passed - Patient has documented A1c within the specified period of time.     If HgbA1C is 8 or greater, it needs to be on file within the past 3 months.  If less than 8, must be on file within the past 6 months.     Recent Labs   Lab Test 08/27/19  1631   A1C 5.9*             Passed - Patient's CR is NOT>1.4 OR Patient's EGFR is NOT<45 within past 12 mos.     Recent Labs   Lab Test 08/27/19  1631   GFRESTIMATED 62   GFRESTBLACK 72       Recent Labs   Lab Test 08/27/19  1631   CR 0.81             Passed - Medication is active on med list        Passed - Patient is not pregnant        Passed - Patient has not had a positive pregnancy test within the past 12 mos.         Passed - Recent (6 mo) or future (30 days) visit within the authorizing provider's specialty     Patient had office visit in the last 6 months or has a visit in the next 30 days with authorizing provider or within the authorizing provider's specialty.  See \"Patient " "Info\" tab in inbasket, or \"Choose Columns\" in Meds & Orders section of the refill encounter.               "

## 2019-09-19 NOTE — TELEPHONE ENCOUNTER
Routing refill request to provider for review/approval because:  Labs not current:  LDL  CHF on problem list

## 2019-09-23 ENCOUNTER — TELEPHONE (OUTPATIENT)
Dept: FAMILY MEDICINE | Facility: CLINIC | Age: 84
End: 2019-09-23

## 2019-09-23 DIAGNOSIS — G62.9 NEUROPATHY: Chronic | ICD-10-CM

## 2019-09-23 NOTE — TELEPHONE ENCOUNTER
Reason for Call:  Other prescription  Detailed comments: please call patient about her medication pregabalin (LYRICA) 150 MG capsule she said she get this medication by mail from assisted program  # [phone]  Phone Number Patient can be reached at: Home number on file [phone] (home)  Best Time: any  Can we leave a detailed message on this number? YES  Call taken on 9/23/2019 at 12:30 PM by ALDO YAN

## 2019-09-23 NOTE — TELEPHONE ENCOUNTER
Called [facility]. They need new rx sent to Fax number [phone].    Rx needs to have: Patient Name, , Home address, Provider WALTER, NPI, License Number, Physical signature and date.

## 2019-09-23 NOTE — TELEPHONE ENCOUNTER
Patient Contact    Attempt # 1    Was call answered?  No.  Left message on voicemail with information to call triage back.    Upon callback, clarify patients request below.

## 2019-09-24 RX ORDER — PREGABALIN 150 MG/1
CAPSULE ORAL
Qty: 180 CAPSULE | Refills: 3 | Status: SHIPPED | OUTPATIENT
Start: 2019-09-24 | End: 2019-10-28

## 2019-09-30 NOTE — TELEPHONE ENCOUNTER
PT CALLED.   Says form needs to be refaxed due to missing provider signature and date.  Refax to [phone].

## 2019-09-30 NOTE — TELEPHONE ENCOUNTER
The script already had Analy's signature and date on it from 9/24/19. Unsure why they are asking it to be done again. I refaxed the form.

## 2019-10-28 ENCOUNTER — TELEPHONE (OUTPATIENT)
Dept: FAMILY MEDICINE | Facility: CLINIC | Age: 84
End: 2019-10-28

## 2019-10-28 DIAGNOSIS — G62.9 NEUROPATHY: Chronic | ICD-10-CM

## 2019-10-28 RX ORDER — PREGABALIN 150 MG/1
CAPSULE ORAL
Qty: 180 CAPSULE | Refills: 3 | Status: SHIPPED | OUTPATIENT
Start: 2019-10-28 | End: 2019-11-07

## 2019-10-28 NOTE — TELEPHONE ENCOUNTER
Patient called wanting a refill on Lyrica. She needs it ordered from [facility]. Attempted to call [facility] several times. The Rx # that patient gave me is not valid. I have verified Rx # with patient. There are 72 callers ahead of me to talk to a pharmacist - so I decided not to hold at this time. I am unable to find 9/24/19 Rx to refax it with a note to refill. Could provider consider writing another Rx?     [facility] phone # [phone].   Fax # [phone]  Pt ID: [id number]   Rx # [id number]

## 2019-10-30 DIAGNOSIS — E78.5 HYPERLIPIDEMIA LDL GOAL <70: ICD-10-CM

## 2019-10-30 RX ORDER — ATORVASTATIN CALCIUM 40 MG/1
40 TABLET, FILM COATED ORAL DAILY
Qty: 90 TABLET | Refills: 0 | Status: SHIPPED | OUTPATIENT
Start: 2019-10-30 | End: 2020-01-27

## 2019-11-06 DIAGNOSIS — G62.9 NEUROPATHY: Chronic | ICD-10-CM

## 2019-11-06 NOTE — TELEPHONE ENCOUNTER
Reason for Call:  Other call back    Detailed comments: Patient gets Lyrica from [facility] and she states she is getting low and cannot get through to [facility]. Patient is wondering If she can get another prescription or what she should do. Please call to advise    Phone Number Patient can be reached at: Home number on file [phone] (home)    Best Time: any    Can we leave a detailed message on this number? YES    Call taken on 11/6/2019 at 9:05 AM by ESTEPHANIA NOLAN

## 2019-11-06 NOTE — TELEPHONE ENCOUNTER
"Call attempted to [facility] phone # [phone] to check status of pregabalin (LYRICA) 150 MG capsule Rx faxed 10/28/2019. Recordings said \"there are 33 calls ahead of you\". Triage, will try calling [facility] back later today.  "

## 2019-11-07 ENCOUNTER — TELEPHONE (OUTPATIENT)
Dept: CARDIOLOGY | Facility: CLINIC | Age: 84
End: 2019-11-07

## 2019-11-07 RX ORDER — PREGABALIN 150 MG/1
CAPSULE ORAL
Qty: 60 CAPSULE | Refills: 0 | Status: SHIPPED | OUTPATIENT
Start: 2019-11-07 | End: 2019-12-18

## 2019-11-07 NOTE — TELEPHONE ENCOUNTER
Last OV 08/27/2019.    Requested Prescriptions   Pending Prescriptions Disp Refills     pregabalin (LYRICA) 150 MG capsule 60 capsule 0     Sig: TAKE 1 CAPSULE BY MOUTH TWICE A DAY       There is no refill protocol information for this order        Routing refill request to provider for review/approval because:  Drug not on the [facility] [place] [place] refill protocol   Please see message below.

## 2019-11-07 NOTE — TELEPHONE ENCOUNTER
Pt was informed of message below. She asked that Rx for a month supply get send to local [facility] pharmacy. She is aware triage is unable to hold for [facility] and Rx approval was sent 10/28/2019.

## 2019-11-07 NOTE — TELEPHONE ENCOUNTER
Call attempted to [employer] [facility] phone # [phone] to check status of pregabalin (LYRICA) 150 MG capsule Rx faxed 10/28/2019. Recordings said 26 calls ahead of you. Triage can try calling back later.     Patient Contact    Attempt # 1    Was call answered?  No.  Left message on voicemail with information to call the clinic back.     On call back: The order was faxed to [facility] and everything looks god from the clinic side. She will need to use the number above and speak with Magnetic to check on the status. The wait it too long for the clinic, she will need to complete this step.

## 2019-11-07 NOTE — TELEPHONE ENCOUNTER
Return Pt call she will be out of Ranexa end of December, and program she is on will end because med going Generic. Informed Pt will run RX as geneic in December and see what happens with almanzar then go from there depending on what price is with increasing maybe Imdur if to expensive. VIC Luo RN

## 2019-11-17 ENCOUNTER — TELEPHONE (OUTPATIENT)
Dept: FAMILY MEDICINE | Facility: CLINIC | Age: 84
End: 2019-11-17

## 2019-11-18 NOTE — TELEPHONE ENCOUNTER
[facility] was called back. Talked to David, he did not find a record of this information. He will do more research and call back.

## 2019-11-18 NOTE — TELEPHONE ENCOUNTER
Huddled with COMPA Zelaya and called the patient. Pt did not have any adverse events or side effects and no adverse everts were reported. The patient said the medication is working well for her. Patient would like to be informed if more info is known about this phone call.

## 2019-11-18 NOTE — TELEPHONE ENCOUNTER
Call received from Mg,  with [employer].    Mg is requesting to speak with COMPA Wood at the [facility].     He is calling regarding an Adverse Drug Event that was reported by her on 9/30/19 re:  pregabalin (Lyrica) 150 mg capsule.    Provided caller with clinic phone number to call M-F from 8 am to 5 pm  [phone]    Notified caller that message would be sent to clinic with [email or web address] contact information.    [email or web address],  Drug Safety Dept.  [phone], select option 2    Megan Hall RN  [facility]

## 2019-11-20 ENCOUNTER — TELEPHONE (OUTPATIENT)
Dept: FAMILY MEDICINE | Facility: CLINIC | Age: 84
End: 2019-11-20

## 2019-11-20 NOTE — TELEPHONE ENCOUNTER
Our goal is to have forms completed within 72 hours, however some forms may require a visit or additional information.    What clinic location was the form placed at [facility] or [place].?     Who is the form from?   Where did the form come from? Faxed to clinic   The form was placed in the inbox of Antwon Schmidt MD      Please fax to [phone]  Phone number: [phone] (option 2)    Additional comments: [employer]- Adverse drug event follow up questionnaire    Call take on 11/20/2019 at 8:10 AM by Eula Mcdonald

## 2019-12-11 DIAGNOSIS — R60.0 BILATERAL LEG EDEMA: ICD-10-CM

## 2019-12-11 RX ORDER — FUROSEMIDE 20 MG
10 TABLET ORAL DAILY
Qty: 45 TABLET | Refills: 1 | Status: SHIPPED | OUTPATIENT
Start: 2019-12-11 | End: 2020-06-08

## 2019-12-12 DIAGNOSIS — I25.85 CARDIAC MICROVASCULAR DISEASE: ICD-10-CM

## 2019-12-12 RX ORDER — RANOLAZINE 500 MG/1
500 TABLET, EXTENDED RELEASE ORAL 2 TIMES DAILY
Qty: 180 TABLET | Refills: 2 | Status: SHIPPED | OUTPATIENT
Start: 2019-12-12 | End: 2020-06-24

## 2019-12-13 ENCOUNTER — TELEPHONE (OUTPATIENT)
Dept: FAMILY MEDICINE | Facility: CLINIC | Age: 84
End: 2019-12-13

## 2019-12-13 NOTE — TELEPHONE ENCOUNTER
Our goal is to have forms completed within 72 hours, however some forms may require a visit or additional information.    What clinic location was the form placed at [facility] or [place].?     Who is the form from?   Where did the form come from? Faxed to clinic   The form was placed in the inbox of Antwon Schmidt MD      Please fax to [phone]  Phone number: [phone]    Additional comments: [facility] patient assistance program paperwork     Call take on 12/13/2019 at 11:46 AM by Nhi Maharaj

## 2019-12-18 DIAGNOSIS — G62.9 NEUROPATHY: Chronic | ICD-10-CM

## 2019-12-18 RX ORDER — PREGABALIN 150 MG/1
CAPSULE ORAL
Qty: 180 CAPSULE | Refills: 3 | Status: SHIPPED | OUTPATIENT
Start: 2019-12-18 | End: 2020-02-14

## 2020-01-10 ENCOUNTER — OFFICE VISIT (OUTPATIENT)
Dept: CARDIOLOGY | Facility: CLINIC | Age: 85
End: 2020-01-10
Payer: MEDICARE

## 2020-01-10 VITALS
DIASTOLIC BLOOD PRESSURE: 70 MMHG | HEART RATE: 70 BPM | BODY MASS INDEX: 36.18 KG/M2 | SYSTOLIC BLOOD PRESSURE: 130 MMHG | WEIGHT: 196.6 LBS | HEIGHT: 62 IN

## 2020-01-10 DIAGNOSIS — I10 HYPERTENSION GOAL BP (BLOOD PRESSURE) < 140/90: ICD-10-CM

## 2020-01-10 DIAGNOSIS — I20.89 STABLE ANGINA PECTORIS (H): Primary | ICD-10-CM

## 2020-01-10 DIAGNOSIS — I25.85 CARDIAC MICROVASCULAR DISEASE: ICD-10-CM

## 2020-01-10 PROCEDURE — 99214 OFFICE O/P EST MOD 30 MIN: CPT | Performed by: NURSE PRACTITIONER

## 2020-01-10 RX ORDER — ISOSORBIDE MONONITRATE 30 MG/1
30 TABLET, EXTENDED RELEASE ORAL DAILY
Qty: 90 TABLET | Refills: 3 | Status: SHIPPED | OUTPATIENT
Start: 2020-01-10 | End: 2020-10-01

## 2020-01-10 ASSESSMENT — MIFFLIN-ST. JEOR: SCORE: 1245.02

## 2020-01-10 NOTE — LETTER
1/10/2020    Antwon Schmidt MD  [address]  [facility] [address]    RE: Lora Vergara       Dear Colleague,    I had the pleasure of seeing Lora Vergara in the [facility].    HPI and Plan:   I had the pleasure of seeing Lora Vergara today in cardiology clinic follow up. She is a pleasant 89 year old patient of Dr. Luis.     Ms. 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  Coronary angiography in 2010 showed mild to moderate coronary artery atherosclerosis but no obstructive disease.  Her chest discomfort and jaw pain have been suspicious for angina and responded to Imdur and Ranexa.  She has been getting Ranexa at a discount from the company, unfortunately this program was discontinued at the end of December.  She still has a supply of Ranexa but will be able to get refills in the future..     Today she says she is doing well.     She exercises regularly at her apartment building with exercise classes and does Wii bowling.  She also walks down a long hallway in the basement to get to some of the activities and with this she gets breathless by the end.  She does not have any chest pain with this.  She denies  PND or orthopnea.  Her angina is controlled with beta-blocker, Imdur and Ranexa.  Her blood pressure is borderline.  Previously she has been on calcium channel blocker but it was discontinued for hypotension.    Physical Exam  Please see Below     Assessment and Plan  1.  Microvascular angina.  She has nonobstructive coronary artery disease.  She has had good symptomatic relief with isosorbide, beta-blockade and Ranexa.  Unfortunately she is going to run out of Ranexa at some time this year.  She is having some shortness of breath when she walks a long hallway in her basement.  I recommended we start increasing her isosorbide from 60 mg daily up to 90 mg daily, gave her a 30 mg tablet to go along with her 60 mg  tablet.  I think this will help prevent some of the symptoms.  When she stops her Ranexa, after she runs out of it, if she has breakthrough angina we could try increasing the dose. It has been quite a while since she seen Dr. Luis, so I am asking her to schedule with him in the next 3 to 4 months.  If she needs to I can see her sooner.  Isosorbide further.    2.  Hypertension.  Controlled.  3.  Controlled on statin therapy.  Hyperlipidemia.    Thank you for allowing me to care for Lora Vergara today.    HERMELINDA Hernandez, CNP  Cardiology    Voice recognition software was used for this note, I have reviewed this note, but errors may have been missed.    Orders Placed This Encounter   Procedures     Follow-Up with Cardiologist     Orders Placed This Encounter   Medications     isosorbide mononitrate (IMDUR) 30 MG 24 hr tablet     Sig: Take 1 tablet (30 mg) by mouth daily     Dispense:  90 tablet     Refill:  3     She should take a 30 mg tablet and her previously ordered 60 mg tablet for a total of 90 mg daily.     There are no discontinued medications.      CURRENT MEDICATIONS:  Current Outpatient Medications   Medication Sig Dispense Refill     acetaminophen (TYLENOL) 500 MG tablet Take 500-1,000 mg by mouth every 6 hours as needed       aspirin 81 MG EC tablet Take 81 mg by mouth daily       atorvastatin (LIPITOR) 40 MG tablet Take 1 tablet (40 mg) by mouth daily 90 tablet 0     bisacodyl (DULCOLAX) 5 MG EC tablet Take 5 mg by mouth daily       furosemide (LASIX) 20 MG tablet Take 0.5 tablets (10 mg) by mouth daily 45 tablet 1     isosorbide mononitrate (IMDUR) 30 MG 24 hr tablet Take 1 tablet (30 mg) by mouth daily 90 tablet 3     isosorbide mononitrate (IMDUR) 60 MG 24 hr tablet TAKE 1 TABLET (60 MG) BY MOUTH DAILY 90 tablet 2     latanoprost (XALATAN) 0.005 % ophthalmic solution Place 1 drop into both eyes At Bedtime       levothyroxine (SYNTHROID/LEVOTHROID) 25 MCG tablet TAKE 1 TABLET BY MOUTH EVERY DAY  90 tablet 3     metFORMIN (GLUCOPHAGE-XR) 750 MG 24 hr tablet TAKE 1 TABLET (750 MG) BY MOUTH DAILY WITH DINNER 90 tablet 3     metoprolol tartrate (LOPRESSOR) 100 MG tablet TAKE 1/2 TABLET BY MOUTH EVERY MORNING AND TAKE 1 TABLET IN THE EVENING. 135 tablet 3     nitroGLYcerin (NITROSTAT) 0.4 MG sublingual tablet Place 1 tablet (0.4 mg) under the tongue every 5 minutes as needed for chest pain 50 tablet 3     order for DME Equipment being ordered: wheeled walker with brakes and a seat 1 Device 0     pregabalin (LYRICA) 150 MG capsule TAKE 1 CAPSULE BY MOUTH TWICE A  capsule 3     ranolazine (RANEXA) 500 MG 12 hr tablet Take 1 tablet (500 mg) by mouth 2 times daily 180 tablet 2     vitamin D3 (CHOLECALCIFEROL) 2000 units tablet Take 4,000 Units by mouth daily       nystatin (MYCOSTATIN) 204601 UNIT/GM external powder Apply topically 2 times daily       polyethylene glycol (MIRALAX/GLYCOLAX) packet Take 1 packet by mouth daily as needed for constipation         ALLERGIES     Allergies   Allergen Reactions     Hydrochlorothiazide      Pancreatitis - patient denies this.  She says it gave her low sodium.       PAST MEDICAL HISTORY:  Past Medical History:   Diagnosis Date     Angina at rest (H)     Thought to be microvascular     Arthritis      Basal cell carcinoma 1/2014, (3/5/6-2014)    nose and check and ear and face     Coronary artery disease     Mild CAD per 2/2008 angiogram     Hyperlipidaemia      Hypertension      Hypothyroidism 1/17/2013     Mitral regurgitation 6/2015    echo     Type 2 diabetes, HbA1C goal < 8% (H) 9/11/2013       PAST SURGICAL HISTORY:  Past Surgical History:   Procedure Laterality Date     APPENDECTOMY  1974     BACK SURGERY  1996     BLADDER SURGERY  1990    bladder suspension     CHOLECYSTECTOMY  1975     CORONARY ANGIOGRAPHY ADULT ORDER  2/2008    Mild CAD. LAD w/20% stenosis, RCA with 30-40% stenosis. No flow limiting obstructions.     face surgery  1-6/2014    basal cell plus  plastic to nose, face , ear     HYSTERECTOMY  1970     OOPHORECTOMY  1974    bilateral     PHACOEMULSIFICATION CLEAR CORNEA WITH STANDARD INTRAOCULAR LENS IMPLANT Right 7/21/2015    Procedure: PHACOEMULSIFICATION CLEAR CORNEA WITH STANDARD INTRAOCULAR LENS IMPLANT;  Surgeon: Schuyler Chapa MD;  Location: [facility]     PHACOEMULSIFICATION CLEAR CORNEA WITH STANDARD INTRAOCULAR LENS IMPLANT Left 8/18/2015    Procedure: PHACOEMULSIFICATION CLEAR CORNEA WITH STANDARD INTRAOCULAR LENS IMPLANT;  Surgeon: Schuyler Chapa MD;  Location: [facility]       FAMILY HISTORY:  Family History   Problem Relation Age of Onset     Cancer Brother         lung     Cancer Son      Cerebrovascular Disease Mother         and Parkinson's     Cancer Father         pancreas     Diabetes Son        SOCIAL HISTORY:  Social History     Socioeconomic History     Marital status:      Spouse name: None     Number of children: None     Years of education: None     Highest education level: None   Occupational History     None   Social Needs     Financial resource strain: None     Food insecurity:     Worry: None     Inability: None     Transportation needs:     Medical: None     Non-medical: None   Tobacco Use     Smoking status: Never Smoker     Smokeless tobacco: Never Used   Substance and Sexual Activity     Alcohol use: Yes     Comment: rarely     Drug use: No     Sexual activity: Not Currently   Lifestyle     Physical activity:     Days per week: None     Minutes per session: None     Stress: None   Relationships     Social connections:     Talks on phone: None     Gets together: None     Attends Scientologist service: None     Active member of club or organization: None     Attends meetings of clubs or organizations: None     Relationship status: None     Intimate partner violence:     Fear of current or ex partner: None     Emotionally abused: None     Physically abused: None     Forced sexual activity: None   Other Topics Concern      "Parent/sibling w/ CABG, MI or angioplasty before 65F 55M? No      Service Not Asked     Blood Transfusions Not Asked     Caffeine Concern Yes     Comment: 4 cups caffeine per day     Occupational Exposure Not Asked     Hobby Hazards Not Asked     Sleep Concern No     Stress Concern No     Weight Concern No     Special Diet No     Back Care Not Asked     Exercise Yes     Comment: exercises 30 minutes, 4 days week     Bike Helmet Not Asked     Seat Belt Not Asked     Self-Exams Not Asked   Social History Narrative    Had 2 sons; one  in ; head and neck cancer;  due to choking at [employer];                                 had 2 step dtrs; one .                                        times 2       Review of Systems:  Skin:  Negative       Eyes:  Positive for glasses cataract extraction both eyes  ENT:  Negative      Respiratory:  Positive for dyspnea on exertion;shortness of breath     Cardiovascular:    Positive for;edema;fatigue edema due to neuropathy. wobbly sometimes  Gastroenterology: Negative      Genitourinary:  Negative      Musculoskeletal:  Positive for arthritis;back pain;foot pain right arm pain, pain in legs(knees).neuropathy in feet going up legs  Neurologic:  Positive for numbness or tingling of hands;numbness or tingling of feet neuropathy  Psychiatric:  Negative      Heme/Lymph/Imm:  Positive for allergies    Endocrine:  Positive for diabetes;thyroid disorder      Physical Exam:  Vitals: /70   Pulse 70   Ht 1.575 m (5' 2\")   Wt 89.2 kg (196 lb 9.6 oz)   LMP  (LMP Unknown)   BMI 35.96 kg/m       Constitutional:  cooperative        Skin:  warm and dry to the touch          Head:  normocephalic        Eyes:  pupils equal and round        Lymph:      ENT:  no pallor or cyanosis        Neck:  JVP normal        Respiratory:  clear to auscultation         Cardiac: regular rhythm;normal S1 and S2                pulses full and equal                                    "     GI:    obese      Extremities and Muscular Skeletal:  no edema              Neurological:  no gross motor deficits   uses walker    Psych:  Alert and Oriented x 3    Encounter Diagnoses   Name Primary?     Stable angina pectoris (H) Yes     Cardiac microvascular disease      Hypertension goal BP (blood pressure) < 140/90        Recent Lab Results:  LIPID RESULTS:  Lab Results   Component Value Date    CHOL 123 07/26/2018    HDL 55 07/26/2018    LDL 42 07/26/2018    TRIG 130 07/26/2018    CHOLHDLRATIO 1.9 07/20/2015       LIVER ENZYME RESULTS:  Lab Results   Component Value Date    AST 15 08/27/2019    ALT 20 08/27/2019       CBC RESULTS:  Lab Results   Component Value Date    WBC 4.4 02/28/2019    RBC 3.66 (A) 02/28/2019    HGB 10.8 (A) 02/28/2019    HCT 33.6 (A) 02/28/2019    MCV 91.8 02/28/2019    MCH 30.0 02/17/2019    MCHC 34.0 02/17/2019    RDW 12.9 02/28/2019     02/28/2019       BMP RESULTS:  Lab Results   Component Value Date     08/27/2019    POTASSIUM 4.1 08/27/2019    CHLORIDE 101 08/27/2019    CO2 25 08/27/2019    ANIONGAP 10 08/27/2019     (H) 08/27/2019    BUN 13 08/27/2019    CR 0.81 08/27/2019    GFRESTIMATED 62 08/27/2019    GFRESTBLACK 72 08/27/2019    VIOLETA 8.7 08/27/2019        A1C RESULTS:  Lab Results   Component Value Date    A1C 5.9 (H) 08/27/2019       INR RESULTS:  Lab Results   Component Value Date    INR 1.02 12/17/2009    INR 0.97 02/22/2008           CC  Antwon Schmidt MD  [address] [place] [address]                Thank you for allowing me to participate in the care of your patient.      Sincerely,     HERMELINDA Hays [facility]

## 2020-01-10 NOTE — PATIENT INSTRUCTIONS
Increase isosorbide from 60 to 90 mg daily. You will need to take a 30 mg and a 60 mg tablet to do this.    When you run out of Ranexa, see how you feel. The increased dose of isosorbide might prevent you from getting chest discomfort. If not, we can try increasing the imdur dose.    [place] [phone]

## 2020-01-10 NOTE — PROGRESS NOTES
HPI and Plan:   I had the pleasure of seeing Lora Vergara today in cardiology clinic follow up. She is a pleasant 89 year old patient of Dr. Luis.     Ms. 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  Coronary angiography in 2010 showed mild to moderate coronary artery atherosclerosis but no obstructive disease.  Her chest discomfort and jaw pain have been suspicious for angina and responded to Imdur and Ranexa.  She has been getting Ranexa at a discount from the company, unfortunately this program was discontinued at the end of December.  She still has a supply of Ranexa but will be able to get refills in the future..     Today she says she is doing well.    She exercises regularly at her apartment building with exercise classes and does Wii bowling.  She also walks down a long hallway in the basement to get to some of the activities and with this she gets breathless by the end.  She does not have any chest pain with this.  She denies  PND or orthopnea.  Her angina is controlled with beta-blocker, Imdur and Ranexa.  Her blood pressure is borderline.  Previously she has been on calcium channel blocker but it was discontinued for hypotension.    Physical Exam  Please see Below     Assessment and Plan  1.  Microvascular angina.  She has nonobstructive coronary artery disease.  She has had good symptomatic relief with isosorbide, beta-blockade and Ranexa.  Unfortunately she is going to run out of Ranexa at some time this year.  She is having some shortness of breath when she walks a long hallway in her basement.  I recommended we start increasing her isosorbide from 60 mg daily up to 90 mg daily, gave her a 30 mg tablet to go along with her 60 mg tablet.  I think this will help prevent some of the symptoms.  When she stops her Ranexa, after she runs out of it, if she has breakthrough angina we could try increasing the dose. It has been quite a while since she seen   Janelle, so I am asking her to schedule with him in the next 3 to 4 months.  If she needs to I can see her sooner.  Isosorbide further.    2.  Hypertension.  Controlled.  3.  Controlled on statin therapy.  Hyperlipidemia.    Thank you for allowing me to care for Lora Vergara today.    HERMELINDA Hernandez, CNP  Cardiology    Voice recognition software was used for this note, I have reviewed this note, but errors may have been missed.    Orders Placed This Encounter   Procedures     Follow-Up with Cardiologist     Orders Placed This Encounter   Medications     isosorbide mononitrate (IMDUR) 30 MG 24 hr tablet     Sig: Take 1 tablet (30 mg) by mouth daily     Dispense:  90 tablet     Refill:  3     She should take a 30 mg tablet and her previously ordered 60 mg tablet for a total of 90 mg daily.     There are no discontinued medications.      CURRENT MEDICATIONS:  Current Outpatient Medications   Medication Sig Dispense Refill     acetaminophen (TYLENOL) 500 MG tablet Take 500-1,000 mg by mouth every 6 hours as needed       aspirin 81 MG EC tablet Take 81 mg by mouth daily       atorvastatin (LIPITOR) 40 MG tablet Take 1 tablet (40 mg) by mouth daily 90 tablet 0     bisacodyl (DULCOLAX) 5 MG EC tablet Take 5 mg by mouth daily       furosemide (LASIX) 20 MG tablet Take 0.5 tablets (10 mg) by mouth daily 45 tablet 1     isosorbide mononitrate (IMDUR) 30 MG 24 hr tablet Take 1 tablet (30 mg) by mouth daily 90 tablet 3     isosorbide mononitrate (IMDUR) 60 MG 24 hr tablet TAKE 1 TABLET (60 MG) BY MOUTH DAILY 90 tablet 2     latanoprost (XALATAN) 0.005 % ophthalmic solution Place 1 drop into both eyes At Bedtime       levothyroxine (SYNTHROID/LEVOTHROID) 25 MCG tablet TAKE 1 TABLET BY MOUTH EVERY DAY 90 tablet 3     metFORMIN (GLUCOPHAGE-XR) 750 MG 24 hr tablet TAKE 1 TABLET (750 MG) BY MOUTH DAILY WITH DINNER 90 tablet 3     metoprolol tartrate (LOPRESSOR) 100 MG tablet TAKE 1/2 TABLET BY MOUTH EVERY MORNING AND TAKE 1  TABLET IN THE EVENING. 135 tablet 3     nitroGLYcerin (NITROSTAT) 0.4 MG sublingual tablet Place 1 tablet (0.4 mg) under the tongue every 5 minutes as needed for chest pain 50 tablet 3     order for DME Equipment being ordered: wheeled walker with brakes and a seat 1 Device 0     pregabalin (LYRICA) 150 MG capsule TAKE 1 CAPSULE BY MOUTH TWICE A  capsule 3     ranolazine (RANEXA) 500 MG 12 hr tablet Take 1 tablet (500 mg) by mouth 2 times daily 180 tablet 2     vitamin D3 (CHOLECALCIFEROL) 2000 units tablet Take 4,000 Units by mouth daily       nystatin (MYCOSTATIN) 308360 UNIT/GM external powder Apply topically 2 times daily       polyethylene glycol (MIRALAX/GLYCOLAX) packet Take 1 packet by mouth daily as needed for constipation         ALLERGIES     Allergies   Allergen Reactions     Hydrochlorothiazide      Pancreatitis - patient denies this.  She says it gave her low sodium.       PAST MEDICAL HISTORY:  Past Medical History:   Diagnosis Date     Angina at rest (H)     Thought to be microvascular     Arthritis      Basal cell carcinoma 1/2014, (3/5/6-2014)    nose and check and ear and face     Coronary artery disease     Mild CAD per 2/2008 angiogram     Hyperlipidaemia      Hypertension      Hypothyroidism 1/17/2013     Mitral regurgitation 6/2015    echo     Type 2 diabetes, HbA1C goal < 8% (H) 9/11/2013       PAST SURGICAL HISTORY:  Past Surgical History:   Procedure Laterality Date     APPENDECTOMY  1974     BACK SURGERY  1996     BLADDER SURGERY  1990    bladder suspension     CHOLECYSTECTOMY  1975     CORONARY ANGIOGRAPHY ADULT ORDER  2/2008    Mild CAD. LAD w/20% stenosis, RCA with 30-40% stenosis. No flow limiting obstructions.     face surgery  1-6/2014    basal cell plus plastic to nose, face , ear     HYSTERECTOMY  1970     OOPHORECTOMY  1974    bilateral     PHACOEMULSIFICATION CLEAR CORNEA WITH STANDARD INTRAOCULAR LENS IMPLANT Right 7/21/2015    Procedure: PHACOEMULSIFICATION CLEAR CORNEA  WITH STANDARD INTRAOCULAR LENS IMPLANT;  Surgeon: Schuyler Chapa MD;  Location: [facility]     PHACOEMULSIFICATION CLEAR CORNEA WITH STANDARD INTRAOCULAR LENS IMPLANT Left 8/18/2015    Procedure: PHACOEMULSIFICATION CLEAR CORNEA WITH STANDARD INTRAOCULAR LENS IMPLANT;  Surgeon: Schuyler Chapa MD;  Location: [facility]       FAMILY HISTORY:  Family History   Problem Relation Age of Onset     Cancer Brother         lung     Cancer Son      Cerebrovascular Disease Mother         and Parkinson's     Cancer Father         pancreas     Diabetes Son        SOCIAL HISTORY:  Social History     Socioeconomic History     Marital status:      Spouse name: None     Number of children: None     Years of education: None     Highest education level: None   Occupational History     None   Social Needs     Financial resource strain: None     Food insecurity:     Worry: None     Inability: None     Transportation needs:     Medical: None     Non-medical: None   Tobacco Use     Smoking status: Never Smoker     Smokeless tobacco: Never Used   Substance and Sexual Activity     Alcohol use: Yes     Comment: rarely     Drug use: No     Sexual activity: Not Currently   Lifestyle     Physical activity:     Days per week: None     Minutes per session: None     Stress: None   Relationships     Social connections:     Talks on phone: None     Gets together: None     Attends Worship service: None     Active member of club or organization: None     Attends meetings of clubs or organizations: None     Relationship status: None     Intimate partner violence:     Fear of current or ex partner: None     Emotionally abused: None     Physically abused: None     Forced sexual activity: None   Other Topics Concern     Parent/sibling w/ CABG, MI or angioplasty before 65F 55M? No      Service Not Asked     Blood Transfusions Not Asked     Caffeine Concern Yes     Comment: 4 cups caffeine per day     Occupational Exposure Not Asked      "Hobby Hazards Not Asked     Sleep Concern No     Stress Concern No     Weight Concern No     Special Diet No     Back Care Not Asked     Exercise Yes     Comment: exercises 30 minutes, 4 days week     Bike Helmet Not Asked     Seat Belt Not Asked     Self-Exams Not Asked   Social History Narrative    Had 2 sons; one  in ; head and neck cancer;  due to choking at [employer];                                 had 2 step dtrs; one .                                        times 2       Review of Systems:  Skin:  Negative       Eyes:  Positive for glasses cataract extraction both eyes  ENT:  Negative      Respiratory:  Positive for dyspnea on exertion;shortness of breath     Cardiovascular:    Positive for;edema;fatigue edema due to neuropathy. wobbly sometimes  Gastroenterology: Negative      Genitourinary:  Negative      Musculoskeletal:  Positive for arthritis;back pain;foot pain right arm pain, pain in legs(knees).neuropathy in feet going up legs  Neurologic:  Positive for numbness or tingling of hands;numbness or tingling of feet neuropathy  Psychiatric:  Negative      Heme/Lymph/Imm:  Positive for allergies    Endocrine:  Positive for diabetes;thyroid disorder      Physical Exam:  Vitals: /70   Pulse 70   Ht 1.575 m (5' 2\")   Wt 89.2 kg (196 lb 9.6 oz)   LMP  (LMP Unknown)   BMI 35.96 kg/m      Constitutional:  cooperative        Skin:  warm and dry to the touch          Head:  normocephalic        Eyes:  pupils equal and round        Lymph:      ENT:  no pallor or cyanosis        Neck:  JVP normal        Respiratory:  clear to auscultation         Cardiac: regular rhythm;normal S1 and S2                pulses full and equal                                        GI:    obese      Extremities and Muscular Skeletal:  no edema              Neurological:  no gross motor deficits   uses walker    Psych:  Alert and Oriented x 3    Encounter Diagnoses   Name Primary?     Stable angina pectoris " (H) Yes     Cardiac microvascular disease      Hypertension goal BP (blood pressure) < 140/90        Recent Lab Results:  LIPID RESULTS:  Lab Results   Component Value Date    CHOL 123 07/26/2018    HDL 55 07/26/2018    LDL 42 07/26/2018    TRIG 130 07/26/2018    CHOLHDLRATIO 1.9 07/20/2015       LIVER ENZYME RESULTS:  Lab Results   Component Value Date    AST 15 08/27/2019    ALT 20 08/27/2019       CBC RESULTS:  Lab Results   Component Value Date    WBC 4.4 02/28/2019    RBC 3.66 (A) 02/28/2019    HGB 10.8 (A) 02/28/2019    HCT 33.6 (A) 02/28/2019    MCV 91.8 02/28/2019    MCH 30.0 02/17/2019    MCHC 34.0 02/17/2019    RDW 12.9 02/28/2019     02/28/2019       BMP RESULTS:  Lab Results   Component Value Date     08/27/2019    POTASSIUM 4.1 08/27/2019    CHLORIDE 101 08/27/2019    CO2 25 08/27/2019    ANIONGAP 10 08/27/2019     (H) 08/27/2019    BUN 13 08/27/2019    CR 0.81 08/27/2019    GFRESTIMATED 62 08/27/2019    GFRESTBLACK 72 08/27/2019    VIOLETA 8.7 08/27/2019        A1C RESULTS:  Lab Results   Component Value Date    A1C 5.9 (H) 08/27/2019       INR RESULTS:  Lab Results   Component Value Date    INR 1.02 12/17/2009    INR 0.97 02/22/2008           CC  Antwon Schmidt MD  [address] [place] [address]

## 2020-01-27 DIAGNOSIS — E78.5 HYPERLIPIDEMIA LDL GOAL <70: ICD-10-CM

## 2020-01-27 RX ORDER — ATORVASTATIN CALCIUM 40 MG/1
40 TABLET, FILM COATED ORAL DAILY
Qty: 90 TABLET | Refills: 3 | Status: SHIPPED | OUTPATIENT
Start: 2020-01-27 | End: 2020-11-18

## 2020-02-11 ENCOUNTER — TELEPHONE (OUTPATIENT)
Dept: FAMILY MEDICINE | Facility: CLINIC | Age: 85
End: 2020-02-11

## 2020-02-11 NOTE — TELEPHONE ENCOUNTER
Flagged for Stephanie tomorrow-    Please call [phone].    There is no more direct number to the refill option. Please check with [employer] to see if all information is up to date, as pt needs refills.     If everything OK update pt that [employer] will send refills to pt as there is 9 more months of refills on file.

## 2020-02-11 NOTE — TELEPHONE ENCOUNTER
Reason for Call:  Other prescription    Detailed comments: The patient called and stated that she is in need of a refill for pregabalin (LYRICA) 150 MG capsule.  She stated that she gets this medication through [employer] assistance program and that we need to contact them and let them know that she needs a refill sent to her. She would like a call back when this has been taken care of so she knows that she can expect this medication to come in the mail.     Phone Number Patient can be reached at: Home number on file [phone] (home)    Best Time: Any    Can we leave a detailed message on this number? YES    Call taken on 2/11/2020 at 10:29 AM by Fernanda Bernard

## 2020-02-11 NOTE — TELEPHONE ENCOUNTER
Per pt request, RN filled out [employer] assistance form online with pt on telephone.  Pt answered questions, while looking at her copy of the assistance form from the 2019 year.  Pt provided all answers to the best of her ability.  Pt asked RN to electronically sign for her.  RN relayed all information from the 3rd section and HIPAA section of the form to pt prior to electronic signature.  Pt stated understanding off all information prior to signing.    Pt requested printed copy of completed form sent out to home address.   RN sent printed copy to pt.    At this time, provider copy to be addressed.

## 2020-02-11 NOTE — TELEPHONE ENCOUNTER
Received fax from Stephanie. Huddled with  to get signature from PCP. Will be refaxed to [phone].

## 2020-02-11 NOTE — TELEPHONE ENCOUNTER
RN spoke to RN working at PCP location.    Old application form to be printed out, and faxed over along with new pt information. Provider to sign page 5 of old application form.    RN will fax over completed new form from pt, and attach it to old pt form for [employer] review.    New pt application placed in scan folder to be scanned into pt chart.

## 2020-02-11 NOTE — TELEPHONE ENCOUNTER
RN called to [facility].    [employer] states there are a few pt information pieces that are missing in pt application.    Provider NPI number, date, and signature needs to be completed.     We need new pt application and provider application for medication to be covered for the 2020 year.

## 2020-02-12 NOTE — TELEPHONE ENCOUNTER
RN called to pt and updated pt with new ID number.  Pt states understanding.    Pt would like update on Friday.  -Double check to make sure that this application is good until 12/31/20.    OK to LVM on home number with info as per pt

## 2020-02-12 NOTE — TELEPHONE ENCOUNTER
RN called [facility] [phone].    [employer] states that paperwork takes 24-48 hours to process and at this time the pt file is not updated.  Pended to Friday to readdress at this time.     Of note, new [employer] ID for pt:  [id number]

## 2020-02-14 NOTE — TELEPHONE ENCOUNTER
RN called back to [employer].    The application was received and all appropriate information was filled out.     told RN that prescription is valid.   Reaching out to pharmacy, and states that if approved, should be shipped out on Tuesday.    72 hrs to report any issues that PT has after receiving the package to reporting number [phone]    RN to call back Tuesday to ensure that script has gone through and shipped.

## 2020-02-17 DIAGNOSIS — I25.9 CHRONIC ISCHEMIC HEART DISEASE: ICD-10-CM

## 2020-02-17 RX ORDER — ISOSORBIDE MONONITRATE 60 MG/1
TABLET, EXTENDED RELEASE ORAL
Qty: 90 TABLET | Refills: 1 | Status: SHIPPED | OUTPATIENT
Start: 2020-02-17 | End: 2020-07-25

## 2020-02-17 NOTE — TELEPHONE ENCOUNTER
01/10/20 Cards    .  I recommended we start increasing her isosorbide from 60 mg daily up to 90 mg daily, gave her a 30 mg tablet to go along with her 60 mg tablet.  I think this will help prevent some of the symptoms.  When she stops her Ranexa, after she runs out of it, if she has breakthrough angina we could try increasing the dose. It has been quite a while since she seen Dr. Luis, so I am asking her to schedule with him in the next 3 to 4 months.  If she needs to I can see her sooner.  Isosorbide further.

## 2020-02-17 NOTE — TELEPHONE ENCOUNTER
RN called pt and informed of cards changes.  Pt states understanding.  Pt also states she needs additional 60 mg refilled.  Routing to refill pool

## 2020-02-17 NOTE — TELEPHONE ENCOUNTER
"Requested Prescriptions   Pending Prescriptions Disp Refills     isosorbide mononitrate (IMDUR) 60 MG 24 hr tablet 90 tablet 2     Sig: TAKE 1 TABLET (60 MG) BY MOUTH DAILY   Last Written Prescription Date:  05/15/19  Last Fill Quantity: 90,  # refills: 2   Last office visit: 8/27/2019 with prescribing provider:  Brayan   Future Office Visit:   Next 5 appointments (look out 90 days)    Apr 02, 2020  1:45 PM CDT  Return Visit with Kalyan Luis MD  [facility] [address] [place] [id number]  [phone] OPT 2             Nitrates Passed - 2/17/2020  9:45 AM        Passed - Blood pressure under 140/90 in past 12 months     BP Readings from Last 3 Encounters:   01/10/20 130/70   08/27/19 128/70   05/30/19 110/74                 Passed - Pt is not on erectile dysfunction medications        Passed - Recent (12 mo) or future (30 days) visit within the authorizing provider's specialty     Patient has had an office visit with the authorizing provider or a provider within the authorizing providers department within the previous 12 mos or has a future within next 30 days. See \"Patient Info\" tab in inbasket, or \"Choose Columns\" in Meds & Orders section of the refill encounter.              Passed - Medication is active on med list        Passed - Patient is age 18 or older          "

## 2020-02-17 NOTE — TELEPHONE ENCOUNTER
Reason for Call:  Other prescription    Detailed comments: pt says the dosage on her isosorbide was changed and she doesn't know why.     Phone Number Patient can be reached at: Home number on file [phone] (home)    Best Time: any    Can we leave a detailed message on this number? YES    Call taken on 2/17/2020 at 9:29 AM by CARMENCITA ESCALERA

## 2020-02-17 NOTE — TELEPHONE ENCOUNTER
Prescription approved per [facility] Refill Protocol for 12 months of refills since last appointment, which was 08/27/19

## 2020-02-18 NOTE — TELEPHONE ENCOUNTER
At this time RN called back to [employer].    Representative states that this medication is still being processed.  This representative states that medication should be processed latest by Thursday 02/20.  Flagged for tomorrow, Wednesday 02/18 to check on status.

## 2020-04-16 DIAGNOSIS — I10 HYPERTENSION GOAL BP (BLOOD PRESSURE) < 140/90: ICD-10-CM

## 2020-04-16 RX ORDER — METOPROLOL TARTRATE 100 MG
TABLET ORAL
Qty: 135 TABLET | Refills: 2 | Status: SHIPPED | OUTPATIENT
Start: 2020-04-16 | End: 2020-10-20

## 2020-05-13 ENCOUNTER — TELEPHONE (OUTPATIENT)
Dept: FAMILY MEDICINE | Facility: CLINIC | Age: 85
End: 2020-05-13

## 2020-05-13 NOTE — TELEPHONE ENCOUNTER
Reason for Call:  Form, our goal is to have forms completed with 72 hours, however, some forms may require a visit or additional information.    Type of letter, form or note:  medical    Who is the form from?: Insurance comp    Where did the form come from: form was faxed in    What clinic location was the form placed at?: [facility]    Where the form was placed: Given to physician    What number is listed as a contact on the form?: [phone] (P) [phone]       Additional comments: Verification of Over the counter Medications    Call taken on 5/13/2020 at 11:15 AM by Fernanda Bernard

## 2020-06-08 DIAGNOSIS — R60.0 BILATERAL LEG EDEMA: ICD-10-CM

## 2020-06-08 RX ORDER — FUROSEMIDE 20 MG
10 TABLET ORAL DAILY
Qty: 45 TABLET | Refills: 0 | Status: SHIPPED | OUTPATIENT
Start: 2020-06-08 | End: 2020-09-01

## 2020-06-24 DIAGNOSIS — I25.85 CARDIAC MICROVASCULAR DISEASE: ICD-10-CM

## 2020-06-24 RX ORDER — RANOLAZINE 500 MG/1
500 TABLET, EXTENDED RELEASE ORAL 2 TIMES DAILY
Qty: 180 TABLET | Refills: 2 | Status: SHIPPED | OUTPATIENT
Start: 2020-06-24 | End: 2021-02-05

## 2020-07-17 DIAGNOSIS — E78.5 HYPERLIPIDEMIA LDL GOAL <70: Primary | Chronic | ICD-10-CM

## 2020-07-17 DIAGNOSIS — E03.9 HYPOTHYROIDISM, UNSPECIFIED TYPE: Chronic | ICD-10-CM

## 2020-07-17 DIAGNOSIS — E11.40 TYPE 2 DIABETES MELLITUS WITH DIABETIC NEUROPATHY, WITHOUT LONG-TERM CURRENT USE OF INSULIN (H): ICD-10-CM

## 2020-07-17 DIAGNOSIS — I50.32 CHRONIC DIASTOLIC CONGESTIVE HEART FAILURE (H): ICD-10-CM

## 2020-07-17 RX ORDER — METFORMIN HYDROCHLORIDE 750 MG/1
TABLET, EXTENDED RELEASE ORAL
Qty: 90 TABLET | Refills: 3 | Status: CANCELLED | OUTPATIENT
Start: 2020-07-17

## 2020-07-17 NOTE — TELEPHONE ENCOUNTER
As far as I know, only some lots of metformin XR or ER have been recalled.     Her most recent A1C was only 5.9.                                 However, at age 89 she probably does not need any glucose lowering medication.            She can stop taking metformin XR or ER.             We can check her labs in a few weeks or months.       Please let her know.

## 2020-07-17 NOTE — TELEPHONE ENCOUNTER
RN called back to pt.    She states understanding and will stop metformin.    1) Please remove from MAR  2) Please place any labs which are needed. Lab appt 08/04

## 2020-07-17 NOTE — TELEPHONE ENCOUNTER
Routing refill request to provider for review/approval because:  Labs not current:  A1C  Patient needs alternative Rx.

## 2020-07-17 NOTE — TELEPHONE ENCOUNTER
Reason for Call:  Medication or medication refill:    Do you use a [facility]?  Name of the pharmacy and phone number for the current request:       [facility] [address] [place] [address]        Name of the medication requested:    metFORMIN (GLUCOPHAGE-XR) 750 MG 24 hr tablet         Other request: The patient called and stated that she received a letter in the mail stating that this medication is being recalled and she would like an alternative to be sent to the pharmacy above. She would like a call once this has been decided on so she knows what the plan is moving forward for the medication switch.     Can we leave a detailed message on this number? YES    Phone number patient can be reached at: Home number on file [phone] (home)    Best Time: Any    Call taken on 7/17/2020 at 10:57 AM by Fernanda Bernard

## 2020-07-25 DIAGNOSIS — I25.9 CHRONIC ISCHEMIC HEART DISEASE: ICD-10-CM

## 2020-07-25 RX ORDER — ISOSORBIDE MONONITRATE 60 MG/1
TABLET, EXTENDED RELEASE ORAL
Qty: 90 TABLET | Refills: 1 | Status: SHIPPED | OUTPATIENT
Start: 2020-07-25 | End: 2020-11-02

## 2020-07-29 ENCOUNTER — VIRTUAL VISIT (OUTPATIENT)
Dept: FAMILY MEDICINE | Facility: CLINIC | Age: 85
End: 2020-07-29
Payer: MEDICARE

## 2020-07-29 DIAGNOSIS — I10 ESSENTIAL HYPERTENSION WITH GOAL BLOOD PRESSURE LESS THAN 130/80: ICD-10-CM

## 2020-07-29 DIAGNOSIS — E11.40 TYPE 2 DIABETES MELLITUS WITH DIABETIC NEUROPATHY, WITHOUT LONG-TERM CURRENT USE OF INSULIN (H): Primary | ICD-10-CM

## 2020-07-29 DIAGNOSIS — I25.9 CHRONIC ISCHEMIC HEART DISEASE: ICD-10-CM

## 2020-07-29 DIAGNOSIS — E03.9 HYPOTHYROIDISM, UNSPECIFIED TYPE: ICD-10-CM

## 2020-07-29 DIAGNOSIS — K59.00 CONSTIPATION, UNSPECIFIED CONSTIPATION TYPE: ICD-10-CM

## 2020-07-29 PROCEDURE — 99442 ZZC PHYSICIAN TELEPHONE EVALUATION 11-20 MIN: CPT | Mod: 95 | Performed by: INTERNAL MEDICINE

## 2020-07-29 NOTE — PROGRESS NOTES
"Lora Vergara is a 89 year old female who is being evaluated via a billable telephone visit.      The patient has been notified of following:     \"This telephone visit will be conducted via a call between you and your physician/provider. We have found that certain health care needs can be provided without the need for a physical exam.  This service lets us provide the care you need with a short phone conversation.  If a prescription is necessary we can send it directly to your pharmacy.  If lab work is needed we can place an order for that and you can then stop by our lab to have the test done at a later time.    Telephone visits are billed at different rates depending on your insurance coverage. During this emergency period, for some insurers they may be billed the same as an in-person visit.  Please reach out to your insurance provider with any questions.    If during the course of the call the physician/provider feels a telephone visit is not appropriate, you will not be charged for this service.\"    Patient has given verbal consent for Telephone visit?  Yes    What phone number would you like to be contacted at? [phone]    How would you like to obtain your AVS? Mail a copy    Subjective     Lora Vergara is a 89 year old female who presents via phone visit today for the following health issues:    HPI    Medication Followup of all     Taking Medication as prescribed: yes - recently discontinued metformin     Side Effects:  None    Medication Helping Symptoms:  yes          She has an appt for labs.                                 She stopped taking metformin approx 2 wks ago.       See the 7/17 phone call.                 C/o constipation.          Uses a stool softener.       She does not recall using Miralax.                             Not much exercise.                             Wants a Lyrica refill.                             She gets this from [employer].                       No cases of COVID-19 " in her building.                          Not shopping; has been to [employer] and to the [employer].              Still taking ranexa; also 90 mg imdur per day.                       No chest pain.                               Current Outpatient Medications   Medication Sig Dispense Refill     acetaminophen (TYLENOL) 500 MG tablet Take 500-1,000 mg by mouth every 6 hours as needed       aspirin 81 MG EC tablet Take 81 mg by mouth daily       atorvastatin (LIPITOR) 40 MG tablet Take 1 tablet (40 mg) by mouth daily 90 tablet 3     bisacodyl (DULCOLAX) 5 MG EC tablet Take 5 mg by mouth daily       furosemide (LASIX) 20 MG tablet Take 0.5 tablets (10 mg) by mouth daily 45 tablet 0     isosorbide mononitrate (IMDUR) 30 MG 24 hr tablet Take 1 tablet (30 mg) by mouth daily 90 tablet 3     isosorbide mononitrate (IMDUR) 60 MG 24 hr tablet TAKE 1 TABLET BY MOUTH EVERY DAY 90 tablet 1     latanoprost (XALATAN) 0.005 % ophthalmic solution Place 1 drop into both eyes At Bedtime       levothyroxine (SYNTHROID/LEVOTHROID) 25 MCG tablet TAKE 1 TABLET BY MOUTH EVERY DAY 90 tablet 3     metoprolol tartrate (LOPRESSOR) 100 MG tablet TAKE 1/2 TABLET BY MOUTH EVERY MORNING AND TAKE 1 TABLET IN THE EVENING. 135 tablet 2     nitroGLYcerin (NITROSTAT) 0.4 MG sublingual tablet Place 1 tablet (0.4 mg) under the tongue every 5 minutes as needed for chest pain 50 tablet 3     nystatin (MYCOSTATIN) 812594 UNIT/GM external powder Apply topically 2 times daily       order for DME Equipment being ordered: wheeled walker with brakes and a seat 1 Device 0     polyethylene glycol (MIRALAX/GLYCOLAX) packet Take 1 packet by mouth daily as needed for constipation       pregabalin (LYRICA) 150 MG capsule Take 1 capsule by mouth twice a day as directed by physician. 180 capsule 0     ranolazine (RANEXA) 500 MG 12 hr tablet Take 1 tablet (500 mg) by mouth 2 times daily 180 tablet 2     vitamin D3 (CHOLECALCIFEROL) 2000 units tablet Take 4,000 Units by  mouth daily       Allergies   Allergen Reactions     Hydrochlorothiazide      Pancreatitis - patient denies this.  She says it gave her low sodium.     BP Readings from Last 3 Encounters:   01/10/20 130/70   08/27/19 128/70   05/30/19 110/74    Wt Readings from Last 3 Encounters:   01/10/20 89.2 kg (196 lb 9.6 oz)   08/27/19 90.7 kg (200 lb)   05/30/19 88.9 kg (196 lb)                    Reviewed and updated as needed this visit by Provider         Review of Systems   CONSTITUTIONAL:NEGATIVE for fever, chills, change in weight  RESP:NEGATIVE for significant cough or SOB  CV: NEGATIVE for chest pain, palpitations or peripheral edema       Objective   Reported vitals:     healthy, alert and no distress  PSYCH: Alert and oriented times 3; coherent speech, normal   rate and volume, able to articulate logical thoughts, able   to abstract reason, no tangential thoughts, no hallucinations   or delusions  Her affect is normal  RESP: No cough, no audible wheezing, able to talk in full sentences  Remainder of exam unable to be completed due to telephone visits    Diagnostic Test Results:  Labs reviewed in Epic        Assessment/Plan:    1. Type 2 diabetes mellitus with diabetic neuropathy, without long-term current use of insulin (H)  She is no longer taking metformin.  Check labs.    2. Hypothyroidism, unspecified type  Check labs and adjust medications as indicated.      3. Chronic ischemic heart disease  Stable using both Ranexa and isosorbide    4. Essential hypertension with goal blood pressure less than 130/80      5. Constipation, unspecified constipation type  Patient Instructions   I will let you know your lab results.                   Try adding some Miralax to help with constipation.                    Use a capful mixed in liquid as needed.    This can be as often as every day, if needed.                      Return in about 1 week (around 8/5/2020) for lab only visit; fasting.      Phone call duration:  15  milton Schmidt MD    Recent Results (from the past 90 hour(s))   TSH with free T4 reflex    Collection Time: 08/03/20  9:58 AM   Result Value Ref Range    TSH 2.50 0.40 - 4.00 mU/L   CBC with platelets and differential    Collection Time: 08/03/20  9:58 AM   Result Value Ref Range    WBC 5.0 4.0 - 11.0 10e9/L    RBC Count 4.70 3.8 - 5.2 10e12/L    Hemoglobin 13.9 11.7 - 15.7 g/dL    Hematocrit 43.1 35.0 - 47.0 %    MCV 92 78 - 100 fl    MCH 29.6 26.5 - 33.0 pg    MCHC 32.3 31.5 - 36.5 g/dL    RDW 12.9 10.0 - 15.0 %    Platelet Count 205 150 - 450 10e9/L    % Neutrophils 53.9 %    % Lymphocytes 37.3 %    % Monocytes 7.4 %    % Eosinophils 1.0 %    % Basophils 0.4 %    Absolute Neutrophil 2.7 1.6 - 8.3 10e9/L    Absolute Lymphocytes 1.9 0.8 - 5.3 10e9/L    Absolute Monocytes 0.4 0.0 - 1.3 10e9/L    Absolute Eosinophils 0.1 0.0 - 0.7 10e9/L    Absolute Basophils 0.0 0.0 - 0.2 10e9/L    Diff Method Automated Method    Lipid Profile (Chol, Trig, HDL, LDL calc)    Collection Time: 08/03/20  9:58 AM   Result Value Ref Range    Cholesterol 164 <200 mg/dL    Triglycerides 112 <150 mg/dL    HDL Cholesterol 68 >49 mg/dL    LDL Cholesterol Calculated 74 <100 mg/dL    Non HDL Cholesterol 96 <130 mg/dL   Hemoglobin A1c    Collection Time: 08/03/20  9:58 AM   Result Value Ref Range    Hemoglobin A1C 6.1 (H) 0 - 5.6 %   Comprehensive metabolic panel (BMP + Alb, Alk Phos, ALT, AST, Total. Bili, TP)    Collection Time: 08/03/20  9:58 AM   Result Value Ref Range    Sodium 138 133 - 144 mmol/L    Potassium 3.8 3.4 - 5.3 mmol/L    Chloride 103 94 - 109 mmol/L    Carbon Dioxide 27 20 - 32 mmol/L    Anion Gap 8 3 - 14 mmol/L    Glucose 132 (H) 70 - 99 mg/dL    Urea Nitrogen 11 7 - 30 mg/dL    Creatinine 0.80 0.52 - 1.04 mg/dL    GFR Estimate 63 >60 mL/min/[1.73_m2]    GFR Estimate If Black 73 >60 mL/min/[1.73_m2]    Calcium 8.6 8.5 - 10.1 mg/dL    Bilirubin Total 0.6 0.2 - 1.3 mg/dL    Albumin 3.8 3.4 - 5.0 g/dL    Protein  Total 7.5 6.8 - 8.8 g/dL    Alkaline Phosphatase 63 40 - 150 U/L    ALT 21 0 - 50 U/L    AST 19 0 - 45 U/L     Letter sent.           Your diabetes control is very good.       The A1C of 6.1 correlates to an average blood sugar of approximately 123.        The thyroid level is good. Keep taking the same dosage.               Your other lab results are normal ,including the liver,kidney,cholesterol,and the bone marrow testing.       Keep taking the same medications.

## 2020-07-29 NOTE — PATIENT INSTRUCTIONS
I will let you know your lab results.                   Try adding some Miralax to help with constipation.                    Use a capful mixed in liquid as needed.    This can be as often as every day, if needed.

## 2020-07-29 NOTE — LETTER
August 6, 2020      Lora ESDRAS Vergara  [address]   [place] [address]        Dear ,    We are writing to inform you of your test results.          Your diabetes control is very good.       The A1C of 6.1 correlates to an average blood sugar of approximately 123.        The thyroid level is good. Keep taking the same dosage.               Your other lab results are normal ,including the liver,kidney,cholesterol,and the bone marrow testing.       Keep taking the same medications.    Recent Results (from the past 90 hour(s))   TSH with free T4 reflex    Collection Time: 08/03/20  9:58 AM   Result Value Ref Range    TSH 2.50 0.40 - 4.00 mU/L   CBC with platelets and differential    Collection Time: 08/03/20  9:58 AM   Result Value Ref Range    WBC 5.0 4.0 - 11.0 10e9/L    RBC Count 4.70 3.8 - 5.2 10e12/L    Hemoglobin 13.9 11.7 - 15.7 g/dL    Hematocrit 43.1 35.0 - 47.0 %    MCV 92 78 - 100 fl    MCH 29.6 26.5 - 33.0 pg    MCHC 32.3 31.5 - 36.5 g/dL    RDW 12.9 10.0 - 15.0 %    Platelet Count 205 150 - 450 10e9/L    % Neutrophils 53.9 %    % Lymphocytes 37.3 %    % Monocytes 7.4 %    % Eosinophils 1.0 %    % Basophils 0.4 %    Absolute Neutrophil 2.7 1.6 - 8.3 10e9/L    Absolute Lymphocytes 1.9 0.8 - 5.3 10e9/L    Absolute Monocytes 0.4 0.0 - 1.3 10e9/L    Absolute Eosinophils 0.1 0.0 - 0.7 10e9/L    Absolute Basophils 0.0 0.0 - 0.2 10e9/L    Diff Method Automated Method    Lipid Profile (Chol, Trig, HDL, LDL calc)    Collection Time: 08/03/20  9:58 AM   Result Value Ref Range    Cholesterol 164 <200 mg/dL    Triglycerides 112 <150 mg/dL    HDL Cholesterol 68 >49 mg/dL    LDL Cholesterol Calculated 74 <100 mg/dL    Non HDL Cholesterol 96 <130 mg/dL   Hemoglobin A1c    Collection Time: 08/03/20  9:58 AM   Result Value Ref Range    Hemoglobin A1C 6.1 (H) 0 - 5.6 %   Comprehensive metabolic panel (BMP + Alb, Alk Phos, ALT, AST, Total. Bili, TP)    Collection Time: 08/03/20  9:58 AM   Result  Value Ref Range    Sodium 138 133 - 144 mmol/L    Potassium 3.8 3.4 - 5.3 mmol/L    Chloride 103 94 - 109 mmol/L    Carbon Dioxide 27 20 - 32 mmol/L    Anion Gap 8 3 - 14 mmol/L    Glucose 132 (H) 70 - 99 mg/dL    Urea Nitrogen 11 7 - 30 mg/dL    Creatinine 0.80 0.52 - 1.04 mg/dL    GFR Estimate 63 >60 mL/min/[1.73_m2]    GFR Estimate If Black 73 >60 mL/min/[1.73_m2]    Calcium 8.6 8.5 - 10.1 mg/dL    Bilirubin Total 0.6 0.2 - 1.3 mg/dL    Albumin 3.8 3.4 - 5.0 g/dL    Protein Total 7.5 6.8 - 8.8 g/dL    Alkaline Phosphatase 63 40 - 150 U/L    ALT 21 0 - 50 U/L    AST 19 0 - 45 U/L         If you have any questions or concerns, please call the clinic at the number listed above.       Sincerely,        Antwon Schmidt MD

## 2020-08-03 DIAGNOSIS — I50.32 CHRONIC DIASTOLIC CONGESTIVE HEART FAILURE (H): ICD-10-CM

## 2020-08-03 DIAGNOSIS — E78.5 HYPERLIPIDEMIA LDL GOAL <70: Chronic | ICD-10-CM

## 2020-08-03 DIAGNOSIS — E11.40 TYPE 2 DIABETES MELLITUS WITH DIABETIC NEUROPATHY, WITHOUT LONG-TERM CURRENT USE OF INSULIN (H): ICD-10-CM

## 2020-08-03 DIAGNOSIS — E03.9 HYPOTHYROIDISM, UNSPECIFIED TYPE: Chronic | ICD-10-CM

## 2020-08-03 LAB
BASOPHILS # BLD AUTO: 0 10E9/L (ref 0–0.2)
BASOPHILS NFR BLD AUTO: 0.4 %
DIFFERENTIAL METHOD BLD: NORMAL
EOSINOPHIL # BLD AUTO: 0.1 10E9/L (ref 0–0.7)
EOSINOPHIL NFR BLD AUTO: 1 %
ERYTHROCYTE [DISTWIDTH] IN BLOOD BY AUTOMATED COUNT: 12.9 % (ref 10–15)
HBA1C MFR BLD: 6.1 % (ref 0–5.6)
HCT VFR BLD AUTO: 43.1 % (ref 35–47)
HGB BLD-MCNC: 13.9 G/DL (ref 11.7–15.7)
LYMPHOCYTES # BLD AUTO: 1.9 10E9/L (ref 0.8–5.3)
LYMPHOCYTES NFR BLD AUTO: 37.3 %
MCH RBC QN AUTO: 29.6 PG (ref 26.5–33)
MCHC RBC AUTO-ENTMCNC: 32.3 G/DL (ref 31.5–36.5)
MCV RBC AUTO: 92 FL (ref 78–100)
MONOCYTES # BLD AUTO: 0.4 10E9/L (ref 0–1.3)
MONOCYTES NFR BLD AUTO: 7.4 %
NEUTROPHILS # BLD AUTO: 2.7 10E9/L (ref 1.6–8.3)
NEUTROPHILS NFR BLD AUTO: 53.9 %
PLATELET # BLD AUTO: 205 10E9/L (ref 150–450)
RBC # BLD AUTO: 4.7 10E12/L (ref 3.8–5.2)
WBC # BLD AUTO: 5 10E9/L (ref 4–11)

## 2020-08-03 PROCEDURE — 36415 COLL VENOUS BLD VENIPUNCTURE: CPT | Performed by: INTERNAL MEDICINE

## 2020-08-03 PROCEDURE — 84443 ASSAY THYROID STIM HORMONE: CPT | Performed by: INTERNAL MEDICINE

## 2020-08-03 PROCEDURE — 80053 COMPREHEN METABOLIC PANEL: CPT | Performed by: INTERNAL MEDICINE

## 2020-08-03 PROCEDURE — 83036 HEMOGLOBIN GLYCOSYLATED A1C: CPT | Performed by: INTERNAL MEDICINE

## 2020-08-03 PROCEDURE — 85025 COMPLETE CBC W/AUTO DIFF WBC: CPT | Performed by: INTERNAL MEDICINE

## 2020-08-03 PROCEDURE — 80061 LIPID PANEL: CPT | Performed by: INTERNAL MEDICINE

## 2020-08-04 LAB
ALBUMIN SERPL-MCNC: 3.8 G/DL (ref 3.4–5)
ALP SERPL-CCNC: 63 U/L (ref 40–150)
ALT SERPL W P-5'-P-CCNC: 21 U/L (ref 0–50)
ANION GAP SERPL CALCULATED.3IONS-SCNC: 8 MMOL/L (ref 3–14)
AST SERPL W P-5'-P-CCNC: 19 U/L (ref 0–45)
BILIRUB SERPL-MCNC: 0.6 MG/DL (ref 0.2–1.3)
BUN SERPL-MCNC: 11 MG/DL (ref 7–30)
CALCIUM SERPL-MCNC: 8.6 MG/DL (ref 8.5–10.1)
CHLORIDE SERPL-SCNC: 103 MMOL/L (ref 94–109)
CHOLEST SERPL-MCNC: 164 MG/DL
CO2 SERPL-SCNC: 27 MMOL/L (ref 20–32)
CREAT SERPL-MCNC: 0.8 MG/DL (ref 0.52–1.04)
GFR SERPL CREATININE-BSD FRML MDRD: 63 ML/MIN/{1.73_M2}
GLUCOSE SERPL-MCNC: 132 MG/DL (ref 70–99)
HDLC SERPL-MCNC: 68 MG/DL
LDLC SERPL CALC-MCNC: 74 MG/DL
NONHDLC SERPL-MCNC: 96 MG/DL
POTASSIUM SERPL-SCNC: 3.8 MMOL/L (ref 3.4–5.3)
PROT SERPL-MCNC: 7.5 G/DL (ref 6.8–8.8)
SODIUM SERPL-SCNC: 138 MMOL/L (ref 133–144)
TRIGL SERPL-MCNC: 112 MG/DL
TSH SERPL DL<=0.005 MIU/L-ACNC: 2.5 MU/L (ref 0.4–4)

## 2020-08-07 ENCOUNTER — TELEPHONE (OUTPATIENT)
Dept: FAMILY MEDICINE | Facility: CLINIC | Age: 85
End: 2020-08-07

## 2020-08-07 NOTE — LETTER
August 7, 2020    Lora Vergara  [address]   [place] [address]    Dear Conner Paulino cares about your health and your health plan.  I have reviewed your medical conditions, medication list and lab results, and am making recommendations based on this review to better manage your health.    You are in particular need of attention regarding:  -Diabetes  -Wellness (Physical) Visit     I am recommending that you:     -schedule a WELLNESS (Physical) APPOINTMENT with me.   We can also do this as a video visit.       Please call us at the [place] location:  [phone] or use [employer] to address the above recommendations.     Thank you for trusting [facility].  We appreciate the opportunity to serve you and look forward to supporting your healthcare in the future.    If you have (or plan to have) any of these tests done at a facility other than a [facility] or a [facility], please have the results sent to the [facility] location noted above.      Best Regards,    Antwon Schmidt MD

## 2020-08-07 NOTE — TELEPHONE ENCOUNTER
Patient Quality Outreach      Summary:    Patient is due/failing the following:   Eye Exam and Microalbumin and Annual wellness, date due: 3/22/2018    Type of outreach:    Sent letter.    Questions for provider review:    None                                                                                   **Start Working phrase here:**       Patient has the following on her problem list/HM:     Diabetes    Last A1C:  Lab Results   Component Value Date    A1C 6.1 08/03/2020    A1C 5.9 08/27/2019       Last LDL:    Lab Results   Component Value Date    LDL 74 08/03/2020       Is the patient on a Statin? Yes          Is the patient on Aspirin? Yes    Medications     HMG CoA Reductase Inhibitors     atorvastatin (LIPITOR) 40 MG tablet       Salicylates     aspirin 81 MG EC tablet             Last three blood pressure readings:  BP Readings from Last 3 Encounters:   01/10/20 130/70   08/27/19 128/70   05/30/19 110/74            Tobacco Use      Smoking status: Never Smoker      Smokeless tobacco: Never Used                                                      [facility]       Chart routed to .

## 2020-08-24 DIAGNOSIS — G62.9 NEUROPATHY: Chronic | ICD-10-CM

## 2020-08-24 NOTE — TELEPHONE ENCOUNTER
Reason for Call:  Medication or medication refill:    Do you use a [facility]?  Name of the pharmacy and phone number for the current request:       [facility] - [place], [place] - [id number]      Name of the medication requested: pregabalin (LYRICA) 150 MG capsule    Other request: The patient called and stated that she gets this medication through [facility].  She is in need a refill for this medication and per triage note from 2/11/2020 the refill will need to be sent to the pharmacy listed above or faxed to [phone]. The patient stated that she has 2 weeks before she will be completely out but they will be mailing it so she wants to give them enough time to fill it.     Can we leave a detailed message on this number? YES    Phone number patient can be reached at: Home number on file [phone] (home)    Best Time: Any    Call taken on 8/24/2020 at 11:15 AM by Fernanda Bernard

## 2020-08-25 RX ORDER — PREGABALIN 150 MG/1
CAPSULE ORAL
Qty: 180 CAPSULE | Refills: 3 | Status: SHIPPED | OUTPATIENT
Start: 2020-08-25 | End: 2021-02-17

## 2020-08-25 NOTE — TELEPHONE ENCOUNTER
Controlled Substance Refill Request for: pregabalin (LYRICA) 150 MG capsule   Problem List Complete:  No     PROVIDER TO CONSIDER COMPLETION OF PROBLEM LIST AND OVERVIEW/CONTROLLED SUBSTANCE AGREEMENT    Future Office visit:   Next 5 appointments (look out 90 days)    Sep 15, 2020  2:15 PM CDT  Return Visit with Kalyan Luis MD  [facility] ([facility]) [address]  [place] [address] [phone] OPT 2   Oct 06, 2020  9:40 AM CDT  PHYSICAL with Antwon Schmidt MD  [facility] [address]  [place] [facility] [place] [id number] [phone]          Controlled substance agreement:   Encounter-Level CSA:    There are no encounter-level csa.     Patient-Level CSA:    There are no patient-level csa.         Last Urine Drug Screen: No results found for: CDAUT, No results found for: COMDAT, No results found for: THC13, PCP13, COC13, MAMP13, OPI13, AMP13, BZO13, TCA13, MTD13, BAR13, OXY13, PPX13, BUP13     Processing:  Rx to be electronically transmitted to pharmacy by provider     [email or web address]       checked in past 3 months?  Yes 8/25/20

## 2020-08-31 ENCOUNTER — TELEPHONE (OUTPATIENT)
Dept: FAMILY MEDICINE | Facility: CLINIC | Age: 85
End: 2020-08-31

## 2020-08-31 NOTE — TELEPHONE ENCOUNTER
Reason for Call:  Form, our goal is to have forms completed with 72 hours, however, some forms may require a visit or additional information.    Type of letter, form or note:  medical    Who is the form from?: Patient    Where did the form come from: form was faxed in    What clinic location was the form placed at?: [facility]    Where the form was placed: [facility] Box/Folder    What number is listed as a contact on the form?: [phone]       Additional comments: pfizer: Lyrica    Call taken on 8/31/2020 at 12:50 PM by Fernanda Bernard

## 2020-09-01 DIAGNOSIS — R60.0 BILATERAL LEG EDEMA: ICD-10-CM

## 2020-09-01 RX ORDER — FUROSEMIDE 20 MG
10 TABLET ORAL DAILY
Qty: 45 TABLET | Refills: 0 | Status: SHIPPED | OUTPATIENT
Start: 2020-09-01 | End: 2020-10-01

## 2020-09-04 DIAGNOSIS — E11.40 TYPE 2 DIABETES MELLITUS WITH DIABETIC NEUROPATHY, WITHOUT LONG-TERM CURRENT USE OF INSULIN (H): ICD-10-CM

## 2020-09-04 DIAGNOSIS — E03.9 HYPOTHYROIDISM: ICD-10-CM

## 2020-09-04 RX ORDER — METFORMIN HYDROCHLORIDE 750 MG/1
TABLET, EXTENDED RELEASE ORAL
Qty: 90 TABLET | Refills: 3 | OUTPATIENT
Start: 2020-09-04

## 2020-09-04 RX ORDER — LEVOTHYROXINE SODIUM 25 UG/1
TABLET ORAL
Qty: 90 TABLET | Refills: 2 | Status: SHIPPED | OUTPATIENT
Start: 2020-09-04 | End: 2021-03-11

## 2020-09-04 NOTE — TELEPHONE ENCOUNTER
Routing refill request to provider for review/approval because:  Drug not active on patient's medication list  Dx of CHF

## 2020-09-14 ENCOUNTER — TELEPHONE (OUTPATIENT)
Dept: CARDIOLOGY | Facility: CLINIC | Age: 85
End: 2020-09-14

## 2020-09-14 NOTE — TELEPHONE ENCOUNTER
Wellness Screening Tool    Symptom Screening:    Do you have one of the following NEW symptoms:      Fever (subjective or >100.0)?  No    New cough? No    Shortness of breath? No    Chills? No    New loss of taste or smell? No    Generalized body aches? No    New persistent headache? No    New sore throat? No    Nausea, vomiting or diarrhea? No    Within the past 2 weeks, have you been exposed to someone with a known positive illness below?      COVID - 19 (known or suspected) No    Chicken pox?  No    Measles? No    Pertussis? No    Have you had a positive COVID-19 diagnostic test (nasal swab test) in the last 14 days or are you currently   on self-quarantine restrictions (i.e.travel restriction, exposure, etc?) No        Patient notified of visitor restriction: Yes  Patient informed to wear a mask: Yes    Patient's appointment status: Patient will be seen in clinic as scheduled on  9/15

## 2020-09-15 ENCOUNTER — OFFICE VISIT (OUTPATIENT)
Dept: CARDIOLOGY | Facility: CLINIC | Age: 85
End: 2020-09-15
Attending: NURSE PRACTITIONER
Payer: MEDICARE

## 2020-09-15 VITALS
DIASTOLIC BLOOD PRESSURE: 83 MMHG | WEIGHT: 195.9 LBS | BODY MASS INDEX: 36.05 KG/M2 | HEART RATE: 75 BPM | OXYGEN SATURATION: 94 % | HEIGHT: 62 IN | SYSTOLIC BLOOD PRESSURE: 151 MMHG

## 2020-09-15 DIAGNOSIS — I34.2 NONRHEUMATIC MITRAL VALVE STENOSIS: ICD-10-CM

## 2020-09-15 DIAGNOSIS — I20.89 STABLE ANGINA PECTORIS (H): ICD-10-CM

## 2020-09-15 DIAGNOSIS — I10 HYPERTENSION GOAL BP (BLOOD PRESSURE) < 140/90: ICD-10-CM

## 2020-09-15 DIAGNOSIS — E78.5 HYPERLIPIDEMIA LDL GOAL <70: Chronic | ICD-10-CM

## 2020-09-15 DIAGNOSIS — I25.85 CARDIAC MICROVASCULAR DISEASE: Primary | ICD-10-CM

## 2020-09-15 DIAGNOSIS — R06.02 SOB (SHORTNESS OF BREATH): ICD-10-CM

## 2020-09-15 LAB — NT-PROBNP SERPL-MCNC: 937 PG/ML (ref 0–450)

## 2020-09-15 PROCEDURE — 83880 ASSAY OF NATRIURETIC PEPTIDE: CPT | Performed by: INTERNAL MEDICINE

## 2020-09-15 PROCEDURE — 99214 OFFICE O/P EST MOD 30 MIN: CPT | Performed by: INTERNAL MEDICINE

## 2020-09-15 PROCEDURE — 36415 COLL VENOUS BLD VENIPUNCTURE: CPT | Performed by: INTERNAL MEDICINE

## 2020-09-15 ASSESSMENT — MIFFLIN-ST. JEOR: SCORE: 1236.85

## 2020-09-15 NOTE — LETTER
9/15/2020    Antwon Schmidt MD  [address]  [facility] [address]    RE: Lora Vergara       Dear Colleague,    I had the pleasure of seeing Lora Vergara in the [facility].    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  She has had a coronary angiography in 2010, which showed mild to moderate coronary artery atherosclerosis, but no obstructive disease.  However, chest discomfort and jaw pain have been suspicious for angina and responded to Imdur and Ranexa.      She is here for follow-up.  She is now complaining of shortness of breath off and on over the last 3 months.  She is not walking much but whenever she walks she does develop shortness of breath.  She uses a walker.  She also has an oxygen pulse oximeter that was given to her by her son and occasional readings are in the 80s.  Deep breathing, improved to 90s.  She denies any fever or cough.    Her last echocardiogram revealed normal systolic function but with moderate mitral stenosis with a mean gradient of 8 mm.         PHYSICAL EXAMINATION:   See below            IMPRESSION:   1.  Shortness of breath  New this year.  Occasional oxygen saturation in the high 80s at home.  No fever or cough.  I recommended repeat echocardiogram to reassess her LV function and mitral valve.  In addition, I will check PFTs with DLCO to see if there is any respiratory cause of shortness of breath.  Oxygen desaturation ambulation is usually reflection of underlying lung disease.     2.  Microvascular angina/coronary artery atherosclerosis.  At this time, pain is well controlled on a combination of Imdur and amlodipine and p.r.n. sublingual nitro.    She is doing well on Ranexa which we will continue.     2.  Chronic diastolic heart failure  She denies any orthopnea PND or edema feet.  He is on very low-dose  Lasix and mid substance stable.     3.  Hyperlipidemia.  Continue atorvastatin.      4.  Hypertension, well controlled.   Blood pressure slightly high today.  Will reassess in follow-up and if still high, will add an antihypertensive.     I will see her in follow-up after the above tests are done.     Thank you for allowing us to participate in the care of this nice patient.     Sincerely,     Kalyan Luis MD    No orders of the defined types were placed in this encounter.      No orders of the defined types were placed in this encounter.      There are no discontinued medications.      Encounter Diagnoses   Name Primary?     Stable angina pectoris (H)      Cardiac microvascular disease      Hypertension goal BP (blood pressure) < 140/90        CURRENT MEDICATIONS:  Current Outpatient Medications   Medication Sig Dispense Refill     acetaminophen (TYLENOL) 500 MG tablet Take 500-1,000 mg by mouth every 6 hours as needed       aspirin 81 MG EC tablet Take 81 mg by mouth daily       atorvastatin (LIPITOR) 40 MG tablet Take 1 tablet (40 mg) by mouth daily 90 tablet 3     bisacodyl (DULCOLAX) 5 MG EC tablet Take 5 mg by mouth daily       furosemide (LASIX) 20 MG tablet Take 0.5 tablets (10 mg) by mouth daily 45 tablet 0     isosorbide mononitrate (IMDUR) 30 MG 24 hr tablet Take 1 tablet (30 mg) by mouth daily 90 tablet 3     isosorbide mononitrate (IMDUR) 60 MG 24 hr tablet TAKE 1 TABLET BY MOUTH EVERY DAY 90 tablet 1     latanoprost (XALATAN) 0.005 % ophthalmic solution Place 1 drop into both eyes At Bedtime       levothyroxine (SYNTHROID/LEVOTHROID) 25 MCG tablet TAKE 1 TABLET BY MOUTH EVERY DAY 90 tablet 2     metoprolol tartrate (LOPRESSOR) 100 MG tablet TAKE 1/2 TABLET BY MOUTH EVERY MORNING AND TAKE 1 TABLET IN THE EVENING. 135 tablet 2     nitroGLYcerin (NITROSTAT) 0.4 MG sublingual tablet Place 1 tablet (0.4 mg) under the tongue every 5 minutes as needed for chest pain 50 tablet 3     order for DME Equipment  being ordered: wheeled walker with brakes and a seat 1 Device 0     polyethylene glycol (MIRALAX/GLYCOLAX) packet Take 1 packet by mouth daily as needed for constipation       pregabalin (LYRICA) 150 MG capsule Take 1 capsule by mouth twice a day as directed by physician. 180 capsule 3     ranolazine (RANEXA) 500 MG 12 hr tablet Take 1 tablet (500 mg) by mouth 2 times daily 180 tablet 2     vitamin D3 (CHOLECALCIFEROL) 2000 units tablet Take 4,000 Units by mouth daily       nystatin (MYCOSTATIN) 666701 UNIT/GM external powder Apply topically 2 times daily         ALLERGIES     Allergies   Allergen Reactions     Hydrochlorothiazide      Pancreatitis - patient denies this.  She says it gave her low sodium.       PAST MEDICAL HISTORY:  Past Medical History:   Diagnosis Date     Angina at rest (H)     Thought to be microvascular     Arthritis      Basal cell carcinoma 1/2014, (3/5/6-2014)    nose and check and ear and face     Coronary artery disease     Mild CAD per 2/2008 angiogram     Hyperlipidaemia      Hypertension      Hypothyroidism 1/17/2013     Mitral regurgitation 6/2015    echo     Type 2 diabetes, HbA1C goal < 8% (H) 9/11/2013       PAST SURGICAL HISTORY:  Past Surgical History:   Procedure Laterality Date     APPENDECTOMY  1974     BACK SURGERY  1996     BLADDER SURGERY  1990    bladder suspension     CHOLECYSTECTOMY  1975     CORONARY ANGIOGRAPHY ADULT ORDER  2/2008    Mild CAD. LAD w/20% stenosis, RCA with 30-40% stenosis. No flow limiting obstructions.     face surgery  1-6/2014    basal cell plus plastic to nose, face , ear     HYSTERECTOMY  1970     OOPHORECTOMY  1974    bilateral     PHACOEMULSIFICATION CLEAR CORNEA WITH STANDARD INTRAOCULAR LENS IMPLANT Right 7/21/2015    Procedure: PHACOEMULSIFICATION CLEAR CORNEA WITH STANDARD INTRAOCULAR LENS IMPLANT;  Surgeon: Schuyler Chapa MD;  Location: [facility]     PHACOEMULSIFICATION CLEAR CORNEA WITH STANDARD INTRAOCULAR LENS IMPLANT Left 8/18/2015     Procedure: PHACOEMULSIFICATION CLEAR CORNEA WITH STANDARD INTRAOCULAR LENS IMPLANT;  Surgeon: Schuyler Chapa MD;  Location: [facility]       FAMILY HISTORY:  Family History   Problem Relation Age of Onset     Cancer Brother         lung     Cancer Son      Cerebrovascular Disease Mother         and Parkinson's     Cancer Father         pancreas     Diabetes Son        SOCIAL HISTORY:  Social History     Socioeconomic History     Marital status:      Spouse name: None     Number of children: None     Years of education: None     Highest education level: None   Occupational History     None   Social Needs     Financial resource strain: None     Food insecurity     Worry: None     Inability: None     Transportation needs     Medical: None     Non-medical: None   Tobacco Use     Smoking status: Never Smoker     Smokeless tobacco: Never Used   Substance and Sexual Activity     Alcohol use: Yes     Comment: rarely     Drug use: No     Sexual activity: Not Currently   Lifestyle     Physical activity     Days per week: None     Minutes per session: None     Stress: None   Relationships     Social connections     Talks on phone: None     Gets together: None     Attends Anglican service: None     Active member of club or organization: None     Attends meetings of clubs or organizations: None     Relationship status: None     Intimate partner violence     Fear of current or ex partner: None     Emotionally abused: None     Physically abused: None     Forced sexual activity: None   Other Topics Concern     Parent/sibling w/ CABG, MI or angioplasty before 65F 55M? No      Service Not Asked     Blood Transfusions Not Asked     Caffeine Concern Yes     Comment: 4 cups caffeine per day     Occupational Exposure Not Asked     Hobby Hazards Not Asked     Sleep Concern No     Stress Concern No     Weight Concern No     Special Diet No     Back Care Not Asked     Exercise Yes     Comment: exercises 30 minutes, 4 days  "week     Bike Helmet Not Asked     Seat Belt Not Asked     Self-Exams Not Asked   Social History Narrative    Had 2 sons; one  in ; head and neck cancer;  due to choking at [employer];                                 had 2 step dtrs; one .                                        times 2       Review of Systems:  Skin:  Negative       Eyes:  Positive for glasses    ENT:         Respiratory:  Positive for dyspnea on exertion;shortness of breath O2 level at home 88% when active   Cardiovascular:  Negative;chest pain;dizziness;syncope or near-syncope;cyanosis;lightheadedness;edema Positive for;fatigue chest pain, 2X month, doesn't last long enough to take nitro  Gastroenterology: Negative      Genitourinary:  Positive for urinary frequency    Musculoskeletal:  Positive for joint pain;arthritis    Neurologic:  Negative      Psychiatric:  Negative      Heme/Lymph/Imm:  Positive for easy bruising    Endocrine:  Negative        Physical Exam:  Vitals: BP (!) 151/83 (BP Location: Left arm, Cuff Size: Adult Large)   Pulse 75   Ht 1.575 m (5' 2\")   Wt 88.9 kg (195 lb 14.4 oz)   LMP  (LMP Unknown)   SpO2 94%   BMI 35.83 kg/m      Constitutional:  cooperative        Skin:  warm and dry to the touch          Head:  normocephalic        Eyes:  sclera white        Lymph:      ENT:  no pallor or cyanosis        Neck:  JVP normal        Respiratory:  scatterred basal rales    Cardiac: regular rhythm;normal S1 and S2                                                         GI:  not assessed this visit        Extremities and Muscular Skeletal:  no edema              Neurological:  no gross motor deficits   uses walker    Psych:  Alert and Oriented x 3        CC  Antwon Schmidt MD  [address] [place] [address]                Thank you for allowing me to participate in the care of your patient.      Sincerely,     Kalyan Luis MD     [facility]    cc:   Antwon COSTELLO" MD Brayan  [address] [place] [address]

## 2020-09-15 NOTE — LETTER
9/15/2020    Antwon Schmidt MD  [address]  [facility] [address]    RE: Lora Vergara       Dear Colleague,    I had the pleasure of seeing Lora Vergara in the [facility].    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  She has had a coronary angiography in 2010, which showed mild to moderate coronary artery atherosclerosis, but no obstructive disease.  However, chest discomfort and jaw pain have been suspicious for angina and responded to Imdur and Ranexa.      She is here for follow-up.  She is now complaining of shortness of breath off and on over the last 3 months.  She is not walking much but whenever she walks she does develop shortness of breath.  She uses a walker.  She also has an oxygen pulse oximeter that was given to her by her son and occasional readings are in the 80s.  Deep breathing, improved to 90s.  She denies any fever or cough.    Her last echocardiogram revealed normal systolic function but with moderate mitral stenosis with a mean gradient of 8 mm.         PHYSICAL EXAMINATION:   See below            IMPRESSION:   1.  Shortness of breath  New this year.  Occasional oxygen saturation in the high 80s at home.  No fever or cough.  I recommended repeat echocardiogram to reassess her LV function and mitral valve.  In addition, I will check PFTs with DLCO to see if there is any respiratory cause of shortness of breath.  Oxygen desaturation ambulation is usually reflection of underlying lung disease.     2.  Microvascular angina/coronary artery atherosclerosis.  At this time, pain is well controlled on a combination of Imdur and amlodipine and p.r.n. sublingual nitro.    She is doing well on Ranexa which we will continue.     2.  Chronic diastolic heart failure  She denies any orthopnea PND or edema feet.  He is on very low-dose  Lasix and mid substance stable.     3.  Hyperlipidemia.  Continue atorvastatin.      4.  Hypertension, well controlled.   Blood pressure slightly high today.  Will reassess in follow-up and if still high, will add an antihypertensive.     I will see her in follow-up after the above tests are done.     Thank you for allowing us to participate in the care of this nice patient.     Sincerely,     Kalyan Luis MD    No orders of the defined types were placed in this encounter.      No orders of the defined types were placed in this encounter.      There are no discontinued medications.      Encounter Diagnoses   Name Primary?     Stable angina pectoris (H)      Cardiac microvascular disease      Hypertension goal BP (blood pressure) < 140/90        CURRENT MEDICATIONS:  Current Outpatient Medications   Medication Sig Dispense Refill     acetaminophen (TYLENOL) 500 MG tablet Take 500-1,000 mg by mouth every 6 hours as needed       aspirin 81 MG EC tablet Take 81 mg by mouth daily       atorvastatin (LIPITOR) 40 MG tablet Take 1 tablet (40 mg) by mouth daily 90 tablet 3     bisacodyl (DULCOLAX) 5 MG EC tablet Take 5 mg by mouth daily       furosemide (LASIX) 20 MG tablet Take 0.5 tablets (10 mg) by mouth daily 45 tablet 0     isosorbide mononitrate (IMDUR) 30 MG 24 hr tablet Take 1 tablet (30 mg) by mouth daily 90 tablet 3     isosorbide mononitrate (IMDUR) 60 MG 24 hr tablet TAKE 1 TABLET BY MOUTH EVERY DAY 90 tablet 1     latanoprost (XALATAN) 0.005 % ophthalmic solution Place 1 drop into both eyes At Bedtime       levothyroxine (SYNTHROID/LEVOTHROID) 25 MCG tablet TAKE 1 TABLET BY MOUTH EVERY DAY 90 tablet 2     metoprolol tartrate (LOPRESSOR) 100 MG tablet TAKE 1/2 TABLET BY MOUTH EVERY MORNING AND TAKE 1 TABLET IN THE EVENING. 135 tablet 2     nitroGLYcerin (NITROSTAT) 0.4 MG sublingual tablet Place 1 tablet (0.4 mg) under the tongue every 5 minutes as needed for chest pain 50 tablet 3     order for DME Equipment  being ordered: wheeled walker with brakes and a seat 1 Device 0     polyethylene glycol (MIRALAX/GLYCOLAX) packet Take 1 packet by mouth daily as needed for constipation       pregabalin (LYRICA) 150 MG capsule Take 1 capsule by mouth twice a day as directed by physician. 180 capsule 3     ranolazine (RANEXA) 500 MG 12 hr tablet Take 1 tablet (500 mg) by mouth 2 times daily 180 tablet 2     vitamin D3 (CHOLECALCIFEROL) 2000 units tablet Take 4,000 Units by mouth daily       nystatin (MYCOSTATIN) 034952 UNIT/GM external powder Apply topically 2 times daily         ALLERGIES     Allergies   Allergen Reactions     Hydrochlorothiazide      Pancreatitis - patient denies this.  She says it gave her low sodium.       PAST MEDICAL HISTORY:  Past Medical History:   Diagnosis Date     Angina at rest (H)     Thought to be microvascular     Arthritis      Basal cell carcinoma 1/2014, (3/5/6-2014)    nose and check and ear and face     Coronary artery disease     Mild CAD per 2/2008 angiogram     Hyperlipidaemia      Hypertension      Hypothyroidism 1/17/2013     Mitral regurgitation 6/2015    echo     Type 2 diabetes, HbA1C goal < 8% (H) 9/11/2013       PAST SURGICAL HISTORY:  Past Surgical History:   Procedure Laterality Date     APPENDECTOMY  1974     BACK SURGERY  1996     BLADDER SURGERY  1990    bladder suspension     CHOLECYSTECTOMY  1975     CORONARY ANGIOGRAPHY ADULT ORDER  2/2008    Mild CAD. LAD w/20% stenosis, RCA with 30-40% stenosis. No flow limiting obstructions.     face surgery  1-6/2014    basal cell plus plastic to nose, face , ear     HYSTERECTOMY  1970     OOPHORECTOMY  1974    bilateral     PHACOEMULSIFICATION CLEAR CORNEA WITH STANDARD INTRAOCULAR LENS IMPLANT Right 7/21/2015    Procedure: PHACOEMULSIFICATION CLEAR CORNEA WITH STANDARD INTRAOCULAR LENS IMPLANT;  Surgeon: Schuyler Chapa MD;  Location: [facility]     PHACOEMULSIFICATION CLEAR CORNEA WITH STANDARD INTRAOCULAR LENS IMPLANT Left 8/18/2015     Procedure: PHACOEMULSIFICATION CLEAR CORNEA WITH STANDARD INTRAOCULAR LENS IMPLANT;  Surgeon: Schuyler Chapa MD;  Location: [facility]       FAMILY HISTORY:  Family History   Problem Relation Age of Onset     Cancer Brother         lung     Cancer Son      Cerebrovascular Disease Mother         and Parkinson's     Cancer Father         pancreas     Diabetes Son        SOCIAL HISTORY:  Social History     Socioeconomic History     Marital status:      Spouse name: None     Number of children: None     Years of education: None     Highest education level: None   Occupational History     None   Social Needs     Financial resource strain: None     Food insecurity     Worry: None     Inability: None     Transportation needs     Medical: None     Non-medical: None   Tobacco Use     Smoking status: Never Smoker     Smokeless tobacco: Never Used   Substance and Sexual Activity     Alcohol use: Yes     Comment: rarely     Drug use: No     Sexual activity: Not Currently   Lifestyle     Physical activity     Days per week: None     Minutes per session: None     Stress: None   Relationships     Social connections     Talks on phone: None     Gets together: None     Attends Christian service: None     Active member of club or organization: None     Attends meetings of clubs or organizations: None     Relationship status: None     Intimate partner violence     Fear of current or ex partner: None     Emotionally abused: None     Physically abused: None     Forced sexual activity: None   Other Topics Concern     Parent/sibling w/ CABG, MI or angioplasty before 65F 55M? No      Service Not Asked     Blood Transfusions Not Asked     Caffeine Concern Yes     Comment: 4 cups caffeine per day     Occupational Exposure Not Asked     Hobby Hazards Not Asked     Sleep Concern No     Stress Concern No     Weight Concern No     Special Diet No     Back Care Not Asked     Exercise Yes     Comment: exercises 30 minutes, 4 days  "week     Bike Helmet Not Asked     Seat Belt Not Asked     Self-Exams Not Asked   Social History Narrative    Had 2 sons; one  in ; head and neck cancer;  due to choking at [employer];                                 had 2 step dtrs; one .                                        times 2       Review of Systems:  Skin:  Negative       Eyes:  Positive for glasses    ENT:         Respiratory:  Positive for dyspnea on exertion;shortness of breath O2 level at home 88% when active   Cardiovascular:  Negative;chest pain;dizziness;syncope or near-syncope;cyanosis;lightheadedness;edema Positive for;fatigue chest pain, 2X month, doesn't last long enough to take nitro  Gastroenterology: Negative      Genitourinary:  Positive for urinary frequency    Musculoskeletal:  Positive for joint pain;arthritis    Neurologic:  Negative      Psychiatric:  Negative      Heme/Lymph/Imm:  Positive for easy bruising    Endocrine:  Negative        Physical Exam:  Vitals: BP (!) 151/83 (BP Location: Left arm, Cuff Size: Adult Large)   Pulse 75   Ht 1.575 m (5' 2\")   Wt 88.9 kg (195 lb 14.4 oz)   LMP  (LMP Unknown)   SpO2 94%   BMI 35.83 kg/m      Constitutional:  cooperative        Skin:  warm and dry to the touch          Head:  normocephalic        Eyes:  sclera white        Lymph:      ENT:  no pallor or cyanosis        Neck:  JVP normal        Respiratory:  scatterred basal rales    Cardiac: regular rhythm;normal S1 and S2                                                         GI:  not assessed this visit        Extremities and Muscular Skeletal:  no edema              Neurological:  no gross motor deficits   uses walker    Psych:  Alert and Oriented x 3        Thank you for allowing me to participate in the care of your patient.    Sincerely,     Kalyan Luis MD     [facility]    "

## 2020-09-18 DIAGNOSIS — I20.89 STABLE ANGINA PECTORIS (H): ICD-10-CM

## 2020-09-18 RX ORDER — NITROGLYCERIN 0.4 MG/1
0.4 TABLET SUBLINGUAL EVERY 5 MIN PRN
Qty: 50 TABLET | Refills: 11 | Status: SHIPPED | OUTPATIENT
Start: 2020-09-18 | End: 2022-01-06

## 2020-09-18 NOTE — TELEPHONE ENCOUNTER
Routing refill request to provider for review/approval because:  BP above range 140/90  BP 09/15/2020   Vitals: BP (!) 151/83

## 2020-09-23 ENCOUNTER — TELEPHONE (OUTPATIENT)
Dept: CARDIOLOGY | Facility: CLINIC | Age: 85
End: 2020-09-23

## 2020-09-24 ENCOUNTER — HOSPITAL ENCOUNTER (OUTPATIENT)
Dept: CARDIOLOGY | Facility: CLINIC | Age: 85
Discharge: HOME | End: 2020-09-24
Attending: INTERNAL MEDICINE | Admitting: INTERNAL MEDICINE
Payer: MEDICARE

## 2020-09-24 DIAGNOSIS — R06.02 SOB (SHORTNESS OF BREATH): ICD-10-CM

## 2020-09-24 DIAGNOSIS — I34.2 NONRHEUMATIC MITRAL VALVE STENOSIS: ICD-10-CM

## 2020-09-24 PROCEDURE — 93306 TTE W/DOPPLER COMPLETE: CPT

## 2020-09-24 PROCEDURE — 93306 TTE W/DOPPLER COMPLETE: CPT | Mod: 26 | Performed by: INTERNAL MEDICINE

## 2020-10-01 DIAGNOSIS — I20.89 STABLE ANGINA PECTORIS (H): ICD-10-CM

## 2020-10-01 DIAGNOSIS — I25.9 CHRONIC ISCHEMIC HEART DISEASE: ICD-10-CM

## 2020-10-01 DIAGNOSIS — R60.0 BILATERAL LEG EDEMA: ICD-10-CM

## 2020-10-01 RX ORDER — ISOSORBIDE MONONITRATE 30 MG/1
30 TABLET, EXTENDED RELEASE ORAL DAILY
Qty: 90 TABLET | Refills: 3 | Status: SHIPPED | OUTPATIENT
Start: 2020-10-01 | End: 2020-10-01

## 2020-10-01 RX ORDER — ISOSORBIDE MONONITRATE 30 MG/1
30 TABLET, EXTENDED RELEASE ORAL DAILY
Qty: 90 TABLET | Refills: 2 | Status: SHIPPED | OUTPATIENT
Start: 2020-10-01 | End: 2021-08-23

## 2020-10-01 RX ORDER — FUROSEMIDE 20 MG
10 TABLET ORAL DAILY
Qty: 45 TABLET | Refills: 3 | Status: SHIPPED | OUTPATIENT
Start: 2020-10-01 | End: 2021-08-25

## 2020-10-06 ENCOUNTER — OFFICE VISIT (OUTPATIENT)
Dept: FAMILY MEDICINE | Facility: CLINIC | Age: 85
End: 2020-10-06
Payer: MEDICARE

## 2020-10-06 VITALS
RESPIRATION RATE: 16 BRPM | HEART RATE: 64 BPM | DIASTOLIC BLOOD PRESSURE: 82 MMHG | SYSTOLIC BLOOD PRESSURE: 138 MMHG | WEIGHT: 193 LBS | OXYGEN SATURATION: 96 % | BODY MASS INDEX: 35.3 KG/M2

## 2020-10-06 DIAGNOSIS — Z00.00 ENCOUNTER FOR ANNUAL WELLNESS EXAM IN MEDICARE PATIENT: Primary | ICD-10-CM

## 2020-10-06 DIAGNOSIS — Z71.89 ADVANCE CARE PLANNING: ICD-10-CM

## 2020-10-06 DIAGNOSIS — Z23 NEED FOR PROPHYLACTIC VACCINATION AND INOCULATION AGAINST INFLUENZA: ICD-10-CM

## 2020-10-06 PROCEDURE — 90662 IIV NO PRSV INCREASED AG IM: CPT | Performed by: INTERNAL MEDICINE

## 2020-10-06 PROCEDURE — G0439 PPPS, SUBSEQ VISIT: HCPCS | Performed by: INTERNAL MEDICINE

## 2020-10-06 PROCEDURE — G0008 ADMIN INFLUENZA VIRUS VAC: HCPCS | Performed by: INTERNAL MEDICINE

## 2020-10-06 ASSESSMENT — ACTIVITIES OF DAILY LIVING (ADL)
CURRENT_FUNCTION: LAUNDRY REQUIRES ASSISTANCE
CURRENT_FUNCTION: TRANSPORTATION REQUIRES ASSISTANCE

## 2020-10-06 ASSESSMENT — PATIENT HEALTH QUESTIONNAIRE - PHQ9
SUM OF ALL RESPONSES TO PHQ QUESTIONS 1-9: 4
SUM OF ALL RESPONSES TO PHQ QUESTIONS 1-9: 4

## 2020-10-06 NOTE — PROGRESS NOTES
"SUBJECTIVE:   Lora Vergara is a 89 year old female who presents for Preventive Visit.      Patient has been advised of split billing requirements and indicates understanding: Yes   Are you in the first 12 months of your Medicare coverage?  No    Healthy Habits:     In general, how would you rate your overall health?  Good    Frequency of exercise:  None    Do you usually eat at least 4 servings of fruit and vegetables a day, include whole grains    & fiber and avoid regularly eating high fat or \"junk\" foods?  No    Taking medications regularly:  Yes    Barriers to taking medications:  None    Medication side effects:  Significant flushing    Ability to successfully perform activities of daily living:  Transportation requires assistance and laundry requires assistance    Home Safety:  No safety concerns identified    Hearing Impairment:  No hearing concerns    In the past 6 months, have you been bothered by leaking of urine?  No    In general, how would you rate your overall mental or emotional health?  Good      PHQ-2 Total Score: 3    Additional concerns today:  No    Do you feel safe in your environment? Yes    Have you ever done Advance Care Planning? (For example, a Health Directive, POLST, or a discussion with a medical provider or your loved ones about your wishes): Yes, advance care planning is on file.      Fall risk  Fallen 2 or more times in the past year?: No  Any fall with injury in the past year?: No    Cognitive Screening   1) Repeat 3 items (Leader, Season, Table)    2) Clock draw: ABNORMAL Hands set wrong  3) 3 item recall: Recalls 2 objects   Results: ABNORMAL clock, 2 items recalled: PROBABLE COGNITIVE IMPAIRMENT, **INFORM PROVIDER**    Mini-CogTM Copyright EDVIN Brizuela. Licensed by the author for use in [facility]; reprinted with permission ([email or web address]). All rights reserved.      Do you have sleep apnea, excessive snoring or daytime drowsiness?: no    Reviewed and updated as needed " this visit by clinical staff  Tobacco  Allergies  Meds   Med Hx  Surg Hx  Fam Hx  Soc Hx        Reviewed and updated as needed this visit by Provider                Social History     Tobacco Use     Smoking status: Never Smoker     Smokeless tobacco: Never Used   Substance Use Topics     Alcohol use: Yes     Comment: rarely     If you drink alcohol do you typically have >3 drinks per day or >7 drinks per week? No    Alcohol Use 10/6/2020   Prescreen: >3 drinks/day or >7 drinks/week? No   Prescreen: >3 drinks/day or >7 drinks/week? -           Just had her 89th birthday.            Has her health care directive with her today.                 This patient is upset by the news of the clinic closing.                             Had a recent echo; sees Cardiology soon.                                      Labs from August reviewed.  meds reviewed.                                        Current providers sharing in care for this patient include:   Patient Care Team:  Antwon Schmidt MD as PCP - General (Internal Medicine)  Care, [facility] ([facility] ([facility] ([place]  Antwon Schmidt MD as Assigned PCP    The following health maintenance items are reviewed in Epic and correct as of today:  Health Maintenance   Topic Date Due     HF ACTION PLAN  09/11/1925     HEPATITIS B IMMUNIZATION (1 of 3 - Risk 3-dose series) 09/[birth year]/1944     DTAP/TDAP/TD IMMUNIZATION (1 - Tdap) 09/11/1950     ZOSTER IMMUNIZATION (2 of 3) 07/29/2010     EYE EXAM  08/28/2018     DIABETIC FOOT EXAM  07/25/2019     MICROALBUMIN  07/26/2019     INFLUENZA VACCINE (1) 09/01/2020     A1C  02/03/2021     BMP  02/03/2021     FALL RISK ASSESSMENT  07/29/2021     ALT  08/03/2021     LIPID  08/03/2021     CBC  08/03/2021     MEDICARE ANNUAL WELLNESS VISIT  10/06/2021     ADVANCE CARE PLANNING  10/06/2025     TSH W/FREE T4 REFLEX  Completed     PHQ-2  Completed     Pneumococcal Vaccine: 65+ Years  Completed     Pneumococcal Vaccine:  "Pediatrics (0 to 5 Years) and At-Risk Patients (6 to 64 Years)  Aged Out     IPV IMMUNIZATION  Aged Out     MENINGITIS IMMUNIZATION  Aged Out     Patient Active Problem List    Diagnosis Date Noted     Stable angina pectoris (H) 01/22/2015     Priority: High     Chronic ischemic heart disease 11/15/2013     Priority: High     Mild to moderate on coronary angiography per Cardiology; angina is thought to be \" microvascular\"; medical Rx advised by Cardiology.         Echo in 2010 showed normal LV systolic fxn with grade I diastolic dysfxn           Hypertension goal BP (blood pressure) < 140/90 01/17/2013     Priority: High     CXR neg in 6/13;          Losartan stopped by Cardiology in 9/17         Hyperlipidemia LDL goal <70 01/17/2013     Priority: High     Cardiac microvascular disease 09/15/2020     Priority: Medium     Nonrheumatic mitral valve stenosis 09/15/2020     Priority: Medium     Right shoulder pain, unspecified chronicity 08/27/2019     Priority: Medium     Generalized weakness 03/12/2019     Priority: Medium     Pneumonia of left lung due to infectious organism, unspecified part of lung 03/12/2019     Priority: Medium     Community acquired pneumonia 02/10/2019     Priority: Medium     Morbid obesity (H) 07/25/2018     Priority: Medium     Chronic diastolic congestive heart failure (H) 04/06/2018     Priority: Medium     Venous (peripheral) insufficiency 04/06/2018     Priority: Medium     Bilateral leg edema 04/06/2018     Priority: Medium     Type 2 diabetes mellitus with diabetic neuropathy, without long-term current use of insulin (H) 01/10/2018     Priority: Medium     Respiratory failure with hypoxia (H) 12/26/2017     Priority: Medium     Osteoarthritis of right knee, unspecified osteoarthritis type 10/31/2017     Priority: Medium     Chronic right-sided low back pain with right-sided sciatica 10/31/2017     Priority: Medium     Gastroenteritis 05/31/2017     Priority: Medium     Hypothyroidism, " unspecified type 12/20/2016     Priority: Medium     SOB (shortness of breath) 03/16/2016     Priority: Medium     Ischemic chest pain (H) 03/16/2016     Priority: Medium     Low back pain 03/17/2015     Priority: Medium     See Dr Sutton note 2/15; pt reports MARANDA was helpful           Overweight 12/27/2013     Priority: Medium     Sent for a sleep study by Dr. Luis of Cardiology in the remote past; pt did not sleep enough for a valid test    Problem list name updated by automated process. Provider to review       BCC (basal cell carcinoma), face 11/27/2013     Priority: Medium     Left nose; will have Mohs surgery 1/6/14, and plastic surgery 1/7/14 with Dr. Ernestina Torres        LLQ abdominal pain 11/26/2013     Priority: Medium     2011; CT: possible mild diverticulitis         Neuropathy 04/29/2013     Priority: Medium     Uses Lyrica BID  No CSA on file    check 08/25/20 no concerns       Preventive measure 11/26/2013     Priority: Low     Pre visit report from [facility] reviewed                                   Mammogram 8/14        CXR 6/13                                      DXA wnl  in 2014         Past Surgical History:   Procedure Laterality Date     APPENDECTOMY  1974     BACK SURGERY  1996     BLADDER SURGERY  1990    bladder suspension     CHOLECYSTECTOMY  1975     CORONARY ANGIOGRAPHY ADULT ORDER  2/2008    Mild CAD. LAD w/20% stenosis, RCA with 30-40% stenosis. No flow limiting obstructions.     face surgery  1-6/2014    basal cell plus plastic to nose, face , ear     HYSTERECTOMY  1970     OOPHORECTOMY  1974    bilateral     PHACOEMULSIFICATION CLEAR CORNEA WITH STANDARD INTRAOCULAR LENS IMPLANT Right 7/21/2015    Procedure: PHACOEMULSIFICATION CLEAR CORNEA WITH STANDARD INTRAOCULAR LENS IMPLANT;  Surgeon: Schuyler Chapa MD;  Location: [facility]     PHACOEMULSIFICATION CLEAR CORNEA WITH STANDARD INTRAOCULAR LENS IMPLANT Left 8/18/2015    Procedure: PHACOEMULSIFICATION CLEAR CORNEA WITH  STANDARD INTRAOCULAR LENS IMPLANT;  Surgeon: Schuyler Chapa MD;  Location: [facility]     Social History     Socioeconomic History     Marital status:      Spouse name: Not on file     Number of children: Not on file     Years of education: Not on file     Highest education level: Not on file   Occupational History     Not on file   Social Needs     Financial resource strain: Not on file     Food insecurity     Worry: Not on file     Inability: Not on file     Transportation needs     Medical: Not on file     Non-medical: Not on file   Tobacco Use     Smoking status: Never Smoker     Smokeless tobacco: Never Used   Substance and Sexual Activity     Alcohol use: Yes     Comment: rarely     Drug use: No     Sexual activity: Not Currently   Lifestyle     Physical activity     Days per week: Not on file     Minutes per session: Not on file     Stress: Not on file   Relationships     Social connections     Talks on phone: Not on file     Gets together: Not on file     Attends Jainism service: Not on file     Active member of club or organization: Not on file     Attends meetings of clubs or organizations: Not on file     Relationship status: Not on file     Intimate partner violence     Fear of current or ex partner: Not on file     Emotionally abused: Not on file     Physically abused: Not on file     Forced sexual activity: Not on file   Other Topics Concern     Parent/sibling w/ CABG, MI or angioplasty before 65F 55M? No      Service Not Asked     Blood Transfusions Not Asked     Caffeine Concern Yes     Comment: 4 cups caffeine per day     Occupational Exposure Not Asked     Hobby Hazards Not Asked     Sleep Concern No     Stress Concern No     Weight Concern No     Special Diet No     Back Care Not Asked     Exercise Yes     Comment: exercises 30 minutes, 4 days week     Bike Helmet Not Asked     Seat Belt Not Asked     Self-Exams Not Asked   Social History Narrative    Had 2 sons; one  in  ; head and neck cancer;  due to choking at [employer];                                 had 2 step dtrs; one .                                        times 2       BP Readings from Last 3 Encounters:   10/06/20 138/82   09/15/20 (!) 151/83   01/10/20 130/70    Wt Readings from Last 3 Encounters:   10/06/20 87.5 kg (193 lb)   09/15/20 88.9 kg (195 lb 14.4 oz)   01/10/20 89.2 kg (196 lb 9.6 oz)                  Current Outpatient Medications   Medication Sig Dispense Refill     acetaminophen (TYLENOL) 500 MG tablet Take 500-1,000 mg by mouth every 6 hours as needed       aspirin 81 MG EC tablet Take 81 mg by mouth daily       atorvastatin (LIPITOR) 40 MG tablet Take 1 tablet (40 mg) by mouth daily 90 tablet 3     bisacodyl (DULCOLAX) 5 MG EC tablet Take 5 mg by mouth daily       furosemide (LASIX) 20 MG tablet Take 0.5 tablets (10 mg) by mouth daily 45 tablet 3     isosorbide mononitrate (IMDUR) 30 MG 24 hr tablet Take 1 tablet (30 mg) by mouth daily 90 tablet 2     isosorbide mononitrate (IMDUR) 60 MG 24 hr tablet TAKE 1 TABLET BY MOUTH EVERY DAY 90 tablet 1     latanoprost (XALATAN) 0.005 % ophthalmic solution Place 1 drop into both eyes At Bedtime       levothyroxine (SYNTHROID/LEVOTHROID) 25 MCG tablet TAKE 1 TABLET BY MOUTH EVERY DAY 90 tablet 2     metoprolol tartrate (LOPRESSOR) 100 MG tablet TAKE 1/2 TABLET BY MOUTH EVERY MORNING AND TAKE 1 TABLET IN THE EVENING. 135 tablet 2     nitroGLYcerin (NITROSTAT) 0.4 MG sublingual tablet Place 1 tablet (0.4 mg) under the tongue every 5 minutes as needed for chest pain 50 tablet 11     nystatin (MYCOSTATIN) 018184 UNIT/GM external powder Apply topically 2 times daily       order for DME Equipment being ordered: wheeled walker with brakes and a seat 1 Device 0     polyethylene glycol (MIRALAX/GLYCOLAX) packet Take 1 packet by mouth daily as needed for constipation       pregabalin (LYRICA) 150 MG capsule Take 1 capsule by mouth twice a day as directed by  "physician. 180 capsule 3     ranolazine (RANEXA) 500 MG 12 hr tablet Take 1 tablet (500 mg) by mouth 2 times daily 180 tablet 2     vitamin D3 (CHOLECALCIFEROL) 2000 units tablet Take 4,000 Units by mouth daily           Review of Systems  CONSTITUTIONAL:NEGATIVE for fever, chills, change in weight  MUSCULOSKELETAL: POSITIVE  for arthralgias   NEURO: POSITIVE for gait disturbance    OBJECTIVE:   /82   Pulse 64   Resp 16   Wt 87.5 kg (193 lb)   LMP  (LMP Unknown)   SpO2 96%   BMI 35.30 kg/m   Estimated body mass index is 35.3 kg/m  as calculated from the following:    Height as of 9/15/20: 1.575 m (5' 2\").    Weight as of this encounter: 87.5 kg (193 lb).  Physical Exam  GENERAL APPEARANCE: alert, no distress and over weight  CV: regular rates and rhythm  NEURO: mentation intact and speech normal  PSYCH: mentation appears normal    Recent Results (from the past 3000 hour(s))   TSH with free T4 reflex    Collection Time: 08/03/20  9:58 AM   Result Value Ref Range    TSH 2.50 0.40 - 4.00 mU/L   CBC with platelets and differential    Collection Time: 08/03/20  9:58 AM   Result Value Ref Range    WBC 5.0 4.0 - 11.0 10e9/L    RBC Count 4.70 3.8 - 5.2 10e12/L    Hemoglobin 13.9 11.7 - 15.7 g/dL    Hematocrit 43.1 35.0 - 47.0 %    MCV 92 78 - 100 fl    MCH 29.6 26.5 - 33.0 pg    MCHC 32.3 31.5 - 36.5 g/dL    RDW 12.9 10.0 - 15.0 %    Platelet Count 205 150 - 450 10e9/L    % Neutrophils 53.9 %    % Lymphocytes 37.3 %    % Monocytes 7.4 %    % Eosinophils 1.0 %    % Basophils 0.4 %    Absolute Neutrophil 2.7 1.6 - 8.3 10e9/L    Absolute Lymphocytes 1.9 0.8 - 5.3 10e9/L    Absolute Monocytes 0.4 0.0 - 1.3 10e9/L    Absolute Eosinophils 0.1 0.0 - 0.7 10e9/L    Absolute Basophils 0.0 0.0 - 0.2 10e9/L    Diff Method Automated Method    Lipid Profile (Chol, Trig, HDL, LDL calc)    Collection Time: 08/03/20  9:58 AM   Result Value Ref Range    Cholesterol 164 <200 mg/dL    Triglycerides 112 <150 mg/dL    HDL Cholesterol " "68 >49 mg/dL    LDL Cholesterol Calculated 74 <100 mg/dL    Non HDL Cholesterol 96 <130 mg/dL   Hemoglobin A1c    Collection Time: 08/03/20  9:58 AM   Result Value Ref Range    Hemoglobin A1C 6.1 (H) 0 - 5.6 %   Comprehensive metabolic panel (BMP + Alb, Alk Phos, ALT, AST, Total. Bili, TP)    Collection Time: 08/03/20  9:58 AM   Result Value Ref Range    Sodium 138 133 - 144 mmol/L    Potassium 3.8 3.4 - 5.3 mmol/L    Chloride 103 94 - 109 mmol/L    Carbon Dioxide 27 20 - 32 mmol/L    Anion Gap 8 3 - 14 mmol/L    Glucose 132 (H) 70 - 99 mg/dL    Urea Nitrogen 11 7 - 30 mg/dL    Creatinine 0.80 0.52 - 1.04 mg/dL    GFR Estimate 63 >60 mL/min/[1.73_m2]    GFR Estimate If Black 73 >60 mL/min/[1.73_m2]    Calcium 8.6 8.5 - 10.1 mg/dL    Bilirubin Total 0.6 0.2 - 1.3 mg/dL    Albumin 3.8 3.4 - 5.0 g/dL    Protein Total 7.5 6.8 - 8.8 g/dL    Alkaline Phosphatase 63 40 - 150 U/L    ALT 21 0 - 50 U/L    AST 19 0 - 45 U/L   N terminal pro BNP outpatient    Collection Time: 09/15/20  3:01 PM   Result Value Ref Range    N-Terminal Pro Bnp 937 (H) 0 - 450 pg/mL         ASSESSMENT / PLAN:   Lora was seen today for physical and imm/inj.    Diagnoses and all orders for this visit:    Encounter for annual wellness exam in Medicare patient    Advance care planning    Need for prophylactic vaccination and inoculation against influenza  -     FLUZONE HIGH DOSE 65+  [25336]  -     ADMIN INFLUENZA (For MEDICARE Patients ONLY) []        Patient has been advised of split billing requirements and indicates understanding: Yes  COUNSELING:  Reviewed preventive health counseling, as reflected in patient instructions       Regular exercise       Healthy diet/nutrition    Estimated body mass index is 35.3 kg/m  as calculated from the following:    Height as of 9/15/20: 1.575 m (5' 2\").    Weight as of this encounter: 87.5 kg (193 lb).    Weight management plan: Discussed healthy diet and exercise guidelines    She reports that she has " never smoked. She has never used smokeless tobacco.      Appropriate preventive services were discussed with this patient, including applicable screening as appropriate for cardiovascular disease, diabetes, osteopenia/osteoporosis, and glaucoma.  As appropriate for age/gender, discussed screening for colorectal cancer, prostate cancer, breast cancer, and cervical cancer. Checklist reviewing preventive services available has been given to the patient.    Reviewed patients plan of care and provided an AVS. The Basic Care Plan (routine screening as documented in Health Maintenance) for Lora meets the Care Plan requirement. This Care Plan has been established and reviewed with the Patient.    Counseling Resources:  ATP IV Guidelines  Pooled Cohorts Equation Calculator  Breast Cancer Risk Calculator  Breast Cancer: Medication to Reduce Risk  FRAX Risk Assessment  ICSI Preventive Guidelines  Dietary Guidelines for Americans, 2010  USDA's MyPlate  ASA Prophylaxis  Lung CA Screening    Antwon Schmidt MD  [facility]    Identified Health Risks:

## 2020-10-20 DIAGNOSIS — I10 HYPERTENSION GOAL BP (BLOOD PRESSURE) < 140/90: ICD-10-CM

## 2020-10-20 RX ORDER — METOPROLOL TARTRATE 100 MG
TABLET ORAL
Qty: 135 TABLET | Refills: 2 | Status: SHIPPED | OUTPATIENT
Start: 2020-10-20 | End: 2021-06-30

## 2020-10-21 ENCOUNTER — TELEPHONE (OUTPATIENT)
Dept: CARDIOLOGY | Facility: CLINIC | Age: 85
End: 2020-10-21

## 2020-10-21 NOTE — TELEPHONE ENCOUNTER
Pt said she is unable to get son to drive her in for appointment, and with covid does not want to come in. Pt had BNP done 9/15 of 937, and echo done 9/24 with /70, and saw PMD 10/6/20 with /82. Per Pt request made appointment telephone visit. VIC Luo RN

## 2020-10-23 ENCOUNTER — VIRTUAL VISIT (OUTPATIENT)
Dept: CARDIOLOGY | Facility: CLINIC | Age: 85
End: 2020-10-23
Attending: INTERNAL MEDICINE
Payer: MEDICARE

## 2020-10-23 DIAGNOSIS — I20.89 STABLE ANGINA PECTORIS (H): ICD-10-CM

## 2020-10-23 DIAGNOSIS — I34.2 NONRHEUMATIC MITRAL VALVE STENOSIS: Primary | ICD-10-CM

## 2020-10-23 DIAGNOSIS — I10 HYPERTENSION GOAL BP (BLOOD PRESSURE) < 140/90: ICD-10-CM

## 2020-10-23 DIAGNOSIS — E78.5 HYPERLIPIDEMIA LDL GOAL <70: ICD-10-CM

## 2020-10-23 DIAGNOSIS — R06.02 SOB (SHORTNESS OF BREATH): ICD-10-CM

## 2020-10-23 DIAGNOSIS — I50.32 CHRONIC DIASTOLIC CONGESTIVE HEART FAILURE (H): ICD-10-CM

## 2020-10-23 PROCEDURE — 99442 PR PHYSICIAN TELEPHONE EVALUATION 11-20 MIN: CPT | Mod: 95 | Performed by: INTERNAL MEDICINE

## 2020-10-23 NOTE — LETTER
"10/23/2020    Antwon Schmidt MD  [address]  [facility] [id number]    RE: Lora Vergara       Dear Colleague,    I had the pleasure of seeing Lora Vergara in the [facility].    Lora Vergara is a 89 year old female who is being evaluated via a billable telephone visit.      The patient has been notified of following:     \"This telephone visit will be conducted via a call between you and your physician/provider. We have found that certain health care needs can be provided without the need for a physical exam.  This service lets us provide the care you need with a short phone conversation.  If a prescription is necessary we can send it directly to your pharmacy.  If lab work is needed we can place an order for that and you can then stop by our lab to have the test done at a later time.    Telephone visits are billed at different rates depending on your insurance coverage. During this emergency period, for some insurers they may be billed the same as an in-person visit.  Please reach out to your insurance provider with any questions.    If during the course of the call the physician/provider feels a telephone visit is not appropriate, you will not be charged for this service.\"  Vitals - Patient Reported  Diastolic (Patient Reported): (N/A)  Weight (Patient Reported): 88.5 kg (195 lb)  Height (Patient Reported): 154.9 cm (5' 1\")  BMI (Based on Pt Reported Ht/Wt): 36.84    Review Of Systems  Skin: NEGATIVE  Eyes:Ears/Nose/Throat: glaucoma  Respiratory: NEGATIVE   Cardiovascular:states has lack of energy, sleeping all the time, edema in ankles occ., lightheadedness after walking hallways   Gastrointestinal: NEGATIVE  Genitourinary:NEGATIVE   Musculoskeletal: NEGATIVE  Neurologic: neuropathy  Psychiatric: NEGATIVE  Hematologic/Lymphatic/Immunologic: NEGATIVE  Endocrine:  NEGATIVE    Mildred Smith LPN    Patient has given verbal consent for Telephone visit?  Yes    What " phone number would you like to be contacted at? [phone]    How would you like to obtain your AVS? Marylujude    Phone call duration: 11 minutes    Dear Antwon:       I had the pleasure of following up with Lora Vergara in virtual Cardiology Clinic as a telephone visit, who has chronic ischemic chest discomfort related to microvascular angina.  She has had a coronary angiography in 2010, which showed mild to moderate coronary artery atherosclerosis, but no obstructive disease.  However, chest discomfort and jaw pain have been suspicious for angina and responded to Imdur and Ranexa.       She is here for follow-up.  I saw her in September 2020 when she was complaining of fatigue, tiredness and some exertional shortness of breath.  I referred her for an echocardiogram and N-terminal proBNP.  N-terminal proBNP was 975 which is normal for her age.  Echocardiogram revealed stable ejection fraction which was normal as well as moderate mitral stenosis with mean gradient of 9 mm, unchanged from 2018.  Mild mitral irritation was noted.  Left atrium was moderate to severely dilated that was unchanged from 2018     She continues to workout in her apartment complex 2-3 times a week.  She states her energy level has not improved.  However her shortness of breath is somewhat better.  She denies any chest pain.          IMPRESSION:   1.   Fatigue/ Shortness of breath  Fatigue and shortness of breath is somewhat new this year.  However, echo is stable with normal ejection fraction and stable moderate mitral stenosis.  N-terminal proBNP also was normal for her age and actually lower than few years ago.  Therefore it doubt that this fatigue and shortness of breath represents congestive heart failure.  She does likely have underlying diastolic dysfunction.  We will also supposed to have PFTs to assess for pulmonary causes of shortness of breath but she was unable to keep that appointment.  I will defer to the primary care physician if  they want to consider that.  Continue low-dose Lasix.    2.  Microvascular angina/coronary artery atherosclerosis.  At this time, pain is well controlled on a combination of Imdur and amlodipine and p.r.n. sublingual nitro.    She is doing well on Ranexa which we will continue.     3.  Hyperlipidemia.  Continue atorvastatin.      4.  Hypertension, well controlled.     We will continue see her on an annual basis.     Thank you for allowing us to participate in the care of this nice patient.     Sincerely,     Kalyan Luis MD     Telephone time was 11 minutes

## 2020-10-23 NOTE — PROGRESS NOTES
"Lora Vergara is a 89 year old female who is being evaluated via a billable telephone visit.      The patient has been notified of following:     \"This telephone visit will be conducted via a call between you and your physician/provider. We have found that certain health care needs can be provided without the need for a physical exam.  This service lets us provide the care you need with a short phone conversation.  If a prescription is necessary we can send it directly to your pharmacy.  If lab work is needed we can place an order for that and you can then stop by our lab to have the test done at a later time.    Telephone visits are billed at different rates depending on your insurance coverage. During this emergency period, for some insurers they may be billed the same as an in-person visit.  Please reach out to your insurance provider with any questions.    If during the course of the call the physician/provider feels a telephone visit is not appropriate, you will not be charged for this service.\"  Vitals - Patient Reported  Diastolic (Patient Reported): (N/A)  Weight (Patient Reported): 88.5 kg (195 lb)  Height (Patient Reported): 154.9 cm (5' 1\")  BMI (Based on Pt Reported Ht/Wt): 36.84    Review Of Systems  Skin: NEGATIVE  Eyes:Ears/Nose/Throat: glaucoma  Respiratory: NEGATIVE   Cardiovascular:states has lack of energy, sleeping all the time, edema in ankles occ., lightheadedness after walking hallways   Gastrointestinal: NEGATIVE  Genitourinary:NEGATIVE   Musculoskeletal: NEGATIVE  Neurologic: neuropathy  Psychiatric: NEGATIVE  Hematologic/Lymphatic/Immunologic: NEGATIVE  Endocrine:  NEGATIVE    Mildred Smith LPN    Patient has given verbal consent for Telephone visit?  Yes    What phone number would you like to be contacted at? [phone]    How would you like to obtain your AVS? 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  She has had a coronary angiography in 2010, which showed mild to moderate coronary artery atherosclerosis, but no obstructive disease.  However, chest discomfort and jaw pain have been suspicious for angina and responded to Imdur and Ranexa.       She is here for follow-up.  I saw her in September 2020 when she was complaining of fatigue, tiredness and some exertional shortness of breath.  I referred her for an echocardiogram and N-terminal proBNP.  N-terminal proBNP was 975 which is normal for her age.  Echocardiogram revealed stable ejection fraction which was normal as well as moderate mitral stenosis with mean gradient of 9 mm, unchanged from 2018.  Mild mitral irritation was noted.  Left atrium was moderate to severely dilated that was unchanged from 2018     She continues to workout in her apartment complex 2-3 times a week.  She states her energy level has not improved.  However her shortness of breath is somewhat better.  She denies any chest pain.          IMPRESSION:   1.   Fatigue/ Shortness of breath  Fatigue and shortness of breath is somewhat new this year.  However, echo is stable with normal ejection fraction and stable moderate mitral stenosis.  N-terminal proBNP also was normal for her age and actually lower than few years ago.  Therefore it doubt that this fatigue and shortness of breath represents congestive heart failure.  She does likely have underlying diastolic dysfunction.  We will also supposed to have PFTs to assess for pulmonary causes of shortness of breath but she was unable to keep that appointment.  I will defer to the primary care physician if they want to consider that.  Continue low-dose Lasix.    2.  Microvascular angina/coronary artery atherosclerosis.  At this time, pain is well controlled on a combination of Imdur and amlodipine and p.r.n. sublingual  nitro.    She is doing well on Ranexa which we will continue.     3.  Hyperlipidemia.  Continue atorvastatin.      4.  Hypertension, well controlled.     We will continue see her on an annual basis.     Thank you for allowing us to participate in the care of this nice patient.     Sincerely,     Kalyan Luis MD     Telephone time was 11 minutes

## 2020-11-05 ENCOUNTER — DOCUMENTATION ONLY (OUTPATIENT)
Dept: OTHER | Facility: CLINIC | Age: 85
End: 2020-11-05

## 2020-11-18 DIAGNOSIS — E78.5 HYPERLIPIDEMIA LDL GOAL <70: ICD-10-CM

## 2020-11-18 RX ORDER — ATORVASTATIN CALCIUM 40 MG/1
40 TABLET, FILM COATED ORAL DAILY
Qty: 90 TABLET | Refills: 2 | Status: SHIPPED | OUTPATIENT
Start: 2020-11-18 | End: 2021-07-27

## 2021-01-22 ENCOUNTER — TELEPHONE (OUTPATIENT)
Dept: INTERNAL MEDICINE | Facility: CLINIC | Age: 86
End: 2021-01-22

## 2021-01-22 DIAGNOSIS — Z20.822 EXPOSURE TO 2019 NOVEL CORONAVIRUS: Primary | ICD-10-CM

## 2021-01-22 NOTE — TELEPHONE ENCOUNTER
Since she has not sx, she can get the vaccine.       She should also have a COVID test.             I have generated a lab order.          Please let her know.
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
## 2021-01-28 ENCOUNTER — OFFICE VISIT (OUTPATIENT)
Dept: URGENT CARE | Facility: URGENT CARE | Age: 86
End: 2021-01-28
Attending: INTERNAL MEDICINE
Payer: MEDICARE

## 2021-01-28 DIAGNOSIS — Z20.822 EXPOSURE TO 2019 NOVEL CORONAVIRUS: ICD-10-CM

## 2021-01-28 LAB
LABORATORY COMMENT REPORT: NORMAL
SARS-COV-2 RNA RESP QL NAA+PROBE: NEGATIVE
SARS-COV-2 RNA RESP QL NAA+PROBE: NORMAL
SPECIMEN SOURCE: NORMAL
SPECIMEN SOURCE: NORMAL

## 2021-01-28 PROCEDURE — U0003 INFECTIOUS AGENT DETECTION BY NUCLEIC ACID (DNA OR RNA); SEVERE ACUTE RESPIRATORY SYNDROME CORONAVIRUS 2 (SARS-COV-2) (CORONAVIRUS DISEASE [COVID-19]), AMPLIFIED PROBE TECHNIQUE, MAKING USE OF HIGH THROUGHPUT TECHNOLOGIES AS DESCRIBED BY CMS-2020-01-R: HCPCS | Performed by: INTERNAL MEDICINE

## 2021-01-28 PROCEDURE — U0005 INFEC AGEN DETEC AMPLI PROBE: HCPCS | Performed by: INTERNAL MEDICINE

## 2021-02-05 DIAGNOSIS — I25.85 CARDIAC MICROVASCULAR DISEASE: ICD-10-CM

## 2021-02-05 RX ORDER — RANOLAZINE 500 MG/1
500 TABLET, EXTENDED RELEASE ORAL 2 TIMES DAILY
Qty: 180 TABLET | Refills: 3 | Status: SHIPPED | OUTPATIENT
Start: 2021-02-05 | End: 2022-04-07

## 2021-02-15 ENCOUNTER — TRANSFERRED RECORDS (OUTPATIENT)
Dept: HEALTH INFORMATION MANAGEMENT | Facility: CLINIC | Age: 86
End: 2021-02-15

## 2021-02-17 DIAGNOSIS — G62.9 NEUROPATHY: Chronic | ICD-10-CM

## 2021-02-17 RX ORDER — PREGABALIN 150 MG/1
CAPSULE ORAL
Qty: 180 CAPSULE | Refills: 3 | Status: SHIPPED | OUTPATIENT
Start: 2021-02-17 | End: 2021-03-29

## 2021-02-23 ENCOUNTER — TELEPHONE (OUTPATIENT)
Dept: CARDIOLOGY | Facility: CLINIC | Age: 86
End: 2021-02-23

## 2021-02-23 DIAGNOSIS — G62.9 NEUROPATHY: Chronic | ICD-10-CM

## 2021-02-23 RX ORDER — PREGABALIN 150 MG/1
CAPSULE ORAL
Qty: 180 CAPSULE | Refills: 3 | Status: CANCELLED | OUTPATIENT
Start: 2021-02-23

## 2021-02-23 NOTE — TELEPHONE ENCOUNTER
Return Pt call she is asking what testing is being ordered for her? Reviewed with Pt PFT's regarding SOB. Pt says she does not want to do this presently. Pt does fel SOB by walking to end of gould. Pt also says she is not able to go to exercise classes 4 days a week as she use to and she is 89. Pt is not presently interested in any testing and was asked to call this nurse if has any changes or concerns.  Did move orders out 6 months. VIC Luo RN

## 2021-02-24 ENCOUNTER — TELEPHONE (OUTPATIENT)
Dept: INTERNAL MEDICINE | Facility: CLINIC | Age: 86
End: 2021-02-24

## 2021-02-24 DIAGNOSIS — G62.9 NEUROPATHY: Primary | Chronic | ICD-10-CM

## 2021-02-24 RX ORDER — GABAPENTIN 300 MG/1
300 CAPSULE ORAL 3 TIMES DAILY
Qty: 60 CAPSULE | Refills: 3 | Status: SHIPPED | OUTPATIENT
Start: 2021-02-24 | End: 2023-01-01

## 2021-02-24 RX ORDER — ACETAMINOPHEN 500 MG
500-1000 TABLET ORAL EVERY 6 HOURS PRN
Status: CANCELLED | OUTPATIENT
Start: 2021-02-24

## 2021-02-24 NOTE — TELEPHONE ENCOUNTER
Pt notified and will pick this up and RN will work with pt on Pfizer Pateint assistance forms then will fax to [phone] .Nettie Ochoa RN

## 2021-02-24 NOTE — TELEPHONE ENCOUNTER
Patient called the clinic stating her Lyrica assistance program had  and she does not have coverage for it now. Spoke to Nettie DONAHUE at  who will be initiating this process for coverage later today. Pt is running out of Lyrica (which works great), but she would need something as an alternative in the mean time until her Lyrica gets approved. Pharmacy for alternative is [facility] in [address].     [facility] route to PCP.

## 2021-02-24 NOTE — TELEPHONE ENCOUNTER
The only similar medication is gabapentin.            I have sent an Rx to your pharmacy for 300 mg bid.             Please let her know.

## 2021-03-01 NOTE — TELEPHONE ENCOUNTER
Called pt to follow up and she will make an appt and will come to office to fill out the paperwork .Nettie Ochoa RN

## 2021-03-11 DIAGNOSIS — E03.9 HYPOTHYROIDISM: ICD-10-CM

## 2021-03-11 RX ORDER — LEVOTHYROXINE SODIUM 25 UG/1
TABLET ORAL
Qty: 90 TABLET | Refills: 2 | Status: SHIPPED | OUTPATIENT
Start: 2021-03-11 | End: 2021-11-29

## 2021-03-27 PROBLEM — I34.2 NONRHEUMATIC MITRAL VALVE STENOSIS: Status: ACTIVE | Noted: 2020-09-15

## 2021-03-27 PROBLEM — K52.9 GASTROENTERITIS: Status: RESOLVED | Noted: 2017-05-31 | Resolved: 2021-03-27

## 2021-03-27 PROBLEM — J96.91 RESPIRATORY FAILURE WITH HYPOXIA (H): Status: RESOLVED | Noted: 2017-12-26 | Resolved: 2021-03-27

## 2021-03-27 PROBLEM — I50.32 CHRONIC DIASTOLIC CONGESTIVE HEART FAILURE (H): Status: ACTIVE | Noted: 2018-04-06

## 2021-03-27 PROBLEM — J18.9 PNEUMONIA OF LEFT LUNG DUE TO INFECTIOUS ORGANISM, UNSPECIFIED PART OF LUNG: Status: RESOLVED | Noted: 2019-03-12 | Resolved: 2021-03-27

## 2021-03-27 PROBLEM — J18.9 COMMUNITY ACQUIRED PNEUMONIA: Status: RESOLVED | Noted: 2019-02-10 | Resolved: 2021-03-27

## 2021-03-28 NOTE — PATIENT INSTRUCTIONS
Try some Aspercream with lidocaine on your feet.

## 2021-03-29 ENCOUNTER — OFFICE VISIT (OUTPATIENT)
Dept: INTERNAL MEDICINE | Facility: CLINIC | Age: 86
End: 2021-03-29
Payer: MEDICARE

## 2021-03-29 VITALS
SYSTOLIC BLOOD PRESSURE: 116 MMHG | BODY MASS INDEX: 33.84 KG/M2 | OXYGEN SATURATION: 94 % | HEART RATE: 79 BPM | DIASTOLIC BLOOD PRESSURE: 82 MMHG | WEIGHT: 185 LBS

## 2021-03-29 DIAGNOSIS — G62.9 NEUROPATHY: Primary | ICD-10-CM

## 2021-03-29 DIAGNOSIS — R26.89 POOR BALANCE: ICD-10-CM

## 2021-03-29 PROCEDURE — 99213 OFFICE O/P EST LOW 20 MIN: CPT | Performed by: INTERNAL MEDICINE

## 2021-03-29 RX ORDER — PREGABALIN 150 MG/1
CAPSULE ORAL
Qty: 180 CAPSULE | Refills: 3 | Status: SHIPPED | OUTPATIENT
Start: 2021-03-29 | End: 2021-05-26

## 2021-03-29 NOTE — LETTER
March 29, 2021      Lora ESDRAS Vergara  [id number]   [facility] [address]        To Whom It May Concern                                                                                                                                                                   In order for this patient to remain in independent living, and to be able to shower and bath safely, she should have a walk in bath tub/shower.              If you have any questions or concerns, please call the clinic at the number listed above.       Sincerely,      Antwon Schmidt MD

## 2021-03-29 NOTE — PROGRESS NOTES
"    Assessment & Plan     Neuropathy  Paperwork done for pt assistance program.      - pregabalin (LYRICA) 150 MG capsule  Dispense: 180 capsule; Refill: 3    Poor balance  She uses a walker               BMI:   Estimated body mass index is 33.84 kg/m  as calculated from the following:    Height as of 9/15/20: 1.575 m (5' 2\").    Weight as of this encounter: 83.9 kg (185 lb).   Weight management plan: Discussed healthy diet and exercise guidelines    Patient Instructions              Try some Aspercream with lidocaine on your feet.                                                                                                                                She does not want to do labs today.   Return in about 4 months (around 7/29/2021) for follow up of several issues.    Antwon Schmidt MD  [facility]    Danette Paulino is a 89 year old who presents for the following health issues  accompanied by her friend:    HPI     Paperwork to get assistance for lyrica       ongoing numbness of both feet.          lyrica works much better in sx relief than gabapentin.                  Review of Systems         Current Outpatient Medications   Medication Sig Dispense Refill     acetaminophen (TYLENOL) 500 MG tablet Take 500-1,000 mg by mouth every 6 hours as needed       aspirin 81 MG EC tablet Take 81 mg by mouth daily       atorvastatin (LIPITOR) 40 MG tablet Take 1 tablet (40 mg) by mouth daily 90 tablet 2     bisacodyl (DULCOLAX) 5 MG EC tablet Take 5 mg by mouth daily       furosemide (LASIX) 20 MG tablet Take 0.5 tablets (10 mg) by mouth daily 45 tablet 3     gabapentin (NEURONTIN) 300 MG capsule Take 1 capsule (300 mg) by mouth 3 times daily 60 capsule 3     isosorbide mononitrate (IMDUR) 30 MG 24 hr tablet Take 1 tablet (30 mg) by mouth daily 90 tablet 2     isosorbide mononitrate (IMDUR) 60 MG 24 hr tablet TAKE 1 TABLET BY MOUTH EVERY DAY 90 tablet 1     latanoprost (XALATAN) 0.005 " % ophthalmic solution Place 1 drop into both eyes 3 times daily        levothyroxine (SYNTHROID/LEVOTHROID) 25 MCG tablet TAKE 1 TABLET BY MOUTH EVERY DAY 90 tablet 2     metoprolol tartrate (LOPRESSOR) 100 MG tablet TAKE 1/2 TABLET BY MOUTH EVERY MORNING AND TAKE 1 TABLET IN THE EVENING. 135 tablet 2     nitroGLYcerin (NITROSTAT) 0.4 MG sublingual tablet Place 1 tablet (0.4 mg) under the tongue every 5 minutes as needed for chest pain 50 tablet 11     nystatin (MYCOSTATIN) 482658 UNIT/GM external powder Apply topically 2 times daily       order for DME Equipment being ordered: wheeled walker with brakes and a seat 1 Device 0     polyethylene glycol (MIRALAX/GLYCOLAX) packet Take 1 packet by mouth daily as needed for constipation       pregabalin (LYRICA) 150 MG capsule Take 1 capsule by mouth twice a day as directed by physician. 180 capsule 3     ranolazine (RANEXA) 500 MG 12 hr tablet Take 1 tablet (500 mg) by mouth 2 times daily 180 tablet 3     vitamin D3 (CHOLECALCIFEROL) 2000 units tablet Take 4,000 Units by mouth daily       Wt Readings from Last 4 Encounters:   03/29/21 83.9 kg (185 lb)   10/06/20 87.5 kg (193 lb)   09/15/20 88.9 kg (195 lb 14.4 oz)   01/10/20 89.2 kg (196 lb 9.6 oz)       Objective    /82   Pulse 79   Wt 83.9 kg (185 lb)   LMP  (LMP Unknown)   SpO2 94%   BMI 33.84 kg/m    Body mass index is 33.84 kg/m .  Physical Exam   GENERAL APPEARANCE: alert, no distress, over weight and she has lost a few lbs  DIABETIC FOOT EXAM: DP reduced bilaterally, PT reduced bilaterally and dry skin

## 2021-04-05 ENCOUNTER — TELEPHONE (OUTPATIENT)
Dept: INTERNAL MEDICINE | Facility: CLINIC | Age: 86
End: 2021-04-05

## 2021-04-05 NOTE — TELEPHONE ENCOUNTER
Spoke with patient and received phone number for Pfizer patient assistance to call and see exactly what information they need. Will call later today to see.    Evon Polanco MA

## 2021-04-05 NOTE — TELEPHONE ENCOUNTER
Pt calling and david completed a form for the Pfizer Pt Assistance program last week for her lyrica. Sates the form was faxed in and she received a letter Friday stating that in the prescription coverage section there was missing information. Please call the pt back at [phone]. Thank you.  Danika Aleman,

## 2021-04-13 DIAGNOSIS — I25.9 CHRONIC ISCHEMIC HEART DISEASE: ICD-10-CM

## 2021-04-13 RX ORDER — ISOSORBIDE MONONITRATE 60 MG/1
TABLET, EXTENDED RELEASE ORAL
Qty: 90 TABLET | Refills: 1 | Status: SHIPPED | OUTPATIENT
Start: 2021-04-13 | End: 2021-09-20

## 2021-04-15 NOTE — TELEPHONE ENCOUNTER
Patient called to follow-up regarding this information. No one has contacted her to give her update on it's status    Ph. [phone]

## 2021-04-27 NOTE — TELEPHONE ENCOUNTER
Pfizer Assistance Form was found in Media in pt's chart. Form was completed and faxed to [phone].

## 2021-04-27 NOTE — TELEPHONE ENCOUNTER
Pt calling wondering what the status is on the Pfizer Pt Assistance Program for Lyrica?  She has not heard anything from insurance.    Please call her back [phone].

## 2021-04-27 NOTE — TELEPHONE ENCOUNTER
What I faxed was for something completely different from the Pfizer Form. I have not seen this form. I will call [employer] at [phone] for them to fax over a form.

## 2021-05-17 ENCOUNTER — OFFICE VISIT (OUTPATIENT)
Dept: INTERNAL MEDICINE | Facility: CLINIC | Age: 86
End: 2021-05-17
Payer: MEDICARE

## 2021-05-17 ENCOUNTER — NURSE TRIAGE (OUTPATIENT)
Dept: INTERNAL MEDICINE | Facility: CLINIC | Age: 86
End: 2021-05-17

## 2021-05-17 VITALS
WEIGHT: 182.8 LBS | HEART RATE: 82 BPM | SYSTOLIC BLOOD PRESSURE: 122 MMHG | DIASTOLIC BLOOD PRESSURE: 74 MMHG | OXYGEN SATURATION: 96 % | RESPIRATION RATE: 18 BRPM | BODY MASS INDEX: 33.43 KG/M2

## 2021-05-17 DIAGNOSIS — R53.83 OTHER FATIGUE: Primary | ICD-10-CM

## 2021-05-17 LAB
ANION GAP SERPL CALCULATED.3IONS-SCNC: 4 MMOL/L (ref 3–14)
BASOPHILS # BLD AUTO: 0 10E9/L (ref 0–0.2)
BASOPHILS NFR BLD AUTO: 0.4 %
BUN SERPL-MCNC: 15 MG/DL (ref 7–30)
CALCIUM SERPL-MCNC: 8.8 MG/DL (ref 8.5–10.1)
CHLORIDE SERPL-SCNC: 99 MMOL/L (ref 94–109)
CO2 SERPL-SCNC: 32 MMOL/L (ref 20–32)
CREAT SERPL-MCNC: 0.92 MG/DL (ref 0.52–1.04)
DIFFERENTIAL METHOD BLD: ABNORMAL
EOSINOPHIL # BLD AUTO: 0 10E9/L (ref 0–0.7)
EOSINOPHIL NFR BLD AUTO: 0.7 %
ERYTHROCYTE [DISTWIDTH] IN BLOOD BY AUTOMATED COUNT: 13.8 % (ref 10–15)
GFR SERPL CREATININE-BSD FRML MDRD: 52 ML/MIN/{1.73_M2}
GLUCOSE SERPL-MCNC: 116 MG/DL (ref 70–99)
HCT VFR BLD AUTO: 38.1 % (ref 35–47)
HGB BLD-MCNC: 12.5 G/DL (ref 11.7–15.7)
LYMPHOCYTES # BLD AUTO: 2.3 10E9/L (ref 0.8–5.3)
LYMPHOCYTES NFR BLD AUTO: 40.2 %
MCH RBC QN AUTO: 33.3 PG (ref 26.5–33)
MCHC RBC AUTO-ENTMCNC: 32.8 G/DL (ref 31.5–36.5)
MCV RBC AUTO: 102 FL (ref 78–100)
MONOCYTES # BLD AUTO: 0.5 10E9/L (ref 0–1.3)
MONOCYTES NFR BLD AUTO: 9.2 %
NEUTROPHILS # BLD AUTO: 2.8 10E9/L (ref 1.6–8.3)
NEUTROPHILS NFR BLD AUTO: 49.5 %
PLATELET # BLD AUTO: 244 10E9/L (ref 150–450)
POTASSIUM SERPL-SCNC: 4.3 MMOL/L (ref 3.4–5.3)
RBC # BLD AUTO: 3.75 10E12/L (ref 3.8–5.2)
SODIUM SERPL-SCNC: 135 MMOL/L (ref 133–144)
WBC # BLD AUTO: 5.7 10E9/L (ref 4–11)

## 2021-05-17 PROCEDURE — 36415 COLL VENOUS BLD VENIPUNCTURE: CPT | Performed by: PHYSICIAN ASSISTANT

## 2021-05-17 PROCEDURE — 99213 OFFICE O/P EST LOW 20 MIN: CPT | Performed by: PHYSICIAN ASSISTANT

## 2021-05-17 PROCEDURE — 80048 BASIC METABOLIC PNL TOTAL CA: CPT | Performed by: PHYSICIAN ASSISTANT

## 2021-05-17 PROCEDURE — 85025 COMPLETE CBC W/AUTO DIFF WBC: CPT | Performed by: PHYSICIAN ASSISTANT

## 2021-05-17 NOTE — LETTER
May 20, 2021      Lora DEWITT Ursula  [id number]   [place] [facility] [address]        Dear ,    We are writing to inform you of your test results.    Labs are all normal/stable  No sign of dehydration, normal sodium level. NO anemia.   Monitor   Recheck with ostlund if symptoms return     Resulted Orders   CBC with platelets differential   Result Value Ref Range    WBC 5.7 4.0 - 11.0 10e9/L    RBC Count 3.75 (L) 3.8 - 5.2 10e12/L    Hemoglobin 12.5 11.7 - 15.7 g/dL    Hematocrit 38.1 35.0 - 47.0 %     (H) 78 - 100 fl    MCH 33.3 (H) 26.5 - 33.0 pg    MCHC 32.8 31.5 - 36.5 g/dL    RDW 13.8 10.0 - 15.0 %    Platelet Count 244 150 - 450 10e9/L    % Neutrophils 49.5 %    % Lymphocytes 40.2 %    % Monocytes 9.2 %    % Eosinophils 0.7 %    % Basophils 0.4 %    Absolute Neutrophil 2.8 1.6 - 8.3 10e9/L    Absolute Lymphocytes 2.3 0.8 - 5.3 10e9/L    Absolute Monocytes 0.5 0.0 - 1.3 10e9/L    Absolute Eosinophils 0.0 0.0 - 0.7 10e9/L    Absolute Basophils 0.0 0.0 - 0.2 10e9/L    Diff Method Automated Method    Basic metabolic panel   Result Value Ref Range    Sodium 135 133 - 144 mmol/L    Potassium 4.3 3.4 - 5.3 mmol/L    Chloride 99 94 - 109 mmol/L    Carbon Dioxide 32 20 - 32 mmol/L    Anion Gap 4 3 - 14 mmol/L    Glucose 116 (H) 70 - 99 mg/dL    Urea Nitrogen 15 7 - 30 mg/dL    Creatinine 0.92 0.52 - 1.04 mg/dL    GFR Estimate 52 (L) >60 mL/min/[1.73_m2]      Comment:      Non  GFR Calc  Starting 12/18/2018, serum creatinine based estimated GFR (eGFR) will be   calculated using the Chronic Kidney Disease Epidemiology Collaboration   (CKD-EPI) equation.      GFR Estimate If Black 61 >60 mL/min/[1.73_m2]      Comment:       GFR Calc  Starting 12/18/2018, serum creatinine based estimated GFR (eGFR) will be   calculated using the Chronic Kidney Disease Epidemiology Collaboration   (CKD-EPI) equation.      Calcium 8.8 8.5 - 10.1 mg/dL       If you have any questions or  concerns, please call the clinic at the number listed above.       Sincerely,      Mouna Chamberlain PA-C

## 2021-05-17 NOTE — TELEPHONE ENCOUNTER
Reason for Disposition    Patient wants to be seen    Additional Information    Negative: Shock suspected (e.g., cold/pale/clammy skin, too weak to stand, low BP, rapid pulse)    Negative: Difficult to awaken or acting confused (e.g., disoriented, slurred speech)    Negative: Fainted, and still feels dizzy afterwards    Negative: Severe difficulty breathing (e.g., struggling for each breath, speaks in single words)    Negative: Overdose (accidental or intentional) of medications    Negative: New neurologic deficit that is present now: * Weakness of the face, arm, or leg on one side of the body * Numbness of the face, arm, or leg on one side of the body * Loss of speech or garbled speech    Negative: Heart beating < 50 beats per minute OR > 140 beats per minute    Negative: Sounds like a life-threatening emergency to the triager    Negative: Chest pain    Negative: Rectal bleeding, bloody stool, or tarry-black stool    Negative: Vomiting is the main symptom    Negative: Diarrhea is the main symptom    Negative: Headache is the main symptom    Negative: Heat exhaustion suspected (i.e., dehydration from heat exposure)    Negative: Patient states that he/she is having an anxiety/panic attack    Negative: SEVERE dizziness (e.g., unable to stand, requires support to walk, feels like passing out now)    Negative: SEVERE headache or neck pain    Negative: Spinning or tilting sensation (vertigo) present now and one or more stroke risk factors (i.e., hypertension, diabetes, prior stroke/TIA, heart attack, age over 60) (Exception: prior physician evaluation for this AND no different/worse than usual)    Negative: Loss of vision or double vision    Negative: Extra heart beats OR irregular heart beating (i.e., 'palpitations')    Negative: Difficulty breathing    Negative: Drinking very little and has signs of dehydration (e.g., no urine > 12 hours, very dry mouth, very lightheaded)    Negative: Follows bleeding (e.g.,  "stomach, rectum, vagina) (Exception: became dizzy from sight of small amount blood)    Negative: Patient sounds very sick or weak to the triager    Negative: Lightheadedness (dizziness) present now, after 2 hours of rest and fluids    Negative: Spinning or tilting sensation (vertigo) present now    Negative: Fever > 103 F (39.4 C)    Negative: Fever > 100.0 F (37.8 C) and has diabetes mellitus or a weak immune system (e.g., HIV positive, cancer chemotherapy, organ transplant, splenectomy, chronic steroids)    Negative: Vomiting occurs with dizziness    Answer Assessment - Initial Assessment Questions  1. DESCRIPTION: \"Describe your dizziness.\"      Short eipsode Saturday   2. LIGHTHEADED: \"Do you feel lightheaded?\" (e.g., somewhat faint, woozy, weak upon standing)      Did at the time  3. VERTIGO: \"Do you feel like either you or the room is spinning or tilting?\" (i.e. vertigo)      n  4. SEVERITY: \"How bad is it?\"  \"Do you feel like you are going to faint?\" \"Can you stand and walk?\"    - MILD - walking normally    - MODERATE - interferes with normal activities (e.g., work, school)     - SEVERE - unable to stand, requires support to walk, feels like passing out now.       mild  5. ONSET:  \"When did the dizziness begin?\"      A few seconds at rest   6. AGGRAVATING FACTORS: \"Does anything make it worse?\" (e.g., standing, change in head position)      no  7. HEART RATE: \"Can you tell me your heart rate?\" \"How many beats in 15 seconds?\"  (Note: not all patients can do this)        na  8. CAUSE: \"What do you think is causing the dizziness?\"      unsure  9. RECURRENT SYMPTOM: \"Have you had dizziness before?\" If so, ask: \"When was the last time?\" \"What happened that time?\"       no  10. OTHER SYMPTOMS: \"Do you have any other symptoms?\" (e.g., fever, chest pain, vomiting, diarrhea, bleeding)        no  11. PREGNANCY: \"Is there any chance you are pregnant?\" \"When was your last menstrual period?\"        no    Protocols used: " DIZZINESS-A-OH

## 2021-05-18 DIAGNOSIS — G62.9 NEUROPATHY: Chronic | ICD-10-CM

## 2021-05-19 NOTE — TELEPHONE ENCOUNTER
Gabapentin was ordered as a temporary measure until she could get the Lyrica refilled via a patient assistance program.                  Did she get the Lyrica?                 If so, then the gabapentin refill should be cancelled.   Please get more information regarding this issue.

## 2021-05-20 RX ORDER — PREGABALIN 150 MG/1
CAPSULE ORAL
Qty: 180 CAPSULE | Refills: 3 | Status: CANCELLED | OUTPATIENT
Start: 2021-05-20

## 2021-05-20 NOTE — CONFIDENTIAL NOTE
PCP please resubmit Rx for Lyrica, pt was taking the  Gabapentin because she hadn't received the Lyrica.    Pt states she is on the [employer] assistance program    Please fax to [phone]. Pt ID [id number]  Pt aware PCP out of office and does have medication for a week. Thank you.

## 2021-05-24 NOTE — TELEPHONE ENCOUNTER
Pt is going to contact the Pfizer Pt Assistance Program for Lyrica, and find out if it was approved. Then she will call clinic back, to notify Dr. Schmidt, and let him know which med he should refill.

## 2021-05-26 DIAGNOSIS — G62.9 NEUROPATHY: ICD-10-CM

## 2021-05-26 RX ORDER — PREGABALIN 150 MG/1
CAPSULE ORAL
Qty: 180 CAPSULE | Refills: 1 | Status: SHIPPED | OUTPATIENT
Start: 2021-05-26 | End: 2021-10-11

## 2021-05-28 RX ORDER — GABAPENTIN 300 MG/1
CAPSULE ORAL
Qty: 60 CAPSULE | Refills: 3 | OUTPATIENT
Start: 2021-05-28

## 2021-06-09 ENCOUNTER — TELEPHONE (OUTPATIENT)
Dept: INTERNAL MEDICINE | Facility: CLINIC | Age: 86
End: 2021-06-09

## 2021-06-30 DIAGNOSIS — I10 HYPERTENSION GOAL BP (BLOOD PRESSURE) < 140/90: ICD-10-CM

## 2021-06-30 DIAGNOSIS — G62.9 NEUROPATHY: Chronic | ICD-10-CM

## 2021-06-30 RX ORDER — GABAPENTIN 300 MG/1
CAPSULE ORAL
Qty: 60 CAPSULE | Refills: 3 | OUTPATIENT
Start: 2021-06-30

## 2021-06-30 RX ORDER — METOPROLOL TARTRATE 100 MG
TABLET ORAL
Qty: 135 TABLET | Refills: 0 | Status: SHIPPED | OUTPATIENT
Start: 2021-06-30 | End: 2021-08-25

## 2021-07-27 DIAGNOSIS — E78.5 HYPERLIPIDEMIA LDL GOAL <70: ICD-10-CM

## 2021-07-27 RX ORDER — ATORVASTATIN CALCIUM 40 MG/1
40 TABLET, FILM COATED ORAL DAILY
Qty: 90 TABLET | Refills: 1 | Status: SHIPPED | OUTPATIENT
Start: 2021-07-27 | End: 2022-01-24

## 2021-08-11 ENCOUNTER — VIRTUAL VISIT (OUTPATIENT)
Dept: INTERNAL MEDICINE | Facility: CLINIC | Age: 86
End: 2021-08-11
Payer: MEDICARE

## 2021-08-11 DIAGNOSIS — R05.9 COUGH: Primary | ICD-10-CM

## 2021-08-11 DIAGNOSIS — J06.9 UPPER RESPIRATORY TRACT INFECTION, UNSPECIFIED TYPE: ICD-10-CM

## 2021-08-11 PROCEDURE — 99442 PR PHYSICIAN TELEPHONE EVALUATION 11-20 MIN: CPT | Mod: 95 | Performed by: PHYSICIAN ASSISTANT

## 2021-08-11 NOTE — PROGRESS NOTES
Lora is a 89 year old who is being evaluated via a billable telephone visit.      What phone number would you like to be contacted at? [phone]  How would you like to obtain your AVS? Mail a copy    Assessment & Plan     Cough  Will screen for covid - though if having symptoms for several weeks likely not contagious at this point, but could be post covid coughing.   Chest xray - to evaluate lungs-   Treatment pending results     - Symptomatic COVID-19 Virus (Coronavirus) by PCR Nasopharyngeal; Future  - XR Chest 2 Views; Future    URI    Reviewed OTC mucinex DM or Robitussin DM for coughing as well  IF testing unrevealing and coughing continues recommend to recheck with in person visit.                  Return in about 3 weeks (around 9/1/2021) for Routine Visit, regular primary provider.    oMuna Chamberlain PA-C  [facility]    Danette Paulino is a 89 year old who presents for the following health issues     HPI     Acute Illness  Acute illness concerns: several weeks  Onset/Duration: coughing,   Symptoms:  Fever: no  Chills/Sweats: no  Headache (location?): no  Sinus Pressure: no  Conjunctivitis:  no  Ear Pain: no  Rhinorrhea: YES- and sneezing   Congestion: no  Sore Throat: no  Cough: YES-non-productive, barking-not getting better-  Wheeze: YES-only once in a while  Decreased Appetite: no  Nausea: no  Vomiting: no  Diarrhea: no  Dysuria/Freq.: no  Dysuria or Hematuria: no  Fatigue/Achiness: YES-  Sick/Strep Exposure: no  Therapies tried and outcome: None        Review of Systems   Constitutional, HEENT, cardiovascular, pulmonary, gi and gu systems are negative, except as otherwise noted.      Objective    Vitals - Patient Reported  Weight (Patient Reported): 86.2 kg (190 lb)  Temperature (Patient Reported): 98.2  F (36.8  C)      Vitals:  No vitals were obtained today due to virtual visit.    Physical Exam   healthy, alert and no distress  PSYCH: Alert and oriented  times 3; coherent speech, normal   rate and volume, able to articulate logical thoughts, able   to abstract reason, no tangential thoughts, no hallucinations   or delusions  Her affect is normal  RESP: No cough, no audible wheezing, able to talk in full sentences  Remainder of exam unable to be completed due to telephone visits                Phone call duration: 11 minutes

## 2021-08-13 ENCOUNTER — ANCILLARY PROCEDURE (OUTPATIENT)
Dept: GENERAL RADIOLOGY | Facility: CLINIC | Age: 86
End: 2021-08-13
Attending: PHYSICIAN ASSISTANT
Payer: MEDICARE

## 2021-08-13 DIAGNOSIS — R05.9 COUGH: ICD-10-CM

## 2021-08-13 LAB — SARS-COV-2 RNA RESP QL NAA+PROBE: NEGATIVE

## 2021-08-13 PROCEDURE — U0005 INFEC AGEN DETEC AMPLI PROBE: HCPCS

## 2021-08-13 PROCEDURE — 71046 X-RAY EXAM CHEST 2 VIEWS: CPT | Performed by: RADIOLOGY

## 2021-08-13 PROCEDURE — U0003 INFECTIOUS AGENT DETECTION BY NUCLEIC ACID (DNA OR RNA); SEVERE ACUTE RESPIRATORY SYNDROME CORONAVIRUS 2 (SARS-COV-2) (CORONAVIRUS DISEASE [COVID-19]), AMPLIFIED PROBE TECHNIQUE, MAKING USE OF HIGH THROUGHPUT TECHNOLOGIES AS DESCRIBED BY CMS-2020-01-R: HCPCS

## 2021-08-16 ENCOUNTER — TELEPHONE (OUTPATIENT)
Dept: INTERNAL MEDICINE | Facility: CLINIC | Age: 86
End: 2021-08-16

## 2021-08-16 NOTE — TELEPHONE ENCOUNTER
Reason for call:  Patient requesting results of COVID testing and Xrays.  DOS: 8/13/2021    Phone number to reach patient:  Home number on file [phone] (home)    Best Time:  Any time     Can we leave a detailed message on this number?  YES

## 2021-08-16 NOTE — TELEPHONE ENCOUNTER
Pt notified and informed to take otc medication. Pt is scheduled with PCP on 8/23/2021 if cough does not get better.

## 2021-08-22 NOTE — PATIENT INSTRUCTIONS
Let's try the doxycycline twice per day to help with the cough and symptoms of bronchitis.                                                                 Consider getting the shingles vaccine (Shingrix) at your pharmacy.        FYI: new custodial date of 12/31/21.       This date is definite assuming no unusual circumstance.

## 2021-08-23 ENCOUNTER — OFFICE VISIT (OUTPATIENT)
Dept: INTERNAL MEDICINE | Facility: CLINIC | Age: 86
End: 2021-08-23
Payer: MEDICARE

## 2021-08-23 VITALS
TEMPERATURE: 98.3 F | SYSTOLIC BLOOD PRESSURE: 128 MMHG | DIASTOLIC BLOOD PRESSURE: 60 MMHG | OXYGEN SATURATION: 94 % | HEART RATE: 73 BPM | BODY MASS INDEX: 33.65 KG/M2 | WEIGHT: 184 LBS

## 2021-08-23 DIAGNOSIS — I20.89 STABLE ANGINA PECTORIS (H): ICD-10-CM

## 2021-08-23 DIAGNOSIS — J41.0 SIMPLE CHRONIC BRONCHITIS (H): ICD-10-CM

## 2021-08-23 DIAGNOSIS — R05.3 CHRONIC COUGH: Primary | ICD-10-CM

## 2021-08-23 PROCEDURE — 99213 OFFICE O/P EST LOW 20 MIN: CPT | Performed by: INTERNAL MEDICINE

## 2021-08-23 RX ORDER — ISOSORBIDE MONONITRATE 30 MG/1
30 TABLET, EXTENDED RELEASE ORAL DAILY
Qty: 90 TABLET | Refills: 0 | Status: SHIPPED | OUTPATIENT
Start: 2021-08-23 | End: 2022-01-05

## 2021-08-23 RX ORDER — DOXYCYCLINE HYCLATE 100 MG
100 TABLET ORAL 2 TIMES DAILY
Qty: 14 TABLET | Refills: 0 | Status: SHIPPED | OUTPATIENT
Start: 2021-08-23 | End: 2021-08-30

## 2021-08-23 NOTE — PROGRESS NOTES
Assessment & Plan     Chronic cough    - doxycycline hyclate (VIBRA-TABS) 100 MG tablet  Dispense: 14 tablet; Refill: 0    Simple chronic bronchitis (H)    - doxycycline hyclate (VIBRA-TABS) 100 MG tablet  Dispense: 14 tablet; Refill: 0               Patient Instructions        Let's try the doxycycline twice per day to help with the cough and symptoms of bronchitis.                                                                 Consider getting the shingles vaccine (Shingrix) at your pharmacy.        FYI: new long-term date of 12/31/21.       This date is definite assuming no unusual circumstance.         Return in about 3 weeks (around 9/13/2021), or if symptoms worsen or fail to improve.    Antwon Schmidt MD  [facility] is a 89 year [facility] who presents for the following health issues     HPI     Recheck cough - robotussin has been helping the congestion. Still has a cough and runny nose    Coughing for multiple wks.      COVID neg on 8/13; CXR also neg.                 No ACE-I or ARB.       No sx of post nasal drainage, orthopnea,PND, unusual MCKEON, asthma, GERD.  Worried that she is developing pneumonia,like in 2019.                   Review of Systems         Current Outpatient Medications   Medication Sig Dispense Refill     acetaminophen (TYLENOL) 500 MG tablet Take 500-1,000 mg by mouth every 6 hours as needed       aspirin 81 MG EC tablet Take 81 mg by mouth daily       atorvastatin (LIPITOR) 40 MG tablet Take 1 tablet (40 mg) by mouth daily 90 tablet 1     bisacodyl (DULCOLAX) 5 MG EC tablet Take 5 mg by mouth daily       doxycycline hyclate (VIBRA-TABS) 100 MG tablet Take 1 tablet (100 mg) by mouth 2 times daily 14 tablet 0     furosemide (LASIX) 20 MG tablet Take 0.5 tablets (10 mg) by mouth daily 45 tablet 3     gabapentin (NEURONTIN) 300 MG capsule Take 1 capsule (300 mg) by mouth 3 times daily 60 capsule 3     isosorbide mononitrate (IMDUR) 30  MG 24 hr tablet Take 1 tablet (30 mg) by mouth daily 90 tablet 0     isosorbide mononitrate (IMDUR) 60 MG 24 hr tablet TAKE 1 TABLET BY MOUTH EVERY DAY 90 tablet 1     latanoprost (XALATAN) 0.005 % ophthalmic solution Place 1 drop into both eyes 3 times daily        levothyroxine (SYNTHROID/LEVOTHROID) 25 MCG tablet TAKE 1 TABLET BY MOUTH EVERY DAY 90 tablet 2     metoprolol tartrate (LOPRESSOR) 100 MG tablet TAKE 1/2 TABLET BY MOUTH EVERY MORNING AND TAKE 1 TABLET IN THE EVENING. 135 tablet 0     nitroGLYcerin (NITROSTAT) 0.4 MG sublingual tablet Place 1 tablet (0.4 mg) under the tongue every 5 minutes as needed for chest pain 50 tablet 11     nystatin (MYCOSTATIN) 563402 UNIT/GM external powder Apply topically 2 times daily       order for DME Equipment being ordered: wheeled walker with brakes and a seat 1 Device 0     polyethylene glycol (MIRALAX/GLYCOLAX) packet Take 1 packet by mouth daily as needed for constipation       pregabalin (LYRICA) 150 MG capsule Take 1 capsule by mouth twice a day as directed by physician. 180 capsule 1     ranolazine (RANEXA) 500 MG 12 hr tablet Take 1 tablet (500 mg) by mouth 2 times daily 180 tablet 3     vitamin D3 (CHOLECALCIFEROL) 2000 units tablet Take 4,000 Units by mouth daily     ]  Objective    /60   Pulse 73   Temp 98.3  F (36.8  C) (Oral)   Wt 83.5 kg (184 lb)   LMP  (LMP Unknown)   SpO2 94%   BMI 33.65 kg/m    Body mass index is 33.65 kg/m .  Physical Exam   GENERAL APPEARANCE: alert, no distress and fatigued  RESP: no rales or rhonchi    Recent Results (from the past 744 hour(s))   XR Chest 2 Views    Narrative    CHEST TWO VIEWS   8/13/2021 1:17 PM     HISTORY: Cough.    COMPARISON: 2/16/2019.      Impression    IMPRESSION: No acute cardiopulmonary disease.    ROSSANA SOSA MD         SYSTEM ID:  [id number]

## 2021-08-25 DIAGNOSIS — I10 HYPERTENSION GOAL BP (BLOOD PRESSURE) < 140/90: ICD-10-CM

## 2021-08-25 DIAGNOSIS — R60.0 BILATERAL LEG EDEMA: ICD-10-CM

## 2021-08-25 RX ORDER — METOPROLOL TARTRATE 100 MG
TABLET ORAL
Qty: 135 TABLET | Refills: 3 | Status: SHIPPED | OUTPATIENT
Start: 2021-08-25 | End: 2022-01-17

## 2021-08-25 RX ORDER — FUROSEMIDE 20 MG
10 TABLET ORAL DAILY
Qty: 45 TABLET | Refills: 0 | Status: SHIPPED | OUTPATIENT
Start: 2021-08-25 | End: 2021-12-03

## 2021-08-25 NOTE — TELEPHONE ENCOUNTER
Received refill request for:  Lasix  Last OV was: 10/23/20 with Dr. Luis  Labs/EKG: BMP 5/17/21  F/U scheduled: Orders for 10/2021; messaged scheduling to call pt to arrange.   New script sent to: [facility]

## 2021-08-30 DIAGNOSIS — J41.0 SIMPLE CHRONIC BRONCHITIS (H): ICD-10-CM

## 2021-08-30 DIAGNOSIS — R05.3 CHRONIC COUGH: ICD-10-CM

## 2021-08-30 RX ORDER — DOXYCYCLINE HYCLATE 100 MG
TABLET ORAL
Qty: 14 TABLET | Refills: 0 | Status: SHIPPED | OUTPATIENT
Start: 2021-08-30 | End: 2021-09-13

## 2021-09-07 ENCOUNTER — TELEPHONE (OUTPATIENT)
Dept: INTERNAL MEDICINE | Facility: CLINIC | Age: 86
End: 2021-09-07

## 2021-09-07 NOTE — TELEPHONE ENCOUNTER
"Patient called for advise on what to do for her cough once she has completed the doxycycline hyclate.  Per last office note, scheduled appointment to see her PCP in one more week.  Patient states her cough has improved only slightly.  She has continued to need robitussin for her cough and does occasionally have \"coughing fits\" when it is difficult to stop coughing.  Instructed pt to call clinic if she is unable to manage cough or become SOB at home prior to her appointment next week.    Sendy Rodriguez, MSN, RN   [facility] Triage    "

## 2021-09-13 ENCOUNTER — OFFICE VISIT (OUTPATIENT)
Dept: INTERNAL MEDICINE | Facility: CLINIC | Age: 86
End: 2021-09-13
Payer: MEDICARE

## 2021-09-13 VITALS
OXYGEN SATURATION: 93 % | HEART RATE: 84 BPM | TEMPERATURE: 98.4 F | WEIGHT: 182 LBS | DIASTOLIC BLOOD PRESSURE: 74 MMHG | SYSTOLIC BLOOD PRESSURE: 142 MMHG | BODY MASS INDEX: 33.29 KG/M2

## 2021-09-13 DIAGNOSIS — R05.3 CHRONIC COUGH: Primary | ICD-10-CM

## 2021-09-13 PROCEDURE — 99213 OFFICE O/P EST LOW 20 MIN: CPT | Performed by: INTERNAL MEDICINE

## 2021-09-13 NOTE — PROGRESS NOTES
Assessment & Plan     Chronic cough  Etiology uncertain.  She is worried that there is some severe underlying illness.  Chest CT ordered.  - CT Chest w/o Contrast               Patient Instructions                Call [phone] to set up the CT scan of your lungs.                                    I will call you with the results.         No follow-ups on file.    Antwon Schmidt MD  [facility]           ADDENDUM:  Recent Results (from the past 744 hour(s))   CT Chest w/o Contrast    Narrative    CT CHEST WITHOUT CONTRAST 9/16/2021 1:50 PM     HISTORY: Cough, persistent. Chronic cough.    COMPARISON: February 12, 2019.    TECHNIQUE: Volumetric helical acquisition of CT images of the chest  from the clavicles to the kidneys were acquired without IV contrast.  Radiation dose for this scan was reduced using automated exposure  control, adjustment of the mA and/or kV according to patient size, or  iterative reconstruction technique.    FINDINGS:  Scattered fibronodular changes are noted, this may be  scarring given the previously seen extensive infiltrates. No  effusions. No consolidation. Minimal bronchiectasis and bronchial wall  thickening. No acute findings in the visualized upper abdomen. No  frankly destructive bony lesions.      Impression    IMPRESSION:  1. Scattered fibronodular changes, which may be scarring given the  extensive infiltrates seen on the comparison study. Consider 6-12  month follow-up study to confirm stability if clinically indicated.  2. Minimal bronchiectasis and bronchial wall thickening.     VITALIY BUENO MD         SYSTEM ID:  [id number]     Phone: no answer. Letter sent.         Your chest CT scan does not show any active infection, or any lung cancer.        It does show some scar tissue, possibly from previous pneumonia    . This can definitely result in some degree of chronic coughing.         We do not have a good remedy for this, other than  cough suppressants.        Subjective   Lora is a 89 year old who presents for the following health issues     HPI     Recheck cough - seems to be better but is coughing harder        This patient finished a course of doxycycline and there was really no improvement.  She has been using Robitussin-DM on a daily basis.                             Cough is nonproductive, no fever or chills.  See previous notes.    Review of Systems         Objective    BP (!) 142/74   Pulse 84   Temp 98.4  F (36.9  C) (Oral)   Wt 82.6 kg (182 lb)   LMP  (LMP Unknown)   SpO2 93%   BMI 33.29 kg/m    Body mass index is 33.29 kg/m .  Physical Exam   GENERAL APPEARANCE: alert, no distress and over weight  RESP: no rales or rhonchi  CV: regular rates and rhythm

## 2021-09-13 NOTE — LETTER
September 16, 2021      Lora Vergara  [address]   [place] [address]        Dear ,    We are writing to inform you of your test results.          Your chest CT scan does not show any active infection, or any lung cancer.        It does show some scar tissue, possibly from previous pneumonia    . This can definitely result in some degree of chronic coughing.         We do not have a good remedy for this, other than cough suppressants.      Recent Results (from the past 744 hour(s))   CT Chest w/o Contrast    Narrative    CT CHEST WITHOUT CONTRAST 9/16/2021 1:50 PM     HISTORY: Cough, persistent. Chronic cough.    COMPARISON: February 12, 2019.    TECHNIQUE: Volumetric helical acquisition of CT images of the chest  from the clavicles to the kidneys were acquired without IV contrast.  Radiation dose for this scan was reduced using automated exposure  control, adjustment of the mA and/or kV according to patient size, or  iterative reconstruction technique.    FINDINGS:  Scattered fibronodular changes are noted, this may be  scarring given the previously seen extensive infiltrates. No  effusions. No consolidation. Minimal bronchiectasis and bronchial wall  thickening. No acute findings in the visualized upper abdomen. No  frankly destructive bony lesions.      Impression    IMPRESSION:  1. Scattered fibronodular changes, which may be scarring given the  extensive infiltrates seen on the comparison study. Consider 6-12  month follow-up study to confirm stability if clinically indicated.  2. Minimal bronchiectasis and bronchial wall thickening.     VITALIY BUENO MD         SYSTEM ID:  [id number]         If you have any questions or concerns, please call the clinic at the number listed above.       Sincerely,      Antwon Schmidt MD

## 2021-09-13 NOTE — PATIENT INSTRUCTIONS
Call [phone] to set up the CT scan of your lungs.                                    I will call you with the results.

## 2021-09-16 ENCOUNTER — HOSPITAL ENCOUNTER (OUTPATIENT)
Dept: CT IMAGING | Facility: CLINIC | Age: 86
Discharge: HOME | End: 2021-09-16
Attending: INTERNAL MEDICINE | Admitting: INTERNAL MEDICINE
Payer: MEDICARE

## 2021-09-16 DIAGNOSIS — R05.3 CHRONIC COUGH: ICD-10-CM

## 2021-09-16 PROCEDURE — 71250 CT THORAX DX C-: CPT | Mod: MG

## 2021-09-20 ENCOUNTER — TELEPHONE (OUTPATIENT)
Dept: INTERNAL MEDICINE | Facility: CLINIC | Age: 86
End: 2021-09-20

## 2021-09-20 DIAGNOSIS — R05.3 CHRONIC COUGH: Primary | ICD-10-CM

## 2021-09-20 DIAGNOSIS — J47.9 BRONCHIECTASIS WITHOUT ACUTE EXACERBATION (H): ICD-10-CM

## 2021-09-20 DIAGNOSIS — I25.9 CHRONIC ISCHEMIC HEART DISEASE: ICD-10-CM

## 2021-09-20 RX ORDER — ISOSORBIDE MONONITRATE 60 MG/1
TABLET, EXTENDED RELEASE ORAL
Qty: 90 TABLET | Refills: 2 | Status: SHIPPED | OUTPATIENT
Start: 2021-09-20 | End: 2022-06-09

## 2021-09-20 NOTE — TELEPHONE ENCOUNTER
I called the patient and discussed the situation with her.  We reviewed the scan result.  I recommended a pulmonary consult.  She agrees.                                                                       Recent Results (from the past 744 hour(s))   CT Chest w/o Contrast    Narrative    CT CHEST WITHOUT CONTRAST 9/16/2021 1:50 PM     HISTORY: Cough, persistent. Chronic cough.    COMPARISON: February 12, 2019.    TECHNIQUE: Volumetric helical acquisition of CT images of the chest  from the clavicles to the kidneys were acquired without IV contrast.  Radiation dose for this scan was reduced using automated exposure  control, adjustment of the mA and/or kV according to patient size, or  iterative reconstruction technique.    FINDINGS:  Scattered fibronodular changes are noted, this may be  scarring given the previously seen extensive infiltrates. No  effusions. No consolidation. Minimal bronchiectasis and bronchial wall  thickening. No acute findings in the visualized upper abdomen. No  frankly destructive bony lesions.      Impression    IMPRESSION:  1. Scattered fibronodular changes, which may be scarring given the  extensive infiltrates seen on the comparison study. Consider 6-12  month follow-up study to confirm stability if clinically indicated.  2. Minimal bronchiectasis and bronchial wall thickening.     VITALIY BUENO MD         SYSTEM ID:  [id number]

## 2021-09-20 NOTE — TELEPHONE ENCOUNTER
Dr. Schmidt    Patient is calling wondering about her CT results- this writer gave her the results, she reports feeling really awful. She has no energy. She's reports she's been sleepy all day. She really thinks there's something going on her body, and she's worried.     She would really like something for her cough, she wants to feel better.     Can we leave a detailed message on this number? YES  Phone number patient can be reached at: Cell number on file:    Telephone Information:   Mobile [phone]       Aleida Sierra RN  [facility] Triage

## 2021-09-20 NOTE — TELEPHONE ENCOUNTER
Routing refill request to provider for review/approval because:  Labs out of range:    BP Readings from Last 3 Encounters:   09/13/21 (!) 142/74   08/23/21 128/60   05/17/21 122/74   Denise Henry RN

## 2021-09-21 ENCOUNTER — TELEPHONE (OUTPATIENT)
Dept: PULMONOLOGY | Facility: CLINIC | Age: 86
End: 2021-09-21

## 2021-09-21 NOTE — TELEPHONE ENCOUNTER
[facility] Call Center    Phone Message    May a detailed message be left on voicemail: yes     Reason for Call: Other: Lora calling stating that she has had a chronic cough for the past few months and her primary docotr wants her to see a pulmonologist. She only can go to the [place] location due to transportation.  Next availalbe is 1/2022.  She feels she needs to be seen sooner. Please call and discuss. Thank you!     Action Taken: Message routed to:  Other: [facility]    Travel Screening: Not Applicable

## 2021-09-22 NOTE — TELEPHONE ENCOUNTER
Patient is currently scheduled in soonest available slot for a new general pulm patient. We may be able to schedule her sooner when Dr. Steen schedule opens up.     Rayna Vines RN

## 2021-09-23 ENCOUNTER — TELEPHONE (OUTPATIENT)
Dept: PULMONOLOGY | Facility: CLINIC | Age: 86
End: 2021-09-23

## 2021-09-23 NOTE — TELEPHONE ENCOUNTER
Received communication from Nimisha RN, to assist patient in scheduling a NEW pulm referral with any general provider at any location. Spoke with pt who wants to go to [facility], so message will be sent to their team to reach out to patient as I don't schedule new patients over there as they have their own staff. Call center number was provided to patient and instructed her to request [facility] for an appointment

## 2021-10-05 ENCOUNTER — ALLIED HEALTH/NURSE VISIT (OUTPATIENT)
Dept: PULMONOLOGY | Facility: CLINIC | Age: 86
End: 2021-10-05
Payer: MEDICARE

## 2021-10-05 DIAGNOSIS — R06.02 SOB (SHORTNESS OF BREATH): ICD-10-CM

## 2021-10-05 LAB
DLCOUNC-PRE: 17.28 ML/MIN/MMHG
ERV-%PRED-PRE: 1444 %
ERV-PRE: 0.39 L
ERV-PRED: 0.03 L
EXPTIME-PRE: 5.37 SEC
FEF2575-PRE: 0.54 L/SEC
FEFMAX-%PRED-PRE: 109 %
FEFMAX-PRE: 2.94 L/SEC
FEFMAX-PRED: 2.7 L/SEC
FEV1-PRE: 0.8 L
FEV1FEV6-PRE: 74 %
FEV1FEV6-PRED: 75 %
FEV1FVC-PRE: 76 %
FEV1SVC-PRE: 54 %
FIFMAX-PRE: 2.56 L/SEC
FVC-PRE: 1.06 L
IC-%PRED-PRE: 49 %
IC-PRE: 1.1 L
IC-PRED: 2.23 L
VA-PRE: 3.19 L
VC-%PRED-PRE: 66 %
VC-PRE: 1.49 L
VC-PRED: 2.26 L

## 2021-10-05 PROCEDURE — 94375 RESPIRATORY FLOW VOLUME LOOP: CPT | Performed by: INTERNAL MEDICINE

## 2021-10-05 PROCEDURE — 94726 PLETHYSMOGRAPHY LUNG VOLUMES: CPT | Performed by: INTERNAL MEDICINE

## 2021-10-05 PROCEDURE — 94729 DIFFUSING CAPACITY: CPT | Performed by: INTERNAL MEDICINE

## 2021-10-11 DIAGNOSIS — G62.9 NEUROPATHY: ICD-10-CM

## 2021-10-11 RX ORDER — PREGABALIN 150 MG/1
CAPSULE ORAL
Qty: 180 CAPSULE | Refills: 0 | Status: SHIPPED | OUTPATIENT
Start: 2021-10-11 | End: 2021-11-30

## 2021-10-12 NOTE — PROGRESS NOTES
"Pulmonary Clinic Note    Chief Complaint   Patient presents with     Breathing Problem     coughing       A/P:  89F being seen for chronic cough. The etiology appears to be related to bronchiectasis and post-nasal gtt. I would like her to start using Aerobika for airway clearance and also trial fluticasone nasal spray for post-nasal gtt. We discussed speech therapy for chronic cough, but she would like to try other things first. We discussed that if her symptoms are not improving in 2 months to come back for re-evaluation.     History:  89F DM2, hypothyroidism, mitral regurgitation, CAD, HTN, HLD referred to pulmonary for chronic cough and bronchiectasis. Cough started years ago. Nonproductive. OTC cough meds have not helped. No SOB at rest. Has MCKEON, unable to walk her entire hallways anymore w/o SOB. Has known angina, takes nitroglycerin, which helps. No reflux. Some post-nasal gtt. Has some dysphagia, trouble w/ swallowing pills. No F/C, HA, throat tightness, ab pain, N/V/D, LE edema.     Lives in [facility] home. No obvious exposures. Worked as a . Never smoker.  No family or personal history of lung disease.     10 point review of systems negative, aside from that mentioned in HPI.    /68 (BP Location: Left arm, Patient Position: Sitting, Cuff Size: Adult Regular)   Pulse 68   Temp 98.5  F (36.9  C) (Oral)   Ht 1.6 m (5' 3\")   Wt 82.6 kg (182 lb)   LMP  (LMP Unknown)   SpO2 93%   BMI 32.24 kg/m    Gen: appears younger than stated age, NAD  HEENT: normal oropharynx  Card: RRR  Pulm: CTAB  Abd: soft  MSK: no edema  Skin: no obvious rash  Psych: normal affect  Neuro: alert and oriented     Labs:  Personally reviewed    Imaging/Studies: Personally reviewed  PFTs (10/2021) - no predictive values given age, flow-volume loop appears normal  CT Chest (9/2021) - scattered fibronodular changes, minimal bronchiectasis    Past Medical History:   Diagnosis Date     Angina at rest (H)     Thought to be " microvascular     Arthritis      Basal cell carcinoma 1/2014, (3/5/6-2014)    nose and check and ear and face     BCC (basal cell carcinoma), face 11/27/2013    Left nose; will have Mohs surgery 1/6/14, and plastic surgery 1/7/14 with Dr. Ernestina Torres       Community acquired pneumonia 2/10/2019     Coronary artery disease     Mild CAD per 2/2008 angiogram     Gastroenteritis 5/31/2017     Hyperlipidaemia      Hypertension      Hypothyroidism 1/17/2013     Mitral regurgitation 6/2015    echo     Pneumonia of left lung due to infectious organism, unspecified part of lung 3/12/2019     Respiratory failure with hypoxia (H) 12/26/2017     Type 2 diabetes, HbA1C goal < 8% (H) 9/11/2013     Past Surgical History:   Procedure Laterality Date     APPENDECTOMY  1974     BACK SURGERY  1996     BLADDER SURGERY  1990    bladder suspension     CHOLECYSTECTOMY  1975     CORONARY ANGIOGRAPHY ADULT ORDER  2/2008    Mild CAD. LAD w/20% stenosis, RCA with 30-40% stenosis. No flow limiting obstructions.     face surgery  1-6/2014    basal cell plus plastic to nose, face , ear     HYSTERECTOMY  1970     OOPHORECTOMY  1974    bilateral     PHACOEMULSIFICATION CLEAR CORNEA WITH STANDARD INTRAOCULAR LENS IMPLANT Right 7/21/2015    Procedure: PHACOEMULSIFICATION CLEAR CORNEA WITH STANDARD INTRAOCULAR LENS IMPLANT;  Surgeon: Schuyler Chapa MD;  Location: [facility]     PHACOEMULSIFICATION CLEAR CORNEA WITH STANDARD INTRAOCULAR LENS IMPLANT Left 8/18/2015    Procedure: PHACOEMULSIFICATION CLEAR CORNEA WITH STANDARD INTRAOCULAR LENS IMPLANT;  Surgeon: Schuyler Chapa MD;  Location: [facility]     Family History   Problem Relation Age of Onset     Cancer Brother         lung     Cancer Son      Cerebrovascular Disease Mother         and Parkinson's     Cancer Father         pancreas     Diabetes Son      Social History     Socioeconomic History     Marital status:      Spouse name: Not on file     Number of children: Not on file      Years of education: Not on file     Highest education level: Not on file   Occupational History     Not on file   Tobacco Use     Smoking status: Never Smoker     Smokeless tobacco: Never Used   Substance and Sexual Activity     Alcohol use: Yes     Comment: rarely     Drug use: No     Sexual activity: Not Currently   Other Topics Concern     Parent/sibling w/ CABG, MI or angioplasty before 65F 55M? No      Service Not Asked     Blood Transfusions Not Asked     Caffeine Concern Yes     Comment: 4 cups caffeine per day     Occupational Exposure Not Asked     Hobby Hazards Not Asked     Sleep Concern No     Stress Concern No     Weight Concern No     Special Diet No     Back Care Not Asked     Exercise Yes     Comment: exercises 30 minutes, 4 days week     Bike Helmet Not Asked     Seat Belt Not Asked     Self-Exams Not Asked   Social History Narrative    Had 2 sons; one  in ; head and neck cancer;  due to choking at [employer];                                 had 2 step dtrs; one .                                        times 2     Social Determinants of Health     Financial Resource Strain:      Difficulty of Paying Living Expenses:    Food Insecurity:      Worried About Running Out of Food in the Last Year:      Ran Out of Food in the Last Year:    Transportation Needs:      Lack of Transportation (Medical):      Lack of Transportation (Non-Medical):    Physical Activity:      Days of Exercise per Week:      Minutes of Exercise per Session:    Stress:      Feeling of Stress :    Social Connections:      Frequency of Communication with Friends and Family:      Frequency of Social Gatherings with Friends and Family:      Attends [employer] Services:      Active Member of Clubs or Organizations:      Attends Club or Organization Meetings:      Marital Status:    Intimate Partner Violence:      Fear of Current or Ex-Partner:      Emotionally Abused:      Physically Abused:      Sexually Abused:         50 minutes spent reviewing chart, reviewing test results, talking with and examining patient, formulating plan, and documentation on the day of the encounter.    Srinivas Herzog MD  Pulmonary and Critical Care Medicine  [facility]

## 2021-10-13 ENCOUNTER — OFFICE VISIT (OUTPATIENT)
Dept: PULMONOLOGY | Facility: CLINIC | Age: 86
End: 2021-10-13
Payer: MEDICARE

## 2021-10-13 VITALS
HEIGHT: 63 IN | SYSTOLIC BLOOD PRESSURE: 131 MMHG | DIASTOLIC BLOOD PRESSURE: 68 MMHG | BODY MASS INDEX: 32.25 KG/M2 | TEMPERATURE: 98.5 F | HEART RATE: 68 BPM | WEIGHT: 182 LBS | OXYGEN SATURATION: 93 %

## 2021-10-13 DIAGNOSIS — R05.3 CHRONIC COUGH: Primary | ICD-10-CM

## 2021-10-13 PROCEDURE — 99204 OFFICE O/P NEW MOD 45 MIN: CPT | Performed by: STUDENT IN AN ORGANIZED HEALTH CARE EDUCATION/TRAINING PROGRAM

## 2021-10-13 RX ORDER — FLUTICASONE PROPIONATE 50 MCG
2 SPRAY, SUSPENSION (ML) NASAL DAILY
Qty: 16 G | Refills: 3 | Status: SHIPPED | OUTPATIENT
Start: 2021-10-13 | End: 2022-09-15

## 2021-10-13 ASSESSMENT — MIFFLIN-ST. JEOR: SCORE: 1184.68

## 2021-10-13 ASSESSMENT — PAIN SCALES - GENERAL: PAINLEVEL: NO PAIN (0)

## 2021-10-13 NOTE — PATIENT INSTRUCTIONS
Thank you for coming to pulmonary clinic. Your chronic cough is likely a combination of bronchiectasis (dilated airways) and post-nasal drip. I would like you to use an airway clearance device (Aerobika) and nasal spray (Flonase) to see how this helps your cough.

## 2021-10-13 NOTE — NURSING NOTE
"Chief Complaint   Patient presents with     Breathing Problem     coughing       Vitals:    10/13/21 1205   BP: 131/68   BP Location: Left arm   Patient Position: Sitting   Cuff Size: Adult Regular   Pulse: 68   Temp: 98.5  F (36.9  C)   TempSrc: Oral   SpO2: 93%   Weight: 82.6 kg (182 lb)   Height: 1.6 m (5' 3\")       Body mass index is 32.24 kg/m .      Mary Lima MA    "

## 2021-10-13 NOTE — LETTER
"    10/13/2021         RE: Lora Vergara  [address]  [facility] [address]        Dear Colleague,    Thank you for referring your patient, Lora Vergara, to the [facility] [place]. Please see a copy of my visit note below.    Pulmonary Clinic Note    Chief Complaint   Patient presents with     Breathing Problem     coughing       A/P:  89F being seen for chronic cough. The etiology appears to be related to bronchiectasis and post-nasal gtt. I would like her to start using Aerobika for airway clearance and also trial fluticasone nasal spray for post-nasal gtt. We discussed speech therapy for chronic cough, but she would like to try other things first. We discussed that if her symptoms are not improving in 2 months to come back for re-evaluation.     History:  89F DM2, hypothyroidism, mitral regurgitation, CAD, HTN, HLD referred to pulmonary for chronic cough and bronchiectasis. Cough started years ago. Nonproductive. OTC cough meds have not helped. No SOB at rest. Has MCKEON, unable to walk her entire hallways anymore w/o SOB. Has known angina, takes nitroglycerin, which helps. No reflux. Some post-nasal gtt. Has some dysphagia, trouble w/ swallowing pills. No F/C, HA, throat tightness, ab pain, N/V/D, LE edema.     Lives in [facility] home. No obvious exposures. Worked as a . Never smoker.  No family or personal history of lung disease.     10 point review of systems negative, aside from that mentioned in HPI.    /68 (BP Location: Left arm, Patient Position: Sitting, Cuff Size: Adult Regular)   Pulse 68   Temp 98.5  F (36.9  C) (Oral)   Ht 1.6 m (5' 3\")   Wt 82.6 kg (182 lb)   LMP  (LMP Unknown)   SpO2 93%   BMI 32.24 kg/m    Gen: appears younger than stated age, NAD  HEENT: normal oropharynx  Card: RRR  Pulm: CTAB  Abd: soft  MSK: no edema  Skin: no obvious rash  Psych: normal affect  Neuro: alert and oriented     Labs:  Personally " reviewed    Imaging/Studies: Personally reviewed  PFTs (10/2021) - no predictive values given age, flow-volume loop appears normal  CT Chest (9/2021) - scattered fibronodular changes, minimal bronchiectasis    Past Medical History:   Diagnosis Date     Angina at rest (H)     Thought to be microvascular     Arthritis      Basal cell carcinoma 1/2014, (3/5/6-2014)    nose and check and ear and face     BCC (basal cell carcinoma), face 11/27/2013    Left nose; will have Mohs surgery 1/6/14, and plastic surgery 1/7/14 with Dr. Ernestina Torres       Community acquired pneumonia 2/10/2019     Coronary artery disease     Mild CAD per 2/2008 angiogram     Gastroenteritis 5/31/2017     Hyperlipidaemia      Hypertension      Hypothyroidism 1/17/2013     Mitral regurgitation 6/2015    echo     Pneumonia of left lung due to infectious organism, unspecified part of lung 3/12/2019     Respiratory failure with hypoxia (H) 12/26/2017     Type 2 diabetes, HbA1C goal < 8% (H) 9/11/2013     Past Surgical History:   Procedure Laterality Date     APPENDECTOMY  1974     BACK SURGERY  1996     BLADDER SURGERY  1990    bladder suspension     CHOLECYSTECTOMY  1975     CORONARY ANGIOGRAPHY ADULT ORDER  2/2008    Mild CAD. LAD w/20% stenosis, RCA with 30-40% stenosis. No flow limiting obstructions.     face surgery  1-6/2014    basal cell plus plastic to nose, face , ear     HYSTERECTOMY  1970     OOPHORECTOMY  1974    bilateral     PHACOEMULSIFICATION CLEAR CORNEA WITH STANDARD INTRAOCULAR LENS IMPLANT Right 7/21/2015    Procedure: PHACOEMULSIFICATION CLEAR CORNEA WITH STANDARD INTRAOCULAR LENS IMPLANT;  Surgeon: Schuyler Chapa MD;  Location: [facility]     PHACOEMULSIFICATION CLEAR CORNEA WITH STANDARD INTRAOCULAR LENS IMPLANT Left 8/18/2015    Procedure: PHACOEMULSIFICATION CLEAR CORNEA WITH STANDARD INTRAOCULAR LENS IMPLANT;  Surgeon: Schuyler Chapa MD;  Location: [facility]     Family History   Problem Relation Age of Onset     Cancer  Brother         lung     Cancer Son      Cerebrovascular Disease Mother         and Parkinson's     Cancer Father         pancreas     Diabetes Son      Social History     Socioeconomic History     Marital status:      Spouse name: Not on file     Number of children: Not on file     Years of education: Not on file     Highest education level: Not on file   Occupational History     Not on file   Tobacco Use     Smoking status: Never Smoker     Smokeless tobacco: Never Used   Substance and Sexual Activity     Alcohol use: Yes     Comment: rarely     Drug use: No     Sexual activity: Not Currently   Other Topics Concern     Parent/sibling w/ CABG, MI or angioplasty before 65F 55M? No      Service Not Asked     Blood Transfusions Not Asked     Caffeine Concern Yes     Comment: 4 cups caffeine per day     Occupational Exposure Not Asked     Hobby Hazards Not Asked     Sleep Concern No     Stress Concern No     Weight Concern No     Special Diet No     Back Care Not Asked     Exercise Yes     Comment: exercises 30 minutes, 4 days week     Bike Helmet Not Asked     Seat Belt Not Asked     Self-Exams Not Asked   Social History Narrative    Had 2 sons; one  in ; head and neck cancer;  due to choking at [employer];                                 had 2 step dtrs; one .                                        times 2     Social Determinants of Health     Financial Resource Strain:      Difficulty of Paying Living Expenses:    Food Insecurity:      Worried About Running Out of Food in the Last Year:      Ran Out of Food in the Last Year:    Transportation Needs:      Lack of Transportation (Medical):      Lack of Transportation (Non-Medical):    Physical Activity:      Days of Exercise per Week:      Minutes of Exercise per Session:    Stress:      Feeling of Stress :    Social Connections:      Frequency of Communication with Friends and Family:      Frequency of Social Gatherings with Friends  and Family:      Attends Mandaen Services:      Active Member of Clubs or Organizations:      Attends Club or Organization Meetings:      Marital Status:    Intimate Partner Violence:      Fear of Current or Ex-Partner:      Emotionally Abused:      Physically Abused:      Sexually Abused:        50 minutes spent reviewing chart, reviewing test results, talking with and examining patient, formulating plan, and documentation on the day of the encounter.    Srinivas Herzog MD  Pulmonary and Critical Care Medicine  [facility]         Again, thank you for allowing me to participate in the care of your patient.        Sincerely,        Srinivas Herzog MD

## 2021-11-08 NOTE — TELEPHONE ENCOUNTER
[facility] Call Center    Phone Message    May a detailed message be left on voicemail: yes     Reason for Call: Medication Refill Request    Has the patient contacted the pharmacy for the refill? Yes   Name of medication being requested: fluticasone (FLONASE) 50 MCG/ACT nasal spray  Provider who prescribed the medication: Dr. Herzog  Pharmacy: [facility] [address]  Date medication is needed: asap     Action Taken: Message routed to:  [facility] Cardio    Travel Screening: Not Applicable

## 2021-11-09 NOTE — TELEPHONE ENCOUNTER
3 refills are available at pharmacy.     Called patient, she said she didn't request a refill. Informed her refills are there if and when she needs one.     Rayna Vines RN

## 2021-11-10 ENCOUNTER — OFFICE VISIT (OUTPATIENT)
Dept: CARDIOLOGY | Facility: CLINIC | Age: 86
End: 2021-11-10
Payer: MEDICARE

## 2021-11-10 VITALS
BODY MASS INDEX: 32.36 KG/M2 | DIASTOLIC BLOOD PRESSURE: 80 MMHG | HEART RATE: 67 BPM | WEIGHT: 182.6 LBS | SYSTOLIC BLOOD PRESSURE: 125 MMHG | OXYGEN SATURATION: 95 % | HEIGHT: 63 IN

## 2021-11-10 DIAGNOSIS — E78.5 HYPERLIPIDEMIA LDL GOAL <70: ICD-10-CM

## 2021-11-10 DIAGNOSIS — J47.9 BRONCHIECTASIS WITHOUT COMPLICATION (H): ICD-10-CM

## 2021-11-10 DIAGNOSIS — I87.2 VENOUS (PERIPHERAL) INSUFFICIENCY: ICD-10-CM

## 2021-11-10 DIAGNOSIS — I20.89 STABLE ANGINA PECTORIS (H): Primary | ICD-10-CM

## 2021-11-10 DIAGNOSIS — E11.40 TYPE 2 DIABETES MELLITUS WITH DIABETIC NEUROPATHY, WITHOUT LONG-TERM CURRENT USE OF INSULIN (H): ICD-10-CM

## 2021-11-10 DIAGNOSIS — N18.1 CHRONIC KIDNEY DISEASE, STAGE 1: ICD-10-CM

## 2021-11-10 PROCEDURE — 99213 OFFICE O/P EST LOW 20 MIN: CPT | Performed by: INTERNAL MEDICINE

## 2021-11-10 ASSESSMENT — MIFFLIN-ST. JEOR: SCORE: 1187.4

## 2021-11-10 NOTE — LETTER
11/10/2021    Antwon Schmidt MD  [address]  [facility] [address]    RE: Lora Vergara       Dear Colleague,    I had the pleasure of seeing Lora Vergara in the [facility].  HPI and Plan:     Dear Atnwon:       I had the pleasure of following up with Lora Vergara in  cardiology clinic for follow-up of her chronic microvascular angina.  She had mild to moderate atherosclerosis on coronary geography in 2010 but has had intermittent jaw discomfort that improved with sublingual nitroglycerin.  Over the years, it has improved with Imdur and Ranexa.  Now she rarely has to take sublingual nitroglycerin     Is been complaining of some shortness of breath and cough with expectoration as well as sneezing and postnasal drip.  She was seen by pulmonologist and did PFTs.  They also did a CT chest which revealed some mild bronchiectasis.  They have given her inhaler fluticasone which she is using intermittently and feels a bit better     In the past, her ejection fraction has been normal.  She has moderate mitral stenosis with a mean gradient of 9mm unchanged from 2018.  Her left atrium is significantly dilated.      Is here with a friend.  She lives in an apartment complex.  Her weight has decreased over the last couple years by about 15 to 20 pounds.  Her appetite is somewhat decreased.      Exam     Below        IMPRESSION:   1.   Microvascular angina/coronary artery atherosclerosis.  At this time, pain is well controlled on a combination of Imdur and amlodipine and p.r.n. sublingual nitro.    She is doing well on Ranexa which we will continue.     2.  Hyperlipidemia.  Continue atorvastatin.      3.  Hypertension, well controlled.     4. Bronchiectasis, management per pulmonary     We will continue see her on an annual basis as a virtual video follow-up visit.     Thank you for allowing us to participate in the care of this nice  patient.     Sincerely,     Kalyan Luis MD    24 minutes spent on the date of the encounter doing chart review, patient visit and documentation   {Provider  Link to [facility] Help Grid :1        Orders Placed This Encounter   Procedures     Follow-Up with Cardiologist     Orders Placed This Encounter   Medications     Respiratory Therapy Supplies (AEROBIKA) RUT     There are no discontinued medications.    Encounter Diagnoses   Name Primary?     Venous (peripheral) insufficiency      Chronic kidney disease, stage 1      Type 2 diabetes mellitus with diabetic neuropathy, without long-term current use of insulin (H)      Stable angina pectoris (H) Yes     Hyperlipidemia LDL goal <70      Bronchiectasis without complication (H)        CURRENT MEDICATIONS:  Current Outpatient Medications   Medication Sig Dispense Refill     acetaminophen (TYLENOL) 500 MG tablet Take 500-1,000 mg by mouth every 6 hours as needed       aspirin 81 MG EC tablet Take 81 mg by mouth daily       atorvastatin (LIPITOR) 40 MG tablet Take 1 tablet (40 mg) by mouth daily 90 tablet 1     fluticasone (FLONASE) 50 MCG/ACT nasal spray Spray 2 sprays into both nostrils daily 16 g 3     furosemide (LASIX) 20 MG tablet Take 0.5 tablets (10 mg) by mouth daily 45 tablet 0     gabapentin (NEURONTIN) 300 MG capsule Take 1 capsule (300 mg) by mouth 3 times daily 60 capsule 3     isosorbide mononitrate (IMDUR) 30 MG 24 hr tablet Take 1 tablet (30 mg) by mouth daily 90 tablet 0     isosorbide mononitrate (IMDUR) 60 MG 24 hr tablet TAKE 1 TABLET BY MOUTH EVERY DAY 90 tablet 2     latanoprost (XALATAN) 0.005 % ophthalmic solution Place 1 drop into both eyes 3 times daily        levothyroxine (SYNTHROID/LEVOTHROID) 25 MCG tablet TAKE 1 TABLET BY MOUTH EVERY DAY 90 tablet 2     metoprolol tartrate (LOPRESSOR) 100 MG tablet TAKE 1/2 TABLET BY MOUTH EVERY MORNING AND TAKE 1 TABLET IN THE EVENING. 135 tablet 3     nitroGLYcerin (NITROSTAT) 0.4 MG sublingual tablet Place  1 tablet (0.4 mg) under the tongue every 5 minutes as needed for chest pain 50 tablet 11     order for DME Equipment being ordered: wheeled walker with brakes and a seat 1 Device 0     polyethylene glycol (MIRALAX/GLYCOLAX) packet Take 1 packet by mouth daily as needed for constipation       pregabalin (LYRICA) 150 MG capsule Take 1 capsule by mouth twice a day as directed by physician. 180 capsule 0     ranolazine (RANEXA) 500 MG 12 hr tablet Take 1 tablet (500 mg) by mouth 2 times daily 180 tablet 3     Respiratory Therapy Supplies (AEROBIKA) RUT        vitamin D3 (CHOLECALCIFEROL) 2000 units tablet Take 4,000 Units by mouth daily         ALLERGIES     Allergies   Allergen Reactions     Hydrochlorothiazide      Pancreatitis - patient denies this.  She says it gave her low sodium.       PAST MEDICAL HISTORY:  Past Medical History:   Diagnosis Date     Angina at rest (H)     Thought to be microvascular     Arthritis      Basal cell carcinoma 1/2014, (3/5/6-2014)    nose and check and ear and face     BCC (basal cell carcinoma), face 11/27/2013    Left nose; will have Mohs surgery 1/6/14, and plastic surgery 1/7/14 with Dr. Ernestina Torres       Community acquired pneumonia 2/10/2019     Coronary artery disease     Mild CAD per 2/2008 angiogram     Gastroenteritis 5/31/2017     Hyperlipidaemia      Hypertension      Hypothyroidism 1/17/2013     Mitral regurgitation 6/2015    echo     Pneumonia of left lung due to infectious organism, unspecified part of lung 3/12/2019     Respiratory failure with hypoxia (H) 12/26/2017     Type 2 diabetes, HbA1C goal < 8% (H) 9/11/2013       PAST SURGICAL HISTORY:  Past Surgical History:   Procedure Laterality Date     APPENDECTOMY  1974     BACK SURGERY  1996     BLADDER SURGERY  1990    bladder suspension     CHOLECYSTECTOMY  1975     CORONARY ANGIOGRAPHY ADULT ORDER  2/2008    Mild CAD. LAD w/20% stenosis, RCA with 30-40% stenosis. No flow limiting obstructions.     face surgery   -2014    basal cell plus plastic to nose, face , ear     HYSTERECTOMY  1970     OOPHORECTOMY  1974    bilateral     PHACOEMULSIFICATION CLEAR CORNEA WITH STANDARD INTRAOCULAR LENS IMPLANT Right 2015    Procedure: PHACOEMULSIFICATION CLEAR CORNEA WITH STANDARD INTRAOCULAR LENS IMPLANT;  Surgeon: Schuyler Chapa MD;  Location: [facility]     PHACOEMULSIFICATION CLEAR CORNEA WITH STANDARD INTRAOCULAR LENS IMPLANT Left 2015    Procedure: PHACOEMULSIFICATION CLEAR CORNEA WITH STANDARD INTRAOCULAR LENS IMPLANT;  Surgeon: Schuyler Chapa MD;  Location: [facility]       FAMILY HISTORY:  Family History   Problem Relation Age of Onset     Cancer Brother         lung     Cancer Son      Cerebrovascular Disease Mother         and Parkinson's     Cancer Father         pancreas     Diabetes Son        SOCIAL HISTORY:  Social History     Socioeconomic History     Marital status:      Spouse name: None     Number of children: None     Years of education: None     Highest education level: None   Occupational History     None   Tobacco Use     Smoking status: Never Smoker     Smokeless tobacco: Never Used   Substance and Sexual Activity     Alcohol use: Yes     Comment: rarely     Drug use: No     Sexual activity: Not Currently   Other Topics Concern     Parent/sibling w/ CABG, MI or angioplasty before 65F 55M? No      Service Not Asked     Blood Transfusions Not Asked     Caffeine Concern Yes     Comment: 4 cups caffeine per day     Occupational Exposure Not Asked     Hobby Hazards Not Asked     Sleep Concern No     Stress Concern No     Weight Concern No     Special Diet No     Back Care Not Asked     Exercise Yes     Comment: exercises 30 minutes, 4 days week     Bike Helmet Not Asked     Seat Belt Not Asked     Self-Exams Not Asked   Social History Narrative    Had 2 sons; one  in ; head and neck cancer;  due to choking at [employer];                                 had 2 step dtrs; one .    "                                     times 2     Social Determinants of Health     Financial Resource Strain: Not on file   Food Insecurity: Not on file   Transportation Needs: Not on file   Physical Activity: Not on file   Stress: Not on file   Social Connections: Not on file   Intimate Partner Violence: Not on file   Housing Stability: Not on file       Review of Systems:  Skin:  Negative     Eyes:  Positive for glasses  ENT:  Negative    Respiratory:  Positive for dyspnea on exertion;cough  Cardiovascular:  Negative;palpitations;dizziness;syncope or near-syncope;cyanosis;edema chest pain;Positive for;exercise intolerance;lightheadedness;fatigue  Gastroenterology: Negative    Genitourinary:  Positive for urinary frequency  Musculoskeletal:  Positive for joint pain  Neurologic:  Positive for numbness or tingling of hands;numbness or tingling of feet;local weakness  Psychiatric:  Negative    Heme/Lymph/Imm:  Negative    Endocrine:  Positive for thyroid disorder    Physical Exam:  Vitals: /80   Pulse 67   Ht 1.6 m (5' 3\")   Wt 82.8 kg (182 lb 9.6 oz)   LMP  (LMP Unknown)   SpO2 95%   BMI 32.35 kg/m      Constitutional:  cooperative        Skin:  warm and dry to the touch          Head:  normocephalic        Eyes:  sclera white        Lymph:      ENT:  no pallor or cyanosis        Neck:  JVP normal        Respiratory:  clear to auscultation         Cardiac: regular rhythm;normal S1 and S2   S4            not assessed this visit                                        GI:  not assessed this visit        Extremities and Muscular Skeletal:      bilateral LE edema;trace          Neurological:  no gross motor deficits   uses walker    Psych:  Alert and Oriented x 3      Recent Lab Results:  LIPID RESULTS:  Lab Results   Component Value Date    CHOL 164 08/03/2020    HDL 68 08/03/2020    LDL 74 08/03/2020    TRIG 112 08/03/2020    CHOLHDLRATIO 1.9 07/20/2015       LIVER ENZYME RESULTS:  Lab Results "   Component Value Date    AST 19 08/03/2020    ALT 21 08/03/2020       CBC RESULTS:  Lab Results   Component Value Date    WBC 5.7 05/17/2021    RBC 3.75 (L) 05/17/2021    HGB 12.5 05/17/2021    HCT 38.1 05/17/2021     (H) 05/17/2021    MCH 33.3 (H) 05/17/2021    MCHC 32.8 05/17/2021    RDW 13.8 05/17/2021     05/17/2021       BMP RESULTS:  Lab Results   Component Value Date     05/17/2021    POTASSIUM 4.3 05/17/2021    CHLORIDE 99 05/17/2021    CO2 32 05/17/2021    ANIONGAP 4 05/17/2021     (H) 05/17/2021    BUN 15 05/17/2021    CR 0.92 05/17/2021    GFRESTIMATED 52 (L) 05/17/2021    GFRESTBLACK 61 05/17/2021    VIOLETA 8.8 05/17/2021        A1C RESULTS:  Lab Results   Component Value Date    A1C 6.1 (H) 08/03/2020       INR RESULTS:  Lab Results   Component Value Date    INR 1.02 12/17/2009    INR 0.97 02/22/2008         Kalyan Luis MD   [facility]

## 2021-11-10 NOTE — PROGRESS NOTES
HPI and Plan:     Dear Antwon:       I had the pleasure of following up with Lora Vergara in  cardiology clinic for follow-up of her chronic microvascular angina.  She had mild to moderate atherosclerosis on coronary geography in 2010 but has had intermittent jaw discomfort that improved with sublingual nitroglycerin.  Over the years, it has improved with Imdur and Ranexa.  Now she rarely has to take sublingual nitroglycerin     Is been complaining of some shortness of breath and cough with expectoration as well as sneezing and postnasal drip.  She was seen by pulmonologist and did PFTs.  They also did a CT chest which revealed some mild bronchiectasis.  They have given her inhaler fluticasone which she is using intermittently and feels a bit better     In the past, her ejection fraction has been normal.  She has moderate mitral stenosis with a mean gradient of 9mm unchanged from 2018.  Her left atrium is significantly dilated.      Is here with a friend.  She lives in an apartment complex.  Her weight has decreased over the last couple years by about 15 to 20 pounds.  Her appetite is somewhat decreased.      Exam     Below        IMPRESSION:   1.   Microvascular angina/coronary artery atherosclerosis.  At this time, pain is well controlled on a combination of Imdur and amlodipine and p.r.n. sublingual nitro.    She is doing well on Ranexa which we will continue.     2.  Hyperlipidemia.  Continue atorvastatin.      3.  Hypertension, well controlled.     4. Bronchiectasis, management per pulmonary     We will continue see her on an annual basis as a virtual video follow-up visit.     Thank you for allowing us to participate in the care of this nice patient.     Sincerely,     Kalyan Luis MD    24 minutes spent on the date of the encounter doing chart review, patient visit and documentation   {Provider  Link to [facility] Help Grid :1        Orders Placed This Encounter   Procedures     Follow-Up with Cardiologist     Orders  Placed This Encounter   Medications     Respiratory Therapy Supplies (AEROBIKA) RUT     There are no discontinued medications.    Encounter Diagnoses   Name Primary?     Venous (peripheral) insufficiency      Chronic kidney disease, stage 1      Type 2 diabetes mellitus with diabetic neuropathy, without long-term current use of insulin (H)      Stable angina pectoris (H) Yes     Hyperlipidemia LDL goal <70      Bronchiectasis without complication (H)        CURRENT MEDICATIONS:  Current Outpatient Medications   Medication Sig Dispense Refill     acetaminophen (TYLENOL) 500 MG tablet Take 500-1,000 mg by mouth every 6 hours as needed       aspirin 81 MG EC tablet Take 81 mg by mouth daily       atorvastatin (LIPITOR) 40 MG tablet Take 1 tablet (40 mg) by mouth daily 90 tablet 1     fluticasone (FLONASE) 50 MCG/ACT nasal spray Spray 2 sprays into both nostrils daily 16 g 3     furosemide (LASIX) 20 MG tablet Take 0.5 tablets (10 mg) by mouth daily 45 tablet 0     gabapentin (NEURONTIN) 300 MG capsule Take 1 capsule (300 mg) by mouth 3 times daily 60 capsule 3     isosorbide mononitrate (IMDUR) 30 MG 24 hr tablet Take 1 tablet (30 mg) by mouth daily 90 tablet 0     isosorbide mononitrate (IMDUR) 60 MG 24 hr tablet TAKE 1 TABLET BY MOUTH EVERY DAY 90 tablet 2     latanoprost (XALATAN) 0.005 % ophthalmic solution Place 1 drop into both eyes 3 times daily        levothyroxine (SYNTHROID/LEVOTHROID) 25 MCG tablet TAKE 1 TABLET BY MOUTH EVERY DAY 90 tablet 2     metoprolol tartrate (LOPRESSOR) 100 MG tablet TAKE 1/2 TABLET BY MOUTH EVERY MORNING AND TAKE 1 TABLET IN THE EVENING. 135 tablet 3     nitroGLYcerin (NITROSTAT) 0.4 MG sublingual tablet Place 1 tablet (0.4 mg) under the tongue every 5 minutes as needed for chest pain 50 tablet 11     order for DME Equipment being ordered: wheeled walker with brakes and a seat 1 Device 0     polyethylene glycol (MIRALAX/GLYCOLAX) packet Take 1 packet by mouth daily as needed for  constipation       pregabalin (LYRICA) 150 MG capsule Take 1 capsule by mouth twice a day as directed by physician. 180 capsule 0     ranolazine (RANEXA) 500 MG 12 hr tablet Take 1 tablet (500 mg) by mouth 2 times daily 180 tablet 3     Respiratory Therapy Supplies (AEROBIKA) RUT        vitamin D3 (CHOLECALCIFEROL) 2000 units tablet Take 4,000 Units by mouth daily         ALLERGIES     Allergies   Allergen Reactions     Hydrochlorothiazide      Pancreatitis - patient denies this.  She says it gave her low sodium.       PAST MEDICAL HISTORY:  Past Medical History:   Diagnosis Date     Angina at rest (H)     Thought to be microvascular     Arthritis      Basal cell carcinoma 1/2014, (3/5/6-2014)    nose and check and ear and face     BCC (basal cell carcinoma), face 11/27/2013    Left nose; will have Mohs surgery 1/6/14, and plastic surgery 1/7/14 with Dr. Ernestina Torres       Community acquired pneumonia 2/10/2019     Coronary artery disease     Mild CAD per 2/2008 angiogram     Gastroenteritis 5/31/2017     Hyperlipidaemia      Hypertension      Hypothyroidism 1/17/2013     Mitral regurgitation 6/2015    echo     Pneumonia of left lung due to infectious organism, unspecified part of lung 3/12/2019     Respiratory failure with hypoxia (H) 12/26/2017     Type 2 diabetes, HbA1C goal < 8% (H) 9/11/2013       PAST SURGICAL HISTORY:  Past Surgical History:   Procedure Laterality Date     APPENDECTOMY  1974     BACK SURGERY  1996     BLADDER SURGERY  1990    bladder suspension     CHOLECYSTECTOMY  1975     CORONARY ANGIOGRAPHY ADULT ORDER  2/2008    Mild CAD. LAD w/20% stenosis, RCA with 30-40% stenosis. No flow limiting obstructions.     face surgery  1-6/2014    basal cell plus plastic to nose, face , ear     HYSTERECTOMY  1970     OOPHORECTOMY  1974    bilateral     PHACOEMULSIFICATION CLEAR CORNEA WITH STANDARD INTRAOCULAR LENS IMPLANT Right 7/21/2015    Procedure: PHACOEMULSIFICATION CLEAR CORNEA WITH STANDARD  INTRAOCULAR LENS IMPLANT;  Surgeon: Schuyler Chapa MD;  Location: [facility]     PHACOEMULSIFICATION CLEAR CORNEA WITH STANDARD INTRAOCULAR LENS IMPLANT Left 2015    Procedure: PHACOEMULSIFICATION CLEAR CORNEA WITH STANDARD INTRAOCULAR LENS IMPLANT;  Surgeon: Schuyler Chapa MD;  Location: [facility]       FAMILY HISTORY:  Family History   Problem Relation Age of Onset     Cancer Brother         lung     Cancer Son      Cerebrovascular Disease Mother         and Parkinson's     Cancer Father         pancreas     Diabetes Son        SOCIAL HISTORY:  Social History     Socioeconomic History     Marital status:      Spouse name: None     Number of children: None     Years of education: None     Highest education level: None   Occupational History     None   Tobacco Use     Smoking status: Never Smoker     Smokeless tobacco: Never Used   Substance and Sexual Activity     Alcohol use: Yes     Comment: rarely     Drug use: No     Sexual activity: Not Currently   Other Topics Concern     Parent/sibling w/ CABG, MI or angioplasty before 65F 55M? No      Service Not Asked     Blood Transfusions Not Asked     Caffeine Concern Yes     Comment: 4 cups caffeine per day     Occupational Exposure Not Asked     Hobby Hazards Not Asked     Sleep Concern No     Stress Concern No     Weight Concern No     Special Diet No     Back Care Not Asked     Exercise Yes     Comment: exercises 30 minutes, 4 days week     Bike Helmet Not Asked     Seat Belt Not Asked     Self-Exams Not Asked   Social History Narrative    Had 2 sons; one  in ; head and neck cancer;  due to choking at [employer];                                 had 2 step dtrs; one .                                        times 2     Social Determinants of Health     Financial Resource Strain: Not on file   Food Insecurity: Not on file   Transportation Needs: Not on file   Physical Activity: Not on file   Stress: Not on file   Social  "Connections: Not on file   Intimate Partner Violence: Not on file   Housing Stability: Not on file       Review of Systems:  Skin:  Negative     Eyes:  Positive for glasses  ENT:  Negative    Respiratory:  Positive for dyspnea on exertion;cough  Cardiovascular:  Negative;palpitations;dizziness;syncope or near-syncope;cyanosis;edema chest pain;Positive for;exercise intolerance;lightheadedness;fatigue  Gastroenterology: Negative    Genitourinary:  Positive for urinary frequency  Musculoskeletal:  Positive for joint pain  Neurologic:  Positive for numbness or tingling of hands;numbness or tingling of feet;local weakness  Psychiatric:  Negative    Heme/Lymph/Imm:  Negative    Endocrine:  Positive for thyroid disorder    Physical Exam:  Vitals: /80   Pulse 67   Ht 1.6 m (5' 3\")   Wt 82.8 kg (182 lb 9.6 oz)   LMP  (LMP Unknown)   SpO2 95%   BMI 32.35 kg/m      Constitutional:  cooperative        Skin:  warm and dry to the touch          Head:  normocephalic        Eyes:  sclera white        Lymph:      ENT:  no pallor or cyanosis        Neck:  JVP normal        Respiratory:  clear to auscultation         Cardiac: regular rhythm;normal S1 and S2   S4            not assessed this visit                                        GI:  not assessed this visit        Extremities and Muscular Skeletal:      bilateral LE edema;trace          Neurological:  no gross motor deficits   uses walker    Psych:  Alert and Oriented x 3      Recent Lab Results:  LIPID RESULTS:  Lab Results   Component Value Date    CHOL 164 08/03/2020    HDL 68 08/03/2020    LDL 74 08/03/2020    TRIG 112 08/03/2020    CHOLHDLRATIO 1.9 07/20/2015       LIVER ENZYME RESULTS:  Lab Results   Component Value Date    AST 19 08/03/2020    ALT 21 08/03/2020       CBC RESULTS:  Lab Results   Component Value Date    WBC 5.7 05/17/2021    RBC 3.75 (L) 05/17/2021    HGB 12.5 05/17/2021    HCT 38.1 05/17/2021     (H) 05/17/2021    MCH 33.3 (H) 05/17/2021 "    MCHC 32.8 05/17/2021    RDW 13.8 05/17/2021     05/17/2021       BMP RESULTS:  Lab Results   Component Value Date     05/17/2021    POTASSIUM 4.3 05/17/2021    CHLORIDE 99 05/17/2021    CO2 32 05/17/2021    ANIONGAP 4 05/17/2021     (H) 05/17/2021    BUN 15 05/17/2021    CR 0.92 05/17/2021    GFRESTIMATED 52 (L) 05/17/2021    GFRESTBLACK 61 05/17/2021    VIOLETA 8.8 05/17/2021        A1C RESULTS:  Lab Results   Component Value Date    A1C 6.1 (H) 08/03/2020       INR RESULTS:  Lab Results   Component Value Date    INR 1.02 12/17/2009    INR 0.97 02/22/2008         CC  No referring provider defined for this encounter.

## 2021-11-29 DIAGNOSIS — E03.9 HYPOTHYROIDISM: ICD-10-CM

## 2021-11-29 DIAGNOSIS — G62.9 NEUROPATHY: ICD-10-CM

## 2021-11-30 RX ORDER — LEVOTHYROXINE SODIUM 25 UG/1
25 TABLET ORAL DAILY
Qty: 90 TABLET | Refills: 0 | Status: SHIPPED | OUTPATIENT
Start: 2021-11-30 | End: 2022-02-15

## 2021-11-30 RX ORDER — PREGABALIN 150 MG/1
CAPSULE ORAL
Qty: 180 CAPSULE | Refills: 0 | Status: SHIPPED | OUTPATIENT
Start: 2021-11-30 | End: 2021-12-15

## 2021-12-01 ENCOUNTER — NURSE TRIAGE (OUTPATIENT)
Dept: INTERNAL MEDICINE | Facility: CLINIC | Age: 86
End: 2021-12-01
Payer: MEDICARE

## 2021-12-01 NOTE — TELEPHONE ENCOUNTER
Pt reports of generalized ache. She had COVID booster yesterday and also fell since her legs gave out. Pt didn't lose consciousness or is aware of any injuries. Couple officers came to see last night and she told EMT she was fine so they never evaluated her.  Attributes fall to COVID-19 booster injection. Denies fever localized redness or swelling. Triage advised UC visit if persistent pain, fever or develops  new symptoms. Pt opts to monitor symptoms at this time. Agreed to seek care at UC if no improvement.       Reason for Disposition    COVID-19 vaccine, questions about    [1] Redness or red streak around the injection site AND [2] started > 48 hours after getting vaccine AND [3] fever    COVID-19 vaccine, injection site reaction (e.g., pain, redness, swelling), question about    COVID-19 vaccine, systemic reactions (e.g., fatigue, fever, muscle aches), questions about    Additional Information    Negative: [1] Difficulty breathing or swallowing AND [2] starts within 2 hours after injection    Negative: Sounds like a life-threatening emergency to the triager    Negative: [1] Symptoms of COVID-19 (e.g., cough, fever, SOB, or others) AND [2] within 14 days of EXPOSURE (close contact) with diagnosed or suspected COVID-19 patient    Negative: [1] Symptoms of COVID-19 (e.g., cough, fever, SOB, or others) AND [2] within 14 days of being at a crowded indoor or outdoor event (e.g., concert, festival, rally, wedding)    Negative: Typical COVID-19 symptoms (e.g., cough, difficulty breathing, loss of taste or smell, runny nose, sore throat) that are NOT expected from vaccine    Negative: [1] COVID-19 exposure AND [2] no symptoms, or symptoms not typical of COVID-19    Negative: Fever > 104 F (40 C)    Negative: Sounds like a severe, unusual reaction to the triager    Negative: [1] Fever > 101 F (38.3 C) AND [2] age > 60 years AND [3] started > 48 hours after getting vaccine    Negative: [1] Fever > 100.0 F (37.8 C) AND [2]  bedridden (e.g., nursing home patient, CVA, chronic illness, recovering from surgery) AND [3] started > 48 hours after getting vaccine    Negative: [1] Fever > 100.0 F (37.8 C) AND [2] diabetes mellitus or weak immune system (e.g., HIV positive, cancer chemo, splenectomy, organ transplant, chronic steroids) AND [3] started > 48 hours after getting vaccine    Negative: Fever > 100.0 F (37.8 C) present > 3 days (72 hours)    Negative: [1] Fever > 100.0 F (37.8 C) AND [2] healthcare worker    Negative: [1] Redness around the injection site AND [2] started > 48 hours after getting vaccine AND [3] no fever  (Exception: red area < 1 inch or 2.5 cm wide)    Negative: [1] Pain, tenderness, or swelling at the injection site AND [2] over 3 days (72 hours) since vaccine AND [3] getting worse    Negative: [1] Pain, tenderness, or swelling at the injection site AND [2] lasts > 7 days    Negative: [1] Lymph node swelling (i.e., armpit or neck on side of vaccine) AND [2] lasts > 3 weeks    Negative: [1] Requesting COVID-19 vaccine AND [2] healthcare worker (e.g., EMS first responders, doctors, nurses)    Negative: [1] Requesting COVID-19 vaccine AND [2] resident of a long-term care facility (e.g., nursing home)    Negative: [1] Requesting COVID-19 vaccine AND [2] vaccine available in the community for this patient group    Protocols used: CORONAVIRUS (COVID-19) EXPOSURE-[facility] 8.25.2021, CORONAVIRUS (COVID-19) VACCINE QUESTIONS AND [id number] 8.25.2021

## 2021-12-03 DIAGNOSIS — R60.0 BILATERAL LEG EDEMA: ICD-10-CM

## 2021-12-03 RX ORDER — FUROSEMIDE 20 MG
10 TABLET ORAL DAILY
Qty: 45 TABLET | Refills: 0 | Status: SHIPPED | OUTPATIENT
Start: 2021-12-03 | End: 2022-01-27

## 2021-12-03 NOTE — TELEPHONE ENCOUNTER
Medication Refilled: Furosemide  Last office visit: 11/10/2021 with Dr. Luis   Last Labs/EK2021  Next office visit: 2022 orders in [facility]   Pharmacy sent to: ALFRED Wray RN

## 2021-12-15 DIAGNOSIS — G62.9 NEUROPATHY: ICD-10-CM

## 2021-12-15 RX ORDER — PREGABALIN 150 MG/1
CAPSULE ORAL
Qty: 180 CAPSULE | Refills: 1 | Status: SHIPPED | OUTPATIENT
Start: 2021-12-15 | End: 2022-05-16

## 2021-12-15 NOTE — TELEPHONE ENCOUNTER
Pt contacted and notified of refill. Assisted with scheduling an appt to establish care.     Denise Henry RN

## 2021-12-15 NOTE — TELEPHONE ENCOUNTER
Refill sent.                        She will need an appointment with a new provider to get her Lyrica refilled in less than 6 months.         Please let her know.

## 2021-12-15 NOTE — TELEPHONE ENCOUNTER
Patient called, stated she would like to speak with a nurse about renewing the prescription when Dr. Schmidt retires.    Patient requests call back.     May call patient at [phone].

## 2022-01-04 DIAGNOSIS — I20.89 STABLE ANGINA PECTORIS (H): ICD-10-CM

## 2022-01-05 ENCOUNTER — TELEPHONE (OUTPATIENT)
Dept: CARDIOLOGY | Facility: CLINIC | Age: 87
End: 2022-01-05
Payer: MEDICARE

## 2022-01-05 DIAGNOSIS — I20.89 STABLE ANGINA PECTORIS (H): ICD-10-CM

## 2022-01-05 RX ORDER — ISOSORBIDE MONONITRATE 30 MG/1
30 TABLET, EXTENDED RELEASE ORAL DAILY
Qty: 90 TABLET | Refills: 3 | Status: SHIPPED | OUTPATIENT
Start: 2022-01-05 | End: 2022-09-15

## 2022-01-05 NOTE — TELEPHONE ENCOUNTER
Did speak with Pt and did clarity she does take Imdur 90 mg a day. This increase was done at [facility] 1/10/20. VIC Luo RN

## 2022-01-06 RX ORDER — NITROGLYCERIN 0.4 MG/1
TABLET SUBLINGUAL
Qty: 150 TABLET | Refills: 3 | Status: SHIPPED | OUTPATIENT
Start: 2022-01-06 | End: 2023-01-01

## 2022-01-06 NOTE — TELEPHONE ENCOUNTER
Routing refill request to provider for review/approval because:    Provider review needed. Previously prescribed by Dr. Schmidt. Pt has future visit with Dr. Chinchilla.     Appointments in Next Year      Feb 28, 2022  3:00 PM  (Arrive by 2:40 PM)  Provider Visit with Paty Chinchilla MD  [facility] ([facility] ) [phone]              Fernanda Richey RN

## 2022-01-17 DIAGNOSIS — I10 HYPERTENSION GOAL BP (BLOOD PRESSURE) < 140/90: ICD-10-CM

## 2022-01-17 NOTE — TELEPHONE ENCOUNTER
Patient called, stated she will be out of medication very soon and is requesting a colleen refill up until her appointment on 2/28.

## 2022-01-18 RX ORDER — METOPROLOL TARTRATE 100 MG
TABLET ORAL
Qty: 135 TABLET | Refills: 0 | Status: SHIPPED | OUTPATIENT
Start: 2022-01-18 | End: 2022-05-04

## 2022-01-18 NOTE — TELEPHONE ENCOUNTER
Routing refill request to provider for review/approval because:  Please see note below, patient scheduled to establish on 2/28     Branden Alegre RN  [facility] Triage Nurse

## 2022-01-24 DIAGNOSIS — E78.5 HYPERLIPIDEMIA LDL GOAL <70: ICD-10-CM

## 2022-01-24 RX ORDER — ATORVASTATIN CALCIUM 40 MG/1
40 TABLET, FILM COATED ORAL DAILY
Qty: 90 TABLET | Refills: 3 | Status: SHIPPED | OUTPATIENT
Start: 2022-01-24 | End: 2023-01-01

## 2022-01-27 DIAGNOSIS — R60.0 BILATERAL LEG EDEMA: ICD-10-CM

## 2022-01-27 RX ORDER — FUROSEMIDE 20 MG
10 TABLET ORAL DAILY
Qty: 45 TABLET | Refills: 3 | Status: SHIPPED | OUTPATIENT
Start: 2022-01-27 | End: 2023-01-01

## 2022-02-02 DIAGNOSIS — I20.89 STABLE ANGINA PECTORIS (H): ICD-10-CM

## 2022-02-02 RX ORDER — NITROGLYCERIN 0.4 MG/1
TABLET SUBLINGUAL
Qty: 25 TABLET | Refills: 23 | OUTPATIENT
Start: 2022-02-02

## 2022-02-14 DIAGNOSIS — E03.9 HYPOTHYROIDISM: ICD-10-CM

## 2022-02-15 RX ORDER — LEVOTHYROXINE SODIUM 25 UG/1
TABLET ORAL
Qty: 90 TABLET | Refills: 0 | Status: SHIPPED | OUTPATIENT
Start: 2022-02-15 | End: 2022-04-13

## 2022-02-27 PROBLEM — N18.1 CHRONIC KIDNEY DISEASE, STAGE 1: Status: RESOLVED | Noted: 2021-11-10 | Resolved: 2022-02-27

## 2022-02-27 PROBLEM — G62.9 PERIPHERAL NEUROPATHY: Status: ACTIVE | Noted: 2022-02-27

## 2022-02-27 PROBLEM — I25.10 CAD (CORONARY ARTERY DISEASE): Status: ACTIVE | Noted: 2022-02-27

## 2022-02-27 PROBLEM — I20.89 MICROVASCULAR ANGINA (H): Status: ACTIVE | Noted: 2022-02-27

## 2022-02-27 PROBLEM — J47.9 BRONCHIECTASIS (H): Status: ACTIVE | Noted: 2022-02-27

## 2022-02-27 PROBLEM — E66.01 MORBID OBESITY (H): Status: RESOLVED | Noted: 2018-07-25 | Resolved: 2022-02-27

## 2022-02-27 PROBLEM — E03.9 HYPOTHYROIDISM: Status: ACTIVE | Noted: 2022-02-27

## 2022-02-27 PROBLEM — M17.11 OSTEOARTHRITIS OF RIGHT KNEE, UNSPECIFIED OSTEOARTHRITIS TYPE: Status: RESOLVED | Noted: 2017-10-31 | Resolved: 2022-02-27

## 2022-02-27 PROBLEM — I25.85 CARDIAC MICROVASCULAR DISEASE: Status: RESOLVED | Noted: 2020-09-15 | Resolved: 2022-02-27

## 2022-02-27 PROBLEM — I34.2 NONRHEUMATIC MITRAL VALVE STENOSIS: Status: RESOLVED | Noted: 2020-09-15 | Resolved: 2022-02-27

## 2022-02-27 PROBLEM — M54.41 CHRONIC RIGHT-SIDED LOW BACK PAIN WITH RIGHT-SIDED SCIATICA: Status: RESOLVED | Noted: 2017-10-31 | Resolved: 2022-02-27

## 2022-02-27 PROBLEM — M25.511 RIGHT SHOULDER PAIN, UNSPECIFIED CHRONICITY: Status: RESOLVED | Noted: 2019-08-27 | Resolved: 2022-02-27

## 2022-02-27 PROBLEM — I87.2 VENOUS (PERIPHERAL) INSUFFICIENCY: Status: RESOLVED | Noted: 2018-04-06 | Resolved: 2022-02-27

## 2022-02-27 PROBLEM — I10 BENIGN ESSENTIAL HYPERTENSION: Status: ACTIVE | Noted: 2022-02-27

## 2022-02-27 PROBLEM — I50.32 CHRONIC DIASTOLIC CONGESTIVE HEART FAILURE (H): Status: RESOLVED | Noted: 2018-04-06 | Resolved: 2022-02-27

## 2022-02-27 PROBLEM — R53.1 GENERALIZED WEAKNESS: Status: RESOLVED | Noted: 2019-03-12 | Resolved: 2022-02-27

## 2022-02-27 PROBLEM — E78.00 PURE HYPERCHOLESTEROLEMIA: Status: ACTIVE | Noted: 2022-02-27

## 2022-02-27 PROBLEM — E11.40 TYPE 2 DIABETES MELLITUS WITH DIABETIC NEUROPATHY, WITHOUT LONG-TERM CURRENT USE OF INSULIN (H): Status: RESOLVED | Noted: 2018-01-10 | Resolved: 2022-02-27

## 2022-02-27 PROBLEM — E11.9 TYPE 2 DIABETES MELLITUS (H): Status: ACTIVE | Noted: 2022-02-27

## 2022-02-27 PROBLEM — G89.29 CHRONIC RIGHT-SIDED LOW BACK PAIN WITH RIGHT-SIDED SCIATICA: Status: RESOLVED | Noted: 2017-10-31 | Resolved: 2022-02-27

## 2022-02-27 PROBLEM — R60.0 BILATERAL LEG EDEMA: Status: RESOLVED | Noted: 2018-04-06 | Resolved: 2022-02-27

## 2022-02-27 PROBLEM — J47.9 BRONCHIECTASIS WITHOUT COMPLICATION (H): Status: RESOLVED | Noted: 2021-11-10 | Resolved: 2022-02-27

## 2022-02-28 ENCOUNTER — OFFICE VISIT (OUTPATIENT)
Dept: INTERNAL MEDICINE | Facility: CLINIC | Age: 87
End: 2022-02-28
Payer: MEDICARE

## 2022-02-28 VITALS
SYSTOLIC BLOOD PRESSURE: 145 MMHG | DIASTOLIC BLOOD PRESSURE: 76 MMHG | WEIGHT: 179 LBS | HEIGHT: 63 IN | TEMPERATURE: 96.8 F | OXYGEN SATURATION: 90 % | RESPIRATION RATE: 14 BRPM | HEART RATE: 75 BPM | BODY MASS INDEX: 31.71 KG/M2

## 2022-02-28 DIAGNOSIS — E11.9 TYPE 2 DIABETES MELLITUS WITHOUT COMPLICATION, WITHOUT LONG-TERM CURRENT USE OF INSULIN (H): ICD-10-CM

## 2022-02-28 DIAGNOSIS — I20.89 MICROVASCULAR ANGINA (H): ICD-10-CM

## 2022-02-28 DIAGNOSIS — Z00.00 MEDICARE ANNUAL WELLNESS VISIT, SUBSEQUENT: Primary | ICD-10-CM

## 2022-02-28 DIAGNOSIS — E78.00 PURE HYPERCHOLESTEROLEMIA: ICD-10-CM

## 2022-02-28 DIAGNOSIS — E03.4 HYPOTHYROIDISM DUE TO ACQUIRED ATROPHY OF THYROID: ICD-10-CM

## 2022-02-28 DIAGNOSIS — E55.9 VITAMIN D DEFICIENCY: ICD-10-CM

## 2022-02-28 PROBLEM — E66.9 OBESITY (BMI 30-39.9): Status: ACTIVE | Noted: 2022-02-28

## 2022-02-28 LAB
ALBUMIN SERPL-MCNC: 3.4 G/DL (ref 3.4–5)
ALP SERPL-CCNC: 68 U/L (ref 40–150)
ALT SERPL W P-5'-P-CCNC: 18 U/L (ref 0–50)
ANION GAP SERPL CALCULATED.3IONS-SCNC: 8 MMOL/L (ref 3–14)
AST SERPL W P-5'-P-CCNC: 16 U/L (ref 0–45)
BILIRUB SERPL-MCNC: 0.6 MG/DL (ref 0.2–1.3)
BUN SERPL-MCNC: 15 MG/DL (ref 7–30)
CALCIUM SERPL-MCNC: 8.9 MG/DL (ref 8.5–10.1)
CHLORIDE BLD-SCNC: 103 MMOL/L (ref 94–109)
CHOLEST SERPL-MCNC: 134 MG/DL
CO2 SERPL-SCNC: 25 MMOL/L (ref 20–32)
CREAT SERPL-MCNC: 0.8 MG/DL (ref 0.52–1.04)
CREAT UR-MCNC: 30 MG/DL
DEPRECATED CALCIDIOL+CALCIFEROL SERPL-MC: 65 UG/L (ref 20–75)
ERYTHROCYTE [DISTWIDTH] IN BLOOD BY AUTOMATED COUNT: 12.2 % (ref 10–15)
FASTING STATUS PATIENT QL REPORTED: NO
GFR SERPL CREATININE-BSD FRML MDRD: 67 ML/MIN/1.73M2
GLUCOSE BLD-MCNC: 115 MG/DL (ref 70–99)
HBA1C MFR BLD: 5.8 % (ref 0–5.6)
HCT VFR BLD AUTO: 38.9 % (ref 35–47)
HDLC SERPL-MCNC: 62 MG/DL
HGB BLD-MCNC: 12.4 G/DL (ref 11.7–15.7)
LDLC SERPL CALC-MCNC: 51 MG/DL
MCH RBC QN AUTO: 31.5 PG (ref 26.5–33)
MCHC RBC AUTO-ENTMCNC: 31.9 G/DL (ref 31.5–36.5)
MCV RBC AUTO: 99 FL (ref 78–100)
MICROALBUMIN UR-MCNC: <5 MG/L
MICROALBUMIN/CREAT UR: NORMAL MG/G{CREAT}
NONHDLC SERPL-MCNC: 72 MG/DL
PLATELET # BLD AUTO: 221 10E3/UL (ref 150–450)
POTASSIUM BLD-SCNC: 4 MMOL/L (ref 3.4–5.3)
PROT SERPL-MCNC: 7 G/DL (ref 6.8–8.8)
RBC # BLD AUTO: 3.94 10E6/UL (ref 3.8–5.2)
SODIUM SERPL-SCNC: 136 MMOL/L (ref 133–144)
TRIGL SERPL-MCNC: 103 MG/DL
TSH SERPL DL<=0.005 MIU/L-ACNC: 2.89 MU/L (ref 0.4–4)
WBC # BLD AUTO: 4.3 10E3/UL (ref 4–11)

## 2022-02-28 PROCEDURE — G0439 PPPS, SUBSEQ VISIT: HCPCS | Performed by: INTERNAL MEDICINE

## 2022-02-28 PROCEDURE — 36415 COLL VENOUS BLD VENIPUNCTURE: CPT | Performed by: INTERNAL MEDICINE

## 2022-02-28 PROCEDURE — 80061 LIPID PANEL: CPT | Performed by: INTERNAL MEDICINE

## 2022-02-28 PROCEDURE — 80053 COMPREHEN METABOLIC PANEL: CPT | Performed by: INTERNAL MEDICINE

## 2022-02-28 PROCEDURE — 84443 ASSAY THYROID STIM HORMONE: CPT | Performed by: INTERNAL MEDICINE

## 2022-02-28 PROCEDURE — 82306 VITAMIN D 25 HYDROXY: CPT | Performed by: INTERNAL MEDICINE

## 2022-02-28 PROCEDURE — 85027 COMPLETE CBC AUTOMATED: CPT | Performed by: INTERNAL MEDICINE

## 2022-02-28 PROCEDURE — 82043 UR ALBUMIN QUANTITATIVE: CPT | Performed by: INTERNAL MEDICINE

## 2022-02-28 PROCEDURE — 83036 HEMOGLOBIN GLYCOSYLATED A1C: CPT | Performed by: INTERNAL MEDICINE

## 2022-02-28 NOTE — NURSING NOTE
Provider verbally informed of abnormal vital sign readings.   Rasheeda Caceres, MARYLIN on 2/28/2022 at 2:31 PM

## 2022-02-28 NOTE — PROGRESS NOTES
ASSESSMENT/PLAN                                                       (Z00.00) Medicare annual wellness visit, subsequent  (primary encounter diagnosis)  Comment: PMH, PSH, FH, SH, medications, allergies, immunizations, and preventative health measures reviewed and updated as appropriate.  Plan: see below for plans.      (E11.9) Type 2 diabetes mellitus without complication, without long-term current use of insulin (H)  (E78.00) Pure hypercholesterolemia  (E03.4) Hypothyroidism due to acquired atrophy of thyroid  (E55.9) Vitamin D deficiency  Plan: non-fasting labs and urine sample today; recommendations to follow.    (I20.8) Microvascular angina (H)  Comment: stable on medical management; followed by cardiology.    Appropriate preventive services were discussed with this patient, including applicable screening as appropriate for cardiovascular disease, diabetes, osteopenia/osteoporosis, and glaucoma.  As appropriate for age/gender, discussed screening for colorectal cancer, prostate cancer, breast cancer, and cervical cancer. Checklist reviewing preventive services available has been given to the patient.    Reviewed patients plan of care. The Basic Care Plan (routine screening as documented in Health Maintenance) for Lora Vergara meets the Care Plan requirement. This Care Plan has been established and reviewed with the Patient.    Paty Chinchilla MD   [address] [address]  T: [phone], F: [phone]    SUBJECTIVE                                                      Lora Vergara is a very pleasant 89 year old female who presents for her subsequent AWV:    Current providers (other than myself): Janelle (cardiology), Rosenda (pulmonology)    PMH, PSH, FH, SH, medications, allergies, immunizations, preventative health, and health risk assessment reviewed and updated as appropriate.    Past Medical History:   Diagnosis Date     Benign essential hypertension       Bronchiectasis (H)      CAD (coronary artery disease)     mild to moderate disease     Hypothyroidism      Microvascular angina (H)     chronic     Peripheral neuropathy      Pure hypercholesterolemia      Type 2 diabetes mellitus (H)     diet-controlled     Past Surgical History:   Procedure Laterality Date     APPENDECTOMY  01/01/1974     BLADDER SURGERY  01/01/1990    bladder suspension     CHOLECYSTECTOMY  01/01/1975     CORONARY ANGIOGRAPHY ADULT ORDER  02/01/2008    Mild CAD. LAD w/20% stenosis, RCA with 30-40% stenosis. No flow limiting obstructions.     HYSTERECTOMY  01/01/1970     OOPHORECTOMY  01/01/1974    bilateral     PHACOEMULSIFICATION CLEAR CORNEA WITH STANDARD INTRAOCULAR LENS IMPLANT Right 07/21/2015    Procedure: PHACOEMULSIFICATION CLEAR CORNEA WITH STANDARD INTRAOCULAR LENS IMPLANT;  Surgeon: Schuyler Chapa MD;  Location: [facility]     PHACOEMULSIFICATION CLEAR CORNEA WITH STANDARD INTRAOCULAR LENS IMPLANT Left 08/18/2015    Procedure: PHACOEMULSIFICATION CLEAR CORNEA WITH STANDARD INTRAOCULAR LENS IMPLANT;  Surgeon: Schuyler Chapa MD;  Location: [facility]     Family History   Problem Relation Age of Onset     Cerebrovascular Disease Mother      Neurologic Disorder Mother         PD     Pancreatic Cancer Father      Lung Cancer Brother         smoker     Diabetes Type 2  Son      Throat cancer Son         smoker     Diabetes No family hx of      Myocardial Infarction No family hx of      Coronary Artery Disease Early Onset No family hx of      Breast Cancer No family hx of      Colon Cancer No family hx of      Ovarian Cancer No family hx of      Social History     Occupational History     Occupation: Retired - book-keeper   Tobacco Use     Smoking status: Never Smoker     Smokeless tobacco: Never Used   Substance and Sexual Activity     Alcohol use: Yes     Comment: rare     Drug use: No     Sexual activity: Not Currently   Social History Narrative     x 2.    Two sons; one has since  passed away.    1 grandchildren.    4 great grandchildren.    2 great great grandchildren.     Allergies   Allergen Reactions     Hydrochlorothiazide Other (See Comments)     Low sodium     Current Outpatient Medications   Medication Sig     acetaminophen (TYLENOL) 500 MG tablet Take 500-1,000 mg by mouth every 6 hours as needed     aspirin 81 MG EC tablet Take 81 mg by mouth daily     atorvastatin (LIPITOR) 40 MG tablet Take 1 tablet (40 mg) by mouth daily     fluticasone (FLONASE) 50 MCG/ACT nasal spray Spray 2 sprays into both nostrils daily     furosemide (LASIX) 20 MG tablet Take 0.5 tablets (10 mg) by mouth daily     gabapentin (NEURONTIN) 300 MG capsule Take 1 capsule (300 mg) by mouth 3 times daily     isosorbide mononitrate (IMDUR) 30 MG 24 hr tablet Take 1 tablet (30 mg) by mouth daily ( take in addition to Isosorbide 60 mg for a total of 90 mg daily)     isosorbide mononitrate (IMDUR) 60 MG 24 hr tablet TAKE 1 TABLET BY MOUTH EVERY DAY     latanoprost (XALATAN) 0.005 % ophthalmic solution Place 1 drop into both eyes 3 times daily      levothyroxine (SYNTHROID/LEVOTHROID) 25 MCG tablet TAKE 1 TABLET BY MOUTH EVERY DAY     metoprolol tartrate (LOPRESSOR) 100 MG tablet TAKE 1/2 TABLET BY MOUTH EVERY MORNING AND TAKE 1 TABLET IN THE EVENING.     nitroGLYcerin (NITROSTAT) 0.4 MG sublingual tablet PLACE 1 TABLET UNDER THE TONGUE EVERY 5 MINUTES AS NEEDED FOR CHEST PAIN     order for DME Equipment being ordered: wheeled walker with brakes and a seat     polyethylene glycol (MIRALAX/GLYCOLAX) packet Take 1 packet by mouth daily as needed for constipation     pregabalin (LYRICA) 150 MG capsule Take 1 capsule by mouth twice a day as directed by physician.     ranolazine (RANEXA) 500 MG 12 hr tablet Take 1 tablet (500 mg) by mouth 2 times daily     Respiratory Therapy Supplies (AEROBIKA) RUT      vitamin D3 (CHOLECALCIFEROL) 2000 units tablet Take 4,000 Units by mouth daily     Immunization History   Administered  "Date(s) Administered     COVID-19,PF,Moderna 01/23/2021, 02/20/2021, 12/01/2021     Influenza (High Dose) 3 valent vaccine 10/05/2016, 10/10/2017, 10/12/2018     Influenza (IIV3) PF 10/03/2012, 10/17/2013, 10/06/2014, 10/09/2015     Influenza, Quad, High Dose, Pf, 65yr+ (Fluzone HD) 10/06/2020, 10/01/2021     Pneumo Conj 13-V (2010&after) 12/20/2016     Pneumococcal 23 valent 10/10/2003     Zoster vaccine recombinant adjuvanted (SHINGRIX) 10/01/2021     Zoster vaccine, live 06/03/2010     PREVENTATIVE HEALTH                                                      BMI: obese  Blood pressure: elevated on current regimen  Breast CA screening: screening no longer indicated  Colon CA screening: screening no longer indicated  Lung CA screening: n/a   Dexa: DUE - patient declines  Screening cholesterol: n/a - already being treated for this condition  Screening diabetes: n/a - already being treated for this condition  Alcohol misuse screening: alcohol use reviewed - no intervention indicated at this time  Immunizations: reviewed; Shingrix series DUE - not covered by Medicare (may obtain in pharmacy if desired)  and tetanus booster DUE - not covered by Medicare (may obtain in pharmacy if desired)     HEALTH RISK ASSESSMENT                                                      In general, how would you rate your overall physical health? good  Outside of work, how many days during the week do you exercise? None  Outside of work, approximately how many minutes a day do you exercise? n/a     If you drink alcohol do you typically have >3 drinks per day or >7 drinks per week? No  Do you usually eat at least 4 servings of fruit and vegetables a day, include whole grains & fiber and avoid regularly eating high fat or \"junk\" foods? Yes     Do you have any problems taking medications regularly? No  Do you have any side effects from medications? No    Assistance with daily activities: YES - housework, meal preparation, and " "transportation    Safety concerns: No    Fall risk assessment: completed today (see ambulatory assessments)    Hearing concerns: YES - difficulty understanding soft or whispered speech    In the past 6 months, have you been bothered by leaking of urine: Yes    In general, how would you rate your overall mental or emotional health: very good    PHQ-2/PHQ-9 assessment: completed today (see ambulatory assessments)    Additional concerns today: No    OBJECTIVE                                                      BP (!) 145/76   Pulse 75   Temp 96.8  F (36  C) (Temporal)   Resp 14   Ht 1.6 m (5' 3\")   Wt 81.2 kg (179 lb)   LMP  (LMP Unknown)   SpO2 90%   BMI 31.71 kg/m    Constitutional: well-appearing  Head, Ears, and Eyes: normocephalic; normal external auditory canal and pinna; tympanic membranes visualized and normal; normal lids and conjunctivae  Neck: supple, symmetric, no thyromegaly or lymphadenopathy  Respiratory: normal respiratory effort; clear to auscultation bilaterally  Cardiovascular: regular rate and rhythm; no edema  Musculoskeletal: walks with walker  Psych: normal judgment and insight; normal mood and affect; recent and remote memory intact    Cognitive impairment noted: No  ---  (Note was completed, in part, with [employer] voice-recognition software. Documentation was reviewed, but some grammatical, spelling, and word errors may remain.)  "

## 2022-02-28 NOTE — LETTER
02/28/22      Lora Vergara  [address]   [place] [facility] [address]        Dear ,    It was a pleasure meeting you the other day! I hope this finds you well.    I wanted to let you know that your labs came back as normal.    Based on your results, please continue your current medications without change.    Let's plan on diabetes follow-up in 6 months.    Please feel free to contact me with any questions or concerns.      Take care,    Paty Chinchilla MD   [facility]  [address] [place] [address]  T: [phone], F: [phone]    Resulted Orders   Comprehensive metabolic panel   Result Value Ref Range    Sodium 136 133 - 144 mmol/L    Potassium 4.0 3.4 - 5.3 mmol/L    Chloride 103 94 - 109 mmol/L    Carbon Dioxide (CO2) 25 20 - 32 mmol/L    Anion Gap 8 3 - 14 mmol/L    Urea Nitrogen 15 7 - 30 mg/dL    Creatinine 0.80 0.52 - 1.04 mg/dL    Calcium 8.9 8.5 - 10.1 mg/dL    Glucose 115 (H) 70 - 99 mg/dL    Alkaline Phosphatase 68 40 - 150 U/L    AST 16 0 - 45 U/L    ALT 18 0 - 50 U/L    Protein Total 7.0 6.8 - 8.8 g/dL    Albumin 3.4 3.4 - 5.0 g/dL    Bilirubin Total 0.6 0.2 - 1.3 mg/dL    GFR Estimate 67 >60 mL/min/1.73m2   Lipid Profile   Result Value Ref Range    Cholesterol 134 <200 mg/dL    Triglycerides 103 <150 mg/dL    Direct Measure HDL 62 >=50 mg/dL    LDL Cholesterol Calculated 51 <=100 mg/dL    Non HDL Cholesterol 72 <130 mg/dL    Patient Fasting > 8hrs? No     Narrative   CBC with platelets   Result Value Ref Range    WBC Count 4.3 4.0 - 11.0 10e3/uL    RBC Count 3.94 3.80 - 5.20 10e6/uL    Hemoglobin 12.4 11.7 - 15.7 g/dL    Hematocrit 38.9 35.0 - 47.0 %    MCV 99 78 - 100 fL    MCH 31.5 26.5 - 33.0 pg    MCHC 31.9 31.5 - 36.5 g/dL    RDW 12.2 10.0 - 15.0 %    Platelet Count 221 150 - 450 10e3/uL   Hemoglobin A1c   Result Value Ref Range    Hemoglobin A1C 5.8 (H) 0.0 - 5.6 %   TSH with free T4 reflex   Result Value Ref Range    TSH 2.89  0.40 - 4.00 mU/L   Vitamin D Deficiency   Result Value Ref Range    Vitamin D, Total (25-Hydroxy) 65 20 - 75 ug/L   Albumin Random Urine Quantitative with Creat Ratio   Result Value Ref Range    Creatinine Urine mg/dL 30 mg/dL    Albumin Urine mg/L <5 mg/L    Albumin Urine mg/g Cr

## 2022-03-12 ENCOUNTER — TELEPHONE (OUTPATIENT)
Dept: NURSING | Facility: CLINIC | Age: 87
End: 2022-03-12
Payer: MEDICARE

## 2022-03-12 NOTE — LETTER
March 14, 2022      Lora Vergara  [address]   [place] [address]        Dear ,    We are writing to inform you of your test results.  Enclosed you will find a copy of your recent test results.     If you have any questions or concerns, please call the clinic at the number listed above.       Sincerely,

## 2022-03-12 NOTE — TELEPHONE ENCOUNTER
Patient calling requesting a lab letter with her lab results from 2/28 be sent to her home address on file. Reports she never received them and would like a copy at her home. Please advise.     Joyce Medina RN 03/12/22 8:47 AM   [facility] Triage Nurse Advisor

## 2022-04-07 DIAGNOSIS — I25.85 CARDIAC MICROVASCULAR DISEASE: ICD-10-CM

## 2022-04-07 RX ORDER — RANOLAZINE 500 MG/1
500 TABLET, EXTENDED RELEASE ORAL 2 TIMES DAILY
Qty: 180 TABLET | Refills: 2 | Status: SHIPPED | OUTPATIENT
Start: 2022-04-07 | End: 2022-10-12

## 2022-04-07 NOTE — TELEPHONE ENCOUNTER
[facility] Cardiology Refill Guideline reviewed.  Medication meets criteria for refill.    Received refill request for:  Ranexa  Last OV was: 11/10/2021 with Dr. Luis  Labs/EKG: na  F/U scheduled: orders in Epic for 11/2022  New script sent to: [facility]    .

## 2022-04-10 NOTE — PROGRESS NOTES
[facility]    Medicine Progress Note - Hospitalist Service       Date of Admission:  2/10/2019  Assessment & Plan      Lora Vergara is a 89 year old female admitted on 2/10/2019.     Sepsis secondary to community-acquired pneumonia most likely viral  Patient with clear onset of fevers chills and productive cough starting Tuesday  She appears systemically ill, with elevation of fever to 101 and tachycardia 105,  Previous rounds of antibiotics will for chronic cough, not for fever  It appears this is an acute insult, but if her symptoms do not improve as expected, could consider more advanced imaging of her chest.  She has no smoking history  She was initiated on ceftriaxone and azithromycin in the emergency department  Pro calcitonin was negative\  CT scan with extensive pneumonia, no PE or mass  Sputum with nonpathologic candida  Plan:  - continue supplemental oxygen, IV ceftriaxone, IV azithromycin   -continue duonebs  - slow improvement noted  - increase PM melatonin    Septic encephalopathy  Mild confusion this morning, but thankfully she has great insight into this  - delirium precautions    Diabetes mellitus type 2  A1c of 7.0 currently  -A1c, moderate sliding scale insulin, diabetic diet  -Hold home metformin     History of coronary artery disease  Microvascular angina  Her last angiogram was in 2010, [facility]  She has symptoms of chest and jaw pain, that improved with full long-acting anginal medications  -Continue Imdur, amlodipine, and as needed nitroglycerin  -Continue Ranexa   -restart home diuretics    Peripheral neuropathy  - continue the lyrica    HLD  - continue the atorvastatin         Diet: Room Service  Low Saturated Fat Na <2400 mg    DVT Prophylaxis: Enoxaparin (Lovenox) SQ  Kumari Catheter: not present  Code Status: DNR/DNI      Disposition Plan   Expected discharge: 2 - 3 days, recommended to prior living arrangement once O2 use less than 2  liters/minute.  Entered: Lester Ron DO 02/14/2019, 4:50 PM       Care discussed with patient    Lester MAYORGA DO Asaf  Hospitalist Service  [facility]    ______________________________________________________________________    Interval History   Slept well, feels better, no more fever, but cough remains nonproductive. Sputum with candida, nonsignificant    Data reviewed today: I reviewed all medications, new labs and imaging results over the last 24 hours. I personally reviewed no images or EKG's today.    Physical Exam   Vital Signs: Temp: 96.3  F (35.7  C) Temp src: Oral BP: 141/65 Pulse: 77 Heart Rate: 85 Resp: 20 SpO2: 96 % O2 Device: Nasal cannula Oxygen Delivery: 2 LPM  Weight: 200 lbs 0 oz  Constitutional: She is oriented to person, place, and time. She appears well-developed and well-nourished.   HENT:   Head: Normocephalic and atraumatic.   Eyes: Conjunctivae and EOM are normal. Pupils are equal, round, and reactive to light.   Neck: Neck supple. No thyromegaly present.   Cardiovascular: Normal rate, regular rhythm, normal heart sounds and intact distal pulses.   Pulmonary/Chest: She is in respiratory distress. She has rales. She exhibits no tenderness.   Abdominal: Soft. Bowel sounds are normal.   Musculoskeletal: Normal range of motion. She exhibits no edema or deformity.   Neurological: She is alert and oriented to person, place, and time. No cranial nerve deficit. Coordination normal.   Skin: Skin is warm and dry. Capillary refill takes less than 2 seconds.   Psychiatric: She has a normal mood and affect. Her behavior is normal. Judgment and thought content normal.           Data   Recent Labs   Lab 02/13/19  0720 02/12/19  0727 02/11/19  0740   WBC 6.7 5.7 4.2   HGB 12.5 13.1 12.8   MCV 88 88 88    184 185   * 131* 134   POTASSIUM 3.6 3.6 3.5   CHLORIDE 92* 95 99   CO2 29 28 28   BUN 9 8 7   CR 0.54 0.52 0.61   ANIONGAP 9 8 7   VIOLETA 8.6 8.5 8.2*   * 146*  124*   ALBUMIN 2.9* 2.9* 3.0*   PROTTOTAL 7.0 6.8 6.5*   BILITOTAL 1.0 0.7 0.5   ALKPHOS 44 40 41   ALT 20 15 16   AST 27 24 19     No results found for this or any previous visit (from the past 24 hour(s)).  Medications       aspirin  81 mg Oral Daily     atorvastatin  40 mg Oral Daily     azithromycin  500 mg Intravenous Q24H     bisacodyl  5 mg Oral Daily     cefTRIAXone  1 g Intravenous Q24H     enoxaparin  40 mg Subcutaneous Q24H     furosemide  10 mg Oral Daily     insulin aspart  1-7 Units Subcutaneous TID AC     insulin aspart  1-5 Units Subcutaneous At Bedtime     ipratropium - albuterol 0.5 mg/2.5 mg/3 mL  3 mL Nebulization 4x Daily     isosorbide mononitrate  60 mg Oral Daily     latanoprost  1 drop Both Eyes At Bedtime     levothyroxine  25 mcg Oral Daily     metoprolol tartrate  100 mg Oral At Bedtime     metoprolol tartrate  50 mg Oral QAM     pregabalin  150 mg Oral BID     ranolazine  500 mg Oral BID     vitamin D3  4,000 Units Oral Daily      no

## 2022-04-11 DIAGNOSIS — E03.9 HYPOTHYROIDISM: ICD-10-CM

## 2022-04-13 RX ORDER — LEVOTHYROXINE SODIUM 25 UG/1
TABLET ORAL
Qty: 90 TABLET | Refills: 2 | Status: SHIPPED | OUTPATIENT
Start: 2022-04-13 | End: 2023-01-01

## 2022-04-13 NOTE — TELEPHONE ENCOUNTER
Prescription approved per [facility] Refill Protocol.  Branden Alegre RN  [facility] Triage Nurse

## 2022-05-02 DIAGNOSIS — I10 HYPERTENSION GOAL BP (BLOOD PRESSURE) < 140/90: ICD-10-CM

## 2022-05-04 RX ORDER — METOPROLOL TARTRATE 100 MG
TABLET ORAL
Qty: 135 TABLET | Refills: 0 | Status: SHIPPED | OUTPATIENT
Start: 2022-05-04 | End: 2022-07-28

## 2022-05-04 NOTE — TELEPHONE ENCOUNTER
Routing refill request to provider for review/approval because:  Labs out of range:    BP Readings from Last 3 Encounters:   02/28/22 (!) 145/76   11/10/21 125/80   10/13/21 131/68       Lorena Martinez RN

## 2022-05-16 ENCOUNTER — TELEPHONE (OUTPATIENT)
Dept: INTERNAL MEDICINE | Facility: CLINIC | Age: 87
End: 2022-05-16
Payer: MEDICARE

## 2022-05-16 DIAGNOSIS — G62.9 NEUROPATHY: ICD-10-CM

## 2022-05-16 DIAGNOSIS — L98.9 SKIN LESION: Primary | ICD-10-CM

## 2022-05-16 RX ORDER — PREGABALIN 150 MG/1
CAPSULE ORAL
Qty: 180 CAPSULE | Refills: 1 | Status: SHIPPED | OUTPATIENT
Start: 2022-05-16 | End: 2022-08-11

## 2022-05-16 NOTE — TELEPHONE ENCOUNTER
Patient calling requestin) schedule follow up visit for diabetes - scheduled     Appointments in Next Year    Sep 15, 2022 10:30 AM  (Arrive by 10:10 AM)  Provider Visit with Paty Chinchilla MD  [facility] ([facility] ) [phone]        2) refill on Lyrica -states she goes through the Lyrica Assistance Program - pended     pregabalin (LYRICA) 150 MG capsule 180 capsule 1 12/15/2021  No   Sig: Take 1 capsule by mouth twice a day as directed by physician.   Sent to pharmacy as: Pregabalin 150 MG Oral Capsule (Lyrica)   Class: E-Prescribe   Order: [id number]   E-Prescribing Status: Receipt confirmed by pharmacy (12/15/2021  9:06 AM CST)    [facility] - [place], [place] - [id number]     Last visit 22 - establish care per letter follow up 6 months     3) referral to dermatology or sooner work in for PCP to assess her face - On her face has some patches (new) - history of skin cancer and asking about being referred to dermatology    Pt asking for call back, detailed message leeroy HERNANDEZ Triage RN  [facility] ;

## 2022-05-16 NOTE — TELEPHONE ENCOUNTER
Lyrica refilled.    Dermatology referral placed - patient will be contacted to schedule an appointment.

## 2022-05-19 NOTE — TELEPHONE ENCOUNTER
The pt stated she rcvd a phone call with the message from [facility] that they have a sooner appt. Please call the pt after 3 today. Thanks.

## 2022-05-19 NOTE — TELEPHONE ENCOUNTER
Triage did not call this patient.    Thank you,    Mi SANTOYORN BSN  [facility]- [phone]

## 2022-05-23 ENCOUNTER — TELEPHONE (OUTPATIENT)
Dept: INTERNAL MEDICINE | Facility: CLINIC | Age: 87
End: 2022-05-23
Payer: MEDICARE

## 2022-05-23 NOTE — TELEPHONE ENCOUNTER
Patient calling to report that she has not received Lyrica.  Writer called [facility] to confirm prescription. Pharmacy states that the address that they have on file for the patient doesn't match the city that they have listed. Advised pharmacy to contact the patient for confirmation. Pharmacy insisted that the clinic contact the patient for the correct address and have the patient call to update the pharmacy.    Patient notified to call [facility] to update her address. Patient to call triage back when this is completed to authorize rx for Lyrica. States that the prescription will not be released unless authorized through a call back.    Sendy Pack RN

## 2022-05-23 NOTE — TELEPHONE ENCOUNTER
Patient called the pharmacy and confirmed her address is [place]. Pharmacy is going to mail her medication today and she should receive it on Wednesday.

## 2022-06-06 DIAGNOSIS — I25.9 CHRONIC ISCHEMIC HEART DISEASE: ICD-10-CM

## 2022-06-09 RX ORDER — ISOSORBIDE MONONITRATE 60 MG/1
60 TABLET, EXTENDED RELEASE ORAL DAILY
Qty: 90 TABLET | Refills: 2 | Status: SHIPPED | OUTPATIENT
Start: 2022-06-09 | End: 2022-09-15

## 2022-06-09 NOTE — TELEPHONE ENCOUNTER
Routing refill request to provider for review/approval because:  BP Readings from Last 3 Encounters:   02/28/22 (!) 145/76   11/10/21 125/80   10/13/21 131/68     Future Appointments 6/9/2022 - 12/6/2022        Date Visit Type Length Department Provider     6/17/2022  3:45 PM NEW 15 min  DERMATOLOGY Adelia Anderson, PAR[facility]    Location Instructions:     [address] [place] [id number]              9/15/2022 10:30 AM OFFICE VISIT 30 min  INTERNAL MEDICINE Paty Chinchilla MD Hira Nazeer, RN  [facility] Triage Nurse

## 2022-06-17 ENCOUNTER — OFFICE VISIT (OUTPATIENT)
Dept: DERMATOLOGY | Facility: CLINIC | Age: 87
End: 2022-06-17
Payer: MEDICARE

## 2022-06-17 DIAGNOSIS — L57.0 ACTINIC KERATOSIS: Primary | ICD-10-CM

## 2022-06-17 DIAGNOSIS — D48.5 NEOPLASM OF UNCERTAIN BEHAVIOR OF SKIN: ICD-10-CM

## 2022-06-17 PROCEDURE — 17003 DESTRUCT PREMALG LES 2-14: CPT | Mod: 59 | Performed by: PHYSICIAN ASSISTANT

## 2022-06-17 PROCEDURE — 88305 TISSUE EXAM BY PATHOLOGIST: CPT | Performed by: PATHOLOGY

## 2022-06-17 PROCEDURE — 99212 OFFICE O/P EST SF 10 MIN: CPT | Mod: 25 | Performed by: PHYSICIAN ASSISTANT

## 2022-06-17 PROCEDURE — 17000 DESTRUCT PREMALG LESION: CPT | Mod: 59 | Performed by: PHYSICIAN ASSISTANT

## 2022-06-17 PROCEDURE — 11102 TANGNTL BX SKIN SINGLE LES: CPT | Performed by: PHYSICIAN ASSISTANT

## 2022-06-17 NOTE — LETTER
6/17/2022         RE: Lora Vergara  [address]  [place] [address]        Dear Colleague,    Thank you for referring your patient, Lora Vergara, to the [facility]. Please see a copy of my visit note below.    HPI:   Chief complaints: Lora Vergara is a pleasant 89 year old female who presents for evaluation of spots on the face. She has three scaly spots. She does have a history of skin cancer; unsure which kind on the nose several years ago.       PHYSICAL EXAM:    LMP  (LMP Unknown)   Breastfeeding No   Skin exam performed as follows: Type 2 skin. Mood appropriate  Alert and Oriented X 3. Well developed, well nourished in no distress.  General appearance: Normal  Head including face: Normal  Eyes: conjunctiva and lids: Normal  Mouth: Lips, teeth, gums: Normal  Neck: Normal  Cardiovascular: Exam of peripheral vascular system by observation for swelling, varicosities, edema: Normal  Right upper extremity: Normal  Left upper extremity: Normal  Right lower extremity: Normal  Left lower extremity: Normal  Skin: Scalp and body hair: See below    10 mm pink patch on the left forehead  Pink gritty papule on the left brow x 1, right upper brow x 1    ASSESSMENT/PLAN:     1. R/o BCC on the left forehead. Shave biopsy performed.  Area cleaned and anesthetized with 1% lidocaine with epinephrine.  Dermablade used to remove the lesion and sent to pathology. Bleeding was cauterized. Pt tolerated procedure well with no complications.   2. Actinic keratosis on the left brow x 1, right upper brow x 1. As precancerous, cryosurgery performed. Advised on blistering and post-op care. Advised if not resolved in 1-2 months to return for evaluation          Follow-up: pending path  CC:   Scribed By: Adelia Anderson, MS, MARIELENA          Again, thank you for allowing me to participate in the care of your patient.        Sincerely,        Adelia Anderson PA-C

## 2022-06-17 NOTE — PROGRESS NOTES
HPI:   Chief complaints: Lora Vergara is a pleasant 89 year old female who presents for evaluation of spots on the face. She has three scaly spots. She does have a history of skin cancer; unsure which kind on the nose several years ago.       PHYSICAL EXAM:    LMP  (LMP Unknown)   Breastfeeding No   Skin exam performed as follows: Type 2 skin. Mood appropriate  Alert and Oriented X 3. Well developed, well nourished in no distress.  General appearance: Normal  Head including face: Normal  Eyes: conjunctiva and lids: Normal  Mouth: Lips, teeth, gums: Normal  Neck: Normal  Cardiovascular: Exam of peripheral vascular system by observation for swelling, varicosities, edema: Normal  Right upper extremity: Normal  Left upper extremity: Normal  Right lower extremity: Normal  Left lower extremity: Normal  Skin: Scalp and body hair: See below    10 mm pink patch on the left forehead  Pink gritty papule on the left brow x 1, right upper brow x 1    ASSESSMENT/PLAN:     1. R/o BCC on the left forehead. Shave biopsy performed.  Area cleaned and anesthetized with 1% lidocaine with epinephrine.  Dermablade used to remove the lesion and sent to pathology. Bleeding was cauterized. Pt tolerated procedure well with no complications.   2. Actinic keratosis on the left brow x 1, right upper brow x 1. As precancerous, cryosurgery performed. Advised on blistering and post-op care. Advised if not resolved in 1-2 months to return for evaluation          Follow-up: pending path  CC:   Scribed By: Adelia Anderson, MS, PAKINA

## 2022-06-17 NOTE — PATIENT INSTRUCTIONS
Wound Care Instructions     FOR SUPERFICIAL WOUNDS     [facility]  [phone]                 AFTER 24 HOURS YOU SHOULD REMOVE THE BANDAGE AND BEGIN DAILY DRESSING CHANGES AS FOLLOWS:     1) Remove Dressing.     2) Clean and dry the area with tap water using a Q-tip or sterile gauze pad.     3) Apply Vaseline, Aquaphor, Polysporin ointment or Bacitracin ointment over entire wound.  Do NOT use Neosporin ointment.     4) Cover the wound with a band-aid, or a sterile non-stick gauze pad and micropore paper tape    REPEAT THESE INSTRUCTIONS AT LEAST ONCE A DAY UNTIL THE WOUND HAS COMPLETELY HEALED.    It is an old wives tale that a wound heals better when it is exposed to air and allowed to dry out. The wound will heal faster with a better cosmetic result if it is kept moist with ointment and covered with a bandage.    **Do not let the wound dry out.**    Supplies Needed:      *Cotton tipped applicators (Q-tips)    *Vaseline, Aquaphor, Polysporin or Bacitracin Ointment (NOT NEOSPORIN)    *Band-aids or non-stick gauze pads and micropore paper tape.      PATIENT INFORMATION:    During the healing process you will notice a number of changes. All wounds develop a small halo of redness surrounding the wound.  This means healing is occurring. Severe itching with extensive redness usually indicates sensitivity to the ointment or bandage tape used to dress the wound.  You should call our office if this develops.      Swelling  and/or discoloration around your surgical site is common, particularly when performed around the eye.    All wounds normally drain.  The larger the wound the more drainage there will be.  After 7-10 days, you will notice the wound beginning to shrink and new skin will begin to grow.  The wound is healed when you can see skin has formed over the entire area.  A healed wound has a healthy, shiny look to the surface and is red to dark pink in color to normalize.  Wounds may  take approximately 4-6 weeks to heal.  Larger wounds may take 6-8 weeks.  After the wound is healed you may discontinue dressing changes.    You may experience a sensation of tightness as your wound heals. This is normal and will gradually subside.    Your healed wound may be sensitive to temperature changes. This sensitivity improves with time, but if you re having a lot of discomfort, try to avoid temperature extremes.    Patients frequently experience itching after their wound appears to have healed because of the continue healing under the skin.  Plain Vaseline will help relieve the itching.      POSSIBLE COMPLICATIONS    BLEEDING:    Leave the bandage in place.  Use tightly rolled up gauze or a cloth to apply direct pressure over the bandage for 30  minutes.  Reapply pressure for an additional 30 minutes if necessary  Use additional gauze and tape to maintain pressure once the bleeding has stopped.

## 2022-06-21 ENCOUNTER — TELEPHONE (OUTPATIENT)
Dept: DERMATOLOGY | Facility: CLINIC | Age: 87
End: 2022-06-21
Payer: MEDICARE

## 2022-06-21 LAB
PATH REPORT.COMMENTS IMP SPEC: NORMAL
PATH REPORT.COMMENTS IMP SPEC: NORMAL
PATH REPORT.FINAL DX SPEC: NORMAL
PATH REPORT.GROSS SPEC: NORMAL
PATH REPORT.MICROSCOPIC SPEC OTHER STN: NORMAL
PATH REPORT.RELEVANT HX SPEC: NORMAL

## 2022-06-21 NOTE — LETTER
[facility]  [address]  [id number] [phone]        6/21/2022       Lora Vergara  [address]   [place] [facility] [address]      Dear Lora:    You are scheduled for Mohs Surgery on: Thursday, August 25, 2022.Check in at 8:45 am.    Please check in at [facility], [address].     You don't need to arrive more than 5-10 minutes prior to your appointment time.     Be sure to eat a good breakfast and bathe and wash your hair prior to surgery.     If you are taking any anti-coagulants that are prescribed by your Doctor (such as Coumadin/Warfarin, Plavix, Aspirin, Ibuprofen), please continue taking them.     However, if you are taking anti-coagulants over the counter without a Doctor's order for a medical condition, please discontinue them 10 days prior to surgery.     Please wear loose comfortable clothing as it could possibly be 4-6 hours until your surgery is completed depending upon how many layers of tissue need to be removed.      Thank you,    ELIAS Rosado MD

## 2022-06-21 NOTE — TELEPHONE ENCOUNTER
----- Message from Adelia Anderson PA-C sent at 6/21/2022 10:03 AM CDT -----  Left forehead BCC please schedule mohs

## 2022-06-21 NOTE — TELEPHONE ENCOUNTER
Need to send mohs packet to home address.    Called patient:  Patient notified and educated on test results   Mohs procedure explained- all questions answered  appointment scheduled-     Thank you,    Mi SANTOYORN BSN  [facility]  [phone]

## 2022-07-26 DIAGNOSIS — I10 HYPERTENSION GOAL BP (BLOOD PRESSURE) < 140/90: ICD-10-CM

## 2022-07-28 RX ORDER — METOPROLOL TARTRATE 100 MG
TABLET ORAL
Qty: 135 TABLET | Refills: 0 | Status: SHIPPED | OUTPATIENT
Start: 2022-07-28 | End: 2022-09-08

## 2022-07-28 NOTE — TELEPHONE ENCOUNTER
Routing refill request to provider for review/approval because:  BP Readings from Last 3 Encounters:   02/28/22 (!) 145/76   11/10/21 125/80   10/13/21 131/68         Branden Alegre RN  [facility] Triage Nurse

## 2022-08-11 DIAGNOSIS — G62.9 NEUROPATHY: ICD-10-CM

## 2022-08-11 RX ORDER — PREGABALIN 150 MG/1
CAPSULE ORAL
Qty: 180 CAPSULE | Refills: 1 | Status: SHIPPED | OUTPATIENT
Start: 2022-08-11 | End: 2022-08-16

## 2022-08-11 NOTE — TELEPHONE ENCOUNTER
Medication Question or Refill    What medication are you calling about (include dose and sig)?: pregrabalin    Controlled Substance Agreement on file:   CSA -- Patient Level:    CSA: None found at the patient level.       Who prescribed the medication?: Dr. Chinchilla    Do you need a refill? Yes: pregabalin    When did you use the medication last? Today    Patient offered an appointment? No    Do you have any questions or concerns?  No    Preferred Pharmacy:   [facility] [address] [facility] [address]  [address] [facility] [address]  [place] [address]  Phone: [phone] Fax: [phone]      Okay to leave a detailed message?: Yes at Cell number on file:    Telephone Information:   Mobile [phone]     Ebonie Lyons,  [facility]

## 2022-08-16 DIAGNOSIS — G62.9 NEUROPATHY: ICD-10-CM

## 2022-08-16 RX ORDER — PREGABALIN 150 MG/1
CAPSULE ORAL
Qty: 180 CAPSULE | Refills: 3 | Status: SHIPPED | OUTPATIENT
Start: 2022-08-16 | End: 2022-08-29

## 2022-08-18 NOTE — TELEPHONE ENCOUNTER
Pt calling to check on status of refill request. States that she is going to run out very soon. Pt requesting refill when possible.    Ebonie Lyons,  [facility]

## 2022-08-22 DIAGNOSIS — G62.9 NEUROPATHY: ICD-10-CM

## 2022-08-22 RX ORDER — PREGABALIN 150 MG/1
CAPSULE ORAL
Qty: 180 CAPSULE | Refills: 3 | OUTPATIENT
Start: 2022-08-22

## 2022-08-22 NOTE — TELEPHONE ENCOUNTER
This refill request is a duplicate previously sent to pharmacy or currently in review.  Refused medication to pharmacy as duplicate.   Lorena Martinez RN

## 2022-08-22 NOTE — TELEPHONE ENCOUNTER
Medication Question or Refill        What medication are you calling about (include dose and sig)?:   pregabalin (LYRICA) 150 MG capsule  Controlled Substance Agreement on file:   CSA -- Patient Level:    CSA: None found at the patient level.       Who prescribed the medication?: sharon    Do you need a refill? Yes:     When did you use the medication last? Today     Patient offered an appointment? No    Do you have any questions or concerns?  No    Preferred Pharmacy:           WRITTEN PRESCRIPTION REQUESTED  No address on file      [facility] - [place], [place] - [address]  [address]  [place] [facility] [place] [address]  Phone: [phone] Fax: [phone]      Okay to leave a detailed message?: Yes at Cell number on file:    Telephone Information:   Mobile [phone]

## 2022-08-24 NOTE — PROGRESS NOTES
Surgical Office Location:  [facility]  [address] [place] [id number]

## 2022-08-25 ENCOUNTER — OFFICE VISIT (OUTPATIENT)
Dept: DERMATOLOGY | Facility: CLINIC | Age: 87
End: 2022-08-25
Payer: MEDICARE

## 2022-08-25 VITALS — SYSTOLIC BLOOD PRESSURE: 118 MMHG | OXYGEN SATURATION: 96 % | HEART RATE: 63 BPM | DIASTOLIC BLOOD PRESSURE: 69 MMHG

## 2022-08-25 DIAGNOSIS — C44.319 BASAL CELL CARCINOMA (BCC) OF FOREHEAD: Primary | ICD-10-CM

## 2022-08-25 PROCEDURE — 17311 MOHS 1 STAGE H/N/HF/G: CPT | Performed by: DERMATOLOGY

## 2022-08-25 PROCEDURE — 17312 MOHS ADDL STAGE: CPT | Performed by: DERMATOLOGY

## 2022-08-25 PROCEDURE — 13132 CMPLX RPR F/C/C/M/N/AX/G/H/F: CPT | Performed by: DERMATOLOGY

## 2022-08-25 ASSESSMENT — PAIN SCALES - GENERAL: PAINLEVEL: NO PAIN (0)

## 2022-08-25 NOTE — PATIENT INSTRUCTIONS
Sutured Wound Care     [facility]: [phone]    [facility]: [phone]          No strenuous activity for 48 hours. Resume moderate activity in 48 hours. No heavy exercising until you are seen for follow up in one week.     Take Tylenol as needed for discomfort.                         Do not drink alcoholic beverages for 48 hours.     Keep the pressure bandage in place for 24 hours. If the bandage becomes blood tinged or loose, reinforce it with gauze and tape.        (Refer to the reverse side of this page for management of bleeding).    Remove pressure bandage in 24 hours     Leave the flat bandage in place until your follow up appointment.    Keep the bandage dry. Wash around it carefully.    If the tape becomes soiled or starts to come off, reinforce it with additional paper tape.    Do not smoke for 3 weeks; smoking is detrimental to wound healing.    It is normal to have swelling and bruising around the surgical site. The bruising will fade in approximately 10-14 days. Elevate the area to reduce swelling.    Numbness, itchiness and sensitivity to temperature changes can occur after surgery and may take up to 18 months to normalize.      POSSIBLE COMPLICATIONS    BLEEDING:    Leave the bandage in place.  Use tightly rolled up gauze or a cloth to apply direct pressure over the bandage for 20   minutes.  Reapply pressure for an additional 20 minutes if necessary  Call the office or go to the nearest emergency room if pressure fails to stop the bleeding.  Use additional gauze and tape to maintain pressure once the bleeding has stopped.        PAIN:    Post operative pain should slowly get better, never worse.  A severe increase in pain may indicate a problem. Call the office if this occurs.    In case of emergency phone:Dr Rosado [phone]

## 2022-08-25 NOTE — PROGRESS NOTES
Lora Vergara is an extremely pleasant 89 year old year old female patient here today for evaluation and managment of basal cell carcinoma on left forehead.  Patient has no other skin complaints today.  Remainder of the HPI, Meds, PMH, Allergies, FH, and SH was reviewed in chart.      Past Medical History:   Diagnosis Date     Basal cell carcinoma      Benign essential hypertension      Bronchiectasis (H)      CAD (coronary artery disease)     mild to moderate disease     Hypothyroidism      Microvascular angina (H)     chronic     Obesity (BMI 30-39.9)      Peripheral neuropathy      Pure hypercholesterolemia      Type 2 diabetes mellitus (H)     diet-controlled       Past Surgical History:   Procedure Laterality Date     APPENDECTOMY  01/01/1974     BLADDER SURGERY  01/01/1990    bladder suspension     CHOLECYSTECTOMY  01/01/1975     CORONARY ANGIOGRAPHY ADULT ORDER  02/01/2008    Mild CAD. LAD w/20% stenosis, RCA with 30-40% stenosis. No flow limiting obstructions.     HYSTERECTOMY  01/01/1970     OOPHORECTOMY  01/01/1974    bilateral     PHACOEMULSIFICATION CLEAR CORNEA WITH STANDARD INTRAOCULAR LENS IMPLANT Right 07/21/2015    Procedure: PHACOEMULSIFICATION CLEAR CORNEA WITH STANDARD INTRAOCULAR LENS IMPLANT;  Surgeon: Schuyler Chapa MD;  Location: [facility]     PHACOEMULSIFICATION CLEAR CORNEA WITH STANDARD INTRAOCULAR LENS IMPLANT Left 08/18/2015    Procedure: PHACOEMULSIFICATION CLEAR CORNEA WITH STANDARD INTRAOCULAR LENS IMPLANT;  Surgeon: Schuyler Chapa MD;  Location: [facility]        Family History   Problem Relation Age of Onset     Cerebrovascular Disease Mother      Neurologic Disorder Mother         PD     Pancreatic Cancer Father      Lung Cancer Brother         smoker     Diabetes Type 2  Son      Throat cancer Son         smoker     Diabetes No family hx of      Myocardial Infarction No family hx of      Coronary Artery Disease Early Onset No family hx of      Breast Cancer No family hx of       Colon Cancer No family hx of      Ovarian Cancer No family hx of        Social History     Socioeconomic History     Marital status:      Spouse name: Not on file     Number of children: Not on file     Years of education: Not on file     Highest education level: Not on file   Occupational History     Occupation: Retired - book-keeper   Tobacco Use     Smoking status: Never Smoker     Smokeless tobacco: Never Used   Substance and Sexual Activity     Alcohol use: Yes     Comment: rare     Drug use: No     Sexual activity: Not Currently   Other Topics Concern     Parent/sibling w/ CABG, MI or angioplasty before 65F 55M? No      Service Not Asked     Blood Transfusions Not Asked     Caffeine Concern Yes     Comment: 4 cups caffeine per day     Occupational Exposure Not Asked     Hobby Hazards Not Asked     Sleep Concern No     Stress Concern No     Weight Concern No     Special Diet No     Back Care Not Asked     Exercise Yes     Comment: exercises 30 minutes, 4 days week     Bike Helmet Not Asked     Seat Belt Not Asked     Self-Exams Not Asked   Social History Narrative     x 2.    Two sons; one has since passed away.    1 grandchildren.    4 great grandchildren.    2 great great grandchildren.     Social Determinants of Health     Financial Resource Strain: Not on file   Food Insecurity: Not on file   Transportation Needs: Not on file   Physical Activity: Not on file   Stress: Not on file   Social Connections: Not on file   Intimate Partner Violence: Not on file   Housing Stability: Not on file       Outpatient Encounter Medications as of 8/25/2022   Medication Sig Dispense Refill     acetaminophen (TYLENOL) 500 MG tablet Take 500-1,000 mg by mouth every 6 hours as needed       aspirin 81 MG EC tablet Take 81 mg by mouth daily       atorvastatin (LIPITOR) 40 MG tablet Take 1 tablet (40 mg) by mouth daily 90 tablet 3     fluticasone (FLONASE) 50 MCG/ACT nasal spray Spray 2 sprays into both  nostrils daily 16 g 3     furosemide (LASIX) 20 MG tablet Take 0.5 tablets (10 mg) by mouth daily 45 tablet 3     gabapentin (NEURONTIN) 300 MG capsule Take 1 capsule (300 mg) by mouth 3 times daily 60 capsule 3     isosorbide mononitrate (IMDUR) 30 MG 24 hr tablet Take 1 tablet (30 mg) by mouth daily ( take in addition to Isosorbide 60 mg for a total of 90 mg daily) 90 tablet 3     isosorbide mononitrate (IMDUR) 60 MG 24 hr tablet Take 1 tablet (60 mg) by mouth daily 90 tablet 2     latanoprost (XALATAN) 0.005 % ophthalmic solution Place 1 drop into both eyes 3 times daily        levothyroxine (SYNTHROID/LEVOTHROID) 25 MCG tablet TAKE 1 TABLET BY MOUTH EVERY DAY 90 tablet 2     metoprolol tartrate (LOPRESSOR) 100 MG tablet TAKE 1/2 TABLET BY MOUTH EVERY MORNING AND TAKE 1 TABLET IN THE EVENING. 135 tablet 0     nitroGLYcerin (NITROSTAT) 0.4 MG sublingual tablet PLACE 1 TABLET UNDER THE TONGUE EVERY 5 MINUTES AS NEEDED FOR CHEST PAIN 150 tablet 3     order for DME Equipment being ordered: wheeled walker with brakes and a seat 1 Device 0     polyethylene glycol (MIRALAX/GLYCOLAX) packet Take 1 packet by mouth daily as needed for constipation       pregabalin (LYRICA) 150 MG capsule Take 1 capsule by mouth twice a day as directed by physician. 180 capsule 3     ranolazine (RANEXA) 500 MG 12 hr tablet Take 1 tablet (500 mg) by mouth 2 times daily 180 tablet 2     Respiratory Therapy Supplies (AEROBIKA) RUT        vitamin D3 (CHOLECALCIFEROL) 2000 units tablet Take 4,000 Units by mouth daily       No facility-administered encounter medications on file as of 8/25/2022.             O:   NAD, WDWN, Alert & Oriented, Mood & Affect wnl, Vitals stable   Here today alone   /69   Pulse 63   LMP  (LMP Unknown)   SpO2 96%   Breastfeeding No    General appearance normal   Vitals stable   Alert, oriented and in no acute distress     L forearm 1.4cm pink pearly papule       Eyes: Conjunctivae/lids:Normal     ENT: Lips,  buccal mucosa, tongue: normal    MSK:Normal    Cardiovascular: peripheral edema none    Pulm: Breathing Normal    Neuro/Psych: Orientation:Alert and Orientedx3 ; Mood/Affect:normal       A/P:  1. L forehead basal cell carcinoma   MOHS:   Location    The rationale for Mohs surgery was discussed with the patient and consent was obtained.  The risks and benefits as well as alternatives to therapy were discussed, in detail.  Specifically, the risks of infection, scarring, bleeding, prolonged wound healing, incomplete removal, allergy to anesthesia, nerve injury and recurrence were addressed.  Indication for Mohs was Location. Prior to the procedure, the treatment site was clearly identified and, if available, confirmed with previous photos and confirmed by the patient   All components of the Universal Protocol/PAUSE rule were completed.  The Mohs surgeon operated in two distinct and integrated capacities as the surgeon and pathologist.      The area was prepped with Betasept.  A rim of normal appearing skin was marked circumferentially around the lesion.  The area was infiltrated with local anesthesia.  The tumor was first debulked to remove all clinically apparent tumor.  An incision following the standard Mohs approach was done and the specimen was oriented,mapped and placed in 2 block(s).  Each specimen was then chromacoded and processed in the Mohs laboratory using standard Mohs technique and submitted for frozen section histology.  Frozen section analysis showed  residual tumor but CLEAR MARGINS.    First stage:Orthokeratosis of epidermis with a proliferation of nests of basaloid cells, with peripheral palisading and a haphazard arrangement in the center extending into the dermis, forming nodules.  The tumor cells have hyperchromatic nuclei. Poor cytoplasm and intercellular bridging.      The tumor was excised using standard Mohs technique in 2 stages(s).  CLEAR MARGINS OBTAINED and Final defect size was 2.5 cm.      We discussed the options for wound management in full with the patient including risks/benefits/ possible outcomes.        REPAIR COMPLEX: Because of the tightness of the surrounding skin and Because of the size and full thickness nature of the defect, Because of the tightness of the surrounding skin, In order to avoid distortion and Because of the proximity to the brow, a complex closure was planned. After LE anesthesia and prep, Burow's triangles were excised in the relaxed skin tension lines. The wound edges were widely undermined greater than width of the defect on both sides by dissection in the subcutaneous plane until adequate tissue mobility was obtained. Hemostasis was obtained. The wound edges were closed in a layered fashion using Vicryl and Fast Absorbing Plain Gut sutures. Postoperative length was 5.3 cm.   EBL minimal; complications none; wound care routine.  The patient was discharged in good condition and will return in one week for wound evaluation.  It was a pleasure speaking to Lora Vergara today.  Previous clinic notes and pertinent laboratory tests were reviewed prior to Lora Vergara's visit.  Signs and Symptoms of skin cancer discussed with patient.  Patient encouraged to perform monthly skin exams.  UV precautions reviewed with patient.  Risks of non-melanoma skin cancer discussed with patient   Return to clinic 6 months

## 2022-08-29 DIAGNOSIS — G62.9 NEUROPATHY: ICD-10-CM

## 2022-08-29 RX ORDER — PREGABALIN 150 MG/1
CAPSULE ORAL
Qty: 180 CAPSULE | Refills: 3 | Status: SHIPPED | OUTPATIENT
Start: 2022-08-29 | End: 2022-09-15

## 2022-08-29 NOTE — TELEPHONE ENCOUNTER
Medication Question or Refill      What medication are you calling about (include dose and sig)?: Pregabalin (Lyrica)    Controlled Substance Agreement on file:   CSA -- Patient Level:    CSA: None found at the patient level.       Who prescribed the medication?: Dr. Chinchilla    Do you need a refill? Yes    When did you use the medication last? Today    Patient offered an appointment? No    Do you have any questions or concerns?  No    Preferred Pharmacy:   [facility] [address] [place] [facility] - [address] [address]  Phone: [phone] Fax: [phone]    Ebonie Lyons,  [facility]

## 2022-08-31 ENCOUNTER — ALLIED HEALTH/NURSE VISIT (OUTPATIENT)
Dept: DERMATOLOGY | Facility: CLINIC | Age: 87
End: 2022-08-31
Payer: MEDICARE

## 2022-08-31 DIAGNOSIS — Z48.01 DRESSING CHANGE OR REMOVAL, SURGICAL WOUND: Primary | ICD-10-CM

## 2022-08-31 PROCEDURE — 99207 PR NO CHARGE NURSE ONLY: CPT | Performed by: STUDENT IN AN ORGANIZED HEALTH CARE EDUCATION/TRAINING PROGRAM

## 2022-08-31 NOTE — PROGRESS NOTES
Lora Vergara comes into clinic today at the request of Dr. Rosado Ordering Provider for Wound Check Action taken: bandage changed.      This service provided today was under the supervising provider of the day Dr. Loving, who was available if needed.    Please see providers note on 8/25/22 for orders  Patient returned to clinic for post surgery 1 week follow up bandage change. Patient has no complaints, denies pain.  Bandage removed from forehead. Area cleansed with wound cleanser. Site is healing with no signs of infection, wound edges approximating well.   Reapplied new steri strips and paper tape.     Advised to watch for signs and symptons of infection; spreading redness, increasing pain, drainage, odor, fever.   Call or report promptly to clinic if any of these symptoms are present.    Wound care post op instructions given and discussed for 7 day bandage removal and continued incision/scar care.    Patient verbalized understanding.     Zahraa LIMON RN  Dermatology   [phone]

## 2022-08-31 NOTE — PATIENT INSTRUCTIONS
WOUND CARE INSTRUCTIONS  for  ONE WEEK AFTER SURGERY          Leave flat bandage on your skin for one week after today s bandage change.  In one week when you remove the bandage, you may resume your regular skin care routine, including washing with mild soap and water, applying moisturizer, make-up and sunscreen.    If there are any open or bleeding areas at the incision/graft site you should begin to cover the area with a bandage daily as follows:    Clean and dry the area with plain tap water using a Q-tip or sterile gauze pad.  Apply Polysporin or Bacitracin ointment to the open area.  Cover the wound with a band-aid or a sterile non-stick gauze pad and micropore paper tape.         SIGNS OF INFECTION  - If you notice any of these signs of infection, call your doctor right away: expanding redness around the wound.  - Yellow or greenish-colored pus or cloudy wound drainage.    - Red streaking spreading from the wound.  - Increased swelling, tenderness, or pain around the wound.   - Fever.    Please remember that yellow and clear drainage from a wound can be normal and related to normal wound healing.  Isolated drainage from a wound without a combination of the above features does not indicate infection.       *Once the bandages are removed, the scar will be red and firm (especially in the lip/chin area). This is normal and will fade in time. It might take 6-12 months for this to happen.     *Massaging the area will help the scar soften and fade quicker. Begin to massage the area one month after the bandages have been removed. To massage apply pressure directly and firmly over the scar with the fingertips and move in a circular motion. Massage the area for a few minutes several times a day. Continue to massage the site for several months.    *Approximately 6-8 weeks after surgery it is not uncommon to see the formation of  tender pimple-like  bump along the scar. This is normal. As the scar continues to mature and  the stitches underneath the skin begin to dissolve, this might occur. Do not pick or squeeze, this will resolve on it s own. Should one break open producing a small amount of drainage, apply Polysporin or Bacitracin ointment a few times a day until the wound is completely healed.    *Numbness in the surgical area is expected. It might take 12-18 months for the feeling to return to normal. During this time sensations of itchiness, tingling and occasional sharp pains might be noted. These feelings are normal and will subside once the nerves have completely healed.         IN CASE OF EMERGENCY: Dr Rosado [phone]     If you were seen in [place] call: [phone]    If you were seen in [place] call: [phone]

## 2022-09-02 ENCOUNTER — HOSPITAL ENCOUNTER (EMERGENCY)
Facility: CLINIC | Age: 87
Discharge: HOME | End: 2022-09-02
Attending: EMERGENCY MEDICINE | Admitting: EMERGENCY MEDICINE
Payer: MEDICARE

## 2022-09-02 ENCOUNTER — APPOINTMENT (OUTPATIENT)
Dept: GENERAL RADIOLOGY | Facility: CLINIC | Age: 87
End: 2022-09-02
Attending: EMERGENCY MEDICINE
Payer: MEDICARE

## 2022-09-02 VITALS
TEMPERATURE: 98.3 F | HEART RATE: 70 BPM | SYSTOLIC BLOOD PRESSURE: 182 MMHG | OXYGEN SATURATION: 94 % | DIASTOLIC BLOOD PRESSURE: 91 MMHG | RESPIRATION RATE: 19 BRPM

## 2022-09-02 DIAGNOSIS — I10 HYPERTENSION, UNSPECIFIED TYPE: ICD-10-CM

## 2022-09-02 DIAGNOSIS — R11.0 NAUSEA: ICD-10-CM

## 2022-09-02 LAB
ALBUMIN SERPL-MCNC: 4 G/DL (ref 3.4–5)
ALBUMIN UR-MCNC: 20 MG/DL
ALP SERPL-CCNC: 61 U/L (ref 40–150)
ALT SERPL W P-5'-P-CCNC: 24 U/L (ref 0–50)
ANION GAP SERPL CALCULATED.3IONS-SCNC: 7 MMOL/L (ref 3–14)
APPEARANCE UR: CLEAR
AST SERPL W P-5'-P-CCNC: 21 U/L (ref 0–45)
ATRIAL RATE - MUSE: 86 BPM
BASOPHILS # BLD AUTO: 0 10E3/UL (ref 0–0.2)
BASOPHILS NFR BLD AUTO: 0 %
BILIRUB SERPL-MCNC: 1.2 MG/DL (ref 0.2–1.3)
BILIRUB UR QL STRIP: NEGATIVE
BUN SERPL-MCNC: 13 MG/DL (ref 7–30)
CALCIUM SERPL-MCNC: 9.2 MG/DL (ref 8.5–10.1)
CHLORIDE BLD-SCNC: 98 MMOL/L (ref 94–109)
CO2 SERPL-SCNC: 26 MMOL/L (ref 20–32)
COLOR UR AUTO: ABNORMAL
CREAT SERPL-MCNC: 0.61 MG/DL (ref 0.52–1.04)
DIASTOLIC BLOOD PRESSURE - MUSE: NORMAL MMHG
EOSINOPHIL # BLD AUTO: 0.1 10E3/UL (ref 0–0.7)
EOSINOPHIL NFR BLD AUTO: 2 %
ERYTHROCYTE [DISTWIDTH] IN BLOOD BY AUTOMATED COUNT: 12.1 % (ref 10–15)
FLUAV RNA SPEC QL NAA+PROBE: NEGATIVE
FLUBV RNA RESP QL NAA+PROBE: NEGATIVE
GFR SERPL CREATININE-BSD FRML MDRD: 81 ML/MIN/1.73M2
GLUCOSE BLD-MCNC: 155 MG/DL (ref 70–99)
GLUCOSE UR STRIP-MCNC: 30 MG/DL
HCT VFR BLD AUTO: 39.4 % (ref 35–47)
HGB BLD-MCNC: 13.5 G/DL (ref 11.7–15.7)
HGB UR QL STRIP: NEGATIVE
HOLD SPECIMEN: NORMAL
HOLD SPECIMEN: NORMAL
IMM GRANULOCYTES # BLD: 0.1 10E3/UL
IMM GRANULOCYTES NFR BLD: 1 %
INTERPRETATION ECG - MUSE: NORMAL
KETONES UR STRIP-MCNC: NEGATIVE MG/DL
LEUKOCYTE ESTERASE UR QL STRIP: NEGATIVE
LYMPHOCYTES # BLD AUTO: 1.7 10E3/UL (ref 0.8–5.3)
LYMPHOCYTES NFR BLD AUTO: 27 %
MCH RBC QN AUTO: 31.4 PG (ref 26.5–33)
MCHC RBC AUTO-ENTMCNC: 34.3 G/DL (ref 31.5–36.5)
MCV RBC AUTO: 92 FL (ref 78–100)
MONOCYTES # BLD AUTO: 0.4 10E3/UL (ref 0–1.3)
MONOCYTES NFR BLD AUTO: 6 %
NEUTROPHILS # BLD AUTO: 4 10E3/UL (ref 1.6–8.3)
NEUTROPHILS NFR BLD AUTO: 64 %
NITRATE UR QL: NEGATIVE
NRBC # BLD AUTO: 0 10E3/UL
NRBC BLD AUTO-RTO: 0 /100
P AXIS - MUSE: 67 DEGREES
PH UR STRIP: 8 [PH] (ref 5–7)
PLATELET # BLD AUTO: 234 10E3/UL (ref 150–450)
POTASSIUM BLD-SCNC: 4 MMOL/L (ref 3.4–5.3)
PR INTERVAL - MUSE: 208 MS
PROCALCITONIN SERPL-MCNC: <0.05 NG/ML
PROT SERPL-MCNC: 7.5 G/DL (ref 6.8–8.8)
QRS DURATION - MUSE: 68 MS
QT - MUSE: 360 MS
QTC - MUSE: 430 MS
R AXIS - MUSE: 20 DEGREES
RBC # BLD AUTO: 4.3 10E6/UL (ref 3.8–5.2)
RBC URINE: 1 /HPF
RSV RNA SPEC NAA+PROBE: NEGATIVE
SARS-COV-2 RNA RESP QL NAA+PROBE: NEGATIVE
SODIUM SERPL-SCNC: 131 MMOL/L (ref 133–144)
SP GR UR STRIP: 1.01 (ref 1–1.03)
SYSTOLIC BLOOD PRESSURE - MUSE: NORMAL MMHG
T AXIS - MUSE: 60 DEGREES
TROPONIN I SERPL HS-MCNC: 6 NG/L
UROBILINOGEN UR STRIP-MCNC: NORMAL MG/DL
VENTRICULAR RATE- MUSE: 86 BPM
WBC # BLD AUTO: 6.3 10E3/UL (ref 4–11)
WBC URINE: 1 /HPF

## 2022-09-02 PROCEDURE — 84295 ASSAY OF SERUM SODIUM: CPT | Performed by: EMERGENCY MEDICINE

## 2022-09-02 PROCEDURE — 93005 ELECTROCARDIOGRAM TRACING: CPT

## 2022-09-02 PROCEDURE — 80053 COMPREHEN METABOLIC PANEL: CPT | Performed by: EMERGENCY MEDICINE

## 2022-09-02 PROCEDURE — C9803 HOPD COVID-19 SPEC COLLECT: HCPCS

## 2022-09-02 PROCEDURE — 81001 URINALYSIS AUTO W/SCOPE: CPT | Performed by: EMERGENCY MEDICINE

## 2022-09-02 PROCEDURE — 36415 COLL VENOUS BLD VENIPUNCTURE: CPT | Performed by: EMERGENCY MEDICINE

## 2022-09-02 PROCEDURE — 99285 EMERGENCY DEPT VISIT HI MDM: CPT | Mod: 25

## 2022-09-02 PROCEDURE — 84145 PROCALCITONIN (PCT): CPT | Performed by: EMERGENCY MEDICINE

## 2022-09-02 PROCEDURE — 87637 SARSCOV2&INF A&B&RSV AMP PRB: CPT | Performed by: EMERGENCY MEDICINE

## 2022-09-02 PROCEDURE — 250N000013 HC RX MED GY IP 250 OP 250 PS 637: Performed by: EMERGENCY MEDICINE

## 2022-09-02 PROCEDURE — 84484 ASSAY OF TROPONIN QUANT: CPT | Performed by: EMERGENCY MEDICINE

## 2022-09-02 PROCEDURE — 71045 X-RAY EXAM CHEST 1 VIEW: CPT

## 2022-09-02 PROCEDURE — 85014 HEMATOCRIT: CPT | Performed by: EMERGENCY MEDICINE

## 2022-09-02 RX ORDER — METOPROLOL TARTRATE 25 MG/1
50 TABLET, FILM COATED ORAL ONCE
Status: COMPLETED | OUTPATIENT
Start: 2022-09-02 | End: 2022-09-02

## 2022-09-02 RX ADMIN — METOPROLOL TARTRATE 50 MG: 25 TABLET, FILM COATED ORAL at 07:39

## 2022-09-02 RX ADMIN — ISOSORBIDE MONONITRATE 90 MG: 60 TABLET, EXTENDED RELEASE ORAL at 11:05

## 2022-09-02 ASSESSMENT — ENCOUNTER SYMPTOMS
SHORTNESS OF BREATH: 0
ABDOMINAL PAIN: 0
DIARRHEA: 0
FEVER: 0
NAUSEA: 1
CONSTIPATION: 0
VOMITING: 0

## 2022-09-02 ASSESSMENT — ACTIVITIES OF DAILY LIVING (ADL)
ADLS_ACUITY_SCORE: 37

## 2022-09-02 NOTE — ED TRIAGE NOTES
Pt BIBA from home. Pt reports nausea x1 day that kept her up all night. Per EMS Pt hypertensive, unknown if Pt took HTN meds. Denies CP, SOB, dizziness. 4mg oral Zofran given by EMS.      Triage Assessment     Row Name 09/02/22 5910       Triage Assessment (Adult)    Airway WDL WDL       Respiratory WDL    Respiratory WDL WDL       Cardiac WDL    Cardiac WDL --  Pt hypertensive        Cognitive/Neuro/Behavioral WDL    Cognitive/Neuro/Behavioral WDL WDL

## 2022-09-02 NOTE — DISCHARGE INSTRUCTIONS
You received your morning dose of isosorbide (IMDUR) and metoprolol (Lopressor) while in the ED.   You will still need to take your evening dose of metoprolol.      Please monitor your symptoms at home. Return with any weakness, chest pain, vomiting, abdominal pain, or other concerns.     Discharge Instructions  Hypertension - High Blood Pressure    During you visit to the Emergency Department, your blood pressure was higher than the recommended blood pressure.  This may be related to stress, pain, medication or other temporary conditions. In these cases, your blood pressure may return to normal on its own. If you have a history of high blood pressure, you may need to have your provider adjust your medications. Sometimes, your high measurement here may indicate that you have developed high blood pressure that will stay high unless it is treated. As a general rule, high blood pressure causes problems over years rather than days, weeks, or months. So, while it is important to treat blood pressure, it is rarely important to treat blood pressure immediately. Occasionally we will begin a medication in the Emergency Department; more often we will recommend close follow-up for medications with a primary doctor/clinic.    Generally, every Emergency Department visit should have a follow-up clinic visit with either a primary or a specialty clinic/provider. Please follow-up as instructed by your emergency provider today.    Return to the Emergency Department if you start to have:  A severe headache.  Chest pain.  Shortness of breath.  Weakness or numbness that affects one part of the body.  Confusion.  Vision changes.  Significant swelling of legs and/or eyes.  A reaction to any medication started in the Emergency Department.    What can I do to help myself?  Avoid alcohol.  Take any blood pressure medicine that you are prescribed.  Get a good night s sleep.  Lower your salt intake.  Exercise.  Lose weight.  Manage  stress.  See your doctor regularly    If blood pressure medication was started in the Emergency Department:  The medicine may not have an immediate effect. The body and brain determine what blood pressure you have. The medicine s job is to retrain the body s  thermostat  to a lower blood pressure.  You will need to follow up with your provider to see how this medicine is working for you.  If you were given a prescription for medicine here today, be sure to read all of the information (including the package insert) that comes with your prescription.  This will include important information about the medicine, its side effects, and any warnings that you need to know about.  The pharmacist who fills the prescription can provide more information and answer questions you may have about the medicine.  If you have questions or concerns that the pharmacist cannot address, please call or return to the Emergency Department.   Remember that you can always come back to the Emergency Department if you are not able to see your regular provider in the amount of time listed above, if you get any new symptoms, or if there is anything that worries you.

## 2022-09-02 NOTE — ED PROVIDER NOTES
History   Chief Complaint:  Nausea and Hypertension       The history is provided by the patient.      Lora Vergara is a 89 year old female with history of hypertension, hyperlipidemia, diabetes, CAD, and obesity who presents with nausea. The patient states that she had a mole removal surgery on the 08/25, and about a day ago she began feeling nauseous, antsy, and uncomfortable. She notes that she can walk normally with her walker (although she doesn't walk much in general), but had a black eye and face swelling from the surgery. She also noted having a weird taste in her mouth, a headache in the area where the surgery occurred, and is fully vaccinated. She denies fever, chest pain, breathing difficulty, diarrhea, abdominal pain, vomiting, and constipation.      Review of Systems   Constitutional: Negative for fever.   Respiratory: Negative for shortness of breath.    Cardiovascular: Negative for chest pain.   Gastrointestinal: Positive for nausea. Negative for abdominal pain, constipation, diarrhea and vomiting.   All other systems reviewed and are negative.    Allergies:  Hydrochlorothiazide     Medications:  Aspirin 81mg  Lipitor  Lasix  Neurontin  Imdur  Xalatan  Synthroid  Lopressor  Nitrostat  Lyrica  Ranexa    Past Medical History:      CAD  Hypercholesterolemia  Hypertension  Bronchiectasis  Hypothyroidism  Type II Diabetes  Peripheral Neuropathy  Microvascular Angina  Obesity    Past Surgical History:    Appendectomy  Bladder Surgery  Cholecystectomy  Coronary Angiography  Hysterectomy  Oophorectomy bilateral  Phacoemulsification clear cornea intraocular lens implant bilateral    Family History:    Mother: Cerebrovascular Disease, Neurologic disorder  Father: Pancreatic Cancer  Brother: Lung Cancer, Smoker    Social History:  Patient presents to the ED alone  Patient arrived via EMS  PCP: Paty Chinchilla     Physical Exam     Patient Vitals for the past 24 hrs:   BP Temp Temp src Pulse Resp SpO2    09/02/22 0845 (!) 182/91 -- -- 70 19 94 %   09/02/22 0830 (!) 194/96 -- -- 72 19 93 %   09/02/22 0730 (!) 185/115 -- -- 79 18 94 %   09/02/22 0700 (!) 198/117 -- -- 83 18 92 %   09/02/22 0615 (!) 189/109 -- -- 82 14 92 %   09/02/22 0555 (!) 195/142 -- -- -- -- --   09/02/22 0537 (!) 197/184 98.3  F (36.8  C) Oral 88 16 92 %       Physical Exam  Eyes:  The pupils are equal and round    Conjunctivae and sclerae are normal  ENT:    The nose is normal    Pinnae are normal  CV:  Regular rate and rhythm     No edema  Resp:  Lungs are clear    Non-labored    No rales    No wheezing   GI:  Abdomen is soft and non-tender, there is no rigidity    No distension    No rebound tenderness   MS:  Normal muscular tone    No asymmetric leg swelling  Skin:  No rash or acute skin lesions noted  Neuro:   Awake, alert, GCS 15    Speech is normal and fluent    Face is symmetric    Moves all extremities    Normal finger-nose-finger     strength equal bilaterally    Equal sensation bilaterally    Hip flexion 5/5 bilaterally     Emergency Department Course   ECG  ECG taken at 0543, ECG read at 0720  Poor data quality, interpretation may be adversely affectd, Normal sinus rhythm, Junctional ST depression, probably abnormal, Abnormal ECG   Rate 86 bpm. DE interval 208 ms. QRS duration 68 ms. QT/QTc 360/430 ms. P-R-T axes 67 20 60.     Imaging:  XR Chest Port 1 View   Final Result   IMPRESSION: Single AP view of the chest was obtained.   Cardiomediastinal silhouette is within normal limits. Atherosclerotic   vascular calcification of the aortic knob. Bibasilar pulmonary   opacities, likely atelectasis. No significant pleural effusion or   pneumothorax. Degenerative changes of the shoulder joints.      JEFFRY CASTANEDA MD            SYSTEM ID:  [id number]        Report per radiology    Laboratory:  Labs Ordered and Resulted from Time of ED Arrival to Time of ED Departure   COMPREHENSIVE METABOLIC PANEL - Abnormal       Result Value     Sodium 131 (*)     Potassium 4.0      Chloride 98      Carbon Dioxide (CO2) 26      Anion Gap 7      Urea Nitrogen 13      Creatinine 0.61      Calcium 9.2      Glucose 155 (*)     Alkaline Phosphatase 61      AST 21      ALT 24      Protein Total 7.5      Albumin 4.0      Bilirubin Total 1.2      GFR Estimate 81     ROUTINE UA WITH MICROSCOPIC REFLEX TO CULTURE - Abnormal    Color Urine Straw      Appearance Urine Clear      Glucose Urine 30  (*)     Bilirubin Urine Negative      Ketones Urine Negative      Specific Gravity Urine 1.011      Blood Urine Negative      pH Urine 8.0 (*)     Protein Albumin Urine 20  (*)     Urobilinogen Urine Normal      Nitrite Urine Negative      Leukocyte Esterase Urine Negative      RBC Urine 1      WBC Urine 1     TROPONIN I - Normal    Troponin I High Sensitivity 6     INFLUENZA A/B & SARS-COV2 PCR MULTIPLEX - Normal    Influenza A PCR Negative      Influenza B PCR Negative      RSV PCR Negative      SARS CoV2 PCR Negative     PROCALCITONIN - Normal    Procalcitonin <0.05     CBC WITH PLATELETS AND DIFFERENTIAL    WBC Count 6.3      RBC Count 4.30      Hemoglobin 13.5      Hematocrit 39.4      MCV 92      MCH 31.4      MCHC 34.3      RDW 12.1      Platelet Count 234      % Neutrophils 64      % Lymphocytes 27      % Monocytes 6      % Eosinophils 2      % Basophils 0      % Immature Granulocytes 1      NRBCs per 100 WBC 0      Absolute Neutrophils 4.0      Absolute Lymphocytes 1.7      Absolute Monocytes 0.4      Absolute Eosinophils 0.1      Absolute Basophils 0.0      Absolute Immature Granulocytes 0.1      Absolute NRBCs 0.0          Emergency Department Course:     Reviewed:  I reviewed nursing notes, vitals and past medical history    Assessments:  0705 I obtained history and examined the patient as noted above.    I rechecked the patient, explained findings, and prepared for discharge.    Interventions:  0739 Lopressor 50 mg PO  1105 Imdur 90 mg PO    Disposition:  The  patient was discharged to home.     Impression & Plan   Medical Decision Making:  Lora Vergara is a 89 year old female who presents to the emergency department with feelings of nausea.  She also had a difficult time sleeping last night and was unable to sleep.  She has a history of high blood pressure, hyperlipidemia, diabetes, CAD and obesity.  Symptoms ongoing through the night.  EKG without acute changes from prior.  She noted some poor taste in her mouth and was concerned that her surgical site may have gotten infected.  Does not appear to be any erythema in the surrounding area and bruising is from settling of the bruise down her face.  Troponin is normal and given the duration of her symptoms I doubt acute coronary syndrome.  She denies any abdominal pain and she has no tenderness on exam.  No difficulty breathing.  Chest x-ray was negative for acute findings other than some atelectasis.  Procalcitonin was negative and the white blood cell count was normal.  She is given her morning dose of Lopressor and Imdur while here in the emergency department to help control her blood pressure.  Urinalysis did not show any signs of infection.  Overall work-up here was reassuring.  Encouraged her to follow-up with her PCP.  She is to return with any new or worrisome symptoms.    Diagnosis:    ICD-10-CM    1. Nausea  R11.0    2. Hypertension, unspecified type  I10        Discharge Medications:  Discharge Medication List as of 9/2/2022 11:10 AM          Scribe Disclosure:  Shaq GRACE, am serving as a scribe at 7:05 AM on 9/2/2022 to document services personally performed by Juan Carlos Fontenot MD based on my observations and the provider's statements to me.            Juan Carlos Fontenot MD  09/02/22 [id number]

## 2022-09-03 ENCOUNTER — APPOINTMENT (OUTPATIENT)
Dept: CT IMAGING | Facility: CLINIC | Age: 87
End: 2022-09-03
Attending: EMERGENCY MEDICINE
Payer: MEDICARE

## 2022-09-03 ENCOUNTER — HOSPITAL ENCOUNTER (EMERGENCY)
Facility: CLINIC | Age: 87
Discharge: HOME | End: 2022-09-03
Attending: EMERGENCY MEDICINE | Admitting: EMERGENCY MEDICINE
Payer: MEDICARE

## 2022-09-03 VITALS
SYSTOLIC BLOOD PRESSURE: 154 MMHG | OXYGEN SATURATION: 95 % | RESPIRATION RATE: 18 BRPM | HEART RATE: 101 BPM | TEMPERATURE: 98.4 F | DIASTOLIC BLOOD PRESSURE: 90 MMHG

## 2022-09-03 DIAGNOSIS — R41.0 CONFUSION: ICD-10-CM

## 2022-09-03 DIAGNOSIS — G47.00 INSOMNIA, UNSPECIFIED TYPE: ICD-10-CM

## 2022-09-03 LAB
ALBUMIN SERPL-MCNC: 3.9 G/DL (ref 3.4–5)
ALBUMIN UR-MCNC: 70 MG/DL
ALP SERPL-CCNC: 49 U/L (ref 40–150)
ALT SERPL W P-5'-P-CCNC: 22 U/L (ref 0–50)
ANION GAP SERPL CALCULATED.3IONS-SCNC: 9 MMOL/L (ref 3–14)
APPEARANCE UR: CLEAR
AST SERPL W P-5'-P-CCNC: 22 U/L (ref 0–45)
ATRIAL RATE - MUSE: 85 BPM
BASOPHILS # BLD AUTO: 0 10E3/UL (ref 0–0.2)
BASOPHILS NFR BLD AUTO: 0 %
BILIRUB SERPL-MCNC: 1.1 MG/DL (ref 0.2–1.3)
BILIRUB UR QL STRIP: ABNORMAL
BUN SERPL-MCNC: 14 MG/DL (ref 7–30)
CALCIUM SERPL-MCNC: 9.3 MG/DL (ref 8.5–10.1)
CHLORIDE BLD-SCNC: 95 MMOL/L (ref 94–109)
CO2 SERPL-SCNC: 27 MMOL/L (ref 20–32)
COLOR UR AUTO: YELLOW
CREAT SERPL-MCNC: 0.72 MG/DL (ref 0.52–1.04)
DIASTOLIC BLOOD PRESSURE - MUSE: NORMAL MMHG
EOSINOPHIL # BLD AUTO: 0 10E3/UL (ref 0–0.7)
EOSINOPHIL NFR BLD AUTO: 0 %
ERYTHROCYTE [DISTWIDTH] IN BLOOD BY AUTOMATED COUNT: 12 % (ref 10–15)
GFR SERPL CREATININE-BSD FRML MDRD: 76 ML/MIN/1.73M2
GLUCOSE BLD-MCNC: 132 MG/DL (ref 70–99)
GLUCOSE UR STRIP-MCNC: NEGATIVE MG/DL
HCT VFR BLD AUTO: 37.2 % (ref 35–47)
HGB BLD-MCNC: 12.8 G/DL (ref 11.7–15.7)
HGB UR QL STRIP: NEGATIVE
IMM GRANULOCYTES # BLD: 0 10E3/UL
IMM GRANULOCYTES NFR BLD: 1 %
INTERPRETATION ECG - MUSE: NORMAL
KETONES UR STRIP-MCNC: 10 MG/DL
LEUKOCYTE ESTERASE UR QL STRIP: NEGATIVE
LYMPHOCYTES # BLD AUTO: 1.7 10E3/UL (ref 0.8–5.3)
LYMPHOCYTES NFR BLD AUTO: 32 %
MCH RBC QN AUTO: 31.8 PG (ref 26.5–33)
MCHC RBC AUTO-ENTMCNC: 34.4 G/DL (ref 31.5–36.5)
MCV RBC AUTO: 92 FL (ref 78–100)
MONOCYTES # BLD AUTO: 0.5 10E3/UL (ref 0–1.3)
MONOCYTES NFR BLD AUTO: 10 %
MUCOUS THREADS #/AREA URNS LPF: PRESENT /LPF
NEUTROPHILS # BLD AUTO: 2.9 10E3/UL (ref 1.6–8.3)
NEUTROPHILS NFR BLD AUTO: 57 %
NITRATE UR QL: NEGATIVE
NRBC # BLD AUTO: 0 10E3/UL
NRBC BLD AUTO-RTO: 0 /100
P AXIS - MUSE: 41 DEGREES
PH UR STRIP: 6 [PH] (ref 5–7)
PLATELET # BLD AUTO: 237 10E3/UL (ref 150–450)
POTASSIUM BLD-SCNC: 3.6 MMOL/L (ref 3.4–5.3)
PR INTERVAL - MUSE: 170 MS
PROT SERPL-MCNC: 7.2 G/DL (ref 6.8–8.8)
QRS DURATION - MUSE: 72 MS
QT - MUSE: 386 MS
QTC - MUSE: 459 MS
R AXIS - MUSE: 21 DEGREES
RBC # BLD AUTO: 4.03 10E6/UL (ref 3.8–5.2)
RBC URINE: 2 /HPF
SODIUM SERPL-SCNC: 131 MMOL/L (ref 133–144)
SP GR UR STRIP: 1.03 (ref 1–1.03)
SQUAMOUS EPITHELIAL: <1 /HPF
SYSTOLIC BLOOD PRESSURE - MUSE: NORMAL MMHG
T AXIS - MUSE: 47 DEGREES
UROBILINOGEN UR STRIP-MCNC: 4 MG/DL
VENTRICULAR RATE- MUSE: 85 BPM
WBC # BLD AUTO: 5.2 10E3/UL (ref 4–11)
WBC URINE: 1 /HPF

## 2022-09-03 PROCEDURE — 80053 COMPREHEN METABOLIC PANEL: CPT | Performed by: EMERGENCY MEDICINE

## 2022-09-03 PROCEDURE — 81001 URINALYSIS AUTO W/SCOPE: CPT | Performed by: EMERGENCY MEDICINE

## 2022-09-03 PROCEDURE — 99285 EMERGENCY DEPT VISIT HI MDM: CPT | Mod: 25

## 2022-09-03 PROCEDURE — 36415 COLL VENOUS BLD VENIPUNCTURE: CPT | Performed by: EMERGENCY MEDICINE

## 2022-09-03 PROCEDURE — 93005 ELECTROCARDIOGRAM TRACING: CPT

## 2022-09-03 PROCEDURE — 85025 COMPLETE CBC W/AUTO DIFF WBC: CPT | Performed by: EMERGENCY MEDICINE

## 2022-09-03 PROCEDURE — G1010 CDSM STANSON: HCPCS

## 2022-09-03 RX ORDER — HYDROXYZINE HYDROCHLORIDE 25 MG/1
25 TABLET, FILM COATED ORAL
Qty: 10 TABLET | Refills: 0 | Status: SHIPPED | OUTPATIENT
Start: 2022-09-03 | End: 2022-09-08

## 2022-09-03 ASSESSMENT — ENCOUNTER SYMPTOMS
DYSURIA: 0
ABDOMINAL PAIN: 0
CONFUSION: 1
HEADACHES: 0

## 2022-09-03 ASSESSMENT — ACTIVITIES OF DAILY LIVING (ADL): ADLS_ACUITY_SCORE: 35

## 2022-09-03 NOTE — ED NOTES
Bed: ED30  Expected date:   Expected time:   Means of arrival:   Comments:  523  89 F confusion  1812

## 2022-09-03 NOTE — ED NOTES
Pt had straight cath In out for concentrated jasmyne urine scant amount. To CT at this time. Lifted legfs and hips for procedure with no difficulty. Follows commands. GCS 15

## 2022-09-03 NOTE — ED PROVIDER NOTES
History   Chief Complaint:  Altered Mental Status       The history is provided by the patient. The history is limited by the condition of the patient.      Lora Vergara is a 89 year old female with history of hypertension, hyperlipidemia, diabetes mellitus, and CAD who presents for evaluation of altered mental status. The patient's family reports the onset of confusion 3 days ago and the patient notes that this is the first time she has been unsure of medication she needs to be taking. She states that her confusion is new and not normal at baseline. She denies any recent falls, along with headache, dysuria, and abdominal pain. She states that she has been confusion since her last hospital visit yesterday where she was evaluated for hypertension. Of note, the patient reports neuropathy at baseline.      Labs yesterday included trop, procal, CBC, CMP, UA all were wnl.    Review of Systems   Unable to perform ROS: Mental status change   Gastrointestinal: Negative for abdominal pain.   Genitourinary: Negative for dysuria.   Neurological: Negative for headaches.   Psychiatric/Behavioral: Positive for confusion.       Allergies:  Hydrochlorothiazide    Medications:  Aspirin 81mg  Lipitor  Lasix  Neurontin  Imdur  Xalatan  Synthroid  Lopressor  Nitrostat  Lyrica  Ranexa    Past Medical History:     CAD  Hypercholesterolemia  Hypertension  Bronchiectasis  Hypothyroidism  Type II Diabetes  Peripheral Neuropathy  Microvascular Angina  Obesity    Past Surgical History:    Appendectomy  Bladder Surgery  Cholecystectomy  Coronary Angiography  Hysterectomy  Oophorectomy bilateral  Phacoemulsification clear cornea intraocular lens implant bilateral    Family History:    Cerebrovascular Disease  Neurologic disorder  Pancreatic Cancer  Lung Cancer     Social History:  The patient presents to the ED alone.  PCP: Paty Chinchilla MD , General - Internal Medicine.       Physical Exam     Patient Vitals for the past 24 hrs:   BP  Temp Temp src Pulse Resp SpO2   09/03/22 1930 (!) 154/90 98.4  F (36.9  C) -- 101 -- 95 %   09/03/22 1818 (!) 150/125 98.8  F (37.1  C) Oral 87 18 --       Physical Exam  General/Appearance: appears stated age, well-groomed, appears comfortable but mildly anxious  Eyes: EOMI, no scleral injection, no icterus  ENT: MMM  Neck: supple, nl ROM, no stiffness  Cardiovascular: RRR, nl S1S2, no m/r/g, 2+ pulses in all 4 extremities, cap refill <2sec  Respiratory: CTAB, good air movement throughout, no wheezes/rhonchi/rales, no increased WOB, no retractions  Back: no lesions  GI: abd soft, non-distended, nttp,  no HSM, no rebound, no guarding, nl BS  MSK: SRINIVASAN, good tone, no bony abnormality  Skin: warm and well-perfused, no rash, no edema, no ecchymosis, nl turgor  Neuro: GCS 15, alert and oriented, no gross focal neuro deficits -- NIHSS 0  Heme: no petechia, no purpura, no active bleeding        Emergency Department Course   ECG  ECG taken at 18:18, ECG read at 1820  Sinus rhythm with premature supraventricular complexes   Otherwise normal ECG  Rate 85 bpm. WA interval 170 ms. QRS duration 72 ms. QT/QTc 386/459 ms. P-R-T axis 41 21 47.     Imaging:  Head CT w/o contrast   Final Result   IMPRESSION:   1.  No finding for intracranial hemorrhage, mass, or acute infarct.      2.  Moderate volume loss and presumed sequela of chronic microvascular ischemic change.        Report per radiology    Laboratory:  Labs Ordered and Resulted from Time of ED Arrival to Time of ED Departure   ROUTINE UA WITH MICROSCOPIC REFLEX TO CULTURE - Abnormal       Result Value    Color Urine Yellow      Appearance Urine Clear      Glucose Urine Negative      Bilirubin Urine Small (*)     Ketones Urine 10  (*)     Specific Gravity Urine 1.033      Blood Urine Negative      pH Urine 6.0      Protein Albumin Urine 70  (*)     Urobilinogen Urine 4.0 (*)     Nitrite Urine Negative      Leukocyte Esterase Urine Negative      Mucus Urine Present (*)     RBC  Urine 2      WBC Urine 1      Squamous Epithelials Urine <1     COMPREHENSIVE METABOLIC PANEL - Abnormal    Sodium 131 (*)     Potassium 3.6      Chloride 95      Carbon Dioxide (CO2) 27      Anion Gap 9      Urea Nitrogen 14      Creatinine 0.72      Calcium 9.3      Glucose 132 (*)     Alkaline Phosphatase 49      AST 22      ALT 22      Protein Total 7.2      Albumin 3.9      Bilirubin Total 1.1      GFR Estimate 76     CBC WITH PLATELETS AND DIFFERENTIAL    WBC Count 5.2      RBC Count 4.03      Hemoglobin 12.8      Hematocrit 37.2      MCV 92      MCH 31.8      MCHC 34.4      RDW 12.0      Platelet Count 237      % Neutrophils 57      % Lymphocytes 32      % Monocytes 10      % Eosinophils 0      % Basophils 0      % Immature Granulocytes 1      NRBCs per 100 WBC 0      Absolute Neutrophils 2.9      Absolute Lymphocytes 1.7      Absolute Monocytes 0.5      Absolute Eosinophils 0.0      Absolute Basophils 0.0      Absolute Immature Granulocytes 0.0      Absolute NRBCs 0.0          Emergency Department Course:     Reviewed:  I reviewed nursing notes, vitals, past medical history and Care Everywhere    Assessments:  1817 I obtained history and examined the patient as noted above.   1955 I rechecked the patient and explained findings.     Interventions:   NS 1 L IV     Disposition:  The patient was discharged to home.     Impression & Plan       Medical Decision Making:  This patient is a 89-year-old female who presents with 3 days of increased confusion.  Here she is GCS 15 and alert and oriented x4.  She has an NIHSS of 0.  I doubt stroke.  Head CT was obtained to look for bleed however this was negative.  Labs are unremarkable as is her urinalysis.  On repeat evaluation her son was in the room and it sounds like she is more here due to recent insomnia.  She was seen here yesterday for hypertension and insomnia.  I saw that this is a new ongoing issue.  I do wonder if the insomnia is leading to her sense of  confusion.  I think it is reasonable to prescribe a few hydroxyzine to see if this will help but I did stress to her and her son that they need to follow-up with her primary to get further work-up and management of this issue.  They feel comfortable with discharge.  Diagnosis:    ICD-10-CM    1. Confusion  R41.0    2. Insomnia, unspecified type  G47.00        Discharge Medications:  New Prescriptions    HYDROXYZINE (ATARAX) 25 MG TABLET    Take 1 tablet (25 mg) by mouth nightly as needed for anxiety (insomnia)       Scribe Disclosure:  I, OTONIEL ARMSTRONG, am serving as a scribe at 6:18 PM on 9/3/2022 to document services personally performed by Daylin Samson MD based on my observations and the provider's statements to me.            Daylin Samson MD  09/03/22 [id number]

## 2022-09-07 DIAGNOSIS — I10 HYPERTENSION GOAL BP (BLOOD PRESSURE) < 140/90: ICD-10-CM

## 2022-09-08 DIAGNOSIS — G47.9 TROUBLE IN SLEEPING: Primary | ICD-10-CM

## 2022-09-08 RX ORDER — HYDROXYZINE HYDROCHLORIDE 25 MG/1
25 TABLET, FILM COATED ORAL
Qty: 90 TABLET | Refills: 3 | Status: SHIPPED | OUTPATIENT
Start: 2022-09-08 | End: 2022-09-15

## 2022-09-08 RX ORDER — METOPROLOL TARTRATE 100 MG
TABLET ORAL
Qty: 135 TABLET | Refills: 0 | Status: SHIPPED | OUTPATIENT
Start: 2022-09-08 | End: 2022-11-30

## 2022-09-08 NOTE — TELEPHONE ENCOUNTER
Routing refill request to provider for review/approval because:  BP Readings from Last 3 Encounters:   09/03/22 (!) 154/90   09/02/22 (!) 182/91   08/25/22 118/69         Branden Alegre RN  [facility] [place] Triage Nurse

## 2022-09-08 NOTE — TELEPHONE ENCOUNTER
Pt called requesting refills for hydrOXYzine 25 MG tablet to help her sleep. She was given hydrOXYzine 25 MG tablet Rx for 10 day supply at ER 09/03/2022.     Pt has enough medication until Monday 09/12. She has a future appt with PCP 09/15 but will run out of medication. Pt does not to change appt since she has transportation set up 09/12. Please advice on Rx request.     Future Appointments 9/8/2022 - 3/7/2023      Date Visit Type Length Department Provider     9/15/2022 10:30 AM OFFICE VISIT 30 min  INTERNAL MEDICINE Paty Chinchilla MD

## 2022-09-15 ENCOUNTER — OFFICE VISIT (OUTPATIENT)
Dept: INTERNAL MEDICINE | Facility: CLINIC | Age: 87
End: 2022-09-15
Payer: MEDICARE

## 2022-09-15 VITALS
TEMPERATURE: 98.2 F | HEART RATE: 60 BPM | DIASTOLIC BLOOD PRESSURE: 72 MMHG | HEIGHT: 63 IN | SYSTOLIC BLOOD PRESSURE: 158 MMHG | WEIGHT: 179 LBS | BODY MASS INDEX: 31.71 KG/M2 | OXYGEN SATURATION: 97 %

## 2022-09-15 DIAGNOSIS — G62.9 NEUROPATHY: ICD-10-CM

## 2022-09-15 DIAGNOSIS — J47.9 BRONCHIECTASIS WITHOUT COMPLICATION (H): ICD-10-CM

## 2022-09-15 DIAGNOSIS — I10 BENIGN ESSENTIAL HYPERTENSION: ICD-10-CM

## 2022-09-15 DIAGNOSIS — Z23 HIGH PRIORITY FOR 2019-NCOV VACCINE: ICD-10-CM

## 2022-09-15 DIAGNOSIS — Z23 NEED FOR PROPHYLACTIC VACCINATION AND INOCULATION AGAINST INFLUENZA: ICD-10-CM

## 2022-09-15 DIAGNOSIS — G47.9 TROUBLE IN SLEEPING: ICD-10-CM

## 2022-09-15 DIAGNOSIS — E11.9 TYPE 2 DIABETES MELLITUS WITHOUT COMPLICATION, WITHOUT LONG-TERM CURRENT USE OF INSULIN (H): Primary | ICD-10-CM

## 2022-09-15 PROBLEM — R73.01 IMPAIRED FASTING GLUCOSE: Status: ACTIVE | Noted: 2022-09-15

## 2022-09-15 LAB — HBA1C MFR BLD: 6 % (ref 0–5.6)

## 2022-09-15 PROCEDURE — 0134A COVID-19,PF,MODERNA BIVALENT: CPT | Performed by: INTERNAL MEDICINE

## 2022-09-15 PROCEDURE — 83036 HEMOGLOBIN GLYCOSYLATED A1C: CPT | Performed by: INTERNAL MEDICINE

## 2022-09-15 PROCEDURE — 80061 LIPID PANEL: CPT | Performed by: INTERNAL MEDICINE

## 2022-09-15 PROCEDURE — 99207 PR FOOT EXAM NO CHARGE: CPT | Mod: 25 | Performed by: INTERNAL MEDICINE

## 2022-09-15 PROCEDURE — G0008 ADMIN INFLUENZA VIRUS VAC: HCPCS | Performed by: INTERNAL MEDICINE

## 2022-09-15 PROCEDURE — 36415 COLL VENOUS BLD VENIPUNCTURE: CPT | Performed by: INTERNAL MEDICINE

## 2022-09-15 PROCEDURE — 99214 OFFICE O/P EST MOD 30 MIN: CPT | Mod: 25 | Performed by: INTERNAL MEDICINE

## 2022-09-15 PROCEDURE — 91313 COVID-19,PF,MODERNA BIVALENT: CPT | Performed by: INTERNAL MEDICINE

## 2022-09-15 PROCEDURE — 90662 IIV NO PRSV INCREASED AG IM: CPT | Performed by: INTERNAL MEDICINE

## 2022-09-15 RX ORDER — ISOSORBIDE MONONITRATE 120 MG/1
120 TABLET, EXTENDED RELEASE ORAL EVERY MORNING
Qty: 90 TABLET | Refills: 3 | Status: SHIPPED | OUTPATIENT
Start: 2022-09-15 | End: 2023-01-01

## 2022-09-15 RX ORDER — HYDROXYZINE HYDROCHLORIDE 10 MG/1
10 TABLET, FILM COATED ORAL
Qty: 90 TABLET | Refills: 3 | Status: SHIPPED | OUTPATIENT
Start: 2022-09-15 | End: 2023-01-01

## 2022-09-15 RX ORDER — PREGABALIN 150 MG/1
CAPSULE ORAL
Qty: 180 CAPSULE | Refills: 3 | Status: SHIPPED | OUTPATIENT
Start: 2022-09-15 | End: 2023-01-01

## 2022-09-15 NOTE — PATIENT INSTRUCTIONS
Flu shot and COVID-19 booster today.    Please INCREASE Imdur from 90mg (60 + 30mg) to 120mg daily (new Rx sent in - 1 pill daily).    TRIAL of LOWER dose of hydroxyzine at night as needed.    Eye referral placed - you will be contacted to schedule.

## 2022-09-15 NOTE — LETTER
"September 16, 2022      Lora Vergara  [address]   [place] [address]        Dear ,    Congratulations! You are no longer a diabetic! You now have \"impaired fasting glucose\" or \"pre-diabetes.\" Nice work!    Please continue all current medications without change.     I'll see you next year for your annual wellness visit.    Resulted Orders   Hemoglobin A1c   Result Value Ref Range    Hemoglobin A1C 6.0 (H) 0.0 - 5.6 %   Lipid Profile   Result Value Ref Range    Cholesterol 150 <200 mg/dL    Triglycerides 105 <150 mg/dL    Direct Measure HDL 63 >=50 mg/dL    LDL Cholesterol Calculated 66 <=100 mg/dL    Non HDL Cholesterol 87 <130 mg/dL    Patient Fasting > 8hrs? Yes        If you have any questions or concerns, please call the clinic at the number listed above.       Sincerely,      Paty Chinchilla MD          "

## 2022-09-15 NOTE — PROGRESS NOTES
ASSESSMENT/PLAN                                                      (E11.9) Type 2 diabetes mellitus without complication, without long-term current use of insulin (H)  (primary encounter diagnosis)  Comment: suspect patient may have impaired fasting glucose/prediabetes instead of actual diabetes since she is on no medications and is not adhering to a diabetic diet.  Plan: diabetic labs today; recommendations to follow; referred for annual diabetic eye exam - patient will be contacted to schedule.    (I10) Benign essential hypertension  Comment: poorly-controlled on current regimen.   Plan: INCREASE Imdur from 90 mg (60+30 mg) to 120 mg daily; blood pressure follow-up in 1 month.    (Z23) Need for prophylactic vaccination and inoculation against influenza  Plan: Flu shot given today.    (Z23) High priority for 2019-nCoV vaccine  Plan: COVID-19 booster given today.    (G47.9) Trouble in sleeping  Comment: possible dizziness with hydroxyzine 25 mg at night as needed.  Plan: TRIAL of lower dose of hydroxyzine (10 mg nightly as needed).    (G62.9) Neuropathy  Comment: well-controlled on current regimen.    Plan: continue present management; refills provided.     (J47.9) Bronchiectasis without complication (H)  Comment: stable on current regimen; followed by outside pulmonologist.    Paty Chinchilla MD   [address] [address]  T: [phone], F: [phone]    SUBJECTIVE                                                      Lora Vergara is a very pleasant 89 year old female who presents for diabetes follow-up:    Accompanied by granddaughter.    Patient's diabetes is diet controlled. She is NOT adherent to her diabetic diet. No diabetic complications. Annual diabetic eye exam is NOT up to date. She is checking her feet regularly. Her pertinent vaccinations (Pneumovax, influenza) are NOT up to date. She is on a daily baby aspirin and a statin. She is NOT on an ACE-I or  "ARB. Blood pressure is NOT well-controlled on current medications.     She is currently on Imdur 90 mg (60+30 mg) daily. Tolerating medication(s) well - no adverse side effects. She does endorse dizziness, but is otherwise asymptomatic from a blood pressure perspective: no chest pain or palpitations, no shortness of breath, no headaches or vision changes. She suspects her dizziness may be due to to her hydroxyzine 25 mg nightly, which was recently started for insomnia.    OBJECTIVE                                                      BP (!) 160/87   Pulse 60   Temp 98.2  F (36.8  C) (Oral)   Ht 1.6 m (5' 3\")   Wt 81.2 kg (179 lb)   LMP  (LMP Unknown)   SpO2 97%   BMI 31.71 kg/m    Constitutional: well-appearing  Respiratory: normal respiratory effort; clear to auscultation bilaterally  Cardiovascular: regular rate and rhythm; no edema  Foot exam: feet intact - no lesions or ulcers; sensation intact to monofilament  Psych: normal judgment and insight; normal mood and affect; recent and remote memory intact    ---    (Note documentation was completed, in part, with [employer] voice-recognition software. Documentation was reviewed, but some grammatical, spelling, and word errors may remain.)    "

## 2022-09-16 ENCOUNTER — TELEPHONE (OUTPATIENT)
Dept: INTERNAL MEDICINE | Facility: CLINIC | Age: 87
End: 2022-09-16

## 2022-09-16 LAB
CHOLEST SERPL-MCNC: 150 MG/DL
FASTING STATUS PATIENT QL REPORTED: YES
HDLC SERPL-MCNC: 63 MG/DL
LDLC SERPL CALC-MCNC: 66 MG/DL
NONHDLC SERPL-MCNC: 87 MG/DL
TRIGL SERPL-MCNC: 105 MG/DL

## 2022-09-16 NOTE — TELEPHONE ENCOUNTER
Prior Authorization Retail Medication Request    Medication/Dose: hydrOXYzine (ATARAX) 10 MG tablet  ICD code (if different than what is on RX):  G47.9  Previously Tried and Failed:    Rationale:      Insurance Name:  MEDICARE      Insurance ID:  [id number]          Pharmacy Information (if different than what is on RX)  Name:  [facility]  Phone:  [phone]

## 2022-09-20 ENCOUNTER — TRANSFERRED RECORDS (OUTPATIENT)
Dept: HEALTH INFORMATION MANAGEMENT | Facility: CLINIC | Age: 87
End: 2022-09-20

## 2022-09-26 NOTE — TELEPHONE ENCOUNTER
Central Prior Authorization Team   Phone: [phone]      PA Initiation    Medication: hydrOXYzine (ATARAX) 10 MG tablet  Insurance Company: Medicare Blue - Phone [phone] Fax [phone]  Pharmacy Filling the Rx: [id number] [place], [place] - [address] AT [facility] [address]  Filling Pharmacy Phone: [phone]  Filling Pharmacy Fax:    Start Date: 9/26/2022

## 2022-09-26 NOTE — TELEPHONE ENCOUNTER
PRIOR AUTHORIZATION DENIED    Medication: hydrOXYzine (ATARAX) 10 MG tablet-DENIED    Denial Date: 9/26/2022    Denial Rational:           Appeal Information:

## 2022-09-28 ENCOUNTER — TELEPHONE (OUTPATIENT)
Dept: INTERNAL MEDICINE | Facility: CLINIC | Age: 87
End: 2022-09-28

## 2022-09-28 NOTE — TELEPHONE ENCOUNTER
Forms/Letter Request    Type of form/letter: Patient Assistance Program - [employer] - for Lyrica     Have you been seen for this request:   Yes. Pt states that she spoke to Dr. Chinchilla about an application for Patient Assistance Program ([employer]) and was told that the application was submitted. Pt states she has not heard back from [employer] yet about it and is wondering how she can follow up.    Do we have the form/letter: Yes    When is form/letter needed by: As soon as possible    How would you like the form/letter returned: Fax    Patient Notified form requests are processed in 3-5 business days:Yes    Okay to leave a detailed message?: Yes at Cell number on file:    Telephone Information:   Mobile [phone]     Ebonie Lyons,  [facility]

## 2022-10-05 NOTE — TELEPHONE ENCOUNTER
She should call [employer] Patient Assistance Program for follow-up. I believe their number is [phone].

## 2022-10-12 DIAGNOSIS — I25.85 CARDIAC MICROVASCULAR DISEASE: ICD-10-CM

## 2022-10-12 RX ORDER — RANOLAZINE 500 MG/1
500 TABLET, EXTENDED RELEASE ORAL 2 TIMES DAILY
Qty: 180 TABLET | Refills: 0 | Status: SHIPPED | OUTPATIENT
Start: 2022-10-12 | End: 2023-01-01

## 2022-11-28 DIAGNOSIS — I10 HYPERTENSION GOAL BP (BLOOD PRESSURE) < 140/90: ICD-10-CM

## 2022-11-30 RX ORDER — METOPROLOL TARTRATE 100 MG
TABLET ORAL
Qty: 135 TABLET | Refills: 0 | Status: SHIPPED | OUTPATIENT
Start: 2022-11-30 | End: 2023-01-01

## 2022-11-30 NOTE — TELEPHONE ENCOUNTER
Routing refill request to provider for review/approval because:  Labs out of range:    BP Readings from Last 3 Encounters:   09/15/22 (!) 158/72   09/03/22 (!) 154/90   09/02/22 (!) 182/91     Justice L. Phoenix, RN

## 2023-01-01 ENCOUNTER — TELEPHONE (OUTPATIENT)
Dept: CARDIOLOGY | Facility: CLINIC | Age: 88
End: 2023-01-01
Payer: MEDICARE

## 2023-01-01 ENCOUNTER — APPOINTMENT (OUTPATIENT)
Dept: GENERAL RADIOLOGY | Facility: CLINIC | Age: 88
DRG: 193 | End: 2023-01-01
Attending: EMERGENCY MEDICINE
Payer: MEDICARE

## 2023-01-01 ENCOUNTER — HOSPITAL ENCOUNTER (INPATIENT)
Facility: CLINIC | Age: 88
LOS: 1 days | DRG: 193 | End: 2023-12-21
Attending: EMERGENCY MEDICINE | Admitting: HOSPITALIST
Payer: MEDICARE

## 2023-01-01 ENCOUNTER — DOCUMENTATION ONLY (OUTPATIENT)
Dept: CARE COORDINATION | Facility: CLINIC | Age: 88
End: 2023-01-01
Payer: MEDICARE

## 2023-01-01 ENCOUNTER — OFFICE VISIT (OUTPATIENT)
Dept: INTERNAL MEDICINE | Facility: CLINIC | Age: 88
End: 2023-01-01
Payer: MEDICARE

## 2023-01-01 ENCOUNTER — OFFICE VISIT (OUTPATIENT)
Dept: URGENT CARE | Facility: URGENT CARE | Age: 88
End: 2023-01-01
Payer: MEDICARE

## 2023-01-01 ENCOUNTER — NURSE TRIAGE (OUTPATIENT)
Dept: INTERNAL MEDICINE | Facility: CLINIC | Age: 88
End: 2023-01-01
Payer: MEDICARE

## 2023-01-01 ENCOUNTER — ANCILLARY PROCEDURE (OUTPATIENT)
Dept: GENERAL RADIOLOGY | Facility: CLINIC | Age: 88
End: 2023-01-01
Attending: PHYSICIAN ASSISTANT
Payer: MEDICARE

## 2023-01-01 ENCOUNTER — NURSE TRIAGE (OUTPATIENT)
Dept: NURSING | Facility: CLINIC | Age: 88
End: 2023-01-01
Payer: MEDICARE

## 2023-01-01 ENCOUNTER — OFFICE VISIT (OUTPATIENT)
Dept: CARDIOLOGY | Facility: CLINIC | Age: 88
End: 2023-01-01
Payer: MEDICARE

## 2023-01-01 ENCOUNTER — OFFICE VISIT (OUTPATIENT)
Dept: DERMATOLOGY | Facility: CLINIC | Age: 88
End: 2023-01-01
Payer: MEDICARE

## 2023-01-01 ENCOUNTER — TELEPHONE (OUTPATIENT)
Dept: DERMATOLOGY | Facility: CLINIC | Age: 88
End: 2023-01-01

## 2023-01-01 ENCOUNTER — APPOINTMENT (OUTPATIENT)
Dept: CT IMAGING | Facility: CLINIC | Age: 88
DRG: 193 | End: 2023-01-01
Attending: NURSE PRACTITIONER
Payer: MEDICARE

## 2023-01-01 ENCOUNTER — OFFICE VISIT (OUTPATIENT)
Dept: ORTHOPEDICS | Facility: CLINIC | Age: 88
End: 2023-01-01
Payer: MEDICARE

## 2023-01-01 ENCOUNTER — APPOINTMENT (OUTPATIENT)
Dept: GENERAL RADIOLOGY | Facility: CLINIC | Age: 88
DRG: 193 | End: 2023-01-01
Attending: NURSE PRACTITIONER
Payer: MEDICARE

## 2023-01-01 ENCOUNTER — TELEPHONE (OUTPATIENT)
Dept: DERMATOLOGY | Facility: CLINIC | Age: 88
End: 2023-01-01
Payer: MEDICARE

## 2023-01-01 ENCOUNTER — TELEPHONE (OUTPATIENT)
Dept: INTERNAL MEDICINE | Facility: CLINIC | Age: 88
End: 2023-01-01

## 2023-01-01 VITALS
WEIGHT: 172.7 LBS | DIASTOLIC BLOOD PRESSURE: 67 MMHG | OXYGEN SATURATION: 97 % | HEART RATE: 66 BPM | SYSTOLIC BLOOD PRESSURE: 115 MMHG | BODY MASS INDEX: 31.78 KG/M2 | HEIGHT: 62 IN

## 2023-01-01 VITALS
BODY MASS INDEX: 31.09 KG/M2 | TEMPERATURE: 97.7 F | HEART RATE: 68 BPM | SYSTOLIC BLOOD PRESSURE: 142 MMHG | OXYGEN SATURATION: 97 % | DIASTOLIC BLOOD PRESSURE: 90 MMHG | WEIGHT: 170 LBS

## 2023-01-01 VITALS
HEIGHT: 65 IN | BODY MASS INDEX: 29.38 KG/M2 | DIASTOLIC BLOOD PRESSURE: 92 MMHG | WEIGHT: 176.37 LBS | SYSTOLIC BLOOD PRESSURE: 165 MMHG | RESPIRATION RATE: 26 BRPM | OXYGEN SATURATION: 97 % | HEART RATE: 100 BPM | TEMPERATURE: 98.3 F

## 2023-01-01 VITALS
RESPIRATION RATE: 16 BRPM | WEIGHT: 176.4 LBS | SYSTOLIC BLOOD PRESSURE: 99 MMHG | TEMPERATURE: 96.9 F | DIASTOLIC BLOOD PRESSURE: 60 MMHG | OXYGEN SATURATION: 97 % | BODY MASS INDEX: 32.46 KG/M2 | HEART RATE: 72 BPM | HEIGHT: 62 IN

## 2023-01-01 VITALS
TEMPERATURE: 97.2 F | BODY MASS INDEX: 31.66 KG/M2 | DIASTOLIC BLOOD PRESSURE: 80 MMHG | HEART RATE: 77 BPM | WEIGHT: 173.1 LBS | OXYGEN SATURATION: 95 % | RESPIRATION RATE: 12 BRPM | SYSTOLIC BLOOD PRESSURE: 130 MMHG

## 2023-01-01 VITALS
OXYGEN SATURATION: 94 % | SYSTOLIC BLOOD PRESSURE: 138 MMHG | HEART RATE: 65 BPM | TEMPERATURE: 97.4 F | BODY MASS INDEX: 32.94 KG/M2 | WEIGHT: 179 LBS | HEIGHT: 62 IN | DIASTOLIC BLOOD PRESSURE: 72 MMHG

## 2023-01-01 VITALS — BODY MASS INDEX: 31.09 KG/M2 | DIASTOLIC BLOOD PRESSURE: 70 MMHG | SYSTOLIC BLOOD PRESSURE: 116 MMHG | WEIGHT: 170 LBS

## 2023-01-01 DIAGNOSIS — D18.01 ANGIOMA OF SKIN: ICD-10-CM

## 2023-01-01 DIAGNOSIS — M54.50 ACUTE RIGHT-SIDED LOW BACK PAIN WITHOUT SCIATICA: Primary | ICD-10-CM

## 2023-01-01 DIAGNOSIS — I10 HYPERTENSION GOAL BP (BLOOD PRESSURE) < 140/90: ICD-10-CM

## 2023-01-01 DIAGNOSIS — J18.9 COMMUNITY ACQUIRED PNEUMONIA OF LEFT LOWER LOBE OF LUNG: ICD-10-CM

## 2023-01-01 DIAGNOSIS — I10 BENIGN ESSENTIAL HYPERTENSION: ICD-10-CM

## 2023-01-01 DIAGNOSIS — Z00.00 MEDICARE ANNUAL WELLNESS VISIT, SUBSEQUENT: Primary | ICD-10-CM

## 2023-01-01 DIAGNOSIS — L24.9 IRRITANT DERMATITIS: ICD-10-CM

## 2023-01-01 DIAGNOSIS — E03.9 HYPOTHYROIDISM: ICD-10-CM

## 2023-01-01 DIAGNOSIS — Z13.1 SCREENING FOR DIABETES MELLITUS: ICD-10-CM

## 2023-01-01 DIAGNOSIS — M25.442 FINGER JOINT SWELLING, LEFT: ICD-10-CM

## 2023-01-01 DIAGNOSIS — E66.9 OBESITY (BMI 30-39.9): ICD-10-CM

## 2023-01-01 DIAGNOSIS — R73.01 IMPAIRED FASTING GLUCOSE: ICD-10-CM

## 2023-01-01 DIAGNOSIS — J47.9 BRONCHIECTASIS WITHOUT COMPLICATION (H): ICD-10-CM

## 2023-01-01 DIAGNOSIS — I25.85 CARDIAC MICROVASCULAR DISEASE: ICD-10-CM

## 2023-01-01 DIAGNOSIS — I20.89 MICROVASCULAR ANGINA (H): Primary | ICD-10-CM

## 2023-01-01 DIAGNOSIS — R60.0 BILATERAL LEG EDEMA: ICD-10-CM

## 2023-01-01 DIAGNOSIS — S62.623A CLOSED DISPLACED FRACTURE OF MIDDLE PHALANX OF LEFT MIDDLE FINGER, INITIAL ENCOUNTER: Primary | ICD-10-CM

## 2023-01-01 DIAGNOSIS — D23.9 DERMAL NEVUS: ICD-10-CM

## 2023-01-01 DIAGNOSIS — H40.1410: ICD-10-CM

## 2023-01-01 DIAGNOSIS — E78.5 HYPERLIPIDEMIA LDL GOAL <70: ICD-10-CM

## 2023-01-01 DIAGNOSIS — J96.01 ACUTE RESPIRATORY FAILURE WITH HYPOXIA (H): ICD-10-CM

## 2023-01-01 DIAGNOSIS — Z85.828 HISTORY OF SKIN CANCER: Primary | ICD-10-CM

## 2023-01-01 DIAGNOSIS — E78.00 PURE HYPERCHOLESTEROLEMIA: ICD-10-CM

## 2023-01-01 DIAGNOSIS — Z23 HIGH PRIORITY FOR 2019-NCOV VACCINE: ICD-10-CM

## 2023-01-01 DIAGNOSIS — J31.0 CHRONIC RHINITIS: ICD-10-CM

## 2023-01-01 DIAGNOSIS — Z01.818 PREOPERATIVE EXAMINATION: Primary | ICD-10-CM

## 2023-01-01 DIAGNOSIS — I20.89 STABLE ANGINA PECTORIS (H): ICD-10-CM

## 2023-01-01 DIAGNOSIS — L81.4 LENTIGO: ICD-10-CM

## 2023-01-01 DIAGNOSIS — E03.4 HYPOTHYROIDISM DUE TO ACQUIRED ATROPHY OF THYROID: ICD-10-CM

## 2023-01-01 DIAGNOSIS — G47.9 TROUBLE IN SLEEPING: ICD-10-CM

## 2023-01-01 DIAGNOSIS — S62.623A CLOSED DISPLACED FRACTURE OF MIDDLE PHALANX OF LEFT MIDDLE FINGER, INITIAL ENCOUNTER: ICD-10-CM

## 2023-01-01 DIAGNOSIS — E55.9 VITAMIN D DEFICIENCY: ICD-10-CM

## 2023-01-01 DIAGNOSIS — L82.1 SEBORRHEIC KERATOSIS: ICD-10-CM

## 2023-01-01 DIAGNOSIS — C44.311 BASAL CELL CARCINOMA (BCC) OF LEFT ALA NASI: ICD-10-CM

## 2023-01-01 LAB
ALBUMIN SERPL BCG-MCNC: 3.9 G/DL (ref 3.5–5.2)
ALBUMIN SERPL BCG-MCNC: 4 G/DL (ref 3.5–5.2)
ALLEN'S TEST: YES
ALLEN'S TEST: YES
ALP SERPL-CCNC: 75 U/L (ref 35–104)
ALP SERPL-CCNC: 78 U/L (ref 40–150)
ALT SERPL W P-5'-P-CCNC: 13 U/L (ref 0–50)
ALT SERPL W P-5'-P-CCNC: 14 U/L (ref 0–50)
ANION GAP SERPL CALCULATED.3IONS-SCNC: 10 MMOL/L (ref 7–15)
ANION GAP SERPL CALCULATED.3IONS-SCNC: 11 MMOL/L (ref 7–15)
ANION GAP SERPL CALCULATED.3IONS-SCNC: 6 MMOL/L (ref 7–15)
ANION GAP SERPL CALCULATED.3IONS-SCNC: 8 MMOL/L (ref 7–15)
AST SERPL W P-5'-P-CCNC: 19 U/L (ref 0–45)
AST SERPL W P-5'-P-CCNC: 26 U/L (ref 0–45)
ATRIAL RATE - MUSE: 72 BPM
BASE EXCESS BLDA CALC-SCNC: 0.4 MMOL/L (ref -9–1.8)
BASE EXCESS BLDA CALC-SCNC: 2.3 MMOL/L (ref -9–1.8)
BASOPHILS # BLD AUTO: 0 10E3/UL (ref 0–0.2)
BASOPHILS # BLD AUTO: 0 10E3/UL (ref 0–0.2)
BASOPHILS NFR BLD AUTO: 0 %
BASOPHILS NFR BLD AUTO: 0 %
BILIRUB SERPL-MCNC: 0.6 MG/DL
BILIRUB SERPL-MCNC: 0.7 MG/DL
BUN SERPL-MCNC: 10.6 MG/DL (ref 8–23)
BUN SERPL-MCNC: 13.2 MG/DL (ref 8–23)
BUN SERPL-MCNC: 14.5 MG/DL (ref 8–23)
BUN SERPL-MCNC: 16.3 MG/DL (ref 8–23)
CALCIUM SERPL-MCNC: 8.7 MG/DL (ref 8.2–9.6)
CALCIUM SERPL-MCNC: 9.2 MG/DL (ref 8.2–9.6)
CALCIUM SERPL-MCNC: 9.3 MG/DL (ref 8.2–9.6)
CALCIUM SERPL-MCNC: 9.4 MG/DL (ref 8.2–9.6)
CHLORIDE SERPL-SCNC: 100 MMOL/L (ref 98–107)
CHLORIDE SERPL-SCNC: 96 MMOL/L (ref 98–107)
CHLORIDE SERPL-SCNC: 98 MMOL/L (ref 98–107)
CHLORIDE SERPL-SCNC: 99 MMOL/L (ref 98–107)
CHOLEST SERPL-MCNC: 148 MG/DL
CREAT SERPL-MCNC: 0.64 MG/DL (ref 0.51–0.95)
CREAT SERPL-MCNC: 0.69 MG/DL (ref 0.51–0.95)
CREAT SERPL-MCNC: 0.75 MG/DL (ref 0.51–0.95)
CREAT SERPL-MCNC: 0.78 MG/DL (ref 0.51–0.95)
DEPRECATED CALCIDIOL+CALCIFEROL SERPL-MC: 58 UG/L (ref 20–75)
DEPRECATED HCO3 PLAS-SCNC: 26 MMOL/L (ref 22–29)
DEPRECATED HCO3 PLAS-SCNC: 29 MMOL/L (ref 22–29)
DEPRECATED HCO3 PLAS-SCNC: 29 MMOL/L (ref 22–29)
DEPRECATED HCO3 PLAS-SCNC: 30 MMOL/L (ref 22–29)
DIASTOLIC BLOOD PRESSURE - MUSE: NORMAL MMHG
EGFRCR SERPLBLD CKD-EPI 2021: 68 ML/MIN/1.73M2
EGFRCR SERPLBLD CKD-EPI 2021: 78 ML/MIN/1.73M2
EGFRCR SERPLBLD CKD-EPI 2021: 79 ML/MIN/1.73M2
EOSINOPHIL # BLD AUTO: 0 10E3/UL (ref 0–0.7)
EOSINOPHIL # BLD AUTO: 0 10E3/UL (ref 0–0.7)
EOSINOPHIL NFR BLD AUTO: 0 %
EOSINOPHIL NFR BLD AUTO: 0 %
ERYTHROCYTE [DISTWIDTH] IN BLOOD BY AUTOMATED COUNT: 13.6 % (ref 10–15)
ERYTHROCYTE [DISTWIDTH] IN BLOOD BY AUTOMATED COUNT: 13.7 % (ref 10–15)
ERYTHROCYTE [DISTWIDTH] IN BLOOD BY AUTOMATED COUNT: 13.8 % (ref 10–15)
FLUAV RNA SPEC QL NAA+PROBE: NEGATIVE
FLUBV RNA RESP QL NAA+PROBE: NEGATIVE
GFR SERPL CREATININE-BSD FRML MDRD: 72 ML/MIN/1.73M2
GLUCOSE BLDC GLUCOMTR-MCNC: 180 MG/DL (ref 70–99)
GLUCOSE SERPL-MCNC: 129 MG/DL (ref 70–99)
GLUCOSE SERPL-MCNC: 140 MG/DL (ref 70–99)
GLUCOSE SERPL-MCNC: 202 MG/DL (ref 70–99)
GLUCOSE SERPL-MCNC: 95 MG/DL (ref 70–99)
HBA1C MFR BLD: 6.2 % (ref 0–5.6)
HCO3 BLD-SCNC: 30 MMOL/L (ref 21–28)
HCO3 BLD-SCNC: 30 MMOL/L (ref 21–28)
HCO3 BLDV-SCNC: 31 MMOL/L (ref 21–28)
HCT VFR BLD AUTO: 38.6 % (ref 35–47)
HCT VFR BLD AUTO: 40.3 % (ref 35–47)
HCT VFR BLD AUTO: 44.4 % (ref 35–47)
HDLC SERPL-MCNC: 71 MG/DL
HGB BLD-MCNC: 12.6 G/DL (ref 11.7–15.7)
HGB BLD-MCNC: 13.1 G/DL (ref 11.7–15.7)
HGB BLD-MCNC: 14.6 G/DL (ref 11.7–15.7)
IMM GRANULOCYTES # BLD: 0.1 10E3/UL
IMM GRANULOCYTES # BLD: 0.2 10E3/UL
IMM GRANULOCYTES NFR BLD: 1 %
IMM GRANULOCYTES NFR BLD: 2 %
INTERPRETATION ECG - MUSE: NORMAL
LACTATE BLD-SCNC: 1.1 MMOL/L
LACTATE SERPL-SCNC: 1.2 MMOL/L (ref 0.7–2)
LACTATE SERPL-SCNC: 2 MMOL/L (ref 0.7–2)
LDLC SERPL CALC-MCNC: 61 MG/DL
LYMPHOCYTES # BLD AUTO: 0.5 10E3/UL (ref 0.8–5.3)
LYMPHOCYTES # BLD AUTO: 1 10E3/UL (ref 0.8–5.3)
LYMPHOCYTES NFR BLD AUTO: 10 %
LYMPHOCYTES NFR BLD AUTO: 4 %
MCH RBC QN AUTO: 29.6 PG (ref 26.5–33)
MCH RBC QN AUTO: 29.8 PG (ref 26.5–33)
MCH RBC QN AUTO: 30 PG (ref 26.5–33)
MCHC RBC AUTO-ENTMCNC: 32.5 G/DL (ref 31.5–36.5)
MCHC RBC AUTO-ENTMCNC: 32.6 G/DL (ref 31.5–36.5)
MCHC RBC AUTO-ENTMCNC: 32.9 G/DL (ref 31.5–36.5)
MCV RBC AUTO: 91 FL (ref 78–100)
MCV RBC AUTO: 91 FL (ref 78–100)
MCV RBC AUTO: 92 FL (ref 78–100)
MONOCYTES # BLD AUTO: 0.7 10E3/UL (ref 0–1.3)
MONOCYTES # BLD AUTO: 0.8 10E3/UL (ref 0–1.3)
MONOCYTES NFR BLD AUTO: 6 %
MONOCYTES NFR BLD AUTO: 8 %
NEUTROPHILS # BLD AUTO: 10.6 10E3/UL (ref 1.6–8.3)
NEUTROPHILS # BLD AUTO: 7.8 10E3/UL (ref 1.6–8.3)
NEUTROPHILS NFR BLD AUTO: 81 %
NEUTROPHILS NFR BLD AUTO: 88 %
NONHDLC SERPL-MCNC: 77 MG/DL
NRBC # BLD AUTO: 0 10E3/UL
NRBC # BLD AUTO: 0 10E3/UL
NRBC BLD AUTO-RTO: 0 /100
NRBC BLD AUTO-RTO: 0 /100
O2/TOTAL GAS SETTING VFR VENT: 100 %
O2/TOTAL GAS SETTING VFR VENT: 60 %
P AXIS - MUSE: -18 DEGREES
PCO2 BLD: 61 MM HG (ref 35–45)
PCO2 BLD: 73 MM HG (ref 35–45)
PCO2 BLDV: 60 MM HG (ref 40–50)
PH BLD: 7.23 [PH] (ref 7.35–7.45)
PH BLD: 7.31 [PH] (ref 7.35–7.45)
PH BLDV: 7.32 [PH] (ref 7.32–7.43)
PHOSPHATE SERPL-MCNC: 4.4 MG/DL (ref 2.5–4.5)
PLATELET # BLD AUTO: 159 10E3/UL (ref 150–450)
PLATELET # BLD AUTO: 180 10E3/UL (ref 150–450)
PLATELET # BLD AUTO: 189 10E3/UL (ref 150–450)
PO2 BLD: 69 MM HG (ref 80–105)
PO2 BLD: 71 MM HG (ref 80–105)
PO2 BLDV: 47 MM HG (ref 25–47)
POTASSIUM SERPL-SCNC: 4 MMOL/L (ref 3.4–5.3)
POTASSIUM SERPL-SCNC: 4.4 MMOL/L (ref 3.4–5.3)
POTASSIUM SERPL-SCNC: 4.5 MMOL/L (ref 3.4–5.3)
POTASSIUM SERPL-SCNC: 4.5 MMOL/L (ref 3.4–5.3)
PR INTERVAL - MUSE: 208 MS
PROT SERPL-MCNC: 6.9 G/DL (ref 6.4–8.3)
PROT SERPL-MCNC: 7 G/DL (ref 6.4–8.3)
QRS DURATION - MUSE: 86 MS
QT - MUSE: 412 MS
QTC - MUSE: 451 MS
R AXIS - MUSE: -14 DEGREES
RBC # BLD AUTO: 4.26 10E6/UL (ref 3.8–5.2)
RBC # BLD AUTO: 4.39 10E6/UL (ref 3.8–5.2)
RBC # BLD AUTO: 4.86 10E6/UL (ref 3.8–5.2)
RSV RNA SPEC NAA+PROBE: NEGATIVE
SAO2 % BLDV: 78 % (ref 94–100)
SARS-COV-2 RNA RESP QL NAA+PROBE: NEGATIVE
SODIUM SERPL-SCNC: 133 MMOL/L (ref 136–145)
SODIUM SERPL-SCNC: 135 MMOL/L (ref 135–145)
SODIUM SERPL-SCNC: 136 MMOL/L (ref 135–145)
SODIUM SERPL-SCNC: 138 MMOL/L (ref 135–145)
SYSTOLIC BLOOD PRESSURE - MUSE: NORMAL MMHG
T AXIS - MUSE: 7 DEGREES
T4 FREE SERPL-MCNC: 1.25 NG/DL (ref 0.9–1.7)
TRIGL SERPL-MCNC: 81 MG/DL
TROPONIN T SERPL HS-MCNC: 12 NG/L
TROPONIN T SERPL HS-MCNC: 9 NG/L
TSH SERPL DL<=0.005 MIU/L-ACNC: 4.48 UIU/ML (ref 0.3–4.2)
VENTRICULAR RATE- MUSE: 72 BPM
WBC # BLD AUTO: 11 10E3/UL (ref 4–11)
WBC # BLD AUTO: 12 10E3/UL (ref 4–11)
WBC # BLD AUTO: 9.7 10E3/UL (ref 4–11)

## 2023-01-01 PROCEDURE — 71045 X-RAY EXAM CHEST 1 VIEW: CPT

## 2023-01-01 PROCEDURE — 999N000157 HC STATISTIC RCP TIME EA 10 MIN

## 2023-01-01 PROCEDURE — 83605 ASSAY OF LACTIC ACID: CPT | Performed by: INTERNAL MEDICINE

## 2023-01-01 PROCEDURE — 250N000011 HC RX IP 250 OP 636: Performed by: STUDENT IN AN ORGANIZED HEALTH CARE EDUCATION/TRAINING PROGRAM

## 2023-01-01 PROCEDURE — 250N000013 HC RX MED GY IP 250 OP 250 PS 637: Performed by: PHYSICIAN ASSISTANT

## 2023-01-01 PROCEDURE — 80053 COMPREHEN METABOLIC PANEL: CPT | Performed by: INTERNAL MEDICINE

## 2023-01-01 PROCEDURE — 258N000003 HC RX IP 258 OP 636: Performed by: PHYSICIAN ASSISTANT

## 2023-01-01 PROCEDURE — 84439 ASSAY OF FREE THYROXINE: CPT | Performed by: INTERNAL MEDICINE

## 2023-01-01 PROCEDURE — 250N000011 HC RX IP 250 OP 636: Performed by: INTERNAL MEDICINE

## 2023-01-01 PROCEDURE — 71046 X-RAY EXAM CHEST 2 VIEWS: CPT

## 2023-01-01 PROCEDURE — 99222 1ST HOSP IP/OBS MODERATE 55: CPT | Performed by: INTERNAL MEDICINE

## 2023-01-01 PROCEDURE — 36600 WITHDRAWAL OF ARTERIAL BLOOD: CPT

## 2023-01-01 PROCEDURE — 36415 COLL VENOUS BLD VENIPUNCTURE: CPT | Performed by: STUDENT IN AN ORGANIZED HEALTH CARE EDUCATION/TRAINING PROGRAM

## 2023-01-01 PROCEDURE — 82803 BLOOD GASES ANY COMBINATION: CPT

## 2023-01-01 PROCEDURE — 250N000013 HC RX MED GY IP 250 OP 250 PS 637: Performed by: INTERNAL MEDICINE

## 2023-01-01 PROCEDURE — 99213 OFFICE O/P EST LOW 20 MIN: CPT | Performed by: INTERNAL MEDICINE

## 2023-01-01 PROCEDURE — G0439 PPPS, SUBSEQ VISIT: HCPCS | Performed by: INTERNAL MEDICINE

## 2023-01-01 PROCEDURE — 84443 ASSAY THYROID STIM HORMONE: CPT | Performed by: INTERNAL MEDICINE

## 2023-01-01 PROCEDURE — 99214 OFFICE O/P EST MOD 30 MIN: CPT | Performed by: PHYSICIAN ASSISTANT

## 2023-01-01 PROCEDURE — 93010 ELECTROCARDIOGRAM REPORT: CPT | Performed by: INTERNAL MEDICINE

## 2023-01-01 PROCEDURE — 250N000009 HC RX 250: Performed by: INTERNAL MEDICINE

## 2023-01-01 PROCEDURE — 36415 COLL VENOUS BLD VENIPUNCTURE: CPT | Performed by: NURSE PRACTITIONER

## 2023-01-01 PROCEDURE — 83605 ASSAY OF LACTIC ACID: CPT | Performed by: NURSE PRACTITIONER

## 2023-01-01 PROCEDURE — G1010 CDSM STANSON: HCPCS

## 2023-01-01 PROCEDURE — 99291 CRITICAL CARE FIRST HOUR: CPT | Performed by: NURSE PRACTITIONER

## 2023-01-01 PROCEDURE — 250N000009 HC RX 250: Performed by: HOSPITALIST

## 2023-01-01 PROCEDURE — 91320 SARSCV2 VAC 30MCG TRS-SUC IM: CPT | Performed by: INTERNAL MEDICINE

## 2023-01-01 PROCEDURE — 11102 TANGNTL BX SKIN SINGLE LES: CPT | Performed by: DERMATOLOGY

## 2023-01-01 PROCEDURE — 99213 OFFICE O/P EST LOW 20 MIN: CPT | Mod: 25 | Performed by: DERMATOLOGY

## 2023-01-01 PROCEDURE — 94660 CPAP INITIATION&MGMT: CPT

## 2023-01-01 PROCEDURE — 82306 VITAMIN D 25 HYDROXY: CPT | Performed by: INTERNAL MEDICINE

## 2023-01-01 PROCEDURE — 84484 ASSAY OF TROPONIN QUANT: CPT | Performed by: NURSE PRACTITIONER

## 2023-01-01 PROCEDURE — 99214 OFFICE O/P EST MOD 30 MIN: CPT | Performed by: INTERNAL MEDICINE

## 2023-01-01 PROCEDURE — 250N000011 HC RX IP 250 OP 636: Performed by: HOSPITALIST

## 2023-01-01 PROCEDURE — 250N000011 HC RX IP 250 OP 636: Performed by: PHYSICIAN ASSISTANT

## 2023-01-01 PROCEDURE — 5A09357 ASSISTANCE WITH RESPIRATORY VENTILATION, LESS THAN 24 CONSECUTIVE HOURS, CONTINUOUS POSITIVE AIRWAY PRESSURE: ICD-10-PCS | Performed by: INTERNAL MEDICINE

## 2023-01-01 PROCEDURE — 82803 BLOOD GASES ANY COMBINATION: CPT | Performed by: NURSE PRACTITIONER

## 2023-01-01 PROCEDURE — 99207 PR NO CHARGE LOS: CPT | Performed by: NURSE PRACTITIONER

## 2023-01-01 PROCEDURE — 87040 BLOOD CULTURE FOR BACTERIA: CPT | Performed by: NURSE PRACTITIONER

## 2023-01-01 PROCEDURE — 99223 1ST HOSP IP/OBS HIGH 75: CPT | Mod: AI | Performed by: PHYSICIAN ASSISTANT

## 2023-01-01 PROCEDURE — 90480 ADMN SARSCOV2 VAC 1/ONLY CMP: CPT | Performed by: INTERNAL MEDICINE

## 2023-01-01 PROCEDURE — 36415 COLL VENOUS BLD VENIPUNCTURE: CPT | Performed by: INTERNAL MEDICINE

## 2023-01-01 PROCEDURE — 85027 COMPLETE CBC AUTOMATED: CPT | Performed by: NURSE PRACTITIONER

## 2023-01-01 PROCEDURE — 73140 X-RAY EXAM OF FINGER(S): CPT | Mod: TC | Performed by: RADIOLOGY

## 2023-01-01 PROCEDURE — 85004 AUTOMATED DIFF WBC COUNT: CPT | Performed by: INTERNAL MEDICINE

## 2023-01-01 PROCEDURE — 120N000001 HC R&B MED SURG/OB

## 2023-01-01 PROCEDURE — 88331 PATH CONSLTJ SURG 1 BLK 1SPC: CPT | Performed by: DERMATOLOGY

## 2023-01-01 PROCEDURE — 80048 BASIC METABOLIC PNL TOTAL CA: CPT | Performed by: PHYSICIAN ASSISTANT

## 2023-01-01 PROCEDURE — 84100 ASSAY OF PHOSPHORUS: CPT | Performed by: NURSE PRACTITIONER

## 2023-01-01 PROCEDURE — 87637 SARSCOV2&INF A&B&RSV AMP PRB: CPT | Performed by: EMERGENCY MEDICINE

## 2023-01-01 PROCEDURE — 80053 COMPREHEN METABOLIC PANEL: CPT | Performed by: STUDENT IN AN ORGANIZED HEALTH CARE EDUCATION/TRAINING PROGRAM

## 2023-01-01 PROCEDURE — 93005 ELECTROCARDIOGRAM TRACING: CPT

## 2023-01-01 PROCEDURE — 80061 LIPID PANEL: CPT | Performed by: INTERNAL MEDICINE

## 2023-01-01 PROCEDURE — 85004 AUTOMATED DIFF WBC COUNT: CPT | Performed by: STUDENT IN AN ORGANIZED HEALTH CARE EDUCATION/TRAINING PROGRAM

## 2023-01-01 PROCEDURE — 84484 ASSAY OF TROPONIN QUANT: CPT | Performed by: STUDENT IN AN ORGANIZED HEALTH CARE EDUCATION/TRAINING PROGRAM

## 2023-01-01 PROCEDURE — 99207 PR NO BILLABLE SERVICE THIS VISIT: CPT | Performed by: INTERNAL MEDICINE

## 2023-01-01 PROCEDURE — 83036 HEMOGLOBIN GLYCOSYLATED A1C: CPT | Performed by: INTERNAL MEDICINE

## 2023-01-01 PROCEDURE — 99239 HOSP IP/OBS DSCHRG MGMT >30: CPT | Performed by: INTERNAL MEDICINE

## 2023-01-01 PROCEDURE — 99285 EMERGENCY DEPT VISIT HI MDM: CPT | Mod: 25

## 2023-01-01 PROCEDURE — 99203 OFFICE O/P NEW LOW 30 MIN: CPT | Performed by: ORTHOPAEDIC SURGERY

## 2023-01-01 PROCEDURE — 80048 BASIC METABOLIC PNL TOTAL CA: CPT | Performed by: STUDENT IN AN ORGANIZED HEALTH CARE EDUCATION/TRAINING PROGRAM

## 2023-01-01 RX ORDER — AZITHROMYCIN 500 MG/1
500 INJECTION, POWDER, LYOPHILIZED, FOR SOLUTION INTRAVENOUS ONCE
Status: COMPLETED | OUTPATIENT
Start: 2023-01-01 | End: 2023-01-01

## 2023-01-01 RX ORDER — LATANOPROST 50 UG/ML
1 SOLUTION/ DROPS OPHTHALMIC DAILY
COMMUNITY

## 2023-01-01 RX ORDER — DORZOLAMIDE HYDROCHLORIDE AND TIMOLOL MALEATE 20; 5 MG/ML; MG/ML
1 SOLUTION/ DROPS OPHTHALMIC 2 TIMES DAILY
Status: DISCONTINUED | OUTPATIENT
Start: 2023-01-01 | End: 2023-01-01 | Stop reason: HOSPADM

## 2023-01-01 RX ORDER — FUROSEMIDE 20 MG
10 TABLET ORAL DAILY
Qty: 45 TABLET | Refills: 0 | Status: SHIPPED | OUTPATIENT
Start: 2023-01-01 | End: 2023-01-01

## 2023-01-01 RX ORDER — RANOLAZINE 500 MG/1
500 TABLET, EXTENDED RELEASE ORAL 2 TIMES DAILY
Qty: 180 TABLET | Refills: 3 | Status: SHIPPED | OUTPATIENT
Start: 2023-01-01 | End: 2023-01-01

## 2023-01-01 RX ORDER — ISOSORBIDE MONONITRATE 120 MG/1
TABLET, EXTENDED RELEASE ORAL
Qty: 90 TABLET | Refills: 0 | Status: SHIPPED | OUTPATIENT
Start: 2023-01-01 | End: 2023-01-01

## 2023-01-01 RX ORDER — ATORVASTATIN CALCIUM 40 MG/1
40 TABLET, FILM COATED ORAL EVERY EVENING
Status: DISCONTINUED | OUTPATIENT
Start: 2023-01-01 | End: 2023-01-01 | Stop reason: HOSPADM

## 2023-01-01 RX ORDER — AMOXICILLIN 250 MG
1 CAPSULE ORAL 2 TIMES DAILY PRN
Status: DISCONTINUED | OUTPATIENT
Start: 2023-01-01 | End: 2023-01-01

## 2023-01-01 RX ORDER — IPRATROPIUM BROMIDE 42 UG/1
2 SPRAY, METERED NASAL 4 TIMES DAILY
Qty: 15 ML | Refills: 3 | Status: SHIPPED | OUTPATIENT
Start: 2023-01-01

## 2023-01-01 RX ORDER — LATANOPROST 50 UG/ML
1 SOLUTION/ DROPS OPHTHALMIC DAILY
Status: DISCONTINUED | OUTPATIENT
Start: 2023-01-01 | End: 2023-01-01

## 2023-01-01 RX ORDER — ATORVASTATIN CALCIUM 40 MG/1
40 TABLET, FILM COATED ORAL DAILY
Qty: 90 TABLET | Refills: 0 | Status: SHIPPED | OUTPATIENT
Start: 2023-01-01

## 2023-01-01 RX ORDER — NALOXONE HYDROCHLORIDE 0.4 MG/ML
0.2 INJECTION, SOLUTION INTRAMUSCULAR; INTRAVENOUS; SUBCUTANEOUS
Status: DISCONTINUED | OUTPATIENT
Start: 2023-01-01 | End: 2023-01-01 | Stop reason: HOSPADM

## 2023-01-01 RX ORDER — NALOXONE HYDROCHLORIDE 0.4 MG/ML
0.1 INJECTION, SOLUTION INTRAMUSCULAR; INTRAVENOUS; SUBCUTANEOUS
Status: DISCONTINUED | OUTPATIENT
Start: 2023-01-01 | End: 2023-01-01 | Stop reason: HOSPADM

## 2023-01-01 RX ORDER — METOPROLOL TARTRATE 100 MG
TABLET ORAL
Qty: 135 TABLET | Refills: 0 | Status: SHIPPED | OUTPATIENT
Start: 2023-01-01 | End: 2023-01-01

## 2023-01-01 RX ORDER — ACETAMINOPHEN 325 MG/1
650 TABLET ORAL EVERY 6 HOURS PRN
Status: DISCONTINUED | OUTPATIENT
Start: 2023-01-01 | End: 2023-01-01 | Stop reason: HOSPADM

## 2023-01-01 RX ORDER — MORPHINE SULFATE 10 MG/5ML
5 SOLUTION ORAL
Status: DISCONTINUED | OUTPATIENT
Start: 2023-01-01 | End: 2023-01-01 | Stop reason: HOSPADM

## 2023-01-01 RX ORDER — ACETAMINOPHEN 650 MG/1
650 SUPPOSITORY RECTAL EVERY 4 HOURS PRN
Status: DISCONTINUED | OUTPATIENT
Start: 2023-01-01 | End: 2023-01-01

## 2023-01-01 RX ORDER — ALBUTEROL SULFATE 0.83 MG/ML
3 SOLUTION RESPIRATORY (INHALATION)
Status: DISCONTINUED | OUTPATIENT
Start: 2023-01-01 | End: 2023-01-01 | Stop reason: HOSPADM

## 2023-01-01 RX ORDER — NITROGLYCERIN 0.4 MG/1
0.4 TABLET SUBLINGUAL EVERY 5 MIN PRN
Qty: 30 TABLET | Refills: 3 | Status: SHIPPED | OUTPATIENT
Start: 2023-01-01

## 2023-01-01 RX ORDER — CEFTRIAXONE 2 G/1
2 INJECTION, POWDER, FOR SOLUTION INTRAMUSCULAR; INTRAVENOUS EVERY 24 HOURS
Qty: 120 ML | Refills: 0 | Status: DISCONTINUED | OUTPATIENT
Start: 2023-01-01 | End: 2023-01-01

## 2023-01-01 RX ORDER — OLANZAPINE 5 MG/1
5 TABLET, ORALLY DISINTEGRATING ORAL EVERY 6 HOURS PRN
Status: DISCONTINUED | OUTPATIENT
Start: 2023-01-01 | End: 2023-01-01 | Stop reason: HOSPADM

## 2023-01-01 RX ORDER — LABETALOL HYDROCHLORIDE 5 MG/ML
10 INJECTION, SOLUTION INTRAVENOUS
Status: DISCONTINUED | OUTPATIENT
Start: 2023-01-01 | End: 2023-01-01 | Stop reason: HOSPADM

## 2023-01-01 RX ORDER — LEVOTHYROXINE SODIUM 25 UG/1
TABLET ORAL
Qty: 90 TABLET | Refills: 1 | Status: SHIPPED | OUTPATIENT
Start: 2023-01-01

## 2023-01-01 RX ORDER — SODIUM CHLORIDE 9 MG/ML
INJECTION, SOLUTION INTRAVENOUS CONTINUOUS
Status: ACTIVE | OUTPATIENT
Start: 2023-01-01 | End: 2023-01-01

## 2023-01-01 RX ORDER — ISOSORBIDE MONONITRATE 120 MG/1
120 TABLET, EXTENDED RELEASE ORAL EVERY MORNING
Qty: 90 TABLET | Refills: 1 | Status: SHIPPED | OUTPATIENT
Start: 2023-01-01

## 2023-01-01 RX ORDER — LIDOCAINE 40 MG/G
CREAM TOPICAL
Status: DISCONTINUED | OUTPATIENT
Start: 2023-01-01 | End: 2023-01-01

## 2023-01-01 RX ORDER — ATROPINE SULFATE 10 MG/ML
2 SOLUTION/ DROPS OPHTHALMIC EVERY 4 HOURS PRN
Status: DISCONTINUED | OUTPATIENT
Start: 2023-01-01 | End: 2023-01-01 | Stop reason: HOSPADM

## 2023-01-01 RX ORDER — METOPROLOL TARTRATE 100 MG
TABLET ORAL
Qty: 135 TABLET | Refills: 3 | Status: SHIPPED | OUTPATIENT
Start: 2023-01-01

## 2023-01-01 RX ORDER — FUROSEMIDE 10 MG/ML
10 INJECTION INTRAMUSCULAR; INTRAVENOUS DAILY
Status: DISCONTINUED | OUTPATIENT
Start: 2023-01-01 | End: 2023-01-01

## 2023-01-01 RX ORDER — KETOROLAC TROMETHAMINE 5 MG/ML
SOLUTION OPHTHALMIC
COMMUNITY
Start: 2022-10-17 | End: 2023-01-01

## 2023-01-01 RX ORDER — CARBOXYMETHYLCELLULOSE SODIUM 5 MG/ML
1 SOLUTION/ DROPS OPHTHALMIC
Status: DISCONTINUED | OUTPATIENT
Start: 2023-01-01 | End: 2023-01-01

## 2023-01-01 RX ORDER — AMOXICILLIN 250 MG
2 CAPSULE ORAL 2 TIMES DAILY PRN
Status: DISCONTINUED | OUTPATIENT
Start: 2023-01-01 | End: 2023-01-01

## 2023-01-01 RX ORDER — SALIVA STIMULANT COMB. NO.3
1 SPRAY, NON-AEROSOL (ML) MUCOUS MEMBRANE
Status: DISCONTINUED | OUTPATIENT
Start: 2023-01-01 | End: 2023-01-01 | Stop reason: HOSPADM

## 2023-01-01 RX ORDER — NITROGLYCERIN 0.4 MG/1
0.4 TABLET SUBLINGUAL EVERY 5 MIN PRN
Status: DISCONTINUED | OUTPATIENT
Start: 2023-01-01 | End: 2023-01-01

## 2023-01-01 RX ORDER — IPRATROPIUM BROMIDE 42 UG/1
2 SPRAY, METERED NASAL 4 TIMES DAILY
Status: DISCONTINUED | OUTPATIENT
Start: 2023-01-01 | End: 2023-01-01

## 2023-01-01 RX ORDER — PREGABALIN 75 MG/1
150 CAPSULE ORAL 2 TIMES DAILY
Status: DISCONTINUED | OUTPATIENT
Start: 2023-01-01 | End: 2023-01-01 | Stop reason: HOSPADM

## 2023-01-01 RX ORDER — ASPIRIN 81 MG/1
81 TABLET ORAL DAILY
Status: DISCONTINUED | OUTPATIENT
Start: 2023-01-01 | End: 2023-01-01

## 2023-01-01 RX ORDER — MINERAL OIL/HYDROPHIL PETROLAT
OINTMENT (GRAM) TOPICAL
Status: DISCONTINUED | OUTPATIENT
Start: 2023-01-01 | End: 2023-01-01 | Stop reason: HOSPADM

## 2023-01-01 RX ORDER — PREGABALIN 150 MG/1
CAPSULE ORAL
COMMUNITY
Start: 2023-01-01 | End: 2023-01-01

## 2023-01-01 RX ORDER — CODEINE PHOSPHATE AND GUAIFENESIN 10; 100 MG/5ML; MG/5ML
5 SOLUTION ORAL
Status: DISCONTINUED | OUTPATIENT
Start: 2023-01-01 | End: 2023-01-01 | Stop reason: HOSPADM

## 2023-01-01 RX ORDER — METOPROLOL TARTRATE 50 MG
50 TABLET ORAL 2 TIMES DAILY
Status: DISCONTINUED | OUTPATIENT
Start: 2023-01-01 | End: 2023-01-01 | Stop reason: HOSPADM

## 2023-01-01 RX ORDER — ISOSORBIDE MONONITRATE 60 MG/1
120 TABLET, EXTENDED RELEASE ORAL EVERY MORNING
Status: DISCONTINUED | OUTPATIENT
Start: 2023-01-01 | End: 2023-01-01

## 2023-01-01 RX ORDER — ACETYLCYSTEINE 200 MG/ML
2 SOLUTION ORAL; RESPIRATORY (INHALATION) EVERY 4 HOURS
Status: DISCONTINUED | OUTPATIENT
Start: 2023-01-01 | End: 2023-01-01

## 2023-01-01 RX ORDER — IOPAMIDOL 755 MG/ML
66 INJECTION, SOLUTION INTRAVASCULAR ONCE
Status: COMPLETED | OUTPATIENT
Start: 2023-01-01 | End: 2023-01-01

## 2023-01-01 RX ORDER — MORPHINE SULFATE 20 MG/ML
10 SOLUTION ORAL
Status: DISCONTINUED | OUTPATIENT
Start: 2023-01-01 | End: 2023-01-01 | Stop reason: HOSPADM

## 2023-01-01 RX ORDER — MORPHINE SULFATE 20 MG/ML
5 SOLUTION ORAL
Status: DISCONTINUED | OUTPATIENT
Start: 2023-01-01 | End: 2023-01-01 | Stop reason: HOSPADM

## 2023-01-01 RX ORDER — CARBOXYMETHYLCELLULOSE SODIUM 5 MG/ML
1-2 SOLUTION/ DROPS OPHTHALMIC
Status: DISCONTINUED | OUTPATIENT
Start: 2023-01-01 | End: 2023-01-01 | Stop reason: HOSPADM

## 2023-01-01 RX ORDER — GLYCOPYRROLATE 0.2 MG/ML
0.2 INJECTION, SOLUTION INTRAMUSCULAR; INTRAVENOUS EVERY 4 HOURS PRN
Status: DISCONTINUED | OUTPATIENT
Start: 2023-01-01 | End: 2023-01-01 | Stop reason: HOSPADM

## 2023-01-01 RX ORDER — RANOLAZINE 500 MG/1
500 TABLET, EXTENDED RELEASE ORAL 2 TIMES DAILY
Status: DISCONTINUED | OUTPATIENT
Start: 2023-01-01 | End: 2023-01-01 | Stop reason: HOSPADM

## 2023-01-01 RX ORDER — ONDANSETRON 4 MG/1
4 TABLET, ORALLY DISINTEGRATING ORAL EVERY 6 HOURS PRN
Status: DISCONTINUED | OUTPATIENT
Start: 2023-01-01 | End: 2023-01-01

## 2023-01-01 RX ORDER — MORPHINE SULFATE 2 MG/ML
1 INJECTION, SOLUTION INTRAMUSCULAR; INTRAVENOUS
Status: DISCONTINUED | OUTPATIENT
Start: 2023-01-01 | End: 2023-01-01 | Stop reason: HOSPADM

## 2023-01-01 RX ORDER — CEFTRIAXONE 2 G/1
2 INJECTION, POWDER, FOR SOLUTION INTRAMUSCULAR; INTRAVENOUS ONCE
Status: COMPLETED | OUTPATIENT
Start: 2023-01-01 | End: 2023-01-01

## 2023-01-01 RX ORDER — LORAZEPAM 2 MG/ML
1 INJECTION INTRAMUSCULAR
Status: DISCONTINUED | OUTPATIENT
Start: 2023-01-01 | End: 2023-01-01 | Stop reason: HOSPADM

## 2023-01-01 RX ORDER — RANOLAZINE 500 MG/1
500 TABLET, EXTENDED RELEASE ORAL 2 TIMES DAILY
Qty: 180 TABLET | Refills: 0 | Status: SHIPPED | OUTPATIENT
Start: 2023-01-01

## 2023-01-01 RX ORDER — ENOXAPARIN SODIUM 100 MG/ML
40 INJECTION SUBCUTANEOUS EVERY 24 HOURS
Status: DISCONTINUED | OUTPATIENT
Start: 2023-01-01 | End: 2023-01-01

## 2023-01-01 RX ORDER — LORAZEPAM 1 MG/1
1 TABLET ORAL
Status: DISCONTINUED | OUTPATIENT
Start: 2023-01-01 | End: 2023-01-01 | Stop reason: HOSPADM

## 2023-01-01 RX ORDER — ACETAMINOPHEN 325 MG/1
650 TABLET ORAL EVERY 4 HOURS PRN
Status: DISCONTINUED | OUTPATIENT
Start: 2023-01-01 | End: 2023-01-01

## 2023-01-01 RX ORDER — ONDANSETRON 2 MG/ML
4 INJECTION INTRAMUSCULAR; INTRAVENOUS EVERY 6 HOURS PRN
Status: DISCONTINUED | OUTPATIENT
Start: 2023-01-01 | End: 2023-01-01 | Stop reason: HOSPADM

## 2023-01-01 RX ORDER — DORZOLAMIDE HYDROCHLORIDE AND TIMOLOL MALEATE 20; 5 MG/ML; MG/ML
1 SOLUTION/ DROPS OPHTHALMIC 2 TIMES DAILY
COMMUNITY
Start: 2022-12-05

## 2023-01-01 RX ORDER — ISOSORBIDE MONONITRATE 120 MG/1
120 TABLET, EXTENDED RELEASE ORAL EVERY MORNING
Qty: 90 TABLET | Refills: 0 | Status: SHIPPED | OUTPATIENT
Start: 2023-01-01 | End: 2023-01-01

## 2023-01-01 RX ORDER — ATORVASTATIN CALCIUM 40 MG/1
40 TABLET, FILM COATED ORAL DAILY
Qty: 90 TABLET | Refills: 3 | Status: SHIPPED | OUTPATIENT
Start: 2023-01-01 | End: 2023-01-01

## 2023-01-01 RX ORDER — MORPHINE SULFATE 2 MG/ML
2 INJECTION, SOLUTION INTRAMUSCULAR; INTRAVENOUS
Status: DISCONTINUED | OUTPATIENT
Start: 2023-01-01 | End: 2023-01-01 | Stop reason: HOSPADM

## 2023-01-01 RX ORDER — FUROSEMIDE 20 MG
10 TABLET ORAL DAILY
Qty: 45 TABLET | Refills: 3 | Status: SHIPPED | OUTPATIENT
Start: 2023-01-01

## 2023-01-01 RX ORDER — ALBUTEROL SULFATE 0.83 MG/ML
2.5 SOLUTION RESPIRATORY (INHALATION) EVERY 4 HOURS PRN
Status: DISCONTINUED | OUTPATIENT
Start: 2023-01-01 | End: 2023-01-01

## 2023-01-01 RX ORDER — MORPHINE SULFATE 10 MG/5ML
10 SOLUTION ORAL
Status: DISCONTINUED | OUTPATIENT
Start: 2023-01-01 | End: 2023-01-01 | Stop reason: HOSPADM

## 2023-01-01 RX ORDER — METOPROLOL TARTRATE 1 MG/ML
5 INJECTION, SOLUTION INTRAVENOUS EVERY 6 HOURS
Status: DISCONTINUED | OUTPATIENT
Start: 2023-01-01 | End: 2023-01-01

## 2023-01-01 RX ORDER — ONDANSETRON 2 MG/ML
4 INJECTION INTRAMUSCULAR; INTRAVENOUS EVERY 6 HOURS PRN
Status: DISCONTINUED | OUTPATIENT
Start: 2023-01-01 | End: 2023-01-01

## 2023-01-01 RX ORDER — ONDANSETRON 4 MG/1
4 TABLET, ORALLY DISINTEGRATING ORAL EVERY 6 HOURS PRN
Status: DISCONTINUED | OUTPATIENT
Start: 2023-01-01 | End: 2023-01-01 | Stop reason: HOSPADM

## 2023-01-01 RX ORDER — HYDROXYZINE HYDROCHLORIDE 10 MG/1
TABLET, FILM COATED ORAL
Qty: 90 TABLET | Refills: 2 | Status: SHIPPED | OUTPATIENT
Start: 2023-01-01

## 2023-01-01 RX ADMIN — MORPHINE SULFATE 2 MG: 2 INJECTION, SOLUTION INTRAMUSCULAR; INTRAVENOUS at 14:31

## 2023-01-01 RX ADMIN — MORPHINE SULFATE 1 MG: 2 INJECTION, SOLUTION INTRAMUSCULAR; INTRAVENOUS at 15:13

## 2023-01-01 RX ADMIN — FUROSEMIDE 10 MG: 10 INJECTION, SOLUTION INTRAMUSCULAR; INTRAVENOUS at 12:04

## 2023-01-01 RX ADMIN — PREGABALIN 150 MG: 75 CAPSULE ORAL at 20:26

## 2023-01-01 RX ADMIN — LATANOPROST 1 DROP: 50 SOLUTION/ DROPS OPHTHALMIC at 18:39

## 2023-01-01 RX ADMIN — ENOXAPARIN SODIUM 40 MG: 40 INJECTION SUBCUTANEOUS at 12:05

## 2023-01-01 RX ADMIN — CEFTRIAXONE SODIUM 2 G: 2 INJECTION, POWDER, FOR SOLUTION INTRAMUSCULAR; INTRAVENOUS at 12:51

## 2023-01-01 RX ADMIN — IPRATROPIUM BROMIDE 2 SPRAY: 42 SPRAY, METERED NASAL at 18:39

## 2023-01-01 RX ADMIN — SODIUM CHLORIDE: 9 INJECTION, SOLUTION INTRAVENOUS at 18:21

## 2023-01-01 RX ADMIN — SODIUM CHLORIDE 90 ML: 9 INJECTION, SOLUTION INTRAVENOUS at 17:23

## 2023-01-01 RX ADMIN — DORZOLAMIDE HYDROCHLORIDE AND TIMOLOL MALEATE 1 DROP: 20; 5 SOLUTION/ DROPS OPHTHALMIC at 10:40

## 2023-01-01 RX ADMIN — LORAZEPAM 1 MG: 1 TABLET ORAL at 14:43

## 2023-01-01 RX ADMIN — CEFTRIAXONE SODIUM 2 G: 2 INJECTION, POWDER, FOR SOLUTION INTRAMUSCULAR; INTRAVENOUS at 13:43

## 2023-01-01 RX ADMIN — METOPROLOL TARTRATE 50 MG: 50 TABLET, FILM COATED ORAL at 20:26

## 2023-01-01 RX ADMIN — RANOLAZINE 500 MG: 500 TABLET, FILM COATED, EXTENDED RELEASE ORAL at 20:26

## 2023-01-01 RX ADMIN — METOPROLOL TARTRATE 5 MG: 5 INJECTION INTRAVENOUS at 12:04

## 2023-01-01 RX ADMIN — MORPHINE SULFATE 5 MG: 100 SOLUTION ORAL at 14:48

## 2023-01-01 RX ADMIN — AZITHROMYCIN MONOHYDRATE 500 MG: 500 INJECTION, POWDER, LYOPHILIZED, FOR SOLUTION INTRAVENOUS at 13:50

## 2023-01-01 RX ADMIN — ATORVASTATIN CALCIUM 40 MG: 40 TABLET, FILM COATED ORAL at 20:26

## 2023-01-01 RX ADMIN — DORZOLAMIDE HYDROCHLORIDE AND TIMOLOL MALEATE 1 DROP: 20; 5 SOLUTION/ DROPS OPHTHALMIC at 20:28

## 2023-01-01 RX ADMIN — IOPAMIDOL 66 ML: 755 INJECTION, SOLUTION INTRAVENOUS at 17:23

## 2023-01-01 RX ADMIN — IPRATROPIUM BROMIDE 2 SPRAY: 42 SPRAY, METERED NASAL at 20:30

## 2023-01-01 RX ADMIN — LATANOPROST 1 DROP: 50 SOLUTION/ DROPS OPHTHALMIC at 10:40

## 2023-01-01 RX ADMIN — MORPHINE SULFATE 1 MG: 2 INJECTION, SOLUTION INTRAMUSCULAR; INTRAVENOUS at 13:59

## 2023-01-01 ASSESSMENT — ACTIVITIES OF DAILY LIVING (ADL)
ADLS_ACUITY_SCORE: 38
ADLS_ACUITY_SCORE: 35
ADLS_ACUITY_SCORE: 34
ADLS_ACUITY_SCORE: 35
ADLS_ACUITY_SCORE: 41
ADLS_ACUITY_SCORE: 41
ADLS_ACUITY_SCORE: 34
ADLS_ACUITY_SCORE: 35
ADLS_ACUITY_SCORE: 38
ADLS_ACUITY_SCORE: 34
ADLS_ACUITY_SCORE: 34
ADLS_ACUITY_SCORE: 38
ADLS_ACUITY_SCORE: 42
ADLS_ACUITY_SCORE: 41
ADLS_ACUITY_SCORE: 38
ADLS_ACUITY_SCORE: 34

## 2023-01-01 ASSESSMENT — ENCOUNTER SYMPTOMS
SPUTUM PRODUCTION: 1
WHEEZING: 0
SHORTNESS OF BREATH: 1
COUGH: 1

## 2023-01-01 ASSESSMENT — PAIN SCALES - GENERAL: PAINLEVEL: NO PAIN (0)

## 2023-01-06 NOTE — TELEPHONE ENCOUNTER
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 She will like to go to [facility] location. Please call back.    Phone Number Patient can be reached at: Home number on file [phone] (home)    Best Time: Afternoon at 2:00 pm    Can we leave a detailed message on this number? YES    Call taken on 1/6/2023 at 8:18 AM by Cheyanne Marie

## 2023-01-10 NOTE — PROGRESS NOTES
HPI and Plan:   Dear Antwon:       I had the pleasure of following up with Lora Vergara in  cardiology clinic for follow-up of her chronic microvascular angina.  She had mild to moderate atherosclerosis on coronary angiography in 2010 but has had intermittent jaw discomfort that improved with sublingual nitroglycerin.  Over the years, it has improved with Imdur and Ranexa.      She is accompanied by a friend who brought her here.  At age 89, she still is mentally sharp and still active.  She walks with a walker.  Denies any chest pain or shortness of breath.  She has not had to take any nitroglycerin recently.    She has been following with an eye doctor for glaucoma and is scheduled for glaucoma surgery.  She also has had mild bronchiectasis in the past and is doing inhaler.      Her prior cardiac evaluation including echocardiogram revealed normal systolic function.  She does have moderate mitral stenosis on previous echocardiograms    Exam   Regular rate and rhythm.  Without significant murmurs.  Chest is clear to auscultation         IMPRESSION:   1.   Microvascular angina/coronary artery atherosclerosis.  No recurrent angina.  Doing well on present antianginal therapy that includes amlodipine, Imdur and Ranexa.  Also on metoprolol.    2.  Hyperlipidemia.  Continue atorvastatin.   Last LDL 66      3.  Hypertension, well controlled.      4. Bronchiectasis, management per pulmonary          Thank you for allowing us to participate in the care of this nice patient.     Sincerely,     Kalyan Luis MD         Today's clinic visit entailed:    31 minutes spent on the date of the encounter doing chart review, review of test results, patient visit and documentation   Provider  Link to [facility] Help Grid     The level of medical decision making during this visit was of moderate complexity.      No orders of the defined types were placed in this encounter.      Orders Placed This Encounter   Medications     dorzolamide-timolol  (COSOPT) 2-0.5 % ophthalmic solution     Sig: INSTILL 1 DROP INTO BOTH EYES 3 TIMES A DAY     ketorolac (ACULAR) 0.5 % ophthalmic solution     Sig: INSTILL 1 DROP INTO RIGHT EYE 4 TIMES A DAY       There are no discontinued medications.      No diagnosis found.    CURRENT MEDICATIONS:  Current Outpatient Medications   Medication Sig Dispense Refill     aspirin 81 MG EC tablet Take 81 mg by mouth daily       atorvastatin (LIPITOR) 40 MG tablet Take 1 tablet (40 mg) by mouth daily 90 tablet 3     dorzolamide-timolol (COSOPT) 2-0.5 % ophthalmic solution INSTILL 1 DROP INTO BOTH EYES 3 TIMES A DAY       furosemide (LASIX) 20 MG tablet Take 0.5 tablets (10 mg) by mouth daily 45 tablet 0     hydrOXYzine (ATARAX) 10 MG tablet Take 1 tablet (10 mg) by mouth nightly as needed for other (trouble sleeping) 90 tablet 3     isosorbide mononitrate CR (IMDUR) 120 MG 24 HR ER tablet Take 1 tablet (120 mg) by mouth every morning 90 tablet 3     ketorolac (ACULAR) 0.5 % ophthalmic solution INSTILL 1 DROP INTO RIGHT EYE 4 TIMES A DAY       latanoprost (XALATAN) 0.005 % ophthalmic solution Place 1 drop into the right eye 3 times daily       levothyroxine (SYNTHROID/LEVOTHROID) 25 MCG tablet TAKE 1 TABLET BY MOUTH EVERY DAY 90 tablet 1     metoprolol tartrate (LOPRESSOR) 100 MG tablet TAKE 1/2 TABLET BY MOUTH EVERY MORNING AND 1 TABLET EVERY EVENING 135 tablet 0     nitroGLYcerin (NITROSTAT) 0.4 MG sublingual tablet PLACE 1 TABLET UNDER THE TONGUE EVERY 5 MINUTES AS NEEDED FOR CHEST PAIN 150 tablet 3     order for DME Equipment being ordered: wheeled walker with brakes and a seat 1 Device 0     polyethylene glycol (MIRALAX/GLYCOLAX) packet Take 1 packet by mouth daily as needed for constipation       ranolazine (RANEXA) 500 MG 12 hr tablet Take 1 tablet (500 mg) by mouth 2 times daily 180 tablet 0     Respiratory Therapy Supplies (AEROBIKA) RUT        vitamin D3 (CHOLECALCIFEROL) 2000 units tablet Take 4,000 Units by mouth daily        gabapentin (NEURONTIN) 300 MG capsule Take 1 capsule (300 mg) by mouth 3 times daily (Patient not taking: Reported on 1/10/2023) 60 capsule 3     pregabalin (LYRICA) 150 MG capsule Take 1 capsule by mouth twice a day as directed by physician. (Patient not taking: Reported on 1/10/2023) 180 capsule 3       ALLERGIES     Allergies   Allergen Reactions     Hydrochlorothiazide Other (See Comments)     Low sodium       PAST MEDICAL HISTORY:  Past Medical History:   Diagnosis Date     Basal cell carcinoma      Benign essential hypertension      Bronchiectasis (H)      CAD (coronary artery disease)     mild to moderate disease     Hypothyroidism      Impaired fasting glucose      Microvascular angina (H)     chronic     Obesity (BMI 30-39.9)      Peripheral neuropathy      Pure hypercholesterolemia        PAST SURGICAL HISTORY:  Past Surgical History:   Procedure Laterality Date     APPENDECTOMY  01/01/1974     BLADDER SURGERY  01/01/1990    bladder suspension     CHOLECYSTECTOMY  01/01/1975     CORONARY ANGIOGRAPHY ADULT ORDER  02/01/2008    Mild CAD. LAD w/20% stenosis, RCA with 30-40% stenosis. No flow limiting obstructions.     HYSTERECTOMY  01/01/1970     OOPHORECTOMY  01/01/1974    bilateral     PHACOEMULSIFICATION CLEAR CORNEA WITH STANDARD INTRAOCULAR LENS IMPLANT Right 07/21/2015    Procedure: PHACOEMULSIFICATION CLEAR CORNEA WITH STANDARD INTRAOCULAR LENS IMPLANT;  Surgeon: Schuyler Chapa MD;  Location: [facility]     PHACOEMULSIFICATION CLEAR CORNEA WITH STANDARD INTRAOCULAR LENS IMPLANT Left 08/18/2015    Procedure: PHACOEMULSIFICATION CLEAR CORNEA WITH STANDARD INTRAOCULAR LENS IMPLANT;  Surgeon: Schuyler Chapa MD;  Location: [facility]       FAMILY HISTORY:  Family History   Problem Relation Age of Onset     Cerebrovascular Disease Mother      Neurologic Disorder Mother         PD     Pancreatic Cancer Father      Lung Cancer Brother         smoker     Diabetes Type 2  Son      Throat cancer Son          "smoker     Diabetes No family hx of      Myocardial Infarction No family hx of      Coronary Artery Disease Early Onset No family hx of      Breast Cancer No family hx of      Colon Cancer No family hx of      Ovarian Cancer No family hx of        SOCIAL HISTORY:  Social History     Socioeconomic History     Marital status:      Spouse name: None     Number of children: None     Years of education: None     Highest education level: None   Occupational History     Occupation: Retired - book-keeper   Tobacco Use     Smoking status: Never     Smokeless tobacco: Never   Substance and Sexual Activity     Alcohol use: Not Currently     Comment: rare     Drug use: No     Sexual activity: Not Currently   Other Topics Concern     Parent/sibling w/ CABG, MI or angioplasty before 65F 55M? No     Caffeine Concern Yes     Comment: 4 cups caffeine per day     Sleep Concern No     Stress Concern No     Weight Concern No     Special Diet No     Exercise Yes     Comment: exercises 30 minutes, 4 days week   Social History Narrative     x 2.    Two sons; one has since passed away.    1 grandchildren.    4 great grandchildren.    2 great great grandchildren.       Review of Systems:  Skin:  not assessed       Eyes:  Positive for glaucoma has L eye surgery on 1/18, preop is on 1/12. mentioned she will not be under general anesthesia unless necessary.  ENT:  not assessed      Respiratory:  Positive for shortness of breath has had sob for years.   Cardiovascular:  Negative   has neuropathy, so her ankles swell in the evenings.  Gastroenterology: not assessed      Genitourinary:  not assessed      Musculoskeletal:  not assessed      Neurologic:  not assessed      Psychiatric:  not assessed      Heme/Lymph/Imm:  not assessed      Endocrine:  Negative        Physical Exam:  Vitals: /67 (BP Location: Left arm, Patient Position: Sitting)   Pulse 66   Ht 1.575 m (5' 2\")   Wt 78.3 kg (172 lb 11.2 oz)   LMP  (LMP Unknown)  "  SpO2 97%   BMI 31.59 kg/m      Constitutional:  cooperative        Skin:             Head:           Eyes:  sclera white        Lymph:      ENT:  not assessed this visit        Neck:  JVP normal        Respiratory:  clear to auscultation         Cardiac: regular rhythm;normal S1 and S2   S4            not assessed this visit                                        GI:  not assessed this visit obese      Extremities and Muscular Skeletal:  no edema   bilateral LE edema;trace          Neurological:  no gross motor deficits   uses walker    Psych:  Alert and Oriented x 3        CC  Kalyan Luis MD  [address]  [address]  [place] [address]

## 2023-01-11 PROBLEM — Z85.828 HISTORY OF BASAL CELL CARCINOMA: Status: ACTIVE | Noted: 2023-01-01

## 2023-01-12 NOTE — PROGRESS NOTES
ASSESSMENT/PLAN:                                                      Lora Vergara is a pleasant 89 year old female who presents for a preoperative evaluation. She is undergoing a low risk procedure. Her risk of major cardiac event is 1 point: 0.9%.    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    (J47.9) Bronchiectasis without complication (H)  Comment: known issue that I take into account for their medical decisions, no current exacerbations or new concerns.    RECOMMEND PROCEEDING WITH SURGERY: YES    Paty Chinchilla MD   [address] [address]  T: [phone], F: [phone]    SUBJECTIVE:                                                      Lora Vergara is a very pleasant 89 year old female who presents for preoperative evaluation.    Surgeon: Bud  Date: 1/18/2023  Location: [facility], Fax#[phone]  Surgery: RIGHT gonitomy (low risk)  Indication: RIGHT capsular glaucoma with pseudoexfoliation    Past Medical History:   Diagnosis Date     Benign essential hypertension      Bronchiectasis (H)      CAD (coronary artery disease)     mild to moderate disease     History of basal cell carcinoma      Hypothyroidism      Impaired fasting glucose      Microvascular angina (H)     chronic     Obesity (BMI 30-39.9)      Peripheral neuropathy      Pure hypercholesterolemia      Personal or family history of excessive or prolonged bleeding? No  Personal or family history of blood clots? No  History of snoring/Sleep Apnea? No  History of blood transfusions? No    Cardiovascular risk: Ischemic cardiac disease (1 point)    Risk of major cardiac event: 1 point: 0.9%    Past Surgical  History:   Procedure Laterality Date     APPENDECTOMY  01/01/1974     BLADDER SURGERY  01/01/1990    bladder suspension     CHOLECYSTECTOMY  01/01/1975     CORONARY ANGIOGRAPHY ADULT ORDER  02/01/2008    Mild CAD. LAD w/20% stenosis, RCA with 30-40% stenosis. No flow limiting obstructions.     HYSTERECTOMY  01/01/1970     OOPHORECTOMY  01/01/1974    bilateral     PHACOEMULSIFICATION CLEAR CORNEA WITH STANDARD INTRAOCULAR LENS IMPLANT Right 07/21/2015    Procedure: PHACOEMULSIFICATION CLEAR CORNEA WITH STANDARD INTRAOCULAR LENS IMPLANT;  Surgeon: Schuyler Chapa MD;  Location: [facility]     PHACOEMULSIFICATION CLEAR CORNEA WITH STANDARD INTRAOCULAR LENS IMPLANT Left 08/18/2015    Procedure: PHACOEMULSIFICATION CLEAR CORNEA WITH STANDARD INTRAOCULAR LENS IMPLANT;  Surgeon: Schuyler Chapa MD;  Location: [facility]     Artificial assistive devices, such as pacemakers, artificial cardiac valves, or artificial joints? No    Allergies   Allergen Reactions     Hydrochlorothiazide Other (See Comments)     Low sodium     Personal or family history of allergy to anesthesia? No  Allergy to local or topical analgesics? No  Allergy to latex? No  Allergy to adhesives/skin preparations (chlorhexadine)? No    Current Outpatient Medications   Medication Sig     aspirin 81 MG EC tablet Take 81 mg by mouth daily     atorvastatin (LIPITOR) 40 MG tablet Take 1 tablet (40 mg) by mouth daily     dorzolamide-timolol (COSOPT) 2-0.5 % ophthalmic solution INSTILL 1 DROP INTO BOTH EYES 3 TIMES A DAY     furosemide (LASIX) 20 MG tablet Take 0.5 tablets (10 mg) by mouth daily     hydrOXYzine (ATARAX) 10 MG tablet Take 1 tablet (10 mg) by mouth nightly as needed for other (trouble sleeping)     isosorbide mononitrate CR (IMDUR) 120 MG 24 HR ER tablet Take 1 tablet (120 mg) by mouth every morning     ketorolac (ACULAR) 0.5 % ophthalmic solution INSTILL 1 DROP INTO RIGHT EYE 4 TIMES A DAY     levothyroxine (SYNTHROID/LEVOTHROID) 25 MCG tablet TAKE  1 TABLET BY MOUTH EVERY DAY     metoprolol tartrate (LOPRESSOR) 100 MG tablet TAKE 1/2 TABLET BY MOUTH EVERY MORNING AND 1 TABLET EVERY EVENING     nitroGLYcerin (NITROSTAT) 0.4 MG sublingual tablet PLACE 1 TABLET UNDER THE TONGUE EVERY 5 MINUTES AS NEEDED FOR CHEST PAIN     order for DME Equipment being ordered: wheeled walker with brakes and a seat     polyethylene glycol (MIRALAX/GLYCOLAX) packet Take 1 packet by mouth daily as needed for constipation     ranolazine (RANEXA) 500 MG 12 hr tablet Take 1 tablet (500 mg) by mouth 2 times daily     Respiratory Therapy Supplies (AEROBIKA) RUT      vitamin D3 (CHOLECALCIFEROL) 2000 units tablet Take 4,000 Units by mouth daily     Over the counter medications? Other than above, no  Vitamin Supplements? Other than above, no  Herbal Supplements? No    Family History   Problem Relation Age of Onset     Cerebrovascular Disease Mother      Neurologic Disorder Mother         PD     Pancreatic Cancer Father      Lung Cancer Brother         smoker     Diabetes Type 2  Son      Throat cancer Son         smoker     Diabetes No family hx of      Myocardial Infarction No family hx of      Coronary Artery Disease Early Onset No family hx of      Breast Cancer No family hx of      Colon Cancer No family hx of      Ovarian Cancer No family hx of      Social History     Occupational History     Occupation: Retired - book-keeper   Tobacco Use     Smoking status: Never     Smokeless tobacco: Never   Vaping Use     Vaping Use: Never used   Substance and Sexual Activity     Alcohol use: Not Currently     Drug use: No     Sexual activity: Not Currently   Social History Narrative     x 2.    Two sons; one has since passed away.    2 grandchild.    3 great grandchildren.    3 great great grandchildren.     Functional capacity: Can walk indoors around the house (2 METs)    OBJECTIVE:                                                      BP 99/60 (BP Location: Left arm, Patient Position:  "Sitting, Cuff Size: Adult Regular)   Pulse 72   Temp 96.9  F (36.1  C) (Temporal)   Resp 16   Ht 1.575 m (5' 2.01\")   Wt 80 kg (176 lb 6.4 oz)   LMP  (LMP Unknown)   SpO2 97%   BMI 32.26 kg/m    Constitutional: well-appearing  Respiratory: normal respiratory effort; clear to auscultation bilaterally  Cardiovascular: regular rate and rhythm; no edema  Musculoskeletal: walks with walker  Psych: normal judgment and insight; normal mood and affect    ---    (Chart documentation was completed, in part, with [employer] voice-recognition software. Even though reviewed, some grammatical, spelling, and word errors may remain.)  "

## 2023-03-09 NOTE — PATIENT INSTRUCTIONS
Apply Desitin to the irritated skin under your eye(s). Do not apply it to the inside of your eye.         Wound Care Instructions     FOR SUPERFICIAL WOUNDS     [facility] [phone]                 AFTER 24 HOURS YOU SHOULD REMOVE THE BANDAGE AND BEGIN DAILY DRESSING CHANGES AS FOLLOWS:     1) Remove Dressing.     2) Clean and dry the area with tap water using a Q-tip or sterile gauze pad.     3) Apply Vaseline, Aquaphor, Polysporin ointment or Bacitracin ointment over entire wound.  Do NOT use Neosporin ointment.     4) Cover the wound with a band-aid, or a sterile non-stick gauze pad and micropore paper tape    REPEAT THESE INSTRUCTIONS AT LEAST ONCE A DAY UNTIL THE WOUND HAS COMPLETELY HEALED.    It is an old wives tale that a wound heals better when it is exposed to air and allowed to dry out. The wound will heal faster with a better cosmetic result if it is kept moist with ointment and covered with a bandage.    **Do not let the wound dry out.**    Supplies Needed:      *Cotton tipped applicators (Q-tips)    *Vaseline, Aquaphor, Polysporin or Bacitracin Ointment (NOT NEOSPORIN)    *Band-aids or non-stick gauze pads and micropore paper tape.      PATIENT INFORMATION:    During the healing process you will notice a number of changes. All wounds develop a small halo of redness surrounding the wound.  This means healing is occurring. Severe itching with extensive redness usually indicates sensitivity to the ointment or bandage tape used to dress the wound.  You should call our office if this develops.      Swelling  and/or discoloration around your surgical site is common, particularly when performed around the eye.    All wounds normally drain.  The larger the wound the more drainage there will be.  After 7-10 days, you will notice the wound beginning to shrink and new skin will begin to grow.  The wound is healed when you can see skin has formed over the entire area.  A healed wound has a  healthy, shiny look to the surface and is red to dark pink in color to normalize.  Wounds may take approximately 4-6 weeks to heal.  Larger wounds may take 6-8 weeks.  After the wound is healed you may discontinue dressing changes.    You may experience a sensation of tightness as your wound heals. This is normal and will gradually subside.    Your healed wound may be sensitive to temperature changes. This sensitivity improves with time, but if you re having a lot of discomfort, try to avoid temperature extremes.    Patients frequently experience itching after their wound appears to have healed because of the continue healing under the skin.  Plain Vaseline will help relieve the itching.      POSSIBLE COMPLICATIONS    BLEEDING:    Leave the bandage in place.  Use tightly rolled up gauze or a cloth to apply direct pressure over the bandage for 30  minutes.  Reapply pressure for an additional 30 minutes if necessary  Use additional gauze and tape to maintain pressure once the bleeding has stopped.

## 2023-03-09 NOTE — LETTER
3/9/2023         RE: Lora Vergara  [address]  [place] [address]        Dear Colleague,    Thank you for referring your patient, Lora Vergara, to the [facility]. Please see a copy of my visit note below.    Lora Vergara is an extremely pleasant 89 year old year old female patient here today for hx of non-melanoma skin cancer.  Forehead well healed.  She has some skin irritation from eye surgery.  .   Patient states this has been present for a while.  Patient reports the following symptoms:  red.  Patient reports the following previous treatments none.  These treatments did not work.  Patient reports the following modifying factors none.  Associated symptoms: none.  Patient has no other skin complaints today.  Remainder of the HPI, Meds, PMH, Allergies, FH, and SH was reviewed in chart.      Past Medical History:   Diagnosis Date     Benign essential hypertension      Bronchiectasis (H)      CAD (coronary artery disease)     mild to moderate disease     History of basal cell carcinoma      Hypothyroidism      Impaired fasting glucose      Microvascular angina (H)     chronic     Obesity (BMI 30-39.9)      Peripheral neuropathy      Pure hypercholesterolemia        Past Surgical History:   Procedure Laterality Date     APPENDECTOMY  01/01/1974     BLADDER SURGERY  01/01/1990    bladder suspension     CHOLECYSTECTOMY  01/01/1975     CORONARY ANGIOGRAPHY ADULT ORDER  02/01/2008    Mild CAD. LAD w/20% stenosis, RCA with 30-40% stenosis. No flow limiting obstructions.     HYSTERECTOMY  01/01/1970     OOPHORECTOMY  01/01/1974    bilateral     PHACOEMULSIFICATION CLEAR CORNEA WITH STANDARD INTRAOCULAR LENS IMPLANT Right 07/21/2015    Procedure: PHACOEMULSIFICATION CLEAR CORNEA WITH STANDARD INTRAOCULAR LENS IMPLANT;  Surgeon: Schuyler Chapa MD;  Location: [facility]     PHACOEMULSIFICATION CLEAR CORNEA WITH STANDARD INTRAOCULAR LENS IMPLANT Left 08/18/2015     Procedure: PHACOEMULSIFICATION CLEAR CORNEA WITH STANDARD INTRAOCULAR LENS IMPLANT;  Surgeon: Schuyler Chapa MD;  Location: [facility]        Family History   Problem Relation Age of Onset     Cerebrovascular Disease Mother      Neurologic Disorder Mother         PD     Pancreatic Cancer Father      Lung Cancer Brother         smoker     Diabetes Type 2  Son      Throat cancer Son         smoker     Diabetes No family hx of      Myocardial Infarction No family hx of      Coronary Artery Disease Early Onset No family hx of      Breast Cancer No family hx of      Colon Cancer No family hx of      Ovarian Cancer No family hx of        Social History     Socioeconomic History     Marital status:      Spouse name: Not on file     Number of children: Not on file     Years of education: Not on file     Highest education level: Not on file   Occupational History     Occupation: Retired - book-keeper   Tobacco Use     Smoking status: Never     Smokeless tobacco: Never   Vaping Use     Vaping Use: Never used   Substance and Sexual Activity     Alcohol use: Not Currently     Drug use: No     Sexual activity: Not Currently   Other Topics Concern     Parent/sibling w/ CABG, MI or angioplasty before 65F 55M? No      Service Not Asked     Blood Transfusions Not Asked     Caffeine Concern Yes     Comment: 4 cups caffeine per day     Occupational Exposure Not Asked     Hobby Hazards Not Asked     Sleep Concern No     Stress Concern No     Weight Concern No     Special Diet No     Back Care Not Asked     Exercise Yes     Comment: exercises 30 minutes, 4 days week     Bike Helmet Not Asked     Seat Belt Not Asked     Self-Exams Not Asked   Social History Narrative     x 2.    Two sons; one has since passed away.    2 grandchild.    3 great grandchildren.    3 great great grandchildren.     Social Determinants of Health     Financial Resource Strain: Not on file   Food Insecurity: Not on file   Transportation  Needs: Not on file   Physical Activity: Not on file   Stress: Not on file   Social Connections: Not on file   Intimate Partner Violence: Not on file   Housing Stability: Not on file       Outpatient Encounter Medications as of 3/9/2023   Medication Sig Dispense Refill     aspirin 81 MG EC tablet Take 81 mg by mouth daily       atorvastatin (LIPITOR) 40 MG tablet Take 1 tablet (40 mg) by mouth daily 90 tablet 3     dorzolamide-timolol (COSOPT) 2-0.5 % ophthalmic solution INSTILL 1 DROP INTO BOTH EYES 3 TIMES A DAY       furosemide (LASIX) 20 MG tablet Take 0.5 tablets (10 mg) by mouth daily 45 tablet 0     hydrOXYzine (ATARAX) 10 MG tablet Take 1 tablet (10 mg) by mouth nightly as needed for other (trouble sleeping) 90 tablet 3     isosorbide mononitrate CR (IMDUR) 120 MG 24 HR ER tablet Take 1 tablet (120 mg) by mouth every morning 90 tablet 3     ketorolac (ACULAR) 0.5 % ophthalmic solution INSTILL 1 DROP INTO RIGHT EYE 4 TIMES A DAY       levothyroxine (SYNTHROID/LEVOTHROID) 25 MCG tablet TAKE 1 TABLET BY MOUTH EVERY DAY 90 tablet 1     metoprolol tartrate (LOPRESSOR) 100 MG tablet TAKE 1/2 TABLET BY MOUTH EVERY MORNING AND 1 TABLET EVERY EVENING 135 tablet 1     nitroGLYcerin (NITROSTAT) 0.4 MG sublingual tablet PLACE 1 TABLET UNDER THE TONGUE EVERY 5 MINUTES AS NEEDED FOR CHEST PAIN 150 tablet 3     order for DME Equipment being ordered: wheeled walker with brakes and a seat 1 Device 0     polyethylene glycol (MIRALAX/GLYCOLAX) packet Take 1 packet by mouth daily as needed for constipation       ranolazine (RANEXA) 500 MG 12 hr tablet Take 1 tablet (500 mg) by mouth 2 times daily 180 tablet 3     Respiratory Therapy Supplies (AEROBIKA) RUT        vitamin D3 (CHOLECALCIFEROL) 2000 units tablet Take 4,000 Units by mouth daily       No facility-administered encounter medications on file as of 3/9/2023.             O:   NAD, WDWN, Alert & Oriented, Mood & Affect wnl, Vitals stable   General appearance  normal   Vitals stable   Alert, oriented and in no acute distress   L alar groove 4mm scaly papule   L forehead well healed  Red macerated patch r orbit   Stuck on papules and brown macules on trunk and ext   Red papules on trunk  Flesh colored papules on trunk         Eyes: Conjunctivae/lids:Normal     ENT: Lips, buccal mucosa, tongue: normal    MSK:Normal    Cardiovascular: peripheral edema none    Pulm: Breathing Normal    Neuro/Psych: Orientation:Alert and Orientedx3 ; Mood/Affect:normal       MICRO:     L alar groove:Orthokeratosis of epidermis with a proliferation of nests of basaloid cells, with peripheral palisading and a haphazard arrangement in the center extending into the dermis, forming nodules.  The tumor cells have hyperchromatic nuclei. Poor cytoplasm and intercellular bridging.    A/P:  1. Seborrheic keratosis, lentigo, angioma, dermal nevus, hx of non-melanoma skin cancer   2. Irritated skin  desitin daily  3. L alar groove r/o basal cell carcinoma   TANGENTIAL BIOPSY IN HOUSE:  After consent, anesthesia with LEC and prep, tangential excision performed and dx above confirmed with frozen section histology.  No complications and routine wound care.  Patient is on asa  anticoagulants and risk of bleeding discussed with patient.       I have personally reviewed all specimens and/or slides and used them with my medical judgement to determine or confirm the final diagnosis.     Patient told result basal cell carcinoma schedule excision .    It was a pleasure speaking to Lora Vergara today.  Previous clinic notes and pertinent laboratory tests were reviewed prior to Lora Vergara's visit.  Signs and Symptoms of skin cancer discussed with patient.  Patient encouraged to perform monthly skin exams.  UV precautions reviewed with patient.  Risks of non-melanoma skin cancer discussed with patient   Return to clinic next appt        Again, thank you for allowing me to participate in the care of your patient.         Sincerely,        Srinivas Rosado MD

## 2023-03-09 NOTE — TELEPHONE ENCOUNTER
Spoke with pt in clinic after results. Pt wants to wait on scheduling Mohs for now, she has our number and will call us if she wants to proceed.    Thank you,  Zahraa LIMON RN  Dermatology   [phone]    
Srinivas Rosado MD  [facility]  L ala basal cell carcinoma schedule excision     
verbal cues/1 person assist

## 2023-03-09 NOTE — PROGRESS NOTES
Lora Vergara is an extremely pleasant 89 year old year old female patient here today for hx of non-melanoma skin cancer.  Forehead well healed.  She has some skin irritation from eye surgery.  .   Patient states this has been present for a while.  Patient reports the following symptoms:  red.  Patient reports the following previous treatments none.  These treatments did not work.  Patient reports the following modifying factors none.  Associated symptoms: none.  Patient has no other skin complaints today.  Remainder of the HPI, Meds, PMH, Allergies, FH, and SH was reviewed in chart.      Past Medical History:   Diagnosis Date     Benign essential hypertension      Bronchiectasis (H)      CAD (coronary artery disease)     mild to moderate disease     History of basal cell carcinoma      Hypothyroidism      Impaired fasting glucose      Microvascular angina (H)     chronic     Obesity (BMI 30-39.9)      Peripheral neuropathy      Pure hypercholesterolemia        Past Surgical History:   Procedure Laterality Date     APPENDECTOMY  01/01/1974     BLADDER SURGERY  01/01/1990    bladder suspension     CHOLECYSTECTOMY  01/01/1975     CORONARY ANGIOGRAPHY ADULT ORDER  02/01/2008    Mild CAD. LAD w/20% stenosis, RCA with 30-40% stenosis. No flow limiting obstructions.     HYSTERECTOMY  01/01/1970     OOPHORECTOMY  01/01/1974    bilateral     PHACOEMULSIFICATION CLEAR CORNEA WITH STANDARD INTRAOCULAR LENS IMPLANT Right 07/21/2015    Procedure: PHACOEMULSIFICATION CLEAR CORNEA WITH STANDARD INTRAOCULAR LENS IMPLANT;  Surgeon: Schuyler Chapa MD;  Location: [facility]     PHACOEMULSIFICATION CLEAR CORNEA WITH STANDARD INTRAOCULAR LENS IMPLANT Left 08/18/2015    Procedure: PHACOEMULSIFICATION CLEAR CORNEA WITH STANDARD INTRAOCULAR LENS IMPLANT;  Surgeon: Schuyler Chapa MD;  Location: [facility]        Family History   Problem Relation Age of Onset     Cerebrovascular Disease Mother      Neurologic Disorder Mother         PD      Pancreatic Cancer Father      Lung Cancer Brother         smoker     Diabetes Type 2  Son      Throat cancer Son         smoker     Diabetes No family hx of      Myocardial Infarction No family hx of      Coronary Artery Disease Early Onset No family hx of      Breast Cancer No family hx of      Colon Cancer No family hx of      Ovarian Cancer No family hx of        Social History     Socioeconomic History     Marital status:      Spouse name: Not on file     Number of children: Not on file     Years of education: Not on file     Highest education level: Not on file   Occupational History     Occupation: Retired - book-keeper   Tobacco Use     Smoking status: Never     Smokeless tobacco: Never   Vaping Use     Vaping Use: Never used   Substance and Sexual Activity     Alcohol use: Not Currently     Drug use: No     Sexual activity: Not Currently   Other Topics Concern     Parent/sibling w/ CABG, MI or angioplasty before 65F 55M? No      Service Not Asked     Blood Transfusions Not Asked     Caffeine Concern Yes     Comment: 4 cups caffeine per day     Occupational Exposure Not Asked     Hobby Hazards Not Asked     Sleep Concern No     Stress Concern No     Weight Concern No     Special Diet No     Back Care Not Asked     Exercise Yes     Comment: exercises 30 minutes, 4 days week     Bike Helmet Not Asked     Seat Belt Not Asked     Self-Exams Not Asked   Social History Narrative     x 2.    Two sons; one has since passed away.    2 grandchild.    3 great grandchildren.    3 great great grandchildren.     Social Determinants of Health     Financial Resource Strain: Not on file   Food Insecurity: Not on file   Transportation Needs: Not on file   Physical Activity: Not on file   Stress: Not on file   Social Connections: Not on file   Intimate Partner Violence: Not on file   Housing Stability: Not on file       Outpatient Encounter Medications as of 3/9/2023   Medication Sig Dispense Refill      aspirin 81 MG EC tablet Take 81 mg by mouth daily       atorvastatin (LIPITOR) 40 MG tablet Take 1 tablet (40 mg) by mouth daily 90 tablet 3     dorzolamide-timolol (COSOPT) 2-0.5 % ophthalmic solution INSTILL 1 DROP INTO BOTH EYES 3 TIMES A DAY       furosemide (LASIX) 20 MG tablet Take 0.5 tablets (10 mg) by mouth daily 45 tablet 0     hydrOXYzine (ATARAX) 10 MG tablet Take 1 tablet (10 mg) by mouth nightly as needed for other (trouble sleeping) 90 tablet 3     isosorbide mononitrate CR (IMDUR) 120 MG 24 HR ER tablet Take 1 tablet (120 mg) by mouth every morning 90 tablet 3     ketorolac (ACULAR) 0.5 % ophthalmic solution INSTILL 1 DROP INTO RIGHT EYE 4 TIMES A DAY       levothyroxine (SYNTHROID/LEVOTHROID) 25 MCG tablet TAKE 1 TABLET BY MOUTH EVERY DAY 90 tablet 1     metoprolol tartrate (LOPRESSOR) 100 MG tablet TAKE 1/2 TABLET BY MOUTH EVERY MORNING AND 1 TABLET EVERY EVENING 135 tablet 1     nitroGLYcerin (NITROSTAT) 0.4 MG sublingual tablet PLACE 1 TABLET UNDER THE TONGUE EVERY 5 MINUTES AS NEEDED FOR CHEST PAIN 150 tablet 3     order for DME Equipment being ordered: wheeled walker with brakes and a seat 1 Device 0     polyethylene glycol (MIRALAX/GLYCOLAX) packet Take 1 packet by mouth daily as needed for constipation       ranolazine (RANEXA) 500 MG 12 hr tablet Take 1 tablet (500 mg) by mouth 2 times daily 180 tablet 3     Respiratory Therapy Supplies (AEROBIKA) RUT        vitamin D3 (CHOLECALCIFEROL) 2000 units tablet Take 4,000 Units by mouth daily       No facility-administered encounter medications on file as of 3/9/2023.             O:   NAD, WDWN, Alert & Oriented, Mood & Affect wnl, Vitals stable   General appearance normal   Vitals stable   Alert, oriented and in no acute distress   L alar groove 4mm scaly papule   L forehead well healed  Red macerated patch r orbit   Stuck on papules and brown macules on trunk and ext   Red papules on trunk  Flesh colored papules on trunk         Eyes:  Conjunctivae/lids:Normal     ENT: Lips, buccal mucosa, tongue: normal    MSK:Normal    Cardiovascular: peripheral edema none    Pulm: Breathing Normal    Neuro/Psych: Orientation:Alert and Orientedx3 ; Mood/Affect:normal       MICRO:     L alar groove:Orthokeratosis of epidermis with a proliferation of nests of basaloid cells, with peripheral palisading and a haphazard arrangement in the center extending into the dermis, forming nodules.  The tumor cells have hyperchromatic nuclei. Poor cytoplasm and intercellular bridging.    A/P:  1. Seborrheic keratosis, lentigo, angioma, dermal nevus, hx of non-melanoma skin cancer   2. Irritated skin  desitin daily  3. L alar groove r/o basal cell carcinoma   TANGENTIAL BIOPSY IN HOUSE:  After consent, anesthesia with LEC and prep, tangential excision performed and dx above confirmed with frozen section histology.  No complications and routine wound care.  Patient is on asa  anticoagulants and risk of bleeding discussed with patient.       I have personally reviewed all specimens and/or slides and used them with my medical judgement to determine or confirm the final diagnosis.     Patient told result basal cell carcinoma schedule excision .    It was a pleasure speaking to Lora Vergara today.  Previous clinic notes and pertinent laboratory tests were reviewed prior to Lora Vergara's visit.  Signs and Symptoms of skin cancer discussed with patient.  Patient encouraged to perform monthly skin exams.  UV precautions reviewed with patient.  Risks of non-melanoma skin cancer discussed with patient   Return to clinic next appt

## 2023-03-14 NOTE — TELEPHONE ENCOUNTER
[facility] Call Center    Phone Message    May a detailed message be left on voicemail: yes     Reason for Call: Other: Pt states she has decided she would like to proceed with scheduling MO[facility] and she would like [facility].     Please call Pt back to discuss/schedule. Thank you.     Action Taken: Other: [facility]    Travel Screening: Not Applicable

## 2023-03-14 NOTE — TELEPHONE ENCOUNTER
Called and spoke with pt. Offered appt at 8:00 am on 5/18, pt said she needs to check to see if she can get a ride that early, and she will call back if she wishes to proceed with scheduling.    Thank you,  Zahraa LIMON RN  Dermatology   [phone]

## 2023-03-29 NOTE — PROGRESS NOTES
SUBJECTIVE:  Chief Complaint   Patient presents with     Swollen Finger     Fell on the 12th of March. It was bruised when it first happen. Left hand middle finger is very swollen and has some bruising. Her middle fingers have a history of locking up on her.      Lora Vergara is a 89 year old female presents with a chief complaint of left finger third pain, swelling, tenderness, redness and decreased range of motion.  The injury occurred 17 day(s) ago.   The injury happened while at home. How: fall delayed pain, delayed swelling.  The patient complained of mild pain  and has had decreased ROM.  Pain exacerbated by movement.  Relieved by rest.  She treated it initially with no therapy. This is the first time this type of injury has occurred to this patient.     Past Medical History:   Diagnosis Date     Benign essential hypertension      Bronchiectasis (H)      CAD (coronary artery disease)     mild to moderate disease     History of basal cell carcinoma      Hypothyroidism      Impaired fasting glucose      Microvascular angina (H)     chronic     Obesity (BMI 30-39.9)      Peripheral neuropathy      Pure hypercholesterolemia      Current Outpatient Medications   Medication Sig Dispense Refill     aspirin 81 MG EC tablet Take 81 mg by mouth daily       atorvastatin (LIPITOR) 40 MG tablet Take 1 tablet (40 mg) by mouth daily 90 tablet 3     dorzolamide-timolol (COSOPT) 2-0.5 % ophthalmic solution INSTILL 1 DROP INTO BOTH EYES 3 TIMES A DAY       furosemide (LASIX) 20 MG tablet Take 0.5 tablets (10 mg) by mouth daily 45 tablet 0     hydrOXYzine (ATARAX) 10 MG tablet Take 1 tablet (10 mg) by mouth nightly as needed for other (trouble sleeping) 90 tablet 3     isosorbide mononitrate CR (IMDUR) 120 MG 24 HR ER tablet Take 1 tablet (120 mg) by mouth every morning 90 tablet 3     ketorolac (ACULAR) 0.5 % ophthalmic solution INSTILL 1 DROP INTO RIGHT EYE 4 TIMES A DAY       levothyroxine (SYNTHROID/LEVOTHROID) 25 MCG  tablet TAKE 1 TABLET BY MOUTH EVERY DAY 90 tablet 1     metoprolol tartrate (LOPRESSOR) 100 MG tablet TAKE 1/2 TABLET BY MOUTH EVERY MORNING AND 1 TABLET EVERY EVENING 135 tablet 1     nitroGLYcerin (NITROSTAT) 0.4 MG sublingual tablet PLACE 1 TABLET UNDER THE TONGUE EVERY 5 MINUTES AS NEEDED FOR CHEST PAIN 150 tablet 3     order for DME Equipment being ordered: wheeled walker with brakes and a seat 1 Device 0     polyethylene glycol (MIRALAX/GLYCOLAX) packet Take 1 packet by mouth daily as needed for constipation       ranolazine (RANEXA) 500 MG 12 hr tablet Take 1 tablet (500 mg) by mouth 2 times daily 180 tablet 3     Respiratory Therapy Supplies (AEROBIKA) RUT        vitamin D3 (CHOLECALCIFEROL) 2000 units tablet Take 4,000 Units by mouth daily       Social History     Tobacco Use     Smoking status: Never     Smokeless tobacco: Never   Substance Use Topics     Alcohol use: Not Currently       ROS:  Review of systems negative except as stated above.    EXAM:   BP (!) 142/90   Pulse 68   Temp 97.7  F (36.5  C) (Oral)   Wt 77.1 kg (170 lb)   LMP  (LMP Unknown)   SpO2 97%   BMI 31.09 kg/m    Gen: healthy,alert,no distress  Extremity: finger  third has swelling, point tenderness  and decreased ROM  There is not compromise to the distal circulation.  Pulses are +2 and CRT is brisk  SKIN: no suspicious lesions or rashes  NEURO: Normal strength and tone, sensory exam grossly normal, mentation intact and speech normal    Results for orders placed or performed in visit on 03/29/23   XR Finger Left G/E 2 Views     Status: None    Narrative    XR FINGER LEFT G/E 2 VIEWS   3/29/2023 11:41 AM     HISTORY: Finger joint swelling, left  COMPARISON: None.       Impression    IMPRESSION: Acute displaced fracture of the distal shaft of the middle  phalanx of the middle finger, the distal fragment is displaced volarly  by one bone width and there is overriding of the fragments. Soft  tissue swelling.    Underlying diffuse  bony demineralization. 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    CECELIA BALDERAS MD         SYSTEM ID:  [id number]         ASSESSMENT:   (R34.724P) Closed displaced fracture of middle phalanx of left middle finger, initial encounter  (primary encounter diagnosis)  Plan: Orthopedic  Referral   Due to >2 week post injury on-call recommends follow up within 1 week  Urgent referral placed       (F77.373) Finger joint swelling, left  Plan: XR Finger Left G/E 2 Views       Red flags and emergent follow up discussed, and understood by patient

## 2023-04-03 NOTE — LETTER
4/3/2023         RE: Lora Vergara  [address]  [facility] [address]        Dear Colleague,    Thank you for referring your patient, Lora Vergara, to the [facility] [place]. Please see a copy of my visit note below.    S:Patient is a 89 year old, righ hand dominant female seen today in consultation for left finger fracture middle.  They report onset of symptoms 4 weeks . Patient feel on her hand  injury.   Pain is located DIP, and described as sharp pain.  Increased pain with when people touch or bump her hand.  Pain sleeps  at nighttime. Pain is improved by nothing .  They have tried the following therapies: nothing .  Patient notes that she does have neuropathy so she doesn't feel much   Current pain level: 0/10, Worst pain level: 6/10.     Patient is currently working retired.     Fracture over 3 weeks old confirmed by patient.           Patient Active Problem List   Diagnosis     CAD (coronary artery disease)     Pure hypercholesterolemia     Benign essential hypertension     Bronchiectasis (H)     Hypothyroidism     Peripheral neuropathy     Microvascular angina (H)     Obesity (BMI 30-39.9)     Impaired fasting glucose     History of basal cell carcinoma            Past Medical History:   Diagnosis Date     Benign essential hypertension      Bronchiectasis (H)      CAD (coronary artery disease)     mild to moderate disease     History of basal cell carcinoma      Hypothyroidism      Impaired fasting glucose      Microvascular angina (H)     chronic     Obesity (BMI 30-39.9)      Peripheral neuropathy      Pure hypercholesterolemia             Past Surgical History:   Procedure Laterality Date     APPENDECTOMY  01/01/1974     BLADDER SURGERY  01/01/1990    bladder suspension     CHOLECYSTECTOMY  01/01/1975     CORONARY ANGIOGRAPHY ADULT ORDER  02/01/2008    Mild CAD. LAD w/20% stenosis, RCA with 30-40% stenosis. No flow limiting obstructions.      HYSTERECTOMY  01/01/1970     OOPHORECTOMY  01/01/1974    bilateral     PHACOEMULSIFICATION CLEAR CORNEA WITH STANDARD INTRAOCULAR LENS IMPLANT Right 07/21/2015    Procedure: PHACOEMULSIFICATION CLEAR CORNEA WITH STANDARD INTRAOCULAR LENS IMPLANT;  Surgeon: Schuyler Chapa MD;  Location: [facility]     PHACOEMULSIFICATION CLEAR CORNEA WITH STANDARD INTRAOCULAR LENS IMPLANT Left 08/18/2015    Procedure: PHACOEMULSIFICATION CLEAR CORNEA WITH STANDARD INTRAOCULAR LENS IMPLANT;  Surgeon: Schuyler Chapa MD;  Location: [facility]            Social History     Tobacco Use     Smoking status: Never     Smokeless tobacco: Never   Vaping Use     Vaping status: Never Used     Passive vaping exposure: Yes   Substance Use Topics     Alcohol use: Not Currently            Family History   Problem Relation Age of Onset     Cerebrovascular Disease Mother      Neurologic Disorder Mother         PD     Pancreatic Cancer Father      Lung Cancer Brother         smoker     Diabetes Type 2  Son      Throat cancer Son         smoker     Diabetes No family hx of      Myocardial Infarction No family hx of      Coronary Artery Disease Early Onset No family hx of      Breast Cancer No family hx of      Colon Cancer No family hx of      Ovarian Cancer No family hx of                Allergies   Allergen Reactions     Hydrochlorothiazide Other (See Comments)     Low sodium            Current Outpatient Medications   Medication Sig Dispense Refill     aspirin 81 MG EC tablet Take 81 mg by mouth daily       atorvastatin (LIPITOR) 40 MG tablet Take 1 tablet (40 mg) by mouth daily 90 tablet 3     dorzolamide-timolol (COSOPT) 2-0.5 % ophthalmic solution INSTILL 1 DROP INTO BOTH EYES 3 TIMES A DAY       furosemide (LASIX) 20 MG tablet Take 0.5 tablets (10 mg) by mouth daily 45 tablet 0     hydrOXYzine (ATARAX) 10 MG tablet Take 1 tablet (10 mg) by mouth nightly as needed for other (trouble sleeping) 90 tablet 3     isosorbide mononitrate CR (IMDUR) 120  MG 24 HR ER tablet Take 1 tablet (120 mg) by mouth every morning 90 tablet 3     ketorolac (ACULAR) 0.5 % ophthalmic solution INSTILL 1 DROP INTO RIGHT EYE 4 TIMES A DAY       levothyroxine (SYNTHROID/LEVOTHROID) 25 MCG tablet TAKE 1 TABLET BY MOUTH EVERY DAY 90 tablet 1     metoprolol tartrate (LOPRESSOR) 100 MG tablet TAKE 1/2 TABLET BY MOUTH EVERY MORNING AND 1 TABLET EVERY EVENING 135 tablet 1     nitroGLYcerin (NITROSTAT) 0.4 MG sublingual tablet PLACE 1 TABLET UNDER THE TONGUE EVERY 5 MINUTES AS NEEDED FOR CHEST PAIN 150 tablet 3     order for DME Equipment being ordered: wheeled walker with brakes and a seat 1 Device 0     polyethylene glycol (MIRALAX/GLYCOLAX) packet Take 1 packet by mouth daily as needed for constipation       ranolazine (RANEXA) 500 MG 12 hr tablet Take 1 tablet (500 mg) by mouth 2 times daily 180 tablet 3     Respiratory Therapy Supplies (AERCandescent Healing) RUT        vitamin D3 (CHOLECALCIFEROL) 2000 units tablet Take 4,000 Units by mouth daily            Review Of Systems  Skin: negative  Eyes: negative  Ears/Nose/Throat: negative  Respiratory: No shortness of breath, dyspnea on exertion, cough, or hemoptysis    O: Physical Exam:  Left long finger edematous and mildly erythematous.  Fx not mobile to palpation.  No evidence malrotation or malangulation.  CMS intact to fingers.  NO open wound.    Lab:  25 OH vit D 65 a year ago    Images:  XR FINGER LEFT G/E 2 VIEWS   3/29/2023 11:41 AM      HISTORY: Finger joint swelling, left  COMPARISON: None.                                                                       IMPRESSION: Acute displaced fracture of the distal shaft of the middle  phalanx of the middle finger, the distal fragment is displaced volarly  by one bone width and there is overriding of the fragments. Soft  tissue swelling.     Underlying diffuse bony demineralization. 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         A:  L long  finger middle phalangeal fracture with complete volar translation of distal fragment without evidence malangulation or malrotation    P:  Stack splint to protect while healing  Too late to manipulate and patient does not want to consider operative reduction.  See back one month with new images.  Notify if exacerbation symptoms.           In addition to the above assessment and plan each active problem on Lora's problem list was evaluated today. This included the questioning of Lora for any medication problems. We will continue the current treatment plan for these active problems except as noted.        Again, thank you for allowing me to participate in the care of your patient.        Sincerely,        MADELIN MERRITT MD

## 2023-04-03 NOTE — PROGRESS NOTES
S:Patient is a 89 year old, righ hand dominant female seen today in consultation for left finger fracture middle.  They report onset of symptoms 4 weeks . Patient feel on her hand  injury.   Pain is located DIP, and described as sharp pain.  Increased pain with when people touch or bump her hand.  Pain sleeps  at nighttime. Pain is improved by nothing .  They have tried the following therapies: nothing .  Patient notes that she does have neuropathy so she doesn't feel much   Current pain level: 0/10, Worst pain level: 6/10.     Patient is currently working retired.     Fracture over 3 weeks old confirmed by patient.           Patient Active Problem List   Diagnosis     CAD (coronary artery disease)     Pure hypercholesterolemia     Benign essential hypertension     Bronchiectasis (H)     Hypothyroidism     Peripheral neuropathy     Microvascular angina (H)     Obesity (BMI 30-39.9)     Impaired fasting glucose     History of basal cell carcinoma            Past Medical History:   Diagnosis Date     Benign essential hypertension      Bronchiectasis (H)      CAD (coronary artery disease)     mild to moderate disease     History of basal cell carcinoma      Hypothyroidism      Impaired fasting glucose      Microvascular angina (H)     chronic     Obesity (BMI 30-39.9)      Peripheral neuropathy      Pure hypercholesterolemia             Past Surgical History:   Procedure Laterality Date     APPENDECTOMY  01/01/1974     BLADDER SURGERY  01/01/1990    bladder suspension     CHOLECYSTECTOMY  01/01/1975     CORONARY ANGIOGRAPHY ADULT ORDER  02/01/2008    Mild CAD. LAD w/20% stenosis, RCA with 30-40% stenosis. No flow limiting obstructions.     HYSTERECTOMY  01/01/1970     OOPHORECTOMY  01/01/1974    bilateral     PHACOEMULSIFICATION CLEAR CORNEA WITH STANDARD INTRAOCULAR LENS IMPLANT Right 07/21/2015    Procedure: PHACOEMULSIFICATION CLEAR CORNEA WITH STANDARD INTRAOCULAR LENS IMPLANT;  Surgeon: Schuyler Chapa MD;   Location:  [facility]     PHACOEMULSIFICATION CLEAR CORNEA WITH STANDARD INTRAOCULAR LENS IMPLANT Left 08/18/2015    Procedure: PHACOEMULSIFICATION CLEAR CORNEA WITH STANDARD INTRAOCULAR LENS IMPLANT;  Surgeon: Schuyler Chapa MD;  Location: [facility]            Social History     Tobacco Use     Smoking status: Never     Smokeless tobacco: Never   Vaping Use     Vaping status: Never Used     Passive vaping exposure: Yes   Substance Use Topics     Alcohol use: Not Currently            Family History   Problem Relation Age of Onset     Cerebrovascular Disease Mother      Neurologic Disorder Mother         PD     Pancreatic Cancer Father      Lung Cancer Brother         smoker     Diabetes Type 2  Son      Throat cancer Son         smoker     Diabetes No family hx of      Myocardial Infarction No family hx of      Coronary Artery Disease Early Onset No family hx of      Breast Cancer No family hx of      Colon Cancer No family hx of      Ovarian Cancer No family hx of                Allergies   Allergen Reactions     Hydrochlorothiazide Other (See Comments)     Low sodium            Current Outpatient Medications   Medication Sig Dispense Refill     aspirin 81 MG EC tablet Take 81 mg by mouth daily       atorvastatin (LIPITOR) 40 MG tablet Take 1 tablet (40 mg) by mouth daily 90 tablet 3     dorzolamide-timolol (COSOPT) 2-0.5 % ophthalmic solution INSTILL 1 DROP INTO BOTH EYES 3 TIMES A DAY       furosemide (LASIX) 20 MG tablet Take 0.5 tablets (10 mg) by mouth daily 45 tablet 0     hydrOXYzine (ATARAX) 10 MG tablet Take 1 tablet (10 mg) by mouth nightly as needed for other (trouble sleeping) 90 tablet 3     isosorbide mononitrate CR (IMDUR) 120 MG 24 HR ER tablet Take 1 tablet (120 mg) by mouth every morning 90 tablet 3     ketorolac (ACULAR) 0.5 % ophthalmic solution INSTILL 1 DROP INTO RIGHT EYE 4 TIMES A DAY       levothyroxine (SYNTHROID/LEVOTHROID) 25 MCG tablet TAKE 1 TABLET BY MOUTH EVERY DAY 90 tablet 1      metoprolol tartrate (LOPRESSOR) 100 MG tablet TAKE 1/2 TABLET BY MOUTH EVERY MORNING AND 1 TABLET EVERY EVENING 135 tablet 1     nitroGLYcerin (NITROSTAT) 0.4 MG sublingual tablet PLACE 1 TABLET UNDER THE TONGUE EVERY 5 MINUTES AS NEEDED FOR CHEST PAIN 150 tablet 3     order for DME Equipment being ordered: wheeled walker with brakes and a seat 1 Device 0     polyethylene glycol (MIRALAX/GLYCOLAX) packet Take 1 packet by mouth daily as needed for constipation       ranolazine (RANEXA) 500 MG 12 hr tablet Take 1 tablet (500 mg) by mouth 2 times daily 180 tablet 3     Respiratory Therapy Supplies (AEROBIKA) RUT        vitamin D3 (CHOLECALCIFEROL) 2000 units tablet Take 4,000 Units by mouth daily            Review Of Systems  Skin: negative  Eyes: negative  Ears/Nose/Throat: negative  Respiratory: No shortness of breath, dyspnea on exertion, cough, or hemoptysis    O: Physical Exam:  Left long finger edematous and mildly erythematous.  Fx not mobile to palpation.  No evidence malrotation or malangulation.  CMS intact to fingers.  NO open wound.    Lab:  25 OH vit D 65 a year ago    Images:  XR FINGER LEFT G/E 2 VIEWS   3/29/2023 11:41 AM      HISTORY: Finger joint swelling, left  COMPARISON: None.                                                                       IMPRESSION: Acute displaced fracture of the distal shaft of the middle  phalanx of the middle finger, the distal fragment is displaced volarly  by one bone width and there is overriding of the fragments. Soft  tissue swelling.     Underlying diffuse bony demineralization. 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         A:  L long finger middle phalangeal fracture with complete volar translation of distal fragment without evidence malangulation or malrotation    P:  Stack splint to protect while healing  Too late to manipulate and patient does not want to consider operative reduction.  See back one month  with new images.  Notify if exacerbation symptoms.           In addition to the above assessment and plan each active problem on Lora's problem list was evaluated today. This included the questioning of Lora for any medication problems. We will continue the current treatment plan for these active problems except as noted.

## 2023-08-14 NOTE — LETTER
August 14, 2023      Lora Vergara  [address]   [facility] [address]        Dear ,    We are writing to inform you of your test results.    Your test results fall within the expected ranges or remain unchanged from previous results.  Please continue with current treatment plan.    Resulted Orders   Comprehensive metabolic panel   Result Value Ref Range    Sodium 133 (L) 136 - 145 mmol/L    Potassium 4.5 3.4 - 5.3 mmol/L    Chloride 96 (L) 98 - 107 mmol/L    Carbon Dioxide (CO2) 29 22 - 29 mmol/L    Anion Gap 8 7 - 15 mmol/L    Urea Nitrogen 10.6 8.0 - 23.0 mg/dL    Creatinine 0.75 0.51 - 0.95 mg/dL    Calcium 9.3 8.2 - 9.6 mg/dL    Glucose 95 70 - 99 mg/dL    Alkaline Phosphatase 75 35 - 104 U/L    AST 26 0 - 45 U/L    ALT 14 0 - 50 U/L    Protein Total 6.9 6.4 - 8.3 g/dL    Albumin 4.0 3.5 - 5.2 g/dL    Bilirubin Total 0.6 <=1.2 mg/dL    GFR Estimate 72 >60 mL/min/1.73m2   Lipid Profile   Result Value Ref Range    Cholesterol 148 <200 mg/dL    Triglycerides 81 <150 mg/dL    Direct Measure HDL 71 >=50 mg/dL    LDL Cholesterol Calculated 61 <=100 mg/dL    Non HDL Cholesterol 77 <130 mg/dL   TSH with free T4 reflex   Result Value Ref Range    TSH 4.48 (H) 0.30 - 4.20 uIU/mL   Hemoglobin A1c   Result Value Ref Range    Hemoglobin A1C 6.2 (H) 0.0 - 5.6 %   T4 free   Result Value Ref Range    Free T4 1.25 0.90 - 1.70 ng/dL     If you have any questions or concerns, please call the clinic at the number listed above.       Sincerely,      Paty Chinchilla MD             urinary retention sp urolift procedure and mirabegron administration, bedside US w/ distended bladder, regular banks placed w/ spontaneous drainage of urine, pt reports feeling better, abx, outpatient uro f/u

## 2023-08-14 NOTE — PROGRESS NOTES
ASSESSMENT/PLAN                                                       (Z00.00) Medicare annual wellness visit, subsequent  (primary encounter diagnosis)  Comment: PMH, PSH, FH, SH, medications, allergies, immunizations, and preventative health measures reviewed and updated as appropriate.  Plan: see below for plans.      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    Appropriate preventive services were discussed with this patient, including applicable screening as appropriate for cardiovascular disease, diabetes, osteopenia/osteoporosis, and glaucoma.  As appropriate for age/gender, discussed screening for colorectal cancer, prostate cancer, breast cancer, and cervical cancer. Checklist reviewing preventive services available has been given to the patient.    Reviewed patients plan of care. The Basic Care Plan (routine screening as documented in Health Maintenance) for Lora Vergara meets the Care Plan requirement. This Care Plan has been established and reviewed with the Patient and Worship friend.    Paty Chinchilla MD   [address] [address]  T: [phone], F: [phone]    SUBJECTIVE                                                      Lora Vergara is a very pleasant 89 year old female who presents for her subsequent AWV:    Accompanied by Worship friend.    Current providers (other than myself): Bud (optometry/ophthalmology) Alonzo (dermatology), Janelle (cardiology)    PMH, PSH, FH, SH, medications, allergies, immunizations, preventative health, and health risk assessment reviewed and updated as appropriate.    Past Medical History:   Diagnosis Date    Benign essential hypertension     Bronchiectasis (H)     CAD (coronary artery disease)     mild to moderate disease    History of  basal cell carcinoma     Hypothyroidism     Impaired fasting glucose     Microvascular angina (H)     chronic    Obesity (BMI 30-39.9)     Peripheral neuropathy     Pure hypercholesterolemia      Past Surgical History:   Procedure Laterality Date    APPENDECTOMY      BLADDER SURGERY      bladder suspension    CHOLECYSTECTOMY      CORONARY ANGIOGRAPHY ADULT ORDER  2008    Mild CAD. LAD w/20% stenosis, RCA with 30-40% stenosis. No flow limiting obstructions.    HYSTERECTOMY      OOPHORECTOMY Bilateral     PHACOEMULSIFICATION CLEAR CORNEA WITH STANDARD INTRAOCULAR LENS IMPLANT Right 07/21/2015    Procedure: PHACOEMULSIFICATION CLEAR CORNEA WITH STANDARD INTRAOCULAR LENS IMPLANT;  Surgeon: Schuyler Chapa MD;  Location: [facility]    PHACOEMULSIFICATION CLEAR CORNEA WITH STANDARD INTRAOCULAR LENS IMPLANT Left 08/18/2015    Procedure: PHACOEMULSIFICATION CLEAR CORNEA WITH STANDARD INTRAOCULAR LENS IMPLANT;  Surgeon: Schuyler Chapa MD;  Location: [facility]     Family History   Problem Relation Age of Onset    Cerebrovascular Disease Mother     Neurologic Disorder Mother         PD    Pancreatic Cancer Father     Lung Cancer Brother         smoker    Diabetes Type 2  Son     Throat cancer Son         smoker    Diabetes No family hx of     Myocardial Infarction No family hx of     Coronary Artery Disease Early Onset No family hx of     Breast Cancer No family hx of     Colon Cancer No family hx of     Ovarian Cancer No family hx of      Social History     Occupational History    Occupation: Retired - book-keeper   Tobacco Use    Smoking status: Never    Smokeless tobacco: Never   Vaping Use    Vaping Use: Never used   Substance and Sexual Activity    Alcohol use: Not Currently    Drug use: No    Sexual activity: Not Currently   Social History Narrative     x 2.    Independent living - senior living.     Two sons; one has since passed away.    2 grandchildren.    3 great grandchildren.    3 great great  grandchildren.     Allergies   Allergen Reactions    Hydrochlorothiazide Other (See Comments)     Low sodium     Current Outpatient Medications   Medication Sig    aspirin 81 MG EC tablet Take 81 mg by mouth daily    atorvastatin (LIPITOR) 40 MG tablet Take 1 tablet (40 mg) by mouth daily    dorzolamide-timolol (COSOPT) 2-0.5 % ophthalmic solution INSTILL 1 DROP INTO BOTH EYES 3 TIMES A DAY    furosemide (LASIX) 20 MG tablet Take 0.5 tablets (10 mg) by mouth daily    hydrOXYzine (ATARAX) 10 MG tablet Take 1 tablet (10 mg) by mouth nightly as needed for other (trouble sleeping)    isosorbide mononitrate CR (IMDUR) 120 MG 24 HR ER tablet TAKE 1 TABLET BY MOUTH EVERY MORNING    ketorolac (ACULAR) 0.5 % ophthalmic solution INSTILL 1 DROP INTO RIGHT EYE 4 TIMES A DAY    levothyroxine (SYNTHROID/LEVOTHROID) 25 MCG tablet TAKE 1 TABLET BY MOUTH EVERY DAY    metoprolol tartrate (LOPRESSOR) 100 MG tablet TAKE 1/2 TABLET BY MOUTH EVERY MORNING AND 1 TABLET EVERY EVENING    nitroGLYcerin (NITROSTAT) 0.4 MG sublingual tablet PLACE 1 TABLET UNDER THE TONGUE EVERY 5 MINUTES AS NEEDED FOR CHEST PAIN    polyethylene glycol (MIRALAX/GLYCOLAX) packet Take 1 packet by mouth daily as needed for constipation    pregabalin (LYRICA) 150 MG capsule TAKE 1 CAPSULE BY MOUTH TWICE A DAY AS DIRECTED BY PHYSICIAN.    ranolazine (RANEXA) 500 MG 12 hr tablet Take 1 tablet (500 mg) by mouth 2 times daily    Respiratory Therapy Supplies (AEROBIKA) RUT     vitamin D3 (CHOLECALCIFEROL) 2000 units tablet Take 4,000 Units by mouth daily     Immunization History   Administered Date(s) Administered    COVID-19 Bivalent 18+ (Moderna) 09/15/2022    COVID-19 Monovalent 18+ (Moderna) 01/23/2021, 02/20/2021, 12/01/2021    Influenza (High Dose) 3 valent vaccine 10/05/2016, 10/10/2017, 10/12/2018    Influenza (IIV3) PF 10/03/2012, 10/17/2013, 10/06/2014, 10/09/2015    Influenza Vaccine 65+ (Fluzone HD) 10/06/2020, 10/01/2021, 09/15/2022    Pneumo Conj 13-V  "(2010&after) 12/20/2016    Pneumococcal 23 valent 10/10/2003    Zoster recombinant adjuvanted (SHINGRIX) 10/01/2021    Zoster vaccine, live 06/03/2010     PREVENTATIVE HEALTH                                                      BMI: obese  Blood pressure: well-controlled on current regimen   Breast CA screening: screening no longer indicated  Colon CA screening: screening no longer indicated  Lung CA screening: n/a   Dexa: up to date   Screening cholesterol: n/a - already being treated for this condition  Screening diabetes: DUE  Alcohol misuse screening: alcohol use reviewed - no intervention indicated at this time  Immunizations: reviewed; tetanus booster DUE - not covered by Medicare (may obtain in pharmacy if desired)     HEALTH RISK ASSESSMENT                                                      In general, how would you rate your overall physical health? good  Outside of work, how many days during the week do you exercise? None  Outside of work, approximately how many minutes a day do you exercise? n/a     If you drink alcohol do you typically have >3 drinks per day or >7 drinks per week? No  Do you usually eat at least 4 servings of fruit and vegetables a day, include whole grains & fiber and avoid regularly eating high fat or \"junk\" foods? Yes     Do you have any problems taking medications regularly? No  Do you have any side effects from medications? No    Assistance with daily activities: YES - assistance with transportation and shopping    Safety concerns: No    Fall risk assessment: completed today (see ambulatory assessments)    Hearing concerns: No    In the past 6 months, have you been bothered by leaking of urine: No    In general, how would you rate your overall mental or emotional health: good    PHQ-2/PHQ-9 assessment: completed today (see ambulatory assessments)    Additional concerns today: No    OBJECTIVE                                                      /72   Pulse 65   Temp 97.4  F " "(36.3  C) (Temporal)   Ht 1.575 m (5' 2\")   Wt 81.2 kg (179 lb)   LMP  (LMP Unknown)   SpO2 94%   BMI 32.74 kg/m    Constitutional: well-appearing  Head, Ears, and Eyes: normocephalic; normal external auditory canal and pinna; tympanic membranes visualized and normal; normal lids and conjunctivae  Neck: supple, symmetric, no thyromegaly or lymphadenopathy  Respiratory: normal respiratory effort; clear to auscultation bilaterally  Cardiovascular: regular rate and rhythm; no edema  Gastrointestinal: soft, non-tender, and non-distended; no organomegaly or masses  Musculoskeletal: walks with walker   Psych: normal judgment and insight; normal mood and affect; recent and remote memory intact    Cognitive impairment noted: No  ---  (Note was completed, in part, with [employer] voice-recognition software. Documentation was reviewed, but some grammatical, spelling, and word errors may remain.)  "

## 2023-09-14 NOTE — PROGRESS NOTES
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  She has had a coronary angiography in 2010, which showed mild to moderate coronary artery atherosclerosis, but no obstructive disease.  However, chest discomfort and jaw pain have been suspicious for angina and responded to Imdur and Ranexa.      She is here for follow-up.  She is now complaining of shortness of breath off and on over the last 3 months.  She is not walking much but whenever she walks she does develop shortness of breath.  She uses a walker.  She also has an oxygen pulse oximeter that was given to her by her son and occasional readings are in the 80s.  Deep breathing, improved to 90s.  She denies any fever or cough.    Her last echocardiogram revealed normal systolic function but with moderate mitral stenosis with a mean gradient of 8 mm.         PHYSICAL EXAMINATION:   See below            IMPRESSION:   1.  Shortness of breath  New this year.  Occasional oxygen saturation in the high 80s at home.  No fever or cough.  I recommended repeat echocardiogram to reassess her LV function and mitral valve.  In addition, I will check PFTs with [facility] to see if there is any respiratory cause of shortness of breath.  Oxygen desaturation ambulation is usually reflection of underlying lung disease.     2.  Microvascular angina/coronary artery atherosclerosis.  At this time, pain is well controlled on a combination of Imdur and amlodipine and p.r.n. sublingual nitro.    She is doing well on Ranexa which we will continue.     2.  Chronic diastolic heart failure  She denies any orthopnea PND or edema feet.  He is on very low-dose Lasix and mid substance stable.     3.  Hyperlipidemia.  Continue atorvastatin.      4.  Hypertension, well controlled.   Blood pressure slightly high today.  Will reassess in follow-up and if still high, will add an  September 14, 2023     Patient: Lynda Foster   YOB: 1961   Date of Visit: 9/14/2023       To Whom it May Concern:    Lynda Foster was seen in my clinic on 9/14/2023 at 1:40 pm.    Please excuse Lynda for her absence from work on the date listed above to be able to make her appointment.    Sincerely,         Scott Whatley MD    Medical information is confidential and cannot be disclosed without the written consent of the patient or her representative.     antihypertensive.     I will see her in follow-up after the above tests are done.     Thank you for allowing us to participate in the care of this nice patient.     Sincerely,     Kalyan Luis MD    No orders of the defined types were placed in this encounter.      No orders of the defined types were placed in this encounter.      There are no discontinued medications.      Encounter Diagnoses   Name Primary?     Stable angina pectoris (H)      Cardiac microvascular disease      Hypertension goal BP (blood pressure) < 140/90        CURRENT MEDICATIONS:  Current Outpatient Medications   Medication Sig Dispense Refill     acetaminophen (TYLENOL) 500 MG tablet Take 500-1,000 mg by mouth every 6 hours as needed       aspirin 81 MG EC tablet Take 81 mg by mouth daily       atorvastatin (LIPITOR) 40 MG tablet Take 1 tablet (40 mg) by mouth daily 90 tablet 3     bisacodyl (DULCOLAX) 5 MG EC tablet Take 5 mg by mouth daily       furosemide (LASIX) 20 MG tablet Take 0.5 tablets (10 mg) by mouth daily 45 tablet 0     isosorbide mononitrate (IMDUR) 30 MG 24 hr tablet Take 1 tablet (30 mg) by mouth daily 90 tablet 3     isosorbide mononitrate (IMDUR) 60 MG 24 hr tablet TAKE 1 TABLET BY MOUTH EVERY DAY 90 tablet 1     latanoprost (XALATAN) 0.005 % ophthalmic solution Place 1 drop into both eyes At Bedtime       levothyroxine (SYNTHROID/LEVOTHROID) 25 MCG tablet TAKE 1 TABLET BY MOUTH EVERY DAY 90 tablet 2     metoprolol tartrate (LOPRESSOR) 100 MG tablet TAKE 1/2 TABLET BY MOUTH EVERY MORNING AND TAKE 1 TABLET IN THE EVENING. 135 tablet 2     nitroGLYcerin (NITROSTAT) 0.4 MG sublingual tablet Place 1 tablet (0.4 mg) under the tongue every 5 minutes as needed for chest pain 50 tablet 3     order for DME Equipment being ordered: wheeled walker with brakes and a seat 1 Device 0     polyethylene glycol (MIRALAX/GLYCOLAX) packet Take 1 packet by mouth daily as needed for constipation       pregabalin (LYRICA) 150 MG capsule Take 1  capsule by mouth twice a day as directed by physician. 180 capsule 3     ranolazine (RANEXA) 500 MG 12 hr tablet Take 1 tablet (500 mg) by mouth 2 times daily 180 tablet 2     vitamin D3 (CHOLECALCIFEROL) 2000 units tablet Take 4,000 Units by mouth daily       nystatin (MYCOSTATIN) 056763 UNIT/GM external powder Apply topically 2 times daily         ALLERGIES     Allergies   Allergen Reactions     Hydrochlorothiazide      Pancreatitis - patient denies this.  She says it gave her low sodium.       PAST MEDICAL HISTORY:  Past Medical History:   Diagnosis Date     Angina at rest (H)     Thought to be microvascular     Arthritis      Basal cell carcinoma 1/2014, (3/5/6-2014)    nose and check and ear and face     Coronary artery disease     Mild CAD per 2/2008 angiogram     Hyperlipidaemia      Hypertension      Hypothyroidism 1/17/2013     Mitral regurgitation 6/2015    echo     Type 2 diabetes, HbA1C goal < 8% (H) 9/11/2013       PAST SURGICAL HISTORY:  Past Surgical History:   Procedure Laterality Date     APPENDECTOMY  1974     BACK SURGERY  1996     BLADDER SURGERY  1990    bladder suspension     CHOLECYSTECTOMY  1975     CORONARY ANGIOGRAPHY ADULT ORDER  2/2008    Mild CAD. LAD w/20% stenosis, RCA with 30-40% stenosis. No flow limiting obstructions.     face surgery  1-6/2014    basal cell plus plastic to nose, face , ear     HYSTERECTOMY  1970     OOPHORECTOMY  1974    bilateral     PHACOEMULSIFICATION CLEAR CORNEA WITH STANDARD INTRAOCULAR LENS IMPLANT Right 7/21/2015    Procedure: PHACOEMULSIFICATION CLEAR CORNEA WITH STANDARD INTRAOCULAR LENS IMPLANT;  Surgeon: Schuyler Chapa MD;  Location: [facility]     PHACOEMULSIFICATION CLEAR CORNEA WITH STANDARD INTRAOCULAR LENS IMPLANT Left 8/18/2015    Procedure: PHACOEMULSIFICATION CLEAR CORNEA WITH STANDARD INTRAOCULAR LENS IMPLANT;  Surgeon: Schuyler Chapa MD;  Location: [facility]       FAMILY HISTORY:  Family History   Problem Relation Age of Onset     Cancer  Brother         lung     Cancer Son      Cerebrovascular Disease Mother         and Parkinson's     Cancer Father         pancreas     Diabetes Son        SOCIAL HISTORY:  Social History     Socioeconomic History     Marital status:      Spouse name: None     Number of children: None     Years of education: None     Highest education level: None   Occupational History     None   Social Needs     Financial resource strain: None     Food insecurity     Worry: None     Inability: None     Transportation needs     Medical: None     Non-medical: None   Tobacco Use     Smoking status: Never Smoker     Smokeless tobacco: Never Used   Substance and Sexual Activity     Alcohol use: Yes     Comment: rarely     Drug use: No     Sexual activity: Not Currently   Lifestyle     Physical activity     Days per week: None     Minutes per session: None     Stress: None   Relationships     Social connections     Talks on phone: None     Gets together: None     Attends Mandaeism service: None     Active member of club or organization: None     Attends meetings of clubs or organizations: None     Relationship status: None     Intimate partner violence     Fear of current or ex partner: None     Emotionally abused: None     Physically abused: None     Forced sexual activity: None   Other Topics Concern     Parent/sibling w/ CABG, MI or angioplasty before 65F 55M? No      Service Not Asked     Blood Transfusions Not Asked     Caffeine Concern Yes     Comment: 4 cups caffeine per day     Occupational Exposure Not Asked     Hobby Hazards Not Asked     Sleep Concern No     Stress Concern No     Weight Concern No     Special Diet No     Back Care Not Asked     Exercise Yes     Comment: exercises 30 minutes, 4 days week     Bike Helmet Not Asked     Seat Belt Not Asked     Self-Exams Not Asked   Social History Narrative    Had 2 sons; one  in ; head and neck cancer;  due to choking at [employer];                            "      had 2 step dtrs; one .                                        times 2       Review of Systems:  Skin:  Negative       Eyes:  Positive for glasses    ENT:         Respiratory:  Positive for dyspnea on exertion;shortness of breath O2 level at home 88% when active   Cardiovascular:  Negative;chest pain;dizziness;syncope or near-syncope;cyanosis;lightheadedness;edema Positive for;fatigue chest pain, 2X month, doesn't last long enough to take nitro  Gastroenterology: Negative      Genitourinary:  Positive for urinary frequency    Musculoskeletal:  Positive for joint pain;arthritis    Neurologic:  Negative      Psychiatric:  Negative      Heme/Lymph/Imm:  Positive for easy bruising    Endocrine:  Negative        Physical Exam:  Vitals: BP (!) 151/83 (BP Location: Left arm, Cuff Size: Adult Large)   Pulse 75   Ht 1.575 m (5' 2\")   Wt 88.9 kg (195 lb 14.4 oz)   LMP  (LMP Unknown)   SpO2 94%   BMI 35.83 kg/m      Constitutional:  cooperative        Skin:  warm and dry to the touch          Head:  normocephalic        Eyes:  sclera white        Lymph:      ENT:  no pallor or cyanosis        Neck:  JVP normal        Respiratory:  scatterred basal rales    Cardiac: regular rhythm;normal S1 and S2                                                         GI:  not assessed this visit        Extremities and Muscular Skeletal:  no edema              Neurological:  no gross motor deficits   uses walker    Psych:  Alert and Oriented x 3        CC  Antwon Schmidt MD  [address] [place] [address]              "

## 2023-09-29 NOTE — TELEPHONE ENCOUNTER
Received refill request from [facility] pharmacy for Furosemide 20mg daily    Last OV with Dr. Luis was January 2023    Last BMP was 8\14\23    [facility] Cardiology Refill Guideline reviewed.  Medication meets criteria for refill.    Rosario Anguiano RN on 9/29/2023 at 10:04 AM

## 2023-10-21 NOTE — TELEPHONE ENCOUNTER
Nurse Triage SBAR    Is this a 2nd Level Triage? NO    Situation: cough    Background: Patient has a cough for 8 days that is slowly improving. Patient has not had a fever and denies chest pain or breathing difficulty. Patient does check her O2 sats and they are staying in the mid 90's. Patient home covid test negative. Patient has been taking Robitussin DM to help suppress cough.  Caller states that she notices cough is slowly improving    Assessment: Cough    Protocol Recommended Disposition:   Home Care    Recommendation: Patient verbalized understanding of care advice.       Heaven Eagle RN on 10/21/2023 at 5:05 PM      Does the patient meet one of the following criteria for ADS visit consideration? No    Reason for Disposition   Cough    Additional Information   Negative: SEVERE difficulty breathing (e.g., struggling for each breath, speaks in single words)   Negative: Bluish (or gray) lips or face now   Negative: [1] Rapid onset of cough AND [2] has hives   Negative: Coughing started suddenly after medicine, an allergic food or bee sting   Negative: [1] Difficulty breathing AND [2] exposure to flames, smoke, or fumes   Negative: [1] Stridor AND [2] difficulty breathing   Negative: Sounds like a life-threatening emergency to the triager   Negative: [1] MODERATE difficulty breathing (e.g., speaks in phrases, SOB even at rest, pulse 100-120) AND [2] still present when not coughing   Negative: Chest pain  (Exception: MILD central chest pain, present only when coughing.)   Negative: Patient sounds very sick or weak to the triager   Negative: [1] MILD difficulty breathing (e.g., minimal/no SOB at rest, SOB with walking, pulse <100) AND [2] still present when not coughing   Negative: [1] Coughed up blood AND [2] > 1 tablespoon (15 ml)   (Exception: Blood-tinged sputum.)   Negative: Fever > 103 F (39.4 C)   Negative: [1] Fever > 101 F (38.3 C) AND [2] age > 60 years   Negative: [1] Fever > 100.0 F (37.8 C) AND [2]  bedridden (e.g., CVA, chronic illness, recovering from surgery)   Negative: [1] Fever > 100.0 F (37.8 C) AND [2] diabetes mellitus or weak immune system (e.g., HIV positive, cancer chemo, splenectomy, organ transplant, chronic steroids)   Negative: Wheezing is present   Negative: [1] Ankle swelling AND [2] swelling is increasing   Negative: SEVERE coughing spells (e.g., whooping sound after coughing, vomiting after coughing)   Negative: [1] Continuous (nonstop) coughing interferes with work or school AND [2] no improvement using cough treatment per Care Advice   Negative: Fever present > 3 days (72 hours)   Negative: [1] Fever returns after gone for over 24 hours AND [2] symptoms worse or not improved   Negative: [1] Using nasal washes and pain medicine > 24 hours AND [2] sinus pain (around cheekbone or eye) persists   Negative: Earache is present   Negative: Cough has been present for > 3 weeks   Negative: [1] Nasal discharge AND [2] present > 10 days   Negative: [1] Coughed up blood-tinged sputum AND [2] more than once   Negative: [1] Patient also has allergy symptoms (e.g., itchy eyes, clear nasal discharge, postnasal drip) AND [2] they are acting up   Negative: Taking an ACE Inhibitor medicine (e.g., benazepril / LOTENSIN, captopril / CAPOTEN, enalapril / VASOTEC, lisinopril / ZESTRIL)   Negative: Exposure to TB (Tuberculosis)    Protocols used: Cough - Acute Non-Productive-A-AH

## 2023-11-08 NOTE — TELEPHONE ENCOUNTER
CC: Catherine - patient's friend calling with patient also on the line.No C2C on file for Catherine, patient gave verbal approval for writer to speak with Catherine.    Catherine and patient calling to report ongoing cough and back pain     Cough:     ONSET: 3 weeks ago - improving   SEVERITY: now mild   SPUTUM: dry cough   HEMOPTYSIS: denies   DIFFICULTLY BREATHING: denies   FEVER: 100.1 on Sunday - no fever since Sunday   CARDIAC HISTORY: per snapshot - HTN, CAD  LUNG HISTORY: denies pulmonary embolus, asthma, emphysema. Per snapshot hx of bronchiectasis  PE RISK FACTORS: denies major surgery, recent prolonged travel, bedridden   OTHER SYMPTOMS: denies wheezing or chest pain   TRAVEL: unknown - covid test was negative (patient and Catherine called about cough on 10/21/23)    Back Pain:     ONSET: Saturday - sudden onset   LOCATION: across lower back   SEVERITY: no pain right now - pain was severe at onset (patient states she could not get out of bed at onset) but now is mild and up doing all normal activities   PATTERN: constant unless using heating pad   RADIATION: denies   CAUSE: unsure   BACK OVERUSE: denies   MEDICINES: has been taking OTC tylenol which has been helping   NEUROLOGIC SYMPTOMS: denies weakness, numbness, or problems with bowel/bladder control   OTHER SYMPTOMS: denies fever, abdomen pain, burning with urination, blood in urine     Catherine also mentioned patient was having loose stools last week but not currently. Patient has also stopped taking her sleeping pills recently.     Triaged per [facility] protocol, patient to be seen in office today or tomorrow. Patient prefers to see Dr. Chinchilla only - scheduled for next available with PCP which is on Friday:    11/10/2023 1:30 PM (Arrive by 1:10 PM) Paty Chinchilla MD [facility]     Routing to PCP to please advise if okay for above appt or if other recommendations? Please review and advise - thank you!     Also dicussed with patient  "that typically severe sudden onset of back pain requires emergent evaluation. Catherine states \"she is very against the ER because she is afraid she won't be able to come back home\"    Advised if any new or worsening symptoms prior to appt on Friday to callback to triage, Catherine and patient expressed verbal understanding and are agreeable.    Callback to Lora [phone] - ok to leave detailed VM     Beau Ward RN  [facility]    Reason for Disposition   Cough has been present for > 3 weeks   Age > 50 and no history of prior similar back pain    Additional Information   Negative: Bluish (or gray) lips or face   Negative: SEVERE difficulty breathing (e.g., struggling for each breath, speaks in single words)   Negative: Rapid onset of cough and has hives   Negative: Coughing started suddenly after medicine, an allergic food or bee sting   Negative: Difficulty breathing after exposure to flames, smoke, or fumes   Negative: Sounds like a life-threatening emergency to the triager   Negative: Previous asthma attacks and this feels like asthma attack   Negative: Dry cough (non-productive; no sputum or minimal clear sputum) and within 14 days of COVID-19 Exposure   Negative: MODERATE difficulty breathing (e.g., speaks in phrases, SOB even at rest, pulse 100-120) and still present when not coughing   Negative: Chest pain present when not coughing   Negative: Passed out (i.e., fainted, collapsed and was not responding)   Negative: Patient sounds very sick or weak to the triager   Negative: MILD difficulty breathing (e.g., minimal/no SOB at rest, SOB with walking, pulse <100) and still present when not coughing   Negative: Coughed up > 1 tablespoon (15 ml) blood (Exception: Blood-tinged sputum.)   Negative: Fever > 103 F (39.4 C)   Negative: Fever > 101 F (38.3 C) and over 60 years of age   Negative: Fever > 100.0 F (37.8 C) and has diabetes mellitus or a weak immune system (e.g., HIV positive, cancer " chemotherapy, organ transplant, splenectomy, chronic steroids)   Negative: Fever > 100.0 F (37.8 C) and bedridden (e.g., CVA, chronic illness, recovering from surgery)   Negative: Increasing ankle swelling   Negative: Wheezing is present   Negative: SEVERE coughing spells (e.g., whooping sound after coughing, vomiting after coughing)   Negative: Coughing up pam-colored (reddish-brown) or blood-tinged sputum   Negative: Fever present > 3 days (72 hours)   Negative: Fever returns after gone for over 24 hours and symptoms worse or not improved   Negative: Using nasal washes and pain medicine > 24 hours and sinus pain persists   Negative: Known COPD or other severe lung disease (i.e., bronchiectasis, cystic fibrosis, lung surgery) and worsening symptoms (i.e., increased sputum purulence or amount, increased breathing difficulty)   Negative: Continuous (nonstop) coughing interferes with work or school and no improvement using cough treatment per Care Advice   Negative: Patient wants to be seen   Negative: Passed out (i.e., fainted, collapsed and was not responding)   Negative: Shock suspected (e.g., cold/pale/clammy skin, too weak to stand, low BP, rapid pulse)   Negative: Sounds like a life-threatening emergency to the triager   Negative: Major injury to the back (e.g., MVA, fall > 10 feet or 3 meters, penetrating injury, etc.)   Negative: Pain in the upper back over the ribs (rib cage) that radiates (travels) into the chest   Negative: Pain in the upper back over the ribs (rib cage) and worsened by coughing (or clearly increases with breathing)   Negative: Back pain during pregnancy   Negative: SEVERE back pain of sudden onset and age > 60 years   Negative: SEVERE abdominal pain (e.g., excruciating)   Negative: Abdominal pain and age > 60 years   Negative: Unable to urinate (or only a few drops) and bladder feels very full   Negative: Loss of bladder or bowel control (urine or bowel incontinence; wetting self,  leaking stool) of new-onset   Negative: Numbness (loss of sensation) in groin or rectal area   Negative: Pain radiates into groin, scrotum   Negative: Blood in urine (red, pink, or tea-colored)   Negative: Vomiting and pain over lower ribs of back (i.e., flank - kidney area)   Negative: Weakness of a leg or foot (e.g., unable to bear weight, dragging foot)   Negative: Patient sounds very sick or weak to the triager   Negative: Fever > 100.4 F (38.0 C) and flank pain   Negative: Pain or burning with passing urine (urination)   Negative: SEVERE back pain (e.g., excruciating, unable to do any normal activities) and not improved after pain medicine and CARE ADVICE   Negative: Numbness in an arm or hand (i.e., loss of sensation) and upper back pain   Negative: Numbness in a leg or foot (i.e., loss of sensation)   Negative: High-risk adult (e.g., history of cancer, history of HIV, or history of IV Drug Use)   Negative: Soft tissue infection (e.g., abscess, cellulitis) or other serious infection (e.g., bacteremia) in last 2 weeks   Negative: Painful rash with multiple small blisters grouped together (i.e., dermatomal distribution or 'band' or 'stripe')   Negative: Pain radiates into the thigh or further down the leg, and in both legs    Protocols used: Cough-A-OH, Back Pain-A-OH

## 2023-11-08 NOTE — TELEPHONE ENCOUNTER
It sounds like both her cough and back pain are improving over time.    As long as she continues to improve over time, no visit is needed.

## 2023-11-08 NOTE — TELEPHONE ENCOUNTER
"Called and spoke to the patient. Relayed message from the provider. Patient states she really would like to see the provider for her cough. Patient states she does not feel like she is in \"good shape.\"     Patient would like to keep the appointment as scheduled on Friday if PCP approves.     Will route back to PCP for review.     Thank you,  Lida Carvajal RN  "

## 2023-11-09 NOTE — TELEPHONE ENCOUNTER
Called and spoke with pt to inform her PCP stated it is okay to keep appt.   She verbalized understanding and has no further questions at this time.     Nov 10, 2023  1:30 PM  (Arrive by 1:10 PM)  Provider Visit with Paty Chinchilla MD  [facility] ([facility] ) [phone]     Thank you,  Chata Trejo, RN

## 2023-11-10 NOTE — PROGRESS NOTES
ASSESSMENT/PLAN                                                      (M54.50) Acute right-sided low back pain without sciatica  (primary encounter diagnosis)  Comment: nearly resolved; well controlled with Tylenol and heating pad.    (J31.0) Chronic rhinitis  Plan: TRIAL of ipratropium 0.06 % nasal spray 4x/day as needed; if symptoms worsen, change, or do not improve, patient to contact MD.      (Z23) High priority for 2019-nCoV vaccine  Plan: COVID-19 booster given today.    Paty Chinchilla MD   [facility]  [address] [place] [id number]  T: [phone], F: [phone]    SUBJECTIVE                                                      Lora Vergara is a very pleasant 89 year old female who presents with back pain and runny nose:    Accompanied by .    Patient developed acute right-sided low back pain, without sciatica, last week.  This made it very difficult for her to get out of bed and get to the bathroom.  Her symptoms have improved over time and with Tylenol and heating pad use. Minimal to no back pain currently.    Runny nose is chronic in nature. No treatments tried to date.  No sinus congestion, pressure, or pain.  No fevers or chills.  No sneezing or itchy, watery, red eyes, scratchy throat, or dry cough.    OBJECTIVE                                                      /80 (BP Location: Left arm, Patient Position: Sitting, Cuff Size: Adult Regular)   Pulse 77   Temp 97.2  F (36.2  C) (Temporal)   Resp 12   Wt 78.5 kg (173 lb 1.6 oz)   LMP  (LMP Unknown)   SpO2 95%   BMI 31.66 kg/m    Constitutional: well-appearing  Respiratory: normal respiratory effort; clear to auscultation bilaterally  Cardiovascular: regular rate and rhythm; no edema  Lumbar spine: no deformity, crepitus, or step-off; no spinal tenderness palpation; bilateral paraspinal tenderness to palpation  Musculoskeletal: walks with walker  psych: normal judgment and insight; normal mood and  affect; recent and remote memory intact    ---    (Note documentation was completed, in part, with [employer] voice-recognition software. Documentation was reviewed, but some grammatical, spelling, and word errors may remain.)

## 2023-11-27 NOTE — TELEPHONE ENCOUNTER
Return Pt call she was not sure what medication needs refill. She will check, and sent [facility] refill so she does not run out. VIC Luo RN

## 2023-11-27 NOTE — TELEPHONE ENCOUNTER
Health Call Center    Phone Message    May a detailed message be left on voicemail: yes     Reason for Call: Other: Per call from patient informed by the clinic that she can't get any more refills until scheduled with Dr. Luis. Patient scheduled next available on 1/30/23. Please assist patient with her refills and reach out to her if any questions.      Action Taken: Other: Cardio    Travel Screening: Not Applicable                                                                    supervision

## 2023-11-27 NOTE — PROGRESS NOTES
[facility] Refill Guideline reviewed.  Medication meets criteria for refill.  VIC Luo RN

## 2023-12-07 NOTE — TELEPHONE ENCOUNTER
Received refill request from [facility] pharmacy for Nitroglycerine 0.4mg sublingual as needed for chest pain    Last OV:  1\10\23   upcoming Jan 30th, 2024    [facility] Refill Guideline reviewed.  Medication meets criteria for refill.    Rosario Anguiano RN on 12/7/2023 at 4:42 PM

## 2023-12-20 PROBLEM — J96.01 ACUTE RESPIRATORY FAILURE WITH HYPOXIA (H): Status: ACTIVE | Noted: 2023-01-01

## 2023-12-20 PROBLEM — J18.9 COMMUNITY ACQUIRED PNEUMONIA OF LEFT LOWER LOBE OF LUNG: Status: ACTIVE | Noted: 2023-01-01

## 2023-12-20 NOTE — ED PROVIDER NOTES
ED ATTENDING PHYSICIAN NOTE:   I evaluated this patient in conjunction with Chrissy Gonzalez PA-C  I have participated in the care of the patient and personally performed key elements of the history, exam, and medical decision making.      HPI:   Lora Vergara is a 89 year old female who presents with cough, fever, shortness of breath for the past couple days.  She is not usually on oxygen.  She denies any chest pain.  She denies any significant leg swelling.    Independent Historian:   None - Patient Only    Review of External Notes:   Reviewed primary care office visit from 11/10/2023     EXAM:   Physical Exam  Vitals and nursing note reviewed.   Constitutional:       General: She is not in acute distress.     Appearance: She is ill-appearing. She is not toxic-appearing.   HENT:      Head: Normocephalic and atraumatic.      Right Ear: External ear normal.      Left Ear: External ear normal.      Nose: Nose normal.      Mouth/Throat:      Mouth: Mucous membranes are moist.   Eyes:      Extraocular Movements: Extraocular movements intact.      Conjunctiva/sclera: Conjunctivae normal.   Cardiovascular:      Rate and Rhythm: Normal rate and regular rhythm.      Heart sounds: No murmur heard.  Pulmonary:      Effort: Pulmonary effort is normal. Tachypnea present. No respiratory distress.      Breath sounds: Rales present. No wheezing or rhonchi.   Abdominal:      General: Abdomen is flat. Bowel sounds are normal. There is no distension.      Palpations: Abdomen is soft.      Tenderness: There is no abdominal tenderness. There is no guarding or rebound.   Musculoskeletal:         General: No deformity or signs of injury.      Cervical back: Normal range of motion and neck supple.   Skin:     General: Skin is warm and dry.      Findings: No rash.   Neurological:      Mental Status: She is alert and oriented to person, place, and time.   Psychiatric:         Behavior: Behavior normal.           Independent Interpretation  (X-rays, CTs, rhythm strip):  I reviewed the chest x-ray and there appears to be a left lower lobe pneumonia per my read    Consultations/Discussion of Management or Tests:  1334 PA discussed with COMPA Hedrick for the hospitalist service regarding admission    Social Determinants of Health affecting care:   None     MEDICAL DECISION MAKING/ASSESSMENT AND PLAN:   89-year-old female presenting with fever, cough, shortness of breath.  She has new onset hypoxia.  Symptoms are most consistent with pneumonia.  Chest x-ray confirms this.  Viral swabs are negative.  She was given antibiotics and we will admit for further care.     DIAGNOSIS:     ICD-10-CM    1. Acute respiratory failure with hypoxia (H)  J96.01       2. Community acquired pneumonia of left lower lobe of lung  J18.9                DISPOSITION:   Admit     Benja Banegas MD  12/20/2023  [facility]        Benja Banegas MD  12/20/23 2284

## 2023-12-20 NOTE — ED PROVIDER NOTES
History     Chief Complaint:  Cough, Fever, and Shortness of Breath       HPI   Lora Vergara is a 89 year old female history of hypothyroidism, coronary artery disease, hypertension, who presents with cough, fever, shortness of breath.  Patient resides at independent living facility, EMS was called and report oxygen saturations in the mid 80s on room air.  She is not typically oxygen dependent.  Now on 2 L oxygen to maintain saturations above 95%.  She states that she has been experiencing a cough for the last 2 to 3 days.  This morning she woke up and noticed she had a fever of 100.9.  No known sick contacts.  Denies any chest pain. Right lower eyelid appears red however patient states this is chronic.       Independent Historian:   EMS reports above    Review of External Notes:   Viewed office visit note from 11/10/2023      Medications:    aspirin 81 MG EC tablet  atorvastatin (LIPITOR) 40 MG tablet  dorzolamide-timolol (COSOPT) 2-0.5 % ophthalmic solution  furosemide (LASIX) 20 MG tablet  hydrOXYzine (ATARAX) 10 MG tablet  ipratropium (ATROVENT) 0.06 % nasal spray  isosorbide mononitrate CR (IMDUR) 120 MG 24 HR ER tablet  latanoprost (XALATAN) 0.005 % ophthalmic solution  levothyroxine (SYNTHROID/LEVOTHROID) 25 MCG tablet  metoprolol tartrate (LOPRESSOR) 100 MG tablet  nitroGLYcerin (NITROSTAT) 0.4 MG sublingual tablet  polyethylene glycol (MIRALAX/GLYCOLAX) packet  pregabalin (LYRICA) 150 MG capsule  ranolazine (RANEXA) 500 MG 12 hr tablet  vitamin D3 (CHOLECALCIFEROL) 2000 units tablet  order for [facility]  Respiratory Therapy Supplies ([employer]) RUT        Past Medical History:    Past Medical History:   Diagnosis Date    Benign essential hypertension     Bronchiectasis (H)     CAD (coronary artery disease)     History of basal cell carcinoma     Hypothyroidism     Impaired fasting glucose     Microvascular angina     Obesity (BMI 30-39.9)     Peripheral neuropathy     Pure hypercholesterolemia        Past  Surgical History:    Past Surgical History:   Procedure Laterality Date    APPENDECTOMY      BLADDER SURGERY      bladder suspension    CHOLECYSTECTOMY      CORONARY ANGIOGRAPHY ADULT ORDER  2008    Mild CAD. LAD w/20% stenosis, RCA with 30-40% stenosis. No flow limiting obstructions.    HYSTERECTOMY      OOPHORECTOMY Bilateral     PHACOEMULSIFICATION CLEAR CORNEA WITH STANDARD INTRAOCULAR LENS IMPLANT Right 07/21/2015    Procedure: PHACOEMULSIFICATION CLEAR CORNEA WITH STANDARD INTRAOCULAR LENS IMPLANT;  Surgeon: Schuyler Chapa MD;  Location: [facility]    PHACOEMULSIFICATION CLEAR CORNEA WITH STANDARD INTRAOCULAR LENS IMPLANT Left 08/18/2015    Procedure: PHACOEMULSIFICATION CLEAR CORNEA WITH STANDARD INTRAOCULAR LENS IMPLANT;  Surgeon: Schuyler Chapa MD;  Location: [facility]        Physical Exam   Patient Vitals for the past 24 hrs:   BP Temp Temp src Pulse Resp SpO2 Weight   12/20/23 1229 124/61 -- -- 71 21 99 % --   12/20/23 1159 123/58 -- -- 73 -- 99 % --   12/20/23 1129 125/61 -- -- -- -- 96 % --   12/20/23 1121 -- -- -- -- -- 95 % --   12/20/23 1120 108/87 -- -- 73 -- -- --   12/20/23 1114 -- -- -- -- -- (!) 87 % --   12/20/23 1105 (!) 76/40 98.1  F (36.7  C) Temporal 74 16 97 % 78.5 kg (173 lb)        Physical Exam  General: Alert and cooperative with exam. Patient in no apparent distress. Normal mentation.  Head:  Scalp is NC/AT  Eyes:  No scleral icterus, PERRL  ENT:  The external nose and ears are normal.   Neck:  Normal range of motion without rigidity.  CV:  Regular rate and rhythm    No pathologic murmur   Resp:  Coarse breath sounds throughout  GI:  Abdomen is soft, no distension, no tenderness. No peritoneal signs  MS:  No lower extremity edema   Skin:  Warm and dry, No rash or lesions noted.  Neuro:  Oriented x 3. No gross motor deficits.      Emergency Department Course   ECG  ECG taken at 1140, ECG read at 1150  Normal sinus rhythm   Normal ECG   No significant change as compared to prior,  dated 9/3/22.  Rate 72 bpm. WV interval 208 ms. QRS duration 86 ms. QT/QTc 412/451 ms. P-R-T axes -18 -14 7.     Imaging:  Chest XR,  PA & LAT   Final Result   IMPRESSION:    New left lower lobe infiltrate consistent with pneumonia.      JARED BURR MD            SYSTEM ID:  [id number]        Laboratory:  Labs Ordered and Resulted from Time of ED Arrival to Time of ED Departure   BASIC METABOLIC PANEL - Abnormal       Result Value    Sodium 135      Potassium 4.5      Chloride 100      Carbon Dioxide (CO2) 29      Anion Gap 6 (*)     Urea Nitrogen 16.3      Creatinine 0.78      GFR Estimate 68      Calcium 9.2      Glucose 140 (*)    ISTAT GASES LACTATE VENOUS POCT - Abnormal    Lactic Acid POCT 1.1      Bicarbonate Venous POCT 31 (*)     O2 Sat, Venous POCT 78 (*)     pCO2 Venous POCT 60 (*)     pH Venous POCT 7.32      pO2 Venous POCT 47     INFLUENZA A/B, RSV, & SARS-COV2 PCR - Normal    Influenza A PCR Negative      Influenza B PCR Negative      RSV PCR Negative      SARS CoV2 PCR Negative     TROPONIN T, HIGH SENSITIVITY - Normal    Troponin T, High Sensitivity 12     CBC WITH PLATELETS AND DIFFERENTIAL    WBC Count 9.7      RBC Count 4.26      Hemoglobin 12.6      Hematocrit 38.6      MCV 91      MCH 29.6      MCHC 32.6      RDW 13.8      Platelet Count 180      % Neutrophils 81      % Lymphocytes 10      % Monocytes 8      % Eosinophils 0      % Basophils 0      % Immature Granulocytes 1      NRBCs per 100 WBC 0      Absolute Neutrophils 7.8      Absolute Lymphocytes 1.0      Absolute Monocytes 0.8      Absolute Eosinophils 0.0      Absolute Basophils 0.0      Absolute Immature Granulocytes 0.1      Absolute NRBCs 0.0          Procedures   None    Emergency Department Course & Assessments:    Interventions:  Medications   cefTRIAXone (ROCEPHIN) 2 g vial to attach to  ml bag for ADULTS or NS 50 ml bag for PEDS (has no administration in time range)   azithromycin (ZITHROMAX) 500 mg vial to attach to NS  250 mL bag (has no administration in time range)        Independent Interpretation (X-rays, CTs, rhythm strip):  Chest xray - new opacities to LLL    Consultations/Discussion of Management or Tests:   ED Course as of 12/20/23 1339   Wed Dec 20, 2023   1334 Consulted with Dr. Pa with inpatient hospitalist service       Social Determinants of Health affecting care:   Resides at independent living facility    Disposition:  The patient was admitted to the hospital under the care of Dr. Pa.     Impression & Plan      Medical Decision Making:  Lora Vergara is a 89 year old female who presents with hypoxia, cough, fever at home, and question of hypotension.  Upon arrival initial blood pressure was noted to be 76/40 however once roomed without any intervention, patient's blood pressure resolved spontaneously.  Suspect false read as initial blood pressure reading.  She was noted to be in the mid 80s for oxygen saturations at her facility.  She arrives on 2 L via nasal cannula to maintain saturations above 95%.  On exam she does have significant rhonchi present on lung auscultation.  She is afebrile here.  COVID/flu/RSV swab completed and negative for all.  Labs are reassuring as she does not have a leukocytosis or electrolyte derangement.  VBG without evidence of acidosis and her lactic is normal.  Chest x-ray was obtained and shows new opacities in the left lower lung suggestive of pneumonia.  I started patient on ceftriaxone and azithromycin via IV.  Given her ongoing oxygen requirement and pneumonia, patient will require admission for ongoing management and monitoring.  Consulted with Dr. Hawkins with patient hospitalist service who accepts patient's admission moving forward.      Diagnosis:    ICD-10-CM    1. Acute respiratory failure with hypoxia (H)  J96.01       2. Community acquired pneumonia of left lower lobe of lung  J18.9            Discharge Medications:  New Prescriptions    No medications on file         12/20/2023   MARIELENA Coronado Lauren R, PA-C  12/20/23 [id number]

## 2023-12-20 NOTE — SIGNIFICANT EVENT
RRT:      Patient just arrived to room 634 from ED; was A&Ox4 and asked to use the bathroom. Patient ambulated to the bathroom from the cart with minimal assist of 2. Patient had O2 on at 2L per NC with O2sats 90's; however, patient was on RA when using the bathroom. Patient remained A&Ox4 and was conversant after and was assisted to the bed. Patient sat up in bed but immediately went limp and slumped over. Patient had her eyes open but was unresponsive. Attempted to check VS but was unsuccessful. O2sats did show 94%  on 2L per NC. Staff Assist button was activated and [facility] [employer] was called - which was cancelled due to patient being DNR/DNI. Hospitalist instructed staff to call RRT instead.See RRT notes for details. Will continue to monitor.    1715: Called son Festus for update but had no answer. Message left for him to call this writer back.

## 2023-12-20 NOTE — PROGRESS NOTES
Admission    Patient arrives to room 634 via cart from ED.    Care plan note: Patient was A&Ox4 and was conversant; asked to use the bathroom but went unresponsive soon after. RRT was called - see previous notes for details.    Inpatient nursing criteria listed below were met:    Did you put disposition on whiteboard and in sticky note: Yes  Full skin assessment done (add LDA if skin issue present). Initials of 2nd RN :Yes/TD, RN  Isolation education started/completed NA  Patient allergies verified with patient: Yes  Fall Risk? (Care plan updated, Education given and documented) Yes  Primary Care Plan initiated: Yes  Home medications documented in belongings flowsheet: NA  Patient belongings documented in belongings flowsheet: Yes  Reminder note (belongings/ medications) placed in discharge instructions:NA  Admission profile/ required documentation complete: No - patient went unresponsive.  If patient is a 72 hour hold/Commitment are belongings removed from room and locked up? NA

## 2023-12-20 NOTE — ED NOTES
[facility]  ED Nurse Handoff Report    ED Chief complaint: Cough, Fever, and Shortness of Breath      ED Diagnosis:   Final diagnoses:   Acute respiratory failure with hypoxia (H)   Community acquired pneumonia of left lower lobe of lung       Code Status: to be addressed by admitting md    Allergies:   Allergies   Allergen Reactions    Hydrochlorothiazide Other (See Comments)     Low sodium       Patient Story: pt presents from skilled nursing facility via triage ambulatory for increased SOB and cough- pt noted to be hypoxic at 87% on RA and hypotensive in triage  Focused Assessment:  pt is A & O X 4, VSS since being roomed in ED, pt has not ambulated since arrival to ED. Pt is on 4 L NC with 02 sats >95%. Pt receiving antibiotics for pneumonia. On regular diet    Treatments and/or interventions provided:   Patient's response to treatments and/or interventions:     To be done/followed up on inpatient unit:      Does this patient have any cognitive concerns?:  none    Activity level - Baseline/Home:  Independent  Activity Level - Current:   Stand with Assist    Patient's Preferred language: English   Needed?: No    Isolation: None  Infection: Not Applicable  Patient tested for COVID 19 prior to admission: yes  Bariatric?: No    Vital Signs:   Vitals:    12/20/23 1121 12/20/23 1129 12/20/23 1159 12/20/23 1229   BP:  125/61 123/58 124/61   Pulse:   73 71   Resp:    21   Temp:       TempSrc:       SpO2: 95% 96% 99% 99%   Weight:           Cardiac Rhythm:Cardiac Rhythm: Normal sinus rhythm    Was the PSS-3 completed:   Yes  What interventions are required if any?               Family Comments: grand daughter at bedside  OBS brochure/video discussed/provided to patient/family: Yes              Name of person given brochure if not patient:               Relationship to patient:     For the majority of the shift this patient's behavior was green.   Behavioral interventions performed were .    ED  NURSE PHONE NUMBER: [id number]

## 2023-12-20 NOTE — ED TRIAGE NOTES
Patient arrived via EMS from her independent living apartment with complaints of cough and fever for the last couple of days. Patient was hypoxic per EMS in the mid 80's on room air, now 98% on 2 L via NC     Triage Assessment (Adult)       Row Name 12/20/23 1031          Triage Assessment    Airway WDL WDL        Respiratory WDL    Respiratory WDL X;cough;rhythm/pattern     Rhythm/Pattern, Respiratory shortness of breath        Skin Circulation/Temperature WDL    Skin Circulation/Temperature WDL WDL        Cardiac WDL    Cardiac WDL WDL        Peripheral/Neurovascular WDL    Peripheral Neurovascular WDL WDL        Cognitive/Neuro/Behavioral WDL    Cognitive/Neuro/Behavioral WDL WDL

## 2023-12-20 NOTE — H&P
[facility]    History and Physical - Hospitalist Service       Date of Admission:  12/20/2023    Assessment & Plan      Lora Vergara is a 89 year old female with a past medical history significant for hypothyroidism, CAD/microvascular angina, HTN admitted on 12/20/2023 after presenting with cough, shortness of breath, and fever.     Acute hypoxic respiratory failure  Left lower lobe community acquired pneumonia   Presents with 3 days of cough and fever with dyspnea developing day of admission.   WBC 9.7. BP initially low 76/40, normalized without intervention  Oxygen saturations 87% RA, improved with 2L NC  *CXR shows new LLL infiltrate consistent with pneumonia  *VBG with PCO2 60, normal pH  --admit to inpatient  --continue Ceftriaxone and azithromycin  --will give small amount of IVF given initial soft pressure   --sputum culture   --wean oxygen as able  --albuterol nebs prn   --prn robitussin, will order robitussin AC for bedtime prn  --IS  --PT/Sw consult    CAD/microvascular angina   HTN  Moderate mitral stenosis  Mild MR  Follows with [facility].   Last ECHO 9/2020 showed EF 60-65% with moderate MS, mild MR.   Denies recent anginal symptoms  Trop in the ED is WNL. EKG without ST elevation   PTA: asa 81mg, atorvastatin 40mg daily, Imdur 120mg daiy, metoprolol 50mg q am and 100mg q pm, ranolazine 500mg bid, lasix 10mg daily  --hold PTA lasix given initial hypotension  --continue PTA asa, statin  --continue BB, Imdur, ranolazine with hold parameters     Hypothyroidism  Continue PTA levothyroxine    Peripheral neuropathy  Continue PTA lyrica     HLD  Continue statin         Diet:  regular diet   DVT Prophylaxis: Pneumatic Compression Devices  Kumari Catheter: Not present  Lines: None     Cardiac Monitoring: None  Code Status:  DNR/DNI- discussed on admission     Clinically Significant Risk Factors Present on Admission                # Drug Induced Platelet Defect: home medication list  includes an antiplatelet medication   # Hypertension: Noted on problem list                 Disposition Plan    >2 midnights, admit inpatient      The patient's care was discussed with Dr. Irwin who agrees with the above plan     Yuni Pa PA-C  Hospitalist Service  [facility]  Securely message with [employer] (more info)  Text page via [facility] Paging/Directory     ______________________________________________________________________    Chief Complaint   Cough, fever, shortness of breath     History is obtained from the patient    History of Present Illness   Lora Vergara is a 89 year old female with a past medical history significant for hypothyroidism, CAD/microvascular angina, HTN admitted on 12/20/2023 after presenting with cough, shortness of breath, and fever.   Patient presents today from her independent apartment with 2 to 3 days of cough, fever with some new mild shortness of breath day of admission.  EMS was called and she was found to have oxygen saturations in the mid 80s.  She reports persistent largely nonproductive cough with fevers as high as 100.9 at home.  She reports some associated fatigue and generalized weakness.  No significant dyspnea.  Denies orthopnea, chest pain, palpitations, dizziness.  She feels she has been eating and drinking normally.  COVID and influenza are negative in the ED.  Workup showed left lower lobe infiltrate consistent with pneumonia.  Patient was started on IV antibiotics and admission was requested for further management of respiratory failure.  She is presently evaluated in her room in the ED.  She reports she is feeling well overall at this time aside from being quite hungry.  She denies any dyspnea.  She continues to have a frequent congested cough.  Denies any lower extremity edema.      Past Medical History    Past Medical History:   Diagnosis Date    Benign essential hypertension     Bronchiectasis (H)     CAD (coronary artery  disease)     mild to moderate disease    History of basal cell carcinoma     Hypothyroidism     Impaired fasting glucose     Microvascular angina     chronic    Obesity (BMI 30-39.9)     Peripheral neuropathy     Pure hypercholesterolemia        Past Surgical History   Past Surgical History:   Procedure Laterality Date    APPENDECTOMY      BLADDER SURGERY      bladder suspension    CHOLECYSTECTOMY      CORONARY ANGIOGRAPHY ADULT ORDER  2008    Mild CAD. LAD w/20% stenosis, RCA with 30-40% stenosis. No flow limiting obstructions.    HYSTERECTOMY      OOPHORECTOMY Bilateral     PHACOEMULSIFICATION CLEAR CORNEA WITH STANDARD INTRAOCULAR LENS IMPLANT Right 07/21/2015    Procedure: PHACOEMULSIFICATION CLEAR CORNEA WITH STANDARD INTRAOCULAR LENS IMPLANT;  Surgeon: Schuyler Chapa MD;  Location: [facility]    PHACOEMULSIFICATION CLEAR CORNEA WITH STANDARD INTRAOCULAR LENS IMPLANT Left 08/18/2015    Procedure: PHACOEMULSIFICATION CLEAR CORNEA WITH STANDARD INTRAOCULAR LENS IMPLANT;  Surgeon: Schuyler Chapa MD;  Location: [facility]       Prior to Admission Medications   Prior to Admission Medications   Prescriptions Last Dose Informant Patient Reported? Taking?   Respiratory Therapy Supplies ([place]) RUT   Yes No   aspirin 81 MG EC tablet 12/20/2023 at AM Self Yes Yes   Sig: Take 81 mg by mouth daily   atorvastatin (LIPITOR) 40 MG tablet 12/19/2023 at PM Self No Yes   Sig: Take 1 tablet (40 mg) by mouth daily   dorzolamide-timolol (COSOPT) 2-0.5 % ophthalmic solution 12/20/2023 at AM Self Yes Yes   Sig: Place 1 drop into both eyes 2 times daily   furosemide (LASIX) 20 MG tablet 12/20/2023 at AM Self No Yes   Sig: Take 0.5 tablets (10 mg) by mouth daily   hydrOXYzine (ATARAX) 10 MG tablet Unknown at PRN, rarely if ever taking Self No Yes   Sig: TAKE 1 TABLET(10 MG) BY MOUTH EVERY NIGHT AS NEEDED FOR SLEEP   ipratropium (ATROVENT) 0.06 % nasal spray 12/20/2023 at AM Self No Yes   Sig: Spray 2 sprays into both nostrils 4  times daily   isosorbide mononitrate CR (IMDUR) 120 MG 24 HR ER tablet 12/20/2023 at AM Self No Yes   Sig: TAKE 1 TABLET BY MOUTH EVERY DAY IN THE MORNING   latanoprost (XALATAN) 0.005 % ophthalmic solution 12/19/2023 at PM Self Yes Yes   Sig: Place 1 drop into both eyes daily   levothyroxine (SYNTHROID/LEVOTHROID) 25 MCG tablet 12/20/2023 at AM Self No Yes   Sig: TAKE 1 TABLET BY MOUTH EVERY DAY   metoprolol tartrate (LOPRESSOR) 100 MG tablet 12/20/2023 at AM Self No Yes   Sig: TAKE 1/2 TABLET BY MOUTH EVERY MORNING AND 1 TABLET EVERY EVENING   nitroGLYcerin (NITROSTAT) 0.4 MG sublingual tablet Unknown at PRN, has supply at home Self No Yes   Sig: Place 1 tablet (0.4 mg) under the tongue every 5 minutes as needed for chest pain For chest pain place 1 tablet under the tongue every 5 minutes for 3 doses. If symptoms persist 5 minutes after 1st dose call 911.   order for DME   No No   Sig: Equipment being ordered: wheeled walker with brakes and a seat   polyethylene glycol (MIRALAX/GLYCOLAX) packet 12/18/2023 at PM Self Yes Yes   Sig: Take 1 packet by mouth at bedtime   pregabalin (LYRICA) 150 MG capsule 12/20/2023 at AM Self No Yes   Sig: TAKE 1 CAPSULE BY MOUTH TWICE A DAY AS DIRECTED BY PHYSICIAN.   ranolazine (RANEXA) 500 MG 12 hr tablet 12/20/2023 at AM Self No Yes   Sig: Take 1 tablet (500 mg) by mouth 2 times daily   vitamin D3 (CHOLECALCIFEROL) 2000 units tablet 12/19/2023 at PM Self Yes Yes   Sig: Take 4,000 Units by mouth daily      Facility-Administered Medications: None        Social History   I have reviewed this patient's social history and updated it with pertinent information if needed.  Social History     Tobacco Use    Smoking status: Never    Smokeless tobacco: Never   Vaping Use    Vaping Use: Never used   Substance Use Topics    Alcohol use: Not Currently    Drug use: No        Physical Exam   Temp: 98.1  F (36.7  C) Temp src: Temporal BP: 124/61 Pulse: 71   Resp: 21 SpO2: 99 % O2 Device: Nasal  cannula Oxygen Delivery: 2 LPM  Vitals:    12/20/23 1105   Weight: 78.5 kg (173 lb)     Vital Signs with Ranges  Temp:  [98.1  F (36.7  C)] 98.1  F (36.7  C)  Pulse:  [71-74] 71  Resp:  [16-21] 21  BP: ()/(40-87) 124/61  SpO2:  [87 %-99 %] 99 %  No intake/output data recorded.    Constitutional: Alert, sitting up in bed. Appears comfortable and is appropriately conversant   ENT: moist mucous membranes  Eyes: right eye erythematous lower lid, baseline per patient  Respiratory: Lungs coarse L base, scattered mild expiratory wheeze. No increased WOB  Cardiovascular: Regular rate and rhythm, no significant LE edema   GI: Normal active bowel sounds, abdomen soft, non-tender  MSK:  moves all four extremities. Normal tone       Medical Decision Making       50 MINUTES SPENT BY ME on the date of service doing chart review, history, exam, documentation & further activities per the note.      Data     I have personally reviewed the following data over the past 24 hrs:    9.7  \   12.6   / 180     135 100 16.3 /  140 (H)   4.5 29 0.78 \     Trop: 12 BNP: N/A     Procal: N/A CRP: N/A Lactic Acid: 1.1         Imaging results reviewed over the past 24 hrs:   Recent Results (from the past 24 hour(s))   Chest XR,  PA & LAT    Narrative    XR CHEST 2 VIEWS  12/20/2023 1:10 PM       INDICATION: SOB Cough  COMPARISON: 9/2/2022       Impression    IMPRESSION:   New left lower lobe infiltrate consistent with pneumonia.    JARED BURR MD         SYSTEM ID:  [id number]

## 2023-12-20 NOTE — PROGRESS NOTES
RECEIVING UNIT ED HANDOFF REVIEW    ED Nurse Handoff Report was reviewed by: Katie Lance RN on December 20, 2023 at 4:06 PM

## 2023-12-20 NOTE — PHARMACY-ADMISSION MEDICATION HISTORY
"Pharmacist Admission Medication History    Admission medication history is complete. The information provided in this note is only as accurate as the sources available at the time of the update.    Information Source(s): Patient and CareEverywhere/SureScripts via in-person    Pertinent Information: patient is mostly reliable historian. Admits to missing occasional nasal spray doses.     Changes made to PTA medication list:  Added: latanoprost  Deleted: ketorolac eye drops (no fill history, patient verifies not taking)   Changed: cosopt TID --> BID (per patient, taking differently than prescribed. Of note, cosopt dosing was increased to TID on 12/11/23, so patient may be using previous supply or has not yet increased dose outpatient), miralax PRN --> scheduled (per patient)      Medication Affordability:  Not including over the counter (OTC) medications, was there a time in the past 3 months when you did not take your medications as prescribed because of cost?:  (not addressed)    Allergies reviewed with patient and updates made in EHR: no - already reviewed this encounter     Medication History Completed By: Shilpa Sanchez [facility] 12/20/2023 1:15 PM    PTA Med List   Medication Sig Note Last Dose    aspirin 81 MG EC tablet Take 81 mg by mouth daily  12/20/2023 at AM    atorvastatin (LIPITOR) 40 MG tablet Take 1 tablet (40 mg) by mouth daily  12/19/2023 at PM    dorzolamide-timolol (COSOPT) 2-0.5 % ophthalmic solution Place 1 drop into both eyes 2 times daily 12/20/2023: Prescribed TID, patient states taking only BID 12/20/2023 at AM    furosemide (LASIX) 20 MG tablet Take 0.5 tablets (10 mg) by mouth daily  12/20/2023 at AM    hydrOXYzine (ATARAX) 10 MG tablet TAKE 1 TABLET(10 MG) BY MOUTH EVERY NIGHT AS NEEDED FOR SLEEP  Unknown at PRN, rarely if ever taking    ipratropium (ATROVENT) 0.06 % nasal spray Spray 2 sprays into both nostrils 4 times daily 12/20/2023: At baseline, gets \"at least 3 doses\" per day " 12/20/2023 at AM    isosorbide mononitrate CR (IMDUR) 120 MG 24 HR ER tablet TAKE 1 TABLET BY MOUTH EVERY DAY IN THE MORNING  12/20/2023 at AM    latanoprost (XALATAN) 0.005 % ophthalmic solution Place 1 drop into both eyes daily  12/19/2023 at PM    levothyroxine (SYNTHROID/LEVOTHROID) 25 MCG tablet TAKE 1 TABLET BY MOUTH EVERY DAY  12/20/2023 at AM    metoprolol tartrate (LOPRESSOR) 100 MG tablet TAKE 1/2 TABLET BY MOUTH EVERY MORNING AND 1 TABLET EVERY EVENING  12/20/2023 at AM    nitroGLYcerin (NITROSTAT) 0.4 MG sublingual tablet Place 1 tablet (0.4 mg) under the tongue every 5 minutes as needed for chest pain For chest pain place 1 tablet under the tongue every 5 minutes for 3 doses. If symptoms persist 5 minutes after 1st dose call 911.  Unknown at PRN, has supply at home    polyethylene glycol (MIRALAX/GLYCOLAX) packet Take 1 packet by mouth at bedtime  12/18/2023 at PM    pregabalin (LYRICA) 150 MG capsule TAKE 1 CAPSULE BY MOUTH TWICE A DAY AS DIRECTED BY PHYSICIAN.  12/20/2023 at AM    ranolazine (RANEXA) 500 MG 12 hr tablet Take 1 tablet (500 mg) by mouth 2 times daily  12/20/2023 at AM    vitamin D3 (CHOLECALCIFEROL) 2000 units tablet Take 4,000 Units by mouth daily  12/19/2023 at PM

## 2023-12-20 NOTE — ED NOTES
Pt stood at bedside with stand by assist, air mat placed and pt changed into gown and yellow  socks, pt is A & O X 4, reports SOB improved since 02 application at 3 L/min NC, pt noted to be in NSR on monitor

## 2023-12-20 NOTE — CODE/RAPID RESPONSE
[facility]    RRT Note  12/20/2023   Time Called: 4:40 PM    RRT called for: Acute unresponsiveness and hypoxia    Assessment & Plan   IMPRESSION & PLAN:    Lora Vergara is a 89 year old female w/ PMH of hypothyroidism, CAD, hypertension who was admitted on 12/20/2023 for community-acquired pneumonia and acute hypoxic respiratory failure with new O2 requirement.  Of 2 L.  Patient had just arrived to the general [facility] from the ED.  She ambulated to the bathroom, was awake and talking.  O2 was briefly off.  On return to the bed O2 was reapplied, SpO2 was 94%.  Patient then became acutely unresponsive, turned gray and became limp.  CODE BLUE was called although patient never actually lost a pulse or was apneic. Event was witnessed by RN.     On my arrival patient is unresponsive but she is breathing spontaneously.  CODE STATUS was verified is DNR/DNI.  SpO2 was 75%, O2 increased to 15 L by OxyMask.  Left lower lobe lung with decreased aeration, rhonchi and other lung fields.  Tachypnea with respiratory rate 28-30, heart rate 95.  Patient was suctioned via nasotracheal route with moderate amount of secretions removed.  Over the next several minutes patient remained drowsy but was able to follow commands to squeeze hands bilaterally.    I personally reviewed the radiographs of the chest which revealed extensive left lower lobe infiltrate    Impression  Acutely worsening hypoxic respiratory failure  Acute encephalopathy likely metabolic from profound hypoxia  Differential diagnosis:  Need to rule out PE, ACS.  Mucous plugging may have contributed.  MAR reviewed no CNS depressing meds recently given.  I considered CNS etiology but patient is clearly hypoxic and on repeat evaluation approximately 630 patient continues to follow commands is verbalizing and was seen moving all extremities.    INTERVENTIONS:  -oxymask at 15L, later changed to HFNC  -stat EKG--> sinus tachycardia w/o any  acute ischemic changes  -stat pcxr--> as above  -stat CMP, CBC, LA, troponin  -add on stat blood cultures  -stat CTA PE protocol wells score is 4.5 (presumed decreased mobility w/ current illness), moderate risk/pretest probability; will also eval severity of LLL pneumonia  -gluc 180 mg/dL  -Stat ABG--> acute respiratory acidosis with hypoxia, PF ratio 71  -Start chest physiotherapy  - Acapella pulmonary hygiene maneuvers  -Neurochecks every 4 hours x 24 hours  - Vital signs every 4    Working diagnosis: Acute hypoxic respiratory failure secondary to extensive left lower lobe pneumonia from CAP and mucous plugging    At the end of the RRT imaging has been completed, EKG completed and reviewed, labs in process    disposition as below    I was called away to another emergency.  I checked on patient at approximately 6:30 PM.  She wakes to verbal stimuli is able to briefly converse, follows commands to stick out her tongue, FiO2 down to 0.5.  I will arrange for transfer to [facility]    Discussed with and defer further cares to admitting hospitalist MARIELENA Pa    RN has attempted to contact her family unsuccessfully.  Spoke with patient's son by phone.  I informed him of the events of this afternoon as well as the serious nature of patient's condition.    Code Status: No CPR- Do NOT Intubate    Allergies   Allergies   Allergen Reactions    Hydrochlorothiazide Other (See Comments)     Low sodium       Physical Exam   Vital Signs with Ranges:  Temp:  [98.1  F (36.7  C)-98.8  F (37.1  C)] 98.8  F (37.1  C)  Pulse:  [71-96] 96  Resp:  [14-30] 22  BP: ()/(40-94) 169/92  Cuff Mean (mmHg):  [] 114  FiO2 (%):  [50 %-70 %] 50 %  SpO2:  [84 %-99 %] 94 %  No intake/output data recorded.    Constitutional: vs as above and/or per EMR  General:  elderly pt cutely ill appearing   GCS: Worst  Motor 6=Obeys commands squeeze hands   Verbal 1=None   Eye Opening 2=To pain   Total: 9      GCS: best  Motor 6=Obeys commands   Verbal  4=Confused   Eye Opening 3=To speech   Total: 13     Neuro: +follows commands wiggle toes squeeze hands face symmetric, tongue midline, speech fluent  Eyes pupils equal round 3mm briskly reactive bilat, sclera nonicteric, noninjected, conjunctiva pink,  Head, ENT & mouth: NC/AT,  dry oral mucosa  Neck: supple  CV S1S2 ST  resp:, Rhonchi bilateral upper lobes, decreased air entry in the left lower lobe  gi:normoactive bowel sounds, soft, nontender, nondisteded  Ext: no edema or mottling  Skin: no rashes on exposed skin  Lymph: defer  Musculoskeletal no bony joint deformities      Data     EKG:  Interpreted by HERMELINDA Spivey CNP  Time reviewed: 4:59 PM  Symptoms at time of EKG: Hypoxia  Rhythm: sinus tachycardia  Rate: Tachycardia  Axis: Normal  Ectopy: none  Conduction: normal  ST Segments/ T Waves: No acute ischemic changes  Q Waves: none  Comparison to prior: Compared with similar study on 12/20/2023, rate is faster but no other acute changes    ABG:  -  Recent Labs   Lab 12/20/23  1702   PH 7.23*   PCO2 73*   PO2 71*   HCO3 30*   O2PER 100       Troponin:    9    IMAGING: (X-ray/CT/MRI)   Recent Results (from the past 24 hour(s))   Chest XR,  PA & LAT    Narrative    XR CHEST 2 VIEWS  12/20/2023 1:10 PM       INDICATION: SOB Cough  COMPARISON: 9/2/2022       Impression    IMPRESSION:   New left lower lobe infiltrate consistent with pneumonia.    JARED BURR MD         SYSTEM ID:  [id number]   XR Chest Port 1 View    Narrative    EXAM: XR CHEST PORT 1 VIEW  LOCATION: [facility]  DATE: 12/20/2023    INDICATION: Acute hypoxia, increasing FiO2 requirements.  COMPARISON: 12/20/2023      Impression    IMPRESSION: Minute left pleural effusion with some associated mild to moderate infiltrate/atelectasis in the mid and lower portions of the left lung. The pulmonary vasculature is decreased and now is within normal limits. Otherwise the chest is stable   with again seen borderline heart  size. Partially calcified thoracic aorta. Scattered degenerative changes in the bony skeleton, most marked in the thoracic spine.   CT Chest Pulmonary Embolism w Contrast    Narrative    EXAM: CT CHEST PULMONARY EMBOLISM W CONTRAST  LOCATION: [facility]  DATE: 12/20/2023    INDICATION: sudden severe acute hypoxia, known LLL pna, eval severity and need to r o PE  COMPARISON: Chest x-ray earlier today. CT chest 09/16/2021.  TECHNIQUE: CT chest pulmonary angiogram during arterial phase injection of IV contrast. Multiplanar reformats and MIP reconstructions were performed. Dose reduction techniques were used.   CONTRAST: 66 mL Isovue 370    FINDINGS:  ANGIOGRAM CHEST: Pulmonary arteries are normal caliber and negative for pulmonary emboli. Thoracic aorta is negative for dissection. No CT evidence of right heart strain.    LUNGS AND PLEURA: Moderate infiltrates left upper lobe most prominent in the lingula and to lesser degree in both lower lobes most consistent with pneumonia. Associated moderate mucous plugging most prominent on the left side. Tiny bilateral pleural   effusions.    MEDIASTINUM/AXILLAE: Normal.    CORONARY ARTERY CALCIFICATION: Moderate.    UPPER ABDOMEN: Normal.    MUSCULOSKELETAL: Normal.      Impression    IMPRESSION:  1.  Negative for pulmonary emboli.    2.  Moderate pneumonia most prominent in the lingula of the left upper lobe and mildly involving both lower lobes.    3.  Moderate mucous plugging most prominent on the left side.    4.  Tiny bilateral pleural effusions.       CBC with Diff:  Recent Labs   Lab Test 12/20/23  1655   WBC 11.0   HGB 13.1   MCV 92           Lactic Acid:    Lab Results   Component Value Date    LACT 1.1 12/20/2023    LACT 1.1 02/10/2019       1.2    Comprehensive Metabolic Panel:  Recent Labs   Lab 12/20/23  1655      POTASSIUM 4.4   CHLORIDE 99   CO2 26   ANIONGAP 11   *   BUN 14.5   CR 0.69   GFRESTIMATED 78   VIOLETA 8.7    PHOS 4.4   PROTTOTAL 7.0   ALBUMIN 3.9   BILITOTAL 0.7   ALKPHOS 78   AST 19   ALT 13     Time Spent on this Encounter   I spent 40 minutes of critical care time on the unit/floor managing the care of Lora Vergara. Upon evaluation, this patient had a high probability of imminent or life-threatening deterioration due to acute hypoxic respiratory failure, acute metabolic encephalopathy, which required my direct attention, intervention, and personal management including review of EKG, chest x-ray, ABG, provision of HFNC 100% of my time was spent at the bedside counseling the patient and/or coordinating care regarding services listed in this note.    HERMELINDA Spivey [facility]  Hospitalist Service  [facility]  Securely message with "[employer] (more info)  Text page via [employer] Paging/Directory

## 2023-12-20 NOTE — PROGRESS NOTES
Kirt's test performed prior to radial ABG draw. Collateral circulation confirmed. Sample obtained from patient's right radial artery while on 15 LPM oxymask. Sent to lab for analysis     Dmitri Stewart RRT on 12/20/2023 at 5:11 PM

## 2023-12-21 NOTE — PLAN OF CARE
"Goal Outcome Evaluation:      Plan of Care Reviewed With: patient    Overall Patient Progress: no change    DATE & TIME: 12/20/2023 [id number]  Cognitive Concerns/ Orientation: Mostly A&O x4. Occasionally disoriented to time   BEHAVIOR & AGGRESSION TOOL COLOR: Green   ABNL VS/O2: VSS on HFNC for most of the night then switched to 80% bipap around 0600. Will trial bipap for a couple of hours then determine if she needs to transfer to [facility]  MOBILITY: Bedrest overnight. Baseline independent with a walker  PAIN MANAGMENT: Denied  DIET: Regular   BOWEL/BLADDER: Mostly continent. Dribbles. Purewick in use overnight. Urine dark and odorous   ABNL LAB/BG: Will have repeat ABG's this AM  DRAIN/DEVICES: Left PIV now SL with intermittent abx  SKIN: R lower eyelid red (chronic) with drainage, scattered bruising, pale  TESTS/PROCEDURES: RT to perform chest physiotherapy in the AM  D/C DAY/GOALS/PLACE: Pending, 3+ days. Pt lives independently    OTHER IMPORTANT INFO: Family at bedside during the evening, very supportive. LS diminished with intermittent crackles. Neuro checks WNL.       0605:Updated pt's son, Festus ([phone]) via phone this morning. He is her primary contact but reports being \"bad at answering the phone.\" Festus would like to be updated with changes in status. He also says there will be some family at bedside this morning.   "

## 2023-12-21 NOTE — CONSULTS
"SPIRITUAL HEALTH SERVICES - Consult Note    66    Referral Source: Consult  Saw pt. Lora Vergara, along with her son and granddaughter. Family explained that Lora would like her bi-pap removed and to be moved to comfort care. When asked how she was doing, Lora replied, \"I want to see Thien.\" They requested a prayer for peace and making the best choices. Family is supportive of Lora's decisions. I provided a prayer and we prayed the Lords Prayer together.    Family has already contacted Lora's , who is out of town and unable to be present. Spiritual Health will continue to be available to support this patient and family at their request.    Plan: Please consult for additional Spiritual Health support.    Rody Herrmann  Chaplain Resident    [facility] routine referrals *[phone]    [facility] available 24/7 for emergent requests/referrals, either by paging the on-call  or by entering an ASAP/STAT consult in Epic (this will also page the on-call ).    "

## 2023-12-21 NOTE — PROGRESS NOTES
Kirt's test performed prior to radial ABG draw. Collateral circulation confirmed. Sample obtained from left radial artery while pt on HFNC with 60% FiO2.    Pedro Pablo Ludwig, RT

## 2023-12-21 NOTE — CONSULTS
"                                                         Pulmonary Consult  Lora Vergara MRN: [id number]  9/11/1925  Date of Admission:12/20/2023  Primary care provider: Paty Chinchilla  ___________________________________    Lora Vergara MRN# [id number]   YOB: 1925 Age: 89 year old   Date of Admission: 12/20/2023     Reason for consult: Mucous plugging         Assessment and Recommendations:     ## Community-acquired pneumonia  ## Mucous plugging    Admitted with a 2-day history of cough and fever with dyspnea and hypoxia the day of admission.  CT scan with dense left-sided infiltrate in the lingula and possible patchy groundglass opacities on the right.  Mucous plugging was also identified in multiple airways on the left side.  Currently requiring BiPAP to maintain oxygenation.  Her presentation is most consistent with bacterial pneumonia.  With her mucous plugging, her oxygenation might improve with bronchoscopic therapeutic aspiration of secretions, however her oxygen requirements are too high for this to be performed without intubation and the patient is very clear that she would not want to be intubated.    I discussed our treatment options with the patient and explained that bronchoscopy could help with her oxygenation, but also that bronchoscopy would be risky given her high oxygen requirements and would require intubation.  She was very clear that she does not want intubation, even if only temporary for a procedure.  We briefly discussed her goals.  She said that right now she would like to focus on restorative cares, but said she feels like \"she might never leave the hospital\".  I clarified if that meant she thought she might die and she said yes.  I asked her what she would want to do in that situation, and she told me that if it does not seem like she is going to recover she would like us to focus on keeping her comfortable and allow her to pass away.    -Agree with antibiotics for " community-acquired pneumonia  -I have ordered scheduled albuterol and Mucomyst as well as volara for mucus clearance  -I would avoid DuoNebs/ipratropium in this setting as it can thicken secretions and make them more difficult to expel        Delfino Frye M.D.  Pulmonary & Critical Care  Pager: Click Here to page      I spent 60 minutes dedicated to this care so far today excluding procedures, including review of medical records, review of imaging (results & images), time with patient and time in documentation.             HPI:     Lora Vergara is a 89 year old female with HO hypertension, bronchiectasis, coronary disease,, moderate mitral stenosis with mild mitral regurgitation basal cell carcinoma, and obesity admitted 12/20/2023 for a 3-day history of cough and fever with dyspnea and hypoxia as low as the 80s at home on the day of admission.    Initially on high flow nasal cannula, then advanced to BiPAP therapy which she continues on this morning.  Afebrile in the hospital, no leukocytosis.  Influenza/RSV/COVID swab negative.  Blood culture with no growth to date.    Unable to obtain detailed history due to acute illness           Past Medical History:     Past Medical History:   Diagnosis Date    Benign essential hypertension     Bronchiectasis (H)     CAD (coronary artery disease)     mild to moderate disease    History of basal cell carcinoma     Hypothyroidism     Impaired fasting glucose     Microvascular angina     chronic    Obesity (BMI 30-39.9)     Peripheral neuropathy     Pure hypercholesterolemia               Past Surgical History:      Past Surgical History:   Procedure Laterality Date    APPENDECTOMY      BLADDER SURGERY      bladder suspension    CHOLECYSTECTOMY      CORONARY ANGIOGRAPHY ADULT ORDER  2008    Mild CAD. LAD w/20% stenosis, RCA with 30-40% stenosis. No flow limiting obstructions.    HYSTERECTOMY      OOPHORECTOMY Bilateral     PHACOEMULSIFICATION CLEAR CORNEA WITH STANDARD  INTRAOCULAR LENS IMPLANT Right 07/21/2015    Procedure: PHACOEMULSIFICATION CLEAR CORNEA WITH STANDARD INTRAOCULAR LENS IMPLANT;  Surgeon: Schuyler Chapa MD;  Location: [facility]    PHACOEMULSIFICATION CLEAR CORNEA WITH STANDARD INTRAOCULAR LENS IMPLANT Left 08/18/2015    Procedure: PHACOEMULSIFICATION CLEAR CORNEA WITH STANDARD INTRAOCULAR LENS IMPLANT;  Surgeon: Schuyler Chapa MD;  Location: [facility]              Social History:     Social History     Socioeconomic History    Marital status:      Spouse name: Not on file    Number of children: Not on file    Years of education: Not on file    Highest education level: Not on file   Occupational History    Occupation: Retired - book-keeper   Tobacco Use    Smoking status: Never    Smokeless tobacco: Never   Vaping Use    Vaping Use: Never used   Substance and Sexual Activity    Alcohol use: Not Currently    Drug use: No    Sexual activity: Not Currently   Other Topics Concern    Parent/sibling w/ CABG, MI or angioplasty before 65F 55M? No     Service Not Asked    Blood Transfusions Not Asked    Caffeine Concern Yes     Comment: 4 cups caffeine per day    Occupational Exposure Not Asked    Hobby Hazards Not Asked    Sleep Concern No    Stress Concern No    Weight Concern No    Special Diet No    Back Care Not Asked    Exercise Yes     Comment: exercises 30 minutes, 4 days week    Bike Helmet Not Asked    Seat Belt Not Asked    Self-Exams Not Asked   Social History Narrative     x 2.    Independent living - senior living.     Two sons; one has since passed away.    2 grandchildren.    3 great grandchildren.    3 great great grandchildren.     Social Determinants of Health     Financial Resource Strain: Low Risk  (11/10/2023)    Financial Resource Strain     Within the past 12 months, have you or your family members you live with been unable to get utilities (heat, electricity) when it was really needed?: No   Food Insecurity: Low Risk   (11/10/2023)    Food Insecurity     Within the past 12 months, did you worry that your food would run out before you got money to buy more?: No     Within the past 12 months, did the food you bought just not last and you didn t have money to get more?: No   Transportation Needs: Low Risk  (11/10/2023)    Transportation Needs     Within the past 12 months, has lack of transportation kept you from medical appointments, getting your medicines, non-medical meetings or appointments, work, or from getting things that you need?: No   Physical Activity: Not on file   Stress: Not on file   Social Connections: Not on file   Interpersonal Safety: Not on file   Housing Stability: Low Risk  (11/10/2023)    Housing Stability     Do you have housing? : Yes     Are you worried about losing your housing?: No               Family History:     Family History   Problem Relation Age of Onset    Cerebrovascular Disease Mother     Neurologic Disorder Mother         PD    Pancreatic Cancer Father     Lung Cancer Brother         smoker    Diabetes Type 2  Son     Throat cancer Son         smoker    Diabetes No family hx of     Myocardial Infarction No family hx of     Coronary Artery Disease Early Onset No family hx of     Breast Cancer No family hx of     Colon Cancer No family hx of     Ovarian Cancer No family hx of               Immunizations:     Immunization History   Administered Date(s) Administered    COVID-19 12+ (2023-24) (Pfizer) 11/10/2023    COVID-19 Bivalent 18+ (Moderna) 09/15/2022    COVID-19 Monovalent 18+ (Moderna) 01/23/2021, 02/20/2021, 12/01/2021    Influenza (High Dose) 3 valent vaccine 10/05/2016, 10/10/2017, 10/12/2018    Influenza (IIV3) PF 10/03/2012, 10/17/2013, 10/06/2014, 10/09/2015    Influenza Vaccine 65+ (Fluzone HD) 10/06/2020, 10/01/2021, 09/15/2022, 10/09/2023    Pneumo Conj 13-V (2010&after) 12/20/2016    Pneumococcal 23 valent 10/10/2003    Zoster recombinant adjuvanted (SHINGRIX) 10/01/2021    Zoster  vaccine, live 06/03/2010              Allergies:     Allergies   Allergen Reactions    Hydrochlorothiazide Other (See Comments)     Low sodium                Medications:     Current Facility-Administered Medications   Medication    acetaminophen (TYLENOL) tablet 650 mg    Or    acetaminophen (TYLENOL) Suppository 650 mg    albuterol (PROVENTIL) neb solution 2.5 mg    aspirin EC tablet 81 mg    atorvastatin (LIPITOR) tablet 40 mg    azithromycin (ZITHROMAX) 250 mg in sodium chloride 0.9 % 250 mL intermittent infusion    carboxymethylcellulose PF (REFRESH PLUS) 0.5 % ophthalmic solution 1 drop    cefTRIAXone (ROCEPHIN) 2 g vial to attach to  ml bag for ADULTS or NS 50 ml bag for PEDS    dorzolamide-timolol (COSOPT) ophthalmic solution 1 drop    guaiFENesin-codeine (ROBITUSSIN AC) 100-10 MG/5ML solution 5 mL    ipratropium (ATROVENT) 0.06 % spray 2 spray    isosorbide mononitrate (IMDUR) 24 hr tablet 120 mg    latanoprost (XALATAN) 0.005 % ophthalmic solution 1 drop    lidocaine (LMX4) cream    lidocaine 1 % 0.1-1 mL    melatonin tablet 1 mg    metoprolol tartrate (LOPRESSOR) tablet 50 mg    nitroGLYcerin (NITROSTAT) sublingual tablet 0.4 mg    No lozenges or gum should be given while patient on BIPAP/AVAPS/AVAPS AE    ondansetron (ZOFRAN ODT) ODT tab 4 mg    Or    ondansetron (ZOFRAN) injection 4 mg    Patient may continue current oral medications    pregabalin (LYRICA) capsule 150 mg    ranolazine (RANEXA) 12 hr tablet 500 mg    senna-docusate (SENOKOT-S/PERICOLACE) 8.6-50 MG per tablet 1 tablet    Or    senna-docusate (SENOKOT-S/PERICOLACE) 8.6-50 MG per tablet 2 tablet    sodium chloride (PF) 0.9% PF flush 3 mL    sodium chloride (PF) 0.9% PF flush 3 mL               Review of Systems:     Review of Systems   Respiratory:  Positive for cough, sputum production and shortness of breath. Negative for wheezing.               Exam:   BP (!) 166/89 (BP Location: Left arm)   Pulse 101   Temp 97.5  F (36.4  C)  "(Oral)   Resp 21   Ht 1.651 m (5' 5\")   Wt 80 kg (176 lb 5.9 oz)   LMP  (LMP Unknown)   SpO2 96%   BMI 29.35 kg/m      Vitals:    12/20/23 1105 12/20/23 1800   Weight: 78.5 kg (173 lb) 80 kg (176 lb 5.9 oz)         Physical Exam  Vitals and nursing note reviewed.   Constitutional:       Appearance: Normal appearance.      Comments: Resting with eyes closed, but does open her eyes to verbal stimulation.  Appears alert and cognitively intact.   Cardiovascular:      Rate and Rhythm: Regular rhythm. Tachycardia present.   Pulmonary:      Comments: Substantial rhonchi on the left and possible mild rhonchi on the right.  Occasional wheezes on the left.  Musculoskeletal:      Right lower leg: No edema.      Left lower leg: No edema.                Data:   ROUTINE ICU LABS (Last four results)  CMP  Recent Labs   Lab 12/20/23  1655 12/20/23  1645 12/20/23  1134     --  135   POTASSIUM 4.4  --  4.5   CHLORIDE 99  --  100   CO2 26  --  29   ANIONGAP 11  --  6*   * 180* 140*   BUN 14.5  --  16.3   CR 0.69  --  0.78   GFRESTIMATED 78  --  68   VIOLETA 8.7  --  9.2   PHOS 4.4  --   --    PROTTOTAL 7.0  --   --    ALBUMIN 3.9  --   --    BILITOTAL 0.7  --   --    ALKPHOS 78  --   --    AST 19  --   --    ALT 13  --   --      CBC  Recent Labs   Lab 12/20/23  1655 12/20/23  1134   WBC 11.0 9.7   RBC 4.39 4.26   HGB 13.1 12.6   HCT 40.3 38.6   MCV 92 91   MCH 29.8 29.6   MCHC 32.5 32.6   RDW 13.6 13.8    180     INFLAMMATIONNo lab results found in last 7 days.    Invalid input(s): \"ESR\"    INRNo lab results found in last 7 days.  Arterial Blood Gas  Recent Labs   Lab 12/20/23  2045 12/20/23  1702   PH 7.31* 7.23*   PCO2 61* 73*   PO2 69* 71*   HCO3 30* 30*   O2PER 60 100       ALL CULTURESNo results for input(s): \"CULT\" in the last 168 hours.           Imaging:     CT chest 12/20/2023  1.  Negative for pulmonary emboli.  2.  Moderate pneumonia most prominent in the lingula of the left upper lobe and mildly " involving both lower lobes.  3.  Moderate mucous plugging most prominent on the left side.  4.  Tiny bilateral pleural effusions.          The above note was dictated using voice recognition software and may include typographical errors. Please contact the author for any clarifications.

## 2023-12-21 NOTE — PLAN OF CARE
Summary: Hx includes acute respiratory failure; CAD/microvascular angina; HTN;moderate mitral stenosis; mild MR;hypothyroidism.    DATE & TIME: 12/20/2023 [id number]       Cognitive Concerns/ Orientation : Patient was A&Ox4 but became unresponsive - see RRT details - currently lethargic but responds to minimal verbal/tactile stimuli; opens eyes spontaneously  BEHAVIOR & AGGRESSION TOOL COLOR: Green  CIWA SCORE: NA   ABNL VS/O2: Hypertensive in 160's/90's; afebrile; 94% on 50% 40LPM hi-flow  MOBILITY: SBA initially - will reassess  PAIN MANAGMENT: Denied - appears comfortable in bed  DIET: Regular - NPO for now till she's more alert  BOWEL/BLADDER: Continent; purewick in place r/t weakness  ABNL LAB/BG: PCO2 60; O2sat venous 78; Bicarb venous 31  DRAIN/DEVICES: Right wrist PIV infusing NS at 75mL/hr and left PIV SL  TELEMETRY RHYTHM: NA  SKIN: Dry, pale but intact; eyelids are reddened  TESTS/PROCEDURES: CXR and CT chest to r/o PE completed - see results  D/C DAY/GOALS/PLACE: Pending  OTHER IMPORTANT INFO: DNR/DNI. RRT was called following patient becoming unresponsive after using the bathroom and ambulating to the bed. See RRT notes for details.

## 2023-12-21 NOTE — PROGRESS NOTES
Patient was A&Ox4; was repeatedly asking to have the BIPAP off. Family was at bedside and patient and family members had decided on [facility]. MD was notified.  came to see the patient and family. At around 1345, patient stated she was done and that she just wanted the BIPAP off. Hi-flow O2 was applied but patient was adamant about leaving it off so she could go. Morphine and Ativan were given to assist with air hunger. At 1516, patient took her last breath surrounded by family. House Officer was called.

## 2023-12-21 NOTE — DEATH PRONOUNCEMENT
MD DEATH PRONOUNCEMENT    Called to pronounce Lora Vergara dead.    Physical Exam: Spontaneous respirations absent, Breath sounds absent, Carotid pulse absent, and Heart sounds absent    Patient was pronounced dead at 15:16 PM, 2023.    Preliminary Cause of Death:   Acute hypoxic and hypercapnic respiratory failure  Left lower lobe community acquired pneumonia    Principal Problem:    Community acquired pneumonia of left lower lobe of lung  Active Problems:    Acute respiratory failure with hypoxia (H)       Infectious disease present?: NO    Communicable disease present? (examples: HIV, chicken pox, TB, Ebola, CJD) :  NO    Multi-drug resistant organism present? (example: MRSA): NO    Please consider an autopsy if any of the following exist:  NO Unexpected or unexplained death during or following any dental, medical, or surgical diagnostic treatment procedures.   NO Death of mother at or up to seven days after delivery.     NO All  and pediatric deaths.     NO Death where the cause is sufficiently obscure to delay completion of the death certificate.   NO Deaths in which autopsy would confirm a suspected illness/condition that would affect surviving family members or recipients of transplanted organs.     The following deaths must be reported to the 's Office:  NO A death that may be due entirely or in part to any factors other than natural disease (recent surgery, recent trauma, suspected abuse/neglect).   NO A death that may be an accident, suicide, or homicide.     NO Any sudden, unexpected death in which there is no prior history of significant heart disease or any other condition associated with sudden death.   NO A death under suspicious, unusual, or unexpected circumstances.    NO Any death which is apparently due to natural causes but in which the  does not have a personal physician familiar with the patient s medical history, social, or environmental situation or the  circumstances of the terminal event.   NO Any death apparently due to Sudden Infant Death Syndrome.     NO Deaths that occur during, in association with, or as consequences of a diagnostic, therapeutic, or anesthetic procedure.   NO Any death in which a fracture of a major bone has occurred within the past (6) six months.   NO A death of persons note seen by their physician within 120 days of demise.     NO Any death in which the  was an inmate of a public institution or was in the custody of Law Enforcement personnel.   NO  All unexpected deaths of children   NO Solid organ donors   NO Unidentified bodies   UNKNOWN Deaths of persons whose bodies are to be cremated or otherwise disposed of so that the bodies will later be unavailable for examination;   NO Deaths unattended by a physician outside of a licensed healthcare facility or licensed residential hospice program   NO Deaths occurring within 24 hours of arrival to a health care facility if death is unexpected.    NO Deaths associated with the decedent s employment.   NO Deaths attributed to acts of terrorism.   NO Any death in which there is uncertainty as to whether it is a medical examiner s care should be discussed with the medical investigator.        Body disposition: Body released to the morgue.

## 2023-12-21 NOTE — PROGRESS NOTES
House [facility] brief RRT follow up:    Received bedside sign out from colleague, HERMELINDA Diggs CNP and was updated that Mandy placed orders for pt to transfer to IMC in setting of FiO2 requirement and recent events.  Was subsequently contacted by nursing via [employer] at 1953 noting pt now awake, conversing with visitor present at bedside, notes maintaining O2 sats > 92% on 50% FiO2 via HFNC.  Nursing inquiring if pt OK to remain on current unit with reduced assignment in setting of pt now more awake, conversing, in no apparent distress, CODE STATUS DNR/DNI.  At this time, pt's room right in front of nursing station, remains on telemetry monitoring, continuous pulse oximetry and Q4H VS.  Will discontinue transfer to IMC at this time.        Addendum: 0576: Contacted by nursing via [employer] noting pt close to maxing out on HFNC, sats mid to upper 80s, RT at bedside, requesting Bipap orders.  Will place pt on Bipap therapy at this time.  May need to consider transfer to IMC pending pt's overall status.      HERMELINDA Kee, CNP  Hospitalist-[place] [facility]  Hospitalist Service  Securely message with [employer] (more info)  Text page via [facility] Paging/Directory     No charge.

## 2023-12-21 NOTE — PROVIDER NOTIFICATION
MD Notification    Notified Person: NP    Notified Person Name: Maxim Hills    Notification Date/Time: 12/21/23 0517    Notification Interaction: Vocera    Purpose of Notification: Pt is close to maxing out on high flow, sats are mid to upper 80's. Respiratory is at bedside. Are you able to place a bipap order just in case?     Orders Received: Yes, will place order!    Comments:

## 2023-12-21 NOTE — DISCHARGE SUMMARY
[facility]    Death Summary - Hospitalist Service     Date of Admission:  12/20/2023  Date of Death: 12/21/2023  Discharging Provider: Sariah Oneil MD    Discharge Diagnoses   Acute hypoxic and hypercapnic respiratory failure secondary to mucous plugging and left lower lobe community-acquired pneumonia  Left lower lobe community-acquired pneumonia  Syncope secondary to respiratory failure  CAD/microvascular angina  Hypertension  Moderate mitral stenosis  Mild MR  Hyperlipidemia  Hypothyroidism    Cause of death: Acute hypoxic and hypercapnic respiratory failure secondary to mucous plugging due to left lower lobe community-acquired pneumonia    Hospital Course   Patient had presented with respiratory failure.  After admission patient became unresponsive needing RRT evaluation noted to have significant mucous pluggin.  Patient and family was given option of therapeutic bronchoscopy for mucus clearing with but patient had to be intubated for the procedure given her tenous respiratory status.  Patient declined and her family agreed for patient's wishes.  Patient was ultimately changed to comfort care.  Patient passed away while on comfort cares.  Please see my progress note from earlier today for details.      Sariah Oneil MD  [facility]  ______________________________________________________________________      Significant Results and Procedures   Results for orders placed or performed during the hospital encounter of 12/20/23   Chest XR,  PA & LAT    Narrative    XR CHEST 2 VIEWS  12/20/2023 1:10 PM       INDICATION: SOB Cough  COMPARISON: 9/2/2022       Impression    IMPRESSION:   New left lower lobe infiltrate consistent with pneumonia.    JARED BURR MD         SYSTEM ID:  [id number]   XR Chest Port 1 View    Narrative    EXAM: XR CHEST PORT 1 VIEW  LOCATION: [facility]  DATE: 12/20/2023    INDICATION: Acute hypoxia, increasing FiO2  requirements.  COMPARISON: 12/20/2023      Impression    IMPRESSION: Minute left pleural effusion with some associated mild to moderate infiltrate/atelectasis in the mid and lower portions of the left lung. The pulmonary vasculature is decreased and now is within normal limits. Otherwise the chest is stable   with again seen borderline heart size. Partially calcified thoracic aorta. Scattered degenerative changes in the bony skeleton, most marked in the thoracic spine.   CT Chest Pulmonary Embolism w Contrast    Narrative    EXAM: CT CHEST PULMONARY EMBOLISM W CONTRAST  LOCATION: [facility]  DATE: 12/20/2023    INDICATION: sudden severe acute hypoxia, known LLL pna, eval severity and need to r o PE  COMPARISON: Chest x-ray earlier today. CT chest 09/16/2021.  TECHNIQUE: CT chest pulmonary angiogram during arterial phase injection of IV contrast. Multiplanar reformats and MIP reconstructions were performed. Dose reduction techniques were used.   CONTRAST: 66 mL Isovue 370    FINDINGS:  ANGIOGRAM CHEST: Pulmonary arteries are normal caliber and negative for pulmonary emboli. Thoracic aorta is negative for dissection. No CT evidence of right heart strain.    LUNGS AND PLEURA: Moderate infiltrates left upper lobe most prominent in the lingula and to lesser degree in both lower lobes most consistent with pneumonia. Associated moderate mucous plugging most prominent on the left side. Tiny bilateral pleural   effusions.    MEDIASTINUM/AXILLAE: Normal.    CORONARY ARTERY CALCIFICATION: Moderate.    UPPER ABDOMEN: Normal.    MUSCULOSKELETAL: Normal.      Impression    IMPRESSION:  1.  Negative for pulmonary emboli.    2.  Moderate pneumonia most prominent in the lingula of the left upper lobe and mildly involving both lower lobes.    3.  Moderate mucous plugging most prominent on the left side.    4.  Tiny bilateral pleural effusions.       Consultations This Hospital Stay   PHYSICAL THERAPY ADULT  IP CONSULT  CARE MANAGEMENT / SOCIAL WORK IP CONSULT  PULMONARY IP CONSULT  SPIRITUAL HEALTH SERVICES IP CONSULT  SPIRITUAL HEALTH SERVICES IP CONSULT  SPIRITUAL HEALTH SERVICES IP CONSULT  [facility]IENT [facility] ADULT CONSULT    Primary Care Physician   Paty Chinchilla    Time Spent on this Encounter   I, Sariah Oneil MD, personally saw the patient today and spent greater than 30 minutes discharging this patient.

## 2023-12-21 NOTE — PROGRESS NOTES
[facility]    Medicine Progress Note - Hospitalist Service    Date of Admission:  12/20/2023    Assessment & Plan   Lora Vergara is a 89 year old female with a past medical history significant for hypothyroidism, CAD/microvascular angina, HTN admitted on 12/20/2023 after presenting with cough, shortness of breath, and fever.      Acute hypoxic and hypercapnic respiratory failure  Left lower lobe community acquired pneumonia  Unresponsiveness prompting RRT evaluation, likely from mucous plugging causing profound hypoxia  Presents with 3 days of cough and fever with dyspnea developing day of admission.   WBC 9.7. BP initially low 76/40, initially normalized without intervention  *CXR shows new LLL infiltrate consistent with pneumonia  -SARS COV 2 PCR, influenza A/B and RSV negative  -Initially patient was needing 2 L oxygen  -After admission, after patient ambulated to the bathroom, patient became unresponsive turned gray and became limp, never lost pulse and was breathing spontaneously.(Please see RRT note for details-12/22/2023).  After suctioning moderate amount of secretions patient's mentation improved.  But still needing high flow oxygen and maxed out on high flow oxygen started on BiPAP  -Troponin remains negative, EKG with no ischemic changes, Sinus tachycardia with occasional ventricular paced complexes  -Initial venous pH was 7.32, worsened to arterial pH of 7.23 with pCO2 of 73, subsequently improved arterial pH to 7.31  -CT chest PE protocol with negative for PE, moderate pneumonia most prominent in the lingula of left upper lobe and mildly involving both lower lobes with moderate mucous plugging most prominent in the left side, tiny bilateral pleural effusion.  --continue Ceftriaxone and azithromycin started during admission  -Check sputum Gram stain culture/Legionella and strep pneumo antigen in the urine, follow blood culture  -Given mucus plugging in the CT scan with ongoing  need for BiPAP , [facility] pulmonary consulted to see if patient will benefit from therapeutic bronchoscopy.  Patient however against intubation.  Due to her high oxygen needs patient will need intubation for safe bronchoscopy.  Plan is for now to continue with chest physical therapy and conservative measures  -Recheck VBG this morning  --wean oxygen as able  --albuterol nebs prn   --IS  --PT/Sw consult    Addendum: Met with patient, her son and her 2 granddaughters at bedside.  Patient has decided to proceed with comfort care.  Family is in agreement.  Discussed with nursing about starting on comfort meds, removing BiPAP and transition to high flow oxygen so patient is able to communicate with the family better.  Antibiotics, labs, PT OT and aggressive pulmonary care discontinued and comfort care started.  Hospice consulted.     CAD/microvascular angina   HTN  Moderate mitral stenosis  Mild MR  Hyperlipidemia  Follows with [facility].   Last ECHO 9/2020 showed EF 60-65% with moderate MS, mild MR.   Denies recent anginal symptoms  Trop in the ED is WNL. EKG without ST elevation   PTA: asa 81mg, atorvastatin 40mg daily, Imdur 120mg daiy, metoprolol 50mg q am and 100mg q pm, ranolazine 500mg bid, lasix 10mg daily  -Patient currently n.p.o. due to being on BiPAP  -Change metoprolol dose IV 5 mg every 6 hours, hold oral medications for now  -- Blood pressure started coming up, will do low-dose IV Lasix scheduled as patient is also on oral Lasix PTA  -As needed IV labetalol  -Monitor and adjust medication as needed     Hypothyroidism  PTA levothyroxine currently on hold due to n.p.o. status  -If patient remains n.p.o. for a longer duration will do IV equivalent       Peripheral neuropathy  Hold PTA lyrica due to n.p.o. status        Diet: Combination Diet Regular Diet Adult    DVT Prophylaxis: Pneumatic Compression Devices.  Will add Lovenox for DVT prophylaxis  Kumari Catheter: Not present  Lines: None     Cardiac Monitoring:  "ACTIVE order. Indication: acute hypoxic resp failure  Code Status: No CPR- Do NOT Intubate      Clinically Significant Risk Factors Present on Admission                # Drug Induced Platelet Defect: home medication list includes an antiplatelet medication   # Hypertension: Noted on problem list   # Non-Invasive mechanical ventilation: current O2 Device: BiPAP/CPAP  # Acute hypoxic respiratory failure: continue supplemental O2 as needed     # Overweight: Estimated body mass index is 29.35 kg/m  as calculated from the following:    Height as of this encounter: 1.651 m (5' 5\").    Weight as of this encounter: 80 kg (176 lb 5.9 oz).              Disposition Plan     Expected Discharge Date: 12/22/2023                    Sariah Oneil MD  Hospitalist Service  [facility]  Securely message with [employer] (more info)  Text page via [employer] Paging/Directory   ______________________________________________________________________    Interval History   Care assumed, chart reviewed.  Events from last evening reviewed.  RRT was called due to syncopal episode secondary to severe hypoxia.  Patient has since been on BiPAP and unable to be weaned.  Discussed with nursing.  Discussed with pulmonary.  Discussed with patient while Dr. Frye and patient's nurse were in the room about possible need for intubation for bronchoscopy which patient flat out refused.  Called son and updated about plan of care and patient's refusal for bronchoscopy and to continue with conservative treatment for now.    Physical Exam   Vital Signs: Temp: 98.2  F (36.8  C) Temp src: Axillary BP: 134/80 Pulse: 83   Resp: 20 SpO2: 93 % O2 Device: BiPAP/CPAP Oxygen Delivery: 55 LPM  Weight: 176 lbs 5.89 oz    Exam:  Constitutional: Awake, alert and no distress. Appears comfortable  Head: Normocephalic. No masses, lesions, tenderness or abnormalities  ENMT: ENT exam normal, no neck nodes or sinus tenderness  Cardiovascular: RRR.  no " murmurs, no rubs or JVD  Respiratory: Increased work of breathing with use of accessory muscles of respiration, breathing with BiPAP support, scattered crackles present  Gastrointestinal: Abdomen soft, non-tender. BS normal. No masses, organomegaly  : Deferred  Extremities : No edema , no clubbing or cyanosis      Medical Decision Making       60 MINUTES SPENT BY ME on the date of service doing chart review, history, exam, documentation & further activities per the note.      Data     I have personally reviewed the following data over the past 24 hrs:    12.0 (H)  \   14.6   / 159     138 98 13.2 /  129 (H)   4.0 30 (H) 0.64 \     ALT: 13 AST: 19 AP: 78 TBILI: 0.7   ALB: 3.9 TOT PROTEIN: 7.0 LIPASE: N/A     Trop: 9 BNP: N/A     Procal: N/A CRP: N/A Lactic Acid: 2.0         Imaging results reviewed over the past 24 hrs:   Recent Results (from the past 24 hour(s))   Chest XR,  PA & LAT    Narrative    XR CHEST 2 VIEWS  12/20/2023 1:10 PM       INDICATION: SOB Cough  COMPARISON: 9/2/2022       Impression    IMPRESSION:   New left lower lobe infiltrate consistent with pneumonia.    JARED BURR MD         SYSTEM ID:  [id number]   XR Chest Port 1 View    Narrative    EXAM: XR CHEST PORT 1 VIEW  LOCATION: [facility]  DATE: 12/20/2023    INDICATION: Acute hypoxia, increasing FiO2 requirements.  COMPARISON: 12/20/2023      Impression    IMPRESSION: Minute left pleural effusion with some associated mild to moderate infiltrate/atelectasis in the mid and lower portions of the left lung. The pulmonary vasculature is decreased and now is within normal limits. Otherwise the chest is stable   with again seen borderline heart size. Partially calcified thoracic aorta. Scattered degenerative changes in the bony skeleton, most marked in the thoracic spine.   CT Chest Pulmonary Embolism w Contrast    Narrative    EXAM: CT CHEST PULMONARY EMBOLISM W CONTRAST  LOCATION: [facility]  DATE: 12/20/2023    INDICATION: sudden severe acute hypoxia, known LLL pna, eval severity and need to r o PE  COMPARISON: Chest x-ray earlier today. CT chest 09/16/2021.  TECHNIQUE: CT chest pulmonary angiogram during arterial phase injection of IV contrast. Multiplanar reformats and MIP reconstructions were performed. Dose reduction techniques were used.   CONTRAST: 66 mL Isovue 370    FINDINGS:  ANGIOGRAM CHEST: Pulmonary arteries are normal caliber and negative for pulmonary emboli. Thoracic aorta is negative for dissection. No CT evidence of right heart strain.    LUNGS AND PLEURA: Moderate infiltrates left upper lobe most prominent in the lingula and to lesser degree in both lower lobes most consistent with pneumonia. Associated moderate mucous plugging most prominent on the left side. Tiny bilateral pleural   effusions.    MEDIASTINUM/AXILLAE: Normal.    CORONARY ARTERY CALCIFICATION: Moderate.    UPPER ABDOMEN: Normal.    MUSCULOSKELETAL: Normal.      Impression    IMPRESSION:  1.  Negative for pulmonary emboli.    2.  Moderate pneumonia most prominent in the lingula of the left upper lobe and mildly involving both lower lobes.    3.  Moderate mucous plugging most prominent on the left side.    4.  Tiny bilateral pleural effusions.     Recent Labs   Lab 12/21/23  0915 12/20/23  1655 12/20/23  1645 12/20/23  1134   WBC 12.0* 11.0  --  9.7   HGB 14.6 13.1  --  12.6   MCV 91 92  --  91    189  --  180    136  --  135   POTASSIUM 4.0 4.4  --  4.5   CHLORIDE 98 99  --  100   CO2 30* 26  --  29   BUN 13.2 14.5  --  16.3   CR 0.64 0.69  --  0.78   ANIONGAP 10 11  --  6*   IVOLETA 9.4 8.7  --  9.2   * 202* 180* 140*   ALBUMIN  --  3.9  --   --    PROTTOTAL  --  7.0  --   --    BILITOTAL  --  0.7  --   --    ALKPHOS  --  78  --   --    ALT  --  13  --   --    AST  --  19  --   --

## 2023-12-21 NOTE — PROVIDER NOTIFICATION
MD Notification    Notified Person: MD    Notified Person Name:  Clarice    Notification Date/Time: 12/21/2023 0952    Notification Interaction: Vocera-messaging    Purpose of Notification: Sepsis BPA fired. LA 2. Also BP has been elevated. BP meds are ordered po and patient is on BIPAP.    Orders Received: Metoprolol changed to IV.    Comments: Patient was awake. /96; 's. O2sats 96% on BIPAP. Will continue to monitor.

## 2023-12-21 NOTE — PROGRESS NOTES
Placed on BiPAP due to increased oxygen need on HFNC. Pt were sating in mid 80s on HFNC with 100% Fio2, 60 lpm. Rt will continue to monitor and assess effect of BiPAP.      Pedro Pablo Ludwig, RT

## 2023-12-22 LAB
ATRIAL RATE - MUSE: 101 BPM
DIASTOLIC BLOOD PRESSURE - MUSE: NORMAL MMHG
INTERPRETATION ECG - MUSE: NORMAL
P AXIS - MUSE: 46 DEGREES
PR INTERVAL - MUSE: 228 MS
QRS DURATION - MUSE: 86 MS
QT - MUSE: 350 MS
QTC - MUSE: 453 MS
R AXIS - MUSE: 34 DEGREES
SYSTOLIC BLOOD PRESSURE - MUSE: NORMAL MMHG
T AXIS - MUSE: 54 DEGREES
VENTRICULAR RATE- MUSE: 101 BPM

## 2023-12-25 LAB
BACTERIA BLD CULT: NO GROWTH
BACTERIA BLD CULT: NO GROWTH

## 2024-02-06 NOTE — LETTER
Goal Outcome Evaluation: Pt did not want to wear BIPAP tonight,resting comfortably on 2 L.                                               Transition Communication Hand-off for Care Transitions to Next Level of Care Provider    Name: Lora Vergara  : 1925  MRN #: [id number]  Primary Care Provider: Antwon Schmidt     Primary Clinic: [address] [place] [address] [facility] [id number]     Reason for Hospitalization:  Sepsis, due to unspecified organism (H) [A41.9]  Pneumonia due to infectious organism, unspecified laterality, unspecified part of lung [J18.9]  Admit Date/Time: 2/10/2019 10:47 AM  Discharge Date: 2019    Payor Source: Payor: MEDICARE / Plan: MEDICARE / Product Type: Medicare /     Readmission Assessment Measure (CAMILO) Risk Score/category: Average             Reason for Communication Hand-off Referral: Admission diagnoses: PN  Fragility  Other Discharging to [facility]    Discharge Plan:       Concern for non-adherence with plan of care:   Y/N N  Discharge Needs Assessment:  Needs      Most Recent Value   Equipment Currently Used at Home  walker, rolling          Already enrolled in Tele-monitoring program and name of program:  N/A  Follow-up specialty is recommended: No    Follow-up plan:  No future appointments.    Any outstanding tests or procedures:    Procedures     Future Labs/Procedures    Oxygen - Nasal cannula     Comments:    2 Lpm by nasal cannula to keep O2 sats 90% or greater.          Referrals     Future Labs/Procedures    Occupational Therapy Adult Consult     Comments:    Evaluate and treat as clinically indicated.    Reason:  Decondition    Physical Therapy Adult Consult     Comments:    Evaluate and treat as clinically indicated.    Reason:  Decondition            Key Recommendations:  Patient living alone prior to admit.    Pt admitted with pneumonia.  PT recommended home with home care PT, but patient felt she does not feel that she has the strength or endurance to go home as pt lives alone.  Patient was unable to wean off her oxygen [facility]       Thank you for following up  with this patient when she discharges from the TCU,   Aleja Weinstein    AVS/Discharge Summary is the source of truth; this is a helpful guide for improved communication of patient story

## 2025-01-08 NOTE — ED PROVIDER NOTES
History     Chief Complaint:  Cough    HPI   Lora Vergara is a 89 year old female who presents with a cough. The patient reports she began feeling ill with a viral infection approximately two weeks ago. Her main symptom during this time was a constant cough. She believes that she may have had some intermittent low grade fevers during this time. The patient denies any history of COPD or smoking.    Allergies:  Hydrochlorothiazide    Medications:    Lyrica  Amlodipine  Atorvastatin  Docusate sodium  Levothyroxine  Metoprolol  Ranexa  Imdur  Nitroglycerine    Past Medical History:    Angina   Arthritis  Basal cell carcinoma  Coronary artery disease  Hyperlipidemia  Hypertension  Hypothyroidism  Mitral regurgitation  Type 2 diabetes    Past Surgical History:    Appendectomy  Back surgery  Bladder surgery  Cholecystectomy   Coronary angiography  Basal cell plus plastic to nose, face, ear  Hysterectomy  Oophorectomy  Phacoemulsification clear cornea with standard intraocular lens implant x2    Family History:    Cancer  Diabetes    Social History:  Smoking Status: No  Smokeless Tobacco: No  Alcohol Use: Yes   Marital Status:   [5]    Review of Systems   Respiratory: Positive for cough and shortness of breath.    10 point review of systems performed and is negative except as above and in HPI.     Physical Exam   Vitals:  Patient Vitals for the past 24 hrs:   BP Temp Temp src Pulse Resp SpO2 Height Weight   12/26/17 1345 - - - - - 93 % - -   12/26/17 1227 123/62 98  F (36.7  C) Oral 79 16 95 % - -   12/26/17 1204 92/77 - - - - - - -   12/26/17 1200 92/77 - - - - - - -   12/26/17 1130 120/72 - - - - 95 % - -   12/26/17 1100 122/84 - - - - 93 % - -   12/26/17 1030 118/90 - - - - 91 % - -   12/26/17 1000 129/75 - - - - 92 % - -   12/26/17 0945 - - - - - 96 % - -   12/26/17 0930 - - - - - 93 % - -   12/26/17 0900 132/79 - - - - 95 % - -   12/26/17 0845 - - - - - 94 % - -   12/26/17 0833 122/77 99  F (37.2  C) Oral 81  "20 (!) 87 % 1.575 m (5' 2\") 90.7 kg (200 lb)   12/26/17 0830 - - - - - (!) 82 % - -        Physical Exam  General: Resting on the gurney, appears somewhat uncomfortable  Head:  The scalp, face, and head appear normal  Eyes:  The pupils are equal, round, and reactive to light  CV:  Regular rate    Normal S1 and S2  No pathological murmur   Resp:  Frequent cough. Course Breath sounds worse on the right but non focal    Non-labored, no retractions or accessory muscle use  GI:  Abdomen is soft, no rigidity    No distension.      No rebound tenderness.     Non-surgical without peritoneal features.  MS:  No lower extremity redness, swelling, or excess warmth  Skin:  No rash or lesions noted.  Psych:  Awake. Alert.  Normal affect.      Appropriate interactions.      Emergency Department Course     Imaging:  Radiology findings were communicated with the patient who voiced understanding of the findings.  Chest XR, PA & LAT:  IMPRESSION: No acute cardiopulmonary abnormality.  Reading per radiology.     Laboratory:  Laboratory findings were communicated with the patient who voiced understanding of the findings.  Influenza A/B antigen: Negative  CBC: AWNL (WBC 5.3, HGB 14.4, )  BMP: NA: 132(L), Glucose: 133(H), Calcium: 8.4(L), o/w WNL (Creatinine 0.57)     Interventions:  0939 Duoneb, 3 mL, nebulization      Emergency Department Course:  Nursing notes and vitals reviewed.  I performed an exam of the patient as documented above.   The patient was sent for a XR while in the emergency department, results above.      I discussed the treatment plan with the patient. They expressed understanding of this plan and consented to admission. I discussed the patient with Dr. Alaniz , who will admit the patient to a monitored bed for further evaluation and treatment.     I personally reviewed the laboratory results with the patient and answered all related questions prior to admission.    Impression & Plan      Medical Decision " Making:  Lora Vergara is a 89 year old female who presents to the emergency department today with cough and shortness of breath. The patient was initially hypoxic and has never needed oxygen before. She has no underlying pulmonary disease, however she did have some expiratory wheeze and therefore Duoneb was given without much improvement. I attempted to take her off oxygen, however she became hypoxic. She has never needed oxygen in the past and will be admitted for management of acute hypoxia in thew setting of upper respiratory infection. I did discuss the use of antibiotics with the hospitalist service but at this time we will hold off on antibiotics and he will continue to follow her clinical course     Diagnosis:    ICD-10-CM    1. Hypoxia R09.02 CBC with platelets differential     Basic metabolic panel        Disposition:   Admitted        Scribe Disclosure:  Benjamin GRACE, am serving as a scribe at 8:44 AM on 12/26/2017 to document services personally performed by Martha Brady MD, based on my observations and the provider's statements to me.    EMERGENCY DEPARTMENT       Martha Brady MD  12/27/17 0787     What Type Of Note Output Would You Prefer (Optional)?: Bullet Format What Is The Reason For Today's Visit?: Full Body Skin Examination What Is The Reason For Today's Visit? (Being Monitored For X): concerning skin lesions on an annual basis

## 2025-04-15 NOTE — ED NOTES
Bed: ED13  Expected date: 9/2/22  Expected time: 5:20 AM  Means of arrival: Ambulance  Comments:  Jc 513 89F HTN; Nausea    Unable to offer, due to mother's clinical indication